# Patient Record
Sex: MALE | Race: WHITE | Employment: OTHER | URBAN - METROPOLITAN AREA
[De-identification: names, ages, dates, MRNs, and addresses within clinical notes are randomized per-mention and may not be internally consistent; named-entity substitution may affect disease eponyms.]

---

## 2022-01-25 ENCOUNTER — HOSPITAL ENCOUNTER (OUTPATIENT)
Dept: LAB | Age: 67
Discharge: HOME | End: 2022-01-25

## 2022-01-25 DIAGNOSIS — D50.9 IRON DEFICIENCY ANEMIA, UNSPECIFIED IRON DEFICIENCY ANEMIA TYPE: ICD-10-CM

## 2022-01-25 DIAGNOSIS — D51.9 VITAMIN B12 DEFICIENCY ANEMIA, UNSPECIFIED: ICD-10-CM

## 2022-01-25 DIAGNOSIS — R68.89 OTHER GENERAL SYMPTOMS AND SIGNS: ICD-10-CM

## 2022-01-25 LAB
ALBUMIN SERPL-MCNC: 2.4 G/DL (ref 3.2–4.6)
ALBUMIN/GLOB SERPL: 0.5 {RATIO} (ref 1.2–3.5)
ALP SERPL-CCNC: 148 U/L (ref 50–136)
ALT SERPL-CCNC: 10 U/L (ref 12–65)
ANION GAP SERPL CALC-SCNC: 7 MMOL/L (ref 7–16)
AST SERPL-CCNC: 8 U/L (ref 15–37)
BASOPHILS # BLD: 0.1 K/UL (ref 0–0.2)
BASOPHILS NFR BLD: 0 % (ref 0–2)
BILIRUB SERPL-MCNC: 0.4 MG/DL (ref 0.2–1.1)
BUN SERPL-MCNC: 15 MG/DL (ref 8–23)
CALCIUM SERPL-MCNC: 8.8 MG/DL (ref 8.3–10.4)
CHLORIDE SERPL-SCNC: 100 MMOL/L (ref 98–107)
CO2 SERPL-SCNC: 27 MMOL/L (ref 21–32)
CREAT SERPL-MCNC: 0.6 MG/DL (ref 0.8–1.5)
DIFFERENTIAL METHOD BLD: ABNORMAL
EOSINOPHIL # BLD: 0.1 K/UL (ref 0–0.8)
EOSINOPHIL NFR BLD: 1 % (ref 0.5–7.8)
ERYTHROCYTE [DISTWIDTH] IN BLOOD BY AUTOMATED COUNT: 18 % (ref 11.9–14.6)
ERYTHROCYTE [SEDIMENTATION RATE] IN BLOOD: 32 MM/HR (ref 0–15)
FERRITIN SERPL-MCNC: 5 NG/ML (ref 8–388)
FOLATE SERPL-MCNC: 9.1 NG/ML (ref 3.1–17.5)
GLOBULIN SER CALC-MCNC: 4.5 G/DL (ref 2.3–3.5)
GLUCOSE SERPL-MCNC: 122 MG/DL (ref 65–100)
HCT VFR BLD AUTO: 27.3 %
HGB BLD-MCNC: 7.3 G/DL (ref 13.6–17.2)
HGB RETIC QN AUTO: 19 PG (ref 29–35)
IMM GRANULOCYTES # BLD AUTO: 0.1 K/UL (ref 0–0.5)
IMM GRANULOCYTES NFR BLD AUTO: 0 % (ref 0–5)
IMM RETICS NFR: 22 % (ref 2.3–13.4)
IRON SATN MFR SERPL: 5 %
IRON SERPL-MCNC: 14 UG/DL (ref 35–150)
LYMPHOCYTES # BLD: 1.6 K/UL (ref 0.5–4.6)
LYMPHOCYTES NFR BLD: 13 % (ref 13–44)
MCH RBC QN AUTO: 17.3 PG (ref 26.1–32.9)
MCHC RBC AUTO-ENTMCNC: 26.7 G/DL (ref 31.4–35)
MCV RBC AUTO: 64.7 FL (ref 79.6–97.8)
MONOCYTES # BLD: 0.7 K/UL (ref 0.1–1.3)
MONOCYTES NFR BLD: 5 % (ref 4–12)
NEUTS SEG # BLD: 9.7 K/UL (ref 1.7–8.2)
NEUTS SEG NFR BLD: 80 % (ref 43–78)
NRBC # BLD: 0 K/UL (ref 0–0.2)
PLATELET # BLD AUTO: 566 K/UL (ref 150–450)
PMV BLD AUTO: 8.1 FL (ref 9.4–12.3)
POTASSIUM SERPL-SCNC: 3.6 MMOL/L (ref 3.5–5.1)
PROT SERPL-MCNC: 6.9 G/DL (ref 6.3–8.2)
RBC # BLD AUTO: 4.22 M/UL (ref 4.23–5.6)
RETICS # AUTO: 0.09 M/UL (ref 0.03–0.1)
RETICS/RBC NFR AUTO: 2.2 % (ref 0.3–2)
SODIUM SERPL-SCNC: 134 MMOL/L (ref 136–145)
TIBC SERPL-MCNC: 273 UG/DL (ref 250–450)
VIT B12 SERPL-MCNC: 225 PG/ML (ref 193–986)
WBC # BLD AUTO: 12.2 K/UL (ref 4.3–11.1)

## 2022-01-25 PROCEDURE — 82607 VITAMIN B-12: CPT

## 2022-01-25 PROCEDURE — 80053 COMPREHEN METABOLIC PANEL: CPT

## 2022-01-25 PROCEDURE — 83090 ASSAY OF HOMOCYSTEINE: CPT

## 2022-01-25 PROCEDURE — 82746 ASSAY OF FOLIC ACID SERUM: CPT

## 2022-01-25 PROCEDURE — 83540 ASSAY OF IRON: CPT

## 2022-01-25 PROCEDURE — 85025 COMPLETE CBC W/AUTO DIFF WBC: CPT

## 2022-01-25 PROCEDURE — 36415 COLL VENOUS BLD VENIPUNCTURE: CPT

## 2022-01-25 PROCEDURE — 82728 ASSAY OF FERRITIN: CPT

## 2022-01-25 PROCEDURE — 83921 ORGANIC ACID SINGLE QUANT: CPT

## 2022-01-25 PROCEDURE — 85652 RBC SED RATE AUTOMATED: CPT

## 2022-01-25 PROCEDURE — 85046 RETICYTE/HGB CONCENTRATE: CPT

## 2022-01-26 LAB — PATH REV BLD -IMP: NORMAL

## 2022-01-31 ENCOUNTER — HOSPITAL ENCOUNTER (OUTPATIENT)
Dept: INFUSION THERAPY | Age: 67
Discharge: HOME | End: 2022-01-31

## 2022-01-31 VITALS
DIASTOLIC BLOOD PRESSURE: 71 MMHG | TEMPERATURE: 97.2 F | SYSTOLIC BLOOD PRESSURE: 126 MMHG | RESPIRATION RATE: 16 BRPM | HEART RATE: 91 BPM | OXYGEN SATURATION: 97 %

## 2022-01-31 DIAGNOSIS — D50.9 IRON DEFICIENCY ANEMIA, UNSPECIFIED IRON DEFICIENCY ANEMIA TYPE: Primary | ICD-10-CM

## 2022-01-31 LAB — HCYS SERPL-SCNC: 15.3 UMOL/L (ref 0–17.2)

## 2022-01-31 PROCEDURE — 74011250636 HC RX REV CODE- 250/636: Performed by: INTERNAL MEDICINE

## 2022-01-31 PROCEDURE — 96374 THER/PROPH/DIAG INJ IV PUSH: CPT

## 2022-01-31 PROCEDURE — 74011000250 HC RX REV CODE- 250: Performed by: INTERNAL MEDICINE

## 2022-01-31 PROCEDURE — 74011000258 HC RX REV CODE- 258: Performed by: INTERNAL MEDICINE

## 2022-01-31 RX ORDER — DIPHENHYDRAMINE HYDROCHLORIDE 50 MG/ML
25 INJECTION, SOLUTION INTRAMUSCULAR; INTRAVENOUS AS NEEDED
Status: DISPENSED | OUTPATIENT
Start: 2022-01-31 | End: 2022-01-31

## 2022-01-31 RX ORDER — HYDROCORTISONE SODIUM SUCCINATE 100 MG/2ML
100 INJECTION, POWDER, FOR SOLUTION INTRAMUSCULAR; INTRAVENOUS AS NEEDED
Status: DISPENSED | OUTPATIENT
Start: 2022-01-31 | End: 2022-01-31

## 2022-01-31 RX ORDER — SODIUM CHLORIDE 0.9 % (FLUSH) 0.9 %
10 SYRINGE (ML) INJECTION AS NEEDED
Status: ACTIVE | OUTPATIENT
Start: 2022-01-31 | End: 2022-01-31

## 2022-01-31 RX ORDER — SODIUM CHLORIDE 9 MG/ML
10 INJECTION INTRAMUSCULAR; INTRAVENOUS; SUBCUTANEOUS AS NEEDED
Status: ACTIVE | OUTPATIENT
Start: 2022-01-31 | End: 2022-01-31

## 2022-01-31 RX ORDER — HEPARIN 100 UNIT/ML
300-500 SYRINGE INTRAVENOUS AS NEEDED
Status: ACTIVE | OUTPATIENT
Start: 2022-01-31 | End: 2022-01-31

## 2022-01-31 RX ADMIN — SODIUM CHLORIDE 500 ML: 9 INJECTION, SOLUTION INTRAVENOUS at 08:15

## 2022-01-31 RX ADMIN — SODIUM CHLORIDE, PRESERVATIVE FREE 10 ML: 5 INJECTION INTRAVENOUS at 08:05

## 2022-01-31 RX ADMIN — FERUMOXYTOL 510 MG: 510 INJECTION INTRAVENOUS at 08:29

## 2022-01-31 NOTE — PROGRESS NOTES
Pt. Discharged ambulatory. Tolerated infusion well. Discharge instructions given. To return to Infusions on 2/7/22.

## 2022-02-04 LAB
Lab: NORMAL
METHYLMALONATE SERPL-SCNC: 350 NMOL/L (ref 0–378)

## 2022-02-07 ENCOUNTER — HOSPITAL ENCOUNTER (OUTPATIENT)
Dept: INFUSION THERAPY | Age: 67
Discharge: HOME | End: 2022-02-07

## 2022-02-07 VITALS
DIASTOLIC BLOOD PRESSURE: 64 MMHG | SYSTOLIC BLOOD PRESSURE: 120 MMHG | TEMPERATURE: 98.4 F | RESPIRATION RATE: 16 BRPM | OXYGEN SATURATION: 97 % | HEART RATE: 86 BPM

## 2022-02-07 DIAGNOSIS — D50.9 IRON DEFICIENCY ANEMIA, UNSPECIFIED IRON DEFICIENCY ANEMIA TYPE: Primary | ICD-10-CM

## 2022-02-07 PROCEDURE — 74011250636 HC RX REV CODE- 250/636: Performed by: INTERNAL MEDICINE

## 2022-02-07 PROCEDURE — 74011000258 HC RX REV CODE- 258: Performed by: INTERNAL MEDICINE

## 2022-02-07 PROCEDURE — 74011000250 HC RX REV CODE- 250: Performed by: INTERNAL MEDICINE

## 2022-02-07 PROCEDURE — 96365 THER/PROPH/DIAG IV INF INIT: CPT

## 2022-02-07 RX ORDER — SODIUM CHLORIDE 0.9 % (FLUSH) 0.9 %
10 SYRINGE (ML) INJECTION AS NEEDED
Status: ACTIVE | OUTPATIENT
Start: 2022-02-07 | End: 2022-02-07

## 2022-02-07 RX ADMIN — SODIUM CHLORIDE, PRESERVATIVE FREE 10 ML: 5 INJECTION INTRAVENOUS at 08:55

## 2022-02-07 RX ADMIN — SODIUM CHLORIDE, PRESERVATIVE FREE 10 ML: 5 INJECTION INTRAVENOUS at 10:00

## 2022-02-07 RX ADMIN — FERUMOXYTOL 510 MG: 510 INJECTION INTRAVENOUS at 09:13

## 2022-02-07 RX ADMIN — SODIUM CHLORIDE 500 ML: 900 INJECTION, SOLUTION INTRAVENOUS at 08:55

## 2022-02-07 NOTE — PROGRESS NOTES
Patient arrived at [facility]. Assessment completed. No needs voiced at this time. Patient tolerated infusion well and is aware of next appointment on 3/7/2022 @8047. Patient discharged ambulatory.

## 2022-02-25 ENCOUNTER — HOSPITAL ENCOUNTER (OUTPATIENT)
Dept: CT IMAGING | Age: 67
Discharge: HOME | End: 2022-02-25
Attending: INTERNAL MEDICINE

## 2022-02-25 DIAGNOSIS — R63.4 WEIGHT LOSS: ICD-10-CM

## 2022-02-25 DIAGNOSIS — D64.9 ANEMIA, UNSPECIFIED TYPE: ICD-10-CM

## 2022-02-25 LAB — CREAT BLD-MCNC: 0.59 MG/DL (ref 0.8–1.5)

## 2022-02-25 PROCEDURE — 74177 CT ABD & PELVIS W/CONTRAST: CPT

## 2022-02-25 PROCEDURE — 74011000636 HC RX REV CODE- 636: Performed by: INTERNAL MEDICINE

## 2022-02-25 PROCEDURE — 74011000258 HC RX REV CODE- 258: Performed by: INTERNAL MEDICINE

## 2022-02-25 PROCEDURE — 82565 ASSAY OF CREATININE: CPT

## 2022-02-25 RX ORDER — SODIUM CHLORIDE 0.9 % (FLUSH) 0.9 %
10 SYRINGE (ML) INJECTION
Status: COMPLETED | OUTPATIENT
Start: 2022-02-25 | End: 2022-02-25

## 2022-02-25 RX ADMIN — DIATRIZOATE MEGLUMINE AND DIATRIZOATE SODIUM 15 ML: 660; 100 LIQUID ORAL; RECTAL at 10:16

## 2022-02-25 RX ADMIN — SODIUM CHLORIDE 100 ML: 900 INJECTION, SOLUTION INTRAVENOUS at 10:16

## 2022-02-25 RX ADMIN — Medication 10 ML: at 10:16

## 2022-02-25 RX ADMIN — IOPAMIDOL 100 ML: 755 INJECTION, SOLUTION INTRAVENOUS at 10:13

## 2022-03-07 ENCOUNTER — HOSPITAL ENCOUNTER (OUTPATIENT)
Dept: LAB | Age: 67
Discharge: HOME | End: 2022-03-07

## 2022-03-07 DIAGNOSIS — D50.9 IRON DEFICIENCY ANEMIA, UNSPECIFIED IRON DEFICIENCY ANEMIA TYPE: ICD-10-CM

## 2022-03-07 LAB
ALBUMIN SERPL-MCNC: 1.7 G/DL (ref 3.2–4.6)
ALBUMIN/GLOB SERPL: 0.4 {RATIO} (ref 1.2–3.5)
ALP SERPL-CCNC: 130 U/L (ref 50–136)
ALT SERPL-CCNC: 11 U/L (ref 12–65)
ANION GAP SERPL CALC-SCNC: 6 MMOL/L (ref 7–16)
AST SERPL-CCNC: 10 U/L (ref 15–37)
BASOPHILS # BLD: 0 K/UL (ref 0–0.2)
BASOPHILS NFR BLD: 0 % (ref 0–2)
BILIRUB SERPL-MCNC: 0.2 MG/DL (ref 0.2–1.1)
BUN SERPL-MCNC: 8 MG/DL (ref 8–23)
CALCIUM SERPL-MCNC: 8 MG/DL (ref 8.3–10.4)
CHLORIDE SERPL-SCNC: 99 MMOL/L (ref 98–107)
CO2 SERPL-SCNC: 27 MMOL/L (ref 21–32)
CREAT SERPL-MCNC: 0.4 MG/DL (ref 0.8–1.5)
DIFFERENTIAL METHOD BLD: ABNORMAL
EOSINOPHIL # BLD: 0.1 K/UL (ref 0–0.8)
EOSINOPHIL NFR BLD: 1 % (ref 0.5–7.8)
FERRITIN SERPL-MCNC: 39 NG/ML (ref 8–388)
GLOBULIN SER CALC-MCNC: 3.8 G/DL (ref 2.3–3.5)
GLUCOSE SERPL-MCNC: 90 MG/DL (ref 65–100)
HCT VFR BLD AUTO: 27.3 %
HGB BLD-MCNC: 7.8 G/DL (ref 13.6–17.2)
HGB RETIC QN AUTO: 25 PG (ref 29–35)
IMM GRANULOCYTES # BLD AUTO: 0 K/UL (ref 0–0.5)
IMM GRANULOCYTES NFR BLD AUTO: 0 % (ref 0–5)
IMM RETICS NFR: 26.9 % (ref 2.3–13.4)
IRON SATN MFR SERPL: 9 %
IRON SERPL-MCNC: 12 UG/DL (ref 35–150)
LYMPHOCYTES # BLD: 1.4 K/UL (ref 0.5–4.6)
LYMPHOCYTES NFR BLD: 12 % (ref 13–44)
MCH RBC QN AUTO: 21.4 PG (ref 26.1–32.9)
MCHC RBC AUTO-ENTMCNC: 28.6 G/DL (ref 31.4–35)
MCV RBC AUTO: 74.8 FL (ref 79.6–97.8)
MONOCYTES # BLD: 0.9 K/UL (ref 0.1–1.3)
MONOCYTES NFR BLD: 8 % (ref 4–12)
NEUTS SEG # BLD: 8.9 K/UL (ref 1.7–8.2)
NEUTS SEG NFR BLD: 79 % (ref 43–78)
NRBC # BLD: 0 K/UL (ref 0–0.2)
PLATELET # BLD AUTO: 485 K/UL (ref 150–450)
PLATELET COMMENTS,PCOM: ABNORMAL
PMV BLD AUTO: 8.1 FL (ref 9.4–12.3)
POTASSIUM SERPL-SCNC: 3.7 MMOL/L (ref 3.5–5.1)
PROT SERPL-MCNC: 5.5 G/DL (ref 6.3–8.2)
RBC # BLD AUTO: 3.65 M/UL (ref 4.23–5.6)
RBC MORPH BLD: ABNORMAL
RETICS # AUTO: 0.11 M/UL (ref 0.03–0.1)
RETICS/RBC NFR AUTO: 3 % (ref 0.3–2)
SODIUM SERPL-SCNC: 132 MMOL/L (ref 136–145)
TIBC SERPL-MCNC: 127 UG/DL (ref 250–450)
WBC # BLD AUTO: 11.3 K/UL (ref 4.3–11.1)
WBC MORPH BLD: ABNORMAL

## 2022-03-07 PROCEDURE — 80053 COMPREHEN METABOLIC PANEL: CPT

## 2022-03-07 PROCEDURE — 85046 RETICYTE/HGB CONCENTRATE: CPT

## 2022-03-07 PROCEDURE — 83540 ASSAY OF IRON: CPT

## 2022-03-07 PROCEDURE — 36415 COLL VENOUS BLD VENIPUNCTURE: CPT

## 2022-03-07 PROCEDURE — 85025 COMPLETE CBC W/AUTO DIFF WBC: CPT

## 2022-03-07 PROCEDURE — 82728 ASSAY OF FERRITIN: CPT

## 2022-03-09 ENCOUNTER — ANESTHESIA EVENT (OUTPATIENT)
Dept: ENDOSCOPY | Age: 67
End: 2022-03-09

## 2022-03-09 ENCOUNTER — ANESTHESIA (OUTPATIENT)
Dept: ENDOSCOPY | Age: 67
End: 2022-03-09

## 2022-03-09 ENCOUNTER — HOSPITAL ENCOUNTER (OUTPATIENT)
Age: 67
Setting detail: OUTPATIENT SURGERY
Discharge: HOME | End: 2022-03-09
Attending: INTERNAL MEDICINE | Admitting: INTERNAL MEDICINE

## 2022-03-09 VITALS
OXYGEN SATURATION: 97 % | TEMPERATURE: 97.7 F | RESPIRATION RATE: 14 BRPM | DIASTOLIC BLOOD PRESSURE: 65 MMHG | HEART RATE: 64 BPM | SYSTOLIC BLOOD PRESSURE: 106 MMHG

## 2022-03-09 PROCEDURE — 88305 TISSUE EXAM BY PATHOLOGIST: CPT

## 2022-03-09 PROCEDURE — 76060000033 HC ANESTHESIA 1 TO 1.5 HR: Performed by: INTERNAL MEDICINE

## 2022-03-09 PROCEDURE — 74011250636 HC RX REV CODE- 250/636: Performed by: ANESTHESIOLOGY

## 2022-03-09 PROCEDURE — 76040000026: Performed by: INTERNAL MEDICINE

## 2022-03-09 PROCEDURE — 2709999900 HC NON-CHARGEABLE SUPPLY: Performed by: INTERNAL MEDICINE

## 2022-03-09 PROCEDURE — 77030008969: Performed by: INTERNAL MEDICINE

## 2022-03-09 PROCEDURE — 77030021593 HC FCPS BIOP ENDOSC BSC -A: Performed by: INTERNAL MEDICINE

## 2022-03-09 PROCEDURE — 74011000250 HC RX REV CODE- 250: Performed by: STUDENT IN AN ORGANIZED HEALTH CARE EDUCATION/TRAINING PROGRAM

## 2022-03-09 PROCEDURE — 88312 SPECIAL STAINS GROUP 1: CPT

## 2022-03-09 PROCEDURE — 74011250636 HC RX REV CODE- 250/636: Performed by: STUDENT IN AN ORGANIZED HEALTH CARE EDUCATION/TRAINING PROGRAM

## 2022-03-09 RX ORDER — PROPOFOL 10 MG/ML
INJECTION, EMULSION INTRAVENOUS
Status: DISCONTINUED | OUTPATIENT
Start: 2022-03-09 | End: 2022-03-09 | Stop reason: HOSPADM

## 2022-03-09 RX ORDER — PROPOFOL 10 MG/ML
INJECTION, EMULSION INTRAVENOUS AS NEEDED
Status: DISCONTINUED | OUTPATIENT
Start: 2022-03-09 | End: 2022-03-09 | Stop reason: HOSPADM

## 2022-03-09 RX ORDER — LIDOCAINE HYDROCHLORIDE 20 MG/ML
INJECTION, SOLUTION EPIDURAL; INFILTRATION; INTRACAUDAL; PERINEURAL AS NEEDED
Status: DISCONTINUED | OUTPATIENT
Start: 2022-03-09 | End: 2022-03-09 | Stop reason: HOSPADM

## 2022-03-09 RX ORDER — SODIUM CHLORIDE, SODIUM LACTATE, POTASSIUM CHLORIDE, CALCIUM CHLORIDE 600; 310; 30; 20 MG/100ML; MG/100ML; MG/100ML; MG/100ML
1000 INJECTION, SOLUTION INTRAVENOUS CONTINUOUS
Status: DISCONTINUED | OUTPATIENT
Start: 2022-03-09 | End: 2022-03-09 | Stop reason: HOSPADM

## 2022-03-09 RX ADMIN — LIDOCAINE HYDROCHLORIDE 100 MG: 20 INJECTION, SOLUTION EPIDURAL; INFILTRATION; INTRACAUDAL; PERINEURAL at 13:48

## 2022-03-09 RX ADMIN — SODIUM CHLORIDE, SODIUM LACTATE, POTASSIUM CHLORIDE, AND CALCIUM CHLORIDE: 600; 310; 30; 20 INJECTION, SOLUTION INTRAVENOUS at 13:44

## 2022-03-09 RX ADMIN — PHENYLEPHRINE HYDROCHLORIDE 100 MCG: 10 INJECTION INTRAVENOUS at 14:31

## 2022-03-09 RX ADMIN — PROPOFOL 50 MG: 10 INJECTION, EMULSION INTRAVENOUS at 13:48

## 2022-03-09 RX ADMIN — PHENYLEPHRINE HYDROCHLORIDE 100 MCG: 10 INJECTION INTRAVENOUS at 14:50

## 2022-03-09 RX ADMIN — PHENYLEPHRINE HYDROCHLORIDE 100 MCG: 10 INJECTION INTRAVENOUS at 14:45

## 2022-03-09 RX ADMIN — PROPOFOL 160 MCG/KG/MIN: 10 INJECTION, EMULSION INTRAVENOUS at 13:49

## 2022-03-09 NOTE — H&P
History and Physical for Procedures             Date: 3/9/2022     History of Present Illness:  Patient presents to undergo EGD and colonoscopy. Patient was found to have iron deficiency anemia and radiographic evidence of a gastric mass. EUS will also be performed for locoregional staging of gastric mass. Past Medical History:   Diagnosis Date    HTN (hypertension)      Past Surgical History:   Procedure Laterality Date    HX OTHER SURGICAL      none      Family History   Problem Relation Age of Onset    Other Other         non-contributory     Social History     Tobacco Use    Smoking status: Former Smoker    Smokeless tobacco: Former User   Substance Use Topics    Alcohol use: Not Currently        Allergies   Allergen Reactions    Tetanus Vaccines And Toxoid Unknown (comments)     No current facility-administered medications for this encounter. Review of Systems:  A detailed 10 organ review of systems is obtained with pertinent positives as listed in the History of Present Illness. All others are negative. Objective:     Physical Exam:  There were no vitals taken for this visit. General:  Alert and oriented. Heart: Regular rate and rhythm  Lungs:  Clear to auscultation bilaterally  Abdomen: Soft, nontender, nondistended    Impression/Plan:     Proceed with EGD/EUS and colonoscopy as planned. I have discussed with the patient the technique, benefits, alternatives, and risks of these procedures, including medication reaction, immediate or delayed bleeding, or perforation of the gastrointestinal tract.      Signed By: Oscar Salgado MD     March 9, 2022

## 2022-03-09 NOTE — DISCHARGE INSTRUCTIONS
Gastrointestinal Colonoscopy/Flexible Sigmoidoscopy - Lower Exam Discharge Instructions  1. Call Dr. Mendel Peña at [phone] for any problems or questions. 2. Contact the doctors office for follow up appointment as directed  3. Medication may cause drowsiness for several hours, therefore:  · Do not drive or operate machinery for reminder of the day. · No alcohol today. · Do not make any important or legal decisions for 24 hours. · Do not sign any legal documents for 24 hours. 4. Ordinarily, you may resume regular diet and activity after exam unless otherwise specified by your physician. 5. Because of air put into your colon during exam, you may experience some abdominal distension, relieved by the passage of gas, for several hours. 6. Contact your physician if you have any of the following:  · Excessive amount of bleeding - large amount when having a bowel movement. Occasional specks of blood with bowel movement would not be unusual.  · Severe abdominal pain  · Fever or Chills. Gastrointestinal Esophagogastroduodenoscopy (EGD) - Upper Exam Discharge Instructions    6. For mild soreness in your throat you may use Cepacol throat lozenges or warm salt-water gargles as needed. Any additional instructions:  1. Office will call with biopsy report. 2. Eat a high fiber diet. 3. Return to office in 1-2 months. Instructions given to Rusty Sams and other family member.

## 2022-03-09 NOTE — OP NOTES
Procedure:  Esophagogastroduodenoscopy, Endoscopic ultrasound with Doppler     Date of Procedure:  3/9/2022    Patient:  Ghazala Oconnor     1955    Indication:  Gastric mass on CT, iron deficiency anemia     Sedation:  MAC    Pre-Procedure Physical Exam:    Mental status:  alert and oriented  Airway:  normal oropharyngeal airway and neck mobility  CV:  regular rate and rhythm  Respiratory:  clear to auscultation    Procedure:  A History and Physical has been performed, and patient medication allergies have been reviewed. Risks of perforation, hemorrhage, adverse drug reaction, and aspiration were discussed. Informed consent was obtained for the procedure, including sedation. The patient was placed in the left lateral decubitus position. The heart rate, oxygen saturations, blood pressure, and response to care were monitored throughout the procedure. The gastroscope was passed through the mouth and advanced under direct vision to the second portion of the duodenum. The radial echoendoscope was then passed through the mouth and advanced under direct vision to the distal duodenum. As the scope was slowly withdrawn, detailed endoscopic images were obtained from the surrounding organs. The patient tolerated the procedure well. ENDOSCOPIC FINDINGS:  OROPHARYNX:  Cords and pyriform recesses appear normal.    ESOPHAGUS:  The esophagus is normal. The proximal, mid, and distal portions are normal. The Z-Line is intact. STOMACH:  A large, circumferential, fungating mass is seen in the proximal stomach extending distally to the level of the incisura. Biopsies were obtained. DUODENUM:  Normal bulb, second and third portions. The ampulla was well visualized with the echoendoscope and appears normal.     EUS FINDINGS:    STOMACH:  Multiple sweeps were made throughout the stomach visualizing the entire wall from the pylorus to the gastroesophageal junction.  At a distance 38 to 58 cm showed the incisors, there are severe circumferential wall thickening to 25 mm maximally. There is a hypoechoic, heterogeneous mass involving all wall layers with definite extension through the muscularis propria into the visceral peritoneum. There is no convincing invasion of adjacent organs. ESOPHAGUS:  Multiple sweeps were made throughout the esophagus visualizing the entire wall from the gastroesophageal junction to the upper esophageal sphincter. Proximal to the previously described mass, the remainder of the esophageal wall is of normal thickness and normal wall architecture. OTHER ORGANS:  Views of the left lobe of the liver demonstrate no solid mass lesions. The left adrenal gland appears normal. Due to the focused nature of the examination, detailed images of the pancreas and biliary tree were not obtained. There are no pathologically enlarged upper abdominal lymph nodes. There are several sub-centimeter perigastric nodes. FNA was not performed as the needle would traverse the mass. Specimen:  No    Estimated Blood Loss:  None    Implant:  None            Impression:     Gastric neoplasm. If malignancy is confirmed, this is stage T4a N0 MX by EUS criteria. Plan:  1. Resume diet and current medications. 2. Return to office in 1-2 months for ongoing care.

## 2022-03-09 NOTE — ANESTHESIA POSTPROCEDURE EVALUATION
Procedure(s):  ESOPHAGOGASTRODUODENOSCOPY (EGD)  ENDOSCOPIC ULTRASOUND (EUS)  COLONOSCOPY/ 22  ESOPHAGOGASTRODUODENAL (EGD) BIOPSY  ENDOSCOPIC POLYPECTOMY. total IV anesthesia    Anesthesia Post Evaluation      Multimodal analgesia: multimodal analgesia not used between 6 hours prior to anesthesia start to PACU discharge  Patient location during evaluation: PACU  Patient participation: complete - patient participated  Level of consciousness: awake and alert  Pain management: adequate  Airway patency: patent  Anesthetic complications: no  Cardiovascular status: hemodynamically stable  Respiratory status: acceptable  Hydration status: acceptable        INITIAL Post-op Vital signs:   Vitals Value Taken Time   /69 03/09/22 1519   Temp 36.5 °C (97.7 °F) 03/09/22 1452   Pulse 68 03/09/22 1527   Resp 14 03/09/22 1516   SpO2 97 % 03/09/22 1525   Vitals shown include unvalidated device data.

## 2022-03-09 NOTE — ANESTHESIA PREPROCEDURE EVALUATION
Relevant Problems   HEMATOLOGY   (+) MANUEL (iron deficiency anemia)       Anesthetic History               Review of Systems / Medical History      Pulmonary                   Neuro/Psych              Cardiovascular    Hypertension: well controlled              Exercise tolerance: >4 METS     GI/Hepatic/Renal                Endo/Other             Other Findings   Comments: Weight loss  Gastric mass           Physical Exam    Airway  Mallampati: II  TM Distance: > 6 cm  Neck ROM: normal range of motion   Mouth opening: Normal     Cardiovascular  Regular rate and rhythm,  S1 and S2 normal,  no murmur, click, rub, or gallop  Rhythm: regular  Rate: normal         Dental  No notable dental hx       Pulmonary  Breath sounds clear to auscultation               Abdominal         Other Findings            Anesthetic Plan    ASA: 2  Anesthesia type: total IV anesthesia            Anesthetic plan and risks discussed with: Patient

## 2022-03-09 NOTE — OP NOTES
Procedure:  Colonoscopy    Date of Procedure:  3/9/2022    Patient:  Andrews Sanchez     1955    Indication:  Iron deficiency anemia     Sedation:  MAC    Pre-Procedure Exam:    Mental status:  alert and oriented  Airway:  normal oropharyngeal airway and neck mobility  CV:  regular rate and rhythm   Respiratory:  clear to auscultation    Procedure:  A History and Physical has been performed, and patient medication allergies have been reviewed. Risks of perforation, hemorrhage, adverse drug reaction, and aspiration were discussed. Informed consent was obtained for the procedure, including sedation. The patient was placed in the left lateral decubitus position. The heart rate, oxygen saturations, blood pressure, and response to care were monitored throughout the procedure. After performing a rectal exam, the colonoscope was passed through the anus and advanced under direct vision to the cecum, identified by appendiceal orifice and ileocecal valve. The quality of prep was adequate. A careful inspection was made as the colonoscope was withdrawn, including a retroflexed view of the rectum. The patient tolerated the procedure well. Findings:     ANUS:  Anal exam reveals no masses or hemorrhoids. RECTUM:  Internal hemorrhoids were seen in the rectum. SIGMOID COLON:  A few small-mouthed diverticula were seen. DESCENDING COLON:  A few small-mouthed diverticula were seen. SPLENIC FLEXURE:  The splenic flexure is normal.   TRANSVERSE COLON:  One 3 mm sessile polyp was removed using a cold forceps. HEPATIC FLEXURE:  The hepatic flexure is normal.   ASCENDING COLON:  One 4 mm sessile polyp was removed using a cold forceps. CECUM:  The appendiceal orifice appears normal. The ileocecal valve appears normal.   TERMINAL ILEUM:  The terminal ileum was not entered.      Specimen:  Yes    Estimated Blood Loss:  Minimal    Implant:  None           Impression:    Colon polyps. Diverticulosis. Internal hemorrhoids. Plan:  1. Follow up pathology results. 2. Repeat colonoscopy for surveillance based on pathology results. 3. High fiber diet indefinitely. 4. Return to office in 2 months.     Signed:  Mirna Yang MD  3/9/2022  2:24 PM

## 2022-03-14 ENCOUNTER — HOSPITAL ENCOUNTER (OUTPATIENT)
Dept: INFUSION THERAPY | Age: 67
Discharge: HOME | End: 2022-03-14

## 2022-03-14 VITALS
SYSTOLIC BLOOD PRESSURE: 122 MMHG | OXYGEN SATURATION: 99 % | DIASTOLIC BLOOD PRESSURE: 86 MMHG | HEART RATE: 98 BPM | RESPIRATION RATE: 16 BRPM | TEMPERATURE: 98 F

## 2022-03-14 DIAGNOSIS — D50.9 IRON DEFICIENCY ANEMIA, UNSPECIFIED IRON DEFICIENCY ANEMIA TYPE: Primary | ICD-10-CM

## 2022-03-14 PROCEDURE — 96365 THER/PROPH/DIAG IV INF INIT: CPT

## 2022-03-14 PROCEDURE — 74011000250 HC RX REV CODE- 250: Performed by: INTERNAL MEDICINE

## 2022-03-14 PROCEDURE — 74011250636 HC RX REV CODE- 250/636: Performed by: INTERNAL MEDICINE

## 2022-03-14 PROCEDURE — 74011000258 HC RX REV CODE- 258: Performed by: INTERNAL MEDICINE

## 2022-03-14 RX ORDER — DIPHENHYDRAMINE HYDROCHLORIDE 50 MG/ML
25 INJECTION, SOLUTION INTRAMUSCULAR; INTRAVENOUS AS NEEDED
Status: CANCELLED | OUTPATIENT
Start: 2022-03-14

## 2022-03-14 RX ORDER — HEPARIN 100 UNIT/ML
300-500 SYRINGE INTRAVENOUS AS NEEDED
Status: CANCELLED | OUTPATIENT
Start: 2022-03-14

## 2022-03-14 RX ORDER — DIPHENHYDRAMINE HYDROCHLORIDE 50 MG/ML
25 INJECTION, SOLUTION INTRAMUSCULAR; INTRAVENOUS AS NEEDED
Status: CANCELLED | OUTPATIENT
Start: 2022-03-21

## 2022-03-14 RX ORDER — SODIUM CHLORIDE 9 MG/ML
10 INJECTION INTRAMUSCULAR; INTRAVENOUS; SUBCUTANEOUS AS NEEDED
Status: CANCELLED | OUTPATIENT
Start: 2022-03-14

## 2022-03-14 RX ORDER — HYDROCORTISONE SODIUM SUCCINATE 100 MG/2ML
100 INJECTION, POWDER, FOR SOLUTION INTRAMUSCULAR; INTRAVENOUS AS NEEDED
Status: CANCELLED | OUTPATIENT
Start: 2022-03-21

## 2022-03-14 RX ORDER — ACETAMINOPHEN 325 MG/1
650 TABLET ORAL AS NEEDED
Status: CANCELLED
Start: 2022-03-14

## 2022-03-14 RX ORDER — ONDANSETRON 2 MG/ML
8 INJECTION INTRAMUSCULAR; INTRAVENOUS AS NEEDED
Status: CANCELLED | OUTPATIENT
Start: 2022-03-21

## 2022-03-14 RX ORDER — SODIUM CHLORIDE 0.9 % (FLUSH) 0.9 %
10 SYRINGE (ML) INJECTION AS NEEDED
Status: ACTIVE | OUTPATIENT
Start: 2022-03-14 | End: 2022-03-14

## 2022-03-14 RX ORDER — EPINEPHRINE 1 MG/ML
0.3 INJECTION, SOLUTION, CONCENTRATE INTRAVENOUS AS NEEDED
Status: CANCELLED | OUTPATIENT
Start: 2022-03-21

## 2022-03-14 RX ORDER — ALBUTEROL SULFATE 0.83 MG/ML
2.5 SOLUTION RESPIRATORY (INHALATION) AS NEEDED
Status: CANCELLED
Start: 2022-03-14

## 2022-03-14 RX ORDER — HYDROCORTISONE SODIUM SUCCINATE 100 MG/2ML
100 INJECTION, POWDER, FOR SOLUTION INTRAMUSCULAR; INTRAVENOUS AS NEEDED
Status: CANCELLED | OUTPATIENT
Start: 2022-03-14

## 2022-03-14 RX ORDER — HEPARIN 100 UNIT/ML
300-500 SYRINGE INTRAVENOUS AS NEEDED
Status: CANCELLED | OUTPATIENT
Start: 2022-03-21

## 2022-03-14 RX ORDER — SODIUM CHLORIDE 9 MG/ML
10 INJECTION INTRAMUSCULAR; INTRAVENOUS; SUBCUTANEOUS AS NEEDED
Status: CANCELLED | OUTPATIENT
Start: 2022-03-21

## 2022-03-14 RX ORDER — ONDANSETRON 2 MG/ML
8 INJECTION INTRAMUSCULAR; INTRAVENOUS AS NEEDED
Status: CANCELLED | OUTPATIENT
Start: 2022-03-14

## 2022-03-14 RX ORDER — DIPHENHYDRAMINE HYDROCHLORIDE 50 MG/ML
50 INJECTION, SOLUTION INTRAMUSCULAR; INTRAVENOUS AS NEEDED
Status: CANCELLED
Start: 2022-03-14

## 2022-03-14 RX ORDER — DIPHENHYDRAMINE HYDROCHLORIDE 50 MG/ML
50 INJECTION, SOLUTION INTRAMUSCULAR; INTRAVENOUS AS NEEDED
Status: CANCELLED
Start: 2022-03-21

## 2022-03-14 RX ORDER — ACETAMINOPHEN 325 MG/1
650 TABLET ORAL AS NEEDED
Status: CANCELLED
Start: 2022-03-21

## 2022-03-14 RX ORDER — ALBUTEROL SULFATE 0.83 MG/ML
2.5 SOLUTION RESPIRATORY (INHALATION) AS NEEDED
Status: CANCELLED
Start: 2022-03-21

## 2022-03-14 RX ORDER — EPINEPHRINE 1 MG/ML
0.3 INJECTION, SOLUTION, CONCENTRATE INTRAVENOUS AS NEEDED
Status: CANCELLED | OUTPATIENT
Start: 2022-03-14

## 2022-03-14 RX ORDER — SODIUM CHLORIDE 0.9 % (FLUSH) 0.9 %
10 SYRINGE (ML) INJECTION AS NEEDED
Status: CANCELLED | OUTPATIENT
Start: 2022-03-21

## 2022-03-14 RX ADMIN — FERUMOXYTOL 510 MG: 510 INJECTION INTRAVENOUS at 12:06

## 2022-03-14 RX ADMIN — SODIUM CHLORIDE, PRESERVATIVE FREE 10 ML: 5 INJECTION INTRAVENOUS at 11:40

## 2022-03-14 RX ADMIN — SODIUM CHLORIDE, PRESERVATIVE FREE 10 ML: 5 INJECTION INTRAVENOUS at 12:29

## 2022-03-14 RX ADMIN — SODIUM CHLORIDE 500 ML: 900 INJECTION, SOLUTION INTRAVENOUS at 11:40

## 2022-03-14 NOTE — PROGRESS NOTES
Patient arrived to [facility]. Assessment completed. No needs voiced at this time. Patient tolerated infusion well and is aware of next appointment on 3/21/2022 @1400. Patient discharged ambulatory.

## 2022-03-15 ENCOUNTER — HOSPITAL ENCOUNTER (OUTPATIENT)
Dept: PET IMAGING | Age: 67
Discharge: HOME | End: 2022-03-15

## 2022-03-15 DIAGNOSIS — C16.2 MALIGNANT NEOPLASM OF BODY OF STOMACH (HCC): ICD-10-CM

## 2022-03-15 DIAGNOSIS — C16.9 MALIGNANT NEOPLASM OF STOMACH, UNSPECIFIED LOCATION (HCC): ICD-10-CM

## 2022-03-15 LAB
GLUCOSE BLD STRIP.AUTO-MCNC: 94 MG/DL (ref 65–100)
SERVICE CMNT-IMP: NORMAL

## 2022-03-15 PROCEDURE — 82962 GLUCOSE BLOOD TEST: CPT

## 2022-03-15 PROCEDURE — 74011000636 HC RX REV CODE- 636: Performed by: INTERNAL MEDICINE

## 2022-03-15 PROCEDURE — A9552 F18 FDG: HCPCS

## 2022-03-15 RX ORDER — FLUDEOXYGLUCOSE F-18 200 MCI/ML
10 INJECTION INTRAVENOUS ONCE
Status: COMPLETED | OUTPATIENT
Start: 2022-03-15 | End: 2022-03-15

## 2022-03-15 RX ORDER — SODIUM CHLORIDE 0.9 % (FLUSH) 0.9 %
10 SYRINGE (ML) INJECTION
Status: COMPLETED | OUTPATIENT
Start: 2022-03-15 | End: 2022-03-15

## 2022-03-15 RX ADMIN — FLUDEOXYGLUCOSE F-18 13.07 MILLICURIE: 200 INJECTION INTRAVENOUS at 13:08

## 2022-03-15 RX ADMIN — Medication 10 ML: at 13:08

## 2022-03-15 RX ADMIN — DIATRIZOATE MEGLUMINE AND DIATRIZOATE SODIUM 10 ML: 660; 100 LIQUID ORAL; RECTAL at 13:08

## 2022-03-19 PROBLEM — D50.9 IDA (IRON DEFICIENCY ANEMIA): Status: ACTIVE | Noted: 2022-01-25

## 2022-03-21 ENCOUNTER — HOSPITAL ENCOUNTER (OUTPATIENT)
Dept: INFUSION THERAPY | Age: 67
Discharge: HOME | End: 2022-03-21

## 2022-03-21 VITALS
HEART RATE: 93 BPM | OXYGEN SATURATION: 98 % | SYSTOLIC BLOOD PRESSURE: 127 MMHG | DIASTOLIC BLOOD PRESSURE: 86 MMHG | RESPIRATION RATE: 16 BRPM | TEMPERATURE: 98 F

## 2022-03-21 DIAGNOSIS — D50.9 IRON DEFICIENCY ANEMIA, UNSPECIFIED IRON DEFICIENCY ANEMIA TYPE: Primary | ICD-10-CM

## 2022-03-21 PROCEDURE — 96365 THER/PROPH/DIAG IV INF INIT: CPT

## 2022-03-21 PROCEDURE — 74011000250 HC RX REV CODE- 250: Performed by: INTERNAL MEDICINE

## 2022-03-21 PROCEDURE — 74011250636 HC RX REV CODE- 250/636: Performed by: INTERNAL MEDICINE

## 2022-03-21 PROCEDURE — 74011000258 HC RX REV CODE- 258: Performed by: INTERNAL MEDICINE

## 2022-03-21 RX ORDER — SODIUM CHLORIDE 0.9 % (FLUSH) 0.9 %
10 SYRINGE (ML) INJECTION AS NEEDED
Status: DISCONTINUED | OUTPATIENT
Start: 2022-03-21 | End: 2022-03-22 | Stop reason: HOSPADM

## 2022-03-21 RX ADMIN — SODIUM CHLORIDE, PRESERVATIVE FREE 10 ML: 5 INJECTION INTRAVENOUS at 14:15

## 2022-03-21 RX ADMIN — SODIUM CHLORIDE 500 ML: 900 INJECTION, SOLUTION INTRAVENOUS at 14:41

## 2022-03-21 RX ADMIN — FERUMOXYTOL 510 MG: 510 INJECTION INTRAVENOUS at 14:25

## 2022-03-21 NOTE — PROGRESS NOTES
Arrived to the [facility]. Kana completed. Patient tolerated well. Any issues or concerns during appointment: patent declined 30 minute wait time, no other concerns this appt. Patient to follow up with MD regarding any future appts. Discharged ambulatory to home.

## 2022-03-23 ENCOUNTER — HOSPITAL ENCOUNTER (OUTPATIENT)
Dept: GENERAL RADIOLOGY | Age: 67
Discharge: HOME | End: 2022-03-23
Attending: SURGERY

## 2022-03-23 ENCOUNTER — HOSPITAL ENCOUNTER (OUTPATIENT)
Dept: SURGERY | Age: 67
Discharge: HOME | End: 2022-03-23

## 2022-03-23 VITALS
HEIGHT: 68 IN | WEIGHT: 146.3 LBS | TEMPERATURE: 97.8 F | BODY MASS INDEX: 22.17 KG/M2 | SYSTOLIC BLOOD PRESSURE: 127 MMHG | HEART RATE: 58 BPM | OXYGEN SATURATION: 98 % | RESPIRATION RATE: 18 BRPM | DIASTOLIC BLOOD PRESSURE: 81 MMHG

## 2022-03-23 PROBLEM — C16.8 MALIGNANT NEOPLASM OF OVERLAPPING SITES OF STOMACH (HCC): Status: ACTIVE | Noted: 2022-03-23

## 2022-03-23 LAB
ALBUMIN SERPL-MCNC: 1.4 G/DL (ref 3.2–4.6)
ALBUMIN/GLOB SERPL: 0.4 {RATIO} (ref 1.2–3.5)
ALP SERPL-CCNC: 160 U/L (ref 50–136)
ALT SERPL-CCNC: 10 U/L (ref 12–65)
ANION GAP SERPL CALC-SCNC: 4 MMOL/L (ref 7–16)
APPEARANCE UR: CLEAR
APTT PPP: 32.5 SEC (ref 24.1–35.1)
AST SERPL-CCNC: 9 U/L (ref 15–37)
ATRIAL RATE: 86 BPM
BASOPHILS # BLD: 0 K/UL (ref 0–0.2)
BASOPHILS NFR BLD: 0 % (ref 0–2)
BILIRUB SERPL-MCNC: 0.2 MG/DL (ref 0.2–1.1)
BILIRUB UR QL: NEGATIVE
BUN SERPL-MCNC: 10 MG/DL (ref 8–23)
CALCIUM SERPL-MCNC: 7.8 MG/DL (ref 8.3–10.4)
CALCULATED P AXIS, ECG09: 119 DEGREES
CALCULATED R AXIS, ECG10: 122 DEGREES
CALCULATED T AXIS, ECG11: 122 DEGREES
CHLORIDE SERPL-SCNC: 97 MMOL/L (ref 98–107)
CO2 SERPL-SCNC: 31 MMOL/L (ref 21–32)
COLOR UR: YELLOW
CREAT SERPL-MCNC: 0.33 MG/DL (ref 0.8–1.5)
DIAGNOSIS, 93000: NORMAL
DIFFERENTIAL METHOD BLD: ABNORMAL
EOSINOPHIL # BLD: 0.1 K/UL (ref 0–0.8)
EOSINOPHIL NFR BLD: 1 % (ref 0.5–7.8)
ERYTHROCYTE [DISTWIDTH] IN BLOOD BY AUTOMATED COUNT: 24.5 % (ref 11.9–14.6)
GLOBULIN SER CALC-MCNC: 3.9 G/DL (ref 2.3–3.5)
GLUCOSE SERPL-MCNC: 100 MG/DL (ref 65–100)
GLUCOSE UR STRIP.AUTO-MCNC: NEGATIVE MG/DL
HCT VFR BLD AUTO: 27.4 % (ref 41.1–50.3)
HGB BLD-MCNC: 8.1 G/DL (ref 13.6–17.2)
HGB UR QL STRIP: NEGATIVE
IMM GRANULOCYTES # BLD AUTO: 0.1 K/UL (ref 0–0.5)
IMM GRANULOCYTES NFR BLD AUTO: 1 % (ref 0–5)
INR PPP: 1
KETONES UR QL STRIP.AUTO: NEGATIVE MG/DL
LEUKOCYTE ESTERASE UR QL STRIP.AUTO: NEGATIVE
LYMPHOCYTES # BLD: 1.3 K/UL (ref 0.5–4.6)
LYMPHOCYTES NFR BLD: 10 % (ref 13–44)
MCH RBC QN AUTO: 23.6 PG (ref 26.1–32.9)
MCHC RBC AUTO-ENTMCNC: 29.6 G/DL (ref 31.4–35)
MCV RBC AUTO: 79.9 FL (ref 79.6–97.8)
MONOCYTES # BLD: 0.8 K/UL (ref 0.1–1.3)
MONOCYTES NFR BLD: 6 % (ref 4–12)
NEUTS SEG # BLD: 10.9 K/UL (ref 1.7–8.2)
NEUTS SEG NFR BLD: 83 % (ref 43–78)
NITRITE UR QL STRIP.AUTO: NEGATIVE
NRBC # BLD: 0 K/UL (ref 0–0.2)
P-R INTERVAL, ECG05: 170 MS
PH UR STRIP: 7 [PH] (ref 5–9)
PLATELET # BLD AUTO: 359 K/UL (ref 150–450)
PMV BLD AUTO: 8.1 FL (ref 9.4–12.3)
POTASSIUM SERPL-SCNC: 4.6 MMOL/L (ref 3.5–5.1)
PROT SERPL-MCNC: 5.3 G/DL (ref 6.3–8.2)
PROT UR STRIP-MCNC: NEGATIVE MG/DL
PROTHROMBIN TIME: 13.9 SEC (ref 12.6–14.5)
Q-T INTERVAL, ECG07: 366 MS
QRS DURATION, ECG06: 80 MS
QTC CALCULATION (BEZET), ECG08: 437 MS
RBC # BLD AUTO: 3.43 M/UL (ref 4.23–5.6)
SODIUM SERPL-SCNC: 132 MMOL/L (ref 136–145)
SP GR UR REFRACTOMETRY: 1.02 (ref 1–1.02)
UROBILINOGEN UR QL STRIP.AUTO: 1 EU/DL (ref 0.2–1)
VENTRICULAR RATE, ECG03: 86 BPM
WBC # BLD AUTO: 13.2 K/UL (ref 4.3–11.1)

## 2022-03-23 PROCEDURE — 85610 PROTHROMBIN TIME: CPT

## 2022-03-23 PROCEDURE — 81003 URINALYSIS AUTO W/O SCOPE: CPT

## 2022-03-23 PROCEDURE — 85025 COMPLETE CBC W/AUTO DIFF WBC: CPT

## 2022-03-23 PROCEDURE — 85730 THROMBOPLASTIN TIME PARTIAL: CPT

## 2022-03-23 PROCEDURE — 80053 COMPREHEN METABOLIC PANEL: CPT

## 2022-03-23 PROCEDURE — 93005 ELECTROCARDIOGRAM TRACING: CPT | Performed by: SURGERY

## 2022-03-23 NOTE — PERIOP NOTES
Patient verified name and     Order for consent not found in EHR and matches case posting; patient verified. Type 1Bsurgery, walk in assessment complete. Labs per surgeon: cbc,cmp,pt,pttcxr; results pending  Labs per anesthesia protocol: none;   EKG: 3/23/22, 10/19/17 in Care everywhere No changes noted. EKG 3/22/23 suspects arm lead reversal confirmed no reversal with 2 ekgs. Both interpretations read same as 10/19/17. Hospital approved surgical skin cleanser and instructions given per hospital policy. Patient provided with and instructed on educational handouts including Guide to Surgery, Pain Management, Hand Hygiene, Blood Transfusion Education, and [place] [facility] Brochure. Patient answered medical/surgical history questions at their best of ability. All prior to admission medications documented in [facility]. Original medication prescription bottle not visualized during patient appointment. Patient instructed to hold all vitamins 7 days prior to surgery and NSAIDS 5 days prior to surgery, patient verbalized understanding. Patient teach back successful and patient demonstrates knowledge of instructions.

## 2022-03-23 NOTE — PERIOP NOTES
PLEASE CONTINUE TAKING ALL PRESCRIPTION MEDICATIONS UP TO THE DAY OF SURGERY UNLESS OTHERWISE DIRECTED BELOW. DISCONTINUE all vitamins and supplements 7 days prior to surgery. DISCONTINUE Non-Steriodal Anti-Inflammatory (NSAIDS) such as Advil and Aleve 5 days prior to surgery. Home Medications to take  the day of surgery               Home Medications   to Hold           Comments      On the day before surgery please take Acetaminophen 1000mg in the morning and then again before bed. You may substitute for Tylenol 650 mg. Please do not bring home medications with you on the day of surgery unless otherwise directed by your nurse. If you are instructed to bring home medications, please give them to your nurse as they will be administered by the nursing staff. If you have any questions, please call [facility] [phone] or [facility] [phone]. A copy of this note was provided to the patient for reference.

## 2022-03-23 NOTE — PERIOP NOTES
Recent Results (from the past 12 hour(s))   CBC WITH AUTOMATED DIFF    Collection Time: 03/23/22  2:59 PM   Result Value Ref Range    WBC 13.2 (H) 4.3 - 11.1 K/uL    RBC 3.43 (L) 4.23 - 5.6 M/uL    HGB 8.1 (L) 13.6 - 17.2 g/dL    HCT 27.4 (L) 41.1 - 50.3 %    MCV 79.9 79.6 - 97.8 FL    MCH 23.6 (L) 26.1 - 32.9 PG    MCHC 29.6 (L) 31.4 - 35.0 g/dL    RDW 24.5 (H) 11.9 - 14.6 %    PLATELET 960 488 - 765 K/uL    MPV 8.1 (L) 9.4 - 12.3 FL    ABSOLUTE NRBC 0.00 0.0 - 0.2 K/uL    DF AUTOMATED      NEUTROPHILS 83 (H) 43 - 78 %    LYMPHOCYTES 10 (L) 13 - 44 %    MONOCYTES 6 4.0 - 12.0 %    EOSINOPHILS 1 0.5 - 7.8 %    BASOPHILS 0 0.0 - 2.0 %    IMMATURE GRANULOCYTES 1 0.0 - 5.0 %    ABS. NEUTROPHILS 10.9 (H) 1.7 - 8.2 K/UL    ABS. LYMPHOCYTES 1.3 0.5 - 4.6 K/UL    ABS. MONOCYTES 0.8 0.1 - 1.3 K/UL    ABS. EOSINOPHILS 0.1 0.0 - 0.8 K/UL    ABS. BASOPHILS 0.0 0.0 - 0.2 K/UL    ABS. IMM. GRANS. 0.1 0.0 - 0.5 K/UL   METABOLIC PANEL, COMPREHENSIVE    Collection Time: 03/23/22  2:59 PM   Result Value Ref Range    Sodium 132 (L) 136 - 145 mmol/L    Potassium 4.6 3.5 - 5.1 mmol/L    Chloride 97 (L) 98 - 107 mmol/L    CO2 31 21 - 32 mmol/L    Anion gap 4 (L) 7 - 16 mmol/L    Glucose 100 65 - 100 mg/dL    BUN 10 8 - 23 MG/DL    Creatinine 0.33 (L) 0.8 - 1.5 MG/DL    GFR est AA >60 >60 ml/min/1.73m2    GFR est non-AA >60 >60 ml/min/1.73m2    Calcium 7.8 (L) 8.3 - 10.4 MG/DL    Bilirubin, total 0.2 0.2 - 1.1 MG/DL    ALT (SGPT) 10 (L) 12 - 65 U/L    AST (SGOT) 9 (L) 15 - 37 U/L    Alk.  phosphatase 160 (H) 50 - 136 U/L    Protein, total 5.3 (L) 6.3 - 8.2 g/dL    Albumin 1.4 (L) 3.2 - 4.6 g/dL    Globulin 3.9 (H) 2.3 - 3.5 g/dL    A-G Ratio 0.4 (L) 1.2 - 3.5     PROTHROMBIN TIME + INR    Collection Time: 03/23/22  2:59 PM   Result Value Ref Range    Prothrombin time 13.9 12.6 - 14.5 sec    INR 1.0     PTT    Collection Time: 03/23/22  2:59 PM   Result Value Ref Range    aPTT 32.5 24.1 - 35.1 SEC   URINALYSIS W/ RFLX MICROSCOPIC Collection Time: 03/23/22  2:59 PM   Result Value Ref Range    Color YELLOW      Appearance CLEAR      Specific gravity 1.017 1.001 - 1.023      pH (UA) 7.0 5.0 - 9.0      Protein Negative NEG mg/dL    Glucose Negative mg/dL    Ketone Negative NEG mg/dL    Bilirubin Negative NEG      Blood Negative NEG      Urobilinogen 1.0 0.2 - 1.0 EU/dL    Nitrites Negative NEG      Leukocyte Esterase Negative NEG     EKG, 12 LEAD, INITIAL    Collection Time: 03/23/22  3:21 PM   Result Value Ref Range    Ventricular Rate 86 BPM    Atrial Rate 86 BPM    P-R Interval 170 ms    QRS Duration 80 ms    Q-T Interval 366 ms    QTC Calculation (Bezet) 437 ms    Calculated P Axis 119 degrees    Calculated R Axis 122 degrees    Calculated T Axis 122 degrees    Diagnosis       !!! Suspect arm lead reversal, interpretation assumes no reversal  Normal sinus rhythm  Right axis deviation  Anteroseptal infarct , age undetermined  Abnormal ECG  No previous ECGs available     Dr Mercedez Pelletier notified of Hgb  8.1., message left for Vu at Dr Abraham Escamilla office.

## 2022-03-24 PROBLEM — C16.8 MALIGNANT NEOPLASM OF OVERLAPPING SITES OF STOMACH (HCC): Status: ACTIVE | Noted: 2022-03-23

## 2022-03-25 ENCOUNTER — HOSPITAL ENCOUNTER (OUTPATIENT)
Age: 67
Setting detail: OUTPATIENT SURGERY
Discharge: HOME | End: 2022-03-25
Attending: SURGERY | Admitting: SURGERY
Payer: MEDICARE

## 2022-03-25 ENCOUNTER — APPOINTMENT (OUTPATIENT)
Dept: GENERAL RADIOLOGY | Age: 67
End: 2022-03-25
Attending: SURGERY

## 2022-03-25 ENCOUNTER — ANESTHESIA (OUTPATIENT)
Dept: SURGERY | Age: 67
End: 2022-03-25

## 2022-03-25 ENCOUNTER — ANESTHESIA EVENT (OUTPATIENT)
Dept: SURGERY | Age: 67
End: 2022-03-25

## 2022-03-25 VITALS
OXYGEN SATURATION: 99 % | BODY MASS INDEX: 22.13 KG/M2 | DIASTOLIC BLOOD PRESSURE: 77 MMHG | HEART RATE: 70 BPM | HEIGHT: 68 IN | TEMPERATURE: 97.7 F | WEIGHT: 146 LBS | SYSTOLIC BLOOD PRESSURE: 122 MMHG | RESPIRATION RATE: 16 BRPM

## 2022-03-25 DIAGNOSIS — C16.8 MALIGNANT NEOPLASM OF OVERLAPPING SITES OF STOMACH (HCC): Primary | ICD-10-CM

## 2022-03-25 LAB
ALBUMIN SERPL-MCNC: 1.6 G/DL (ref 3.2–4.6)
ALBUMIN/GLOB SERPL: 0.4 {RATIO} (ref 1.2–3.5)
ALP SERPL-CCNC: 175 U/L (ref 50–136)
ALT SERPL-CCNC: 14 U/L (ref 12–65)
ANION GAP SERPL CALC-SCNC: 5 MMOL/L (ref 7–16)
APPEARANCE UR: CLEAR
AST SERPL-CCNC: 14 U/L (ref 15–37)
BASOPHILS # BLD: 0 K/UL (ref 0–0.2)
BASOPHILS NFR BLD: 0 % (ref 0–2)
BILIRUB SERPL-MCNC: 0.2 MG/DL (ref 0.2–1.1)
BILIRUB UR QL: NEGATIVE
BUN SERPL-MCNC: 9 MG/DL (ref 8–23)
CALCIUM SERPL-MCNC: 8.2 MG/DL (ref 8.3–10.4)
CHLORIDE SERPL-SCNC: 96 MMOL/L (ref 98–107)
CO2 SERPL-SCNC: 30 MMOL/L (ref 21–32)
COLOR UR: YELLOW
CREAT SERPL-MCNC: 0.3 MG/DL (ref 0.8–1.5)
DIFFERENTIAL METHOD BLD: ABNORMAL
EOSINOPHIL # BLD: 0 K/UL (ref 0–0.8)
EOSINOPHIL NFR BLD: 0 % (ref 0.5–7.8)
ERYTHROCYTE [DISTWIDTH] IN BLOOD BY AUTOMATED COUNT: 24.9 % (ref 11.9–14.6)
GLOBULIN SER CALC-MCNC: 3.9 G/DL (ref 2.3–3.5)
GLUCOSE SERPL-MCNC: 92 MG/DL (ref 65–100)
GLUCOSE UR STRIP.AUTO-MCNC: NEGATIVE MG/DL
HCT VFR BLD AUTO: 29.1 % (ref 41.1–50.3)
HGB BLD-MCNC: 8.6 G/DL (ref 13.6–17.2)
HGB UR QL STRIP: NEGATIVE
IMM GRANULOCYTES # BLD AUTO: 0.1 K/UL (ref 0–0.5)
IMM GRANULOCYTES NFR BLD AUTO: 1 % (ref 0–5)
INR PPP: 1.1
KETONES UR QL STRIP.AUTO: NEGATIVE MG/DL
LEUKOCYTE ESTERASE UR QL STRIP.AUTO: NEGATIVE
LYMPHOCYTES # BLD: 1 K/UL (ref 0.5–4.6)
LYMPHOCYTES NFR BLD: 6 % (ref 13–44)
MCH RBC QN AUTO: 24 PG (ref 26.1–32.9)
MCHC RBC AUTO-ENTMCNC: 29.6 G/DL (ref 31.4–35)
MCV RBC AUTO: 81.1 FL (ref 79.6–97.8)
MONOCYTES # BLD: 1 K/UL (ref 0.1–1.3)
MONOCYTES NFR BLD: 6 % (ref 4–12)
NEUTS SEG # BLD: 13.6 K/UL (ref 1.7–8.2)
NEUTS SEG NFR BLD: 87 % (ref 43–78)
NITRITE UR QL STRIP.AUTO: NEGATIVE
NRBC # BLD: 0 K/UL (ref 0–0.2)
PH UR STRIP: 8 [PH] (ref 5–9)
PLATELET # BLD AUTO: 383 K/UL (ref 150–450)
PMV BLD AUTO: 8.1 FL (ref 9.4–12.3)
POTASSIUM SERPL-SCNC: 4.2 MMOL/L (ref 3.5–5.1)
PROT SERPL-MCNC: 5.5 G/DL (ref 6.3–8.2)
PROT UR STRIP-MCNC: ABNORMAL MG/DL
PROTHROMBIN TIME: 14.1 SEC (ref 12.6–14.5)
RBC # BLD AUTO: 3.59 M/UL (ref 4.23–5.6)
SODIUM SERPL-SCNC: 131 MMOL/L (ref 138–145)
SP GR UR REFRACTOMETRY: 1.01 (ref 1–1.02)
UROBILINOGEN UR QL STRIP.AUTO: 0.2 EU/DL (ref 0.2–1)
WBC # BLD AUTO: 15.7 K/UL (ref 4.3–11.1)

## 2022-03-25 PROCEDURE — 74011250636 HC RX REV CODE- 250/636: Performed by: REGISTERED NURSE

## 2022-03-25 PROCEDURE — 80053 COMPREHEN METABOLIC PANEL: CPT

## 2022-03-25 PROCEDURE — 71045 X-RAY EXAM CHEST 1 VIEW: CPT

## 2022-03-25 PROCEDURE — 77030014090 HC TRCR OPT AMR -B: Performed by: SURGERY

## 2022-03-25 PROCEDURE — 85025 COMPLETE CBC W/AUTO DIFF WBC: CPT

## 2022-03-25 PROCEDURE — 77030031139 HC SUT VCRL2 J&J -A: Performed by: SURGERY

## 2022-03-25 PROCEDURE — 77030008756 HC TU IRR SUC STRY -B: Performed by: SURGERY

## 2022-03-25 PROCEDURE — 77001 FLUOROGUIDE FOR VEIN DEVICE: CPT | Performed by: SURGERY

## 2022-03-25 PROCEDURE — 76010000161 HC OR TIME 1 TO 1.5 HR INTENSV-TIER 1: Performed by: SURGERY

## 2022-03-25 PROCEDURE — 36561 INSERT TUNNELED CV CATH: CPT | Performed by: SURGERY

## 2022-03-25 PROCEDURE — 77030008518 HC TBNG INSUF ENDO STRY -B: Performed by: SURGERY

## 2022-03-25 PROCEDURE — 49320 DIAG LAPARO SEPARATE PROC: CPT | Performed by: SURGERY

## 2022-03-25 PROCEDURE — 81003 URINALYSIS AUTO W/O SCOPE: CPT

## 2022-03-25 PROCEDURE — 76210000020 HC REC RM PH II FIRST 0.5 HR: Performed by: SURGERY

## 2022-03-25 PROCEDURE — 74011000250 HC RX REV CODE- 250: Performed by: SURGERY

## 2022-03-25 PROCEDURE — 77030008522 HC TBNG INSUF LAPRO STRY -B: Performed by: SURGERY

## 2022-03-25 PROCEDURE — 77030010031 HC SCIS ENDOSC MPLR J&J -C: Performed by: SURGERY

## 2022-03-25 PROCEDURE — 77030018673: Performed by: SURGERY

## 2022-03-25 PROCEDURE — 74011250636 HC RX REV CODE- 250/636: Performed by: SURGERY

## 2022-03-25 PROCEDURE — 77030003575 HC NDL INSUF ENDOPTH J&J -B: Performed by: SURGERY

## 2022-03-25 PROCEDURE — 77030008606 HC TRCR ENDOSC KII AMR -B: Performed by: SURGERY

## 2022-03-25 PROCEDURE — 76210000006 HC OR PH I REC 0.5 TO 1 HR: Performed by: SURGERY

## 2022-03-25 PROCEDURE — 74011250637 HC RX REV CODE- 250/637: Performed by: ANESTHESIOLOGY

## 2022-03-25 PROCEDURE — C1788 PORT, INDWELLING, IMP: HCPCS | Performed by: SURGERY

## 2022-03-25 PROCEDURE — 85610 PROTHROMBIN TIME: CPT

## 2022-03-25 PROCEDURE — 77030002986 HC SUT PROL J&J -A: Performed by: SURGERY

## 2022-03-25 PROCEDURE — 77030040830 HC CATH URETH FOL MDII -A: Performed by: SURGERY

## 2022-03-25 PROCEDURE — 2709999900 HC NON-CHARGEABLE SUPPLY: Performed by: SURGERY

## 2022-03-25 PROCEDURE — 74011000250 HC RX REV CODE- 250: Performed by: NURSE ANESTHETIST, CERTIFIED REGISTERED

## 2022-03-25 PROCEDURE — 76060000033 HC ANESTHESIA 1 TO 1.5 HR: Performed by: SURGERY

## 2022-03-25 PROCEDURE — 77030040922 HC BLNKT HYPOTHRM STRY -A: Performed by: REGISTERED NURSE

## 2022-03-25 PROCEDURE — 74011250636 HC RX REV CODE- 250/636: Performed by: ANESTHESIOLOGY

## 2022-03-25 PROCEDURE — 77030039425 HC BLD LARYNG TRULITE DISP TELE -A: Performed by: REGISTERED NURSE

## 2022-03-25 PROCEDURE — 74011000250 HC RX REV CODE- 250: Performed by: REGISTERED NURSE

## 2022-03-25 PROCEDURE — 77030000038 HC TIP SCIS LAPSCP SURI -B: Performed by: SURGERY

## 2022-03-25 PROCEDURE — 77030019908 HC STETH ESOPH SIMS -A: Performed by: REGISTERED NURSE

## 2022-03-25 PROCEDURE — 88305 TISSUE EXAM BY PATHOLOGIST: CPT

## 2022-03-25 PROCEDURE — 77030040361 HC SLV COMPR DVT MDII -B: Performed by: SURGERY

## 2022-03-25 PROCEDURE — 88112 CYTOPATH CELL ENHANCE TECH: CPT

## 2022-03-25 PROCEDURE — 77030037088 HC TUBE ENDOTRACH ORAL NSL COVD-A: Performed by: REGISTERED NURSE

## 2022-03-25 PROCEDURE — 77030020829: Performed by: SURGERY

## 2022-03-25 DEVICE — POWERPORT IMPLANTABLE PORT WITH ATTACHABLE 8F CHRONOFLEX OPEN-ENDED SINGLE-LUMEN VENOUS CATHETER INTERMEDIATE KIT (WITH SUTURE PLUGS)
Type: IMPLANTABLE DEVICE | Site: CHEST | Status: FUNCTIONAL
Brand: POWERPORT, CHRONOFLEX

## 2022-03-25 RX ORDER — FAMOTIDINE 20 MG/1
20 TABLET, FILM COATED ORAL ONCE
Status: COMPLETED | OUTPATIENT
Start: 2022-03-25 | End: 2022-03-25

## 2022-03-25 RX ORDER — MIDAZOLAM HYDROCHLORIDE 1 MG/ML
2 INJECTION, SOLUTION INTRAMUSCULAR; INTRAVENOUS ONCE
Status: DISCONTINUED | OUTPATIENT
Start: 2022-03-25 | End: 2022-03-25 | Stop reason: HOSPADM

## 2022-03-25 RX ORDER — ONDANSETRON 2 MG/ML
INJECTION INTRAMUSCULAR; INTRAVENOUS AS NEEDED
Status: DISCONTINUED | OUTPATIENT
Start: 2022-03-25 | End: 2022-03-25 | Stop reason: HOSPADM

## 2022-03-25 RX ORDER — GLYCOPYRROLATE 0.2 MG/ML
INJECTION INTRAMUSCULAR; INTRAVENOUS AS NEEDED
Status: DISCONTINUED | OUTPATIENT
Start: 2022-03-25 | End: 2022-03-25 | Stop reason: HOSPADM

## 2022-03-25 RX ORDER — SODIUM CHLORIDE, SODIUM LACTATE, POTASSIUM CHLORIDE, CALCIUM CHLORIDE 600; 310; 30; 20 MG/100ML; MG/100ML; MG/100ML; MG/100ML
75 INJECTION, SOLUTION INTRAVENOUS CONTINUOUS
Status: DISCONTINUED | OUTPATIENT
Start: 2022-03-25 | End: 2022-03-25 | Stop reason: HOSPADM

## 2022-03-25 RX ORDER — LIDOCAINE HYDROCHLORIDE 10 MG/ML
0.1 INJECTION INFILTRATION; PERINEURAL AS NEEDED
Status: DISCONTINUED | OUTPATIENT
Start: 2022-03-25 | End: 2022-03-25 | Stop reason: HOSPADM

## 2022-03-25 RX ORDER — OXYCODONE HYDROCHLORIDE 5 MG/1
5 TABLET ORAL
Status: DISCONTINUED | OUTPATIENT
Start: 2022-03-25 | End: 2022-03-25 | Stop reason: HOSPADM

## 2022-03-25 RX ORDER — HYDROMORPHONE HYDROCHLORIDE 2 MG/ML
0.5 INJECTION, SOLUTION INTRAMUSCULAR; INTRAVENOUS; SUBCUTANEOUS
Status: DISCONTINUED | OUTPATIENT
Start: 2022-03-25 | End: 2022-03-25 | Stop reason: HOSPADM

## 2022-03-25 RX ORDER — SODIUM CHLORIDE 0.9 % (FLUSH) 0.9 %
5-40 SYRINGE (ML) INJECTION AS NEEDED
Status: DISCONTINUED | OUTPATIENT
Start: 2022-03-25 | End: 2022-03-25 | Stop reason: HOSPADM

## 2022-03-25 RX ORDER — BUPIVACAINE HYDROCHLORIDE 5 MG/ML
INJECTION, SOLUTION EPIDURAL; INTRACAUDAL AS NEEDED
Status: DISCONTINUED | OUTPATIENT
Start: 2022-03-25 | End: 2022-03-25 | Stop reason: HOSPADM

## 2022-03-25 RX ORDER — HEPARIN 100 UNIT/ML
SYRINGE INTRAVENOUS AS NEEDED
Status: DISCONTINUED | OUTPATIENT
Start: 2022-03-25 | End: 2022-03-25 | Stop reason: HOSPADM

## 2022-03-25 RX ORDER — DEXAMETHASONE SODIUM PHOSPHATE 4 MG/ML
INJECTION, SOLUTION INTRA-ARTICULAR; INTRALESIONAL; INTRAMUSCULAR; INTRAVENOUS; SOFT TISSUE AS NEEDED
Status: DISCONTINUED | OUTPATIENT
Start: 2022-03-25 | End: 2022-03-25 | Stop reason: HOSPADM

## 2022-03-25 RX ORDER — EPHEDRINE SULFATE/0.9% NACL/PF 50 MG/5 ML
SYRINGE (ML) INTRAVENOUS AS NEEDED
Status: DISCONTINUED | OUTPATIENT
Start: 2022-03-25 | End: 2022-03-25 | Stop reason: HOSPADM

## 2022-03-25 RX ORDER — NEOSTIGMINE METHYLSULFATE 1 MG/ML
INJECTION, SOLUTION INTRAVENOUS AS NEEDED
Status: DISCONTINUED | OUTPATIENT
Start: 2022-03-25 | End: 2022-03-25 | Stop reason: HOSPADM

## 2022-03-25 RX ORDER — FENTANYL CITRATE 50 UG/ML
INJECTION, SOLUTION INTRAMUSCULAR; INTRAVENOUS AS NEEDED
Status: DISCONTINUED | OUTPATIENT
Start: 2022-03-25 | End: 2022-03-25 | Stop reason: HOSPADM

## 2022-03-25 RX ORDER — LIDOCAINE HYDROCHLORIDE 20 MG/ML
INJECTION, SOLUTION EPIDURAL; INFILTRATION; INTRACAUDAL; PERINEURAL AS NEEDED
Status: DISCONTINUED | OUTPATIENT
Start: 2022-03-25 | End: 2022-03-25 | Stop reason: HOSPADM

## 2022-03-25 RX ORDER — CEFAZOLIN SODIUM/WATER 2 G/20 ML
2 SYRINGE (ML) INTRAVENOUS ONCE
Status: COMPLETED | OUTPATIENT
Start: 2022-03-25 | End: 2022-03-25

## 2022-03-25 RX ORDER — PROPOFOL 10 MG/ML
INJECTION, EMULSION INTRAVENOUS AS NEEDED
Status: DISCONTINUED | OUTPATIENT
Start: 2022-03-25 | End: 2022-03-25 | Stop reason: HOSPADM

## 2022-03-25 RX ORDER — OXYCODONE HYDROCHLORIDE 5 MG/1
10 TABLET ORAL
Status: DISCONTINUED | OUTPATIENT
Start: 2022-03-25 | End: 2022-03-25 | Stop reason: HOSPADM

## 2022-03-25 RX ORDER — FENTANYL CITRATE 50 UG/ML
100 INJECTION, SOLUTION INTRAMUSCULAR; INTRAVENOUS ONCE
Status: DISCONTINUED | OUTPATIENT
Start: 2022-03-25 | End: 2022-03-25 | Stop reason: HOSPADM

## 2022-03-25 RX ORDER — SODIUM CHLORIDE 0.9 % (FLUSH) 0.9 %
5-40 SYRINGE (ML) INJECTION EVERY 8 HOURS
Status: DISCONTINUED | OUTPATIENT
Start: 2022-03-25 | End: 2022-03-25 | Stop reason: HOSPADM

## 2022-03-25 RX ORDER — HYDROCODONE BITARTRATE AND ACETAMINOPHEN 5; 325 MG/1; MG/1
1 TABLET ORAL
Qty: 8 TABLET | Refills: 0 | Status: SHIPPED | OUTPATIENT
Start: 2022-03-25 | End: 2022-03-28

## 2022-03-25 RX ORDER — ROCURONIUM BROMIDE 10 MG/ML
INJECTION, SOLUTION INTRAVENOUS AS NEEDED
Status: DISCONTINUED | OUTPATIENT
Start: 2022-03-25 | End: 2022-03-25 | Stop reason: HOSPADM

## 2022-03-25 RX ADMIN — ONDANSETRON 4 MG: 2 INJECTION INTRAMUSCULAR; INTRAVENOUS at 15:07

## 2022-03-25 RX ADMIN — Medication 10 MG: at 14:42

## 2022-03-25 RX ADMIN — PHENYLEPHRINE HYDROCHLORIDE 100 MCG: 10 INJECTION INTRAVENOUS at 14:19

## 2022-03-25 RX ADMIN — PHENYLEPHRINE HYDROCHLORIDE 200 MCG: 10 INJECTION INTRAVENOUS at 14:33

## 2022-03-25 RX ADMIN — GLYCOPYRROLATE 0.2 MG: 0.2 INJECTION, SOLUTION INTRAMUSCULAR; INTRAVENOUS at 15:10

## 2022-03-25 RX ADMIN — Medication 2 G: at 14:17

## 2022-03-25 RX ADMIN — Medication 1 MG: at 15:10

## 2022-03-25 RX ADMIN — SODIUM CHLORIDE, SODIUM LACTATE, POTASSIUM CHLORIDE, AND CALCIUM CHLORIDE: 600; 310; 30; 20 INJECTION, SOLUTION INTRAVENOUS at 15:20

## 2022-03-25 RX ADMIN — GLYCOPYRROLATE 0.2 MG: 0.2 INJECTION, SOLUTION INTRAMUSCULAR; INTRAVENOUS at 15:12

## 2022-03-25 RX ADMIN — Medication 1 MG: at 15:12

## 2022-03-25 RX ADMIN — PROPOFOL 160 MG: 10 INJECTION, EMULSION INTRAVENOUS at 14:11

## 2022-03-25 RX ADMIN — Medication 1 MG: at 15:13

## 2022-03-25 RX ADMIN — SODIUM CHLORIDE, SODIUM LACTATE, POTASSIUM CHLORIDE, AND CALCIUM CHLORIDE 75 ML/HR: 600; 310; 30; 20 INJECTION, SOLUTION INTRAVENOUS at 12:11

## 2022-03-25 RX ADMIN — FENTANYL CITRATE 100 MCG: 50 INJECTION INTRAMUSCULAR; INTRAVENOUS at 14:11

## 2022-03-25 RX ADMIN — Medication 5 MG: at 14:50

## 2022-03-25 RX ADMIN — PHENYLEPHRINE HYDROCHLORIDE 200 MCG: 10 INJECTION INTRAVENOUS at 14:15

## 2022-03-25 RX ADMIN — DEXAMETHASONE SODIUM PHOSPHATE 4 MG: 4 INJECTION, SOLUTION INTRAMUSCULAR; INTRAVENOUS at 14:17

## 2022-03-25 RX ADMIN — ROCURONIUM BROMIDE 40 MG: 10 INJECTION, SOLUTION INTRAVENOUS at 14:12

## 2022-03-25 RX ADMIN — ROCURONIUM BROMIDE 10 MG: 10 INJECTION, SOLUTION INTRAVENOUS at 14:24

## 2022-03-25 RX ADMIN — PHENYLEPHRINE HYDROCHLORIDE 100 MCG: 10 INJECTION INTRAVENOUS at 14:37

## 2022-03-25 RX ADMIN — ROCURONIUM BROMIDE 10 MG: 10 INJECTION, SOLUTION INTRAVENOUS at 14:49

## 2022-03-25 RX ADMIN — FAMOTIDINE 20 MG: 20 TABLET ORAL at 12:10

## 2022-03-25 RX ADMIN — LIDOCAINE HYDROCHLORIDE 100 MG: 20 INJECTION, SOLUTION EPIDURAL; INFILTRATION; INTRACAUDAL; PERINEURAL at 14:11

## 2022-03-25 RX ADMIN — PHENYLEPHRINE HYDROCHLORIDE 100 MCG: 10 INJECTION INTRAVENOUS at 14:26

## 2022-03-25 NOTE — DISCHARGE INSTRUCTIONS
Remove outer dressing and gauze after 48 hours, leave sterile strips on for 7-10 days, OK to shower    May start using port when needed    MEDICATION INTERACTION:    During your procedure you potentially received a medication or medications which may reduce the effectiveness of oral contraceptives. Please consider other forms of contraception for 1 month following your procedure if you are currently using oral contraceptives as your primary form of birth control. In addition to this, we recommend continuing your oral contraceptive as prescribed, unless otherwise instructed by your physician, during this time. ACTIVITY  · As tolerated and as directed by your doctor. · You may shower in 24 hours. Do not take a bath until cleared by MD.     DIET  · Clear liquids until no nausea or vomiting, then light diet for the first day. · Advance to regular diet on second day, unless your doctor orders otherwise. · If nausea and vomiting continues, call your doctor. PAIN  · Take pain medication as directed by your doctor. · Call your doctor if pain is NOT relieved by medication. CALL YOUR DOCTOR IF   · Excessive bleeding that does not stop after holding pressure over the area. · Temperature of 101 degrees F or above. · Excessive redness, swelling or bruising, and/or green or yellow, smelly discharge from incision. After general anesthesia or intravenous sedation, for 24 hours or while taking prescription Narcotics:  · Limit your activities  · A responsible adult needs to be with you for the next 24 hours  · Do not drive and operate hazardous machinery  · Do not make important personal or business decisions  · Do not drink alcoholic beverages  · If you have not urinated within 8 hours after discharge, and you are experiencing discomfort from urinary retention, please go to the nearest ED. · If you have sleep apnea and have a CPAP machine, please use it for all naps and sleeping.   · Please use caution when taking narcotics and any of your home medications that may cause drowsiness. *  Please give a list of your current medications to your Primary Care Provider. *  Please update this list whenever your medications are discontinued, doses are      changed, or new medications (including over-the-counter products) are added. *  Please carry medication information at all times in case of emergency situations. These are general instructions for a healthy lifestyle:  No smoking/ No tobacco products/ Avoid exposure to second hand smoke  Surgeon General's Warning:  Quitting smoking now greatly reduces serious risk to your health. Obesity, smoking, and sedentary lifestyle greatly increases your risk for illness  A healthy diet, regular physical exercise & weight monitoring are important for maintaining a healthy lifestyle    You may be retaining fluid if you have a history of heart failure or if you experience any of the following symptoms:  Weight gain of 3 pounds or more overnight or 5 pounds in a week, increased swelling in our hands or feet or shortness of breath while lying flat in bed. Please call your doctor as soon as you notice any of these symptoms; do not wait until your next office visit.

## 2022-03-25 NOTE — BRIEF OP NOTE
Brief Postoperative Note    Patient: Milagros Camilo  YOB: 1955  MRN: [id number]    Date of Procedure: 3/25/2022     Pre-Op Diagnosis: Malignant neoplasm of stomach, unspecified location ([facility] [address] [C16.9]    Post-Op Diagnosis: Same as preoperative diagnosis. Procedure(s): INFUSAPORT INSERTION with fluoro  LAPAROSCOPY GENERAL DIAGNOSTIC    Surgeon(s): Tyler Pablo MD    Surgical Assistant: None    Anesthesia: General     Estimated Blood Loss (mL): Minimal    Complications: None    Specimens:   ID Type Source Tests Collected by Time Destination   1 : ascites Fresh Abdominal Fluid  Tyler Pablo MD 3/25/2022 1500 Cytology        Implants:   Implant Name Type Inv.  Item Serial No.  Lot No. LRB No. Used Action   PORT ATTACH CATH POWERPORT 8FR --  - [id number]  PORT ATTACH CATH POWERPORT 8FR --   [employer]_WD [id number] Left 1 Implanted       Drains: * No LDAs found *    Findings: 1 fluoro confirmed positioning of port; 2 no definitive gross peritoneal disease but small ascites, which is collected for cytology    Electronically Signed by Edward Joyce MD on 3/25/2022 at 3:18 PM

## 2022-03-25 NOTE — ANESTHESIA PREPROCEDURE EVALUATION
Relevant Problems   HEMATOLOGY   (+) MANUEL (iron deficiency anemia)      PERSONAL HX & FAMILY HX OF CANCER   (+) Malignant neoplasm of overlapping sites of stomach [facility])       Anesthetic History               Review of Systems / Medical History      Pulmonary    COPD: mild               Neuro/Psych              Cardiovascular    Hypertension: well controlled              Exercise tolerance: >4 METS     GI/Hepatic/Renal                Endo/Other        Cancer and anemia     Other Findings   Comments: Weight loss  Gastric mass  Stomach cancer         Physical Exam    Airway  Mallampati: II  TM Distance: > 6 cm  Neck ROM: normal range of motion   Mouth opening: Normal     Cardiovascular  Regular rate and rhythm,  S1 and S2 normal,  no murmur, click, rub, or gallop  Rhythm: regular  Rate: normal         Dental  No notable dental hx       Pulmonary  Breath sounds clear to auscultation               Abdominal         Other Findings            Anesthetic Plan    ASA: 3  Anesthesia type: general            Anesthetic plan and risks discussed with: Patient

## 2022-03-26 NOTE — ANESTHESIA POSTPROCEDURE EVALUATION
Procedure(s): INFUSAPORT INSERTION  LAPAROSCOPY GENERAL DIAGNOSTIC.    general    Anesthesia Post Evaluation      Multimodal analgesia: multimodal analgesia used between 6 hours prior to anesthesia start to PACU discharge  Patient location during evaluation: PACU  Patient participation: complete - patient participated  Level of consciousness: awake  Pain management: adequate  Airway patency: patent  Anesthetic complications: no  Cardiovascular status: acceptable  Respiratory status: acceptable  Hydration status: acceptable  Post anesthesia nausea and vomiting:  none      INITIAL Post-op Vital signs:   Vitals Value Taken Time   /77 03/25/22 1556   Temp 36.5 °C (97.7 °F) 03/25/22 1555   Pulse 74 03/25/22 1557   Resp 16 03/25/22 1555   SpO2 99 % 03/25/22 1557   Vitals shown include unvalidated device data.

## 2022-03-26 NOTE — OP NOTES
[address] [facility]  OPERATIVE REPORT    Name:  Juan Diego Toribio  MR#:  [id number]  :  1955  ACCOUNT #:  [de-identified]  DATE OF SERVICE:  2022    PREOPERATIVE DIAGNOSIS:  Stomach cancer. POSTOPERATIVE DIAGNOSIS:  Stomach cancer. PROCEDURES PERFORMED:  1. Port-A-Cath placement under fluoroscopy. 2.  Diagnostic laparoscopy. SURGEON:  MD Ajith Altman PA    ANESTHESIA:  general    COMPLICATIONS:none    SPECIMENS REMOVED: ascites    IMPLANTS: port    ESTIMATED BLOOD LOSS:  Minimum. INDICATION:  This is a 79-year-old gentleman who presented with a large stomach cancer, which almost occupied his entire stomach and EUS suggested this may be a T4A disease, although the scan shows no evidence of metastatic disease. He needs a port for chemotherapy. I also offered him diagnostic laparoscopy to rule out peritoneal disease. He has refused a feeding tube and he claims he can drink Ensure without difficulty although he cannot take solid food and he has lost a lot of weight. The patient understood the risks and benefits, agreed to proceed with surgery. FINDINGS:  1. Intraoperative fluoroscopy confirmed position of port placement. 2.  Laparoscopy showed no gross peritoneal disease, but he does have a small ascites which is not clear and fluid was collected for cytology. PROCEDURE:  After informed consent obtained, the patient brought into the operating room, left in supine position. General anesthesia was administered. We prepped his neck, chest and abdomen altogether and this was draped. Then, we have the abdomen covered first and started port placement first.  The left subclavian vein was accessed. Venous blood return was obtained. Guidewire was then advanced under the guidance of fluoroscopy followed by a dilator introducer sheath and then the catheter was advanced along the introducer sheath under the guidance of fluoroscopy.   At the same time, pocket was made at the left upper chest.  The catheter was tunneled through the pocket, connected to a reservoir. Both reservoir and catheter was flushed with heparin solution. 100 units/mL with good aspiration and good flush. Then the port was sutured into place with 2-0 Prolene stitch. Incision closed with 3-0 Vicryl stitch on dermal layer, 4-0 Vicryl stitch in subcuticular running fashion. Then the port site was dressed and we moved attention to the abdomen. I used Veress needle technique to create pneumoperitoneum and then 5 mm trocar placed in the right upper quadrant with scope-over-trocar technique. Peritoneal cavity was entered under direct vision. A second 5-mm trocar placed at the epigastrium and the findings as described above. I see no gross positive peritoneal disease. I was able to lift the liver left lobe to look at the part of anterior stomach surface. The stomach does feel thickened, felt like leather. However, no gross disease on the surface of the stomach. He does have small to moderate amount of ascites in the upper abdomen. This was collected for cytology. Then pneumoperitoneum was evacuated. All the trocars were removed and the skin incision closed with 4-0 Vicryl stitch in subcuticular fashion. The patient tolerated the procedure well, transferred to recovery room in stable condition. All the instrument count and lap count were correct. Estimated blood loss was minimum.         Kal Cruz MD      [id number]  D:  03/25/2022 15:30  T:  03/25/2022 21:39  JOB #:  [id number]

## 2022-03-30 ENCOUNTER — DOCUMENTATION ONLY (OUTPATIENT)
Dept: HEMATOLOGY | Age: 67
End: 2022-03-30

## 2022-03-30 NOTE — PROGRESS NOTES
I spoke with Barney Smallwood regarding his Medicare Part A only insurance coverage, potential oral medication authorizations, enrollment in the [address] [place] [address] ([facility]) and the [address] [address] [place] [address] ([address]), and assistance organization resource sheet. Patient will review the cancer programs. Next, I spoke with Barney Smallwood regarding his Medicare Part A only insurance coverage. Next, I spoke with Barney Smallwood regarding the Oncology Care Model Notification Letter. I answered questions regarding the costs associated with Medicare Benefits. I explained to Barney Smallwood the estimated cost of treatment and services for six months under the [employer]. Barney Smallwood was advised to contact Medicare or their healthcare provider for questions or concerns related to service of care. The Oncology Care Model provides different options of contact for Barney Smallwood regarding concerns and complaints of treatments and services. The cost of 6 months of medical treatments and services can be estimated to be $6,917.00. Next, I spoke with Barney Smallwood regarding his Prescription Drug Benefits. Next, I spoke with Barney Selin about the financial assistance application. Next, I spoke with Barney Selin about billing questions and treatment services. We discussed copayments, deductibles, and out of pocket maximums. We discussed cost associated with FLOT medication. Next, I spoke with Barney Smallwood regarding the Limited Power of [employer] document. After reading the form, Barney Smallwood signed the Limited Power of  document. Next, I spoke with Barney Josefapaty regarding potential oral medication authorizations. I told him that if he ever had any problems getting his oral medications filled to give the dedicated [facility]  a call. Most of the time, it is simply an authorization that needs to be done with the insurance company.   Lastly, I gave Barney Smallwood a form with various resource organizations that could assist with specific needs (example:  transportation, lodging, preparing meals, home cleaning). After reviewing the programs Gurpreet Moss signed. Faxed Gurpreet Moss Referral form to the [address] [place], [place] [address]e at [phone]. Phone [phone]. Form scanned into chart. Faxed Physician's Statement to the [address] [place] [address] at [phone]. Phone [phone]. Form scanned into chart. Gurpreet Moss expressed understanding of the information above and all questions were answered to his satisfaction.

## 2022-04-05 ENCOUNTER — PATIENT OUTREACH (OUTPATIENT)
Dept: CASE MANAGEMENT | Age: 67
End: 2022-04-05

## 2022-04-05 ENCOUNTER — HOSPITAL ENCOUNTER (OUTPATIENT)
Dept: INFUSION THERAPY | Age: 67
Discharge: HOME | End: 2022-04-05

## 2022-04-05 DIAGNOSIS — D50.9 IRON DEFICIENCY ANEMIA, UNSPECIFIED IRON DEFICIENCY ANEMIA TYPE: ICD-10-CM

## 2022-04-05 DIAGNOSIS — C16.2 MALIGNANT NEOPLASM OF BODY OF STOMACH (HCC): ICD-10-CM

## 2022-04-05 LAB
ALBUMIN SERPL-MCNC: 1.7 G/DL (ref 3.2–4.6)
ALBUMIN/GLOB SERPL: 0.4 {RATIO} (ref 1.2–3.5)
ALP SERPL-CCNC: 236 U/L (ref 50–136)
ALT SERPL-CCNC: 50 U/L (ref 12–65)
ANION GAP SERPL CALC-SCNC: 7 MMOL/L (ref 7–16)
AST SERPL-CCNC: 31 U/L (ref 15–37)
BASOPHILS # BLD: 0.1 K/UL (ref 0–0.2)
BASOPHILS NFR BLD: 0 % (ref 0–2)
BILIRUB SERPL-MCNC: 0.2 MG/DL (ref 0.2–1.1)
BUN SERPL-MCNC: 17 MG/DL (ref 8–23)
CALCIUM SERPL-MCNC: 8.6 MG/DL (ref 8.3–10.4)
CANCER AG19-9 SERPL-ACNC: 5.2 U/ML (ref 2–37)
CEA SERPL-MCNC: 2.4 NG/ML (ref 0–3)
CHLORIDE SERPL-SCNC: 98 MMOL/L (ref 98–107)
CO2 SERPL-SCNC: 27 MMOL/L (ref 21–32)
CREAT SERPL-MCNC: 0.3 MG/DL (ref 0.8–1.5)
DIFFERENTIAL METHOD BLD: ABNORMAL
EOSINOPHIL # BLD: 0.1 K/UL (ref 0–0.8)
EOSINOPHIL NFR BLD: 1 % (ref 0.5–7.8)
ERYTHROCYTE [DISTWIDTH] IN BLOOD BY AUTOMATED COUNT: 21.6 % (ref 11.9–14.6)
FERRITIN SERPL-MCNC: 152 NG/ML (ref 8–388)
GLOBULIN SER CALC-MCNC: 4.4 G/DL (ref 2.3–3.5)
GLUCOSE SERPL-MCNC: 183 MG/DL (ref 65–100)
HCT VFR BLD AUTO: 26.4 %
HGB BLD-MCNC: 7.7 G/DL (ref 13.6–17.2)
IMM GRANULOCYTES # BLD AUTO: 0.1 K/UL (ref 0–0.5)
IMM GRANULOCYTES NFR BLD AUTO: 1 % (ref 0–5)
IRON SATN MFR SERPL: 15 %
IRON SERPL-MCNC: 21 UG/DL (ref 35–150)
LYMPHOCYTES # BLD: 1.4 K/UL (ref 0.5–4.6)
LYMPHOCYTES NFR BLD: 9 % (ref 13–44)
MAGNESIUM SERPL-MCNC: 1.9 MG/DL (ref 1.8–2.4)
MCH RBC QN AUTO: 24.4 PG (ref 26.1–32.9)
MCHC RBC AUTO-ENTMCNC: 29.2 G/DL (ref 31.4–35)
MCV RBC AUTO: 83.8 FL (ref 79.6–97.8)
MONOCYTES # BLD: 0.5 K/UL (ref 0.1–1.3)
MONOCYTES NFR BLD: 4 % (ref 4–12)
NEUTS SEG # BLD: 13.1 K/UL (ref 1.7–8.2)
NEUTS SEG NFR BLD: 86 % (ref 43–78)
NRBC # BLD: 0 K/UL (ref 0–0.2)
PLATELET # BLD AUTO: 448 K/UL (ref 150–450)
PMV BLD AUTO: 7.7 FL (ref 9.4–12.3)
POTASSIUM SERPL-SCNC: 4.7 MMOL/L (ref 3.5–5.1)
PROT SERPL-MCNC: 6.1 G/DL (ref 6.3–8.2)
RBC # BLD AUTO: 3.15 M/UL (ref 4.23–5.6)
SODIUM SERPL-SCNC: 132 MMOL/L (ref 136–145)
TIBC SERPL-MCNC: 139 UG/DL (ref 250–450)
WBC # BLD AUTO: 15.2 K/UL (ref 4.3–11.1)

## 2022-04-05 PROCEDURE — 85025 COMPLETE CBC W/AUTO DIFF WBC: CPT

## 2022-04-05 PROCEDURE — 82378 CARCINOEMBRYONIC ANTIGEN: CPT

## 2022-04-05 PROCEDURE — 80053 COMPREHEN METABOLIC PANEL: CPT

## 2022-04-05 PROCEDURE — 36591 DRAW BLOOD OFF VENOUS DEVICE: CPT

## 2022-04-05 PROCEDURE — 83540 ASSAY OF IRON: CPT

## 2022-04-05 PROCEDURE — 86301 IMMUNOASSAY TUMOR CA 19-9: CPT

## 2022-04-05 PROCEDURE — 83735 ASSAY OF MAGNESIUM: CPT

## 2022-04-05 PROCEDURE — 82728 ASSAY OF FERRITIN: CPT

## 2022-04-05 RX ORDER — SODIUM CHLORIDE 0.9 % (FLUSH) 0.9 %
10 SYRINGE (ML) INJECTION AS NEEDED
Status: DISCONTINUED | OUTPATIENT
Start: 2022-04-05 | End: 2022-04-07 | Stop reason: HOSPADM

## 2022-04-05 RX ADMIN — Medication 10 ML: at 08:37

## 2022-04-05 NOTE — PROGRESS NOTES
Patient arrived to port lab for port access and lab draw   Port accessed and labs drawn per protocol   *Port deaccessed and flushed. Patient discharged from port lab to [facility].

## 2022-04-05 NOTE — PROGRESS NOTES
4/5/22 saw pt today with ÁNGEL Mills for pre chemo cycle 1 FLOT for gastric cancer. See pt instructions for symptom management. Discussed nutrition at length. Provided opportunity to ask questions and all were discussed. Will add iron studies to labs today. Infusion tomorrow. Follow up in 1 week for tox check and 2 weeks for next cycle. Encouraged to call with any concerns. Navigation will continue to follow.

## 2022-04-06 ENCOUNTER — HOSPITAL ENCOUNTER (OUTPATIENT)
Dept: INFUSION THERAPY | Age: 67
Discharge: HOME | End: 2022-04-06

## 2022-04-06 VITALS
TEMPERATURE: 97.6 F | DIASTOLIC BLOOD PRESSURE: 80 MMHG | OXYGEN SATURATION: 95 % | RESPIRATION RATE: 16 BRPM | BODY MASS INDEX: 20.21 KG/M2 | WEIGHT: 131 LBS | SYSTOLIC BLOOD PRESSURE: 131 MMHG | HEART RATE: 90 BPM

## 2022-04-06 DIAGNOSIS — C16.8 MALIGNANT NEOPLASM OF OVERLAPPING SITES OF STOMACH (HCC): Primary | ICD-10-CM

## 2022-04-06 PROCEDURE — 96415 CHEMO IV INFUSION ADDL HR: CPT

## 2022-04-06 PROCEDURE — 96417 CHEMO IV INFUS EACH ADDL SEQ: CPT

## 2022-04-06 PROCEDURE — G0498 CHEMO EXTEND IV INFUS W/PUMP: HCPCS

## 2022-04-06 PROCEDURE — 96368 THER/DIAG CONCURRENT INF: CPT

## 2022-04-06 PROCEDURE — 96413 CHEMO IV INFUSION 1 HR: CPT

## 2022-04-06 PROCEDURE — 74011250636 HC RX REV CODE- 250/636: Performed by: INTERNAL MEDICINE

## 2022-04-06 PROCEDURE — 74011000258 HC RX REV CODE- 258: Performed by: INTERNAL MEDICINE

## 2022-04-06 PROCEDURE — 96375 TX/PRO/DX INJ NEW DRUG ADDON: CPT

## 2022-04-06 RX ORDER — ACETAMINOPHEN 325 MG/1
650 TABLET ORAL AS NEEDED
Status: CANCELLED
Start: 2022-04-06

## 2022-04-06 RX ORDER — EPINEPHRINE 1 MG/ML
0.3 INJECTION, SOLUTION, CONCENTRATE INTRAVENOUS AS NEEDED
Status: CANCELLED | OUTPATIENT
Start: 2022-04-06

## 2022-04-06 RX ORDER — ONDANSETRON 2 MG/ML
8 INJECTION INTRAMUSCULAR; INTRAVENOUS AS NEEDED
Status: CANCELLED | OUTPATIENT
Start: 2022-04-06

## 2022-04-06 RX ORDER — SODIUM CHLORIDE 0.9 % (FLUSH) 0.9 %
10 SYRINGE (ML) INJECTION AS NEEDED
Status: CANCELLED | OUTPATIENT
Start: 2022-04-07

## 2022-04-06 RX ORDER — DIPHENHYDRAMINE HYDROCHLORIDE 50 MG/ML
25 INJECTION, SOLUTION INTRAMUSCULAR; INTRAVENOUS AS NEEDED
Status: DISPENSED | OUTPATIENT
Start: 2022-04-06 | End: 2022-04-06

## 2022-04-06 RX ORDER — DIPHENHYDRAMINE HYDROCHLORIDE 50 MG/ML
50 INJECTION, SOLUTION INTRAMUSCULAR; INTRAVENOUS AS NEEDED
Status: CANCELLED
Start: 2022-04-06

## 2022-04-06 RX ORDER — ONDANSETRON 2 MG/ML
8 INJECTION INTRAMUSCULAR; INTRAVENOUS ONCE
Status: COMPLETED | OUTPATIENT
Start: 2022-04-06 | End: 2022-04-06

## 2022-04-06 RX ORDER — DEXTROSE MONOHYDRATE 50 MG/ML
25 INJECTION, SOLUTION INTRAVENOUS CONTINUOUS
Status: ACTIVE | OUTPATIENT
Start: 2022-04-06 | End: 2022-04-06

## 2022-04-06 RX ORDER — SODIUM CHLORIDE 9 MG/ML
10 INJECTION INTRAMUSCULAR; INTRAVENOUS; SUBCUTANEOUS AS NEEDED
Status: CANCELLED | OUTPATIENT
Start: 2022-04-07

## 2022-04-06 RX ORDER — SODIUM CHLORIDE 0.9 % (FLUSH) 0.9 %
10 SYRINGE (ML) INJECTION AS NEEDED
Status: CANCELLED | OUTPATIENT
Start: 2022-04-06

## 2022-04-06 RX ORDER — SODIUM CHLORIDE 9 MG/ML
10 INJECTION INTRAMUSCULAR; INTRAVENOUS; SUBCUTANEOUS AS NEEDED
Status: CANCELLED | OUTPATIENT
Start: 2022-04-06

## 2022-04-06 RX ORDER — HYDROCORTISONE SODIUM SUCCINATE 100 MG/2ML
100 INJECTION, POWDER, FOR SOLUTION INTRAMUSCULAR; INTRAVENOUS AS NEEDED
Status: DISPENSED | OUTPATIENT
Start: 2022-04-06 | End: 2022-04-06

## 2022-04-06 RX ORDER — HEPARIN 100 UNIT/ML
300-500 SYRINGE INTRAVENOUS AS NEEDED
Status: CANCELLED
Start: 2022-04-06

## 2022-04-06 RX ORDER — HEPARIN 100 UNIT/ML
300-500 SYRINGE INTRAVENOUS AS NEEDED
Status: CANCELLED
Start: 2022-04-07

## 2022-04-06 RX ORDER — ALBUTEROL SULFATE 0.83 MG/ML
2.5 SOLUTION RESPIRATORY (INHALATION) AS NEEDED
Status: CANCELLED
Start: 2022-04-06

## 2022-04-06 RX ADMIN — DOCETAXEL ANHYDROUS 84 MG: 10 INJECTION, SOLUTION INTRAVENOUS at 09:26

## 2022-04-06 RX ADMIN — ONDANSETRON 8 MG: 2 INJECTION INTRAMUSCULAR; INTRAVENOUS at 08:36

## 2022-04-06 RX ADMIN — FLUOROURACIL 4368 MG: 50 INJECTION, SOLUTION INTRAVENOUS at 12:54

## 2022-04-06 RX ADMIN — OXALIPLATIN 143 MG: 5 INJECTION, SOLUTION INTRAVENOUS at 10:36

## 2022-04-06 RX ADMIN — DEXAMETHASONE SODIUM PHOSPHATE 12 MG: 4 INJECTION, SOLUTION INTRAMUSCULAR; INTRAVENOUS at 08:47

## 2022-04-06 RX ADMIN — DEXTROSE MONOHYDRATE 25 ML/HR: 5 INJECTION, SOLUTION INTRAVENOUS at 08:20

## 2022-04-06 RX ADMIN — LEUCOVORIN CALCIUM 336 MG: 350 INJECTION, POWDER, LYOPHILIZED, FOR SOLUTION INTRAMUSCULAR; INTRAVENOUS at 10:36

## 2022-04-06 NOTE — PROGRESS NOTES
Arrived to the [facility] ambulatory with his wife. taxotere, oxaliplatin completed and CI 5fu pump started over 24hours at 1254. Patient tolerated well. Any issues or concerns during appointment: no.  Patient aware of next infusion appointment on 4/7 at 1700 for pump DC. Patient instructed to call provider with temperature of 100.4 or greater or nausea/vomiting/ diarrhea or pain not controlled by medications  Discharged to home ambulatory.

## 2022-04-07 ENCOUNTER — HOSPITAL ENCOUNTER (OUTPATIENT)
Dept: INFUSION THERAPY | Age: 67
Discharge: HOME | End: 2022-04-07

## 2022-04-07 VITALS
TEMPERATURE: 97.7 F | HEART RATE: 109 BPM | OXYGEN SATURATION: 95 % | RESPIRATION RATE: 18 BRPM | SYSTOLIC BLOOD PRESSURE: 113 MMHG | DIASTOLIC BLOOD PRESSURE: 64 MMHG

## 2022-04-07 DIAGNOSIS — C16.8 MALIGNANT NEOPLASM OF OVERLAPPING SITES OF STOMACH (HCC): Primary | ICD-10-CM

## 2022-04-07 PROCEDURE — 74011000250 HC RX REV CODE- 250: Performed by: INTERNAL MEDICINE

## 2022-04-07 PROCEDURE — 96523 IRRIG DRUG DELIVERY DEVICE: CPT

## 2022-04-07 RX ORDER — SODIUM CHLORIDE 9 MG/ML
10 INJECTION INTRAMUSCULAR; INTRAVENOUS; SUBCUTANEOUS AS NEEDED
Status: DISCONTINUED | OUTPATIENT
Start: 2022-04-07 | End: 2022-04-08 | Stop reason: HOSPADM

## 2022-04-07 RX ADMIN — SODIUM CHLORIDE, PRESERVATIVE FREE 10 ML: 5 INJECTION INTRAVENOUS at 16:50

## 2022-04-07 NOTE — PROGRESS NOTES
Arrived to the [facility]. D/C pump completed. Patient tolerated well. Any issues or concerns during appointment: none. Patient aware of next infusion appointment on 4/20 (date) at 8:00 AM (time). Patient instructed to call provider with temperature of 100.4 or greater or nausea/vomiting/ diarrhea or pain not controlled by medications  Discharged ambulatory.

## 2022-04-12 ENCOUNTER — PATIENT OUTREACH (OUTPATIENT)
Dept: CASE MANAGEMENT | Age: 67
End: 2022-04-12

## 2022-04-12 ENCOUNTER — HOSPITAL ENCOUNTER (OUTPATIENT)
Dept: LAB | Age: 67
Discharge: HOME | End: 2022-04-12

## 2022-04-12 DIAGNOSIS — C16.2 MALIGNANT NEOPLASM OF BODY OF STOMACH (HCC): ICD-10-CM

## 2022-04-12 LAB
ALBUMIN SERPL-MCNC: 2 G/DL (ref 3.2–4.6)
ALBUMIN/GLOB SERPL: 0.5 {RATIO} (ref 1.2–3.5)
ALP SERPL-CCNC: 239 U/L (ref 50–136)
ALT SERPL-CCNC: 39 U/L (ref 12–65)
ANION GAP SERPL CALC-SCNC: 4 MMOL/L (ref 7–16)
AST SERPL-CCNC: 24 U/L (ref 15–37)
BASOPHILS # BLD: 0 K/UL (ref 0–0.2)
BASOPHILS NFR BLD: 0 % (ref 0–2)
BILIRUB SERPL-MCNC: 0.2 MG/DL (ref 0.2–1.1)
BUN SERPL-MCNC: 19 MG/DL (ref 8–23)
CALCIUM SERPL-MCNC: 8.6 MG/DL (ref 8.3–10.4)
CHLORIDE SERPL-SCNC: 95 MMOL/L (ref 98–107)
CO2 SERPL-SCNC: 32 MMOL/L (ref 21–32)
CREAT SERPL-MCNC: 0.4 MG/DL (ref 0.8–1.5)
DIFFERENTIAL METHOD BLD: ABNORMAL
EOSINOPHIL # BLD: 0.1 K/UL (ref 0–0.8)
EOSINOPHIL NFR BLD: 1 % (ref 0.5–7.8)
ERYTHROCYTE [DISTWIDTH] IN BLOOD BY AUTOMATED COUNT: 20 % (ref 11.9–14.6)
GLOBULIN SER CALC-MCNC: 4.4 G/DL (ref 2.3–3.5)
GLUCOSE SERPL-MCNC: 109 MG/DL (ref 65–100)
HCT VFR BLD AUTO: 28 %
HGB BLD-MCNC: 8.4 G/DL (ref 13.6–17.2)
IMM GRANULOCYTES # BLD AUTO: 0.2 K/UL (ref 0–0.5)
IMM GRANULOCYTES NFR BLD AUTO: 1 % (ref 0–5)
LYMPHOCYTES # BLD: 1.5 K/UL (ref 0.5–4.6)
LYMPHOCYTES NFR BLD: 12 % (ref 13–44)
MAGNESIUM SERPL-MCNC: 2.1 MG/DL (ref 1.8–2.4)
MCH RBC QN AUTO: 24.9 PG (ref 26.1–32.9)
MCHC RBC AUTO-ENTMCNC: 30 G/DL (ref 31.4–35)
MCV RBC AUTO: 83.1 FL (ref 79.6–97.8)
MONOCYTES # BLD: 0.4 K/UL (ref 0.1–1.3)
MONOCYTES NFR BLD: 3 % (ref 4–12)
NEUTS SEG # BLD: 9.7 K/UL (ref 1.7–8.2)
NEUTS SEG NFR BLD: 83 % (ref 43–78)
NRBC # BLD: 0 K/UL (ref 0–0.2)
PLATELET # BLD AUTO: 467 K/UL (ref 150–450)
PMV BLD AUTO: 8.2 FL (ref 9.4–12.3)
POTASSIUM SERPL-SCNC: 5.5 MMOL/L (ref 3.5–5.1)
PROT SERPL-MCNC: 6.4 G/DL (ref 6.3–8.2)
RBC # BLD AUTO: 3.37 M/UL (ref 4.23–5.6)
SODIUM SERPL-SCNC: 131 MMOL/L (ref 136–145)
WBC # BLD AUTO: 11.9 K/UL (ref 4.3–11.1)

## 2022-04-12 PROCEDURE — 80053 COMPREHEN METABOLIC PANEL: CPT

## 2022-04-12 PROCEDURE — 85025 COMPLETE CBC W/AUTO DIFF WBC: CPT

## 2022-04-12 PROCEDURE — 36415 COLL VENOUS BLD VENIPUNCTURE: CPT

## 2022-04-12 PROCEDURE — 83735 ASSAY OF MAGNESIUM: CPT

## 2022-04-12 NOTE — PROGRESS NOTES
4/12/22 saw pt today with Bautista Shaffer NP for tox check post cycle 1 FLOT for gastric cancer. He tolerated cycle 1 well. Denies any issues. He is having some fatigue. He is primarily getting nutrition from supplement shakes (10-12 per day), very little food intake. Discussed that should improve as we do more chemo. Follow up 4/18 for cycle 2. Encouraged to call with any concerns. Navigation will continue to follow.

## 2022-04-18 ENCOUNTER — HOSPITAL ENCOUNTER (OUTPATIENT)
Dept: LAB | Age: 67
Discharge: HOME | End: 2022-04-18

## 2022-04-18 DIAGNOSIS — C16.8 MALIGNANT NEOPLASM OF OVERLAPPING SITES OF STOMACH (HCC): ICD-10-CM

## 2022-04-18 PROBLEM — C16.2 MALIGNANT NEOPLASM OF BODY OF STOMACH (HCC): Status: ACTIVE | Noted: 2022-04-18

## 2022-04-18 PROBLEM — C16.9 GASTRIC ADENOCARCINOMA (HCC): Status: ACTIVE | Noted: 2022-04-18

## 2022-04-18 LAB
ALBUMIN SERPL-MCNC: 1.9 G/DL (ref 3.2–4.6)
ALBUMIN/GLOB SERPL: 0.4 {RATIO} (ref 1.2–3.5)
ALP SERPL-CCNC: 248 U/L (ref 50–136)
ALT SERPL-CCNC: 27 U/L (ref 12–65)
ANION GAP SERPL CALC-SCNC: 6 MMOL/L (ref 7–16)
AST SERPL-CCNC: 19 U/L (ref 15–37)
BASOPHILS # BLD: 0 K/UL (ref 0–0.2)
BASOPHILS NFR BLD: 1 % (ref 0–2)
BILIRUB SERPL-MCNC: 0.2 MG/DL (ref 0.2–1.1)
BUN SERPL-MCNC: 16 MG/DL (ref 8–23)
CALCIUM SERPL-MCNC: 8.5 MG/DL (ref 8.3–10.4)
CHLORIDE SERPL-SCNC: 96 MMOL/L (ref 98–107)
CO2 SERPL-SCNC: 28 MMOL/L (ref 21–32)
CREAT SERPL-MCNC: 0.4 MG/DL (ref 0.8–1.5)
DIFFERENTIAL METHOD BLD: ABNORMAL
EOSINOPHIL # BLD: 0.1 K/UL (ref 0–0.8)
EOSINOPHIL NFR BLD: 1 % (ref 0.5–7.8)
ERYTHROCYTE [DISTWIDTH] IN BLOOD BY AUTOMATED COUNT: 19.6 % (ref 11.9–14.6)
GLOBULIN SER CALC-MCNC: 4.4 G/DL (ref 2.3–3.5)
GLUCOSE SERPL-MCNC: 120 MG/DL (ref 65–100)
HCT VFR BLD AUTO: 28.2 %
HGB BLD-MCNC: 8.2 G/DL (ref 13.6–17.2)
IMM GRANULOCYTES # BLD AUTO: 0 K/UL (ref 0–0.5)
IMM GRANULOCYTES NFR BLD AUTO: 1 % (ref 0–5)
LYMPHOCYTES # BLD: 1.3 K/UL (ref 0.5–4.6)
LYMPHOCYTES NFR BLD: 15 % (ref 13–44)
MAGNESIUM SERPL-MCNC: 1.9 MG/DL (ref 1.8–2.4)
MCH RBC QN AUTO: 24.8 PG (ref 26.1–32.9)
MCHC RBC AUTO-ENTMCNC: 29.1 G/DL (ref 31.4–35)
MCV RBC AUTO: 85.2 FL (ref 79.6–97.8)
MONOCYTES # BLD: 0.9 K/UL (ref 0.1–1.3)
MONOCYTES NFR BLD: 10 % (ref 4–12)
NEUTS SEG # BLD: 6.3 K/UL (ref 1.7–8.2)
NEUTS SEG NFR BLD: 73 % (ref 43–78)
NRBC # BLD: 0 K/UL (ref 0–0.2)
PLATELET # BLD AUTO: 394 K/UL (ref 150–450)
PMV BLD AUTO: 7.9 FL (ref 9.4–12.3)
POTASSIUM SERPL-SCNC: 4.4 MMOL/L (ref 3.5–5.1)
PROT SERPL-MCNC: 6.3 G/DL (ref 6.3–8.2)
RBC # BLD AUTO: 3.31 M/UL (ref 4.23–5.6)
SODIUM SERPL-SCNC: 130 MMOL/L (ref 136–145)
WBC # BLD AUTO: 8.7 K/UL (ref 4.3–11.1)

## 2022-04-18 PROCEDURE — 80053 COMPREHEN METABOLIC PANEL: CPT

## 2022-04-18 PROCEDURE — 83735 ASSAY OF MAGNESIUM: CPT

## 2022-04-18 PROCEDURE — 36415 COLL VENOUS BLD VENIPUNCTURE: CPT

## 2022-04-18 PROCEDURE — 85025 COMPLETE CBC W/AUTO DIFF WBC: CPT

## 2022-04-20 ENCOUNTER — HOSPITAL ENCOUNTER (OUTPATIENT)
Dept: INFUSION THERAPY | Age: 67
Discharge: HOME | End: 2022-04-20

## 2022-04-20 VITALS
BODY MASS INDEX: 19.69 KG/M2 | WEIGHT: 127.6 LBS | SYSTOLIC BLOOD PRESSURE: 128 MMHG | RESPIRATION RATE: 20 BRPM | HEART RATE: 92 BPM | OXYGEN SATURATION: 99 % | TEMPERATURE: 97.6 F | DIASTOLIC BLOOD PRESSURE: 78 MMHG

## 2022-04-20 DIAGNOSIS — C16.8 MALIGNANT NEOPLASM OF OVERLAPPING SITES OF STOMACH (HCC): Primary | ICD-10-CM

## 2022-04-20 PROCEDURE — 74011250636 HC RX REV CODE- 250/636: Performed by: INTERNAL MEDICINE

## 2022-04-20 PROCEDURE — 74011000250 HC RX REV CODE- 250: Performed by: INTERNAL MEDICINE

## 2022-04-20 PROCEDURE — 96368 THER/DIAG CONCURRENT INF: CPT

## 2022-04-20 PROCEDURE — 96375 TX/PRO/DX INJ NEW DRUG ADDON: CPT

## 2022-04-20 PROCEDURE — 96413 CHEMO IV INFUSION 1 HR: CPT

## 2022-04-20 PROCEDURE — 74011000258 HC RX REV CODE- 258: Performed by: INTERNAL MEDICINE

## 2022-04-20 PROCEDURE — 96417 CHEMO IV INFUS EACH ADDL SEQ: CPT

## 2022-04-20 PROCEDURE — G0498 CHEMO EXTEND IV INFUS W/PUMP: HCPCS

## 2022-04-20 RX ORDER — DEXTROSE MONOHYDRATE 50 MG/ML
25 INJECTION, SOLUTION INTRAVENOUS CONTINUOUS
Status: ACTIVE | OUTPATIENT
Start: 2022-04-20 | End: 2022-04-20

## 2022-04-20 RX ORDER — SODIUM CHLORIDE 0.9 % (FLUSH) 0.9 %
10 SYRINGE (ML) INJECTION AS NEEDED
Status: ACTIVE | OUTPATIENT
Start: 2022-04-20 | End: 2022-04-20

## 2022-04-20 RX ORDER — ONDANSETRON 2 MG/ML
8 INJECTION INTRAMUSCULAR; INTRAVENOUS ONCE
Status: COMPLETED | OUTPATIENT
Start: 2022-04-20 | End: 2022-04-20

## 2022-04-20 RX ADMIN — DOCETAXEL ANHYDROUS 84 MG: 10 INJECTION, SOLUTION INTRAVENOUS at 09:08

## 2022-04-20 RX ADMIN — OXALIPLATIN 143 MG: 5 INJECTION, SOLUTION INTRAVENOUS at 10:12

## 2022-04-20 RX ADMIN — LEUCOVORIN CALCIUM 336 MG: 350 INJECTION, POWDER, LYOPHILIZED, FOR SOLUTION INTRAMUSCULAR; INTRAVENOUS at 10:12

## 2022-04-20 RX ADMIN — DEXTROSE MONOHYDRATE 25 ML/HR: 5 INJECTION, SOLUTION INTRAVENOUS at 09:00

## 2022-04-20 RX ADMIN — ONDANSETRON 8 MG: 2 INJECTION INTRAMUSCULAR; INTRAVENOUS at 08:34

## 2022-04-20 RX ADMIN — DEXAMETHASONE SODIUM PHOSPHATE 12 MG: 4 INJECTION, SOLUTION INTRAMUSCULAR; INTRAVENOUS at 08:35

## 2022-04-20 RX ADMIN — FLUOROURACIL 4368 MG: 50 INJECTION, SOLUTION INTRAVENOUS at 12:20

## 2022-04-20 RX ADMIN — SODIUM CHLORIDE, PRESERVATIVE FREE 10 ML: 5 INJECTION INTRAVENOUS at 08:33

## 2022-04-20 RX ADMIN — SODIUM CHLORIDE, PRESERVATIVE FREE 10 ML: 5 INJECTION INTRAVENOUS at 12:21

## 2022-04-20 NOTE — PROGRESS NOTES
Pt arrived ambulatory, Chemo completed, pt tolerated well, discharged home ambulatory with chemo pump connected and unclamped, discharged home ambulatory aware of appointment tomorrow at [phone]

## 2022-04-21 ENCOUNTER — HOSPITAL ENCOUNTER (OUTPATIENT)
Dept: INFUSION THERAPY | Age: 67
Discharge: HOME | End: 2022-04-21

## 2022-04-21 VITALS
OXYGEN SATURATION: 99 % | TEMPERATURE: 97.8 F | RESPIRATION RATE: 18 BRPM | SYSTOLIC BLOOD PRESSURE: 113 MMHG | HEART RATE: 119 BPM | DIASTOLIC BLOOD PRESSURE: 70 MMHG

## 2022-04-21 DIAGNOSIS — C16.8 MALIGNANT NEOPLASM OF OVERLAPPING SITES OF STOMACH (HCC): Primary | ICD-10-CM

## 2022-04-21 PROCEDURE — 96523 IRRIG DRUG DELIVERY DEVICE: CPT

## 2022-04-21 PROCEDURE — 74011000250 HC RX REV CODE- 250: Performed by: INTERNAL MEDICINE

## 2022-04-21 RX ORDER — SODIUM CHLORIDE 0.9 % (FLUSH) 0.9 %
10 SYRINGE (ML) INJECTION AS NEEDED
Status: DISCONTINUED | OUTPATIENT
Start: 2022-04-21 | End: 2022-04-22 | Stop reason: HOSPADM

## 2022-04-21 RX ADMIN — SODIUM CHLORIDE, PRESERVATIVE FREE 10 ML: 5 INJECTION INTRAVENOUS at 16:26

## 2022-04-21 NOTE — PROGRESS NOTES
Arrived to the [facility]. Pump D/C completed. Patient instructed to report any side affects to ordering provider. Patient tolerated well. Any issues or concerns during appointment: none. Patient aware of next infusion appointment on 05/04/2022 (date) at 0800 (time). Discharged ambulatory.

## 2022-05-03 ENCOUNTER — PATIENT OUTREACH (OUTPATIENT)
Dept: CASE MANAGEMENT | Age: 67
End: 2022-05-03

## 2022-05-03 ENCOUNTER — HOSPITAL ENCOUNTER (OUTPATIENT)
Dept: LAB | Age: 67
Discharge: HOME | End: 2022-05-03

## 2022-05-03 DIAGNOSIS — C16.9 GASTRIC ADENOCARCINOMA (HCC): ICD-10-CM

## 2022-05-03 DIAGNOSIS — D50.9 IRON DEFICIENCY ANEMIA, UNSPECIFIED IRON DEFICIENCY ANEMIA TYPE: ICD-10-CM

## 2022-05-03 DIAGNOSIS — C16.2 MALIGNANT NEOPLASM OF BODY OF STOMACH (HCC): ICD-10-CM

## 2022-05-03 LAB
ALBUMIN SERPL-MCNC: 1.8 G/DL (ref 3.2–4.6)
ALBUMIN/GLOB SERPL: 0.4 {RATIO} (ref 1.2–3.5)
ALP SERPL-CCNC: 201 U/L (ref 50–136)
ALT SERPL-CCNC: 20 U/L (ref 12–65)
ANION GAP SERPL CALC-SCNC: 8 MMOL/L (ref 7–16)
AST SERPL-CCNC: 16 U/L (ref 15–37)
BASOPHILS # BLD: 0 K/UL (ref 0–0.2)
BASOPHILS NFR BLD: 0 % (ref 0–2)
BILIRUB SERPL-MCNC: 0.2 MG/DL (ref 0.2–1.1)
BUN SERPL-MCNC: 17 MG/DL (ref 8–23)
CALCIUM SERPL-MCNC: 8.7 MG/DL (ref 8.3–10.4)
CHLORIDE SERPL-SCNC: 93 MMOL/L (ref 98–107)
CO2 SERPL-SCNC: 28 MMOL/L (ref 21–32)
CREAT SERPL-MCNC: 0.4 MG/DL (ref 0.8–1.5)
DIFFERENTIAL METHOD BLD: ABNORMAL
EOSINOPHIL # BLD: 0.1 K/UL (ref 0–0.8)
EOSINOPHIL NFR BLD: 1 % (ref 0.5–7.8)
ERYTHROCYTE [DISTWIDTH] IN BLOOD BY AUTOMATED COUNT: 17.2 % (ref 11.9–14.6)
FERRITIN SERPL-MCNC: 124 NG/ML (ref 8–388)
GLOBULIN SER CALC-MCNC: 4.4 G/DL (ref 2.3–3.5)
GLUCOSE SERPL-MCNC: 117 MG/DL (ref 65–100)
HCT VFR BLD AUTO: 26.8 %
HGB BLD-MCNC: 8 G/DL (ref 13.6–17.2)
HGB RETIC QN AUTO: 22 PG (ref 29–35)
IMM GRANULOCYTES # BLD AUTO: 0.1 K/UL (ref 0–0.5)
IMM GRANULOCYTES NFR BLD AUTO: 1 % (ref 0–5)
IMM RETICS NFR: 23.6 % (ref 2.3–13.4)
IRON SATN MFR SERPL: 7 %
IRON SERPL-MCNC: 14 UG/DL (ref 35–150)
LYMPHOCYTES # BLD: 1.1 K/UL (ref 0.5–4.6)
LYMPHOCYTES NFR BLD: 12 % (ref 13–44)
MAGNESIUM SERPL-MCNC: 2 MG/DL (ref 1.8–2.4)
MCH RBC QN AUTO: 24.5 PG (ref 26.1–32.9)
MCHC RBC AUTO-ENTMCNC: 29.9 G/DL (ref 31.4–35)
MCV RBC AUTO: 82 FL (ref 79.6–97.8)
MONOCYTES # BLD: 1.3 K/UL (ref 0.1–1.3)
MONOCYTES NFR BLD: 13 % (ref 4–12)
NEUTS SEG # BLD: 6.9 K/UL (ref 1.7–8.2)
NEUTS SEG NFR BLD: 73 % (ref 43–78)
NRBC # BLD: 0 K/UL (ref 0–0.2)
PLATELET # BLD AUTO: 335 K/UL (ref 150–450)
PMV BLD AUTO: 7.9 FL (ref 9.4–12.3)
POTASSIUM SERPL-SCNC: 4.8 MMOL/L (ref 3.5–5.1)
PROT SERPL-MCNC: 6.2 G/DL (ref 6.3–8.2)
RBC # BLD AUTO: 3.27 M/UL (ref 4.23–5.6)
RETICS # AUTO: 0.14 M/UL (ref 0.03–0.1)
RETICS/RBC NFR AUTO: 4.4 % (ref 0.3–2)
SODIUM SERPL-SCNC: 129 MMOL/L (ref 136–145)
TIBC SERPL-MCNC: 208 UG/DL (ref 250–450)
VIT B12 SERPL-MCNC: 339 PG/ML (ref 193–986)
WBC # BLD AUTO: 9.4 K/UL (ref 4.3–11.1)

## 2022-05-03 PROCEDURE — 80053 COMPREHEN METABOLIC PANEL: CPT

## 2022-05-03 PROCEDURE — 83735 ASSAY OF MAGNESIUM: CPT

## 2022-05-03 PROCEDURE — 36415 COLL VENOUS BLD VENIPUNCTURE: CPT

## 2022-05-03 PROCEDURE — 82607 VITAMIN B-12: CPT

## 2022-05-03 PROCEDURE — 83540 ASSAY OF IRON: CPT

## 2022-05-03 PROCEDURE — 82728 ASSAY OF FERRITIN: CPT

## 2022-05-03 PROCEDURE — 85046 RETICYTE/HGB CONCENTRATE: CPT

## 2022-05-03 PROCEDURE — 85025 COMPLETE CBC W/AUTO DIFF WBC: CPT

## 2022-05-03 NOTE — PROGRESS NOTES
I spoke with patient prior to C3 FLOT. He states he has little appetite however has not started Decadron maintenance. We spent most of the visit speaking with patient concerning drinking Boost/Ensure and any foods palatable. Pt also admits that he is not moving and sleeping most of day. Again we explained the necessity of movement and exercise. Pt will start Remeron and agreed we will give pt one week for change, then start home PT. Pt is in agreement with plan. One Liter fluids added to pump day off and in one week.  Nurse navigation is following

## 2022-05-04 ENCOUNTER — HOSPITAL ENCOUNTER (OUTPATIENT)
Dept: INFUSION THERAPY | Age: 67
Discharge: HOME | End: 2022-05-04

## 2022-05-04 VITALS
DIASTOLIC BLOOD PRESSURE: 88 MMHG | HEART RATE: 112 BPM | TEMPERATURE: 97.9 F | SYSTOLIC BLOOD PRESSURE: 120 MMHG | RESPIRATION RATE: 18 BRPM | OXYGEN SATURATION: 99 %

## 2022-05-04 DIAGNOSIS — C16.9 GASTRIC ADENOCARCINOMA (HCC): ICD-10-CM

## 2022-05-04 DIAGNOSIS — D50.9 IRON DEFICIENCY ANEMIA, UNSPECIFIED IRON DEFICIENCY ANEMIA TYPE: ICD-10-CM

## 2022-05-04 DIAGNOSIS — C16.8 MALIGNANT NEOPLASM OF OVERLAPPING SITES OF STOMACH (HCC): Primary | ICD-10-CM

## 2022-05-04 PROCEDURE — 96417 CHEMO IV INFUS EACH ADDL SEQ: CPT

## 2022-05-04 PROCEDURE — 96375 TX/PRO/DX INJ NEW DRUG ADDON: CPT

## 2022-05-04 PROCEDURE — 74011250636 HC RX REV CODE- 250/636: Performed by: NURSE PRACTITIONER

## 2022-05-04 PROCEDURE — 96415 CHEMO IV INFUSION ADDL HR: CPT

## 2022-05-04 PROCEDURE — 96413 CHEMO IV INFUSION 1 HR: CPT

## 2022-05-04 PROCEDURE — 96368 THER/DIAG CONCURRENT INF: CPT

## 2022-05-04 PROCEDURE — 74011000258 HC RX REV CODE- 258: Performed by: NURSE PRACTITIONER

## 2022-05-04 PROCEDURE — G0498 CHEMO EXTEND IV INFUS W/PUMP: HCPCS

## 2022-05-04 RX ORDER — ONDANSETRON 2 MG/ML
8 INJECTION INTRAMUSCULAR; INTRAVENOUS ONCE
Status: COMPLETED | OUTPATIENT
Start: 2022-05-04 | End: 2022-05-04

## 2022-05-04 RX ORDER — DEXTROSE MONOHYDRATE 50 MG/ML
25 INJECTION, SOLUTION INTRAVENOUS CONTINUOUS
Status: ACTIVE | OUTPATIENT
Start: 2022-05-04 | End: 2022-05-04

## 2022-05-04 RX ORDER — SODIUM CHLORIDE 0.9 % (FLUSH) 0.9 %
10 SYRINGE (ML) INJECTION AS NEEDED
Status: ACTIVE | OUTPATIENT
Start: 2022-05-04 | End: 2022-05-04

## 2022-05-04 RX ADMIN — OXALIPLATIN 143 MG: 5 INJECTION, SOLUTION INTRAVENOUS at 09:57

## 2022-05-04 RX ADMIN — SODIUM CHLORIDE 500 ML: 900 INJECTION, SOLUTION INTRAVENOUS at 12:00

## 2022-05-04 RX ADMIN — FERUMOXYTOL 510 MG: 510 INJECTION INTRAVENOUS at 12:00

## 2022-05-04 RX ADMIN — LEUCOVORIN CALCIUM 336 MG: 200 INJECTION, POWDER, LYOPHILIZED, FOR SOLUTION INTRAMUSCULAR; INTRAVENOUS at 09:57

## 2022-05-04 RX ADMIN — DEXAMETHASONE SODIUM PHOSPHATE 12 MG: 4 INJECTION, SOLUTION INTRAMUSCULAR; INTRAVENOUS at 08:15

## 2022-05-04 RX ADMIN — DEXTROSE MONOHYDRATE 25 ML/HR: 5 INJECTION, SOLUTION INTRAVENOUS at 08:05

## 2022-05-04 RX ADMIN — FLUOROURACIL 4368 MG: 50 INJECTION, SOLUTION INTRAVENOUS at 12:16

## 2022-05-04 RX ADMIN — ONDANSETRON 8 MG: 2 INJECTION INTRAMUSCULAR; INTRAVENOUS at 08:11

## 2022-05-04 RX ADMIN — DOCETAXEL ANHYDROUS 84 MG: 10 INJECTION, SOLUTION INTRAVENOUS at 08:55

## 2022-05-04 NOTE — PROGRESS NOTES
Arrived to the [facility]. Taxotere, Oxaliplatin, and Feraheme completed. Continuous 5FU infusing via elastomeric pump over 24 hrs. Patient tolerated well. Patient declined 30 minutes post iron infusion wait time. Any issues or concerns during appointment: none. Patient aware of next infusion appointment on 5/5 at 1530. Patient instructed to call provider with temperature of 100.4 or greater or nausea/vomiting/ diarrhea or pain not controlled by medications. Discharged ambulatory with chemo infusing.

## 2022-05-05 ENCOUNTER — HOSPITAL ENCOUNTER (OUTPATIENT)
Dept: INFUSION THERAPY | Age: 67
Discharge: HOME | End: 2022-05-05

## 2022-05-05 VITALS
SYSTOLIC BLOOD PRESSURE: 109 MMHG | DIASTOLIC BLOOD PRESSURE: 64 MMHG | OXYGEN SATURATION: 97 % | RESPIRATION RATE: 16 BRPM | HEART RATE: 109 BPM | TEMPERATURE: 98.4 F

## 2022-05-05 DIAGNOSIS — C16.9 GASTRIC ADENOCARCINOMA (HCC): ICD-10-CM

## 2022-05-05 DIAGNOSIS — C16.8 MALIGNANT NEOPLASM OF OVERLAPPING SITES OF STOMACH (HCC): Primary | ICD-10-CM

## 2022-05-05 PROCEDURE — 74011250636 HC RX REV CODE- 250/636: Performed by: NURSE PRACTITIONER

## 2022-05-05 PROCEDURE — 96360 HYDRATION IV INFUSION INIT: CPT

## 2022-05-05 PROCEDURE — 74011000250 HC RX REV CODE- 250: Performed by: NURSE PRACTITIONER

## 2022-05-05 RX ORDER — SODIUM CHLORIDE 0.9 % (FLUSH) 0.9 %
10 SYRINGE (ML) INJECTION AS NEEDED
Status: DISCONTINUED | OUTPATIENT
Start: 2022-05-05 | End: 2022-05-06 | Stop reason: HOSPADM

## 2022-05-05 RX ORDER — SODIUM CHLORIDE 9 MG/ML
1000 INJECTION, SOLUTION INTRAVENOUS ONCE
Status: COMPLETED | OUTPATIENT
Start: 2022-05-05 | End: 2022-05-05

## 2022-05-05 RX ADMIN — SODIUM CHLORIDE, PRESERVATIVE FREE 10 ML: 5 INJECTION INTRAVENOUS at 15:33

## 2022-05-05 RX ADMIN — SODIUM CHLORIDE 1000 ML: 900 INJECTION, SOLUTION INTRAVENOUS at 15:35

## 2022-05-05 NOTE — PROGRESS NOTES
Arrived to the [facility]. DC pump completed. Patient given IVF and tolerated well. Any issues or concerns during appointment: no, feels better after hydration  Patient given education on hydration  Informed of next infusion appointment scheduled for 5/12/22 at 330pm  Instructed patient to notify ordering provider for any issues or worrisome symptoms. They verbalized understanding. Discharged via w/c with family member.

## 2022-05-12 ENCOUNTER — HOSPITAL ENCOUNTER (OUTPATIENT)
Dept: INFUSION THERAPY | Age: 67
Discharge: HOME | End: 2022-05-12

## 2022-05-12 VITALS
RESPIRATION RATE: 16 BRPM | TEMPERATURE: 97.3 F | HEART RATE: 113 BPM | SYSTOLIC BLOOD PRESSURE: 108 MMHG | OXYGEN SATURATION: 96 % | DIASTOLIC BLOOD PRESSURE: 79 MMHG

## 2022-05-12 DIAGNOSIS — D50.9 IRON DEFICIENCY ANEMIA, UNSPECIFIED IRON DEFICIENCY ANEMIA TYPE: Primary | ICD-10-CM

## 2022-05-12 PROCEDURE — 74011250636 HC RX REV CODE- 250/636: Performed by: NURSE PRACTITIONER

## 2022-05-12 PROCEDURE — 74011000258 HC RX REV CODE- 258: Performed by: NURSE PRACTITIONER

## 2022-05-12 PROCEDURE — 96374 THER/PROPH/DIAG INJ IV PUSH: CPT

## 2022-05-12 PROCEDURE — 74011000250 HC RX REV CODE- 250: Performed by: NURSE PRACTITIONER

## 2022-05-12 RX ORDER — SODIUM CHLORIDE 0.9 % (FLUSH) 0.9 %
10 SYRINGE (ML) INJECTION AS NEEDED
Status: DISCONTINUED | OUTPATIENT
Start: 2022-05-12 | End: 2022-05-13 | Stop reason: HOSPADM

## 2022-05-12 RX ADMIN — SODIUM CHLORIDE 500 ML: 900 INJECTION, SOLUTION INTRAVENOUS at 15:50

## 2022-05-12 RX ADMIN — FERUMOXYTOL 510 MG: 510 INJECTION INTRAVENOUS at 16:07

## 2022-05-12 RX ADMIN — SODIUM CHLORIDE, PRESERVATIVE FREE 10 ML: 5 INJECTION INTRAVENOUS at 17:01

## 2022-05-12 NOTE — PROGRESS NOTES
Arrived to the [facility]. Feraheme and NS bolus given and tolerated well. Any issues or concerns during appointment: fatigued   Informed of next [facility] appointment on 5/17 at [phone] for lab with Ov to follow. Instructed patient to notify Dr. Humble Mathur' office for any issues or worrisome symptoms. They verbalized understanding. Discharged via w/c with family.

## 2022-05-17 ENCOUNTER — HOME HEALTH ADMISSION (OUTPATIENT)
Dept: HOME HEALTH SERVICES | Facility: HOME HEALTH | Age: 67
End: 2022-05-17

## 2022-05-17 ENCOUNTER — HOSPITAL ENCOUNTER (OUTPATIENT)
Dept: LAB | Age: 67
Discharge: HOME | End: 2022-05-17

## 2022-05-17 ENCOUNTER — PATIENT OUTREACH (OUTPATIENT)
Dept: CASE MANAGEMENT | Age: 67
End: 2022-05-17

## 2022-05-17 DIAGNOSIS — C16.2 MALIGNANT NEOPLASM OF BODY OF STOMACH (HCC): ICD-10-CM

## 2022-05-17 DIAGNOSIS — C16.9 GASTRIC ADENOCARCINOMA (HCC): Primary | ICD-10-CM

## 2022-05-17 DIAGNOSIS — C16.2 MALIGNANT NEOPLASM OF BODY OF STOMACH (HCC): Primary | ICD-10-CM

## 2022-05-17 DIAGNOSIS — R53.1 WEAKNESS GENERALIZED: ICD-10-CM

## 2022-05-17 LAB
ALBUMIN SERPL-MCNC: 1.7 G/DL (ref 3.2–4.6)
ALBUMIN/GLOB SERPL: 0.4 {RATIO} (ref 1.2–3.5)
ALP SERPL-CCNC: 176 U/L (ref 50–136)
ALT SERPL-CCNC: 17 U/L (ref 12–65)
ANION GAP SERPL CALC-SCNC: 3 MMOL/L (ref 7–16)
AST SERPL-CCNC: 13 U/L (ref 15–37)
BASOPHILS # BLD: 0 K/UL (ref 0–0.2)
BASOPHILS NFR BLD: 0 % (ref 0–2)
BILIRUB SERPL-MCNC: 0.3 MG/DL (ref 0.2–1.1)
BUN SERPL-MCNC: 14 MG/DL (ref 8–23)
CALCIUM SERPL-MCNC: 8.7 MG/DL (ref 8.3–10.4)
CEA SERPL-MCNC: 2.6 NG/ML (ref 0–3)
CHLORIDE SERPL-SCNC: 91 MMOL/L (ref 98–107)
CO2 SERPL-SCNC: 32 MMOL/L (ref 21–32)
CREAT SERPL-MCNC: 0.3 MG/DL (ref 0.8–1.5)
DIFFERENTIAL METHOD BLD: ABNORMAL
EOSINOPHIL # BLD: 0.1 K/UL (ref 0–0.8)
EOSINOPHIL NFR BLD: 1 % (ref 0.5–7.8)
ERYTHROCYTE [DISTWIDTH] IN BLOOD BY AUTOMATED COUNT: 19.1 % (ref 11.9–14.6)
GLOBULIN SER CALC-MCNC: 4.3 G/DL (ref 2.3–3.5)
GLUCOSE SERPL-MCNC: 94 MG/DL (ref 65–100)
HCT VFR BLD AUTO: 28 %
HGB BLD-MCNC: 8.3 G/DL (ref 13.6–17.2)
IMM GRANULOCYTES # BLD AUTO: 0.3 K/UL (ref 0–0.5)
IMM GRANULOCYTES NFR BLD AUTO: 3 % (ref 0–5)
LYMPHOCYTES # BLD: 1.1 K/UL (ref 0.5–4.6)
LYMPHOCYTES NFR BLD: 13 % (ref 13–44)
MAGNESIUM SERPL-MCNC: 2.2 MG/DL (ref 1.8–2.4)
MCH RBC QN AUTO: 24.3 PG (ref 26.1–32.9)
MCHC RBC AUTO-ENTMCNC: 29.6 G/DL (ref 31.4–35)
MCV RBC AUTO: 81.9 FL (ref 79.6–97.8)
MONOCYTES # BLD: 1.7 K/UL (ref 0.1–1.3)
MONOCYTES NFR BLD: 21 % (ref 4–12)
NEUTS SEG # BLD: 5.1 K/UL (ref 1.7–8.2)
NEUTS SEG NFR BLD: 62 % (ref 43–78)
NRBC # BLD: 0 K/UL (ref 0–0.2)
PLATELET # BLD AUTO: 312 K/UL (ref 150–450)
PLATELET COMMENTS,PCOM: ADEQUATE
PMV BLD AUTO: 7.8 FL (ref 9.4–12.3)
POTASSIUM SERPL-SCNC: 5.1 MMOL/L (ref 3.5–5.1)
PROT SERPL-MCNC: 6 G/DL (ref 6.3–8.2)
RBC # BLD AUTO: 3.42 M/UL (ref 4.23–5.6)
RBC MORPH BLD: ABNORMAL
SODIUM SERPL-SCNC: 126 MMOL/L (ref 136–145)
WBC # BLD AUTO: 8.3 K/UL (ref 4.3–11.1)
WBC MORPH BLD: ABNORMAL

## 2022-05-17 PROCEDURE — 36415 COLL VENOUS BLD VENIPUNCTURE: CPT

## 2022-05-17 PROCEDURE — 82378 CARCINOEMBRYONIC ANTIGEN: CPT

## 2022-05-17 PROCEDURE — 80053 COMPREHEN METABOLIC PANEL: CPT

## 2022-05-17 PROCEDURE — 83735 ASSAY OF MAGNESIUM: CPT

## 2022-05-17 PROCEDURE — 85025 COMPLETE CBC W/AUTO DIFF WBC: CPT

## 2022-05-17 NOTE — PROGRESS NOTES
5/17/22 saw pt today with ÁNGEL Mills for pre chemo cycle 4 FLOT for gastric cancer. He is still having difficulty with food intake but he is drinking ensure with added protein. Will arrange follow up with Dr. Jacobo Mccord to plan surgery. Referral to home health PT for general weakness and deconditioning. Infusion tomorrow. IV iron infusion completed. Encouraged to call with any concerns. Navigation will arrange follow up post op.

## 2022-05-18 ENCOUNTER — HOME HEALTH ADMISSION (OUTPATIENT)
Dept: HOME HEALTH SERVICES | Facility: HOME HEALTH | Age: 67
End: 2022-05-18
Payer: MEDICARE

## 2022-05-18 ENCOUNTER — HOSPITAL ENCOUNTER (OUTPATIENT)
Dept: INFUSION THERAPY | Age: 67
Discharge: HOME | End: 2022-05-18

## 2022-05-18 VITALS
WEIGHT: 120.6 LBS | RESPIRATION RATE: 16 BRPM | BODY MASS INDEX: 18.61 KG/M2 | DIASTOLIC BLOOD PRESSURE: 81 MMHG | HEART RATE: 95 BPM | TEMPERATURE: 97.4 F | OXYGEN SATURATION: 100 % | SYSTOLIC BLOOD PRESSURE: 124 MMHG

## 2022-05-18 DIAGNOSIS — C16.9 GASTRIC ADENOCARCINOMA (HCC): Primary | ICD-10-CM

## 2022-05-18 PROCEDURE — G0498 CHEMO EXTEND IV INFUS W/PUMP: HCPCS

## 2022-05-18 PROCEDURE — 74011250636 HC RX REV CODE- 250/636: Performed by: NURSE PRACTITIONER

## 2022-05-18 PROCEDURE — 96375 TX/PRO/DX INJ NEW DRUG ADDON: CPT

## 2022-05-18 PROCEDURE — 96417 CHEMO IV INFUS EACH ADDL SEQ: CPT

## 2022-05-18 PROCEDURE — 96368 THER/DIAG CONCURRENT INF: CPT

## 2022-05-18 PROCEDURE — 96413 CHEMO IV INFUSION 1 HR: CPT

## 2022-05-18 PROCEDURE — 96415 CHEMO IV INFUSION ADDL HR: CPT

## 2022-05-18 PROCEDURE — 74011000258 HC RX REV CODE- 258: Performed by: NURSE PRACTITIONER

## 2022-05-18 RX ORDER — DEXTROSE MONOHYDRATE 50 MG/ML
25 INJECTION, SOLUTION INTRAVENOUS CONTINUOUS
Status: ACTIVE | OUTPATIENT
Start: 2022-05-18 | End: 2022-05-18

## 2022-05-18 RX ORDER — ONDANSETRON 2 MG/ML
8 INJECTION INTRAMUSCULAR; INTRAVENOUS ONCE
Status: COMPLETED | OUTPATIENT
Start: 2022-05-18 | End: 2022-05-18

## 2022-05-18 RX ORDER — DIPHENHYDRAMINE HYDROCHLORIDE 50 MG/ML
25 INJECTION, SOLUTION INTRAMUSCULAR; INTRAVENOUS AS NEEDED
Status: DISPENSED | OUTPATIENT
Start: 2022-05-18 | End: 2022-05-18

## 2022-05-18 RX ORDER — SODIUM CHLORIDE 0.9 % (FLUSH) 0.9 %
10 SYRINGE (ML) INJECTION AS NEEDED
Status: ACTIVE | OUTPATIENT
Start: 2022-05-18 | End: 2022-05-18

## 2022-05-18 RX ORDER — HYDROCORTISONE SODIUM SUCCINATE 100 MG/2ML
100 INJECTION, POWDER, FOR SOLUTION INTRAMUSCULAR; INTRAVENOUS AS NEEDED
Status: DISPENSED | OUTPATIENT
Start: 2022-05-18 | End: 2022-05-18

## 2022-05-18 RX ADMIN — DEXAMETHASONE SODIUM PHOSPHATE 12 MG: 4 INJECTION, SOLUTION INTRAMUSCULAR; INTRAVENOUS at 08:37

## 2022-05-18 RX ADMIN — ONDANSETRON 8 MG: 2 INJECTION INTRAMUSCULAR; INTRAVENOUS at 08:34

## 2022-05-18 RX ADMIN — OXALIPLATIN 143 MG: 5 INJECTION, SOLUTION INTRAVENOUS at 10:17

## 2022-05-18 RX ADMIN — FLUOROURACIL 4368 MG: 50 INJECTION, SOLUTION INTRAVENOUS at 12:23

## 2022-05-18 RX ADMIN — DOCETAXEL ANHYDROUS 84 MG: 10 INJECTION, SOLUTION INTRAVENOUS at 09:13

## 2022-05-18 RX ADMIN — DEXTROSE MONOHYDRATE 25 ML/HR: 5 INJECTION, SOLUTION INTRAVENOUS at 08:36

## 2022-05-18 RX ADMIN — LEUCOVORIN CALCIUM 336 MG: 350 INJECTION, POWDER, LYOPHILIZED, FOR SOLUTION INTRAMUSCULAR; INTRAVENOUS at 10:17

## 2022-05-18 NOTE — PROGRESS NOTES
Arrived to the [facility]. FLOT given and tolerated well. 5FU pump unclamped and connected to patient - verified with 2nd RN   Any issues or concerns during appointment:denies  Informed of next infusion appointment scheduled for tomorrow at [facility] 43 patient to notify ordering provider for any issues or worrisome symptoms. They verbalized understanding. Discharged ambulatory with wife.

## 2022-05-19 ENCOUNTER — HOSPITAL ENCOUNTER (OUTPATIENT)
Dept: INFUSION THERAPY | Age: 67
Discharge: HOME | End: 2022-05-19

## 2022-05-19 VITALS
SYSTOLIC BLOOD PRESSURE: 111 MMHG | HEART RATE: 130 BPM | OXYGEN SATURATION: 92 % | DIASTOLIC BLOOD PRESSURE: 75 MMHG | RESPIRATION RATE: 16 BRPM | TEMPERATURE: 98 F

## 2022-05-19 DIAGNOSIS — C16.9 GASTRIC ADENOCARCINOMA (HCC): Primary | ICD-10-CM

## 2022-05-19 PROCEDURE — 74011000250 HC RX REV CODE- 250: Performed by: NURSE PRACTITIONER

## 2022-05-19 PROCEDURE — 74011250636 HC RX REV CODE- 250/636: Performed by: NURSE PRACTITIONER

## 2022-05-19 PROCEDURE — 96360 HYDRATION IV INFUSION INIT: CPT

## 2022-05-19 RX ORDER — SODIUM CHLORIDE 9 MG/ML
1000 INJECTION, SOLUTION INTRAVENOUS CONTINUOUS
Status: DISCONTINUED | OUTPATIENT
Start: 2022-05-19 | End: 2022-05-21 | Stop reason: HOSPADM

## 2022-05-19 RX ORDER — SODIUM CHLORIDE 0.9 % (FLUSH) 0.9 %
10 SYRINGE (ML) INJECTION AS NEEDED
Status: DISCONTINUED | OUTPATIENT
Start: 2022-05-19 | End: 2022-05-20 | Stop reason: HOSPADM

## 2022-05-19 RX ADMIN — SODIUM CHLORIDE 1000 ML: 900 INJECTION, SOLUTION INTRAVENOUS at 16:07

## 2022-05-19 RX ADMIN — SODIUM CHLORIDE, PRESERVATIVE FREE 10 ML: 5 INJECTION INTRAVENOUS at 17:08

## 2022-05-19 RX ADMIN — SODIUM CHLORIDE, PRESERVATIVE FREE 10 ML: 5 INJECTION INTRAVENOUS at 16:06

## 2022-05-19 NOTE — PROGRESS NOTES
Arrived to the [facility]. Adrucil pump and hydration completed. Patient tolerated without problems. Any issues or concerns during appointment: no.  Patient aware of next infusion appointment on 5/25/22 (date) at 0800 (time). Patient instructed to call provider with temperature of 100.4 or greater or nausea/vomiting/ diarrhea or pain not controlled by medications  Discharged via [facility] with family.

## 2022-05-21 ENCOUNTER — HOME CARE VISIT (OUTPATIENT)
Dept: SCHEDULING | Facility: HOME HEALTH | Age: 67
End: 2022-05-21
Payer: MEDICARE

## 2022-05-21 VITALS
HEART RATE: 94 BPM | OXYGEN SATURATION: 97 % | SYSTOLIC BLOOD PRESSURE: 130 MMHG | TEMPERATURE: 98 F | RESPIRATION RATE: 16 BRPM | DIASTOLIC BLOOD PRESSURE: 85 MMHG

## 2022-05-21 PROCEDURE — 1090000002 HH PPS REVENUE DEBIT

## 2022-05-21 PROCEDURE — 0221000100 HH NO PAY CLAIM PROCEDURE

## 2022-05-21 PROCEDURE — 1090000001 HH PPS REVENUE CREDIT

## 2022-05-21 PROCEDURE — 400013 HH SOC

## 2022-05-21 PROCEDURE — G0151 HHCP-SERV OF PT,EA 15 MIN: HCPCS

## 2022-05-21 ASSESSMENT — ENCOUNTER SYMPTOMS
DIARRHEA: 1
HEMOPTYSIS: 0

## 2022-05-22 PROCEDURE — 1090000001 HH PPS REVENUE CREDIT

## 2022-05-22 PROCEDURE — 1090000002 HH PPS REVENUE DEBIT

## 2022-05-23 ENCOUNTER — HOME CARE VISIT (OUTPATIENT)
Dept: SCHEDULING | Facility: HOME HEALTH | Age: 67
End: 2022-05-23
Payer: MEDICARE

## 2022-05-23 VITALS
RESPIRATION RATE: 16 BRPM | HEART RATE: 88 BPM | TEMPERATURE: 98.3 F | DIASTOLIC BLOOD PRESSURE: 78 MMHG | SYSTOLIC BLOOD PRESSURE: 108 MMHG

## 2022-05-23 PROCEDURE — 1090000001 HH PPS REVENUE CREDIT

## 2022-05-23 PROCEDURE — G0151 HHCP-SERV OF PT,EA 15 MIN: HCPCS

## 2022-05-23 PROCEDURE — 1090000002 HH PPS REVENUE DEBIT

## 2022-05-24 PROCEDURE — 1090000002 HH PPS REVENUE DEBIT

## 2022-05-24 PROCEDURE — 1090000001 HH PPS REVENUE CREDIT

## 2022-05-25 ENCOUNTER — HOSPITAL ENCOUNTER (OUTPATIENT)
Dept: INFUSION THERAPY | Age: 67
Discharge: HOME | End: 2022-05-25

## 2022-05-25 VITALS
SYSTOLIC BLOOD PRESSURE: 118 MMHG | HEART RATE: 98 BPM | DIASTOLIC BLOOD PRESSURE: 79 MMHG | RESPIRATION RATE: 16 BRPM | TEMPERATURE: 97.5 F

## 2022-05-25 DIAGNOSIS — C16.2 MALIGNANT NEOPLASM OF BODY OF STOMACH (HCC): Primary | ICD-10-CM

## 2022-05-25 PROCEDURE — 96360 HYDRATION IV INFUSION INIT: CPT

## 2022-05-25 PROCEDURE — 1090000001 HH PPS REVENUE CREDIT

## 2022-05-25 PROCEDURE — 2580000003 HC RX 258: Performed by: INTERNAL MEDICINE

## 2022-05-25 PROCEDURE — 1090000002 HH PPS REVENUE DEBIT

## 2022-05-25 RX ORDER — 0.9 % SODIUM CHLORIDE 0.9 %
1000 INTRAVENOUS SOLUTION INTRAVENOUS ONCE
Status: COMPLETED | OUTPATIENT
Start: 2022-05-25 | End: 2022-05-25

## 2022-05-25 RX ORDER — SODIUM CHLORIDE 0.9 % (FLUSH) 0.9 %
5-40 SYRINGE (ML) INJECTION 2 TIMES DAILY
Status: DISCONTINUED | OUTPATIENT
Start: 2022-05-25 | End: 2022-05-26 | Stop reason: HOSPADM

## 2022-05-25 RX ADMIN — SODIUM CHLORIDE, PRESERVATIVE FREE 10 ML: 5 INJECTION INTRAVENOUS at 09:25

## 2022-05-25 RX ADMIN — SODIUM CHLORIDE, PRESERVATIVE FREE 10 ML: 5 INJECTION INTRAVENOUS at 08:05

## 2022-05-25 RX ADMIN — SODIUM CHLORIDE 1000 ML: 900 INJECTION, SOLUTION INTRAVENOUS at 08:05

## 2022-05-25 NOTE — PROGRESS NOTES
[facility] 1L of NS completed. Provided education on infusion. Patient instructed to report any side affects to ordering provider. Patient tolerated well. Any issues or concerns during appointment: none. Patient aware of next infusion appointment on 06/01/2022 at 0800. Discharged via wheelchair.

## 2022-05-26 ENCOUNTER — HOME CARE VISIT (OUTPATIENT)
Dept: SCHEDULING | Facility: HOME HEALTH | Age: 67
End: 2022-05-26
Payer: MEDICARE

## 2022-05-26 PROCEDURE — 1090000002 HH PPS REVENUE DEBIT

## 2022-05-26 PROCEDURE — 1090000001 HH PPS REVENUE CREDIT

## 2022-05-26 PROCEDURE — G0151 HHCP-SERV OF PT,EA 15 MIN: HCPCS

## 2022-05-27 PROCEDURE — 1090000002 HH PPS REVENUE DEBIT

## 2022-05-27 PROCEDURE — 1090000001 HH PPS REVENUE CREDIT

## 2022-05-28 PROCEDURE — 1090000002 HH PPS REVENUE DEBIT

## 2022-05-28 PROCEDURE — 1090000001 HH PPS REVENUE CREDIT

## 2022-05-29 PROCEDURE — 1090000001 HH PPS REVENUE CREDIT

## 2022-05-29 PROCEDURE — 1090000002 HH PPS REVENUE DEBIT

## 2022-05-30 ENCOUNTER — HOME CARE VISIT (OUTPATIENT)
Dept: SCHEDULING | Facility: HOME HEALTH | Age: 67
End: 2022-05-30
Payer: MEDICARE

## 2022-05-30 VITALS
TEMPERATURE: 98.6 F | HEART RATE: 108 BPM | SYSTOLIC BLOOD PRESSURE: 118 MMHG | DIASTOLIC BLOOD PRESSURE: 70 MMHG | OXYGEN SATURATION: 97 % | RESPIRATION RATE: 20 BRPM

## 2022-05-30 VITALS
TEMPERATURE: 98.7 F | DIASTOLIC BLOOD PRESSURE: 60 MMHG | RESPIRATION RATE: 16 BRPM | SYSTOLIC BLOOD PRESSURE: 100 MMHG | HEART RATE: 95 BPM | OXYGEN SATURATION: 99 %

## 2022-05-30 PROCEDURE — 1090000001 HH PPS REVENUE CREDIT

## 2022-05-30 PROCEDURE — 1090000002 HH PPS REVENUE DEBIT

## 2022-05-30 PROCEDURE — G0151 HHCP-SERV OF PT,EA 15 MIN: HCPCS

## 2022-05-30 ASSESSMENT — ENCOUNTER SYMPTOMS: PAIN LOCATION - PAIN QUALITY: ACHE

## 2022-05-31 ENCOUNTER — OFFICE VISIT (OUTPATIENT)
Dept: SURGERY | Age: 67
End: 2022-05-31

## 2022-05-31 ENCOUNTER — PREP FOR PROCEDURE (OUTPATIENT)
Dept: SURGERY | Age: 67
End: 2022-05-31

## 2022-05-31 VITALS
HEART RATE: 86 BPM | BODY MASS INDEX: 17.88 KG/M2 | HEIGHT: 68 IN | SYSTOLIC BLOOD PRESSURE: 110 MMHG | DIASTOLIC BLOOD PRESSURE: 72 MMHG | WEIGHT: 118 LBS | OXYGEN SATURATION: 98 %

## 2022-05-31 DIAGNOSIS — C16.8 MALIGNANT NEOPLASM OF OVERLAPPING SITES OF STOMACH (HCC): Primary | ICD-10-CM

## 2022-05-31 PROCEDURE — 1090000002 HH PPS REVENUE DEBIT

## 2022-05-31 PROCEDURE — 99215 OFFICE O/P EST HI 40 MIN: CPT | Performed by: SURGERY

## 2022-05-31 PROCEDURE — 1090000001 HH PPS REVENUE CREDIT

## 2022-05-31 PROCEDURE — 1123F ACP DISCUSS/DSCN MKR DOCD: CPT | Performed by: SURGERY

## 2022-05-31 NOTE — PROGRESS NOTES
[facility]  [facility]. [address] [place] [address]  [phone]     2022  Patient:  Ita Birch  :     HPI  Ita Birch is a 78 y.o. male who is known to our service and was last seen in March with a known gastric adenocarcinoma. He has completed 4 cycles of chemotherapy. His last dose was on 2022. He states that the chemotherapy has been very difficult. The patient states that he feels very week. He states that he is only having liquid intake. He is able to drink about 7 ensure/day. He denies any emesis. He states he is not having any abdominal pain. He has had an overall weight loss of about 50 lbs. He states he loses weight every week. He is having regular BMs. On 3/22/2022, he presented with a gastric adenocarcinoma. The patient states that over the last few months he has had a significant decrease in appetite as well as a 40 lb unintentional weight loss. This patient was worked up and found to be anemic earlier in the year. He underwent a CT scan on 2022 and was found to have a gastric mass on the CT scan. He then underwent an EGD/EUS on 3/9/2022 which found a large, circumferential, fungating mass in the proximal stomach at a distance 38-58 cm. It was staged T4aN0 on EUS criteria. Biopsy of this mass found it to be a poorly differentiated adenocarcinoma. He underwent a subsequent PET scan on 3/15/2022 which again revealed a hypermetabolic gastric mass. There was no evidence of metastatic disease. He had laparoscopy without no gross peritoneal disease, peritoneal washing was also negative. He has no significant past surgical history. He has no significant past medical history. He has no family history of cancer. He has a 50 pack year history of smoking.        Past Medical History:   Diagnosis Date    HTN (hypertension)      Current Outpatient Medications   Medication Sig Dispense Refill    dexamethasone (DECADRON) 1 MG tablet Take 1 mg by mouth 2 times a day      dexamethasone (DECADRON) 4 MG tablet Take 2 tabs twice daily the day before chemo and the day after chemo.  lidocaine-prilocaine (EMLA) 2.5-2.5 % cream Apply topically as needed      ondansetron (ZOFRAN) 8 MG tablet Take 8 mg by mouth every 8 hours as needed for Nausea      prochlorperazine (COMPAZINE) 10 MG tablet Take 5 mg by mouth every 6 hours as needed (nausea)       No current facility-administered medications for this visit. No Known Allergies  Past Surgical History:   Procedure Laterality Date    COLONOSCOPY N/A 3/9/2022    COLONOSCOPY/ 22 performed by Tahir Garza MD at [facility] ENDOSCOPY    KNEE ARTHROSCOPY      OTHER SURGICAL HISTORY      none     Family History   Problem Relation Age of Onset    Other Other         non-contributory     Social History     Tobacco Use    Smoking status: Former Smoker    Smokeless tobacco: Former User   Substance Use Topics    Alcohol use: Not Currently        Review of Systems   A comprehensive review of systems was negative except for that written in the HPI. Physical Exam  /72   Pulse 86   Ht 5' 7.5\" (1.715 m)   Wt 118 lb (53.5 kg)   SpO2 98%   BMI 18.21 kg/m²      General: Alert, oriented, cooperative, awake patient in no acute distress, he is cachetic.    Skin:  Warm, moist with good texture   Eyes:   Sclera are clear, extraocular muscles intact  HENT:  Normocephalic; oral mucosa moist, nares patent; neck is supple; trachea midline  Respiratory: Lungs clear to auscultation bilaterally, breathing is non-labored   Chest:  Symmetric throughout one respiratory excursion; no supraclavicular lymphadenopathy  CV:  Regular rate and rhythm, no appreciable murmurs, rubs, gallops  Abdomen: Soft, protuberant but non-distended; bowel sounds are normoactive   Extremities: No cyanosis, clubbing or edema  Neurological: No focal signs      Labs:   Lab Results   Component Value Date    APTT 32.5 03/23/2022    INR 1.1 03/25/2022      Lab Results   Component Value Date    WBC 8.3 05/17/2022    HGB 8.3 (L) 05/17/2022    HCT 28.0 05/17/2022    MCV 81.9 05/17/2022     05/17/2022     Lab Results   Component Value Date     (L) 05/17/2022    K 5.1 05/17/2022    CL 91 (L) 05/17/2022    CO2 32 05/17/2022    BUN 14 05/17/2022    CREATININE 0.30 (L) 05/17/2022    GLUCOSE 94 05/17/2022    CALCIUM 8.7 05/17/2022    PROT 6.0 (L) 05/17/2022    LABALBU 1.7 (L) 05/17/2022    BILITOT 0.3 05/17/2022    ALKPHOS 176 (H) 05/17/2022    AST 13 (L) 05/17/2022    ALT 17 05/17/2022    LABGLOM >60 02/25/2022    GFRAA >60 05/17/2022    AGRATIO 0.4 (L) 05/17/2022    GLOB 4.3 (H) 05/17/2022       CT:2/25/2022  CT OF THE ABDOMEN AND PELVIS       INDICATION: One month of weight loss with intermittent abdominal pain.       Multiple axial images were obtained through the abdomen and pelvis.  Oral   contrast was used for bowel opacification.  100mL of Isovue 370 intravenous   contrast was used for better evaluation of solid organs and vascular structures.    Radiation dose reduction techniques were used for this study.  All CT scans   performed at this facility use one or all of the following: Automated exposure   control, adjustment of the mA and/or kVp according to patient's size, iterative   reconstruction.       COMPARISON: None       FINDINGS:   LOWER CHEST: Normal.       HEPATOBILIARY: Calcified granulomas. Liver and gallbladder are otherwise   unremarkable. Trace perihepatic ascites.       PANCREAS: Normal.       SPLEEN: Spleen is enlarged measuring 14.4 cm in AP dimension. No focal splenic   lesions.       ADRENAL GLANDS: Normal.       KIDNEYS/BLADDER: Kidneys and bladder are unremarkable. No hydronephrosis or   renal calculi.       BOWEL: Soft tissue mass noted involving the proximal stomach with an area   posteriorly abutting the spleen measuring 6.4 x 4.2 cm. An additional area noted   on image 23 measuring 5.1 x 3.6 cm.  This may also involve the region of the   gastric cardia near the GE junction on image 10. No associated gastric   obstruction. No bowel obstruction or diverticulitis. Appendix is normal.       LYMPH NODES: No enlarged abdominal or pelvic lymph nodes.       VASCULATURE: Calcified plaque abdominal aorta without aneurysm or dissection.       PELVIC ORGANS: Prostate gland and rectum are unremarkable. Small amount of free   pelvic fluid.       MUSCULOSKELETAL: Degenerative spine changes. No destructive bone lesions.           Impression   1. Infiltrative appearing gastric mass for which follow-up endoscopy recommended   for further evaluation. No gastric obstruction. Remainder of the bowel is within   normal limits. Small upper abdominal lymph nodes are present but none are   significantly enlarged by CT criteria. Gastric adenocarcinoma thought to be most   likely, but gastrointestinal stromal tumor remains in the differential. No   significant metastatic disease is appreciated but there is a trace amount of   perihepatic ascites. Follow-up CT imaging recommended to assess for developing   peritoneal metastatic disease. PET: 3/15/2022  EXAMINATION: PET/CT TUMOR IMAGE SKULL THIGH W (INI) 3/15/2022 2:46 PM       ACCESSION NUMBER: [id number]       COMPARISON: CT abdomen pelvis dated 2/25/2022       INDICATION: Gastric adenocarcinoma, staging       TECHNIQUE:   Radiopharmaceutical: 13.07 mCi F18-FDG IV.  This is PET- FDG study   performed on a dedicated full-ring scanner. Low dose, nondiagnostic CT axial   source images are also acquired for PET attenuation correction and are utilized   for PET-CT fusion.  These images do not constitute a diagnostic quality CT exam.   The patient underwent adequate fasting of at least 4 hours.        Blood glucose at the time of tracer administration is 94 mg/dl, which is   adequate for imaging.  Approximately 60 minutes after administration of the   radiopharmaceutical imaging was initiated covering the skull to the thighs. SUV   max. calculated from patient body wt. Noncaloric dilute oral contrast is given.     For this CT scanner at least one of the following techniques is utilized to   decrease patient radiation exposure: Automatic exposure control, modulation of   MA and KVP based on patient weight, and iterative reconstruction.        FINDINGS:     Head/Neck:   No hypermetabolic cervical lymphadenopathy.  No abnormal radiotracer uptake   within the brain, however the normal intense uptake limits evaluation.       Chest:   No hypermetabolic pulmonary nodule or mediastinal lymph node. Small left pleural   effusion without significant radiotracer uptake. Moderate upper lobe predominant   emphysema. Platelike atelectasis/scarring within the lingula and left lower   lobe. Small pericardial effusion.       Abdomen/Pelvis:   Hypermetabolic infiltrative mass involving the gastric cardia, gastric fundus,   and gastric body (max SUV 18.3). Small to moderate-sized hiatal hernia. There is   a focal outpouching of hypermetabolic tumor along the posterior aspect of the   gastric body (series 3 image 165). Small amount of ascites within the abdomen   and pelvis, without significant FDG uptake. Several small bowel loops within the   pelvis and right abdomen demonstrate metabolic activity, favored to BE   physiologic. No discrete omental or peritoneal nodules.        Bones:   No hypermetabolic osseous focus to suggest osseous metastatic disease.           Impression   1.  Hypermetabolic infiltrative gastric mass involving the upper portions of the   stomach, compatible with biopsy-proven malignancy. There is a focal outpouching   of tumor posteriorly along the gastric body wall. 2.  No evidence of metastatic disease. 3.  Redemonstrated small amount of ascites within the abdomen and pelvis. No   discrete omental nodularity is evident.     4.  Small pericardial effusion, small left pleural effusion, and mild anasarca,   suspicious for fluid overload or hypoproteinemia.                 Assessment/Plan:  Paty Felipe is a 78 y.o. male who has signs and symptoms consistent with a gastric adenocarcinoma involving almost entire stomach. The patient has finished his neoadjuvant chemotherapy with last dose two weeks ago. We will be scheduling the patient to undergo an open total gastrectomy in 2-3 weeks . It was discussed that he will have a temporary J-tube placed as well at the time of his operation. He was encouraged to continue his 6-8 ensure each day to keep up with nutrition as much as possible. Discussed the patient's condition and treatment options with the patient. Discussed risks of surgery in language the patient could understand. The patient voiced understanding of all this and all questions were answered. Alternatives to surgery were discussed also and risks of the alternatives. The patient requested that we proceed with surgery.       Total time spent with patient including chart review is 40 minutes      Vasquez Mantilla MD

## 2022-06-01 ENCOUNTER — HOSPITAL ENCOUNTER (OUTPATIENT)
Dept: INFUSION THERAPY | Age: 67
End: 2022-06-01

## 2022-06-01 PROCEDURE — 1090000002 HH PPS REVENUE DEBIT

## 2022-06-01 PROCEDURE — 1090000001 HH PPS REVENUE CREDIT

## 2022-06-01 RX ORDER — SODIUM CHLORIDE 0.9 % (FLUSH) 0.9 %
5-40 SYRINGE (ML) INJECTION EVERY 12 HOURS SCHEDULED
Status: CANCELLED | OUTPATIENT
Start: 2022-06-01

## 2022-06-01 RX ORDER — SODIUM CHLORIDE 9 MG/ML
INJECTION, SOLUTION INTRAVENOUS PRN
Status: CANCELLED | OUTPATIENT
Start: 2022-06-01

## 2022-06-01 RX ORDER — SODIUM CHLORIDE 0.9 % (FLUSH) 0.9 %
5-40 SYRINGE (ML) INJECTION PRN
Status: CANCELLED | OUTPATIENT
Start: 2022-06-01

## 2022-06-02 ENCOUNTER — HOSPITAL ENCOUNTER (INPATIENT)
Age: 67
LOS: 19 days | Discharge: NURSING, REHAB OR LONG-TERM CARE | DRG: 853 | End: 2022-06-21
Attending: EMERGENCY MEDICINE
Payer: MEDICARE

## 2022-06-02 ENCOUNTER — APPOINTMENT (OUTPATIENT)
Dept: CT IMAGING | Age: 67
DRG: 853 | End: 2022-06-02
Payer: MEDICARE

## 2022-06-02 ENCOUNTER — APPOINTMENT (OUTPATIENT)
Dept: GENERAL RADIOLOGY | Age: 67
DRG: 853 | End: 2022-06-02
Payer: MEDICARE

## 2022-06-02 DIAGNOSIS — I95.1 SYNCOPE DUE TO ORTHOSTATIC HYPOTENSION: ICD-10-CM

## 2022-06-02 DIAGNOSIS — Z85.028 HISTORY OF GASTRIC CANCER: ICD-10-CM

## 2022-06-02 DIAGNOSIS — Z90.49 H/O RESECTION OF LIVER: ICD-10-CM

## 2022-06-02 DIAGNOSIS — R55 SYNCOPE AND COLLAPSE: ICD-10-CM

## 2022-06-02 DIAGNOSIS — A41.9 SEPTIC SHOCK (HCC): Primary | ICD-10-CM

## 2022-06-02 DIAGNOSIS — R65.21 SEPTIC SHOCK (HCC): Primary | ICD-10-CM

## 2022-06-02 DIAGNOSIS — I26.99 ACUTE PULMONARY EMBOLISM WITHOUT ACUTE COR PULMONALE, UNSPECIFIED PULMONARY EMBOLISM TYPE (HCC): ICD-10-CM

## 2022-06-02 DIAGNOSIS — K92.2 GASTROINTESTINAL HEMORRHAGE, UNSPECIFIED GASTROINTESTINAL HEMORRHAGE TYPE: ICD-10-CM

## 2022-06-02 DIAGNOSIS — D68.69 HYPERCOAGULABLE STATE, SECONDARY (HCC): Chronic | ICD-10-CM

## 2022-06-02 DIAGNOSIS — R57.8 HEMORRHAGIC SHOCK (HCC): ICD-10-CM

## 2022-06-02 DIAGNOSIS — R53.1 WEAKNESS: ICD-10-CM

## 2022-06-02 PROBLEM — D50.9 IDA (IRON DEFICIENCY ANEMIA): Status: ACTIVE | Noted: 2022-01-25

## 2022-06-02 PROBLEM — E46 HYPOALBUMINEMIA DUE TO PROTEIN-CALORIE MALNUTRITION (HCC): Status: ACTIVE | Noted: 2022-06-02

## 2022-06-02 PROBLEM — C16.9 GASTRIC ADENOCARCINOMA (HCC): Status: ACTIVE | Noted: 2022-04-18

## 2022-06-02 PROBLEM — D62 ABLA (ACUTE BLOOD LOSS ANEMIA): Status: ACTIVE | Noted: 2022-06-02

## 2022-06-02 PROBLEM — R57.9 SHOCK (HCC): Status: ACTIVE | Noted: 2022-06-02

## 2022-06-02 PROBLEM — F17.200 TOBACCO USE DISORDER: Status: ACTIVE | Noted: 2021-12-01

## 2022-06-02 PROBLEM — F19.20 CORTICOSTEROID DEPENDENCE (HCC): Status: ACTIVE | Noted: 2022-06-02

## 2022-06-02 PROBLEM — R63.4 LOSS OF WEIGHT: Status: ACTIVE | Noted: 2021-12-01

## 2022-06-02 PROBLEM — S62.512B: Status: ACTIVE | Noted: 2017-10-25

## 2022-06-02 PROBLEM — K21.9 GASTROESOPHAGEAL REFLUX DISEASE: Status: ACTIVE | Noted: 2021-12-01

## 2022-06-02 PROBLEM — E87.1 HYPONATREMIA: Status: ACTIVE | Noted: 2022-06-02

## 2022-06-02 PROBLEM — Z87.891 FORMER HEAVY TOBACCO SMOKER: Status: ACTIVE | Noted: 2022-06-02

## 2022-06-02 PROBLEM — R64 CANCER CACHEXIA (HCC): Status: ACTIVE | Noted: 2022-06-02

## 2022-06-02 PROBLEM — R65.20 SEVERE SEPSIS (HCC): Status: ACTIVE | Noted: 2022-06-02

## 2022-06-02 PROBLEM — E87.20 SEVERE SEPSIS WITH LACTIC ACIDOSIS (HCC): Status: ACTIVE | Noted: 2022-06-02

## 2022-06-02 PROBLEM — C16.8 MALIGNANT NEOPLASM OF OVERLAPPING SITES OF STOMACH (HCC): Status: ACTIVE | Noted: 2022-03-23

## 2022-06-02 PROBLEM — E83.42 HYPOMAGNESEMIA: Status: ACTIVE | Noted: 2022-06-02

## 2022-06-02 PROBLEM — E88.09 HYPOALBUMINEMIA DUE TO PROTEIN-CALORIE MALNUTRITION (HCC): Status: ACTIVE | Noted: 2022-06-02

## 2022-06-02 LAB
ALBUMIN SERPL-MCNC: 1 G/DL (ref 3.2–4.6)
ALBUMIN/GLOB SERPL: 0.3 {RATIO} (ref 1.2–3.5)
ALP SERPL-CCNC: 181 U/L (ref 50–136)
ALT SERPL-CCNC: 15 U/L (ref 12–65)
ANION GAP SERPL CALC-SCNC: 10 MMOL/L (ref 7–16)
APPEARANCE UR: CLEAR
APTT PPP: 33.8 SEC (ref 24.1–35.1)
AST SERPL-CCNC: 17 U/L (ref 15–37)
BACTERIA URNS QL MICRO: 0 /HPF
BASOPHILS # BLD: 0 K/UL (ref 0–0.2)
BASOPHILS NFR BLD: 0 % (ref 0–2)
BILIRUB SERPL-MCNC: 0.2 MG/DL (ref 0.2–1.1)
BILIRUB UR QL: NEGATIVE
BUN SERPL-MCNC: 23 MG/DL (ref 8–23)
CALCIUM SERPL-MCNC: 7.9 MG/DL (ref 8.3–10.4)
CASTS URNS QL MICRO: 0 /LPF
CHLORIDE SERPL-SCNC: 96 MMOL/L (ref 98–107)
CO2 SERPL-SCNC: 25 MMOL/L (ref 21–32)
COLOR UR: YELLOW
CORTIS BS SERPL-MCNC: 21.8 UG/DL
CREAT SERPL-MCNC: 0.4 MG/DL (ref 0.8–1.5)
CRYSTALS URNS QL MICRO: 0 /LPF
DIFFERENTIAL METHOD BLD: ABNORMAL
EKG ATRIAL RATE: 109 BPM
EKG DIAGNOSIS: NORMAL
EKG P AXIS: 51 DEGREES
EKG P-R INTERVAL: 167 MS
EKG Q-T INTERVAL: 326 MS
EKG QRS DURATION: 73 MS
EKG QTC CALCULATION (BAZETT): 437 MS
EKG R AXIS: 41 DEGREES
EKG T AXIS: 66 DEGREES
EKG VENTRICULAR RATE: 108 BPM
EOSINOPHIL # BLD: 0 K/UL (ref 0–0.8)
EOSINOPHIL NFR BLD: 0 % (ref 0.5–7.8)
EPI CELLS #/AREA URNS HPF: 0 /HPF
ERYTHROCYTE [DISTWIDTH] IN BLOOD BY AUTOMATED COUNT: 19 % (ref 11.9–14.6)
GLOBULIN SER CALC-MCNC: 3.1 G/DL (ref 2.3–3.5)
GLUCOSE SERPL-MCNC: 195 MG/DL (ref 65–100)
GLUCOSE UR STRIP.AUTO-MCNC: NEGATIVE MG/DL
HCT VFR BLD AUTO: 19.5 % (ref 41.1–50.3)
HCT VFR BLD AUTO: 22.2 % (ref 41.1–50.3)
HGB BLD-MCNC: 5.6 G/DL (ref 13.6–17.2)
HGB BLD-MCNC: 6.8 G/DL (ref 13.6–17.2)
HGB UR QL STRIP: NEGATIVE
HISTORY CHECK: NORMAL
HISTORY CHECK: NORMAL
IMM GRANULOCYTES # BLD AUTO: 1.1 K/UL (ref 0–0.5)
IMM GRANULOCYTES NFR BLD AUTO: 5 % (ref 0–5)
INR PPP: 1.1
KETONES UR QL STRIP.AUTO: NEGATIVE MG/DL
LACTATE SERPL-SCNC: 1.6 MMOL/L (ref 0.4–2)
LACTATE SERPL-SCNC: 1.6 MMOL/L (ref 0.4–2)
LACTATE SERPL-SCNC: 5.1 MMOL/L (ref 0.4–2)
LEUKOCYTE ESTERASE UR QL STRIP.AUTO: NEGATIVE
LYMPHOCYTES # BLD: 1.1 K/UL (ref 0.5–4.6)
LYMPHOCYTES NFR BLD: 5 % (ref 13–44)
MAGNESIUM SERPL-MCNC: 1.8 MG/DL (ref 1.8–2.4)
MCH RBC QN AUTO: 24 PG (ref 26.1–32.9)
MCHC RBC AUTO-ENTMCNC: 28.7 G/DL (ref 31.4–35)
MCV RBC AUTO: 83.7 FL (ref 79.6–97.8)
MONOCYTES # BLD: 2.1 K/UL (ref 0.1–1.3)
MONOCYTES NFR BLD: 10 % (ref 4–12)
MUCOUS THREADS URNS QL MICRO: 0 /LPF
NEUTS SEG # BLD: 16 K/UL (ref 1.7–8.2)
NEUTS SEG NFR BLD: 79 % (ref 43–78)
NITRITE UR QL STRIP.AUTO: NEGATIVE
NRBC # BLD: 0 K/UL (ref 0–0.2)
PH UR STRIP: 7 [PH] (ref 5–9)
PLATELET # BLD AUTO: 401 K/UL (ref 150–450)
PMV BLD AUTO: 8.8 FL (ref 9.4–12.3)
POTASSIUM SERPL-SCNC: 4.6 MMOL/L (ref 3.5–5.1)
PROCALCITONIN SERPL-MCNC: 1.02 NG/ML (ref 0–0.49)
PROT SERPL-MCNC: 4.1 G/DL (ref 6.3–8.2)
PROT UR STRIP-MCNC: NEGATIVE MG/DL
PROTHROMBIN TIME: 14.6 SEC (ref 12.6–14.5)
RBC # BLD AUTO: 2.33 M/UL (ref 4.23–5.6)
RBC #/AREA URNS HPF: 0 /HPF
SODIUM SERPL-SCNC: 131 MMOL/L (ref 136–145)
SP GR UR REFRACTOMETRY: 1.01 (ref 1–1.02)
TROPONIN I SERPL HS-MCNC: 8.8 PG/ML (ref 0–14)
TSH W FREE THYROID IF ABNORMAL: 1.41 UIU/ML (ref 0.36–3.74)
URINE CULTURE IF INDICATED: NORMAL
UROBILINOGEN UR QL STRIP.AUTO: 0.2 EU/DL (ref 0.2–1)
WBC # BLD AUTO: 20.4 K/UL (ref 4.3–11.1)
WBC URNS QL MICRO: 0 /HPF

## 2022-06-02 PROCEDURE — 2580000003 HC RX 258: Performed by: FAMILY MEDICINE

## 2022-06-02 PROCEDURE — 83735 ASSAY OF MAGNESIUM: CPT

## 2022-06-02 PROCEDURE — 51798 US URINE CAPACITY MEASURE: CPT

## 2022-06-02 PROCEDURE — 83930 ASSAY OF BLOOD OSMOLALITY: CPT

## 2022-06-02 PROCEDURE — 87641 MR-STAPH DNA AMP PROBE: CPT

## 2022-06-02 PROCEDURE — 84484 ASSAY OF TROPONIN QUANT: CPT

## 2022-06-02 PROCEDURE — 1090000001 HH PPS REVENUE CREDIT

## 2022-06-02 PROCEDURE — 71275 CT ANGIOGRAPHY CHEST: CPT

## 2022-06-02 PROCEDURE — 83605 ASSAY OF LACTIC ACID: CPT

## 2022-06-02 PROCEDURE — 36415 COLL VENOUS BLD VENIPUNCTURE: CPT

## 2022-06-02 PROCEDURE — 87205 SMEAR GRAM STAIN: CPT

## 2022-06-02 PROCEDURE — 30233N1 TRANSFUSION OF NONAUTOLOGOUS RED BLOOD CELLS INTO PERIPHERAL VEIN, PERCUTANEOUS APPROACH: ICD-10-PCS | Performed by: EMERGENCY MEDICINE

## 2022-06-02 PROCEDURE — 84300 ASSAY OF URINE SODIUM: CPT

## 2022-06-02 PROCEDURE — 6360000004 HC RX CONTRAST MEDICATION: Performed by: FAMILY MEDICINE

## 2022-06-02 PROCEDURE — P9047 ALBUMIN (HUMAN), 25%, 50ML: HCPCS | Performed by: FAMILY MEDICINE

## 2022-06-02 PROCEDURE — 71045 X-RAY EXAM CHEST 1 VIEW: CPT

## 2022-06-02 PROCEDURE — 84145 PROCALCITONIN (PCT): CPT

## 2022-06-02 PROCEDURE — 86923 COMPATIBILITY TEST ELECTRIC: CPT

## 2022-06-02 PROCEDURE — 86901 BLOOD TYPING SEROLOGIC RH(D): CPT

## 2022-06-02 PROCEDURE — 1090000002 HH PPS REVENUE DEBIT

## 2022-06-02 PROCEDURE — 96375 TX/PRO/DX INJ NEW DRUG ADDON: CPT

## 2022-06-02 PROCEDURE — 80053 COMPREHEN METABOLIC PANEL: CPT

## 2022-06-02 PROCEDURE — P9016 RBC LEUKOCYTES REDUCED: HCPCS

## 2022-06-02 PROCEDURE — 1100000000 HC RM PRIVATE

## 2022-06-02 PROCEDURE — 81001 URINALYSIS AUTO W/SCOPE: CPT

## 2022-06-02 PROCEDURE — 85730 THROMBOPLASTIN TIME PARTIAL: CPT

## 2022-06-02 PROCEDURE — 87185 SC STD ENZYME DETCJ PER NZM: CPT

## 2022-06-02 PROCEDURE — 83935 ASSAY OF URINE OSMOLALITY: CPT

## 2022-06-02 PROCEDURE — 87186 SC STD MICRODIL/AGAR DIL: CPT

## 2022-06-02 PROCEDURE — 96365 THER/PROPH/DIAG IV INF INIT: CPT

## 2022-06-02 PROCEDURE — 82533 TOTAL CORTISOL: CPT

## 2022-06-02 PROCEDURE — 6370000000 HC RX 637 (ALT 250 FOR IP): Performed by: FAMILY MEDICINE

## 2022-06-02 PROCEDURE — 85610 PROTHROMBIN TIME: CPT

## 2022-06-02 PROCEDURE — 99285 EMERGENCY DEPT VISIT HI MDM: CPT

## 2022-06-02 PROCEDURE — 2580000003 HC RX 258: Performed by: EMERGENCY MEDICINE

## 2022-06-02 PROCEDURE — 85025 COMPLETE CBC W/AUTO DIFF WBC: CPT

## 2022-06-02 PROCEDURE — 6360000002 HC RX W HCPCS: Performed by: EMERGENCY MEDICINE

## 2022-06-02 PROCEDURE — 85018 HEMOGLOBIN: CPT

## 2022-06-02 PROCEDURE — 6360000002 HC RX W HCPCS: Performed by: FAMILY MEDICINE

## 2022-06-02 PROCEDURE — 36430 TRANSFUSION BLD/BLD COMPNT: CPT

## 2022-06-02 PROCEDURE — 2500000003 HC RX 250 WO HCPCS: Performed by: FAMILY MEDICINE

## 2022-06-02 PROCEDURE — 87040 BLOOD CULTURE FOR BACTERIA: CPT

## 2022-06-02 PROCEDURE — 87076 CULTURE ANAEROBE IDENT EACH: CPT

## 2022-06-02 PROCEDURE — 84443 ASSAY THYROID STIM HORMONE: CPT

## 2022-06-02 RX ORDER — MAGNESIUM SULFATE IN WATER 40 MG/ML
2000 INJECTION, SOLUTION INTRAVENOUS ONCE
Status: COMPLETED | OUTPATIENT
Start: 2022-06-02 | End: 2022-06-02

## 2022-06-02 RX ORDER — SODIUM CHLORIDE 9 MG/ML
INJECTION, SOLUTION INTRAVENOUS PRN
Status: DISCONTINUED | OUTPATIENT
Start: 2022-06-02 | End: 2022-06-02

## 2022-06-02 RX ORDER — ACETAMINOPHEN 325 MG/1
650 TABLET ORAL EVERY 6 HOURS PRN
Status: DISCONTINUED | OUTPATIENT
Start: 2022-06-02 | End: 2022-06-03

## 2022-06-02 RX ORDER — SODIUM CHLORIDE 0.9 % (FLUSH) 0.9 %
5-40 SYRINGE (ML) INJECTION PRN
Status: DISCONTINUED | OUTPATIENT
Start: 2022-06-02 | End: 2022-06-09

## 2022-06-02 RX ORDER — CYANOCOBALAMIN 1000 UG/ML
1000 INJECTION INTRAMUSCULAR; SUBCUTANEOUS ONCE
Status: COMPLETED | OUTPATIENT
Start: 2022-06-02 | End: 2022-06-02

## 2022-06-02 RX ORDER — MIDODRINE HYDROCHLORIDE 5 MG/1
10 TABLET ORAL 3 TIMES DAILY PRN
Status: DISCONTINUED | OUTPATIENT
Start: 2022-06-02 | End: 2022-06-03 | Stop reason: DRUGHIGH

## 2022-06-02 RX ORDER — SODIUM CHLORIDE 0.9 % (FLUSH) 0.9 %
10 SYRINGE (ML) INJECTION
Status: DISCONTINUED | OUTPATIENT
Start: 2022-06-02 | End: 2022-06-09

## 2022-06-02 RX ORDER — DEXAMETHASONE 0.5 MG/1
3 TABLET ORAL EVERY 12 HOURS SCHEDULED
Status: DISPENSED | OUTPATIENT
Start: 2022-06-02 | End: 2022-06-05

## 2022-06-02 RX ORDER — DEXAMETHASONE SODIUM PHOSPHATE 4 MG/ML
4 INJECTION, SOLUTION INTRA-ARTICULAR; INTRALESIONAL; INTRAMUSCULAR; INTRAVENOUS; SOFT TISSUE ONCE
Status: COMPLETED | OUTPATIENT
Start: 2022-06-02 | End: 2022-06-02

## 2022-06-02 RX ORDER — SODIUM CHLORIDE, SODIUM LACTATE, POTASSIUM CHLORIDE, AND CALCIUM CHLORIDE .6; .31; .03; .02 G/100ML; G/100ML; G/100ML; G/100ML
30 INJECTION, SOLUTION INTRAVENOUS ONCE
Status: COMPLETED | OUTPATIENT
Start: 2022-06-02 | End: 2022-06-02

## 2022-06-02 RX ORDER — SALIVA STIMULANT COMB. NO.3
SPRAY, NON-AEROSOL (ML) MUCOUS MEMBRANE PRN
Status: DISCONTINUED | OUTPATIENT
Start: 2022-06-02 | End: 2022-06-09

## 2022-06-02 RX ORDER — ONDANSETRON 2 MG/ML
4 INJECTION INTRAMUSCULAR; INTRAVENOUS EVERY 6 HOURS PRN
Status: DISCONTINUED | OUTPATIENT
Start: 2022-06-02 | End: 2022-06-21 | Stop reason: HOSPADM

## 2022-06-02 RX ORDER — FOLIC ACID 1 MG/1
1 TABLET ORAL DAILY
Status: DISCONTINUED | OUTPATIENT
Start: 2022-06-02 | End: 2022-06-06

## 2022-06-02 RX ORDER — MIRTAZAPINE 15 MG/1
7.5 TABLET, FILM COATED ORAL NIGHTLY
Status: DISCONTINUED | OUTPATIENT
Start: 2022-06-02 | End: 2022-06-09

## 2022-06-02 RX ORDER — SUCRALFATE 1 G/1
1 TABLET ORAL EVERY 6 HOURS SCHEDULED
Status: DISCONTINUED | OUTPATIENT
Start: 2022-06-02 | End: 2022-06-09

## 2022-06-02 RX ORDER — ACETAMINOPHEN 650 MG/1
650 SUPPOSITORY RECTAL EVERY 6 HOURS PRN
Status: DISCONTINUED | OUTPATIENT
Start: 2022-06-02 | End: 2022-06-03

## 2022-06-02 RX ORDER — SODIUM CHLORIDE 9 MG/ML
INJECTION, SOLUTION INTRAVENOUS PRN
Status: DISCONTINUED | OUTPATIENT
Start: 2022-06-02 | End: 2022-06-04 | Stop reason: SDUPTHER

## 2022-06-02 RX ORDER — DEXAMETHASONE 0.5 MG/1
1 TABLET ORAL EVERY 12 HOURS SCHEDULED
Status: DISCONTINUED | OUTPATIENT
Start: 2022-06-05 | End: 2022-06-09

## 2022-06-02 RX ORDER — ALBUMIN (HUMAN) 12.5 G/50ML
25 SOLUTION INTRAVENOUS EVERY 6 HOURS
Status: DISPENSED | OUTPATIENT
Start: 2022-06-02 | End: 2022-06-03

## 2022-06-02 RX ORDER — LANOLIN
CREAM (ML) TOPICAL PRN
Status: DISCONTINUED | OUTPATIENT
Start: 2022-06-02 | End: 2022-06-21 | Stop reason: HOSPADM

## 2022-06-02 RX ORDER — DEXTROSE AND SODIUM CHLORIDE 5; .9 G/100ML; G/100ML
INJECTION, SOLUTION INTRAVENOUS CONTINUOUS
Status: DISCONTINUED | OUTPATIENT
Start: 2022-06-02 | End: 2022-06-03

## 2022-06-02 RX ORDER — 0.9 % SODIUM CHLORIDE 0.9 %
100 INTRAVENOUS SOLUTION INTRAVENOUS
Status: COMPLETED | OUTPATIENT
Start: 2022-06-02 | End: 2022-06-02

## 2022-06-02 RX ORDER — SODIUM CHLORIDE 0.9 % (FLUSH) 0.9 %
5-40 SYRINGE (ML) INJECTION EVERY 12 HOURS SCHEDULED
Status: DISCONTINUED | OUTPATIENT
Start: 2022-06-02 | End: 2022-06-09

## 2022-06-02 RX ADMIN — DEXAMETHASONE 3 MG: 0.5 TABLET ORAL at 21:59

## 2022-06-02 RX ADMIN — SODIUM CHLORIDE, POTASSIUM CHLORIDE, SODIUM LACTATE AND CALCIUM CHLORIDE 2019 ML: 600; 310; 30; 20 INJECTION, SOLUTION INTRAVENOUS at 13:24

## 2022-06-02 RX ADMIN — DEXAMETHASONE SODIUM PHOSPHATE 4 MG: 4 INJECTION, SOLUTION INTRAMUSCULAR; INTRAVENOUS at 20:20

## 2022-06-02 RX ADMIN — SUCRALFATE 1 G: 1 TABLET ORAL at 20:20

## 2022-06-02 RX ADMIN — FOLIC ACID 1 MG: 1 TABLET ORAL at 20:20

## 2022-06-02 RX ADMIN — IOPAMIDOL 100 ML: 755 INJECTION, SOLUTION INTRAVENOUS at 17:21

## 2022-06-02 RX ADMIN — MIRTAZAPINE 7.5 MG: 15 TABLET, FILM COATED ORAL at 20:46

## 2022-06-02 RX ADMIN — SODIUM CHLORIDE, PRESERVATIVE FREE 10 ML: 5 INJECTION INTRAVENOUS at 20:37

## 2022-06-02 RX ADMIN — MIDODRINE HYDROCHLORIDE 10 MG: 5 TABLET ORAL at 20:19

## 2022-06-02 RX ADMIN — CEFEPIME HYDROCHLORIDE 2000 MG: 2 INJECTION, POWDER, FOR SOLUTION INTRAVENOUS at 21:23

## 2022-06-02 RX ADMIN — SODIUM CHLORIDE 100 ML: 9 INJECTION, SOLUTION INTRAVENOUS at 17:21

## 2022-06-02 RX ADMIN — MAGNESIUM SULFATE HEPTAHYDRATE 2000 MG: 40 INJECTION, SOLUTION INTRAVENOUS at 20:21

## 2022-06-02 RX ADMIN — VANCOMYCIN HYDROCHLORIDE 750 MG: 750 INJECTION, POWDER, LYOPHILIZED, FOR SOLUTION INTRAVENOUS at 22:43

## 2022-06-02 RX ADMIN — CEFEPIME HYDROCHLORIDE 2000 MG: 2 INJECTION, POWDER, FOR SOLUTION INTRAVENOUS at 13:40

## 2022-06-02 RX ADMIN — Medication 5 UNITS: at 20:46

## 2022-06-02 RX ADMIN — VANCOMYCIN HYDROCHLORIDE 1250 MG: 10 INJECTION, POWDER, LYOPHILIZED, FOR SOLUTION INTRAVENOUS at 14:53

## 2022-06-02 RX ADMIN — CYANOCOBALAMIN 1000 MCG: 1000 INJECTION, SOLUTION INTRAMUSCULAR; SUBCUTANEOUS at 20:20

## 2022-06-02 RX ADMIN — ALBUMIN (HUMAN) 25 G: 0.25 INJECTION, SOLUTION INTRAVENOUS at 22:00

## 2022-06-02 RX ADMIN — DEXTROSE AND SODIUM CHLORIDE: 5; 900 INJECTION, SOLUTION INTRAVENOUS at 20:07

## 2022-06-02 ASSESSMENT — ENCOUNTER SYMPTOMS
SINUS PAIN: 0
ABDOMINAL DISTENTION: 0
BACK PAIN: 0
NAUSEA: 1
VOMITING: 0
BLOOD IN STOOL: 0
ABDOMINAL PAIN: 0
SHORTNESS OF BREATH: 1
CONSTIPATION: 1
DIARRHEA: 0
SHORTNESS OF BREATH: 0
EYE PAIN: 0
COUGH: 0
ABDOMINAL PAIN: 1
TROUBLE SWALLOWING: 1
NAUSEA: 0
ANAL BLEEDING: 0
RHINORRHEA: 0
COLOR CHANGE: 0

## 2022-06-02 NOTE — H&P
Hospitalist History and Physical   Admit Date:  2022  1:05 PM   Name:  Burnie Lefort   Age:  78 y.o. Sex:  male  :  1955   MRN:  [id number]   Room:  ER08/    Presenting Complaint: Loss of Consciousness     Reason(s) for Admission: Shock [facility]) [R57.9]     History of Present Illness:   Burnie Lefort is a 78 y.o. male with medical history of gastric adenocarcinoma s/p 4 cycles chemo, last dose 22, 50 pack year smoking history,  HTN, who presented after having a syncopal episode today with 2 day history of near syncope. He started feeling wewak and tired then stood up and passed out. Had constipation yesterday and abdominal pain but no abdominal pain today. No melena, hematochezia. Denies fever, chills, n/v, diarrhea. Unable to tolerate solid food for months, drinks several ensures per day with significant weight loss. Wife notes yesterday he was breathing abnormally and very shallow, having some chest discomfort. 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 Has missed 1 or more doses but says his wife is good about giving him his medicine. Denies current NSAID use. Only takes what is prescribed. Has hd IV iron infusions in the past but no blood. Prior to arrival, HR was erratic jumping from normal to as high as 160-170, described as SVT vs Sinus tach per EMS. He rec'd 1500 cc LR bolus and 4 mg zofran while en route. In ED, he was hypotensive, BP 74/54, , RR 22. He rec'd S/p  30cc/kg sepsis, vancomcyin, cefepime 1 UpRBC ordered     Labs: SNa 131 Procal 1.02 LA 5.1  Salb 1.0 ALK phos 181  WBC 20.4 hgb 5.6 (8.3 on 22) HCT 19.5   EKG sinus tach   CXR no acute findings     S/p sepsis bolus, vancomcyin, cefepime 1 UpRBC ordered     Review of Systems:  Review of Systems   Constitutional: Positive for activity change, appetite change, fatigue and unexpected weight change. Negative for chills, diaphoresis and fever. HENT: Positive for trouble swallowing. Negative for congestion and sinus pain. Eyes: Negative for pain and visual disturbance. Respiratory: Positive for shortness of breath. Negative for cough. Cardiovascular: Negative for chest pain and leg swelling. Gastrointestinal: Positive for abdominal pain, constipation and nausea. Negative for abdominal distention, anal bleeding, blood in stool, diarrhea and vomiting. Endocrine: Positive for polydipsia. Genitourinary: Negative for difficulty urinating, dysuria and frequency. Musculoskeletal: Positive for gait problem. Skin: Positive for pallor. Negative for rash. Neurological: Positive for dizziness, syncope, weakness and light-headedness. Hematological: Bruises/bleeds easily. Psychiatric/Behavioral: Positive for dysphoric mood. Negative for confusion. Assessment & Plan:     Shock - hemorrhagic and septic etiology suspected. Source of sepsis unknown. S/p 30 cc/kg sepsis Bolus. Schedule albumin q6hx4 doses. Start cIVF. Admin 2 UpRBC. Vanc/cefepime. Chronically on dexamethasone 1 mg BID. Admin IV 4 mg dexadron now f/b 3 mg BID x 3 days then reduce back to home dose. Secondary adrenal crisis is possible. Blood cultures. Check random cortisol. Check CTPE and CT a/p. Trend lactic acid. Midodrine 10 mg TID prn for MAP persistently <65     Anemia - suspected abla from GI source vs BM suppression from chemo/malignancy plus nutritional deficiencies. H/o b12 deficiency. Admin b12 1000 mcg IM and start PO folic acid. Admin 2 Uprbc. Trend h/h q6h. Check coags. Start PPI IV BID and carafate      Immunocompromised state // corticosteroid dependence - admin decadron 4 mg IV now f/b increase home regimen 3x3. Chronic hyponatremia - etiology unclear. Possibly SIADH related to known malignancy. Check TSH, cortisol, urine studies. Syncope - 2/2 hypovolemia. Cancer cachexia // severe protein caloric malnutrition - add low dose remeron at bedtime. Steroids as above. Consult nutrition. Gastric adenocarcinoma - s/p 4 cycle chemo. Consult oncology and Dr Segundo Caraballo. Former smoker - noted     Hypoalbuminemia - 2/2 malnutirtion. Slab 1.0 on 6/2/22     hypomagnsemia - replete     SVT/sinus tachycardia - responded to IVF. Patient is critically ill. Without intervention, there is a high probability of acute organ impairment or life-threatening deterioration in the patient's condition from: shock, hemorrhagic shock, abla, septic shock, arrhythmias   Total critical care time spent: 61 minutes. Time is indicative of direct patient attendance at bedside and on the patient's floor nearby. Includes time spent at bedside performing history and exam, performing chart review, discussing findings and treatment plan with patient and/or family, discussing patient with nursing staff, consultants and colleagues, and ordering/reviewing pertinent laboratory and radiographic evaluations. Time excludes procedures. CPT:  95432: First 30-74 minutes  13015: Each block of 30 min. beyond 76      Dispo/Discharge Planning:     Admit remote tele, low threshold ICU transfer     Diet: ADULT DIET; Clear Liquid; No red dye  VTE ppx: SCDs  Code status: DNR    Hospital Problems:  Principal Problem:    Shock ([facility] [address] [address]  Active Problems:    Severe sepsis with lactic acidosis (HCC)    ABLA (acute blood loss anemia)    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HCC)    Former heavy tobacco smoker    Gastroesophageal reflux disease    GIB (gastrointestinal bleeding)    Hyponatremia    Hypoalbuminemia due to protein-calorie malnutrition (HCC)    Syncope due to orthostatic hypotension    Hypomagnesemia    Corticosteroid dependence (HCC)    ABILIO (iron deficiency anemia)    Malignant neoplasm of overlapping sites of stomach ([address]    Gastric adenocarcinoma ([address]  Resolved Problems:    * No resolved hospital problems.  *       Past History:     Past Medical History:   Diagnosis Date    HTN (hypertension)      Past Surgical History:   Procedure Laterality Date    COLONOSCOPY N/A 3/9/2022    COLONOSCOPY/ 22 performed by Minal Rutledge [facility] ENDOSCOPY    KNEE ARTHROSCOPY      OTHER SURGICAL HISTORY      none      No Known Allergies   Social History     Tobacco Use    Smoking status: Former Smoker    Smokeless tobacco: Former User   Substance Use Topics    Alcohol use: Not Currently      Family History   Problem Relation Age of Onset    Other Other         non-contributory      Family history reviewed and negative except as noted above. There is no immunization history for the selected administration types on file for this patient. Prior to Admit Medications:  Current Outpatient Medications   Medication Instructions    dexamethasone (DECADRON) 4 MG tablet Take 2 tabs twice daily the day before chemo and the day after chemo.  dexamethasone (DECADRON) 1 mg, Oral, 2 times a day    lidocaine-prilocaine (EMLA) 2.5-2.5 % cream Topical, PRN    ondansetron (ZOFRAN) 8 mg, Oral, EVERY 8 HOURS PRN    prochlorperazine (COMPAZINE) 5 mg, Oral, EVERY 6 HOURS PRN         Objective:     Patient Vitals for the past 24 hrs:   Temp Pulse Resp BP SpO2   06/02/22 1551 97.8 °F (36.6 °C) 91 16 (!) 83/55 98 %   06/02/22 1536 97.5 °F (36.4 °C) 86 16 83/63 100 %   06/02/22 1415 -- 99 18 94/62 94 %   06/02/22 1330 -- (!) 104 19 (!) 100/38 96 %   06/02/22 1323 -- (!) 110 17 80/62 96 %   06/02/22 1310 -- (!) 110 22 (!) 74/54 96 %   06/02/22 1309 -- (!) 108 20 (!) 74/54 95 %       Oxygen Therapy  SpO2: 98 %    Estimated body mass index is 17.43 kg/m² as calculated from the following:    Height as of this encounter: 5' 9\" (1.753 m). Weight as of this encounter: 118 lb (53.5 kg). No intake or output data in the 24 hours ending 06/02/22 1631      Physical Exam:    Blood pressure (!) 83/55, pulse 91, temperature 97.8 °F (36.6 °C), resp. rate 16, height 5' 9\" (1.753 m), weight 118 lb (53.5 kg), SpO2 98 %. Physical Exam  Vitals and nursing note reviewed.    Constitutional: Appearance: He is ill-appearing. Comments: Cachetic, diffuse pallor    HENT:      Head: Normocephalic and atraumatic. Mouth/Throat:      Mouth: Mucous membranes are dry. Eyes:      General:         Right eye: Discharge present. Left eye: Discharge present. Extraocular Movements: Extraocular movements intact. Pupils: Pupils are equal, round, and reactive to light. Cardiovascular:      Rate and Rhythm: Normal rate and regular rhythm. Heart sounds: No murmur heard. Pulmonary:      Effort: Pulmonary effort is normal.      Breath sounds: No wheezing or rhonchi. Comments: Transmitted upper airway sounds   Abdominal:      General: Abdomen is flat. Bowel sounds are normal. There is no distension. Palpations: Abdomen is soft. Tenderness: There is no abdominal tenderness. There is no guarding or rebound. Musculoskeletal:      Cervical back: Neck supple. Right lower leg: No edema. Left lower leg: No edema. Skin:     General: Skin is dry. Capillary Refill: Capillary refill takes 2 to 3 seconds. Coloration: Skin is pale. Findings: Bruising present. No rash. Comments: Left upper chest port site clean, dry    Neurological:      Mental Status: He is alert and oriented to person, place, and time. Motor: Weakness present. Psychiatric:      Comments: Normal mood. Tearful affect.             I have reviewed ordered lab tests and independently visualized imaging below:    Last 24hr Labs:  Recent Results (from the past 24 hour(s))   EKG 12 Lead    Collection Time: 06/02/22  1:10 PM   Result Value Ref Range    Ventricular Rate 108 BPM    Atrial Rate 109 BPM    P-R Interval 167 ms    QRS Duration 73 ms    Q-T Interval 326 ms    QTc Calculation (Bazett) 437 ms    P Axis 51 degrees    R Axis 41 degrees    T Axis 66 degrees    Diagnosis Sinus tachycardia    Culture, Blood 1    Collection Time: 06/02/22  1:12 PM    Specimen: Blood   Result Value Ref Range    Special Requests PORT      Culture PENDING    Lactic Acid    Collection Time: 06/02/22  1:12 PM   Result Value Ref Range    Lactic Acid, Plasma 5.1 (HH) 0.4 - 2.0 MMOL/L   CBC with Auto Differential    Collection Time: 06/02/22  1:12 PM   Result Value Ref Range    WBC 20.4 (H) 4.3 - 11.1 K/uL    RBC 2.33 (L) 4.23 - 5.6 M/uL    Hemoglobin 5.6 (LL) 13.6 - 17.2 g/dL    Hematocrit 19.5 (L) 41.1 - 50.3 %    MCV 83.7 79.6 - 97.8 FL    MCH 24.0 (L) 26.1 - 32.9 PG    MCHC 28.7 (L) 31.4 - 35.0 g/dL    RDW 19.0 (H) 11.9 - 14.6 %    Platelets 836 641 - 238 K/uL    MPV 8.8 (L) 9.4 - 12.3 FL    nRBC 0.00 0.0 - 0.2 K/uL    Differential Type AUTOMATED      Seg Neutrophils 79 (H) 43 - 78 %    Lymphocytes 5 (L) 13 - 44 %    Monocytes 10 4.0 - 12.0 %    Eosinophils % 0 (L) 0.5 - 7.8 %    Basophils 0 0.0 - 2.0 %    Immature Granulocytes 5 0.0 - 5.0 %    Segs Absolute 16.0 (H) 1.7 - 8.2 K/UL    Absolute Lymph # 1.1 0.5 - 4.6 K/UL    Absolute Mono # 2.1 (H) 0.1 - 1.3 K/UL    Absolute Eos # 0.0 0.0 - 0.8 K/UL    Basophils Absolute 0.0 0.0 - 0.2 K/UL    Absolute Immature Granulocyte 1.1 (H) 0.0 - 0.5 K/UL   CMP    Collection Time: 06/02/22  1:12 PM   Result Value Ref Range    Sodium 131 (L) 136 - 145 mmol/L    Potassium 4.6 3.5 - 5.1 mmol/L    Chloride 96 (L) 98 - 107 mmol/L    CO2 25 21 - 32 mmol/L    Anion Gap 10 7 - 16 mmol/L    Glucose 195 (H) 65 - 100 mg/dL    BUN 23 8 - 23 MG/DL    CREATININE 0.40 (L) 0.8 - 1.5 MG/DL    GFR African American >60 >60 ml/min/1.73m2    GFR Non- >60 >60 ml/min/1.73m2    Calcium 7.9 (L) 8.3 - 10.4 MG/DL    Total Bilirubin 0.2 0.2 - 1.1 MG/DL    ALT 15 12 - 65 U/L    AST 17 15 - 37 U/L    Alk Phosphatase 181 (H) 50 - 136 U/L    Total Protein 4.1 (L) 6.3 - 8.2 g/dL    Albumin 1.0 (L) 3.2 - 4.6 g/dL    Globulin 3.1 2.3 - 3.5 g/dL    Albumin/Globulin Ratio 0.3 (L) 1.2 - 3.5     Procalcitonin    Collection Time: 06/02/22  1:12 PM   Result Value Ref Range    Procalcitonin 1.02 (H) 0.00 - 0.49 ng/mL   Troponin    Collection Time: 06/02/22  1:12 PM   Result Value Ref Range    Troponin, High Sensitivity 8.8 0 - 14 pg/mL   Magnesium    Collection Time: 06/02/22  1:12 PM   Result Value Ref Range    Magnesium 1.8 1.8 - 2.4 mg/dL   TYPE AND SCREEN    Collection Time: 06/02/22  1:36 PM   Result Value Ref Range    Crossmatch expiration date 06/05/2022,2359     ABO/Rh O POSITIVE     Antibody Screen NEG     Unit Number [id number]     Product Code Blood Bank RC LR     Unit Divison 00     Dispense Status Blood Bank ALLOCATED     Crossmatch Result Compatible     Unit Number [id number]     Product Code Blood Bank RC LR     Unit Divison 00     Dispense Status Blood Bank ISSUED     Crossmatch Result Compatible    PREPARE RBC (CROSSMATCH), 1 Units    Collection Time: 06/02/22  1:45 PM   Result Value Ref Range    History Check Historical check performed    PREPARE RBC (CROSSMATCH), 1 Units    Collection Time: 06/02/22  3:15 PM   Result Value Ref Range    History Check Historical check performed        Other Studies:  XR CHEST PORTABLE    Result Date: 6/2/2022  Portable chest: History: Sepsis, near syncope Comparison: 03/25/2022 Findings: Portable AP views were obtained at 1316 at 1319 hours. A left subclavian Mediport catheter is present. The cardiac silhouette is normal in size and configuration. The lungs and pleural spaces are clear. The pulmonary vascularity is within normal limits. Unremarkable portable chest radiograph. Echocardiogram:  No results found for this or any previous visit.       Meds previously ordered:  Orders Placed This Encounter   Medications    lactated ringers bolus    cefepime (MAXIPIME) 2000 mg IVPB minibag in NS     Order Specific Question:   Antimicrobial Indications     Answer:   Sepsis of Unknown Etiology    DISCONTD: vancomycin (VANCOCIN) 1,000 mg in sodium chloride 0.9 % 250 mL IVPB ([id number])     Order Specific Question:   Antimicrobial Indications     Answer: Sepsis of Unknown Etiology    0.9 % sodium chloride infusion    vancomycin (VANCOCIN) 1250 mg in sodium chloride 0.9% 250 mL IVPB     Order Specific Question:   Antimicrobial Indications     Answer:   Sepsis of Unknown Etiology    0.9 % sodium chloride infusion    albumin human 25 % IV solution 25 g    pantoprazole (PROTONIX) 40 mg in sodium chloride (PF) 10 mL injection    sodium chloride flush 0.9 % injection 5-40 mL    sodium chloride flush 0.9 % injection 5-40 mL    0.9 % sodium chloride infusion    OR Linked Order Group     acetaminophen (TYLENOL) tablet 650 mg     acetaminophen (TYLENOL) suppository 650 mg    dextrose 5 % and 0.9 % sodium chloride infusion    cefepime (MAXIPIME) 2000 mg IVPB minibag in NS     Order Specific Question:   Antimicrobial Indications     Answer:   Sepsis of Unknown Etiology     Order Specific Question:   Sepsis duration of therapy     Answer:   7 days    0.9 % sodium chloride infusion    magnesium sulfate 2000 mg in 50 mL IVPB premix    dexamethasone (DECADRON) injection 4 mg    FOLLOWED BY Linked Order Group     dexamethasone (DECADRON) tablet 3 mg     dexamethasone (DECADRON) tablet 1 mg    sucralfate (CARAFATE) tablet 1 g    iopamidol (ISOVUE-370) 76 % injection 100 mL    0.9 % sodium chloride bolus    sodium chloride flush 0.9 % injection 10 mL    folic acid (FOLVITE) tablet 1 mg    cyanocobalamin injection 1,000 mcg    mirtazapine (REMERON) tablet 7.5 mg    ondansetron (ZOFRAN) injection 4 mg    saliva substitute (BIOTENE/MOUTH KOTE) liquid    Medela Tender Care Lanolin cream    tuberculin injection 5 Units    vancomycin (VANCOCIN) 750 mg in sodium chloride 0.9 % 250 mL IVPB ([id number])     Order Specific Question:   Antimicrobial Indications     Answer:   Pneumonia (CAP)     Order Specific Question:   Antimicrobial Indications     Answer:   Sepsis of Unknown Etiology     Order Specific Question:   CAP duration of therapy     Answer:   7 days Order Specific Question:   Sepsis duration of therapy     Answer:   7 days    midodrine (PROAMATINE) tablet 10 mg       Signed:  Agnes Addison, DO, DO    Part of this note may have been written by using a voice dictation software. The note has been proof read but may still contain some grammatical/other typographical errors.

## 2022-06-02 NOTE — ED PROVIDER NOTES
[facility] Emergency Department Provider Note                   PCP:                Luis A Peters MD               Age: 78 y.o. Sex: male       ICD-10-CM    1. Septic shock (HCC)  A41.9     R65.21    2. Hemorrhagic shock (HCC)  R57.8    3. Gastrointestinal hemorrhage, unspecified gastrointestinal hemorrhage type  K92.2    4. Syncope and collapse  R55    5. History of gastric cancer  Z85.028        DISPOSITION Decision To Admit 06/02/2022 03:12:11 PM       New Prescriptions    No medications on file       Orders Placed This Encounter   Procedures    Culture, Blood 1    Culture, Blood 1    XR CHEST PORTABLE    Lactic Acid    Lactic Acid    CBC with Auto Differential    CMP    Procalcitonin    Troponin    Magnesium    Hemoglobin and Hematocrit    Hemoglobin and Hematocrit    Straight Cath (Select if patient is unable to provide a sample)    Verify hospital blood product consent form has been signed and witnessed    Vital Signs For Blood Product Transfusion    Transfusion Reaction Management    Verify hospital blood product consent form has been signed and witnessed    Vital Signs For Blood Product Transfusion    Transfusion Reaction Management    Pharmacy to Dose: Vancomycin; Sepsis of Unknown Etiology; 7 days    EKG 12 Lead    TYPE AND SCREEN    PREPARE RBC (CROSSMATCH), 1 Units    TYPE AND SCREEN    PREPARE RBC (CROSSMATCH), 1 Units    Saline lock IV        MDM  Number of Diagnoses or Management Options  Gastrointestinal hemorrhage, unspecified gastrointestinal hemorrhage type  Hemorrhagic shock (HCC)  History of gastric cancer  Septic shock (HCC)  Syncope and collapse  Diagnosis management comments: Patient appears pale and we will Hemoccult stool and get a unit of blood ready if positive. 1:33 PM  Hemoccult positive. A unit of blood will be ordered and transfused. 2:11 PM  Antibiotics continued due to leukocytosis and white count of 20.   Patient remains hypotensive with a blood pressure of 89/62 but heart rate is improved down to 100-1 01 and blood pressure is improved from a systolic in the 89C.    9:72 PM  Blood bank still working on first unit of blood and I have asked them to pair a second unit of blood. GI consulted and hospitalist consulted for admission. Patient remains hypotensive but tachycardia has improved. BP currently 82/58. Patient awake and alert. Amount and/or Complexity of Data Reviewed  Clinical lab tests: ordered and reviewed  Tests in the radiology section of CPT®: ordered and reviewed  Tests in the medicine section of CPT®: ordered and reviewed  Independent visualization of images, tracings, or specimens: yes (EKG interpretation in absence of cardiology  EKG shows a sinus tachycardia rate of 100 with atrial premature complexes, anterior infarct old anterior Q waves, prolonged QT interval, ST elevation inferior leads less than 1 mm, no STEMI, baseline wander lateral leads,  Abnormal EKG  Interpreted by ED physician Dr. Desmond Dykes)    Risk of Complications, Morbidity, and/or Mortality  Presenting problems: high  Diagnostic procedures: low  Management options: moderate    Critical Care  Total time providing critical care: (Critical care time: 35 minutes of critical care time was performed in the emergency department. This was separate from any other procedures listed during the patients emergency department course. The failure to initiate these interventions on an urgent basis would likely have resulted in sudden, clinically significant or life-threatening deterioration in the patients condition.  )       José Raza is a 78 y.o. male who presents to the Emergency Department with chief complaint of    Chief Complaint   Patient presents with    Loss of Consciousness      79-year-old male with history of stomach cancer status post recent completion of chemotherapy presents after syncopal event today.   He has been feeling weak and tired and stood up and passed out today. He had trouble with constipation yesterday as well as abdominal pain but denies any abdominal pain today. He denies melena or hematochezia. He denies fevers chills cough or congestion. No nausea vomiting or diarrhea. Prior to arrival patient's heart rate was erratic and jumping from normal up to as high as 160 and 170 and described as SVT versus sinus tachycardia. Patient hypotensive prior to arrival and upon arrival.          All other systems reviewed and are negative. Review of Systems   Constitutional: Negative for chills and fever. HENT: Negative for congestion and rhinorrhea. Respiratory: Negative for cough and shortness of breath. Cardiovascular: Negative for chest pain and leg swelling. Gastrointestinal: Positive for constipation. Negative for abdominal pain, diarrhea, nausea and vomiting. Endocrine: Negative for polydipsia and polyuria. Genitourinary: Negative for dysuria, frequency and hematuria. Musculoskeletal: Negative for back pain and myalgias. Skin: Negative for color change and rash. Neurological: Negative for weakness and numbness. All other systems reviewed and are negative.       Past Medical History:   Diagnosis Date    HTN (hypertension)         Past Surgical History:   Procedure Laterality Date    COLONOSCOPY N/A 3/9/2022    COLONOSCOPY/ 22 performed by Nancie Woods MD at [facility] ENDOSCOPY    KNEE ARTHROSCOPY      OTHER SURGICAL HISTORY      none        Family History   Problem Relation Age of Onset    Other Other         non-contributory           Social Connections:     Frequency of Communication with Friends and Family: Not on file    Frequency of Social Gatherings with Friends and Family: Not on file    Attends Scientologist Services: Not on file    Active Member of Clubs or Organizations: Not on file    Attends Club or Organization Meetings: Not on file    Marital Status: Not on file        No Known Allergies     Vitals signs and nursing note reviewed. Patient Vitals for the past 4 hrs:   Pulse Resp BP SpO2   06/02/22 1415 99 18 94/62 94 %   06/02/22 1330 (!) 104 19 (!) 100/38 96 %   06/02/22 1323 (!) 110 17 80/62 96 %   06/02/22 1310 (!) 110 22 (!) 74/54 96 %   06/02/22 1309 (!) 108 20 (!) 74/54 95 %          Physical Exam  Vitals and nursing note reviewed. Constitutional:       Appearance: Normal appearance. He is ill-appearing ( Chronically ill-appearing, pale). HENT:      Head: Normocephalic and atraumatic. Nose: Nose normal.      Mouth/Throat:      Mouth: Mucous membranes are moist.   Eyes:      Pupils: Pupils are equal, round, and reactive to light. Comments: Conjunctive a are pale   Cardiovascular:      Rate and Rhythm: Normal rate and regular rhythm. Pulses: Normal pulses. Heart sounds: Normal heart sounds. Pulmonary:      Effort: Pulmonary effort is normal.      Breath sounds: Normal breath sounds. Abdominal:      General: There is no distension. Palpations: Abdomen is soft. Tenderness: There is no abdominal tenderness. There is no guarding or rebound. Genitourinary:     Rectum: Guaiac result positive. Musculoskeletal:         General: Normal range of motion. Skin:     General: Skin is warm and dry. Coloration: Skin is pale. Neurological:      Mental Status: He is alert and oriented to person, place, and time.    Psychiatric:         Behavior: Behavior normal.          Procedures    Labs Reviewed   LACTIC ACID - Abnormal; Notable for the following components:       Result Value    Lactic Acid, Plasma 5.1 (*)     All other components within normal limits   CBC WITH AUTO DIFFERENTIAL - Abnormal; Notable for the following components:    WBC 20.4 (*)     RBC 2.33 (*)     Hemoglobin 5.6 (*)     Hematocrit 19.5 (*)     MCH 24.0 (*)     MCHC 28.7 (*)     RDW 19.0 (*)     MPV 8.8 (*)     Seg Neutrophils 79 (*)     Lymphocytes 5 (*)     Eosinophils % 0 (*)     Segs Absolute 16.0 (*)     Absolute Mono # 2.1 (*)     Absolute Immature Granulocyte 1.1 (*)     All other components within normal limits   COMPREHENSIVE METABOLIC PANEL - Abnormal; Notable for the following components:    Sodium 131 (*)     Chloride 96 (*)     Glucose 195 (*)     CREATININE 0.40 (*)     Calcium 7.9 (*)     Alk Phosphatase 181 (*)     Total Protein 4.1 (*)     Albumin 1.0 (*)     Albumin/Globulin Ratio 0.3 (*)     All other components within normal limits   PROCALCITONIN - Abnormal; Notable for the following components:    Procalcitonin 1.02 (*)     All other components within normal limits   CULTURE, BLOOD 1   CULTURE, BLOOD 1   TROPONIN   MAGNESIUM   TYPE AND SCREEN   PREPARE RBC (CROSSMATCH)   TYPE AND SCREEN   PREPARE RBC (CROSSMATCH)        XR CHEST PORTABLE   Final Result   Unremarkable portable chest radiograph. Adryan Coma Scale  Eye Opening: Spontaneous  Best Verbal Response: Oriented  Best Motor Response: Obeys commands  Adryan Coma Scale Score: 15                     Voice dictation software was used during the making of this note. This software is not perfect and grammatical and other typographical errors may be present. This note has not been completely proofread for errors.         Sisi Agee MD  06/02/22 7750

## 2022-06-02 NOTE — ED TRIAGE NOTES
Pt arrives for complaint of syncope that occurred at 1121 today. Pt reports near syncope for the past couple days. Pt currently receiving chemo. Pt received 4mg zofran and 1500mL LR en route.

## 2022-06-02 NOTE — ACP (ADVANCE CARE PLANNING)
[facility] Hospitalist Service  At the heart of better care     Advance Care Planning   Admit Date:  2022  1:05 PM   Name:  Paty Felipe   Age:  78 y.o. Sex:  male  :  1955   MRN:  [id number]   Room:  [place] [address]    Paty Felipe is able to make his own decisions:   yes    If pt unable to make decisions, POA/surrogate decision maker:  wife    Patient / surrogate decision-maker directed:  DNR/DNI     Patient or surrogate consented to discussion of the current conditions, workup, management plans, prognosis, and understand the risk for further deterioration. Time spent: 16 minutes in direct discussion (face to face and/or over phone).       Signed:  Claudia Lennon DO,

## 2022-06-03 ENCOUNTER — HOME CARE VISIT (OUTPATIENT)
Dept: SCHEDULING | Facility: HOME HEALTH | Age: 67
End: 2022-06-03
Payer: MEDICARE

## 2022-06-03 ENCOUNTER — APPOINTMENT (OUTPATIENT)
Dept: ULTRASOUND IMAGING | Age: 67
DRG: 853 | End: 2022-06-03
Payer: MEDICARE

## 2022-06-03 PROBLEM — I26.99 ACUTE PULMONARY EMBOLISM WITHOUT ACUTE COR PULMONALE (HCC): Status: ACTIVE | Noted: 2022-06-03

## 2022-06-03 PROBLEM — D62 ABLA (ACUTE BLOOD LOSS ANEMIA): Status: RESOLVED | Noted: 2022-06-02 | Resolved: 2022-06-03

## 2022-06-03 PROBLEM — A41.9 SEPTIC SHOCK (HCC): Status: ACTIVE | Noted: 2022-06-02

## 2022-06-03 PROBLEM — E87.20 SEVERE SEPSIS WITH LACTIC ACIDOSIS (HCC): Status: RESOLVED | Noted: 2022-06-02 | Resolved: 2022-06-03

## 2022-06-03 PROBLEM — A41.9 SEVERE SEPSIS WITH LACTIC ACIDOSIS (HCC): Status: RESOLVED | Noted: 2022-06-02 | Resolved: 2022-06-03

## 2022-06-03 PROBLEM — R65.21 SEPTIC SHOCK (HCC): Status: ACTIVE | Noted: 2022-06-02

## 2022-06-03 PROBLEM — D68.69 HYPERCOAGULABLE STATE, SECONDARY (HCC): Chronic | Status: ACTIVE | Noted: 2022-06-03

## 2022-06-03 PROBLEM — R65.20 SEVERE SEPSIS WITH LACTIC ACIDOSIS (HCC): Status: RESOLVED | Noted: 2022-06-02 | Resolved: 2022-06-03

## 2022-06-03 PROBLEM — Z66 DO NOT RESUSCITATE STATUS: Status: ACTIVE | Noted: 2022-06-03

## 2022-06-03 PROBLEM — K92.2 GIB (GASTROINTESTINAL BLEEDING): Status: RESOLVED | Noted: 2022-06-02 | Resolved: 2022-06-03

## 2022-06-03 PROBLEM — E77.8 HYPOPROTEINEMIA (HCC): Status: ACTIVE | Noted: 2022-06-03

## 2022-06-03 LAB
ALBUMIN SERPL-MCNC: 1.5 G/DL (ref 3.2–4.6)
ALBUMIN SERPL-MCNC: 2.1 G/DL (ref 3.2–4.6)
ALBUMIN/GLOB SERPL: 0.6 {RATIO} (ref 1.2–3.5)
ALBUMIN/GLOB SERPL: 0.7 {RATIO} (ref 1.2–3.5)
ALP SERPL-CCNC: 115 U/L (ref 50–136)
ALP SERPL-CCNC: 146 U/L (ref 50–136)
ALT SERPL-CCNC: 11 U/L (ref 12–65)
ALT SERPL-CCNC: 12 U/L (ref 12–65)
ANION GAP SERPL CALC-SCNC: 13 MMOL/L (ref 7–16)
ANION GAP SERPL CALC-SCNC: 5 MMOL/L (ref 7–16)
AST SERPL-CCNC: 10 U/L (ref 15–37)
AST SERPL-CCNC: 17 U/L (ref 15–37)
BACTERIA SPEC CULT: ABNORMAL
BASOPHILS # BLD: 0 K/UL (ref 0–0.2)
BASOPHILS # BLD: 0 K/UL (ref 0–0.2)
BASOPHILS NFR BLD: 0 % (ref 0–2)
BASOPHILS NFR BLD: 0 % (ref 0–2)
BILIRUB SERPL-MCNC: 0.3 MG/DL (ref 0.2–1.1)
BILIRUB SERPL-MCNC: 0.4 MG/DL (ref 0.2–1.1)
BUN SERPL-MCNC: 27 MG/DL (ref 8–23)
BUN SERPL-MCNC: 28 MG/DL (ref 8–23)
CALCIUM SERPL-MCNC: 8 MG/DL (ref 8.3–10.4)
CALCIUM SERPL-MCNC: 8.4 MG/DL (ref 8.3–10.4)
CHLORIDE SERPL-SCNC: 101 MMOL/L (ref 98–107)
CHLORIDE SERPL-SCNC: 101 MMOL/L (ref 98–107)
CO2 SERPL-SCNC: 17 MMOL/L (ref 21–32)
CO2 SERPL-SCNC: 24 MMOL/L (ref 21–32)
CREAT SERPL-MCNC: 0.2 MG/DL (ref 0.8–1.5)
CREAT SERPL-MCNC: 0.5 MG/DL (ref 0.8–1.5)
DIFFERENTIAL METHOD BLD: ABNORMAL
DIFFERENTIAL METHOD BLD: ABNORMAL
EOSINOPHIL # BLD: 0 K/UL (ref 0–0.8)
EOSINOPHIL # BLD: 0 K/UL (ref 0–0.8)
EOSINOPHIL NFR BLD: 0 % (ref 0.5–7.8)
EOSINOPHIL NFR BLD: 0 % (ref 0.5–7.8)
ERYTHROCYTE [DISTWIDTH] IN BLOOD BY AUTOMATED COUNT: 17.4 % (ref 11.9–14.6)
ERYTHROCYTE [DISTWIDTH] IN BLOOD BY AUTOMATED COUNT: 17.7 % (ref 11.9–14.6)
GLOBULIN SER CALC-MCNC: 2.7 G/DL (ref 2.3–3.5)
GLOBULIN SER CALC-MCNC: 3 G/DL (ref 2.3–3.5)
GLUCOSE BLD STRIP.AUTO-MCNC: 337 MG/DL (ref 65–100)
GLUCOSE SERPL-MCNC: 161 MG/DL (ref 65–100)
GLUCOSE SERPL-MCNC: 371 MG/DL (ref 65–100)
HCT VFR BLD AUTO: 21.9 % (ref 41.1–50.3)
HCT VFR BLD AUTO: 24.8 % (ref 41.1–50.3)
HCT VFR BLD AUTO: 25.4 % (ref 41.1–50.3)
HGB BLD-MCNC: 6.4 G/DL (ref 13.6–17.2)
HGB BLD-MCNC: 7.8 G/DL (ref 13.6–17.2)
HGB BLD-MCNC: 7.8 G/DL (ref 13.6–17.2)
HISTORY CHECK: NORMAL
IMM GRANULOCYTES # BLD AUTO: 0.4 K/UL (ref 0–0.5)
IMM GRANULOCYTES # BLD AUTO: 1.3 K/UL (ref 0–0.5)
IMM GRANULOCYTES NFR BLD AUTO: 5 % (ref 0–5)
IMM GRANULOCYTES NFR BLD AUTO: 7 % (ref 0–5)
INR PPP: 1.2
LACTATE SERPL-SCNC: 0.9 MMOL/L (ref 0.4–2)
LACTATE SERPL-SCNC: 7.9 MMOL/L (ref 0.4–2)
LYMPHOCYTES # BLD: 0.6 K/UL (ref 0.5–4.6)
LYMPHOCYTES # BLD: 2.9 K/UL (ref 0.5–4.6)
LYMPHOCYTES NFR BLD: 15 % (ref 13–44)
LYMPHOCYTES NFR BLD: 7 % (ref 13–44)
MCH RBC QN AUTO: 24.7 PG (ref 26.1–32.9)
MCH RBC QN AUTO: 25.5 PG (ref 26.1–32.9)
MCHC RBC AUTO-ENTMCNC: 29.2 G/DL (ref 31.4–35)
MCHC RBC AUTO-ENTMCNC: 31.5 G/DL (ref 31.4–35)
MCV RBC AUTO: 81 FL (ref 79.6–97.8)
MCV RBC AUTO: 84.6 FL (ref 79.6–97.8)
MONOCYTES # BLD: 0.2 K/UL (ref 0.1–1.3)
MONOCYTES # BLD: 1 K/UL (ref 0.1–1.3)
MONOCYTES NFR BLD: 2 % (ref 4–12)
MONOCYTES NFR BLD: 5 % (ref 4–12)
NEUTS SEG # BLD: 13.9 K/UL (ref 1.7–8.2)
NEUTS SEG # BLD: 7.2 K/UL (ref 1.7–8.2)
NEUTS SEG NFR BLD: 73 % (ref 43–78)
NEUTS SEG NFR BLD: 86 % (ref 43–78)
NRBC # BLD: 0 K/UL (ref 0–0.2)
NRBC # BLD: 0 K/UL (ref 0–0.2)
OSMOLALITY SERPL: 273 MOSM/KG H2O (ref 280–301)
OSMOLALITY UR: 536 MOSM/KG H2O (ref 50–1400)
PLATELET # BLD AUTO: 287 K/UL (ref 150–450)
PLATELET # BLD AUTO: 496 K/UL (ref 150–450)
PMV BLD AUTO: 8.8 FL (ref 9.4–12.3)
PMV BLD AUTO: 9 FL (ref 9.4–12.3)
POTASSIUM SERPL-SCNC: 4.3 MMOL/L (ref 3.5–5.1)
POTASSIUM SERPL-SCNC: 4.9 MMOL/L (ref 3.5–5.1)
PROT SERPL-MCNC: 4.2 G/DL (ref 6.3–8.2)
PROT SERPL-MCNC: 5.1 G/DL (ref 6.3–8.2)
PROTHROMBIN TIME: 15.2 SEC (ref 12.6–14.5)
RBC # BLD AUTO: 2.59 M/UL (ref 4.23–5.6)
RBC # BLD AUTO: 3.06 M/UL (ref 4.23–5.6)
SERVICE CMNT-IMP: ABNORMAL
SERVICE CMNT-IMP: ABNORMAL
SODIUM SERPL-SCNC: 130 MMOL/L (ref 136–145)
SODIUM SERPL-SCNC: 131 MMOL/L (ref 136–145)
SODIUM UR-SCNC: 16 MMOL/L
VANCOMYCIN SERPL-MCNC: 26.1 UG/ML
WBC # BLD AUTO: 19.1 K/UL (ref 4.3–11.1)
WBC # BLD AUTO: 8.4 K/UL (ref 4.3–11.1)

## 2022-06-03 PROCEDURE — 85610 PROTHROMBIN TIME: CPT

## 2022-06-03 PROCEDURE — 6360000002 HC RX W HCPCS: Performed by: FAMILY MEDICINE

## 2022-06-03 PROCEDURE — P9016 RBC LEUKOCYTES REDUCED: HCPCS

## 2022-06-03 PROCEDURE — 2500000003 HC RX 250 WO HCPCS: Performed by: FAMILY MEDICINE

## 2022-06-03 PROCEDURE — 1100000000 HC RM PRIVATE

## 2022-06-03 PROCEDURE — 97530 THERAPEUTIC ACTIVITIES: CPT

## 2022-06-03 PROCEDURE — 80053 COMPREHEN METABOLIC PANEL: CPT

## 2022-06-03 PROCEDURE — 1090000002 HH PPS REVENUE DEBIT

## 2022-06-03 PROCEDURE — 85025 COMPLETE CBC W/AUTO DIFF WBC: CPT

## 2022-06-03 PROCEDURE — 36430 TRANSFUSION BLD/BLD COMPNT: CPT

## 2022-06-03 PROCEDURE — 51702 INSERT TEMP BLADDER CATH: CPT

## 2022-06-03 PROCEDURE — APPSS180 APP SPLIT SHARED TIME > 60 MINUTES: Performed by: NURSE PRACTITIONER

## 2022-06-03 PROCEDURE — 99223 1ST HOSP IP/OBS HIGH 75: CPT | Performed by: INTERNAL MEDICINE

## 2022-06-03 PROCEDURE — 99223 1ST HOSP IP/OBS HIGH 75: CPT | Performed by: NURSE PRACTITIONER

## 2022-06-03 PROCEDURE — 36415 COLL VENOUS BLD VENIPUNCTURE: CPT

## 2022-06-03 PROCEDURE — 97535 SELF CARE MNGMENT TRAINING: CPT

## 2022-06-03 PROCEDURE — 6370000000 HC RX 637 (ALT 250 FOR IP): Performed by: FAMILY MEDICINE

## 2022-06-03 PROCEDURE — 85018 HEMOGLOBIN: CPT

## 2022-06-03 PROCEDURE — 2580000003 HC RX 258: Performed by: FAMILY MEDICINE

## 2022-06-03 PROCEDURE — 6360000002 HC RX W HCPCS: Performed by: INTERNAL MEDICINE

## 2022-06-03 PROCEDURE — A4216 STERILE WATER/SALINE, 10 ML: HCPCS | Performed by: FAMILY MEDICINE

## 2022-06-03 PROCEDURE — 83605 ASSAY OF LACTIC ACID: CPT

## 2022-06-03 PROCEDURE — P9047 ALBUMIN (HUMAN), 25%, 50ML: HCPCS | Performed by: FAMILY MEDICINE

## 2022-06-03 PROCEDURE — 99221 1ST HOSP IP/OBS SF/LOW 40: CPT | Performed by: NURSE PRACTITIONER

## 2022-06-03 PROCEDURE — 82962 GLUCOSE BLOOD TEST: CPT

## 2022-06-03 PROCEDURE — 1090000001 HH PPS REVENUE CREDIT

## 2022-06-03 PROCEDURE — 97166 OT EVAL MOD COMPLEX 45 MIN: CPT

## 2022-06-03 PROCEDURE — 97162 PT EVAL MOD COMPLEX 30 MIN: CPT

## 2022-06-03 PROCEDURE — C9113 INJ PANTOPRAZOLE SODIUM, VIA: HCPCS | Performed by: FAMILY MEDICINE

## 2022-06-03 PROCEDURE — 2580000003 HC RX 258: Performed by: INTERNAL MEDICINE

## 2022-06-03 PROCEDURE — 80202 ASSAY OF VANCOMYCIN: CPT

## 2022-06-03 PROCEDURE — 93970 EXTREMITY STUDY: CPT

## 2022-06-03 RX ORDER — ALBUMIN (HUMAN) 12.5 G/50ML
25 SOLUTION INTRAVENOUS EVERY 6 HOURS
Status: COMPLETED | OUTPATIENT
Start: 2022-06-03 | End: 2022-06-04

## 2022-06-03 RX ORDER — MIDODRINE HYDROCHLORIDE 5 MG/1
10 TABLET ORAL 3 TIMES DAILY PRN
Status: DISCONTINUED | OUTPATIENT
Start: 2022-06-03 | End: 2022-06-09

## 2022-06-03 RX ORDER — 0.9 % SODIUM CHLORIDE 0.9 %
1000 INTRAVENOUS SOLUTION INTRAVENOUS ONCE
Status: COMPLETED | OUTPATIENT
Start: 2022-06-03 | End: 2022-06-03

## 2022-06-03 RX ORDER — METRONIDAZOLE 500 MG/100ML
500 INJECTION, SOLUTION INTRAVENOUS EVERY 12 HOURS
Status: DISCONTINUED | OUTPATIENT
Start: 2022-06-03 | End: 2022-06-09

## 2022-06-03 RX ORDER — SODIUM CHLORIDE 9 MG/ML
INJECTION, SOLUTION INTRAVENOUS PRN
Status: DISCONTINUED | OUTPATIENT
Start: 2022-06-03 | End: 2022-06-04 | Stop reason: SDUPTHER

## 2022-06-03 RX ORDER — ACETAMINOPHEN 325 MG/1
650 TABLET ORAL EVERY 6 HOURS PRN
Status: DISCONTINUED | OUTPATIENT
Start: 2022-06-03 | End: 2022-06-09

## 2022-06-03 RX ORDER — SODIUM CHLORIDE, SODIUM LACTATE, POTASSIUM CHLORIDE, CALCIUM CHLORIDE 600; 310; 30; 20 MG/100ML; MG/100ML; MG/100ML; MG/100ML
INJECTION, SOLUTION INTRAVENOUS CONTINUOUS
Status: ACTIVE | OUTPATIENT
Start: 2022-06-03 | End: 2022-06-06

## 2022-06-03 RX ORDER — ACETAMINOPHEN 650 MG/1
650 SUPPOSITORY RECTAL EVERY 6 HOURS PRN
Status: DISCONTINUED | OUTPATIENT
Start: 2022-06-03 | End: 2022-06-09

## 2022-06-03 RX ADMIN — SUCRALFATE 1 G: 1 TABLET ORAL at 23:27

## 2022-06-03 RX ADMIN — FOLIC ACID 1 MG: 1 TABLET ORAL at 08:16

## 2022-06-03 RX ADMIN — ONDANSETRON 4 MG: 2 INJECTION INTRAMUSCULAR; INTRAVENOUS at 17:44

## 2022-06-03 RX ADMIN — ALBUMIN (HUMAN) 25 G: 0.25 INJECTION, SOLUTION INTRAVENOUS at 19:52

## 2022-06-03 RX ADMIN — VANCOMYCIN HYDROCHLORIDE 750 MG: 750 INJECTION, POWDER, LYOPHILIZED, FOR SOLUTION INTRAVENOUS at 06:52

## 2022-06-03 RX ADMIN — DEXAMETHASONE 3 MG: 0.5 TABLET ORAL at 08:16

## 2022-06-03 RX ADMIN — SODIUM CHLORIDE, PRESERVATIVE FREE 40 MG: 5 INJECTION INTRAVENOUS at 15:30

## 2022-06-03 RX ADMIN — SUCRALFATE 1 G: 1 TABLET ORAL at 06:03

## 2022-06-03 RX ADMIN — CEFEPIME HYDROCHLORIDE 2000 MG: 2 INJECTION, POWDER, FOR SOLUTION INTRAVENOUS at 23:27

## 2022-06-03 RX ADMIN — DEXAMETHASONE 3 MG: 0.5 TABLET ORAL at 22:07

## 2022-06-03 RX ADMIN — DEXTROSE AND SODIUM CHLORIDE: 5; 900 INJECTION, SOLUTION INTRAVENOUS at 08:24

## 2022-06-03 RX ADMIN — SODIUM CHLORIDE, SODIUM LACTATE, POTASSIUM CHLORIDE, AND CALCIUM CHLORIDE: 600; 310; 30; 20 INJECTION, SOLUTION INTRAVENOUS at 19:46

## 2022-06-03 RX ADMIN — SODIUM CHLORIDE, PRESERVATIVE FREE 10 ML: 5 INJECTION INTRAVENOUS at 22:38

## 2022-06-03 RX ADMIN — SUCRALFATE 1 G: 1 TABLET ORAL at 13:03

## 2022-06-03 RX ADMIN — SODIUM CHLORIDE 1000 ML: 9 INJECTION, SOLUTION INTRAVENOUS at 18:32

## 2022-06-03 RX ADMIN — MIRTAZAPINE 7.5 MG: 15 TABLET, FILM COATED ORAL at 22:07

## 2022-06-03 RX ADMIN — SODIUM CHLORIDE, PRESERVATIVE FREE 10 ML: 5 INJECTION INTRAVENOUS at 08:17

## 2022-06-03 RX ADMIN — ALBUMIN (HUMAN) 25 G: 0.25 INJECTION, SOLUTION INTRAVENOUS at 04:11

## 2022-06-03 RX ADMIN — CEFEPIME HYDROCHLORIDE 2000 MG: 2 INJECTION, POWDER, FOR SOLUTION INTRAVENOUS at 06:03

## 2022-06-03 RX ADMIN — SUCRALFATE 1 G: 1 TABLET ORAL at 17:40

## 2022-06-03 RX ADMIN — ALBUMIN (HUMAN) 25 G: 0.25 INJECTION, SOLUTION INTRAVENOUS at 10:20

## 2022-06-03 RX ADMIN — VANCOMYCIN HYDROCHLORIDE 1000 MG: 1 INJECTION, POWDER, LYOPHILIZED, FOR SOLUTION INTRAVENOUS at 17:40

## 2022-06-03 RX ADMIN — SODIUM CHLORIDE, PRESERVATIVE FREE 40 MG: 5 INJECTION INTRAVENOUS at 04:11

## 2022-06-03 RX ADMIN — CEFEPIME HYDROCHLORIDE 2000 MG: 2 INJECTION, POWDER, FOR SOLUTION INTRAVENOUS at 14:00

## 2022-06-03 NOTE — PROGRESS NOTES
PHYSICAL THERAPY Initial Assessment and Daily Note  (Link to Caseload Tracking: PT Visit Days : 1  Acknowledge Orders  Time In/Out  PT Charge Capture  Rehab Caseload Tracker    Marina Middleton is a 78 y.o. male   PRIMARY DIAGNOSIS: Septic shock ([address]  Syncope and collapse [R55]  Hemorrhagic shock (HCC) [R57.8]  Shock ([address] [R57.9]  History of gastric cancer [Z85.028]  Septic shock ([facility] [A41.9, R65.21]  Gastrointestinal hemorrhage, unspecified gastrointestinal hemorrhage type [K92.2]       Reason for Referral: Generalized Muscle Weakness (M62.81)  Other abnormalities of gait and mobility (R26.89)  History of falling (Z91.81)  Inpatient: Payor: Willy December / Plan: MEDICARE PART A / Product Type: *No Product type* /     ASSESSMENT:     REHAB RECOMMENDATIONS:   Recommendation to date pending progress:  Setting:  [facility] 24/7 care    Equipment:     3 in 1 Bedside Commode   Rolling Walker     ASSESSMENT:  Mr. Adrianne Clemens presents s/p syncopal episode and increased weakness. He has a PMH of gastric adenocarcinoma with a recent completion of 4 rounds of chemo. Today he was able to perform bed mobility with CGA at the start of the session and came to stand with CGA-min A x2. Pt ambulated around the bed and proceeded to perform several other sit to stand transfers and static and dynamic standing for cleaning. After ambulating to the bathroom with RW for further self care he had another syncopal episode at the sink just after finishing brushing his teeth and required max A x2 to come to stand and recover. Pt was SBA for self care until sudden episode. He then required max A to transfer back to bed and roll for changing of now soiled brief. RN was immediately notified and present for transfers and brief/bedding change with assist of PCT. Pt is functionally safe for transfers and mobility at home with HHPT and 24/7 care as long as the syncope is well controlled.  He would continue to benefit from skilled acute care therapy to promote improved strength, ROM, activity tolerance and overall mobility.       [address] [facility] [address] [address] [facility] 6 Clicks Basic Mobility Inpatient Short Form  AM-PAC Mobility Inpatient   How much difficulty turning over in bed?: A Little  How much difficulty sitting down on / standing up from a chair with arms?: A Little  How much difficulty moving from lying on back to sitting on side of bed?: A Little  How much help from another person moving to and from a bed to a chair?: A Little  How much help from another person needed to walk in hospital room?: A Little  How much help from another person for climbing 3-5 steps with a railing?: A Lot  AM-PAC Inpatient Mobility Raw Score : 17  AM-PAC Inpatient T-Scale Score : 42.13  Mobility Inpatient CMS 0-100% Score: 50.57  Mobility Inpatient CMS G-Code Modifier : CK    SUBJECTIVE:   Mr. Austen Dow states, \"This looks really dignified doesn't it\"     Social/Functional Lives With: Spouse  Type of Home: House  Home Layout: One level  Bathroom Accessibility: Accessible  Receives Help From: Family  ADL Assistance: Independent  Homemaking Assistance: Independent  Ambulation Assistance: Independent  Transfer Assistance: Independent  Active : Yes  Mode of Transportation: Car  Occupation: Retired    OBJECTIVE:     PAIN: [place] / O2: [place]uckle / Cyndy Hatter / [place]:   Pre Treatment:   Pain Assessment: None - Denies Pain      Post Treatment: 0/10 Vitals        Oxygen      Mata Catheter and IV    RESTRICTIONS/PRECAUTIONS:  Restrictions/Precautions: Fall Risk                 GROSS EVALUATION: Intact Impaired (Comments):   AROM []  generally decreased due to decreased strength   PROM []    Strength []  generally decreased but functionally able to perform transfers with CGA-min A; grossly 3/5 bilaterally   Balance [] Posture: Fair  Sitting - Static: Good  Sitting - Dynamic: Fair,+  Standing - Static: Fair  Standing - Dynamic: Fair   Posture [] Forward Head  Rounded Shoulders Sensation [x]     Coordination []      Tone []     Edema []    Activity Tolerance [] Patient limited by fatigue    []      COGNITION/  PERCEPTION: Intact Impaired (Comments):   Orientation [x]     Vision [x]     Hearing [x]     Cognition  [x]       MOBILITY: I Mod I S SBA CGA Min Mod Max Total  NT x2 Comments:   Bed Mobility    Rolling [] [] [] [] [x] [x] [] [] [] [] [] Increased assist needed post syncopal episode   Supine to Sit [] [] [] [] [x] [] [] [] [] [] []    Scooting [] [] [] [] [] [] [] [] [x] [] [x]    Sit to Supine [] [] [] [] [] [] [x] [] [] [] [x] Post syncopal episode   Transfers    Sit to Stand [] [] [] [] [x] [x] [] [] [] [] [x]    Bed to Chair [] [] [] [] [x] [] [] [] [] [] []    Stand to Sit [] [] [] [] [x] [x] [] [] [] [] [x]     [] [] [] [] [] [] [] [] [] [] []    I=Independent, Mod I=Modified Independent, S=Supervision, SBA=Standby Assistance, CGA=Contact Guard Assistance,   Min=Minimal Assistance, Mod=Moderate Assistance, Max=Maximal Assistance, Total=Total Assistance, NT=Not Tested    GAIT: I Mod I S SBA CGA Min Mod Max Total  NT x2 Comments:   Level of Assistance [] [] [] [] [x] [] [] [] [] [] []    Distance 15,10 feet    DME Rolling Walker    Gait Quality Decreased martínez , Decreased step clearance and forward flexed posture    Weightbearing Status      Stairs      I=Independent, Mod I=Modified Independent, S=Supervision, SBA=Standby Assistance, CGA=Contact Guard Assistance,   Min=Minimal Assistance, Mod=Moderate Assistance, Max=Maximal Assistance, Total=Total Assistance, NT=Not Tested    PLAN:   ACUTE PHYSICAL THERAPY GOALS:   (Developed with and agreed upon by patient and/or caregiver. )  LTG:  (1.)Mr. Austen Dow will move from supine to sit and sit to supine , scoot up and down and roll side to side in bed with INDEPENDENCE within 7 treatment day(s). (2.)Mr. Austen Dow will transfer from bed to chair and chair to bed with MODIFIED INDEPENDENCE using the least restrictive device within 7 treatment day(s). (3.)Mr. Nitesh Sharma will ambulate with STAND BY ASSIST for a minimum of 75 feet with the least restrictive device within 7 treatment day(s). ________________________________________________________________________________________________      FREQUENCY AND DURATION: 3 times/week for duration of hospital stay or until stated goals are met, whichever comes first.    THERAPY PROGNOSIS: Fair    PROBLEM LIST:   (Skilled intervention is medically necessary to address:)  Decreased ADL/Functional Activities  Decreased Activity Tolerance  Decreased AROM/PROM  Decreased Balance  Decreased Gait Ability  Decreased Safety Awareness  Decreased Strength  Decreased Transfer Abilities INTERVENTIONS PLANNED:   (Benefits and precautions of physical therapy have been discussed with the patient.)  Self Care Training  Therapeutic Activity  Therapeutic Exercise/HEP  Neuromuscular Re-education  Gait Training  Education       TREATMENT:   EVALUATION: MODERATE COMPLEXITY: (Untimed Charge)    TREATMENT:   Co-Treatment PT/OT necessary due to patient's decreased overall endurance/tolerance levels, as well as need for high level skilled assistance to complete functional transfers/mobility and functional tasks  Therapeutic Activity (53 Minutes): Therapeutic activity included Rolling, Supine to Sit, Sit to Supine, Scooting, Transfer Training, Ambulation on level ground, Sitting balance  and Standing balance to improve functional Activity tolerance, Balance, Coordination, Mobility, Strength and ROM. TREATMENT GRID:  N/A    AFTER TREATMENT PRECAUTIONS: Bed, Call light within reach and RN at bedside    INTERDISCIPLINARY COLLABORATION:  RN/ PCT, PT/ PTA and OT/ BHATT    EDUCATION: Education Given To: Patient  Education Provided: Role of Therapy;Plan of Care; Fall Prevention Strategies  Education Method: Verbal  Barriers to Learning: None  Education Outcome: Verbalized understanding;Continued education needed    TIME IN/OUT:  Time In: 1103  Time Out: 21   Minutes: [address] [place], [place]

## 2022-06-03 NOTE — PROGRESS NOTES
attended Rapid Response. Patient did not respond at time and no family was present.  provided prayer from outside room while medical staff worked with patient.   Signed by  Mac Gomez M.Div.

## 2022-06-03 NOTE — CONSULTS
Comprehensive Nutrition Assessment    Type and Reason for Visit: Initial,Positive Nutrition Screen,Consult  Malnutrition Screening Tool: Malnutrition Screen  Have you recently lost weight without trying?: 34 or more pounds (4 points)  Have you been eating poorly because of a decreased appetite?: Yes (1 point)  Malnutrition Screening Tool Score: 5 and Unintentional Weight Loss (Hospitalists)    Nutrition Recommendations/Plan:   Meals and Snacks:  Diet: Continue current order Diet progression per GI as medical status allows  Do not anticipate patient to tolerate any diet progression past FLD. Once diet able to be advanced to at least FLD, oral nutrition supplement should be changed from clear liquid supplement to high kcal, high protein supplement x 6 per day (2 with each meal)  Nutrition Supplement Therapy:   Medical food supplement therapy:  Initiate Ensure Clear 6 per day (this provides 240 kcal and 8 grams protein per bottle)     Malnutrition Assessment:  Malnutrition Status: Severe malnutrition  Context: Chronic Illness  Findings of clinical characteristics of malnutrition:   Energy Intake:  Mild decrease in energy intake (Comment) (only tolerating oral nutrition supplements as primary)  Weight Loss:  Greater than 20% over 1 year (38# (24.4%) in ~10 months)     Body Fat Loss:  Severe body fat loss Triceps,Fat Overlying Ribs,Buccal region   Muscle Mass Loss:  Severe muscle mass loss Temples (temporalis),Clavicles (pectoralis & deltoids),Thigh (quadraceps),Calf (gastrocnemius)  Fluid Accumulation:  No significant fluid accumulation     Strength:  Not Performed  Nutrition Assessment:  Nutrition History: History provided by patient and wife. Patient states that he has been trying to eat some solids but it feels like it just sits on his stomach. He states he will wake the next day and still feel full. Wife states that patient has not tried any solids in weeks.  Patient states that he mostly has been consuming Ensure (regular) 6-7 per day. Wife states that she has been mixing peanut butter powder into Ensure to increase kcal and protein (potentially providing 60-70 additional kcal and 6-9 additional grams of protein per serving depending on brand). Patient endorses weight loss of 50-60# over the last year. Do You Have Any Cultural, Scientologist, or Ethnic Food Preferences?: No   Nutrition Background:   Patient with PMH significant for HTN, gastric adenocarcinoma s/p neoadjuvant chemo with plans for surgery in 2-3 week. He presented with near syncopal episodes for last 2 days. He was admitted with shock. Nutrition Interval:  Patient seen with wife at bedside. He states that he tried to drink his broth this am but it did not taste very good. He states that he does get hungry. He asks what the point of a CLD as he does not think he is getting much nutrition from it. Explained some calorie provisions with CLD but minimal protein. Explained addition of Ensure Clear until diet can be advanced will provide additional kcal and some protein. He stated he is willing to try anything that will help him. Nutrition Related Findings:          Current Nutrition Therapies:  ADULT DIET; Clear Liquid; No red dye    Current Intake:   Average Meal Intake:  (sips of CLD)        Anthropometric Measures:  Height: 5' 9\" (175.3 cm)  Current Body Wt: 117 lb 15.1 oz (53.5 kg) (6/2), Weight source: Not Specified (last office wt 5/31)  BMI: 17.4  Admission Body Weight: 117 lb 15.1 oz (53.5 kg)  Ideal Body Weight (Kg) (Calculated): 73 kg (160 lbs), 73.7 %  Usual Body Wt: 156 lb (70.8 kg) (8/11/21 office wt), Percent weight change: -24.4       Edema: No data recorded  BMI Category Overweight (BMI 25.0-29. 9)  Estimated Daily Nutrient Needs:  Energy (kcal/day): 5553-1513 (Kcal/kg (28-33) Weight used: 53.5 kg Current (6/2)  Protein (g/day): 70-80 (1.3-1.5 g/kg) Weight Used: (Current) 53.5 kg  Fluid (ml/day):   (1 ml/kcal)    Nutrition Diagnosis: · Unintended weight loss related to inadequate protein-energy intake as evidenced by  (diet recall, 24.4% wt loss)    · Severe malnutrition related to inadequate protein-energy intake as evidenced by Criteria as identified in malnutrition assessment    Nutrition Interventions:   Food and/or Nutrient Delivery: Continue Current Diet,Start Oral Nutrition Supplement     Coordination of Nutrition Care: Continue to monitor while inpatient       Goals: Active Goal:  (Diet advance to at least FLD by RD follow-up)       Nutrition Monitoring and Evaluation:      Food/Nutrient Intake Outcomes: Diet Advancement/Tolerance,Food and Nutrient Intake,Supplement Intake  Physical Signs/Symptoms Outcomes: GI Status,Meal Time Behavior,Weight    Discharge Planning:     Too soon to determine    Maddy Oscar, [address], [address], [address]

## 2022-06-03 NOTE — PROGRESS NOTES
[facility] CRITICAL CARE OUTREACH NURSE PROGRESS REPORT      SUBJECTIVE: Called to assess patient secondary to previous rapid response. Vitals:    06/03/22 1334 06/03/22 1606 06/03/22 1730 06/03/22 1930   BP: 80/70 129/84 88/62 108/82   Pulse:  85 (!) 110 (!) 112   Resp:  17     Temp:  97.6 °F (36.4 °C) 97.3 °F (36.3 °C)    TempSrc:  Oral Oral    SpO2:    100%   Weight:       Height:            OBJECTIVE: On arrival to room, I found patient to be resting in bed watching TV. ASSESSMENT:  Pt is A&O x2 and appears to be in NAD at this time. O2 sat 100% on RA. Pt denies any pain and voices no complaints. Family at bedside. Pt to receive 2 units PRBC. BP stable at this time. ST on tele monitor. PLAN:  Will continue to follow per outreach program protocol. Symone Pineda RN.

## 2022-06-03 NOTE — PROGRESS NOTES
Pt passed out while worked with PT/OT this morning.  per tele, BP 87/70, Albumin given per Mar.  provider notified. Recheck in 10 min, /72, hr 99. Pt resting quietly with his daughter at bedside. will closely monitor.

## 2022-06-03 NOTE — CONSULTS
H&P/Consult Note/Progress Note/Office Note:   Declan Mendosa  MRN: [id number]  :1955  Age:66 y.o.    HPI: Declan Mendosa is a 78 y.o. male who is well known to me for stomach cancer, which occupied almost entire stomach. He has just finished neoadjuvant chemotherapy and plans for resection with open total gastrectomy with J tube placement in about 3 weeks. He presented to the ED on 22 with c/o syncopal episode and a 2 day history of duarte syncope. On admission, WBC 20k, Hgb 5. 10.  GI was consulted, no plans for endoscopy at this time.         Past Medical History:   Diagnosis Date    ABLA (acute blood loss anemia) 2022    GIB (gastrointestinal bleeding) 2022    HTN (hypertension)     Severe sepsis with lactic acidosis ([address] 2022     Past Surgical History:   Procedure Laterality Date    COLONOSCOPY N/A 3/9/2022    COLONOSCOPY performed by Nando N[facility]ola MD at [facility] ENDOSCOPY    KNEE ARTHROSCOPY      OTHER SURGICAL HISTORY      none     Current Facility-Administered Medications   Medication Dose Route Frequency    acetaminophen (TYLENOL) tablet 650 mg  650 mg Oral Q6H PRN    Or    acetaminophen (TYLENOL) suppository 650 mg  650 mg Rectal Q6H PRN    0.9 % sodium chloride infusion   IntraVENous PRN    albumin human 25 % IV solution 25 g  25 g IntraVENous Q6H    pantoprazole (PROTONIX) 40 mg in sodium chloride (PF) 10 mL injection  40 mg IntraVENous Q12H    sodium chloride flush 0.9 % injection 5-40 mL  5-40 mL IntraVENous 2 times per day    sodium chloride flush 0.9 % injection 5-40 mL  5-40 mL IntraVENous PRN    dextrose 5 % and 0.9 % sodium chloride infusion   IntraVENous Continuous    cefepime (MAXIPIME) 2000 mg IVPB minibag in NS  2,000 mg IntraVENous Q8H    dexamethasone (DECADRON) tablet 3 mg  3 mg Oral 2 times per day    Followed by   Oscar Leaks ON 2022] dexamethasone (DECADRON) tablet 1 mg  1 mg Oral 2 times per day    sucralfate (CARAFATE) tablet 1 g  1 g Oral 4 times per day    sodium chloride flush 0.9 % injection 10 mL  10 mL IntraVENous ONCE PRN    folic acid (FOLVITE) tablet 1 mg  1 mg Oral Daily    mirtazapine (REMERON) tablet 7.5 mg  7.5 mg Oral Nightly    ondansetron (ZOFRAN) injection 4 mg  4 mg IntraVENous Q6H PRN    saliva substitute (BIOTENE/MOUTH KOTE) liquid   Oral PRN    Medela Tender Care Lanolin cream   Topical PRN    tuberculin injection 5 Units  5 Units IntraDERmal Once    vancomycin (VANCOCIN) 750 mg in sodium chloride 0.9 % 250 mL IVPB ([id number])  750 mg IntraVENous Q8H    midodrine (PROAMATINE) tablet 10 mg  10 mg Oral TID PRN     Iron and Tetanus antitoxin  Social History     Socioeconomic History    Marital status:      Spouse name: Not on file    Number of children: Not on file    Years of education: Not on file    Highest education level: Not on file   Occupational History    Not on file   Tobacco Use    Smoking status: Former Smoker    Smokeless tobacco: Former User   Substance and Sexual Activity    Alcohol use: Not Currently    Drug use: Not Currently    Sexual activity: Not on file   Other Topics Concern    Not on file   Social History Narrative    Not on file     Social Determinants of Health     Financial Resource Strain:     Difficulty of Paying Living Expenses: Not on file   Food Insecurity:     Worried About Running Out of Food in the Last Year: Not on file    Stacie of Food in the Last Year: Not on file   Transportation Needs:     Lack of Transportation (Medical): Not on file    Lack of Transportation (Non-Medical):  Not on file   Physical Activity:     Days of Exercise per Week: Not on file    Minutes of Exercise per Session: Not on file   Stress:     Feeling of Stress : Not on file   Social Connections:     Frequency of Communication with Friends and Family: Not on file    Frequency of Social Gatherings with Friends and Family: Not on file    Attends Gnosticist Services: Not on file   Anibal Dillon Member of Clubs or Organizations: Not on file    Attends Club or Organization Meetings: Not on file    Marital Status: Not on file   Intimate Partner Violence:     Fear of Current or Ex-Partner: Not on file    Emotionally Abused: Not on file    Physically Abused: Not on file    Sexually Abused: Not on file   Housing Stability:     Unable to Pay for Housing in the Last Year: Not on file    Number of [place] in the Last Year: Not on file    Unstable Housing in the Last Year: Not on file     Social History     Tobacco Use   Smoking Status Former Smoker   Smokeless Tobacco Former User     Family History   Problem Relation Age of Onset    Other Other         non-contributory     ROS: The patient has no difficulty with chest pain or shortness of breath. No fever or chills. Comprehensive review of systems was otherwise unremarkable except as noted above. Physical Exam:   /86   Pulse 89   Temp 97.7 °F (36.5 °C) (Oral)   Resp 17   Ht 5' 9\" (1.753 m)   Wt 118 lb (53.5 kg)   SpO2 95%   BMI 17.43 kg/m²   Vitals:    06/03/22 1000 06/03/22 1010 06/03/22 1152 06/03/22 1159   BP: 87/70 110/72  114/86   Pulse: (!) 188 99  89   Resp:    17   Temp:    97.7 °F (36.5 °C)   TempSrc:    Oral   SpO2:    95%   Weight:       Height:   5' 9\" (1.753 m)      06/03 0701 - 06/03 1900  In: 8061 [P.O.:240; I.V.:1278]  Out: 600 [Urine:600]  06/01 1901 - 06/03 0700  In: 786.1   Out: 700 [Urine:700]    Constitutional: Alert, oriented, cooperative patient in no acute distress; appears stated age    Eyes:Sclera are clear. EOMs intact  ENMT: no external lesions gross hearing normal; no obvious neck masses, no ear or lip lesions, nares normal  CV: RRR. Normal perfusion  Resp: No JVD. Breathing is  non-labored; no audible wheezing. GI: soft and non-distended     Musculoskeletal: unremarkable with normal function. No embolic signs or cyanosis.    Neuro:  Oriented; moves all 4; no focal deficits  Psychiatric: normal affect and mood, no memory impairment    Recent vitals (if inpt):  Patient Vitals for the past 24 hrs:   BP Temp Temp src Pulse Resp SpO2 Height Weight   06/03/22 1159 114/86 97.7 °F (36.5 °C) Oral 89 17 95 % -- --   06/03/22 1152 -- -- -- -- -- -- 5' 9\" (1.753 m) --   06/03/22 1010 110/72 -- -- 99 -- -- -- --   06/03/22 1000 87/70 -- -- (!) 188 -- -- -- --   06/03/22 0758 121/80 98.3 °F (36.8 °C) Oral 82 17 96 % -- --   06/03/22 0548 105/76 97.4 °F (36.3 °C) Oral 86 18 95 % -- --   06/03/22 0453 101/77 97.3 °F (36.3 °C) Oral 83 18 95 % -- --   06/03/22 0404 113/73 97.3 °F (36.3 °C) Oral 84 18 95 % -- --   06/03/22 0403 -- -- -- 85 -- -- -- --   06/03/22 0356 99/74 97.3 °F (36.3 °C) Oral 84 18 94 % -- --   06/03/22 0341 -- -- -- -- -- 95 % -- --   06/03/22 0328 99/69 97.5 °F (36.4 °C) Oral 86 18 -- -- --   06/03/22 0058 -- -- -- 87 -- -- -- --   06/02/22 2250 93/68 97.5 °F (36.4 °C) Oral 90 17 97 % -- --   06/02/22 1920 90/73 97.6 °F (36.4 °C) Oral 91 17 95 % -- --   06/02/22 1900 87/69 -- -- 91 17 -- -- --   06/02/22 1830 90/66 -- -- 90 24 97 % -- --   06/02/22 1815 94/68 -- -- 89 19 95 % -- --   06/02/22 1800 90/68 -- -- 90 15 97 % -- --   06/02/22 1745 85/64 -- -- 87 18 97 % -- --   06/02/22 1715 87/70 -- -- 91 19 97 % -- --   06/02/22 1700 85/66 -- -- 92 21 99 % -- --   06/02/22 1645 90/66 -- -- 92 19 97 % -- --   06/02/22 1630 90/67 -- -- 91 22 -- -- --   06/02/22 1615 85/64 -- -- 89 17 97 % -- --   06/02/22 1600 86/68 -- -- 92 22 98 % -- --   06/02/22 1551 (!) 83/55 97.8 °F (36.6 °C) -- 91 16 98 % -- --   06/02/22 1545 86/62 -- -- 87 -- 98 % -- --   06/02/22 1536 83/63 97.5 °F (36.4 °C) -- 86 16 100 % -- --   06/02/22 1415 94/62 -- -- 99 18 94 % -- --   06/02/22 1330 (!) 100/38 -- -- (!) 104 19 96 % -- --   06/02/22 1323 80/62 -- -- (!) 110 17 96 % -- --   06/02/22 1310 (!) 74/54 -- -- (!) 110 22 96 % -- --   06/02/22 1309 (!) 74/54 -- -- (!) 108 20 95 % 5' 9\" (1.753 m) 118 lb (53.5 kg)       Amount and/or Complexity 06/02/2022     Narrative       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 2.6 cm distal abdominal aortic aneurysm. Treated gastric cancer with moderate hiatal hernia.     Preliminary report was provided by Dr. Brooke Swain, the on-call radiologist, at 21:42  hours     Final report to follow from IR.              CTA CHEST ABDOMEN PELVIS W WO CONTRAST    Result Date: 6/3/2022  1. No extravasation to suggest active GI bleed. 2. Small areas of pulmonary embolism are present in the right lower lobe. Clot burden is quite small. 3. DVT is also likely present in the left femoral veins. 4. Previously described gastric mass again noted. It is difficult to measure to evaluate for progression. 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[address]    Former heavy tobacco smoker    Gastroesophageal reflux disease    Hyponatremia    Hypoalbuminemia due to protein-calorie malnutrition (HCC)    Syncope due to orthostatic hypotension    Hypomagnesemia    Corticosteroid dependence ([address]    Hypoproteinemia (HCC)    Do not resuscitate status    Fatigue    History of gastric cancer    Encounter for palliative care    Debility    Weakness    Hypercoagulable state, secondary ([address]    Adult body mass index less than 19    Acute pulmonary embolism without acute cor pulmonale (HCC)    ABILIO (iron deficiency anemia)    Malignant neoplasm of overlapping sites of stomach (HCC)    Gastric adenocarcinoma (HCC)  Resolved Problems:    Severe sepsis with lactic acidosis (HCC)    ABLA (acute blood loss anemia)    GIB (gastrointestinal bleeding)     Stomach cancer, s/p chemo just a few days ago. Try to manage GI bleeding medically, to avoid emergency surgery, which would be much more riskier.          Number and Complexity of Problems addressed and   Risks of complications and/or morbidity of management        Level of MDM (2/3 elements below)  Number and Complexity of Problems Addressed Amount and/or Complexity of Data to be Reviewed and Analyzed  *Each unique test, order, or document contributes to the combination of 2 or combination of 3 in Category 1 below.  Risk of Complications and/or Morbidity or Mortality of pt Management     37123  03367 SF Minimal  1self-limited or minor problem Minimal or none Minimal risk of morbidity from additional diagnostic testing or Rx   45868  71782 Low Low  2or more self-limited or minor problems;    or  1stable chronic illness;    or  [id number], uncomplicated illness or injury   Limited  (Must meet the requirements of at least 1 of the 2 categories)  Category 1: Tests and documents   Any combination of 2 from the following:  Review of prior external note(s) from each unique source*;  review of the result(s) of each unique test*;   ordering of each unique test*    or   Category 2: Assessment requiring an independent historian(s)  (For the categories of independent interpretation of tests and discussion of management or test interpretation, see moderate or high) Low risk of morbidity from additional diagnostic testing or treatment     14500  18113 Mod Moderate  1or more chronic illnesses with exacerbation, progression, or side effects of treatment;    or  2or more stable chronic illnesses;    or  1undiagnosed new problem with uncertain prognosis;    or  1acute illness with systemic symptoms;    or  [id number] complicated injury   Moderate  (Must meet the requirements of at least 1 out of 3 categories)  Category 1: Tests, documents, or independent historian(s)  Any combination of 3 from the following:   Review of prior external note(s) from each unique source*;  Review of the result(s) of each unique test*;  Ordering of each unique test*;  Assessment requiring an independent historian(s)    or  Category 2: Independent interpretation of tests   Independent interpretation of a test performed by another physician/other qualified health care professional (not separately reported);     or  Category 3: Discussion of management or test interpretation  Discussion of management or test interpretation with external physician/other qualified health care professional/appropriate source (not separately reported)   Moderate risk of morbidity from additional diagnostic testing or treatment  Examples only:  Prescription drug management   Decision regarding minor surgery with identified patient or procedure risk factors  Decision regarding elective major surgery without identified patient or procedure risk factors   Diagnosis or treatment significantly limited by social determinants of health       46629  63419 High High  1or more chronic illnesses with severe exacerbation, progression, or side effects of treatment;    or  1 acute or chronic illness or injury that poses a threat to life or bodily function   Extensive  (Must meet the requirements of at least 2 out of 3 categories)  Category 1: Tests, documents, or independent historian(s)  Any combination of 3 from the following:   Review of prior external note(s) from each unique source*;  Review of the result(s) of each unique test*;   Ordering of each unique test*;   Assessment requiring an independent historian(s)    or   Category 2: Independent interpretation of tests   Independent interpretation of a test performed by another physician/other qualified health care professional (not separately reported);     or  Category 3: Discussion of management or test interpretation  Discussion of management or test interpretation with external physician/other qualified health care professional/appropriate source (not separately reported)   High risk of morbidity from additional diagnostic testing or treatment  Examples only:  Drug therapy requiring intensive monitoring for toxicity  Decision regarding elective major surgery with identified patient or procedure risk factors  Decision regarding emergency major surgery  Decision regarding hospitalization  Decision not to resuscitate or to de-escalate care because of poor prognosis             I have personally performed a face-to-face diagnostic evaluation and management  service on this patient. I have independently seen the patient. I have independently obtained the above history from the patient/family. I have independently examined the patient with above findings. I have independently reviewed data/labs for this patient and developed the above plan of care (MDM).   Signed: Lena Farfan MD, FACS

## 2022-06-03 NOTE — PROGRESS NOTES
110 RR 22 BP 74/54 without clear source. S/p 30 cc/kg sepsis Bolus. S/p pRBC 2U 6/2.   - albumin q6hx4 doses  - Vancomycin (6/2-. .), Cefepime (6/2-. Kaushal Lakhani )  - decadron 4 mg f/b 3 mg BID x 3 days then reduce back to home dose of dexamethasone 1 mg BID. - follow blood cultures. Check random cortisol.   - Midodrine 10 mg TID prn for MAP persistently <65      Chronic Anemia   Admission Hgb 5.6; Suspected abla but GI consult signed off. Most likely BM suppression from chemo/malignancy plus nutritional deficiencies. S/p pRBC 2U 6/2; s/p b12 2976 mcg IM   - folic acid  - PPI IV BID and carafate   - consult hematology      Hypercoagulable state  CT CAP with small PE, femoral DVT. Discussed with surgery, no current plan for urgent intervention  - apixaban  - duplex BLE    Cancer cachexia, severe protein caloric malnutrition, Hypoproteinemia  - remeron qhs  - Steroids as above. - Consult nutrition  - consider TPN once infection is ruled out     Gastric adenocarcinoma   s/p 4 cycle chemotherapy  - Consult oncology   - Consult surgery (Dr Clarke Sanders)     Former smoker   noted          Discharge Planning:      Pending clinical course    Diet:  ADULT DIET; Clear Liquid;  No red dye  ADULT ORAL NUTRITION SUPPLEMENT; Breakfast, Lunch, Dinner; Clear Liquid Oral Supplement  ADULT ORAL NUTRITION SUPPLEMENT; Breakfast, Lunch, Dinner; Clear Liquid Oral Supplement  DVT PPx: SCDs  Code status: DNR    Hospital Problems:  Principal Problem:    Septic shock ([address]  Active Problems:    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[address]    Former heavy tobacco smoker    Gastroesophageal reflux disease    Hyponatremia    Hypoalbuminemia due to protein-calorie malnutrition (HCC)    Syncope due to orthostatic hypotension    Hypomagnesemia    Corticosteroid dependence ([address]    Hypoproteinemia (HCC)    Do not resuscitate status    Fatigue    History of gastric cancer    Encounter for palliative care    Debility    Weakness    Hypercoagulable state, secondary ([address]    Adult body mass index less than 19    ABILIO (iron deficiency anemia)    Malignant neoplasm of overlapping sites of stomach (HCC)    Gastric adenocarcinoma ([facility]  Resolved Problems:    Severe sepsis with lactic acidosis (HCC)    ABLA (acute blood loss anemia)    GIB (gastrointestinal bleeding)      Objective:     Patient Vitals for the past 24 hrs:   Temp Pulse Resp BP SpO2   06/03/22 1159 97.7 °F (36.5 °C) 89 17 114/86 95 %   06/03/22 1010 -- 99 -- 110/72 --   06/03/22 1000 -- (!) 188 -- 87/70 --   06/03/22 0758 98.3 °F (36.8 °C) 82 17 121/80 96 %   06/03/22 0548 97.4 °F (36.3 °C) 86 18 105/76 95 %   06/03/22 0453 97.3 °F (36.3 °C) 83 18 101/77 95 %   06/03/22 0404 97.3 °F (36.3 °C) 84 18 113/73 95 %   06/03/22 0403 -- 85 -- -- --   06/03/22 0356 97.3 °F (36.3 °C) 84 18 99/74 94 %   06/03/22 0341 -- -- -- -- 95 %   06/03/22 0328 97.5 °F (36.4 °C) 86 18 99/69 --   06/03/22 0058 -- 87 -- -- --   06/02/22 2250 97.5 °F (36.4 °C) 90 17 93/68 97 %   06/02/22 1920 97.6 °F (36.4 °C) 91 17 90/73 95 %   06/02/22 1900 -- 91 17 87/69 --   06/02/22 1830 -- 90 24 90/66 97 %   06/02/22 1815 -- 89 19 94/68 95 %   06/02/22 1800 -- 90 15 90/68 97 %   06/02/22 1745 -- 87 18 85/64 97 %   06/02/22 1715 -- 91 19 87/70 97 %   06/02/22 1700 -- 92 21 85/66 99 %   06/02/22 1645 -- 92 19 90/66 97 %   06/02/22 1630 -- 91 22 90/67 --   06/02/22 1615 -- 89 17 85/64 97 %   06/02/22 1600 -- 92 22 86/68 98 %   06/02/22 1551 97.8 °F (36.6 °C) 91 16 (!) 83/55 98 %   06/02/22 1545 -- 87 -- 86/62 98 %   06/02/22 1536 97.5 °F (36.4 °C) 86 16 83/63 100 %   06/02/22 1415 -- 99 18 94/62 94 %   06/02/22 1330 -- (!) 104 19 (!) 100/38 96 %   06/02/22 1323 -- (!) 110 17 80/62 96 %   06/02/22 1310 -- (!) 110 22 (!) 74/54 96 %   06/02/22 1309 -- (!) 108 20 (!) 74/54 95 %       Oxygen Therapy  SpO2: 95 %  O2 Device: None (Room air)    Estimated body mass index is 17.43 kg/m² as calculated from the following:    Height as of this encounter: 5' 9\" (1.753 m).     Weight as of this encounter: 118 lb (53.5 kg). Intake/Output Summary (Last 24 hours) at 6/3/2022 1251  Last data filed at 6/3/2022 0945  Gross per 24 hour   Intake 2304.08 ml   Output 1300 ml   Net 1004.08 ml       Blood pressure 114/86, pulse 89, temperature 97.7 °F (36.5 °C), temperature source Oral, resp. rate 17, height 5' 9\" (1.753 m), weight 118 lb (53.5 kg), SpO2 95 %. Physical Exam  Vitals and nursing note reviewed. Constitutional:       General: He is not in acute distress. Appearance: He is cachectic. He is ill-appearing. He is not diaphoretic. HENT:      Head: Normocephalic and atraumatic. Comments: Temporal wasting  Eyes:      Extraocular Movements: Extraocular movements intact. Cardiovascular:      Rate and Rhythm: Normal rate. Pulmonary:      Effort: Pulmonary effort is normal. No respiratory distress. Abdominal:      General: There is no distension. Musculoskeletal:         General: No deformity. Right lower leg: No edema. Left lower leg: No edema. Comments: Thoracic bony prominence  Thenar atrophy   Skin:     Coloration: Skin is not jaundiced or pale. Neurological:      General: No focal deficit present. Mental Status: He is alert and oriented to person, place, and time. Psychiatric:         Mood and Affect: Mood normal.         Behavior: Behavior normal. Behavior is cooperative.       Comments: pleasant         I have reviewed ordered lab tests and independently visualized imaging below:    Recent Labs:  Recent Results (from the past 48 hour(s))   EKG 12 Lead    Collection Time: 06/02/22  1:10 PM   Result Value Ref Range    Ventricular Rate 108 BPM    Atrial Rate 109 BPM    P-R Interval 167 ms    QRS Duration 73 ms    Q-T Interval 326 ms    QTc Calculation (Bazett) 437 ms    P Axis 51 degrees    R Axis 41 degrees    T Axis 66 degrees    Diagnosis Sinus tachycardia    Culture, Blood 1    Collection Time: 06/02/22  1:12 PM    Specimen: Blood   Result Value Ref Range Special Requests PORT      Gram stain GRAM POSITIVE RODS      Gram stain ANAEROBIC BOTTLE POSITIVE      Gram stain        RESULTS VERIFIED, PHONED TO AND READ BACK BY DR GELLER AT 0739 ON 6.3.22, ROLF    Culture CULTURE IN [facility] UPDATES TO FOLLOW     Lactic Acid    Collection Time: 06/02/22  1:12 PM   Result Value Ref Range    Lactic Acid, Plasma 5.1 (HH) 0.4 - 2.0 MMOL/L   CBC with Auto Differential    Collection Time: 06/02/22  1:12 PM   Result Value Ref Range    WBC 20.4 (H) 4.3 - 11.1 K/uL    RBC 2.33 (L) 4.23 - 5.6 M/uL    Hemoglobin 5.6 (LL) 13.6 - 17.2 g/dL    Hematocrit 19.5 (L) 41.1 - 50.3 %    MCV 83.7 79.6 - 97.8 FL    MCH 24.0 (L) 26.1 - 32.9 PG    MCHC 28.7 (L) 31.4 - 35.0 g/dL    RDW 19.0 (H) 11.9 - 14.6 %    Platelets 338 137 - 336 K/uL    MPV 8.8 (L) 9.4 - 12.3 FL    nRBC 0.00 0.0 - 0.2 K/uL    Differential Type AUTOMATED      Seg Neutrophils 79 (H) 43 - 78 %    Lymphocytes 5 (L) 13 - 44 %    Monocytes 10 4.0 - 12.0 %    Eosinophils % 0 (L) 0.5 - 7.8 %    Basophils 0 0.0 - 2.0 %    Immature Granulocytes 5 0.0 - 5.0 %    Segs Absolute 16.0 (H) 1.7 - 8.2 K/UL    Absolute Lymph # 1.1 0.5 - 4.6 K/UL    Absolute Mono # 2.1 (H) 0.1 - 1.3 K/UL    Absolute Eos # 0.0 0.0 - 0.8 K/UL    Basophils Absolute 0.0 0.0 - 0.2 K/UL    Absolute Immature Granulocyte 1.1 (H) 0.0 - 0.5 K/UL   CMP    Collection Time: 06/02/22  1:12 PM   Result Value Ref Range    Sodium 131 (L) 136 - 145 mmol/L    Potassium 4.6 3.5 - 5.1 mmol/L    Chloride 96 (L) 98 - 107 mmol/L    CO2 25 21 - 32 mmol/L    Anion Gap 10 7 - 16 mmol/L    Glucose 195 (H) 65 - 100 mg/dL    BUN 23 8 - 23 MG/DL    CREATININE 0.40 (L) 0.8 - 1.5 MG/DL    GFR African American >60 >60 ml/min/1.73m2    GFR Non- >60 >60 ml/min/1.73m2    Calcium 7.9 (L) 8.3 - 10.4 MG/DL    Total Bilirubin 0.2 0.2 - 1.1 MG/DL    ALT 15 12 - 65 U/L    AST 17 15 - 37 U/L    Alk Phosphatase 181 (H) 50 - 136 U/L    Total Protein 4.1 (L) 6.3 - 8.2 g/dL    Albumin 1.0 (L) 3.2 - 4.6 g/dL    Globulin 3.1 2.3 - 3.5 g/dL    Albumin/Globulin Ratio 0.3 (L) 1.2 - 3.5     Procalcitonin    Collection Time: 06/02/22  1:12 PM   Result Value Ref Range    Procalcitonin 1.02 (H) 0.00 - 0.49 ng/mL   Troponin    Collection Time: 06/02/22  1:12 PM   Result Value Ref Range    Troponin, High Sensitivity 8.8 0 - 14 pg/mL   Magnesium    Collection Time: 06/02/22  1:12 PM   Result Value Ref Range    Magnesium 1.8 1.8 - 2.4 mg/dL   TYPE AND SCREEN    Collection Time: 06/02/22  1:36 PM   Result Value Ref Range    Crossmatch expiration date 06/05/2022,2443     ABO/Rh O POSITIVE     Antibody Screen NEG     Unit Number [id number]     Product Code Blood Bank  LR     Unit Divison 00     Dispense Status Blood Bank ISSUED     Crossmatch Result Compatible     Unit Number [id number]     Product Code Blood Bank  LR     Unit Divison 00     Dispense Status Blood Bank TRANSFUSED     Crossmatch Result Compatible    PREPARE RBC (CROSSMATCH), 1 Units    Collection Time: 06/02/22  1:45 PM   Result Value Ref Range    History Check Historical check performed    PREPARE RBC (CROSSMATCH), 1 Units    Collection Time: 06/02/22  3:15 PM   Result Value Ref Range    History Check Historical check performed    Culture, Blood 1    Collection Time: 06/02/22  7:53 PM    Specimen: Blood   Result Value Ref Range    Special Requests LEFT ANTECUBITAL      Culture NO GROWTH AFTER 10 HOURS     Lactate, Sepsis    Collection Time: 06/02/22  7:53 PM   Result Value Ref Range    Lactic Acid, Sepsis 1.6 0.4 - 2.0 MMOL/L   Hemoglobin and Hematocrit    Collection Time: 06/02/22  7:53 PM   Result Value Ref Range    Hemoglobin 6.8 (LL) 13.6 - 17.2 g/dL    Hematocrit 22.2 (L) 41.1 - 50.3 %   Lactic Acid    Collection Time: 06/02/22  7:53 PM   Result Value Ref Range    Lactic Acid, Plasma 1.6 0.4 - 2.0 MMOL/L   Protime-INR    Collection Time: 06/02/22  7:53 PM   Result Value Ref Range    Protime 14.6 (H) 12.6 - 14.5 sec    INR 1.1     APTT Collection Time: 06/02/22  7:53 PM   Result Value Ref Range    PTT 33.8 24.1 - 35.1 SEC   TSH with Reflex    Collection Time: 06/02/22  7:53 PM   Result Value Ref Range    TSH w Free Thyroid if Abnormal 1.41 0.358 - 3.740 UIU/ML   Cortisol, Baseline    Collection Time: 06/02/22  7:53 PM   Result Value Ref Range    Cortisol 21.8 ug/dL   Urinalysis with Reflex to Culture    Collection Time: 06/02/22  9:30 PM    Specimen: Urine   Result Value Ref Range    Color, UA YELLOW      Appearance CLEAR      Specific Gravity, UA 1.015 1.001 - 1.023      pH, Urine 7.0 5.0 - 9.0      Protein, UA Negative NEG mg/dL    Glucose, UA Negative mg/dL    Ketones, Urine Negative NEG mg/dL    Bilirubin Urine Negative NEG      Blood, Urine Negative NEG      Urobilinogen, Urine 0.2 0.2 - 1.0 EU/dL    Nitrite, Urine Negative NEG      Leukocyte Esterase, Urine Negative NEG      WBC, UA 0 0 /hpf    RBC, UA 0 0 /hpf    BACTERIA, URINE 0 0 /hpf    Urine Culture if Indicated CULTURE NOT INDICATED BY UA RESULT      Epithelial Cells UA 0 0 /hpf    Casts 0 0 /lpf    Crystals 0 0 /LPF    Mucus, UA 0 0 /lpf   Sodium, urine, random    Collection Time: 06/02/22  9:30 PM   Result Value Ref Range    SODIUM, RANDOM URINE 16 MMOL/L   Lactic Acid    Collection Time: 06/02/22 10:35 PM   Result Value Ref Range    Lactic Acid, Plasma 0.9 0.4 - 2.0 MMOL/L   MSSA/MRSA Screen BY PCR    Collection Time: 06/02/22 10:48 PM    Specimen: Nasal; Blood   Result Value Ref Range    Special Requests NO SPECIAL REQUESTS      Culture (A)       MRSA target DNA not detected, SA target DNA detected. A MRSA negative, SA positive test result does not preclude MRSA nasal colonization.    Vancomycin Level, Random    Collection Time: 06/03/22  7:09 AM   Result Value Ref Range    Vancomycin Rm 26.1 UG/ML   CBC with Auto Differential    Collection Time: 06/03/22  7:09 AM   Result Value Ref Range    WBC 8.4 4.3 - 11.1 K/uL    RBC 3.06 (L) 4.23 - 5.6 M/uL    Hemoglobin 7.8 (L) 13.6 -  albumin human 25 % IV solution 25 g  25 g IntraVENous Q6H    pantoprazole (PROTONIX) 40 mg in sodium chloride (PF) 10 mL injection  40 mg IntraVENous Q12H    sodium chloride flush 0.9 % injection 5-40 mL  5-40 mL IntraVENous 2 times per day    sodium chloride flush 0.9 % injection 5-40 mL  5-40 mL IntraVENous PRN    dextrose 5 % and 0.9 % sodium chloride infusion   IntraVENous Continuous    cefepime (MAXIPIME) 2000 mg IVPB minibag in NS  2,000 mg IntraVENous Q8H    dexamethasone (DECADRON) tablet 3 mg  3 mg Oral 2 times per day    Followed by   Familia Denton ON 6/5/2022] dexamethasone (DECADRON) tablet 1 mg  1 mg Oral 2 times per day    sucralfate (CARAFATE) tablet 1 g  1 g Oral 4 times per day    sodium chloride flush 0.9 % injection 10 mL  10 mL IntraVENous ONCE PRN    folic acid (FOLVITE) tablet 1 mg  1 mg Oral Daily    mirtazapine (REMERON) tablet 7.5 mg  7.5 mg Oral Nightly    ondansetron (ZOFRAN) injection 4 mg  4 mg IntraVENous Q6H PRN    saliva substitute (BIOTENE/MOUTH KOTE) liquid   Oral PRN    Medela Tender Care Lanolin cream   Topical PRN    tuberculin injection 5 Units  5 Units IntraDERmal Once    vancomycin (VANCOCIN) 750 mg in sodium chloride 0.9 % 250 mL IVPB ([id number])  750 mg IntraVENous Q8H    midodrine (PROAMATINE) tablet 10 mg  10 mg Oral TID PRN       Signed:  Brandi Aguillon MD

## 2022-06-03 NOTE — ACP (ADVANCE CARE PLANNING)
Advance Care Planning     General Advance Care Planning (ACP) Conversation    Date of Conversation: 6/2/2022  Conducted with: Patient with Decision Making Capacity    Healthcare Decision Maker:    Primary Decision Maker: Cb Sandra - [phone]    Secondary Decision Maker: Matti Perez - Ko - [phone]  Click here to complete Healthcare Decision Makers including selection of the Healthcare Decision Maker Relationship (ie \"Primary\"). Today we documented Decision Maker(s) consistent with Legal Next of Kin hierarchy.     Content/Action Overview:  Has NO ACP documents/care preferences - refer to ACP Clinical Specialist  Reviewed DNR/DNI and patient confirms current DNR status - completed forms on file (place new order if needed)  artificial nutrition, ventilation preferences, hospitalization preferences and resuscitation preferences  N/A    Length of Voluntary ACP Conversation in minutes:  <16 minutes (Non-Billable)    JUANA Kingsley

## 2022-06-03 NOTE — CARE COORDINATION
Chart screened by CM for d/c planning. Pt resides with his spouse Roman Baez [phone] in a one-story house. He has insurance and a PCP. He is seen at the [facility]. Pt is current with [facility] [place]: PT.  Pt admitted with Dx of Syncope and collapse, Hemorrhagic shock, HTN, Hx of gastric CA, Septic shock, Gastrointestinal hemorrhage, and Stage IIB gastric adenocarcinoma. PT, OT, and SLP consults have been ordered. CM will await recommendations. Anticipated d/c plan is for pt to return home with spouse and [facility] services when medically stable. CM will continue to follow and remain available if any needs arise. 06/03/22 3790   Service Assessment   Patient Orientation Alert and Oriented;Person;Place; Self   Cognition Alert   History Provided By Medical Record   Primary Caregiver Self   Support Systems Spouse/Significant Other;Family Members   Patient's Healthcare Decision Maker is: Legal Next of Kin   PCP Verified by CM Yes  Jermain Tinsley. Stan An MD)   Last Visit to PCP [facility] last year   Prior Functional Level Independent in ADLs/IADLs   Current Functional Level Independent in ADLs/IADLs   Can patient return to prior living arrangement Yes   Ability to make needs known: Good   Family able to assist with home care needs: Yes   Would you like for me to discuss the discharge plan with any other family members/significant others, and if so, who?  No   Social/Functional History   Lives With Spouse   Type of [address] One level   Bathroom Accessibility Accessible   Receives Help From Sherry Butler.   Ambulation Assistance Independent   Transfer Assistance Independent   Active  Yes   Mode of Transportation Car   Occupation Retired   Discharge Planning   Type of [address] Prior To Admission Other ([place])  [facility]: PT)   [facility] Medications No   Patient expects to be discharged to: House   One/Two Story Residence One story   Services At/After Discharge   Transition of Care Consult (CM Consult) Home Health;Discharge Planning  (Current with [facility]: PT)   Internal [address] Provided? No   Mode of Transport at Discharge Other (see comment)  (Family)   Confirm Follow Up Transport Family   Condition of Participation: Discharge Planning   The Plan for Transition of Care is related to the following treatment goals: Pt requires home health to return to functional baseline in the community. The Patient and/or Patient Representative was provided with a Choice of Provider? Patient   The Patient and/Or Patient Representative agree with the Discharge Plan? Yes   Freedom of Choice list was provided with basic dialogue that supports the patient's individualized plan of care/goals, treatment preferences, and shares the quality data associated with the providers?   Yes

## 2022-06-03 NOTE — CONSULTS
Inpatient Hematology / Oncology Consult Note    Reason for Consult:  Syncope and collapse [R55]  Hemorrhagic shock ([address] [R57.8]  Shock ([address] [R57.9]  History of gastric cancer [Z85.028]  Septic shock ([address] [A41.9, R65.21]  Gastrointestinal hemorrhage, unspecified gastrointestinal hemorrhage type [K92.2]  Referring Physician:  Maude Winn MD    History of Present Illness:  Mr. Mary Grace Foley is a 78 y.o. male admitted on 6/2/2022 with a primary diagnosis of The primary encounter diagnosis was Septic shock ([address] Diagnoses of Hemorrhagic shock ([address] Gastrointestinal hemorrhage, unspecified gastrointestinal hemorrhage type, Syncope and collapse, History of gastric cancer, and Acute pulmonary embolism without acute cor pulmonale, unspecified pulmonary embolism type [facility]) were also pertinent to this visit. .      70-year-old  male patient, well-known to me from cancer center for his diagnosis of gastric adenocarcinoma stage IIb, ABILIO as well as debility, more recently undergoing neoadjuvant treatment with FLOT, dose #4 given on 5/18/2022. Patient now admitted through ED with syncope episode, in setting of progressive weakness, hypotension, leukocytosis, significant anemia with hemoglobin 5.6. Status post PRBC transfusion. Blood cultures since are showing 1 out of 2 gram-positive rods, patient currently on broad-spectrum antibiotics. Oncology consulted. Remains frail appearing however appears to have clinically improved from admission. Has seen GI, no plans for endoscopies currently. CT CAP today with no extravasation to suggest active GI bleeding, small RLL PE noted, likely DVT in left femoral vein as well as previously described gastric mass again noted, possibly improved. Review of Systems:  As mentioned above. All other systems reviewed in full and are unremarkable.        Allergies   Allergen Reactions    Iron     Tetanus Antitoxin      Past Medical History:   Diagnosis Date    ABLA (acute blood loss anemia) 6/2/2022    GIB (gastrointestinal bleeding) 6/2/2022    HTN (hypertension)     Severe sepsis with lactic acidosis ([facility] 6/2/2022     Past Surgical History:   Procedure Laterality Date    COLONOSCOPY N/A 3/9/2022    COLONOSCOPY/ 22 performed by Nancie Woods MD at [address] [place] [address] ARTHROSCOPY      OTHER SURGICAL HISTORY      none     Family History   Problem Relation Age of Onset    Other Other         non-contributory     Social History     Socioeconomic History    Marital status:      Spouse name: Not on file    Number of children: Not on file    Years of education: Not on file    Highest education level: Not on file   Occupational History    Not on file   Tobacco Use    Smoking status: Former Smoker    Smokeless tobacco: Former User   Substance and Sexual Activity    Alcohol use: Not Currently    Drug use: Not Currently    Sexual activity: Not on file   Other Topics Concern    Not on file   Social History Narrative    Not on file     Social Determinants of Health     Financial Resource Strain:     Difficulty of Paying Living Expenses: Not on file   Food Insecurity:     Worried About [facility] in the Last Year: Not on file    Stacie of Food in the Last Year: Not on file   Transportation Needs:     Lack of Transportation (Medical): Not on file    Lack of Transportation (Non-Medical):  Not on file   Physical Activity:     Days of Exercise per Week: Not on file    Minutes of Exercise per Session: Not on file   Stress:     Feeling of Stress : Not on file   Social Connections:     Frequency of Communication with Friends and Family: Not on file    Frequency of Social Gatherings with Friends and Family: Not on file    Attends [employer] Services: Not on file    Active Member of Clubs or Organizations: Not on file    Attends Club or Organization Meetings: Not on file    Marital Status: Not on file   Intimate Partner Violence:     Fear of Current or Ex-Partner: Not on file    Emotionally Abused: Not on file    Physically Abused: Not on file    Sexually Abused: Not on file   Housing Stability:     Unable to Pay for Housing in the Last Year: Not on file    Number of Jono in the Last Year: Not on file    Unstable Housing in the Last Year: Not on file     Current Facility-Administered Medications   Medication Dose Route Frequency Provider Last Rate Last Admin    acetaminophen (TYLENOL) tablet 650 mg  650 mg Oral Q6H PRN Jaylon Oconnell MD        Or    acetaminophen (TYLENOL) suppository 650 mg  650 mg Rectal Q6H PRN Jaylon Oconnell MD        vancomycin (VANCOCIN) 1,000 mg in sodium chloride 0.9 % 250 mL IVPB ([id number])  1,000 mg IntraVENous Q12H Clay Mohan MD        0.9 % sodium chloride infusion   IntraVENous PRN Maegan Donovan MD        pantoprazole (PROTONIX) 40 mg in sodium chloride (PF) 10 mL injection  40 mg IntraVENous Q12H Loreatha Stain, DO   40 mg at 06/03/22 1530    sodium chloride flush 0.9 % injection 5-40 mL  5-40 mL IntraVENous 2 times per day Loreatha Stain, DO   10 mL at 06/03/22 0817    sodium chloride flush 0.9 % injection 5-40 mL  5-40 mL IntraVENous PRN Loreatha Stain, DO        dextrose 5 % and 0.9 % sodium chloride infusion   IntraVENous Continuous Loreatha Stain,  mL/hr at 06/03/22 0824 New Bag at 06/03/22 0824    cefepime (MAXIPIME) 2000 mg IVPB minibag in NS  2,000 mg IntraVENous Q8H Loreatha Stain, DO 12.5 mL/hr at 06/03/22 1400 2,000 mg at 06/03/22 1400    dexamethasone (DECADRON) tablet 3 mg  3 mg Oral 2 times per day Loreatha Stain, DO   3 mg at 06/03/22 0816    Followed by   Elli Andrew ON 6/5/2022] dexamethasone (DECADRON) tablet 1 mg  1 mg Oral 2 times per day Loreatha Stain, DO        sucralfate (CARAFATE) tablet 1 g  1 g Oral 4 times per day Loreatha Stain, DO   1 g at 06/03/22 1303    sodium chloride flush 0.9 % injection 10 mL  10 mL IntraVENous ONCE PRN Loreatha Stain, DO        folic acid (Siva Jay) tablet 1 mg  1 mg Oral Daily Karen Snipe, DO   1 mg at 22 0816    mirtazapine (REMERON) tablet 7.5 mg  7.5 mg Oral Nightly Karen Snipe, DO   7.5 mg at 22    ondansetron (ZOFRAN) injection 4 mg  4 mg IntraVENous Q6H PRN Karen Snipe, DO        saliva substitute (BIOTENE/MOUTH KOTE) liquid   Oral PRN Karen Snipe, DO        Medela Tender Care Lanolin cream   Topical PRN Karen Snipe, DO        tuberculin injection 5 Units  5 Units IntraDERmal Once Karen Snipe, DO   5 Units at 22    midodrine (PROAMATINE) tablet 10 mg  10 mg Oral TID PRN Karen Snipe, DO   10 mg at 22       OBJECTIVE:  Patient Vitals for the past 8 hrs:   BP Temp Temp src Pulse Resp SpO2 Height   22 1606 129/84 97.6 °F (36.4 °C) Oral 85 17 -- --   22 1334 80/70 -- -- -- -- -- --   22 1159 114/86 97.7 °F (36.5 °C) Oral 89 17 95 % --   22 1152 -- -- -- -- -- -- 5' 9\" (1.753 m)   22 1010 110/72 -- -- 99 -- -- --   22 1000 87/70 -- -- (!) 188 -- -- --     Temp (24hrs), Av.6 °F (36.4 °C), Min:97.3 °F (36.3 °C), Max:98.3 °F (36.8 °C)     0701 -  1900  In: 8980 [P.O.:240; I.V.:1278]  Out: 600 [Urine:600]    Physical Exam:  Constitutional: Weak appearing male in no acute distress, sitting comfortably in the hospital bed. HEENT: Normocephalic and atraumatic. Oropharynx is clear, mucous membranes are moist.  Pupils are equal, round, and reactive to light. Extraocular muscles are intact. Sclerae anicteric. Neck supple without JVD. No thyromegaly present. Lymph node   No palpable submandibular, cervical, supraclavicular, axillary or inguinal lymph nodes. Skin Warm and dry. No bruising and no rash noted. No erythema. No pallor. Respiratory Lungs are clear to auscultation bilaterally without wheezes, rales or rhonchi, normal air exchange without accessory muscle use. CVS Normal rate, regular rhythm and normal S1 and S2.   No murmurs, gallops, or rubs.   Abdomen Soft, nontender and nondistended, normoactive bowel sounds. No palpable mass. No hepatosplenomegaly. Neuro Grossly nonfocal with no obvious sensory or motor deficits. MSK Normal range of motion in general.  No edema and no tenderness. Psych Appropriate mood and affect.         Labs:    Recent Results (from the past 24 hour(s))   Culture, Blood 1    Collection Time: 06/02/22  7:53 PM    Specimen: Blood   Result Value Ref Range    Special Requests LEFT ANTECUBITAL      Culture NO GROWTH AFTER 10 HOURS     Lactate, Sepsis    Collection Time: 06/02/22  7:53 PM   Result Value Ref Range    Lactic Acid, Sepsis 1.6 0.4 - 2.0 MMOL/L   Hemoglobin and Hematocrit    Collection Time: 06/02/22  7:53 PM   Result Value Ref Range    Hemoglobin 6.8 (LL) 13.6 - 17.2 g/dL    Hematocrit 22.2 (L) 41.1 - 50.3 %   Lactic Acid    Collection Time: 06/02/22  7:53 PM   Result Value Ref Range    Lactic Acid, Plasma 1.6 0.4 - 2.0 MMOL/L   Protime-INR    Collection Time: 06/02/22  7:53 PM   Result Value Ref Range    Protime 14.6 (H) 12.6 - 14.5 sec    INR 1.1     APTT    Collection Time: 06/02/22  7:53 PM   Result Value Ref Range    PTT 33.8 24.1 - 35.1 SEC   TSH with Reflex    Collection Time: 06/02/22  7:53 PM   Result Value Ref Range    TSH w Free Thyroid if Abnormal 1.41 0.358 - 3.740 UIU/ML   Osmolality    Collection Time: 06/02/22  7:53 PM   Result Value Ref Range    Serum Osmolality 273 (L) 280 - 301 MOSM/kg H2O   Cortisol, Baseline    Collection Time: 06/02/22  7:53 PM   Result Value Ref Range    Cortisol 21.8 ug/dL   Urinalysis with Reflex to Culture    Collection Time: 06/02/22  9:30 PM    Specimen: Urine   Result Value Ref Range    Color, UA YELLOW      Appearance CLEAR      Specific Gravity, UA 1.015 1.001 - 1.023      pH, Urine 7.0 5.0 - 9.0      Protein, UA Negative NEG mg/dL    Glucose, UA Negative mg/dL    Ketones, Urine Negative NEG mg/dL    Bilirubin Urine Negative NEG      Blood, Urine Negative NEG Urobilinogen, Urine 0.2 0.2 - 1.0 EU/dL    Nitrite, Urine Negative NEG      Leukocyte Esterase, Urine Negative NEG      WBC, UA 0 0 /hpf    RBC, UA 0 0 /hpf    BACTERIA, URINE 0 0 /hpf    Urine Culture if Indicated CULTURE NOT INDICATED BY UA RESULT      Epithelial Cells UA 0 0 /hpf    Casts 0 0 /lpf    Crystals 0 0 /LPF    Mucus, UA 0 0 /lpf   Sodium, urine, random    Collection Time: 06/02/22  9:30 PM   Result Value Ref Range    SODIUM, RANDOM URINE 16 MMOL/L   Osmolality, Urine    Collection Time: 06/02/22  9:30 PM   Result Value Ref Range    Osmolality, Ur 536 50 - 1400 MOSM/kg H2O   Lactic Acid    Collection Time: 06/02/22 10:35 PM   Result Value Ref Range    Lactic Acid, Plasma 0.9 0.4 - 2.0 MMOL/L   MSSA/MRSA Screen BY PCR    Collection Time: 06/02/22 10:48 PM    Specimen: Nasal; Blood   Result Value Ref Range    Special Requests NO SPECIAL REQUESTS      Culture (A)       MRSA target DNA not detected, SA target DNA detected. A MRSA negative, SA positive test result does not preclude MRSA nasal colonization.    Vancomycin Level, Random    Collection Time: 06/03/22  7:09 AM   Result Value Ref Range    Vancomycin Rm 26.1 UG/ML   CBC with Auto Differential    Collection Time: 06/03/22  7:09 AM   Result Value Ref Range    WBC 8.4 4.3 - 11.1 K/uL    RBC 3.06 (L) 4.23 - 5.6 M/uL    Hemoglobin 7.8 (L) 13.6 - 17.2 g/dL    Hematocrit 24.8 (L) 41.1 - 50.3 %    MCV 81.0 79.6 - 97.8 FL    MCH 25.5 (L) 26.1 - 32.9 PG    MCHC 31.5 31.4 - 35.0 g/dL    RDW 17.4 (H) 11.9 - 14.6 %    Platelets 828 626 - 856 K/uL    MPV 8.8 (L) 9.4 - 12.3 FL    nRBC 0.00 0.0 - 0.2 K/uL    Differential Type AUTOMATED      Seg Neutrophils 86 (H) 43 - 78 %    Lymphocytes 7 (L) 13 - 44 %    Monocytes 2 (L) 4.0 - 12.0 %    Eosinophils % 0 (L) 0.5 - 7.8 %    Basophils 0 0.0 - 2.0 %    Immature Granulocytes 5 0.0 - 5.0 %    Segs Absolute 7.2 1.7 - 8.2 K/UL    Absolute Lymph # 0.6 0.5 - 4.6 K/UL    Absolute Mono # 0.2 0.1 - 1.3 K/UL    Absolute Eos # 0.0 0.0 - 0.8 K/UL    Basophils Absolute 0.0 0.0 - 0.2 K/UL    Absolute Immature Granulocyte 0.4 0.0 - 0.5 K/UL   Hemoglobin and Hematocrit    Collection Time: 06/03/22  2:10 PM   Result Value Ref Range    Hemoglobin 7.8 (L) 13.6 - 17.2 g/dL    Hematocrit 25.4 (L) 41.1 - 50.3 %   Comprehensive Metabolic Panel w/ Reflex to MG    Collection Time: 06/03/22  2:10 PM   Result Value Ref Range    Sodium 130 (L) 136 - 145 mmol/L    Potassium 4.3 3.5 - 5.1 mmol/L    Chloride 101 98 - 107 mmol/L    CO2 24 21 - 32 mmol/L    Anion Gap 5 (L) 7 - 16 mmol/L    Glucose 161 (H) 65 - 100 mg/dL    BUN 27 (H) 8 - 23 MG/DL    CREATININE 0.20 (L) 0.8 - 1.5 MG/DL    GFR African American >60 >60 ml/min/1.73m2    GFR Non- >60 >60 ml/min/1.73m2    Calcium 8.4 8.3 - 10.4 MG/DL    Total Bilirubin 0.4 0.2 - 1.1 MG/DL    ALT 11 (L) 12 - 65 U/L    AST 10 (L) 15 - 37 U/L    Alk Phosphatase 115 50 - 136 U/L    Total Protein 5.1 (L) 6.3 - 8.2 g/dL    Albumin 2.1 (L) 3.2 - 4.6 g/dL    Globulin 3.0 2.3 - 3.5 g/dL    Albumin/Globulin Ratio 0.7 (L) 1.2 - 3.5         Imaging:  reviewed    ASSESSMENT & PLAN:  27-year-old  male patient, well-known to me from cancer center for his diagnosis of gastric adenocarcinoma stage IIb, ABILIO as well as debility, more recently undergoing neoadjuvant treatment with FLOT, dose #4 given on 5/18/2022. Patient now admitted through ED with syncope episode, in setting of progressive weakness, hypotension, leukocytosis, significant anemia with hemoglobin 5.6. Status post PRBC transfusion. Blood cultures since are showing 1 out of 2 gram-positive rods, patient currently on broad-spectrum antibiotics. Oncology consulted. Remains frail appearing however appears to have clinically improved from admission. Has seen GI, no plans for endoscopies currently.   CT CAP today with no extravasation to suggest active GI bleeding, small RLL PE noted, likely DVT in left femoral vein as well as previously described gastric mass again noted, possibly improved. Continue ongoing management through hospitalist service for his bacteremia. Transfuse PRBC as needed to keep hemoglobin above 7. Patient is to be started on anticoagulation with close monitoring of hemoglobin given VTE. He has completed his planned neoadjuvant systemic therapy. Once clinically improved plan is for him to undergo surgery with open total gastrectomy. F/u with outpt oncology within 2 weeks of discharge. Lab studies and imaging studies were personally reviewed. Pertinent old records were reviewed. Thank you for allowing us to participate in the care of Mr. Quinones Allans.          Moris Shane MD  [address] [place] [address] Hematology Oncology  [address] [place] [address]  Office : [phone]  Fax : [phone]

## 2022-06-03 NOTE — PROGRESS NOTES
SPEECH PATHOLOGY NOTE:    Speech therapy consult received and appreciated. Attempted to see this AM, but currently working with PT/OT. Will follow up at later time today. ADDENDUM: Second attempt to see patient this morning. Syncopal event reported by PT/OT during their session. + bloody bowel movement also noted. Will defer dysphagia evaluation today.      ASHER Martinez, CCC-SLP  Speech Language Pathologist  Acute Rehabilitation Services  Contact: [place]

## 2022-06-03 NOTE — PROGRESS NOTES
VANCO DAILY FOLLOW UP NOTE  [facility] Pharmacokinetic Monitoring Service - Vancomycin    Consulting Provider: Dr. Barbara Woods   Indication: sepsis  Target Concentration: Goal AUC/WILFREDO 400-600 mg*hr/L  Day of Therapy: 2  Additional Antimicrobials: cefepime    Pertinent Laboratory Values: Wt Readings from Last 1 Encounters:   06/02/22 118 lb (53.5 kg)     Temp Readings from Last 1 Encounters:   06/03/22 97.7 °F (36.5 °C) (Oral)     Recent Labs     06/02/22  1312 06/03/22  0709   BUN 23  --    CREATININE 0.40*  --    WBC 20.4* 8.4   PROCAL 1.02*  --      Estimated Creatinine Clearance: 136 mL/min (A) (based on SCr of 0.4 mg/dL (L)). Lab Results   Component Value Date    VANCORANDOM 26.1 06/03/2022       MRSA Nasal Swab: N/A. Non-respiratory infection. .      Assessment:  Date/Time Dose Concentration AUC   6/3 0709 750 mg q8h 26.1 601   Note: Serum concentrations collected for AUC dosing may appear elevated if collected in close proximity to the dose administered, this is not necessarily an indication of toxicity    Plan:  Current dosing regimen is supra-therapeutic  Change dose to 1000 mg q12h for predicted AUC/Tr of 535/13.1  Repeat vancomycin concentrations will be ordered as clinically appropriate   Pharmacy will continue to monitor patient and adjust therapy as indicated    Thank you for the consult,  Darcie Ruiz, PharmD, BCOP  Clinical Pharmacist  Contact via Perfect Serve

## 2022-06-03 NOTE — CONSULTS
H&P/Consult Note/Progress Note/Office Note:   Nena Hoang  MRN: [id number]  :1955  Age:66 y.o. General Surgery Consult ordered by: Dr. Jeremiah Levine  Reason for General Surgery Consult: Gastric Adenocarcinoma    HPI: Nena Hoang is a 78 y.o. male with a past medical history of HTN, ABILIO and tobacco use - 50pk year who presented to the ED 22 with c/o syncopal episode and a 2 day history of duarte syncope. He is a patient of Dr. Marcell Mills with Stage IIB gastric adenocarcinoma s/p neoadjuvant C4 FLOT from  - . Plans for resection with open total gastrectomy with J tube placement in 2-3 weeks. On admission, WBC 20k, Hgb 5.6. GI was consulted, no plans for endoscopy at this time.     Past Medical History:   Diagnosis Date    HTN (hypertension)      Past Surgical History:   Procedure Laterality Date    COLONOSCOPY N/A 3/9/2022    COLONOSCOPY performed by Sophia Paulino MD at [facility]    KNEE ARTHROSCOPY      OTHER SURGICAL HISTORY      none     Current Facility-Administered Medications   Medication Dose Route Frequency    0.9 % sodium chloride infusion   IntraVENous PRN    albumin human 25 % IV solution 25 g  25 g IntraVENous Q6H    pantoprazole (PROTONIX) 40 mg in sodium chloride (PF) 10 mL injection  40 mg IntraVENous Q12H    sodium chloride flush 0.9 % injection 5-40 mL  5-40 mL IntraVENous 2 times per day    sodium chloride flush 0.9 % injection 5-40 mL  5-40 mL IntraVENous PRN    acetaminophen (TYLENOL) tablet 650 mg  650 mg Oral Q6H PRN    Or    acetaminophen (TYLENOL) suppository 650 mg  650 mg Rectal Q6H PRN    dextrose 5 % and 0.9 % sodium chloride infusion   IntraVENous Continuous    cefepime (MAXIPIME) 2000 mg IVPB minibag in NS  2,000 mg IntraVENous Q8H    dexamethasone (DECADRON) tablet 3 mg  3 mg Oral 2 times per day    Followed by   Senthil Shields ON 2022] dexamethasone (DECADRON) tablet 1 mg  1 mg Oral 2 times per day    sucralfate (CARAFATE) tablet 1 g  1 g Oral 4 times per day    sodium chloride flush 0.9 % injection 10 mL  10 mL IntraVENous ONCE PRN    folic acid (FOLVITE) tablet 1 mg  1 mg Oral Daily    mirtazapine (REMERON) tablet 7.5 mg  7.5 mg Oral Nightly    ondansetron (ZOFRAN) injection 4 mg  4 mg IntraVENous Q6H PRN    saliva substitute (BIOTENE/MOUTH KOTE) liquid   Oral PRN    Medela Tender Care Lanolin cream   Topical PRN    tuberculin injection 5 Units  5 Units IntraDERmal Once    vancomycin (VANCOCIN) 750 mg in sodium chloride 0.9 % 250 mL IVPB ([id number])  750 mg IntraVENous Q8H    midodrine (PROAMATINE) tablet 10 mg  10 mg Oral TID PRN     Iron and Tetanus antitoxin  Social History     Socioeconomic History    Marital status:      Spouse name: Not on file    Number of children: Not on file    Years of education: Not on file    Highest education level: Not on file   Occupational History    Not on file   Tobacco Use    Smoking status: Former Smoker    Smokeless tobacco: Former User   Substance and Sexual Activity    Alcohol use: Not Currently    Drug use: Not Currently    Sexual activity: Not on file   Other Topics Concern    Not on file   Social History Narrative    Not on file     Social Determinants of Health     Financial Resource Strain:     Difficulty of Paying Living Expenses: Not on file   Food Insecurity:     Worried About Running Out of Food in the Last Year: Not on file    Stacie of Food in the Last Year: Not on file   Transportation Needs:     Lack of Transportation (Medical): Not on file    Lack of Transportation (Non-Medical):  Not on file   Physical Activity:     Days of Exercise per Week: Not on file    Minutes of Exercise per Session: Not on file   Stress:     Feeling of Stress : Not on file   Social Connections:     Frequency of Communication with Friends and Family: Not on file    Frequency of Social Gatherings with Friends and Family: Not on file    Attends Sabianism Services: Not on file   Keisha Active Member of Clubs or Organizations: Not on file    Attends Club or Organization Meetings: Not on file    Marital Status: Not on file   Intimate Partner Violence:     Fear of Current or Ex-Partner: Not on file    Emotionally Abused: Not on file    Physically Abused: Not on file    Sexually Abused: Not on file   Housing Stability:     Unable to Pay for Housing in the Last Year: Not on file    Number of [place] in the Last Year: Not on file    Unstable Housing in the Last Year: Not on file     Social History     Tobacco Use   Smoking Status Former Smoker   Smokeless Tobacco Former User     Family History   Problem Relation Age of Onset    Other Other         non-contributory     ROS: The patient has no difficulty with chest pain or shortness of breath. No fever or chills. Comprehensive review of systems was otherwise unremarkable except as noted above. Physical Exam:   /72   Pulse 99   Temp 98.3 °F (36.8 °C) (Oral)   Resp 17   Ht 5' 9\" (1.753 m)   Wt 118 lb (53.5 kg)   SpO2 96%   BMI 17.43 kg/m²   Vitals:    06/03/22 0548 06/03/22 0758 06/03/22 1000 06/03/22 1010   BP: 105/76 121/80 87/70 110/72   Pulse: 86 82 (!) 188 99   Resp: 18 17     Temp: 97.4 °F (36.3 °C) 98.3 °F (36.8 °C)     TempSrc: Oral Oral     SpO2: 95% 96%     Weight:       Height:         06/03 0701 - 06/03 1900  In: 7469 [I.V.:1278]  Out: -   06/01 1901 - 06/03 0700  In: 786.1   Out: 700 [Urine:700]    Constitutional: Alert, cooperative, NAD  Eyes: Sclera are clear. EOMs intact  ENMT: no external lesions gross hearing normal; no obvious neck masses, no ear or lip lesions, nares normal  CV: RRR. Normal perfusion  Resp: No JVD. Breathing is  non-labored; no audible wheezing. GI: soft and non-distended, non-tender     Musculoskeletal: No embolic signs or cyanosis.    Neuro: moves all 4; no focal deficits  Psychiatric: normal affect and mood    Recent vitals (if inpt):  Patient Vitals for the past 24 hrs:   BP Temp Temp src Pulse Resp SpO2 Height Weight   06/03/22 1010 110/72 -- -- 99 -- -- -- --   06/03/22 1000 87/70 -- -- (!) 188 -- -- -- --   06/03/22 0758 121/80 98.3 °F (36.8 °C) Oral 82 17 96 % -- --   06/03/22 0548 105/76 97.4 °F (36.3 °C) Oral 86 18 95 % -- --   06/03/22 0453 101/77 97.3 °F (36.3 °C) Oral 83 18 95 % -- --   06/03/22 0404 113/73 97.3 °F (36.3 °C) Oral 84 18 95 % -- --   06/03/22 0403 -- -- -- 85 -- -- -- --   06/03/22 0356 99/74 97.3 °F (36.3 °C) Oral 84 18 94 % -- --   06/03/22 0341 -- -- -- -- -- 95 % -- --   06/03/22 0328 99/69 97.5 °F (36.4 °C) Oral 86 18 -- -- --   06/03/22 0058 -- -- -- 87 -- -- -- --   06/02/22 2250 93/68 97.5 °F (36.4 °C) Oral 90 17 97 % -- --   06/02/22 1920 90/73 97.6 °F (36.4 °C) Oral 91 17 95 % -- --   06/02/22 1900 87/69 -- -- 91 17 -- -- --   06/02/22 1830 90/66 -- -- 90 24 97 % -- --   06/02/22 1815 94/68 -- -- 89 19 95 % -- --   06/02/22 1800 90/68 -- -- 90 15 97 % -- --   06/02/22 1745 85/64 -- -- 87 18 97 % -- --   06/02/22 1715 87/70 -- -- 91 19 97 % -- --   06/02/22 1700 85/66 -- -- 92 21 99 % -- --   06/02/22 1645 90/66 -- -- 92 19 97 % -- --   06/02/22 1630 90/67 -- -- 91 22 -- -- --   06/02/22 1615 85/64 -- -- 89 17 97 % -- --   06/02/22 1600 86/68 -- -- 92 22 98 % -- --   06/02/22 1551 (!) 83/55 97.8 °F (36.6 °C) -- 91 16 98 % -- --   06/02/22 1545 86/62 -- -- 87 -- 98 % -- --   06/02/22 1536 83/63 97.5 °F (36.4 °C) -- 86 16 100 % -- --   06/02/22 1415 94/62 -- -- 99 18 94 % -- --   06/02/22 1330 (!) 100/38 -- -- (!) 104 19 96 % -- --   06/02/22 1323 80/62 -- -- (!) 110 17 96 % -- --   06/02/22 1310 (!) 74/54 -- -- (!) 110 22 96 % -- --   06/02/22 1309 (!) 74/54 -- -- (!) 108 20 95 % 5' 9\" (1.753 m) 118 lb (53.5 kg)       Amount and/or Complexity of Data Reviewed and Analyzed:  I reviewed and analyzed all of the unique labs and radiologic studies that are shown below as well as any that are in the HPI, and any that are in the expanded problem list below  *Each unique test, order, or document contributes to the combination of 2 or combination of 3 in Category 1 below. For this visit I also reviewed old records and prior notes. Recent Labs     06/02/22  1312 06/02/22  1312 06/02/22 1953 06/03/22  0709   WBC 20.4*   < >  --  8.4   HGB 5.6*   < > 6.8* 7.8*      < >  --  287   *  --   --   --    K 4.6  --   --   --    CL 96*  --   --   --    CO2 25  --   --   --    BUN 23  --   --   --    INR  --   --  1.1  --    APTT  --   --  33.8  --    ALT 15  --   --   --     < > = values in this interval not displayed. Review of most recent CBC  Lab Results   Component Value Date    WBC 8.4 06/03/2022    HGB 7.8 (L) 06/03/2022    HCT 24.8 (L) 06/03/2022    MCV 81.0 06/03/2022     06/03/2022       Review of most recent BMP  Lab Results   Component Value Date     06/02/2022    K 4.6 06/02/2022    CL 96 06/02/2022    CO2 25 06/02/2022    BUN 23 06/02/2022    CREATININE 0.40 06/02/2022    GLUCOSE 195 06/02/2022    CALCIUM 7.9 06/02/2022        Review of most recent LFTs (and lipase if done)  Lab Results   Component Value Date    ALT 15 06/02/2022    AST 17 06/02/2022    ALKPHOS 181 (H) 06/02/2022    BILITOT 0.2 06/02/2022     No results found for: LIPASE    Lab Results   Component Value Date    INR 1.1 06/02/2022    APTT 33.8 06/02/2022    APTT 32.5 03/23/2022       Review of most recent HgbA1c  No results found for: LABA1C  No results found for: EAG    Nutritional assessment screen for wound healing issues:  Lab Results   Component Value Date    LABALBU 1.0 (L) 06/02/2022       @lastcovr@    Xray Result (most recent):  XR CHEST PORTABLE 06/02/2022    Narrative  Portable chest:    History: Sepsis, near syncope    Comparison: 03/25/2022    Findings: Portable AP views were obtained at 1316 at 1319 hours. A left  subclavian Mediport catheter is present. The cardiac silhouette is normal in size and configuration. The lungs and  pleural spaces are clear. The pulmonary vascularity is within normal limits. Impression  Unremarkable portable chest radiograph. 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 2.6 cm distal abdominal aortic aneurysm. Treated gastric cancer with moderate hiatal hernia. Preliminary report was provided by Dr. Mary Lynch, the on-call radiologist, at 21:42  hours    Final report to follow from IR. *END PRELIMINARY REPORT*                Title:  CTA  of the chest, abdomen and pelvis. Indication:  Sepsis. GI bleed. Syncope. Concern for pulmonary embolism. History  of gastric cancer. Technique: Axial images of the chest, abdomen and pelvis were obtained before  and after the administration of intravenous iodinated contrast media. Contrast  was used to best identify solid structures. Images also viewed on an  independent workstation including 3D rendering. All CT scans at this facility are performed using dose reduction/dose modulation  techniques, as appropriate the performed exam, including the following:  Automated Exposure Control; Adjustment of the mA and/or kV according to patient  size (this includes techniques or standardized protocols for targeted exams  where dose is matched to indication/reason for exam); and Use of Iterative  Reconstruction Technique. Comparison: February 25, 2022    Findings:    Neck base: Normal  Lungs: Emphysema. Primarily dependent opacities suggest atelectasis. Trace  effusions  Mediastinum: Normal.  Cardiac: Coronary calcifications. Pulmonary Arteries: Small filling defects of the right lower lobe are present,  compatible with pulmonary embolism. The clot burden is quite small. Esophagus:  Distention of the distal esophagus again noted. Previously described soft tissue  mass at the GE junction is grossly similar.   CHEST WALL: Normal  Liver:Normal  Biliary:Gallbladder is contracted  Pancreas:Normal  Spleen:Spleen remains mildly enlarged. Compared to the previous study, there are  some small peripheral areas of decreased enhancement which may represent  interval areas of infarct. The clinical significance is uncertain. Adrenals:Normal  Kidneys:Normal  Lymph nodes:No pathologically enlarged lymph nodes  Abdominal wall:Duyen  Bowel:Stomach remains distended. There is some thickening of the walls of the  proximal and mid stomach. This is a new finding compared to the previous study. However the previously described soft tissue mass of the posterior wall may be  slightly improved. No evidence of extravasation to suggest active GI bleed. This  is difficult to completely measure. Pelvic organs:Mild prostate enlargement  Bones:Mild degenerative changes in the spine    Aorta:  Mild atherosclerotic changes. No evidence of aneurysm or dissection. The  great vessels are patent. The is a mild stenosis in the proximal celiac artery. SMA and VAN are patent. Renal arteries are patent  Vascular/Other:  Filling defects are present in the left common femoral and deep  femoral veins, suggesting DVT. This could be confirmed with vascular ultrasound. Findings were discussed with patient's nurse on 6/20/2022 at 9:00 AM    Impression  1. No extravasation to suggest active GI bleed. 2. Small areas of pulmonary embolism are present in the right lower lobe. Clot  burden is quite small. 3. DVT is also likely present in the left femoral veins. 4. Previously described gastric mass again noted. It is difficult to measure to  evaluate for progression. US Result (most recent):  No results found for this or any previous visit from the past 3650 days.       Admission date (for inpatients): 6/2/2022   * No surgery found *  * No surgery found *        ASSESSMENT/PLAN:  [unfilled]  Principal Problem:    Septic shock ([facility] [address]  Active Problems:    Severe sepsis with lactic acidosis (HCC)    ABLA (acute blood loss anemia)    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HCC)    Former heavy tobacco smoker    Gastroesophageal reflux disease    GIB (gastrointestinal bleeding)    Hyponatremia    Hypoalbuminemia due to protein-calorie malnutrition (HCC)    Syncope due to orthostatic hypotension    Hypomagnesemia    Corticosteroid dependence ([address]    Hypoproteinemia (HCC)    Do not resuscitate status    Fatigue    History of gastric cancer    Encounter for palliative care    Debility    Weakness    ABILIO (iron deficiency anemia)    Malignant neoplasm of overlapping sites of stomach ([facility] [address]    Gastric adenocarcinoma ([facility] [address]  Resolved Problems:    * No resolved hospital problems.  *     Patient Active Problem List    Diagnosis Date Noted    Hypoproteinemia ([facility] [address] 06/03/2022     Priority: Medium    Do not resuscitate status 06/03/2022     Priority: Medium    Fatigue      Priority: Medium    History of gastric cancer      Priority: Medium    Encounter for palliative care      Priority: Medium    Debility      Priority: Medium    Weakness      Priority: Medium    Septic shock ([address] 06/02/2022     Priority: Medium    Severe sepsis with lactic acidosis ([facility] [address] 06/02/2022     Priority: Medium    ABLA (acute blood loss anemia) 06/02/2022     Priority: Medium    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[address] 06/02/2022     Priority: Medium    Former heavy tobacco smoker 06/02/2022     Priority: Medium    GIB (gastrointestinal bleeding) 06/02/2022     Priority: Medium    Hyponatremia 06/02/2022     Priority: Medium    Hypoalbuminemia due to protein-calorie malnutrition ([address] 06/02/2022     Priority: Medium    Syncope due to orthostatic hypotension 06/02/2022     Priority: Medium    Hypomagnesemia 06/02/2022     Priority: Medium    Corticosteroid dependence ([address] 06/02/2022     Priority: Medium    Gastroesophageal reflux disease 12/01/2021     Priority: Medium    Loss of weight 12/01/2021     Priority: Medium     Last Assessment & Plan:   Formatting of this note might be different from the original.  Patient has lost 12 pounds since visit on 12/1/2021. It is concerning although patient's BMI is normal I am concerned a gastric ulcer could be causing him to have pain postprandial.      Tobacco use disorder 12/01/2021     Priority: Medium     Last Assessment & Plan:   Formatting of this note might be different from the original.  Praised pt for cutting down on smoking. Unclear if weight loss could be something sinister like lung nodule. Pt chose to forego labs & imaging today till after he enrolled in additional insurance coverage.  Open fracture of proximal phalanx of left thumb 10/25/2017     Priority: Medium    Thumb amputation status, left 10/18/2017     Priority: Medium    Malignant neoplasm of body of stomach ([facility] [address] 04/18/2022    Gastric adenocarcinoma ([facility] [address] 04/18/2022    Malignant neoplasm of overlapping sites of stomach ([facility] [address] 03/23/2022    ABILIO (iron deficiency anemia) 01/25/2022          Number and Complexity of Problems addressed and   Risks of complications and/or morbidity of management    Care Management per Hospitalist  GI has signed off  Speech following for dysphagia evaluation  Palliative Care following for pain and symptom management  Oncology following  General Surgery will follow.  Further orders per Dr. Toma Mendez, NP

## 2022-06-03 NOTE — PROGRESS NOTES
[facility] Progress Note         Admit Date:  6/2/2022    Today's Date:  6/3/2022    CC:  GIB    Subjective:     Patient: patient without hematochezia, melena or hematemesis. Denies any BM since admission. Denies any N&V, GERD. Mild periumbilical abdominal pain intermittent. \"I feel great. 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 2.6 cm distal abdominal aortic aneurysm. Treated gastric cancer with moderate hiatal hernia.       Preliminary report was provided by Dr. Paul Montenegro, the on-call radiologist, at 21:42   hours       Final report to follow from IR.       End of preliminary report                               Title:  CTA  of the chest, abdomen and pelvis.       Indication:  Sepsis. GI bleed. Syncope. Concern for pulmonary embolism. History   of gastric cancer.       Technique: Axial images of the chest, abdomen and pelvis were obtained before   and after the administration of intravenous iodinated contrast media. Contrast   was used to best identify solid structures.  Images also viewed on an   independent workstation including 3D rendering.       All CT scans at this facility are performed using dose reduction/dose modulation   techniques, as appropriate the performed exam, including the following:    Automated Exposure Control; Adjustment of the mA and/or kV according to patient   size (this includes techniques or standardized protocols for targeted exams   where dose is matched to indication/reason for exam); and Use of Iterative   Reconstruction Technique.       Comparison: February 25, 2022       Findings:        Neck base: Normal    Lungs: Emphysema. Primarily dependent opacities suggest atelectasis. Trace   effusions   Mediastinum: Normal.   Cardiac: Coronary calcifications. Pulmonary Arteries: Small filling defects of the right lower lobe are present,   compatible with pulmonary embolism. The clot burden is quite small.  Esophagus: Distention of the distal esophagus again noted. Previously described soft tissue   mass at the GE junction is grossly similar. CHEST WALL: Normal   Liver:Normal   Biliary:Gallbladder is contracted   Pancreas:Normal   Spleen:Spleen remains mildly enlarged. Compared to the previous study, there are   some small peripheral areas of decreased enhancement which may represent   interval areas of infarct. The clinical significance is uncertain. Adrenals:Normal   Kidneys:Normal   Lymph nodes:No pathologically enlarged lymph nodes   Abdominal wall:Duyen   Bowel:Stomach remains distended. There is some thickening of the walls of the   proximal and mid stomach. This is a new finding compared to the previous study. However the previously described soft tissue mass of the posterior wall may be   slightly improved. No evidence of extravasation to suggest active GI bleed. This   is difficult to completely measure. Pelvic organs:Mild prostate enlargement   Bones:Mild degenerative changes in the spine       Aorta:  Mild atherosclerotic changes. No evidence of aneurysm or dissection. The   great vessels are patent. The is a mild stenosis in the proximal celiac artery. SMA and VAN are patent. Renal arteries are patent   Vascular/Other:  Filling defects are present in the left common femoral and deep   femoral veins, suggesting DVT. This could be confirmed with vascular ultrasound. Findings were discussed with patient's nurse on 6/20/2022 at 9:00 AM               Impression   1. No extravasation to suggest active GI bleed. 2. Small areas of pulmonary embolism are present in the right lower lobe. Clot   burden is quite small. 3. DVT is also likely present in the left femoral veins. 4. Previously described gastric mass again noted. It is difficult to measure to   evaluate for progression.          Medications:   Current Facility-Administered Medications   Medication Dose Route Frequency    0.9 % sodium chloride infusion   IntraVENous PRN    albumin human 25 % IV solution 25 g  25 g IntraVENous Q6H    pantoprazole (PROTONIX) 40 mg in sodium chloride (PF) 10 mL injection  40 mg IntraVENous Q12H    sodium chloride flush 0.9 % injection 5-40 mL  5-40 mL IntraVENous 2 times per day    sodium chloride flush 0.9 % injection 5-40 mL  5-40 mL IntraVENous PRN    acetaminophen (TYLENOL) tablet 650 mg  650 mg Oral Q6H PRN    Or    acetaminophen (TYLENOL) suppository 650 mg  650 mg Rectal Q6H PRN    dextrose 5 % and 0.9 % sodium chloride infusion   IntraVENous Continuous    cefepime (MAXIPIME) 2000 mg IVPB minibag in NS  2,000 mg IntraVENous Q8H    dexamethasone (DECADRON) tablet 3 mg  3 mg Oral 2 times per day    Followed by   Kaylan Ferrer ON 6/5/2022] dexamethasone (DECADRON) tablet 1 mg  1 mg Oral 2 times per day    sucralfate (CARAFATE) tablet 1 g  1 g Oral 4 times per day    sodium chloride flush 0.9 % injection 10 mL  10 mL IntraVENous ONCE PRN    folic acid (FOLVITE) tablet 1 mg  1 mg Oral Daily    mirtazapine (REMERON) tablet 7.5 mg  7.5 mg Oral Nightly    ondansetron (ZOFRAN) injection 4 mg  4 mg IntraVENous Q6H PRN    saliva substitute (BIOTENE/MOUTH KOTE) liquid   Oral PRN    Medela Tender Care Lanolin cream   Topical PRN    tuberculin injection 5 Units  5 Units IntraDERmal Once    vancomycin (VANCOCIN) 750 mg in sodium chloride 0.9 % 250 mL IVPB ([id number])  750 mg IntraVENous Q8H    midodrine (PROAMATINE) tablet 10 mg  10 mg Oral TID PRN       Review of Systems:  ROS was obtained, with pertinent positives as listed above. No chest pain or SOB.     Diet:  Clear liquid nothing red    Objective:   Vitals:  /80   Pulse 82   Temp 98.3 °F (36.8 °C) (Oral)   Resp 17   Ht 5' 9\" (1.753 m)   Wt 118 lb (53.5 kg)   SpO2 96%   BMI 17.43 kg/m²   Intake/Output:  06/03 0701 - 06/03 1900  In: 2843 [I.V.:1278]  Out: -   06/01 1901 - 06/03 0700  In: 786.1   Out: 700 [Urine:700]  Exam:  General appearance: alert, cooperative, no distress THIN; HARD OF HEARING  Lungs: clear to auscultation bilaterally anteriorly  Heart: regular rate and rhythm  Abdomen: soft, non-tender. Bowel sounds normal. No masses, no organomegaly  Extremities: extremities normal, atraumatic, no cyanosis or edema  Neuro:  alert and oriented    Data Review (Labs):    Recent Labs     06/02/22  1312 06/02/22  1953 06/03/22  0709   WBC 20.4*  --  8.4   HGB 5.6* 6.8* 7.8*   HCT 19.5* 22.2* 24.8*     --  287   MCV 83.7  --  81.0   *  --   --    K 4.6  --   --    CL 96*  --   --    CO2 25  --   --    BUN 23  --   --    CREATININE 0.40*  --   --    CALCIUM 7.9*  --   --    MG 1.8  --   --    GLUCOSE 195*  --   --    ALKPHOS 181*  --   --    AST 17  --   --    ALT 15  --   --    BILITOT 0.2  --   --    ALBUMIN 0.3*  --   --    PROT 4.1*  --   --    INR  --  1.1  --    APTT  --  33.8  --        Assessment:     Principal Problem:    Shock ([address]  Active Problems:    Severe sepsis with lactic acidosis (HCC)    ABLA (acute blood loss anemia)    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HCC)    Former heavy tobacco smoker    Gastroesophageal reflux disease    GIB (gastrointestinal bleeding)    Hyponatremia    Hypoalbuminemia due to protein-calorie malnutrition (HCC)    Syncope due to orthostatic hypotension    Hypomagnesemia    Corticosteroid dependence (HCC)    Hypoproteinemia (HCC)    Do not resuscitate status    ABILIO (iron deficiency anemia)    Malignant neoplasm of overlapping sites of stomach (HCC)    Gastric adenocarcinoma (HCC)  Resolved Problems:    * No resolved hospital problems. *  78 y.o. male with PMH including but not limited to, HTN, ABILIO, gastric cancer on current chemotherapy with plans for surgery later this month who we are following for possible GIB. Patient presented to the ED after a syncopal episode at home. On evaluation in the ED, he was found to have a Hgb of 5.6 (down from 8.3 on 5/17),BUN is normal at 23 with creat 0.40.   He has no overt bleeding, but heme positive stool. Mr. Melanie Mariscal has a known gastric adenocarcinoma involving almost his entire stomach on active neoadjuvant chemotherapy with FLOT therapy last treated 5/25. He is being followed by Dr. Kelly Son with plans for open total gastrectomy with J tube placement    6/3/2022 Hgb  7.8 s/p transfusion. No overt bleeding. Leukocytosis resolved. CTA of the chest/abdomen/pelvis without contrast did not reveal findings to suggest GIB. Anemia likely mutlifactorial with known history of chronic anemia, current gastric adenocarcinoma and active neoadjuvant chemotherapy. Plan:     -Agree with serial H&H and transfuse as needed  -No plans for repeat endoscopy at this time  -Will defer findings of Pulmonary Embolism and left femoral DVT to primary team  -Gastric adenocarcinoma plan of care per oncology, Dr. Jordan Emmanuel and Surgery, Dr. Therese Silva with supportive care  -Continue bid PPI  Tonya Osborn in collaboration with Dr. Bharath Mcmahan  [facility] [address] [place] [address]

## 2022-06-03 NOTE — PROGRESS NOTES
Hospitalist Rapid Response Note   Admit Date:  2022  1:05 PM   Name:  Burnie Lefort   Age:  78 y.o. Sex:  male  :  1955   MRN:  [id number]   Room:  [facility]/    Presenting Complaint: Loss of Consciousness    Reason(s) for Admission: Syncope and collapse [R55]  Hemorrhagic shock ([address] [R57.8]  Shock ([address] [R57.9]  History of gastric cancer [Z85.028]  Septic shock ([address] [A41.9, R65.21]  Gastrointestinal hemorrhage, unspecified gastrointestinal hemorrhage type [K92.2]     Rapid Response or Code Event:     79 y/o male with gastric cancer admitted yesterday with syncopal episode associated with sepsis, hypovolemia and acute blood loss anemia requiring blood transfusion. Rapid response was called due to severe hypotension with large melancotic BM, 2nd blow out today. Was having presyncopal episodes with PT/OT today per staff. HR was significantly elevated with hypotension but improved with IVF bolus. Plan:   Stat transfuse 2 UpRBC. Restart albumin. Hold any AC for newly detected PE and femoral dvt. Consult IR for evaluation for IVC Filter   Change IVF to  cc/hr. Midodrine 10 mg TID prn for vasopressor sparing   Stat labs CBC, CMP, PT/PTT/INR, fibrinogen, LA   BC positive in anorebic bottle GPR. Add anorebic coverage to cefepime/vanc w/  IV flagyl pending speciation    GI signed off today due to no evidence of GIB at that time, will reconsult given change in clinical status and medical management may still be recommended given co-morbid state. There is a high probability of acute organ impairment or life-threatening deterioration in the patient's condition from:   Hemorrhagic shock, septic shock, SVT    Total critical care time spent: 42 minutes. Time is indicative of direct patient attendance at bedside and on the patient's floor nearby.   Includes time spent at bedside performing history and exam, performing chart review, discussing findings and treatment plan with patient and/or family, discussing patient with nursing staff, consultants and colleagues, and ordering/reviewing pertinent laboratory and radiographic evaluations. Time excludes procedures. CPT:  01332: First 30-74 minutes  48192: Each block of 30 min. beyond 76    [address] done: no  (If yes, please chart separate ACP note).     Current Problem List:     Patient Active Problem List   Diagnosis    ABILIO (iron deficiency anemia)    Malignant neoplasm of overlapping sites of stomach [facility]    Malignant neoplasm of body of stomach ([facility]    Gastric adenocarcinoma ([facility]    Septic shock ([facility]    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[facility]    Former heavy tobacco smoker    Gastroesophageal reflux disease    Loss of weight    Open fracture of proximal phalanx of left thumb    Thumb amputation status, left    Tobacco use disorder    Hyponatremia    Hypoalbuminemia due to protein-calorie malnutrition (HCC)    Syncope due to orthostatic hypotension    Hypomagnesemia    Corticosteroid dependence (HCC)    Hypoproteinemia (HCC)    Do not resuscitate status    Fatigue    History of gastric cancer    Encounter for palliative care    Debility    Weakness    Hypercoagulable state, secondary ([facility]    Adult body mass index less than 19    Acute pulmonary embolism without acute cor pulmonale (HCC)       Objective:   Patient Vitals for the past 24 hrs:   Temp Pulse Resp BP SpO2   06/03/22 1730 97.3 °F (36.3 °C) (!) 110 -- 88/62 --   06/03/22 1606 97.6 °F (36.4 °C) 85 17 129/84 --   06/03/22 1334 -- -- -- 80/70 --   06/03/22 1159 97.7 °F (36.5 °C) 89 17 114/86 95 %   06/03/22 1010 -- 99 -- 110/72 --   06/03/22 1000 -- (!) 188 -- 87/70 --   06/03/22 0758 98.3 °F (36.8 °C) 82 17 121/80 96 %   06/03/22 0548 97.4 °F (36.3 °C) 86 18 105/76 95 %   06/03/22 0453 97.3 °F (36.3 °C) 83 18 101/77 95 %   06/03/22 0404 97.3 °F (36.3 °C) 84 18 113/73 95 %   06/03/22 0403 -- 85 -- -- --   06/03/22 0356 97.3 °F (36.3 °C) 84 18 99/74 94 %   06/03/22 0341 -- -- -- -- 95 %   06/03/22 0328 97.5 °F (36.4 °C) 86 18 99/69 --   06/03/22 0058 -- 87 -- -- --   06/02/22 2250 97.5 °F (36.4 °C) 90 17 93/68 97 %   06/02/22 1920 97.6 °F (36.4 °C) 91 17 90/73 95 %   06/02/22 1900 -- 91 17 87/69 --   06/02/22 1830 -- 90 24 90/66 97 %       Oxygen Therapy  SpO2: 95 %  O2 Device: None (Room air)    Estimated body mass index is 17.43 kg/m² as calculated from the following:    Height as of this encounter: 5' 9\" (1.753 m). Weight as of this encounter: 118 lb (53.5 kg).     Intake/Output Summary (Last 24 hours) at 6/3/2022 1824  Last data filed at 6/3/2022 0945  Gross per 24 hour   Intake 2304.08 ml   Output 1300 ml   Net 1004.08 ml         Recent Labs:  Recent Results (from the past 48 hour(s))   EKG 12 Lead    Collection Time: 06/02/22  1:10 PM   Result Value Ref Range    Ventricular Rate 108 BPM    Atrial Rate 109 BPM    P-R Interval 167 ms    QRS Duration 73 ms    Q-T Interval 326 ms    QTc Calculation (Bazett) 437 ms    P Axis 51 degrees    R Axis 41 degrees    T Axis 66 degrees    Diagnosis Sinus tachycardia    Culture, Blood 1    Collection Time: 06/02/22  1:12 PM    Specimen: Blood   Result Value Ref Range    Special Requests PORT      Gram stain GRAM POSITIVE RODS      Gram stain ANAEROBIC BOTTLE POSITIVE      Gram stain        RESULTS VERIFIED, PHONED TO AND READ BACK BY DR GELLER AT 0739 ON 6.3.22, ROLF    Culture CULTURE IN [facility] UPDATES TO FOLLOW     Lactic Acid    Collection Time: 06/02/22  1:12 PM   Result Value Ref Range    Lactic Acid, Plasma 5.1 (HH) 0.4 - 2.0 MMOL/L   CBC with Auto Differential    Collection Time: 06/02/22  1:12 PM   Result Value Ref Range    WBC 20.4 (H) 4.3 - 11.1 K/uL    RBC 2.33 (L) 4.23 - 5.6 M/uL    Hemoglobin 5.6 (LL) 13.6 - 17.2 g/dL    Hematocrit 19.5 (L) 41.1 - 50.3 %    MCV 83.7 79.6 - 97.8 FL    MCH 24.0 (L) 26.1 - 32.9 PG    MCHC 28.7 (L) 31.4 - 35.0 g/dL    RDW 19.0 (H) 11.9 - 14.6 %    Platelets 256 411 - 808 K/uL    MPV 8.8 (L) 9.4 - 12.3 FL    nRBC 0.00 0.0 - 0.2 K/uL    Differential Type AUTOMATED      Seg Neutrophils 79 (H) 43 - 78 %    Lymphocytes 5 (L) 13 - 44 %    Monocytes 10 4.0 - 12.0 %    Eosinophils % 0 (L) 0.5 - 7.8 %    Basophils 0 0.0 - 2.0 %    Immature Granulocytes 5 0.0 - 5.0 %    Segs Absolute 16.0 (H) 1.7 - 8.2 K/UL    Absolute Lymph # 1.1 0.5 - 4.6 K/UL    Absolute Mono # 2.1 (H) 0.1 - 1.3 K/UL    Absolute Eos # 0.0 0.0 - 0.8 K/UL    Basophils Absolute 0.0 0.0 - 0.2 K/UL    Absolute Immature Granulocyte 1.1 (H) 0.0 - 0.5 K/UL   CMP    Collection Time: 06/02/22  1:12 PM   Result Value Ref Range    Sodium 131 (L) 136 - 145 mmol/L    Potassium 4.6 3.5 - 5.1 mmol/L    Chloride 96 (L) 98 - 107 mmol/L    CO2 25 21 - 32 mmol/L    Anion Gap 10 7 - 16 mmol/L    Glucose 195 (H) 65 - 100 mg/dL    BUN 23 8 - 23 MG/DL    CREATININE 0.40 (L) 0.8 - 1.5 MG/DL    GFR African American >60 >60 ml/min/1.73m2    GFR Non- >60 >60 ml/min/1.73m2    Calcium 7.9 (L) 8.3 - 10.4 MG/DL    Total Bilirubin 0.2 0.2 - 1.1 MG/DL    ALT 15 12 - 65 U/L    AST 17 15 - 37 U/L    Alk Phosphatase 181 (H) 50 - 136 U/L    Total Protein 4.1 (L) 6.3 - 8.2 g/dL    Albumin 1.0 (L) 3.2 - 4.6 g/dL    Globulin 3.1 2.3 - 3.5 g/dL    Albumin/Globulin Ratio 0.3 (L) 1.2 - 3.5     Procalcitonin    Collection Time: 06/02/22  1:12 PM   Result Value Ref Range    Procalcitonin 1.02 (H) 0.00 - 0.49 ng/mL   Troponin    Collection Time: 06/02/22  1:12 PM   Result Value Ref Range    Troponin, High Sensitivity 8.8 0 - 14 pg/mL   Magnesium    Collection Time: 06/02/22  1:12 PM   Result Value Ref Range    Magnesium 1.8 1.8 - 2.4 mg/dL   TYPE AND SCREEN    Collection Time: 06/02/22  1:36 PM   Result Value Ref Range    Crossmatch expiration date 06/05/2022,2359     ABO/Rh O POSITIVE     Antibody Screen NEG     Unit Number [id number]     Product Code Blood Bank RC LR     Unit Divison 00     Dispense Status Blood Bank ISSUED     Crossmatch Result Compatible     Unit Number [id number]     Product Code Blood Bank RC LR     Unit Divison 00     Dispense Status Blood Bank TRANSFUSED     Crossmatch Result Compatible     Unit Number [id number]     Product Code Blood Bank RC LR     Unit Divison 00     Dispense Status Blood Bank ALLOCATED     Crossmatch Result Compatible     Unit Number [id number]     Product Code Blood Bank RC LR     Unit Divison 00     Dispense Status Blood Bank ALLOCATED     Crossmatch Result Compatible    PREPARE RBC (CROSSMATCH), 1 Units    Collection Time: 06/02/22  1:45 PM   Result Value Ref Range    History Check Historical check performed    PREPARE RBC (CROSSMATCH), 1 Units    Collection Time: 06/02/22  3:15 PM   Result Value Ref Range    History Check Historical check performed    Culture, Blood 1    Collection Time: 06/02/22  7:53 PM    Specimen: Blood   Result Value Ref Range    Special Requests LEFT ANTECUBITAL      Culture NO GROWTH AFTER 10 HOURS     Lactate, Sepsis    Collection Time: 06/02/22  7:53 PM   Result Value Ref Range    Lactic Acid, Sepsis 1.6 0.4 - 2.0 MMOL/L   Hemoglobin and Hematocrit    Collection Time: 06/02/22  7:53 PM   Result Value Ref Range    Hemoglobin 6.8 (LL) 13.6 - 17.2 g/dL    Hematocrit 22.2 (L) 41.1 - 50.3 %   Lactic Acid    Collection Time: 06/02/22  7:53 PM   Result Value Ref Range    Lactic Acid, Plasma 1.6 0.4 - 2.0 MMOL/L   Protime-INR    Collection Time: 06/02/22  7:53 PM   Result Value Ref Range    Protime 14.6 (H) 12.6 - 14.5 sec    INR 1.1     APTT    Collection Time: 06/02/22  7:53 PM   Result Value Ref Range    PTT 33.8 24.1 - 35.1 SEC   TSH with Reflex    Collection Time: 06/02/22  7:53 PM   Result Value Ref Range    TSH w Free Thyroid if Abnormal 1.41 0.358 - 3.740 UIU/ML   Osmolality    Collection Time: 06/02/22  7:53 PM   Result Value Ref Range    Serum Osmolality 273 (L) 280 - 301 MOSM/kg H2O   Cortisol, Baseline    Collection Time: 06/02/22  7:53 PM   Result Value Ref Range    Cortisol 21.8 ug/dL Urinalysis with Reflex to Culture    Collection Time: 06/02/22  9:30 PM    Specimen: Urine   Result Value Ref Range    Color, UA YELLOW      Appearance CLEAR      Specific Gravity, UA 1.015 1.001 - 1.023      pH, Urine 7.0 5.0 - 9.0      Protein, UA Negative NEG mg/dL    Glucose, UA Negative mg/dL    Ketones, Urine Negative NEG mg/dL    Bilirubin Urine Negative NEG      Blood, Urine Negative NEG      Urobilinogen, Urine 0.2 0.2 - 1.0 EU/dL    Nitrite, Urine Negative NEG      Leukocyte Esterase, Urine Negative NEG      WBC, UA 0 0 /hpf    RBC, UA 0 0 /hpf    BACTERIA, URINE 0 0 /hpf    Urine Culture if Indicated CULTURE NOT INDICATED BY UA RESULT      Epithelial Cells UA 0 0 /hpf    Casts 0 0 /lpf    Crystals 0 0 /LPF    Mucus, UA 0 0 /lpf   Sodium, urine, random    Collection Time: 06/02/22  9:30 PM   Result Value Ref Range    SODIUM, RANDOM URINE 16 MMOL/L   Osmolality, Urine    Collection Time: 06/02/22  9:30 PM   Result Value Ref Range    Osmolality, Ur 536 50 - 1400 MOSM/kg H2O   Lactic Acid    Collection Time: 06/02/22 10:35 PM   Result Value Ref Range    Lactic Acid, Plasma 0.9 0.4 - 2.0 MMOL/L   MSSA/MRSA Screen BY PCR    Collection Time: 06/02/22 10:48 PM    Specimen: Nasal; Blood   Result Value Ref Range    Special Requests NO SPECIAL REQUESTS      Culture (A)       MRSA target DNA not detected, SA target DNA detected. A MRSA negative, SA positive test result does not preclude MRSA nasal colonization.    Vancomycin Level, Random    Collection Time: 06/03/22  7:09 AM   Result Value Ref Range    Vancomycin Rm 26.1 UG/ML   CBC with Auto Differential    Collection Time: 06/03/22  7:09 AM   Result Value Ref Range    WBC 8.4 4.3 - 11.1 K/uL    RBC 3.06 (L) 4.23 - 5.6 M/uL    Hemoglobin 7.8 (L) 13.6 - 17.2 g/dL    Hematocrit 24.8 (L) 41.1 - 50.3 %    MCV 81.0 79.6 - 97.8 FL    MCH 25.5 (L) 26.1 - 32.9 PG    MCHC 31.5 31.4 - 35.0 g/dL    RDW 17.4 (H) 11.9 - 14.6 %    Platelets 622 355 - 900 K/uL    MPV 8.8 (L) 9.4 - 12.3 FL    nRBC 0.00 0.0 - 0.2 K/uL    Differential Type AUTOMATED      Seg Neutrophils 86 (H) 43 - 78 %    Lymphocytes 7 (L) 13 - 44 %    Monocytes 2 (L) 4.0 - 12.0 %    Eosinophils % 0 (L) 0.5 - 7.8 %    Basophils 0 0.0 - 2.0 %    Immature Granulocytes 5 0.0 - 5.0 %    Segs Absolute 7.2 1.7 - 8.2 K/UL    Absolute Lymph # 0.6 0.5 - 4.6 K/UL    Absolute Mono # 0.2 0.1 - 1.3 K/UL    Absolute Eos # 0.0 0.0 - 0.8 K/UL    Basophils Absolute 0.0 0.0 - 0.2 K/UL    Absolute Immature Granulocyte 0.4 0.0 - 0.5 K/UL   Hemoglobin and Hematocrit    Collection Time: 06/03/22  2:10 PM   Result Value Ref Range    Hemoglobin 7.8 (L) 13.6 - 17.2 g/dL    Hematocrit 25.4 (L) 41.1 - 50.3 %   Comprehensive Metabolic Panel w/ Reflex to MG    Collection Time: 06/03/22  2:10 PM   Result Value Ref Range    Sodium 130 (L) 136 - 145 mmol/L    Potassium 4.3 3.5 - 5.1 mmol/L    Chloride 101 98 - 107 mmol/L    CO2 24 21 - 32 mmol/L    Anion Gap 5 (L) 7 - 16 mmol/L    Glucose 161 (H) 65 - 100 mg/dL    BUN 27 (H) 8 - 23 MG/DL    CREATININE 0.20 (L) 0.8 - 1.5 MG/DL    GFR African American >60 >60 ml/min/1.73m2    GFR Non- >60 >60 ml/min/1.73m2    Calcium 8.4 8.3 - 10.4 MG/DL    Total Bilirubin 0.4 0.2 - 1.1 MG/DL    ALT 11 (L) 12 - 65 U/L    AST 10 (L) 15 - 37 U/L    Alk Phosphatase 115 50 - 136 U/L    Total Protein 5.1 (L) 6.3 - 8.2 g/dL    Albumin 2.1 (L) 3.2 - 4.6 g/dL    Globulin 3.0 2.3 - 3.5 g/dL    Albumin/Globulin Ratio 0.7 (L) 1.2 - 3.5     POCT Glucose    Collection Time: 06/03/22  6:02 PM   Result Value Ref Range    POC Glucose 337 (H) 65 - 100 mg/dL    Performed by: Tiffanie    PREPARE RBC (CROSSMATCH), 2 Units    Collection Time: 06/03/22  6:15 PM   Result Value Ref Range    History Check Historical check performed        Other Studies:  XR CHEST PORTABLE    Result Date: 6/2/2022  Portable chest: History: Sepsis, near syncope Comparison: 03/25/2022 Findings: Portable AP views were obtained at 1316 at 1319 hours. A left subclavian Mediport catheter is present. The cardiac silhouette is normal in size and configuration. The lungs and pleural spaces are clear. The pulmonary vascularity is within normal limits. Unremarkable portable chest radiograph. 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 2.6 cm distal abdominal aortic aneurysm. Treated gastric cancer with moderate hiatal hernia. Preliminary report was provided by Dr. Racquel Hong, the on-call radiologist, at 21:42 hours Final report to follow from IR. *END PRELIMINARY REPORTTitle:  CTA  of the chest, abdomen and pelvis. Indication:  Sepsis. GI bleed. Syncope. Concern for pulmonary embolism. History of gastric cancer. Technique: Axial images of the chest, abdomen and pelvis were obtained before and after the administration of intravenous iodinated contrast media. Contrast was used to best identify solid structures. Images also viewed on an independent workstation including 3D rendering. All CT scans at this facility are performed using dose reduction/dose modulation techniques, as appropriate the performed exam, including the following: Automated Exposure Control; Adjustment of the mA and/or kV according to patient size (this includes techniques or standardized protocols for targeted exams where dose is matched to indication/reason for exam); and Use of Iterative Reconstruction Technique. Comparison: February 25, 2022 Findings: Neck base: Normal Lungs: Emphysema. Primarily dependent opacities suggest atelectasis. Trace effusions Mediastinum: Normal. Cardiac: Coronary calcifications. Pulmonary Arteries: Small filling defects of the right lower lobe are present, compatible with pulmonary embolism. The clot burden is quite small. Esophagus: Distention of the distal esophagus again noted. Previously described soft tissue mass at the GE junction is grossly similar.  CHEST WALL: Normal Liver:Normal Biliary:Gallbladder is contracted Pancreas:Normal Spleen:Spleen remains mildly enlarged. Compared to the previous study, there are some small peripheral areas of decreased enhancement which may represent interval areas of infarct. The clinical significance is uncertain. Adrenals:Normal Kidneys:Normal Lymph nodes:No pathologically enlarged lymph nodes Abdominal wall:Duyen Bowel:Stomach remains distended. There is some thickening of the walls of the proximal and mid stomach. This is a new finding compared to the previous study. However the previously described soft tissue mass of the posterior wall may be slightly improved. No evidence of extravasation to suggest active GI bleed. This is difficult to completely measure. Pelvic organs:Mild prostate enlargement Bones:Mild degenerative changes in the spine Aorta:  Mild atherosclerotic changes. No evidence of aneurysm or dissection. The great vessels are patent. The is a mild stenosis in the proximal celiac artery. SMA and VAN are patent. Renal arteries are patent Vascular/Other:  Filling defects are present in the left common femoral and deep femoral veins, suggesting DVT. This could be confirmed with vascular ultrasound. Findings were discussed with patient's nurse on 6/20/2022 at 9:00 AM     1. No extravasation to suggest active GI bleed. 2. Small areas of pulmonary embolism are present in the right lower lobe. Clot burden is quite small. 3. DVT is also likely present in the left femoral veins. 4. Previously described gastric mass again noted. It is difficult to measure to evaluate for progression. Vascular duplex lower extremity venous bilateral    Result Date: 6/3/2022  RIGHT LOWER EXTREMITY VENOUS DUPLEX ULTRASOUND. INDICATION: Right lower extremity pain. TECHNIQUE: Direct skin-contact high resolution grayscale images, color-flow Doppler and duplex waveform analysis.  FINDINGS: There is compressibility, color-flow assignment and augmentation of the venous waveform with calf compression in the common femoral, superficial femoral and popliteal veins. Upper calf veins are unremarkable. Negative for sonographic evidence of deep venous thrombosis right lower extremity. LEFT LOWER EXTREMITY VENOUS DUPLEX ULTRASOUND. INDICATION: Left lower extremity pain. TECHNIQUE: Direct skin-contact high resolution grayscale images, color-flow Doppler and duplex waveform analysis. FINDINGS: Abnormal intraluminal echoes within the common femoral and profunda femoral veins. There is some flow. The superficial superficial femoral and popliteal veins and upper calf veins are unremarkable. IMPRESSION: Positive for deep venous thrombosis left common femoral profunda femoral veins, nonocclusive.       Current Meds:  Current Facility-Administered Medications   Medication Dose Route Frequency    acetaminophen (TYLENOL) tablet 650 mg  650 mg Oral Q6H PRN    Or    acetaminophen (TYLENOL) suppository 650 mg  650 mg Rectal Q6H PRN    vancomycin (VANCOCIN) 1,000 mg in sodium chloride 0.9 % 250 mL IVPB ([id number])  1,000 mg IntraVENous Q12H    albumin human 25 % IV solution 25 g  25 g IntraVENous Q6H    0.9 % sodium chloride infusion   IntraVENous PRN    midodrine (PROAMATINE) tablet 10 mg  10 mg Oral TID PRN    0.9 % sodium chloride bolus  1,000 mL IntraVENous Once    0.9 % sodium chloride infusion   IntraVENous PRN    pantoprazole (PROTONIX) 40 mg in sodium chloride (PF) 10 mL injection  40 mg IntraVENous Q12H    sodium chloride flush 0.9 % injection 5-40 mL  5-40 mL IntraVENous 2 times per day    sodium chloride flush 0.9 % injection 5-40 mL  5-40 mL IntraVENous PRN    dextrose 5 % and 0.9 % sodium chloride infusion   IntraVENous Continuous    cefepime (MAXIPIME) 2000 mg IVPB minibag in NS  2,000 mg IntraVENous Q8H    dexamethasone (DECADRON) tablet 3 mg  3 mg Oral 2 times per day    Followed by   Isaias Salazar ON 6/5/2022] dexamethasone (DECADRON) tablet 1 mg  1 mg Oral 2 times

## 2022-06-03 NOTE — CONSULTS
Palliative Care    Patient: Ankit Rodriguez MRN: [id number]  SSN: [id number]    YOB: 1955  Age: 78 y.o. Sex: male       Date of Request: 6/2/2022  Date of Consult:  6/3/2022  Reason for Consult:  pain and symptom management  Requesting Physician: Dr Rachel Noriega     Assessment/Plan:     Principal Diagnosis:     Fatigue, Lethargy  R53.83    Additional Diagnoses:   · Anorexia  R63.0  · Debility, Unspecified  R53.81  · Frailty  R54  · Counseling, Encounter for Medical Advice  Z71.9  · Encounter for Palliative Care  Z51.5    Palliative Performance Scale (PPS):       Medical Decision Making:   Reviewed and summarized notes from admission to present   Discussed case with appropriate providers  Reviewed laboratory and x-ray data from admission to present     Pt resting in bed, no distress noted. No visitors at bedside. Introduced role of PC and reviewed events of hospitalization. Pt reports he had a brief syncopal episode this morning while brushing is teeth at the sink with PT. Per PT, he never fully lost consciousness. Right after, pt had a large bloody BM. Pt currently states he feels great, and denies any symptoms other that fatigue. General surgery consult is pending. Per notes, pt is to have a total gastrectomy after neoadjuvant FLOT. Pt denies questions or needs at this time. We will follow loosely. Will discuss findings with members of the interdisciplinary team.      Thank you for this referral.          .    Subjective:     History obtained from:  Patient, Care Provider and Chart    Chief Complaint: Nausea/vomiting, gastric cancer   History of Present Illness:  Mr Michele Garduno is a 77 yo male with PMH of recently diagnosed gastric cancer s/p 4 cycles of FLOT, HTN, and tobacco abuse, who presented to the ED from home on 6/2/2022 with c/o syncopal event 1 hour earlier, and near syncopal events for several days prior. He c/o inability to tolerate solid foods, and has had significant weight loss.   Pt denied fevers, chills, n/v/d, melena. Work up revealed hypotension, tachycardia, elevated LA, elevated WBC count, and Hgb of 5.6. Pt was admitted for further management. GI was consulted for hemoccult stools, however, has deferred management to surgery. Advance Directive: No       Code Status:  DNR            Health Care Power of : No - Patient does not have a [address]. Past Medical History:   Diagnosis Date    HTN (hypertension)       Past Surgical History:   Procedure Laterality Date    COLONOSCOPY N/A 3/9/2022    COLONOSCOPY/ 22 performed by [facility]ful [facility]iva [facility] at [facility] ENDOSCOPY    KNEE ARTHROSCOPY      OTHER SURGICAL HISTORY      none     Family History   Problem Relation Age of Onset    Other Other         non-contributory      Social History     Tobacco Use    Smoking status: Former Smoker    Smokeless tobacco: Former User   Substance Use Topics    Alcohol use: Not Currently     Prior to Admission medications    Medication Sig Start Date End Date Taking? Authorizing Provider   dexamethasone (DECADRON) 1 MG tablet Take 1 mg by mouth 2 times a day 4/18/22   Ar Automatic Reconciliation   dexamethasone (DECADRON) 4 MG tablet Take 2 tabs twice daily the day before chemo and the day after chemo. 3/30/22   Ar Automatic Reconciliation   lidocaine-prilocaine (EMLA) 2.5-2.5 % cream Apply topically as needed 3/30/22   Ar Automatic Reconciliation   ondansetron (ZOFRAN) 8 MG tablet Take 8 mg by mouth every 8 hours as needed for Nausea 3/30/22   Ar Automatic Reconciliation   prochlorperazine (COMPAZINE) 10 MG tablet Take 5 mg by mouth every 6 hours as needed (nausea) 3/30/22   Ar Automatic Reconciliation       Allergies   Allergen Reactions    Iron     Tetanus Antitoxin         Review of Systems:  A comprehensive review of systems was negative except for:   Constitutional: Positive for fatigue.      Objective:     Visit Vitals  /80   Pulse 82   Temp 98.3 °F (36.8 °C) (Oral)   Resp 17   Ht 5' 9\" (1.753 m)   Wt 118 lb (53.5 kg)   SpO2 96%   BMI 17.43 kg/m²        Physical Exam:    General:  Cooperative. No acute distress. Eyes:  Conjunctivae/corneas clear    Nose: Nares normal. Septum midline.    Neck: Supple, symmetrical, trachea midline   Lungs:   Clear to auscultation bilaterally, unlabored   Heart:  Regular rate and rhythm   Abdomen:   Soft, non-tender, non-distended   Extremities: Normal, atraumatic, no cyanosis or edema   Skin: Skin color-pale, texture, turgor normal.    Neurologic: Nonfocal   Psych: Alert and oriented      Assessment:     [unfilled]    Signed By: Aroldo Rodriguez, WALLY - CNP     Janey 3, 2022

## 2022-06-03 NOTE — PROGRESS NOTES
When this RN came into pt room , pt complains having some nausea. Black loose stool noticed in the brief. Check BP 65/47,  O2 sat 91%, rapid response called. Blood collected, I unit blood ordered,  1 L NS bolus received. bp 109/76 hr 110 O2 sat 99%   Pt is resting in the bed with daughther at bedside. Shift report given to ongoing nurse at bedside.

## 2022-06-03 NOTE — PROGRESS NOTES
Rapid Response Nurse Note    Responded to rapid response due to hypotension (SBP 70s). Patient with recent bloody bowel movement. Dr. Kina Casarez at bedside. Patient given fluid bolus with good BP response. Lab work sent and PRBCs ordered. Will follow up per outreach protocol.

## 2022-06-03 NOTE — MANAGEMENT PLAN
[facility] Hematology & Oncology        Inpatient Hematology / Oncology Plan of Care    Reason for Consult:  Syncope and collapse [R55]  Hemorrhagic shock ([address] [R57.8]  Shock ([address] [R57.9]  History of gastric cancer [Z85.028]  Septic shock ([address] [A41.9, R65.21]  Gastrointestinal hemorrhage, unspecified gastrointestinal hemorrhage type [K92.2]  Referring Physician:  Melisa Dorman MD    History of Present Illness:  Mr. Nick Gutierrez is a 78 y.o. male admitted on 6/2/2022. The primary encounter diagnosis was Septic shock ([address] Diagnoses of Hemorrhagic shock ([address] Gastrointestinal hemorrhage, unspecified gastrointestinal hemorrhage type, Syncope and collapse, and History of gastric cancer were also pertinent to this visit. Lissett Carrington His PMH includes 50 pk yr smoking hx and HTN,  He is a patient of Dr. Marcell Mills with Stage IIB gastric adenocarcinoma s/p neoadjuvant C4 FLOT from 5/18 - 5/19. Plans for resection with open total gastrectomy in 2-3 weeks. He presented to ED with c/o syncopal episode with 2 day history of near syncope. He reports standing up and feeling weak and tired and then he passed out. He endorses constipation with abdominal pain the day prior to admission, but abd pain has now resolved. Wife reports pt with shallow breathing and chest discomfort at home. He was noted to be tachycardic with HR up to 170s, BP 74/54. EKG with sinus tach. PCT 1.02, LA 5.1. CXR neg. 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 On admission, WBC 20k, Hgb 5.6. GI was consulted, no plans for endoscopy at this time. Surgery consult pending. UA neg. BCx 1/2 +GPR, I/S pending. On Cef/Vanc. We were consulted for recommendations for our patient.     Allergies   Allergen Reactions    Iron     Tetanus Antitoxin      Past Medical History:   Diagnosis Date    HTN (hypertension)      Past Surgical History:   Procedure Laterality Date    COLONOSCOPY N/A 3/9/2022    COLONOSCOPY/ 22 performed by Ozzy Cortez MD at [facility] ENDOSCOPY    KNEE ARTHROSCOPY      OTHER SURGICAL HISTORY      none     Family History   Problem Relation Age of Onset    Other Other         non-contributory     Social History     Socioeconomic History    Marital status:      Spouse name: Not on file    Number of children: Not on file    Years of education: Not on file    Highest education level: Not on file   Occupational History    Not on file   Tobacco Use    Smoking status: Former Smoker    Smokeless tobacco: Former User   Substance and Sexual Activity    Alcohol use: Not Currently    Drug use: Not Currently    Sexual activity: Not on file   Other Topics Concern    Not on file   Social History Narrative    Not on file     Social Determinants of Health     Financial Resource Strain:     Difficulty of Paying Living Expenses: Not on file   Food Insecurity:     Worried About [address] in the Last Year: Not on file    [address] [employer] in the Last Year: Not on file   Transportation Needs:     Lack of Transportation (Medical): Not on file    Lack of Transportation (Non-Medical):  Not on file   Physical Activity:     Days of Exercise per Week: Not on file    Minutes of Exercise per Session: Not on file   Stress:     Feeling of Stress : Not on file   Social Connections:     Frequency of Communication with Friends and Family: Not on file    Frequency of Social Gatherings with Friends and Family: Not on file    Attends Scientologist Services: Not on file    Active Member of Clubs or Organizations: Not on file    Attends Club or Organization Meetings: Not on file    Marital Status: Not on file   Intimate Partner Violence:     Fear of Current or Ex-Partner: Not on file    Emotionally Abused: Not on file    Physically Abused: Not on file    Sexually Abused: Not on file   Housing Stability:     Unable to Pay for Housing in the Last Year: Not on file    Number of [place] in the Last Year: Not on file    Unstable Housing in the Last Year: Not on file     Current Facility-Administered Medications   Medication Dose Route Frequency Provider Last Rate Last Admin    0.9 % sodium chloride infusion   IntraVENous PRN Kimberly Garza MD        albumin human 25 % IV solution 25 g  25 g IntraVENous Q6H Birder [place], DO   Stopped at 06/03/22 0515    pantoprazole (PROTONIX) 40 mg in sodium chloride (PF) 10 mL injection  40 mg IntraVENous Q12H Birder [place], DO   40 mg at 06/03/22 0411    sodium chloride flush 0.9 % injection 5-40 mL  5-40 mL IntraVENous 2 times per day Birder [place], DO   10 mL at 06/03/22 0817    sodium chloride flush 0.9 % injection 5-40 mL  5-40 mL IntraVENous PRN Sandovaler [place], DO        acetaminophen (TYLENOL) tablet 650 mg  650 mg Oral Q6H PRN Sandovaler [place], DO        Or    acetaminophen (TYLENOL) suppository 650 mg  650 mg Rectal Q6H PRN Sandovaler [place], DO        dextrose 5 % and 0.9 % sodium chloride infusion   IntraVENous Continuous Harika Beaulieu,  mL/hr at 06/02/22 2007 New Bag at 06/02/22 2007    cefepime (MAXIPIME) 2000 mg IVPB minibag in NS  2,000 mg IntraVENous Q8H Sandovaler [place], DO 12.5 mL/hr at 06/03/22 0603 2,000 mg at 06/03/22 0603    dexamethasone (DECADRON) tablet 3 mg  3 mg Oral 2 times per day Sandovaler [place], DO   3 mg at 06/03/22 0816    Followed by   Senthil Shields ON 6/5/2022] dexamethasone (DECADRON) tablet 1 mg  1 mg Oral 2 times per day Birder [place], DO        sucralfate (CARAFATE) tablet 1 g  1 g Oral 4 times per day Birder [place], DO   1 g at 06/03/22 0603    sodium chloride flush 0.9 % injection 10 mL  10 mL IntraVENous ONCE PRN Sandovaler [place], DO        folic acid (FOLVITE) tablet 1 mg  1 mg Oral Daily Birder [place], DO   1 mg at 06/03/22 0816    mirtazapine (REMERON) tablet 7.5 mg  7.5 mg Oral Nightly Sandovaler [place], DO   7.5 mg at 06/02/22 2046    ondansetron (ZOFRAN) injection 4 mg  4 mg IntraVENous Q6H PRN Harika Beaulieu, DO        saliva substitute (BIOTENE/MOUTH KOTE) liquid   Oral PRN Javier Shaker, DO        Medela Tender Care Lanolin cream   Topical PRN Javier Shaker, DO        tuberculin injection 5 Units  5 Units IntraDERmal Once Javier Shaker, DO   5 Units at 22    vancomycin (VANCOCIN) 750 mg in sodium chloride 0.9 % 250 mL IVPB ([id number])  750 mg IntraVENous Q8H Javier Shaker, DO   Stopped at 22 08    midodrine (PROAMATINE) tablet 10 mg  10 mg Oral TID PRN Javier Shaker, DO   10 mg at 22       OBJECTIVE:  Patient Vitals for the past 8 hrs:   BP Temp Temp src Pulse Resp SpO2   22 0758 121/80 98.3 °F (36.8 °C) Oral 82 17 96 %   22 0548 105/76 97.4 °F (36.3 °C) Oral 86 18 95 %   22 0453 101/77 97.3 °F (36.3 °C) Oral 83 18 95 %   22 0404 113/73 97.3 °F (36.3 °C) Oral 84 18 95 %   22 0403 -- -- -- 85 -- --   22 0356 99/74 97.3 °F (36.3 °C) Oral 84 18 94 %   22 0341 -- -- -- -- -- 95 %   22 0328 99/69 97.5 °F (36.4 °C) Oral 86 18 --   22 0058 -- -- -- 87 -- --     Temp (24hrs), Av.6 °F (36.4 °C), Min:97.3 °F (36.3 °C), Max:98.3 °F (36.8 °C)    No intake/output data recorded. Physical Exam:  Constitutional: Well developed, well nourished male in no acute distress, sitting comfortably in the bedside chair. HEENT: Normocephalic and atraumatic. Oropharynx is clear, mucous membranes are moist.  Extraocular muscles are intact. Sclerae anicteric. Neck supple without JVD. No thyromegaly present. Skin Warm and dry. No bruising and no rash noted. No erythema. No pallor. Respiratory Lungs are clear to auscultation bilaterally without wheezes, rales or rhonchi, normal air exchange without accessory muscle use. CVS Normal rate, regular rhythm and normal S1 and S2. No murmurs, gallops, or rubs. Abdomen Soft, nontender and nondistended, normoactive bowel sounds. No palpable mass. No hepatosplenomegaly.    Neuro Grossly nonfocal with no obvious sensory or motor deficits. MSK Normal range of motion in general.  No edema and no tenderness. Psych Appropriate mood and affect.         Labs:    Recent Results (from the past 24 hour(s))   EKG 12 Lead    Collection Time: 06/02/22  1:10 PM   Result Value Ref Range    Ventricular Rate 108 BPM    Atrial Rate 109 BPM    P-R Interval 167 ms    QRS Duration 73 ms    Q-T Interval 326 ms    QTc Calculation (Bazett) 437 ms    P Axis 51 degrees    R Axis 41 degrees    T Axis 66 degrees    Diagnosis Sinus tachycardia    Culture, Blood 1    Collection Time: 06/02/22  1:12 PM    Specimen: Blood   Result Value Ref Range    Special Requests PORT      Gram stain GRAM POSITIVE RODS      Gram stain ANAEROBIC BOTTLE POSITIVE      Gram stain        RESULTS VERIFIED, PHONED TO AND READ BACK BY DR GELLER AT 0739 ON 6.3.22, ROLF    Culture CULTURE IN [facility] UPDATES TO FOLLOW     Lactic Acid    Collection Time: 06/02/22  1:12 PM   Result Value Ref Range    Lactic Acid, Plasma 5.1 (HH) 0.4 - 2.0 MMOL/L   CBC with Auto Differential    Collection Time: 06/02/22  1:12 PM   Result Value Ref Range    WBC 20.4 (H) 4.3 - 11.1 K/uL    RBC 2.33 (L) 4.23 - 5.6 M/uL    Hemoglobin 5.6 (LL) 13.6 - 17.2 g/dL    Hematocrit 19.5 (L) 41.1 - 50.3 %    MCV 83.7 79.6 - 97.8 FL    MCH 24.0 (L) 26.1 - 32.9 PG    MCHC 28.7 (L) 31.4 - 35.0 g/dL    RDW 19.0 (H) 11.9 - 14.6 %    Platelets 394 381 - 487 K/uL    MPV 8.8 (L) 9.4 - 12.3 FL    nRBC 0.00 0.0 - 0.2 K/uL    Differential Type AUTOMATED      Seg Neutrophils 79 (H) 43 - 78 %    Lymphocytes 5 (L) 13 - 44 %    Monocytes 10 4.0 - 12.0 %    Eosinophils % 0 (L) 0.5 - 7.8 %    Basophils 0 0.0 - 2.0 %    Immature Granulocytes 5 0.0 - 5.0 %    Segs Absolute 16.0 (H) 1.7 - 8.2 K/UL    Absolute Lymph # 1.1 0.5 - 4.6 K/UL    Absolute Mono # 2.1 (H) 0.1 - 1.3 K/UL    Absolute Eos # 0.0 0.0 - 0.8 K/UL    Basophils Absolute 0.0 0.0 - 0.2 K/UL    Absolute Immature Granulocyte 1.1 (H) 0.0 - 0.5 K/UL   CMP Special Requests LEFT ANTECUBITAL      Culture NO GROWTH AFTER 10 HOURS     Lactate, Sepsis    Collection Time: 06/02/22  7:53 PM   Result Value Ref Range    Lactic Acid, Sepsis 1.6 0.4 - 2.0 MMOL/L   Hemoglobin and Hematocrit    Collection Time: 06/02/22  7:53 PM   Result Value Ref Range    Hemoglobin 6.8 (LL) 13.6 - 17.2 g/dL    Hematocrit 22.2 (L) 41.1 - 50.3 %   Lactic Acid    Collection Time: 06/02/22  7:53 PM   Result Value Ref Range    Lactic Acid, Plasma 1.6 0.4 - 2.0 MMOL/L   Protime-INR    Collection Time: 06/02/22  7:53 PM   Result Value Ref Range    Protime 14.6 (H) 12.6 - 14.5 sec    INR 1.1     APTT    Collection Time: 06/02/22  7:53 PM   Result Value Ref Range    PTT 33.8 24.1 - 35.1 SEC   TSH with Reflex    Collection Time: 06/02/22  7:53 PM   Result Value Ref Range    TSH w Free Thyroid if Abnormal 1.41 0.358 - 3.740 UIU/ML   Cortisol, Baseline    Collection Time: 06/02/22  7:53 PM   Result Value Ref Range    Cortisol 21.8 ug/dL   Urinalysis with Reflex to Culture    Collection Time: 06/02/22  9:30 PM    Specimen: Urine   Result Value Ref Range    Color, UA YELLOW      Appearance CLEAR      Specific Gravity, UA 1.015 1.001 - 1.023      pH, Urine 7.0 5.0 - 9.0      Protein, UA Negative NEG mg/dL    Glucose, UA Negative mg/dL    Ketones, Urine Negative NEG mg/dL    Bilirubin Urine Negative NEG      Blood, Urine Negative NEG      Urobilinogen, Urine 0.2 0.2 - 1.0 EU/dL    Nitrite, Urine Negative NEG      Leukocyte Esterase, Urine Negative NEG      WBC, UA 0 0 /hpf    RBC, UA 0 0 /hpf    BACTERIA, URINE 0 0 /hpf    Urine Culture if Indicated CULTURE NOT INDICATED BY UA RESULT      Epithelial Cells UA 0 0 /hpf    Casts 0 0 /lpf    Crystals 0 0 /LPF    Mucus, UA 0 0 /lpf   Lactic Acid    Collection Time: 06/02/22 10:35 PM   Result Value Ref Range    Lactic Acid, Plasma 0.9 0.4 - 2.0 MMOL/L   MSSA/MRSA Screen BY PCR    Collection Time: 06/02/22 10:48 PM    Specimen: Nasal; Blood   Result Value Ref Range Special Requests NO SPECIAL REQUESTS      Culture (A)       MRSA target DNA not detected, SA target DNA detected. A MRSA negative, SA positive test result does not preclude MRSA nasal colonization. CBC with Auto Differential    Collection Time: 06/03/22  7:09 AM   Result Value Ref Range    WBC 8.4 4.3 - 11.1 K/uL    RBC 3.06 (L) 4.23 - 5.6 M/uL    Hemoglobin 7.8 (L) 13.6 - 17.2 g/dL    Hematocrit 24.8 (L) 41.1 - 50.3 %    MCV 81.0 79.6 - 97.8 FL    MCH 25.5 (L) 26.1 - 32.9 PG    MCHC 31.5 31.4 - 35.0 g/dL    RDW 17.4 (H) 11.9 - 14.6 %    Platelets 472 090 - 666 K/uL    MPV 8.8 (L) 9.4 - 12.3 FL    nRBC 0.00 0.0 - 0.2 K/uL    Differential Type AUTOMATED      Seg Neutrophils 86 (H) 43 - 78 %    Lymphocytes 7 (L) 13 - 44 %    Monocytes 2 (L) 4.0 - 12.0 %    Eosinophils % 0 (L) 0.5 - 7.8 %    Basophils 0 0.0 - 2.0 %    Immature Granulocytes 5 0.0 - 5.0 %    Segs Absolute 7.2 1.7 - 8.2 K/UL    Absolute Lymph # 0.6 0.5 - 4.6 K/UL    Absolute Mono # 0.2 0.1 - 1.3 K/UL    Absolute Eos # 0.0 0.0 - 0.8 K/UL    Basophils Absolute 0.0 0.0 - 0.2 K/UL    Absolute Immature Granulocyte 0.4 0.0 - 0.5 K/UL       Imaging:  Portable chest:        History: Sepsis, near syncope       Comparison: 03/25/2022       Findings: Portable AP views were obtained at 1316 at 1319 hours. A left   subclavian Mediport catheter is present.       The cardiac silhouette is normal in size and configuration. The lungs and   pleural spaces are clear. The pulmonary vascularity is within normal limits.           Impression   Unremarkable portable chest radiograph.      ASSESSMENT:  Patient Active Problem List   Diagnosis    ABILIO (iron deficiency anemia)    Malignant neoplasm of overlapping sites of stomach ([address]    Malignant neoplasm of body of stomach (HCC)    Gastric adenocarcinoma ([address]    [facility]    Severe sepsis with lactic acidosis (HCC)    ABLA (acute blood loss anemia)    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    Former heavy tobacco smoker    Gastroesophageal reflux disease    Loss of weight    Open fracture of proximal phalanx of left thumb    Thumb amputation status, left    Tobacco use disorder    GIB (gastrointestinal bleeding)    Hyponatremia    Hypoalbuminemia due to protein-calorie malnutrition (HCC)    Syncope due to orthostatic hypotension    Hypomagnesemia    [facility] dependence [facility]    Hypoproteinemia ([facility]    Do not resuscitate status       RECOMMENDATIONS:  Gastric adenocarcinoma  - s/p C4 FLOT 5/18 - 5/19. Plans for resection with open total gastrectomy in 2-3 weeks. - Hospitalist has consulted surgery    Sepsis  - CXR neg  - CTA CAP pending  - BCx 1/2 +GPR, I/S pending  - UA neg  - On Cef/Vanc    Anemia secondary to chemotherapy  - GI following, no plans for endoscopy at this time  - Hospitalist started J17 and folic acid supplements  - Transfuse prn to keep Hgb >7    Lab studies and imaging studies were personally reviewed. Thank you for allowing us to participate in the care of Mr. Pili Caceres. Formal consult note by Dr. Zaki Thapa to follow. He will need to f/u with Dr. Zaki Thapa as previously scheduled upon discharge.          Gonzaloeen January, APRN - [address] Hematology & Oncology  [address] [address]  Office : [phone]  Fax : [phone]

## 2022-06-03 NOTE — PROGRESS NOTES
OCCUPATIONAL THERAPY Initial Assessment and Daily Note       OT Visit Days: 1  Acknowledge Orders  Time  OT Charge Capture  Rehab Caseload Tracker      Roger Chambers is a 78 y.o. male   PRIMARY DIAGNOSIS: Septic shock ([address]  Syncope and collapse [R55]  Hemorrhagic shock ([address] [R57.8]  Shock ([address] [R57.9]  History of gastric cancer [Z85.028]  Septic shock ([address] [A41.9, R65.21]  Gastrointestinal hemorrhage, unspecified gastrointestinal hemorrhage type [K92.2]       Reason for Referral: Generalized Muscle Weakness (M62.81)  Difficulty in walking, Not elsewhere classified (R26.2)  Other abnormalities of gait and mobility (R26.89)  Inpatient: Payor: MEDICARE / Plan: MEDICARE PART A / Product Type: *No Product type* /     ASSESSMENT:     REHAB RECOMMENDATIONS:   Recommendation to date pending progress:  Settin [facility] with  supervision as pt is already getting    Equipment:     3 in 1 Bedside Commode   Rolling Walker     ASSESSMENT:  Mr. Jovanny Martin is a 77 y/o male presents with syncope and collapse. Pt with hx gastric adenocarcinoma and is current with chemotherapy. Today pt presents with decreased activity tolerance, balance, strength and mobility impacting ADLs. Pt overall CGA RW for functional transfers and mobility of household distances. Pt able to complete BM hygiene in standing with CGA and complete grooming ADLs standing at sink with CGA RW. While having a conversation with therapists during grooming ADLs, pt had a syncopal episode without warning or symptoms. Pt stated he lost consciousness, however was able to respond that he was ok literally 5 seconds after the episode occurred. Pt was able to transfer to the chair then to the bed with mod-max A x2 and BP was checked with RN in grid below. At this time pt lost control of bowel/bladder and required max A x2 for BM hygiene and brief change rolling in bed. Pt felt better after lying down and BP improved.  Pt is currently functioning below baseline and would benefit from skilled OT services to address OT goals and plan of care. Rec HHOT at d/c.       [facility] [address] [address] AM-PAC 6 Clicks Daily Activity Inpatient Short Form:    AM-PAC Daily Activity Inpatient   How much help for putting on and taking off regular lower body clothing?: A Little  How much help for Bathing?: A Little  How much help for Toileting?: A Little  How much help for putting on and taking off regular upper body clothing?: A Little  How much help for taking care of personal grooming?: A Little  How much help for eating meals?: None  AM-PAC Inpatient Daily Activity Raw Score: 19  AM-PAC Inpatient ADL T-Scale Score : 40.22  ADL Inpatient CMS 0-100% Score: 42.8  ADL Inpatient CMS G-Code Modifier : CK           SUBJECTIVE:     Mr. Austen Dow states, \"It's been a morning hasn't it\"     Social/Functional Lives With: Spouse  Type of Home: House  Home Layout: One level  Bathroom Accessibility: Accessible  Receives Help From: Family  ADL Assistance: Independent  Homemaking Assistance: Independent  Ambulation Assistance: Independent  Transfer Assistance: Independent  Active : Yes  Mode of Transportation: Car  Occupation: Retired    OBJECTIVE:     Cyndy Coffey / Se Baig / Osbaldo Forget: IV    RESTRICTIONS/PRECAUTIONS:  Restrictions/Precautions: Fall Risk    PAIN: [place] / O2:   Pre Treatment:   Pain Assessment: None - Denies Pain      Post Treatment: 0/10       Vitals   Vitals  BP: 80/70  BP Location: Right upper arm  BP Method: Automatic  Patient Position: Supine (after syncopal episode)  Orthostatic B/P and Pulse?: Yes  Blood Pressure Lyin/76 (after recovery from syncopal episode)  Pulse Lyin PER MINUTE      Oxygen            GROSS EVALUATION: INTACT IMPAIRED   (See Comments)   UE AROM [x] []   UE PROM [x] []   Strength []   generally decreased   Posture / Balance [] Posture: Fair  Sitting - Static: Good  Sitting - Dynamic: Fair,+  Standing - Static: Fair  Standing - Dynamic: Fair   Sensation [x]     Coordination [x]       Tone [x]       Edema [x] NA    Activity Tolerance [] Patient limited by fatigue     Hand Dominance R [] L []      COGNITION/  PERCEPTION: INTACT IMPAIRED   (See Comments)   Orientation [x]     Vision [x]     Hearing [] Hard of hearing/hearing concerns   Cognition  [x]     Perception [] Decreased safety awareness     MOBILITY: I Mod I S SBA CGA Min Mod Max Total  NT x2 Comments:   Bed Mobility    Rolling [] [] [] [] [] [x] [] [] [] [] []    Supine to Sit [] [] [] [] [] [x] [] [] [] [] [x]    Scooting [] [] [] [] [x] [] [] [] [] [] []    Sit to Supine [] [] [] [] [] [x] [] [] [] [] [x]    Transfers    Sit to Stand [] [] [] [] [x] [] [] [] [] [] [x]    Bed to Chair [] [] [] [] [x] [] [] [] [] [] [x]    Stand to Sit [] [] [] [] [x] [] [] [] [] [] [x]    Tub/Shower [] [] [] [] [] [] [] [] [] [x] []     Toilet [] [] [] [] [] [] [] [] [] [x] []      [] [] [] [] [] [] [] [] [] [x] []    I=Independent, Mod I=Modified Independent, S=Supervision/Setup, SBA=Standby Assistance, CGA=Contact Guard Assistance, Min=Minimal Assistance, Mod=Moderate Assistance, Max=Maximal Assistance, Total=Total Assistance, NT=Not Tested    ACTIVITIES OF DAILY LIVING: I Mod I S SBA CGA Min Mod Max Total NT Comments   BASIC ADLs:              Bathing/ Showering [] [] [] [] [] [] [] [] [] [x]    Toileting [] [] [] [] [x] [] [] [] [] [] For BM hygiene in standing RW initially then max A x2 after syncopal episode   Upper Body Dressing [] [] [] [] [] [x] [] [] [] [] Donning gown EOB   Lower Body Dressing [] [] [] [] [x] [] [] [] [] [] Donning socks EOB   Feeding [] [] [] [] [] [] [] [] [] [x]    Grooming [] [] [] [] [x] [] [] [] [] [] Brushing teeth and washing face standing at sink RW    Personal Device Care [] [] [] [] [] [] [] [] [] [x]    Functional Mobility [] [] [] [] [x] [] [] [] [] [] RW   I=Independent, Mod I=Modified Independent, S=Supervision/Setup, SBA=Standby Assistance, CGA=Contact Guard Assistance, Min=Minimal Assistance, Mod=Moderate Assistance, Max=Maximal Assistance, Total=Total Assistance, NT=Not Tested    PLAN:     FREQUENCY/DURATION   OT Plan of Care: 3 times/week for duration of hospital stay or until stated goals are met, whichever comes first.    ACUTE OCCUPATIONAL THERAPY GOALS:   (Developed with and agreed upon by patient and/or caregiver.)  1. Patient will complete lower body bathing and dressing with SUPERVISION and adaptive equipment as needed. 2. Patient will complete toileting with SUPERVISION. 3. Patient will complete grooming ADL with SUPERVISION. 4. Patient will tolerate 25 minutes of OT treatment with 1-2 rest breaks to increase activity tolerance for ADLs. 5. Patient will complete functional transfers with SUPERVISION and adaptive equipment as needed. 6. Patient will tolerate 10 minutes BUE exercises to increase strength for safe, functional transfers.      Timeframe: 7 visits     PROBLEM LIST:   (Skilled intervention is medically necessary to address:)  Decreased ADL/Functional Activities  Decreased Activity Tolerance  Decreased AROM/PROM  Decreased Balance  Decreased Gait Ability  Decreased Safety Awareness  Decreased Strength  Decreased Transfer Abilities   INTERVENTIONS PLANNED:  (Benefits and precautions of occupational therapy have been discussed with the patient.)  Self Care Training  Therapeutic Activity  Therapeutic Exercise/HEP  Neuromuscular Re-education  Manual Therapy  Education         TREATMENT:     EVALUATION: MODERATE COMPLEXITY: (Untimed Charge)    TREATMENT:   Co-Treatment PT/OT necessary due to patient's decreased overall endurance/tolerance levels, as well as need for high level skilled assistance to complete functional transfers/mobility and functional tasks  Self Care: (55): Procedure(s) (per grid) utilized to improve and/or restore self-care/home management as related to dressing, toileting, grooming and functional transfers in prep for ADLs and ambulating household distances. Required moderate verbal, manual and tactile cueing to facilitate activities of daily living skills and compensatory activities.     TREATMENT GRID:  N/A    AFTER TREATMENT PRECAUTIONS: Bed, Call light within reach, Needs within reach, RN at bedside and RN notified    INTERDISCIPLINARY COLLABORATION:  RN/ PCT, PT/ PTA and OT/ BHATT    EDUCATION:       TOTAL TREATMENT DURATION AND TIME:  Time In: 7675  Time Out: [address]  Minutes: [facility]

## 2022-06-04 ENCOUNTER — ANESTHESIA (OUTPATIENT)
Dept: ENDOSCOPY | Age: 67
DRG: 853 | End: 2022-06-04
Payer: MEDICARE

## 2022-06-04 ENCOUNTER — ANESTHESIA EVENT (OUTPATIENT)
Dept: ENDOSCOPY | Age: 67
DRG: 853 | End: 2022-06-04
Payer: MEDICARE

## 2022-06-04 LAB
ALBUMIN SERPL-MCNC: 2.4 G/DL (ref 3.2–4.6)
ALBUMIN/GLOB SERPL: 1 {RATIO} (ref 1.2–3.5)
ALP SERPL-CCNC: 115 U/L (ref 50–136)
ALT SERPL-CCNC: 13 U/L (ref 12–65)
ANION GAP SERPL CALC-SCNC: 7 MMOL/L (ref 7–16)
AST SERPL-CCNC: 19 U/L (ref 15–37)
BASOPHILS # BLD: 0 K/UL (ref 0–0.2)
BASOPHILS NFR BLD: 0 % (ref 0–2)
BILIRUB SERPL-MCNC: 0.3 MG/DL (ref 0.2–1.1)
BUN SERPL-MCNC: 31 MG/DL (ref 8–23)
CALCIUM SERPL-MCNC: 8 MG/DL (ref 8.3–10.4)
CHLORIDE SERPL-SCNC: 104 MMOL/L (ref 98–107)
CO2 SERPL-SCNC: 23 MMOL/L (ref 21–32)
CREAT SERPL-MCNC: 0.2 MG/DL (ref 0.8–1.5)
DIFFERENTIAL METHOD BLD: ABNORMAL
EOSINOPHIL # BLD: 0 K/UL (ref 0–0.8)
EOSINOPHIL NFR BLD: 0 % (ref 0.5–7.8)
ERYTHROCYTE [DISTWIDTH] IN BLOOD BY AUTOMATED COUNT: 16.9 % (ref 11.9–14.6)
GLOBULIN SER CALC-MCNC: 2.3 G/DL (ref 2.3–3.5)
GLUCOSE SERPL-MCNC: 101 MG/DL (ref 65–100)
HCT VFR BLD AUTO: 15.4 % (ref 41.1–50.3)
HCT VFR BLD AUTO: 16.7 % (ref 41.1–50.3)
HCT VFR BLD AUTO: 19.1 % (ref 41.1–50.3)
HCT VFR BLD AUTO: 22.5 % (ref 41.1–50.3)
HGB BLD-MCNC: 4.9 G/DL (ref 13.6–17.2)
HGB BLD-MCNC: 5.3 G/DL (ref 13.6–17.2)
HGB BLD-MCNC: 6.2 G/DL (ref 13.6–17.2)
HGB BLD-MCNC: 7.4 G/DL (ref 13.6–17.2)
HISTORY CHECK: NORMAL
IMM GRANULOCYTES # BLD AUTO: 0.8 K/UL (ref 0–0.5)
IMM GRANULOCYTES NFR BLD AUTO: 6 % (ref 0–5)
LACTATE SERPL-SCNC: 0.8 MMOL/L (ref 0.4–2)
LYMPHOCYTES # BLD: 1 K/UL (ref 0.5–4.6)
LYMPHOCYTES NFR BLD: 7 % (ref 13–44)
MCH RBC QN AUTO: 26.6 PG (ref 26.1–32.9)
MCHC RBC AUTO-ENTMCNC: 31.7 G/DL (ref 31.4–35)
MCV RBC AUTO: 83.9 FL (ref 79.6–97.8)
MM INDURATION, POC: 0 MM (ref 0–5)
MONOCYTES # BLD: 0.6 K/UL (ref 0.1–1.3)
MONOCYTES NFR BLD: 4 % (ref 4–12)
NEUTS SEG # BLD: 11.5 K/UL (ref 1.7–8.2)
NEUTS SEG NFR BLD: 83 % (ref 43–78)
NRBC # BLD: 0 K/UL (ref 0–0.2)
PHOSPHATE SERPL-MCNC: 3.5 MG/DL (ref 2.3–3.7)
PLATELET # BLD AUTO: 198 K/UL (ref 150–450)
PLATELET COMMENT: ADEQUATE
PMV BLD AUTO: 9.1 FL (ref 9.4–12.3)
POTASSIUM SERPL-SCNC: 4.3 MMOL/L (ref 3.5–5.1)
PPD, POC: NEGATIVE
PROT SERPL-MCNC: 4.7 G/DL (ref 6.3–8.2)
RBC # BLD AUTO: 1.99 M/UL (ref 4.23–5.6)
RBC MORPH BLD: ABNORMAL
SODIUM SERPL-SCNC: 134 MMOL/L (ref 136–145)
WBC # BLD AUTO: 13.9 K/UL (ref 4.3–11.1)
WBC MORPH BLD: ABNORMAL

## 2022-06-04 PROCEDURE — 2500000003 HC RX 250 WO HCPCS: Performed by: FAMILY MEDICINE

## 2022-06-04 PROCEDURE — 3700000000 HC ANESTHESIA ATTENDED CARE: Performed by: INTERNAL MEDICINE

## 2022-06-04 PROCEDURE — 87040 BLOOD CULTURE FOR BACTERIA: CPT

## 2022-06-04 PROCEDURE — 36430 TRANSFUSION BLD/BLD COMPNT: CPT

## 2022-06-04 PROCEDURE — 1090000001 HH PPS REVENUE CREDIT

## 2022-06-04 PROCEDURE — A4216 STERILE WATER/SALINE, 10 ML: HCPCS | Performed by: FAMILY MEDICINE

## 2022-06-04 PROCEDURE — 1100000000 HC RM PRIVATE

## 2022-06-04 PROCEDURE — 6360000002 HC RX W HCPCS: Performed by: INTERNAL MEDICINE

## 2022-06-04 PROCEDURE — P9016 RBC LEUKOCYTES REDUCED: HCPCS

## 2022-06-04 PROCEDURE — 85025 COMPLETE CBC W/AUTO DIFF WBC: CPT

## 2022-06-04 PROCEDURE — 3700000001 HC ADD 15 MINUTES (ANESTHESIA): Performed by: INTERNAL MEDICINE

## 2022-06-04 PROCEDURE — 6370000000 HC RX 637 (ALT 250 FOR IP): Performed by: FAMILY MEDICINE

## 2022-06-04 PROCEDURE — 84100 ASSAY OF PHOSPHORUS: CPT

## 2022-06-04 PROCEDURE — 2500000003 HC RX 250 WO HCPCS: Performed by: ANESTHESIOLOGY

## 2022-06-04 PROCEDURE — 7100000011 HC PHASE II RECOVERY - ADDTL 15 MIN: Performed by: INTERNAL MEDICINE

## 2022-06-04 PROCEDURE — 92610 EVALUATE SWALLOWING FUNCTION: CPT

## 2022-06-04 PROCEDURE — 1090000002 HH PPS REVENUE DEBIT

## 2022-06-04 PROCEDURE — 7100000010 HC PHASE II RECOVERY - FIRST 15 MIN: Performed by: INTERNAL MEDICINE

## 2022-06-04 PROCEDURE — 2580000003 HC RX 258: Performed by: FAMILY MEDICINE

## 2022-06-04 PROCEDURE — P9047 ALBUMIN (HUMAN), 25%, 50ML: HCPCS | Performed by: FAMILY MEDICINE

## 2022-06-04 PROCEDURE — 6360000002 HC RX W HCPCS: Performed by: FAMILY MEDICINE

## 2022-06-04 PROCEDURE — 2580000003 HC RX 258: Performed by: INTERNAL MEDICINE

## 2022-06-04 PROCEDURE — 2709999900 HC NON-CHARGEABLE SUPPLY: Performed by: INTERNAL MEDICINE

## 2022-06-04 PROCEDURE — APPNB30 APP NON BILLABLE TIME 0-30 MINS: Performed by: NURSE PRACTITIONER

## 2022-06-04 PROCEDURE — C9113 INJ PANTOPRAZOLE SODIUM, VIA: HCPCS | Performed by: FAMILY MEDICINE

## 2022-06-04 PROCEDURE — 85014 HEMATOCRIT: CPT

## 2022-06-04 PROCEDURE — 80053 COMPREHEN METABOLIC PANEL: CPT

## 2022-06-04 PROCEDURE — 2580000003 HC RX 258: Performed by: ANESTHESIOLOGY

## 2022-06-04 PROCEDURE — 3609017100 HC EGD: Performed by: INTERNAL MEDICINE

## 2022-06-04 PROCEDURE — 6360000002 HC RX W HCPCS: Performed by: ANESTHESIOLOGY

## 2022-06-04 PROCEDURE — 83605 ASSAY OF LACTIC ACID: CPT

## 2022-06-04 PROCEDURE — 36415 COLL VENOUS BLD VENIPUNCTURE: CPT

## 2022-06-04 RX ORDER — METOCLOPRAMIDE HYDROCHLORIDE 5 MG/ML
10 INJECTION INTRAMUSCULAR; INTRAVENOUS EVERY 6 HOURS
Status: DISCONTINUED | OUTPATIENT
Start: 2022-06-04 | End: 2022-06-09

## 2022-06-04 RX ORDER — PROPOFOL 10 MG/ML
INJECTION, EMULSION INTRAVENOUS PRN
Status: DISCONTINUED | OUTPATIENT
Start: 2022-06-04 | End: 2022-06-04 | Stop reason: SDUPTHER

## 2022-06-04 RX ORDER — FUROSEMIDE 10 MG/ML
20 INJECTION INTRAMUSCULAR; INTRAVENOUS PRN
Status: DISCONTINUED | OUTPATIENT
Start: 2022-06-04 | End: 2022-06-09

## 2022-06-04 RX ORDER — KETAMINE HYDROCHLORIDE 50 MG/ML
INJECTION, SOLUTION, CONCENTRATE INTRAMUSCULAR; INTRAVENOUS PRN
Status: DISCONTINUED | OUTPATIENT
Start: 2022-06-04 | End: 2022-06-04 | Stop reason: SDUPTHER

## 2022-06-04 RX ORDER — SODIUM CHLORIDE 9 MG/ML
INJECTION, SOLUTION INTRAVENOUS CONTINUOUS PRN
Status: DISCONTINUED | OUTPATIENT
Start: 2022-06-04 | End: 2022-06-04 | Stop reason: SDUPTHER

## 2022-06-04 RX ORDER — SODIUM CHLORIDE 9 MG/ML
INJECTION, SOLUTION INTRAVENOUS PRN
Status: DISCONTINUED | OUTPATIENT
Start: 2022-06-04 | End: 2022-06-09

## 2022-06-04 RX ORDER — LIDOCAINE HYDROCHLORIDE 40 MG/ML
SOLUTION TOPICAL
Status: DISPENSED
Start: 2022-06-04 | End: 2022-06-05

## 2022-06-04 RX ORDER — SODIUM CHLORIDE 9 MG/ML
INJECTION, SOLUTION INTRAVENOUS PRN
Status: DISCONTINUED | OUTPATIENT
Start: 2022-06-04 | End: 2022-06-04 | Stop reason: SDUPTHER

## 2022-06-04 RX ORDER — MIDAZOLAM HYDROCHLORIDE 1 MG/ML
INJECTION INTRAMUSCULAR; INTRAVENOUS PRN
Status: DISCONTINUED | OUTPATIENT
Start: 2022-06-04 | End: 2022-06-04 | Stop reason: SDUPTHER

## 2022-06-04 RX ADMIN — CEFEPIME HYDROCHLORIDE 2000 MG: 2 INJECTION, POWDER, FOR SOLUTION INTRAVENOUS at 13:34

## 2022-06-04 RX ADMIN — MIRTAZAPINE 7.5 MG: 15 TABLET, FILM COATED ORAL at 21:51

## 2022-06-04 RX ADMIN — SODIUM CHLORIDE: 9 INJECTION, SOLUTION INTRAVENOUS at 17:12

## 2022-06-04 RX ADMIN — VANCOMYCIN HYDROCHLORIDE 1000 MG: 1 INJECTION, POWDER, LYOPHILIZED, FOR SOLUTION INTRAVENOUS at 18:30

## 2022-06-04 RX ADMIN — SODIUM CHLORIDE, SODIUM LACTATE, POTASSIUM CHLORIDE, AND CALCIUM CHLORIDE: 600; 310; 30; 20 INJECTION, SOLUTION INTRAVENOUS at 06:15

## 2022-06-04 RX ADMIN — METRONIDAZOLE 500 MG: 500 INJECTION, SOLUTION INTRAVENOUS at 07:50

## 2022-06-04 RX ADMIN — SODIUM CHLORIDE, PRESERVATIVE FREE 10 ML: 5 INJECTION INTRAVENOUS at 09:35

## 2022-06-04 RX ADMIN — METRONIDAZOLE 500 MG: 500 INJECTION, SOLUTION INTRAVENOUS at 00:12

## 2022-06-04 RX ADMIN — WATER 2.5 MG: 1 INJECTION INTRAMUSCULAR; INTRAVENOUS; SUBCUTANEOUS at 03:17

## 2022-06-04 RX ADMIN — MIDAZOLAM 2 MG: 1 INJECTION INTRAMUSCULAR; INTRAVENOUS at 17:21

## 2022-06-04 RX ADMIN — SODIUM CHLORIDE, PRESERVATIVE FREE 40 MG: 5 INJECTION INTRAVENOUS at 16:17

## 2022-06-04 RX ADMIN — METRONIDAZOLE 500 MG: 500 INJECTION, SOLUTION INTRAVENOUS at 18:36

## 2022-06-04 RX ADMIN — SUCRALFATE 1 G: 1 TABLET ORAL at 06:34

## 2022-06-04 RX ADMIN — SODIUM CHLORIDE, PRESERVATIVE FREE 40 MG: 5 INJECTION INTRAVENOUS at 05:08

## 2022-06-04 RX ADMIN — METOCLOPRAMIDE HYDROCHLORIDE 10 MG: 5 INJECTION INTRAMUSCULAR; INTRAVENOUS at 18:33

## 2022-06-04 RX ADMIN — VANCOMYCIN HYDROCHLORIDE 1000 MG: 1 INJECTION, POWDER, LYOPHILIZED, FOR SOLUTION INTRAVENOUS at 06:34

## 2022-06-04 RX ADMIN — DEXAMETHASONE 3 MG: 0.5 TABLET ORAL at 09:25

## 2022-06-04 RX ADMIN — ALBUMIN (HUMAN) 25 G: 0.25 INJECTION, SOLUTION INTRAVENOUS at 02:10

## 2022-06-04 RX ADMIN — SUCRALFATE 1 G: 1 TABLET ORAL at 18:33

## 2022-06-04 RX ADMIN — DEXAMETHASONE 3 MG: 0.5 TABLET ORAL at 21:48

## 2022-06-04 RX ADMIN — ALBUMIN (HUMAN) 25 G: 0.25 INJECTION, SOLUTION INTRAVENOUS at 07:52

## 2022-06-04 RX ADMIN — KETAMINE HYDROCHLORIDE 10 MG: 50 INJECTION, SOLUTION INTRAMUSCULAR; INTRAVENOUS at 17:24

## 2022-06-04 RX ADMIN — FOLIC ACID 1 MG: 1 TABLET ORAL at 09:25

## 2022-06-04 RX ADMIN — ALBUMIN (HUMAN) 25 G: 0.25 INJECTION, SOLUTION INTRAVENOUS at 11:40

## 2022-06-04 RX ADMIN — CEFEPIME HYDROCHLORIDE 2000 MG: 2 INJECTION, POWDER, FOR SOLUTION INTRAVENOUS at 06:16

## 2022-06-04 RX ADMIN — PROPOFOL 10 MG: 10 INJECTION, EMULSION INTRAVENOUS at 17:25

## 2022-06-04 RX ADMIN — SUCRALFATE 1 G: 1 TABLET ORAL at 11:40

## 2022-06-04 RX ADMIN — CEFEPIME HYDROCHLORIDE 2000 MG: 2 INJECTION, POWDER, FOR SOLUTION INTRAVENOUS at 21:52

## 2022-06-04 RX ADMIN — PROPOFOL 10 MG: 10 INJECTION, EMULSION INTRAVENOUS at 17:22

## 2022-06-04 RX ADMIN — PROPOFOL 10 MG: 10 INJECTION, EMULSION INTRAVENOUS at 17:23

## 2022-06-04 RX ADMIN — SODIUM CHLORIDE, PRESERVATIVE FREE 10 ML: 5 INJECTION INTRAVENOUS at 21:00

## 2022-06-04 NOTE — PROGRESS NOTES
H&P/Consult Note/Progress Note/Office Note:   Bebeto Aden  MRN: [id number]  :1955  Age:66 y.o.    HPI: Bebeto Aden is a 78 y.o. male who is well known to me for stomach cancer, which occupied almost entire stomach. He has just finished neoadjuvant chemotherapy and plans for resection with open total gastrectomy with J tube placement in about 3 weeks. He presented to the ED on 22 with c/o syncopal episode and a 2 day history of duarte syncope. On admission, WBC 20k, Hgb 5. 10.  GI was consulted, no plans for endoscopy at this time. 22: Pt awake in bed. Denies pain. Reports Bloody BM x 2 overnight. Hgb 4.9 this am. AF, NAD.      Past Medical History:   Diagnosis Date    ABLA (acute blood loss anemia) 2022    GIB (gastrointestinal bleeding) 2022    HTN (hypertension)     Severe sepsis with lactic acidosis ([address] 2022     Past Surgical History:   Procedure Laterality Date    COLONOSCOPY N/A 3/9/2022    COLON[facility]COPY performed by Deuce [facility]ana [facility] ENDOSCOPY    KNEE ARTHROSCOPY      OTHER SURGICAL HISTORY      none     Current Facility-Administered Medications   Medication Dose Route Frequency    0.9 % sodium chloride infusion   IntraVENous PRN    [START ON 2022] vancomycin random level reminder   Other Once    acetaminophen (TYLENOL) tablet 650 mg  650 mg Oral Q6H PRN    Or    acetaminophen (TYLENOL) suppository 650 mg  650 mg Rectal Q6H PRN    vancomycin (VANCOCIN) 1,000 mg in sodium chloride 0.9 % 250 mL IVPB ([id number])  1,000 mg IntraVENous Q12H    albumin human 25 % IV solution 25 g  25 g IntraVENous Q6H    0.9 % sodium chloride infusion   IntraVENous PRN    midodrine (PROAMATINE) tablet 10 mg  10 mg Oral TID PRN    metronidazole (FLAGYL) 500 mg in 0.9% NaCl 100 mL IVPB premix  500 mg IntraVENous Q12H    lactated ringers infusion   IntraVENous Continuous    0.9 % sodium chloride infusion   IntraVENous PRN    0.9 % sodium chloride infusion   IntraVENous PRN    pantoprazole (PROTONIX) 40 mg in sodium chloride (PF) 10 mL injection  40 mg IntraVENous Q12H    sodium chloride flush 0.9 % injection 5-40 mL  5-40 mL IntraVENous 2 times per day    sodium chloride flush 0.9 % injection 5-40 mL  5-40 mL IntraVENous PRN    cefepime (MAXIPIME) 2000 mg IVPB minibag in NS  2,000 mg IntraVENous Q8H    dexamethasone (DECADRON) tablet 3 mg  3 mg Oral 2 times per day    Followed by   Desirae Naylor ON 6/5/2022] dexamethasone (DECADRON) tablet 1 mg  1 mg Oral 2 times per day    sucralfate (CARAFATE) tablet 1 g  1 g Oral 4 times per day    sodium chloride flush 0.9 % injection 10 mL  10 mL IntraVENous ONCE PRN    folic acid (FOLVITE) tablet 1 mg  1 mg Oral Daily    mirtazapine (REMERON) tablet 7.5 mg  7.5 mg Oral Nightly    ondansetron (ZOFRAN) injection 4 mg  4 mg IntraVENous Q6H PRN    saliva substitute (BIOTENE/MOUTH KOTE) liquid   Oral PRN    Medela Tender [facility] Lanolin cream   Topical PRN     Iron and Tetanus antitoxin  Social History     Socioeconomic History    Marital status:      Spouse name: Not on file    Number of children: Not on file    Years of education: Not on file    Highest education level: Not on file   Occupational History    Not on file   Tobacco Use    Smoking status: Former Smoker    Smokeless tobacco: Former User   Substance and Sexual Activity    Alcohol use: Not Currently    Drug use: Not Currently    Sexual activity: Not on file   Other Topics Concern    Not on file   Social History Narrative    Not on file     Social Determinants of Health     Financial Resource Strain:     Difficulty of Paying Living Expenses: Not on file   Food Insecurity:     Worried About Running Out of Food in the Last Year: Not on file    Stacie of Food in the Last Year: Not on file   Transportation Needs:     Lack of Transportation (Medical): Not on file    Lack of Transportation (Non-Medical):  Not on file   Physical Activity:     Days of Exercise per Week: Not on file    Minutes of Exercise per Session: Not on file   Stress:     Feeling of Stress : Not on file   Social Connections:     Frequency of Communication with Friends and Family: Not on file    Frequency of Social Gatherings with Friends and Family: Not on file    Attends [employer] Services: Not on file    Active Member of [address] [place] [address] or Organizations: Not on file    Attends Club or Organization Meetings: Not on file    Marital Status: Not on file   Intimate Partner Violence:     Fear of Current or Ex-Partner: Not on file    Emotionally Abused: Not on file    Physically Abused: Not on file    Sexually Abused: Not on file   Housing Stability:     Unable to Pay for Housing in the Last Year: Not on file    Number of [place] in the Last Year: Not on file    Unstable Housing in the Last Year: Not on file     Social History     Tobacco Use   Smoking Status Former Smoker   Smokeless Tobacco Former User     Family History   Problem Relation Age of Onset    Other Other         non-contributory     ROS: The patient has no difficulty with chest pain or shortness of breath. No fever or chills. Comprehensive review of systems was otherwise unremarkable except as noted above. Physical Exam:   /80   Pulse 81   Temp 97.5 °F (36.4 °C) (Oral)   Resp 12   Ht 5' 9\" (1.753 m)   Wt 118 lb (53.5 kg)   SpO2 98%   BMI 17.43 kg/m²   Vitals:    06/03/22 2058 06/03/22 2337 06/04/22 0320 06/04/22 0819   BP: 110/84 117/83 126/85 112/80   Pulse: 86 89 84 81   Resp: 19 20 18 12   Temp: 97.5 °F (36.4 °C) 97.3 °F (36.3 °C) 97.5 °F (36.4 °C) 97.5 °F (36.4 °C)   TempSrc: Oral Oral Oral Oral   SpO2: 100% 100% 100% 98%   Weight:       Height:         No intake/output data recorded. 06/02 1901 - 06/04 0700  In: 6896.4 [P.O.:240; I.V.:6279]  Out: 2200 [Urine:2200]    Constitutional: Alert, oriented, cooperative patient in no acute distress; appears stated age    Eyes: Sclera are clear.  EOMs intact  ENMT: no external lesions gross hearing normal; no obvious neck masses, no ear or lip lesions, nares normal  CV: RRR. Normal perfusion  Resp: No JVD. Breathing is  non-labored; no audible wheezing. GI: soft and non-distended, non-tender   Musculoskeletal: unremarkable with normal function. No embolic signs or cyanosis. Neuro:  Oriented; moves all 4; no focal deficits  Psychiatric: normal affect and mood, no memory impairment    Recent vitals (if inpt):  Patient Vitals for the past 24 hrs:   BP Temp Temp src Pulse Resp SpO2 Height   06/04/22 0819 112/80 97.5 °F (36.4 °C) Oral 81 12 98 % --   06/04/22 0320 126/85 97.5 °F (36.4 °C) Oral 84 18 100 % --   06/03/22 2337 117/83 97.3 °F (36.3 °C) Oral 89 20 100 % --   06/03/22 2058 110/84 97.5 °F (36.4 °C) Oral 86 19 100 % --   06/03/22 2043 111/81 97.5 °F (36.4 °C) -- 99 20 100 % --   06/03/22 1946 110/78 -- -- (!) 112 24 97 % --   06/03/22 1930 108/82 -- -- (!) 112 -- 100 % --   06/03/22 1730 88/62 97.3 °F (36.3 °C) Oral (!) 110 -- -- --   06/03/22 1606 129/84 97.6 °F (36.4 °C) Oral 85 17 -- --   06/03/22 1334 80/70 -- -- -- -- -- --   06/03/22 1159 114/86 97.7 °F (36.5 °C) Oral 89 17 95 % --   06/03/22 1152 -- -- -- -- -- -- 5' 9\" (1.753 m)       Amount and/or Complexity of Data Reviewed and Analyzed:  I reviewed and analyzed all of the unique labs and radiologic studies that are shown below as well as any that are in the HPI, and any that are in the expanded problem list below  *Each unique test, order, or document contributes to the combination of 2 or combination of 3 in Category 1 below. For this visit I also reviewed old records and prior notes.       Recent Labs     06/02/22 1953 06/03/22  0709 06/03/22 1807 06/03/22 1807 06/04/22  0348   WBC  --    < > 19.1*   < > 13.9*   HGB 6.8*   < > 6.4*   < > 5.3*   PLT  --    < > 496*   < > 198   NA  --    < > 131*   < > 134*   K  --    < > 4.9   < > 4.3   CL  --    < > 101   < > 104   CO2  --    < > 17*   < > 23   BUN  -- < > 28*   < > 31*   INR 1.1  --  1.2  --   --    APTT 33.8  --   --   --   --    ALT  --    < > 12   < > 13    < > = values in this interval not displayed. Review of most recent CBC  Lab Results   Component Value Date    WBC 13.9 (H) 06/04/2022    HGB 5.3 (LL) 06/04/2022    HCT 16.7 (L) 06/04/2022    MCV 83.9 06/04/2022     06/04/2022       Review of most recent BMP  Lab Results   Component Value Date     06/04/2022    K 4.3 06/04/2022     06/04/2022    CO2 23 06/04/2022    BUN 31 06/04/2022    CREATININE 0.20 06/04/2022    GLUCOSE 101 06/04/2022    CALCIUM 8.0 06/04/2022        Review of most recent LFTs (and lipase if done)  Lab Results   Component Value Date    ALT 13 06/04/2022    AST 19 06/04/2022    ALKPHOS 115 06/04/2022    BILITOT 0.3 06/04/2022     No results found for: LIPASE    Lab Results   Component Value Date    INR 1.2 06/03/2022    APTT 33.8 06/02/2022    APTT 32.5 03/23/2022       Review of most recent HgbA1c  No results found for: LABA1C  No results found for: EAG    Nutritional assessment screen for wound healing issues:  Lab Results   Component Value Date    LABALBU 2.4 (L) 06/04/2022         XR CHEST PORTABLE    Result Date: 6/2/2022  Unremarkable portable chest radiograph. 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 2.6 cm distal abdominal aortic aneurysm. Treated gastric cancer with moderate hiatal hernia.     Preliminary report was provided by Dr. Julia Stone, the on-call radiologist, at 21:42  hours     Final report to follow from IR.              CTA CHEST ABDOMEN PELVIS W WO CONTRAST    Result Date: 6/3/2022  1. No extravasation to suggest active GI bleed. 2. Small areas of pulmonary embolism are present in the right lower lobe. Clot burden is quite small. 3. DVT is also likely present in the left femoral veins. 4. Previously described gastric mass again noted.  It is difficult to measure to evaluate for progression. 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[facility]    Former heavy tobacco smoker    Gastroesophageal reflux disease    Hyponatremia    Hypoalbuminemia due to protein-calorie malnutrition (HCC)    Syncope due to orthostatic hypotension    Hypomagnesemia    Corticosteroid dependence ([facility]    Hypoproteinemia (HCC)    Do not resuscitate status    Fatigue    History of gastric cancer    Encounter for palliative care    Debility    Weakness    Hypercoagulable state, secondary ([facility]    Adult body mass index less than 19    Acute pulmonary embolism without acute cor pulmonale (HCC)    ABILIO (iron deficiency anemia)    Malignant neoplasm of overlapping sites of stomach (HCC)    Gastric adenocarcinoma (HCC)  Resolved Problems:    Severe sepsis with lactic acidosis (HCC)    ABLA (acute blood loss anemia)    GIB (gastrointestinal bleeding)         Stomach cancer, s/p chemo just a few days ago. Try to manage GI bleeding medically, to avoid emergency surgery, which would be much more riskier. Care Management per Hospitalist  -Hgb 4.9 s/p 3 units over last 2 days  -2 additional units pending;  Albumin  -Serial Hgb, IVF  -Pt on Midodrine for BP support   GI following  -planning EGD today  General Surgery will follow  -Further orders per Dr. Kathya Feliz, NP

## 2022-06-04 NOTE — PROGRESS NOTES
TRANSFER - OUT REPORT:    Verbal report given to Bharat RASHID Yonatan Winkler on [facility]  being transferred to  [id number] for routine post-op       Report consisted of patient's Situation, Background, Assessment and   Recommendations(SBAR). Information from the following report(s) Nurse Handoff Report was reviewed with the receiving nurse. Lines:   Single Lumen Implantable Port Left Chest (Active)   Port A Cath Status Accessed 06/03/22 1946   Criteria for Appropriate Use Hemodynamically unstable, requiring monitoring lines, vasopressors, or volume resuscitation 06/04/22 0819   Site Assessment Clean, dry & intact 06/04/22 0819   Line Care Cap changed; Connections checked and tightened 06/04/22 0819   Alcohol Cap Used Yes 06/04/22 0819   Date of Last Dressing Change 06/04/22 06/04/22 0819   Dressing Type Transparent; Antimicrobial 06/04/22 0819   Dressing Status Clean, dry & intact 06/04/22 0819   Dressing Intervention New 06/04/22 0819   Date Accessed  06/02/22 06/03/22 1946   Single Lumen Status Flushed; Infusing 06/03/22 1946   Date needle changed 06/02/22 06/03/22 1946       Peripheral IV 06/04/22 Left;Proximal;Anterior Forearm (Active)       Peripheral IV 06/04/22 Right; Anterior Cephalic (Active)        Opportunity for questions and clarification was provided.       Patient transported with:  Registered Nurse

## 2022-06-04 NOTE — OP NOTE
Esophagogastroduodenoscopy    DATE of PROCEDURE: 6/4/2022    INDICATION:GI Bleed, gastric cancer    POSTPROCEDURE DIAGNOSIS: Food in stomach    MEDICATIONS ADMINISTERED: MAC anesthesia (see anesthesia report)    INSTRUMENT: [id number]    PROCEDURE:  After obtaining informed consent, the patient was placed in the left lateral position and sedated. The endoscope was advanced under direct vision without difficulty. The esophagus, stomach (including retroflexed views) and duodenum were evaluated. The patient was taken to the recovery area in stable condition.     FINDINGS:  ESOPHAGUS: Normal  STOMACH:Obstructed by food material, I am unable to pass around or through the food material  DUODENUM: Not examined    Estimated blood loss: 0-minimal   Specimens obtained during procedure: none    PLAN:Initiate IV reglan repeat attempt at EGD in am

## 2022-06-04 NOTE — PROGRESS NOTES
TRANSFER - IN REPORT:    Verbal report received from Slim TABARES on [facility]  being received from [phone] [phone] for ordered procedure      Report consisted of patient's Situation, Background, Assessment and   Recommendations(SBAR). Information from the following report(s) Recent Results and Procedure Verification was reviewed with the receiving nurse. Opportunity for questions and clarification was provided. Assessment completed upon patient's arrival to unit and care assumed.

## 2022-06-04 NOTE — ANESTHESIA POSTPROCEDURE EVALUATION
Department of Anesthesiology  Postprocedure Note    Patient: Primo Elizabeth  MRN: [id number]  YOB: 1955  Date of evaluation: 6/4/2022  Time:  6:09 PM     Procedure Summary     Date: 06/04/22 Room / Location: [facility] ENDO [facility] 1 / [facility] ENDOSCOPY    Anesthesia Start: 6959 Anesthesia Stop:     Procedure: EGD ESOPHAGOGASTRODUODENOSCOPY (N/A Upper GI Region) Diagnosis:       Complaint of melena      (melena)    Surgeons: Edgard Killian MD Responsible Provider:     Anesthesia Type: TIVA ASA Status: 4 - Emergent          Anesthesia Type: No value filed. Stephen Phase I:      Stephen Phase II:      Last vitals: Reviewed and per EMR flowsheets.        Anesthesia Post Evaluation    Patient location during evaluation: PACU  Patient participation: complete - patient participated  Level of consciousness: awake and alert  Airway patency: patent  Nausea & Vomiting: no nausea and no vomiting  Complications: no  Cardiovascular status: hemodynamically stable  Respiratory status: acceptable, nonlabored ventilation and spontaneous ventilation  Hydration status: euvolemic  Comments: BP (!) 140/80   Pulse 77   Temp 97.9 °F (36.6 °C)   Resp 18   Ht 5' 9\" (1.753 m)   Wt 118 lb (53.5 kg)   SpO2 96%   BMI 17.43 kg/m²     Multimodal analgesia pain management approach

## 2022-06-04 NOTE — ANESTHESIA PRE PROCEDURE
Department of Anesthesiology  Preprocedure Note       Name:  Riddhi Skinner   Age:  78 y.o.  :  1955                                          MRN:  [id number]         Date:  2022      Surgeon: Merna Minaya):  Bar Torre MD    Procedure: Procedure(s):  EGD ESOPHAGOGASTRODUODENOSCOPY    Medications prior to admission:   Prior to Admission medications    Medication Sig Start Date End Date Taking? Authorizing Provider   dexamethasone (DECADRON) 1 MG tablet Take 1 mg by mouth 2 times a day 22   Ar Automatic Reconciliation   dexamethasone (DECADRON) 4 MG tablet Take 2 tabs twice daily the day before chemo and the day after chemo.  3/30/22   Ar Automatic Reconciliation   lidocaine-prilocaine (EMLA) 2.5-2.5 % cream Apply topically as needed 3/30/22   Ar Automatic Reconciliation   ondansetron (ZOFRAN) 8 MG tablet Take 8 mg by mouth every 8 hours as needed for Nausea 3/30/22   Ar Automatic Reconciliation   prochlorperazine (COMPAZINE) 10 MG tablet Take 5 mg by mouth every 6 hours as needed (nausea) 3/30/22   Ar Automatic Reconciliation       Current medications:    Current Facility-Administered Medications   Medication Dose Route Frequency Provider Last Rate Last Admin    [START ON 2022] vancomycin random level reminder   Other Once Travis South MD        0.9 % sodium chloride infusion   IntraVENous PRN WALLY Andrea - CNP        0.9 % sodium chloride infusion   IntraVENous PRN Bar Torre MD        furosemide (LASIX) injection 20 mg  20 mg IntraVENous PRN Bar Torre MD        acetaminophen (TYLENOL) tablet 650 mg  650 mg Oral Q6H PRN Clay Gutiérrez MD        Or    acetaminophen (TYLENOL) suppository 650 mg  650 mg Rectal Q6H PRN Clay Gutiérrez MD        vancomycin (VANCOCIN) 1,000 mg in sodium chloride 0.9 % 250 mL IVPB ([id number])  1,000 mg IntraVENous Q12H Travis South MD   Stopped at 22 0742    midodrine (PROAMATINE) tablet 10 mg  10 mg Oral TID PRN Shanika Varma DO  metronidazole (FLAGYL) 500 mg in 0.9% NaCl 100 mL IVPB premix  500 mg IntraVENous Q12H Alarcon Adas, DO   Stopped at 06/04/22 0850    lactated ringers infusion   IntraVENous Continuous Alarcon Adas,  mL/hr at 06/04/22 0615 New Bag at 06/04/22 0615    pantoprazole (PROTONIX) 40 mg in sodium chloride (PF) 10 mL injection  40 mg IntraVENous Q12H Alarcon Adas, DO   40 mg at 06/04/22 1617    sodium chloride flush 0.9 % injection 5-40 mL  5-40 mL IntraVENous 2 times per day Alarcon Adas, DO   10 mL at 06/04/22 0935    sodium chloride flush 0.9 % injection 5-40 mL  5-40 mL IntraVENous PRN Alarcon Adas, DO        cefepime (MAXIPIME) 2000 mg IVPB minibag in NS  2,000 mg IntraVENous Q8H Alarcon Adas, DO 12.5 mL/hr at 06/04/22 1334 2,000 mg at 06/04/22 1334    dexamethasone (DECADRON) tablet 3 mg  3 mg Oral 2 times per day Alarcon Adas, DO   3 mg at 06/04/22 7067    Followed by   Andreia Serna ON 6/5/2022] dexamethasone (DECADRON) tablet 1 mg  1 mg Oral 2 times per day Alarcon Adas, DO        sucralfate (CARAFATE) tablet 1 g  1 g Oral 4 times per day Alarcon Adas, DO   1 g at 06/04/22 1140    sodium chloride flush 0.9 % injection 10 mL  10 mL IntraVENous ONCE PRN Alarcon Adas, DO        folic acid (FOLVITE) tablet 1 mg  1 mg Oral Daily Alarcon Adas, DO   1 mg at 06/04/22 0925    mirtazapine (REMERON) tablet 7.5 mg  7.5 mg Oral Nightly Alarcon Adas, DO   7.5 mg at 06/03/22 2207    ondansetron (ZOFRAN) injection 4 mg  4 mg IntraVENous Q6H PRN Alarcon Adas, DO   4 mg at 06/03/22 1744    saliva substitute (BIOTENE/MOUTH KOTE) liquid   Oral PRN Alarcon Adas, DO        Medela Tender Care Lanolin cream   Topical PRN Alarcon Adas, DO           Allergies:     Allergies   Allergen Reactions    Iron     Tetanus Antitoxin        Problem List:    Patient Active Problem List   Diagnosis Code    ABILIO (iron deficiency anemia) D50.9    [facility] sites of [facility] C16.8    Malignant neoplasm of body of stomach (HCC) C16.2    Gastric adenocarcinoma (HCC) C16.9    Septic shock (HCC) A41.9, R65.21    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[facility] 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    Former heavy tobacco smoker Z87.891    Gastroesophageal reflux disease K21.9    Loss of weight R63.4    Open fracture of proximal phalanx of left thumb S62.512B    Thumb amputation status, left [id number]    Tobacco use disorder F17.200    Hyponatremia E87.1    Hypoalbuminemia due to protein-calorie malnutrition (HCC) E88.09, E46    Syncope due to orthostatic hypotension I95.1    Hypomagnesemia E83.42    Corticosteroid dependence (HCC) F19.20    Hypoproteinemia (HCC) E77.8    Do not resuscitate status Z66    Fatigue R53.83    History of gastric cancer Z85.028    Encounter for palliative care Z51.5    Debility R53.81    Weakness R53.1    Hypercoagulable state, secondary ([facility] D68.69    Adult body mass index less than 19 Z68.1    Acute pulmonary embolism without acute cor pulmonale (HCC) I26.99       Past Medical History:        Diagnosis Date    ABLA (acute blood loss anemia) 6/2/2022    GIB (gastrointestinal bleeding) 6/2/2022    HTN (hypertension)     Severe sepsis with lactic acidosis ([facility] 6/2/2022       Past Surgical History:        Procedure Laterality Date    COLONOSCOPY N/A 3/9/2022    COLONOSCOPY/ 22 performed [facility]ire MD [facility] ENDOSCOPY    KNEE ARTHROSCOPY      OTHER SURGICAL HISTORY      none       Social History:    Social History     Tobacco Use    Smoking status: Former Smoker    Smokeless tobacco: Former User   Substance Use Topics    Alcohol use: Not Currently                                Counseling given: Not Answered      Vital Signs (Current):   Vitals:    06/04/22 1245 06/04/22 1252 06/04/22 1400 06/04/22 1510   BP: 108/60 (!) 107/59 130/61 128/67   Pulse: 63 88 83 91   Resp: 18 18 18 18   Temp: 97.3 °F (36.3 °C) 97.4 °F (36.3 °C) 97.8 °F (36.6 °C) 97.2 °F (36.2 °C)   TempSrc: Oral Oral Oral Oral SpO2: 95% 93% 95% 98%   Weight:       Height:                                                  BP Readings from Last 3 Encounters:   06/04/22 128/67   05/31/22 110/72   05/30/22 118/70       NPO Status:                                                                                 BMI:   Wt Readings from Last 3 Encounters:   06/02/22 118 lb (53.5 kg)   05/31/22 118 lb (53.5 kg)   05/18/22 120 lb 9.6 oz (54.7 kg)     Body mass index is 17.43 kg/m².     CBC:   Lab Results   Component Value Date    WBC 13.9 06/04/2022    RBC 1.99 06/04/2022    HGB 4.9 06/04/2022    HCT 15.4 06/04/2022    MCV 83.9 06/04/2022    RDW 16.9 06/04/2022     06/04/2022       CMP:   Lab Results   Component Value Date     06/04/2022    K 4.3 06/04/2022     06/04/2022    CO2 23 06/04/2022    BUN 31 06/04/2022    CREATININE 0.20 06/04/2022    GFRAA >60 06/04/2022    AGRATIO 0.4 05/17/2022    LABGLOM >60 06/04/2022    GLUCOSE 101 06/04/2022    PROT 4.7 06/04/2022    CALCIUM 8.0 06/04/2022    BILITOT 0.3 06/04/2022    ALKPHOS 115 06/04/2022    ALKPHOS 176 05/17/2022    AST 19 06/04/2022    ALT 13 06/04/2022       POC Tests:   Recent Labs     06/03/22  1802   POCGLU 337*       Coags:   Lab Results   Component Value Date    PROTIME 15.2 06/03/2022    INR 1.2 06/03/2022    APTT 33.8 06/02/2022    APTT 32.5 03/23/2022       HCG (If Applicable): No results found for: PREGTESTUR, PREGSERUM, HCG, HCGQUANT     ABGs: No results found for: PHART, PO2ART, QOX0NGC, EAE1LRO, BEART, A4TGRAPQ     Type & Screen (If Applicable):  No results found for: LABABO, LABRH    Drug/Infectious Status (If Applicable):  No results found for: HIV, HEPCAB    COVID-19 Screening (If Applicable): No results found for: COVID19        Anesthesia Evaluation  Patient summary reviewed  Airway: Mallampati: II          Dental: normal exam   (+) poor dentition      Pulmonary:Negative Pulmonary ROS breath sounds clear to auscultation Cardiovascular:  Exercise tolerance: good (>4 METS),   (+) dysrhythmias: SVT,     (-) past MI, murmur and peripheral edema      Rhythm: regular  Rate: normal                    Neuro/Psych:   Negative Neuro/Psych ROS     (-) CVA           GI/Hepatic/Renal: Neg GI/Hepatic/Renal ROS  (+) GERD:,           Endo/Other: Negative Endo/Other ROS   (+) electrolyte abnormalities, malignancy/cancer. ROS comment: Frail, malnourished,    Proufound anemia, 4.9 this morning, s/p 1u PRBCs Abdominal:             Vascular: negative vascular ROS. Other Findings:           Anesthesia Plan      TIVA     ASA 4 - emergent     (Discussed risks with Dr. Jaime Kennedy, patient and spouse including inceased risk of arrhyhtmia, hypotension  And the compensatory plan of less sedation which increases risk of awareness. After 1u HR is 65 and /65, patient with dilute urine and no shortness of breath with clear lung harper, Dr. Jaime Kennedy indicates that hemospray could reduce tumor bleeding for up to 72hours, and thus I elected to proceed trying to weigh all the risks and benefits of this brief procedure with the patient's clinical picture. Plan is for mild sedation and Pharyngeal popularization. Post 6/4/ EGD Note: patient prone to obstruction, 2md of midaz, 30mg propofol and 10mg ketamine, and LTA yielded ~20minutes of unconsciousness. Full stomach despite \"no food in months\" per patient spouse.)  Induction: intravenous. Anesthetic plan and risks discussed with patient.                         Ismael Joe MD   6/4/2022

## 2022-06-04 NOTE — PROGRESS NOTES
Date of Outreach Update:  Alex Hudson was seen and assessed. @[facility]([id number])@  Vitals:    06/04/22 1245 06/04/22 1252 06/04/22 1400 06/04/22 1510   BP: 108/60 (!) 107/59 130/61 128/67   Pulse: 63 88 83 91   Resp: 18 18 18 18   Temp: 97.3 °F (36.3 °C) 97.4 °F (36.3 °C) 97.8 °F (36.6 °C) 97.2 °F (36.2 °C)   TempSrc: Oral Oral Oral Oral   SpO2: 95% 93% 95% 98%   Weight:       Height:             Pain Assessment  @[facility]([id number])@               Previous Outreach assessment has been reviewed. There have been no significant clinical changes since the completion of the last dated Outreach assessment. EGD to be completed this afternoon, will follow closely. Will continue to follow up per outreach protocol.     Signed By:   Juana Hoffmann RN    June 4, 2022 4:14 PM

## 2022-06-04 NOTE — PROGRESS NOTES
SPEECH LANGUAGE PATHOLOGY: DYSPHAGIA  Initial Assessment and Discharge    NAME: Khushbu Geller  :   MRN: [id number]    ADMISSION DATE: 2022  ADMITTING DIAGNOSIS: has ABILIO (iron deficiency anemia); Malignant neoplasm of overlapping sites of stomach ([facility] Malignant neoplasm of body of stomach ([facility] Gastric adenocarcinoma ([facility] Septic shock ([facility] 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[facility] Former heavy tobacco smoker; Gastroesophageal reflux disease; Loss of weight; Open fracture of proximal phalanx of left thumb; Thumb amputation status, left; Tobacco use disorder; Hyponatremia; Hypoalbuminemia due to protein-calorie malnutrition ([facility] Syncope due to orthostatic hypotension; Hypomagnesemia; Corticosteroid dependence ([facility] Hypoproteinemia ([facility] Do not resuscitate status; Fatigue; History of gastric cancer; Encounter for palliative care; Debility; Weakness; Hypercoagulable state, secondary ([facility] Adult body mass index less than 19; and Acute pulmonary embolism without acute cor pulmonale (HCC) on their problem list.  Date of Eval: 2022  ICD-10: Treatment Diagnosis: R13.11 Dysphagia, Oral Phase    RECOMMENDATIONS   Diet:     Liquid Consistency Recommendation: Clear    Medications: Whole with water     Referral To: GI   Compensatory Swallowing Strategies:    Therapeutic Intervention:    Patient continues to require skilled intervention:    D/C Recommendations: No follow up therapy recommended post discharge     ASSESSMENT    Dysphagia Diagnosis: Swallow function appears WFL  Dysphagia Impression : Pt on clears at this time. RN reports no further PO trials allowed on this date. Pt tolerated thin liquids via straw and pureed with no overt signs/sx of aspiration. will sign off at this time. please reconsult if needed.       GENERAL    History of Present Injury/Illness: Mr. Stacie Parks  has a past medical history of ABLA (acute blood loss anemia), GIB (gastrointestinal bleeding), HTN (hypertension), and Severe sepsis with lactic acidosis ([address] . He also  has a past surgical history that includes other surgical history; Knee arthroscopy; and Colonoscopy (N/A, 3/9/2022). Chart Reviewed: Yes  Behavior/Cognition: Alert  Patient Position: upright in bed  Communication Observation: Functional  Follows Directions: Simple                       Current Diet :  (clears)  Current Liquid Diet : Clear  Pain:   Patient does not c/o pain                   Vision: Within Functional Limits            Hearing: Within functional limits    Hearing Exceptions: Hard of hearing/hearing concerns  OBJECTIVE    Patient Positioning: Upright in bed   Oral Motor   Labial: No impairment  Dentition: Natural  Oral Hygiene: Clean  Oral Hygiene Comments: adequate  Lingual: No impairment  Dentition: Adequate        Baseline Vocal Quality: Normal  Oropharyngeal Phase: Thin - straw;Pureed  Neuromuscular Estim Used: No  Assessment Method(s): Observation  Patient Position: upright in bed  Vocal Quality: No Impairment  Consistency Presented: Pureed; Thin  How Presented: Self-fed/presented;Spoon;Straw  Bolus Acceptance: No impairment  Bolus Formation/Control: No impairment  Propulsion: No impairment  Oral Residue: None  Initiation of Swallow: No impairment  Aspiration Signs/Symptoms: None  Pharyngeal Phase Characteristics: No impairment, issues, or problems              PLAN    Duration/Frequency: No treatment indicated at this time    Dysphagia Outcome and Severity Scale (ALTA)  Dysphagia Outcome Severity Scale: Level 7: Normal in all situations  Interpretation of Tool: The Dysphagia Outcome and Severity Scale (ALTA) is a simple, easy-to-use, 7-point scale developed to systematically rate the functional severity of dysphagia based on objective assessment and make recommendations for diet level, independence level, and type of nutrition.    Normal(7), Functional(6), Mild(5), Mild-Moderate(4), Moderate(3), Moderate-Severe(2), Severe(1)         Education: RN  Patient Education: aspiration risk, dysphagia  Patient Education Response: Verbalizes understanding    Current Medications:   No current facility-administered medications on file prior to encounter. Current Outpatient Medications on File Prior to Encounter   Medication Sig Dispense Refill    dexamethasone (DECADRON) 1 MG tablet Take 1 mg by mouth 2 times a day      dexamethasone (DECADRON) 4 MG tablet Take 2 tabs twice daily the day before chemo and the day after chemo.       lidocaine-prilocaine (EMLA) 2.5-2.5 % cream Apply topically as needed      ondansetron (ZOFRAN) 8 MG tablet Take 8 mg by mouth every 8 hours as needed for Nausea      prochlorperazine (COMPAZINE) 10 MG tablet Take 5 mg by mouth every 6 hours as needed (nausea)         PRECAUTIONS/ALLERGIES: Iron and Tetanus antitoxin   Safety Devices in place: Yes  Type of devices: Call light within reach,Nurse notified  Restraints Initially in Place: No    Therapy Time  SLP Individual Minutes  Time In: 1010  Time Out: [id number] [address]oss  Minutes: [address], [address]  6/4/2022 10:42 AM

## 2022-06-04 NOTE — PROGRESS NOTES
Date of Outreach Update:  Aditya Nails was seen and assessed. Previous Outreach assessment has been reviewed. There have been no significant clinical changes since the completion of the last dated Outreach assessment. Will continue to follow up per outreach protocol.     Signed By:   Ynes Fritz RN    June 4, 2022 2:00 AM

## 2022-06-04 NOTE — CONSULTS
[facility] Consult Note           Referring Provider:  HU blankenship DO    Consult Date:  6/4/2022    Admit Date:  6/2/2022    Chief Complaint:  melena    Subjective:     History of Present Illness:  Patient is a 78 y.o. male with PMH including but not limited to Gastric CA, s/p chemo , who is seen in consultation at the request of Dr. Joanne Khan for GI Bleeding. He has a known Gastric CA involving the majority of his stomach initially diagnosed in March. He was admitted and seen by me on Thursday secondary to GI bleeding. It was recommended that his surgeon be consulted. It is unclear if changes were made to his plan of surgery. He had an episode of melena last pm, associated with transient hypotension. CTA 6/2 demonstrated no large volume gastric bleeding    PMH:  Past Medical History:   Diagnosis Date    ABLA (acute blood loss anemia) 6/2/2022    GIB (gastrointestinal bleeding) 6/2/2022    HTN (hypertension)     Severe sepsis with lactic acidosis ([address] 6/2/2022       PSH:  Past Surgical History:   Procedure Laterality Date    COLONOSCOPY N/A 3/9/2022    COLONOSCOPY/ 22 performed by Joaquina Mcnally MD at [address] [place] [address] ARTHROSCOPY      OTHER SURGICAL HISTORY      none       Allergies: Allergies   Allergen Reactions    Iron     Tetanus Antitoxin        Home Medications:  Prior to Admission medications    Medication Sig Start Date End Date Taking? Authorizing Provider   dexamethasone (DECADRON) 1 MG tablet Take 1 mg by mouth 2 times a day 4/18/22   Ar Automatic Reconciliation   dexamethasone (DECADRON) 4 MG tablet Take 2 tabs twice daily the day before chemo and the day after chemo.  3/30/22   Ar Automatic Reconciliation   lidocaine-prilocaine (EMLA) 2.5-2.5 % cream Apply topically as needed 3/30/22   Ar Automatic Reconciliation   ondansetron (ZOFRAN) 8 MG tablet Take 8 mg by mouth every 8 hours as needed for Nausea 3/30/22   Ar Automatic Reconciliation   prochlorperazine (COMPAZINE) 10 MG tablet Take 5 mg by mouth every 6 hours as needed (nausea) 3/30/22   Ar Automatic Reconciliation       [facility] Medications:  Current Facility-Administered Medications   Medication Dose Route Frequency    0.9 % sodium chloride infusion   IntraVENous PRN    [START ON 6/5/2022] vancomycin random level reminder   Other Once    0.9 % sodium chloride infusion   IntraVENous PRN    acetaminophen (TYLENOL) tablet 650 mg  650 mg Oral Q6H PRN    Or    acetaminophen (TYLENOL) suppository 650 mg  650 mg Rectal Q6H PRN    vancomycin (VANCOCIN) 1,000 mg in sodium chloride 0.9 % 250 mL IVPB ([id number])  1,000 mg IntraVENous Q12H    albumin human 25 % IV solution 25 g  25 g IntraVENous Q6H    0.9 % sodium chloride infusion   IntraVENous PRN    midodrine (PROAMATINE) tablet 10 mg  10 mg Oral TID PRN    metronidazole (FLAGYL) 500 mg in 0.9% NaCl 100 mL IVPB premix  500 mg IntraVENous Q12H    lactated ringers infusion   IntraVENous Continuous    0.9 % sodium chloride infusion   IntraVENous PRN    0.9 % sodium chloride infusion   IntraVENous PRN    pantoprazole (PROTONIX) 40 mg in sodium chloride (PF) 10 mL injection  40 mg IntraVENous Q12H    sodium chloride flush 0.9 % injection 5-40 mL  5-40 mL IntraVENous 2 times per day    sodium chloride flush 0.9 % injection 5-40 mL  5-40 mL IntraVENous PRN    cefepime (MAXIPIME) 2000 mg IVPB minibag in NS  2,000 mg IntraVENous Q8H    dexamethasone (DECADRON) tablet 3 mg  3 mg Oral 2 times per day    Followed by   Audie Parsons ON 6/5/2022] dexamethasone (DECADRON) tablet 1 mg  1 mg Oral 2 times per day    sucralfate (CARAFATE) tablet 1 g  1 g Oral 4 times per day    sodium chloride flush 0.9 % injection 10 mL  10 mL IntraVENous ONCE PRN    folic acid (FOLVITE) tablet 1 mg  1 mg Oral Daily    mirtazapine (REMERON) tablet 7.5 mg  7.5 mg Oral Nightly    ondansetron (ZOFRAN) injection 4 mg  4 mg IntraVENous Q6H PRN    saliva substitute (BIOTENE/MOUTH KOTE) liquid   Oral PRN    Nicci Tender Care Lanolin cream   Topical PRN       Social History:  Social History     Tobacco Use    Smoking status: Former Smoker    Smokeless tobacco: Former User   Substance Use Topics    Alcohol use: Not Currently       Pt denies any history of drug use, blood transfusions, or tattoos. Family History:  Family History   Problem Relation Age of Onset    Other Other         non-contributory       Review of Systems:  A detailed 10 system ROS is obtained, with pertinent positives as listed above. All others are negative. Diet:      Objective:     Physical Exam:  Vitals:  /80   Pulse 81   Temp 97.5 °F (36.4 °C) (Oral)   Resp 12   Ht 5' 9\" (1.753 m)   Wt 118 lb (53.5 kg)   SpO2 98%   BMI 17.43 kg/m²   Gen:  Pt is alert, cooperative, no acute distress  Skin:  Extremities and face reveal no rashes. HEENT: Sclerae anicteric. Extra-occular muscles are intact. No oral ulcers. No abnormal pigmentation of the lips. The neck is supple. Cardiovascular: Regular rate and rhythm. No murmurs, gallops, or rubs. Respiratory:  Comfortable breathing with no accessory muscle use. Clear breath sounds anteriorly with no wheezes, rales, or rhonchi. GI:  Abdomen nondistended, soft, and nontender. Normal active bowel sounds. No enlargement of the liver or spleen. No masses palpable. Rectal:  Deferred  Musculoskeletal:  No pitting edema of the lower legs. Neurological:  Gross memory appears intact. Patient is alert and oriented. Psychiatric:  Mood appears appropriate with judgement intact. Lymphatic:  No cervical or supraclavicular adenopathy.     Laboratory:    Recent Labs     06/02/22  1312 06/02/22  1953 06/03/22  0709 06/03/22  1410 06/03/22  1807 06/04/22  0348   WBC 20.4*  --  8.4  --  19.1* 13.9*   HGB 5.6* 6.8* 7.8* 7.8* 6.4* 5.3*   HCT 19.5* 22.2* 24.8* 25.4* 21.9* 16.7*     --  287  --  496* 198   MCV 83.7  --  81.0  --  84.6 83.9   *  --   --  130* 131* 134*   K 4.6  --   -- 4.3 4.9 4.3   CL 96*  --   --  101 101 104   CO2 25  --   --  24 17* 23   BUN 23  --   --  27* 28* 31*   MG 1.8  --   --   --   --   --    AST 17  --   --  10* 17 19   ALT 15  --   --  11* 12 13   INR  --  1.1  --   --  1.2  --    APTT  --  33.8  --   --   --   --           Assessment:     Principal Problem:    Septic shock (HCC)  Active Problems:    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HCC)    Former heavy tobacco smoker    Gastroesophageal reflux disease    Hyponatremia    Hypoalbuminemia due to protein-calorie malnutrition (HCC)    Syncope due to orthostatic hypotension    Hypomagnesemia    Corticosteroid dependence (HCC)    Hypoproteinemia (HCC)    Do not resuscitate status    Fatigue    History of gastric cancer    Encounter for palliative care    Debility    Weakness    Hypercoagul[facility] state, secondary [facility]    Adult body mass index less than 19    Acute pulmonary embolism without acute cor pulmonale (HCC)    ABILIO (iron deficiency anemia)    Malignant neoplasm of overlapping sites of stomach (HCC)    Gastric adenocarcinoma (HCC)  Resolved Problems:    Severe sepsis with lactic acidosis (HCC)    ABLA (acute blood loss anemia)    GIB (gastrointestinal bleeding)      Plan:     GI Bleed-  In the setting of known Gastric malignancy. Plan for EGD later today. He should receive additional blood, as we should expect continued bleeding from necrotic tumor until surgery.

## 2022-06-04 NOTE — PROGRESS NOTES
Roby Ramirez CRITICAL CARE OUTREACH NURSE PROGRESS REPORT      SUBJECTIVE: Called to assess patient secondary to ICU outreach protocol. @[facility]([id number])@  Vitals:    06/04/22 0819 06/04/22 1215 06/04/22 1228 06/04/22 1252   BP: 112/80 106/71 106/71 (!) (P) 107/59   Pulse: 81  89    Resp: 12 16 16 (P) 18   Temp: 97.5 °F (36.4 °C)  97.1 °F (36.2 °C) (P) 97.4 °F (36.3 °C)   TempSrc: Oral Axillary Oral (P) Oral   SpO2: 98%  98% (P) 93%   Weight:       Height:          EKG: normal EKG, normal sinus rhythm, unchanged from previous tracings. LAB DATA:    Recent Labs     06/02/22  1312 06/02/22  1312 06/03/22  1410 06/03/22  1807 06/04/22  0348   *   < > 130* 131* 134*   K 4.6   < > 4.3 4.9 4.3   CL 96*   < > 101 101 104   CO2 25   < > 24 17* 23   BUN 23   < > 27* 28* 31*   GFRAA >60   < > >60 >60 >60   MG 1.8  --   --   --   --    PHOS  --   --   --   --  3.5   GLOB 3.1   < > 3.0 2.7 2.3   ALT 15   < > 11* 12 13    < > = values in this interval not displayed. Recent Labs     06/03/22  0709 06/03/22  1410 06/03/22  1807 06/04/22  0348 06/04/22  1102   WBC 8.4  --  19.1* 13.9*  --    HGB 7.8*   < > 6.4* 5.3* 4.9*   HCT 24.8*   < > 21.9* 16.7* 15.4*     --  496* 198  --     < > = values in this interval not displayed. OBJECTIVE: On arrival to room, I found patient to be resting in bed, asleep, but easily arousable. ASSESSMENT:  Pt in bed, lung sounds clear, S1 S2. Oriented to self, but quickly falls asleep during conversation. Multiple episodes of bloody BMs and continual decrease in Hgb. GI to be reconsulted per primary RN and 2 more units of PRBC to be administered. Surgery to also re-evaluate potential need for sx treatment of gastric mass. PLAN:  Will continue monitoring per ICU outreach protocol. Spoke with Hospitalist NP: pt at high risk for unit transfer d/t decreased Hgb and continued bleeding - will follow along very closely.

## 2022-06-04 NOTE — PROGRESS NOTES
Second unit of blood started at 7137 per order/policy. Dr. Summer Rojas present and running blood for patient. Co-signed and verified per policy.  See Blood Admin documentation

## 2022-06-04 NOTE — PROGRESS NOTES
Pt received I unit blood. Hgb came back 6.2, attending provider notified. Transfering to [facility] lab for the procedure. Verbal report given to oRssy TABARES at bedside.

## 2022-06-04 NOTE — PROGRESS NOTES
Hospitalist Progress Note   Admit Date:  2022  1:05 PM   Name:  Johan Ziegler   Age:  78 y.o. Sex:  male  :  1955   MRN:  [id number]   Room:  [facility]/01    Presenting Complaint: Loss of Consciousness    Reason(s) for Admission: Syncope and collapse [R55]  Hemorrhagic shock ([address] [R57.8]  Shock ([address] [R57.9]  History of gastric cancer [Z85.028]  Septic shock ([address] [A41.9, R65.21]  Gastrointestinal hemorrhage, unspecified gastrointestinal hemorrhage type [facility] Course & Interval History:   66M PMHx gastric adenocarcinoma s/p 4 cycles chemo, last dose 22, 50 pack year smoking history,  HTN, who presented  after having a syncopal episode with 2 day history of near syncope. He started feeling weak and tired then stood up and passed out. Endorsed abdominal pain. Noted that patient has been unable to tolerate solid food for months, and consumes several ensures per day but still had  significant weight loss. Wife also reported  breathing abnormally and very shallow, with some chest discomfort    Prior to arrival, HR was erratic jumping from normal to as high as 160-170, described as SVT vs Sinus tach per EMS. [facility] rec'd 1500 cc LR bolus and 4 mg zofran while en route. In ED, he was hypotensive, BP 74/54, , RR 22. He rec'd S/p  30cc/kg sepsis, vancomcyin, cefepime 1 UpRBC ordered    Patient with pre syncopal episodes yesterday and given 2 units PRBC. AC held. IR consulted for possible IVC filter due to continuous GI bleeding. Subjective/24hr Events (22):  Endorses fatigue, bloody stools,  denies CP, N/V. Family questioning swelling RUE near elbow which is result of patient accidentally pulling out IV yesterday. Will image if swelling gets worse. 2 lrg bloody stools per nursing       ROS:  10 systems reviewed and negative except as noted above. Assessment & Plan:   Septic Shock, hemorrhagic shock  GPR bacteremia  Admission  RR 22 BP 74/54 without clear source.  S/p 30 cc/kg sepsis Bolus. S/p pRBC 2U 6/2.   - albumin q6hx4 doses  - Vancomycin (6/2-. .), Cefepime (6/2-. Blanchie Bevels )  - decadron 4 mg f/b 3 mg BID x 3 days then reduce back to home dose of dexamethasone 1 mg BID. - follow blood cultures. Check random cortisol.   - Midodrine 10 mg TID prn for MAP persistently <65   6/4/22  -BCx positive for GPC x 1 bottle(anaerobic). Continue Vanc/Cefepime/Flagyl  -Repeat BCx pending   -IVF     Chronic Anemia   Admission Hgb 5.6; Suspected abla but GI consult signed off. Most likely BM suppression from chemo/malignancy plus nutritional deficiencies. S/p pRBC 2U 6/2; s/p b12 6951 mcg IM   - folic acid  - PPI IV BID and carafate   - consult hematology  6/4/22  -Continued GI loss  -Hgb 4.9 s/p 3 units over last 2 days  -2 additional units pending; Albumin  -GS notified of continued blood loss  -GI with plans for EGD today although bleeding likely from necrotic tumor  -Serial Hgb, IVF  -ICU [place] also following  -Pt on Midodrine for BP support         Hypercoagulable state  CT CAP with small PE, femoral DVT. Discussed with surgery, no current plan for urgent intervention  - apixaban  - duplex BLE  6/4/22  -Holding AC due to bleeding  [facility] consulted for possible IVC placement   -Will need Hematology f/u      Cancer cachexia, severe protein caloric malnutrition, Hypoproteinemia  - remeron qhs  - Steroids as above. - Ensure clear added per nutrition; appreciate assistance   - TPN being considered      Gastric adenocarcinoma   s/p 4 cycle chemotherapy  - Oncology and GS onboard    - Patient will undergo total gastrectomy once clinically improved     Former smoker   noted          Discharge Planning:      TBD    Diet:  ADULT DIET; Clear Liquid;  No red dye  ADULT ORAL NUTRITION SUPPLEMENT; Breakfast, Lunch, Dinner; Clear Liquid Oral Supplement  ADULT ORAL NUTRITION SUPPLEMENT; Breakfast, Lunch, Dinner; Clear Liquid Oral Supplement  DVT PPx: SCD  Code status: DNR    There is a high probability of acute organ impairment or life-threatening deterioration in the patient's condition from:   Hemorrhagic shock    Total critical care time spent:35 minutes. Time is indicative of direct patient attendance at bedside and on the patient's floor nearby. Includes time spent at bedside performing history and exam, performing chart review, discussing findings and treatment plan with patient and/or family, discussing patient with nursing staff, consultants and colleagues, and ordering/reviewing pertinent laboratory and radiographic evaluations. Time excludes procedures. CPT:  17079: First 30-74 minutes  77990: Each block of 30 min. beyond 76    [address] done: no  (If yes, please chart separate ACP note). Objective:   Patient Vitals for the past 24 hrs:   Temp Pulse Resp BP SpO2   06/04/22 0819 97.5 °F (36.4 °C) 81 12 112/80 98 %   06/04/22 0320 97.5 °F (36.4 °C) 84 18 126/85 100 %   06/03/22 2337 97.3 °F (36.3 °C) 89 20 117/83 100 %   06/03/22 2058 97.5 °F (36.4 °C) 86 19 110/84 100 %   06/03/22 2043 97.5 °F (36.4 °C) 99 20 111/81 100 %   06/03/22 1946 -- (!) 112 24 110/78 97 %   06/03/22 1930 -- (!) 112 -- 108/82 100 %   06/03/22 1730 97.3 °F (36.3 °C) (!) 110 -- 88/62 --   06/03/22 1606 97.6 °F (36.4 °C) 85 17 129/84 --   06/03/22 1334 -- -- -- 80/70 --     @[id number])@    Estimated body mass index is 17.43 kg/m² as calculated from the following:    Height as of this encounter: 5' 9\" (1.753 m). Weight as of this encounter: 118 lb (53.5 kg). Intake/Output Summary (Last 24 hours) at 6/4/2022 1215  Last data filed at 6/4/2022 9961  Gross per 24 hour   Intake 5001.03 ml   Output 900 ml   Net 4101.03 ml         Physical Exam:     Blood pressure 112/80, pulse 81, temperature 97.5 °F (36.4 °C), temperature source Oral, resp. rate 12, height 5' 9\" (1.753 m), weight 118 lb (53.5 kg), SpO2 98 %. General:    Ill appearing;  No overt distress  Head:  Normocephalic, atraumatic  Eyes:  Sclerae appear normal.  Pupils equally round. ENT:  Nares appear normal, no drainage. Moist oral mucosa  Neck:  No restricted ROM. Trachea midline   CV:   RRR. No m/r/g. No jugular venous distension. Lungs:   CTAB. No wheezing, rhonchi, or rales. Respirations even, unlabored  Abdomen: Bowel sounds present. Soft, nontender, nondistended. Extremities: No cyanosis or clubbing. No edema  Skin:     No rashes. Pallor  Warm and dry. Neuro:  CN II-XII grossly intact. A&Ox3  Psych:  Normal mood and affect.       I have reviewed ordered lab tests and independently visualized imaging below:    Recent Labs:  Recent Results (from the past 48 hour(s))   EKG 12 Lead    Collection Time: 06/02/22  1:10 PM   Result Value Ref Range    Ventricular Rate 108 BPM    Atrial Rate 109 BPM    P-R Interval 167 ms    QRS Duration 73 ms    Q-T Interval 326 ms    QTc Calculation (Bazett) 437 ms    P Axis 51 degrees    R Axis 41 degrees    T Axis 66 degrees    Diagnosis Sinus tachycardia    Culture, Blood 1    Collection Time: 06/02/22  1:12 PM    Specimen: Blood   Result Value Ref Range    Special Requests PORT      Gram stain GRAM POSITIVE RODS      Gram stain ANAEROBIC BOTTLE POSITIVE      Gram stain        RESULTS VERIFIED, PHONED TO AND READ BACK BY DR GELLER AT 0739 ON 6.3.22, ROLF    Culture CULTURE IN [facility] UPDATES TO FOLLOW     Lactic Acid    Collection Time: 06/02/22  1:12 PM   Result Value Ref Range    Lactic Acid, Plasma 5.1 (HH) 0.4 - 2.0 MMOL/L   CBC with Auto Differential    Collection Time: 06/02/22  1:12 PM   Result Value Ref Range    WBC 20.4 (H) 4.3 - 11.1 K/uL    RBC 2.33 (L) 4.23 - 5.6 M/uL    Hemoglobin 5.6 (LL) 13.6 - 17.2 g/dL    Hematocrit 19.5 (L) 41.1 - 50.3 %    MCV 83.7 79.6 - 97.8 FL    MCH 24.0 (L) 26.1 - 32.9 PG    MCHC 28.7 (L) 31.4 - 35.0 g/dL    RDW 19.0 (H) 11.9 - 14.6 %    Platelets 903 711 - 367 K/uL    MPV 8.8 (L) 9.4 - 12.3 FL    nRBC 0.00 0.0 - 0.2 K/uL Differential Type AUTOMATED      Seg Neutrophils 79 (H) 43 - 78 %    Lymphocytes 5 (L) 13 - 44 %    Monocytes 10 4.0 - 12.0 %    Eosinophils % 0 (L) 0.5 - 7.8 %    Basophils 0 0.0 - 2.0 %    Immature Granulocytes 5 0.0 - 5.0 %    Segs Absolute 16.0 (H) 1.7 - 8.2 K/UL    Absolute Lymph # 1.1 0.5 - 4.6 K/UL    Absolute Mono # 2.1 (H) 0.1 - 1.3 K/UL    Absolute Eos # 0.0 0.0 - 0.8 K/UL    Basophils Absolute 0.0 0.0 - 0.2 K/UL    Absolute Immature Granulocyte 1.1 (H) 0.0 - 0.5 K/UL   CMP    Collection Time: 06/02/22  1:12 PM   Result Value Ref Range    Sodium 131 (L) 136 - 145 mmol/L    Potassium 4.6 3.5 - 5.1 mmol/L    Chloride 96 (L) 98 - 107 mmol/L    CO2 25 21 - 32 mmol/L    Anion Gap 10 7 - 16 mmol/L    Glucose 195 (H) 65 - 100 mg/dL    BUN 23 8 - 23 MG/DL    CREATININE 0.40 (L) 0.8 - 1.5 MG/DL    GFR African American >60 >60 ml/min/1.73m2    GFR Non- >60 >60 ml/min/1.73m2    Calcium 7.9 (L) 8.3 - 10.4 MG/DL    Total Bilirubin 0.2 0.2 - 1.1 MG/DL    ALT 15 12 - 65 U/L    AST 17 15 - 37 U/L    Alk Phosphatase 181 (H) 50 - 136 U/L    Total Protein 4.1 (L) 6.3 - 8.2 g/dL    Albumin 1.0 (L) 3.2 - 4.6 g/dL    Globulin 3.1 2.3 - 3.5 g/dL    Albumin/Globulin Ratio 0.3 (L) 1.2 - 3.5     Procalcitonin    Collection Time: 06/02/22  1:12 PM   Result Value Ref Range    Procalcitonin 1.02 (H) 0.00 - 0.49 ng/mL   Troponin    Collection Time: 06/02/22  1:12 PM   Result Value Ref Range    Troponin, High Sensitivity 8.8 0 - 14 pg/mL   Magnesium    Collection Time: 06/02/22  1:12 PM   Result Value Ref Range    Magnesium 1.8 1.8 - 2.4 mg/dL   TYPE AND SCREEN    Collection Time: 06/02/22  1:36 PM   Result Value Ref Range    Crossmatch expiration date 06/05/2022,5279     ABO/Rh O POSITIVE     Antibody Screen NEG     Unit Number [id number]     Product Code Blood Bank RC LR     Unit Divison 00     Dispense Status Blood Bank TRANSFUSED     Crossmatch Result Compatible     Unit Number [id number]     Product Code Blood Bank RC LR     Unit Divison 00     Dispense Status Blood Bank TRANSFUSED     Crossmatch Result Compatible     Unit Number [id number]     Product Code Blood Bank RC LR     Unit Divison 00     Dispense Status Blood Bank TRANSFUSED     Crossmatch Result Compatible     Unit Number [id number]     Product Code Blood Bank RC LR     Unit Divison 00     Dispense Status Blood Bank ALLOCATED     Crossmatch Result Compatible    PREPARE RBC (CROSSMATCH), 1 Units    Collection Time: 06/02/22  1:45 PM   Result Value Ref Range    History Check Historical check performed    PREPARE RBC (CROSSMATCH), 1 Units    Collection Time: 06/02/22  3:15 PM   Result Value Ref Range    History Check Historical check performed    Culture, Blood 1    Collection Time: 06/02/22  7:53 PM    Specimen: Blood   Result Value Ref Range    Special Requests LEFT ANTECUBITAL      Culture NO GROWTH 2 DAYS     Lactate, Sepsis    Collection Time: 06/02/22  7:53 PM   Result Value Ref Range    Lactic Acid, Sepsis 1.6 0.4 - 2.0 MMOL/L   Hemoglobin and Hematocrit    Collection Time: 06/02/22  7:53 PM   Result Value Ref Range    Hemoglobin 6.8 (LL) 13.6 - 17.2 g/dL    Hematocrit 22.2 (L) 41.1 - 50.3 %   Lactic Acid    Collection Time: 06/02/22  7:53 PM   Result Value Ref Range    Lactic Acid, Plasma 1.6 0.4 - 2.0 MMOL/L   Protime-INR    Collection Time: 06/02/22  7:53 PM   Result Value Ref Range    Protime 14.6 (H) 12.6 - 14.5 sec    INR 1.1     APTT    Collection Time: 06/02/22  7:53 PM   Result Value Ref Range    PTT 33.8 24.1 - 35.1 SEC   TSH with Reflex    Collection Time: 06/02/22  7:53 PM   Result Value Ref Range    TSH w Free Thyroid if Abnormal 1.41 0.358 - 3.740 UIU/ML   Osmolality    Collection Time: 06/02/22  7:53 PM   Result Value Ref Range    Serum Osmolality 273 (L) 280 - 301 MOSM/kg H2O   Cortisol, Baseline    Collection Time: 06/02/22  7:53 PM   Result Value Ref Range    Cortisol 21.8 ug/dL   Urinalysis with Reflex to Culture    Collection Time: 06/02/22  9:30 PM    Specimen: Urine   Result Value Ref Range    Color, UA YELLOW      Appearance CLEAR      Specific Gravity, UA 1.015 1.001 - 1.023      pH, Urine 7.0 5.0 - 9.0      Protein, UA Negative NEG mg/dL    Glucose, UA Negative mg/dL    Ketones, Urine Negative NEG mg/dL    Bilirubin Urine Negative NEG      Blood, Urine Negative NEG      Urobilinogen, Urine 0.2 0.2 - 1.0 EU/dL    Nitrite, Urine Negative NEG      Leukocyte Esterase, Urine Negative NEG      WBC, UA 0 0 /hpf    RBC, UA 0 0 /hpf    BACTERIA, URINE 0 0 /hpf    Urine Culture if Indicated CULTURE NOT INDICATED BY UA RESULT      Epithelial Cells UA 0 0 /hpf    Casts 0 0 /lpf    Crystals 0 0 /LPF    Mucus, UA 0 0 /lpf   Sodium, urine, random    Collection Time: 06/02/22  9:30 PM   Result Value Ref Range    SODIUM, RANDOM URINE 16 MMOL/L   Osmolality, Urine    Collection Time: 06/02/22  9:30 PM   Result Value Ref Range    Osmolality, Ur 536 50 - 1400 MOSM/kg H2O   Lactic Acid    Collection Time: 06/02/22 10:35 PM   Result Value Ref Range    Lactic Acid, Plasma 0.9 0.4 - 2.0 MMOL/L   MSSA/MRSA Screen BY PCR    Collection Time: 06/02/22 10:48 PM    Specimen: Nasal; Blood   Result Value Ref Range    Special Requests NO SPECIAL REQUESTS      Culture (A)       MRSA target DNA not detected, SA target DNA detected. A MRSA negative, SA positive test result does not preclude MRSA nasal colonization.    Vancomycin Level, Random    Collection Time: 06/03/22  7:09 AM   Result Value Ref Range    Vancomycin Rm 26.1 UG/ML   CBC with Auto Differential    Collection Time: 06/03/22  7:09 AM   Result Value Ref Range    WBC 8.4 4.3 - 11.1 K/uL    RBC 3.06 (L) 4.23 - 5.6 M/uL    Hemoglobin 7.8 (L) 13.6 - 17.2 g/dL    Hematocrit 24.8 (L) 41.1 - 50.3 %    MCV 81.0 79.6 - 97.8 FL    MCH 25.5 (L) 26.1 - 32.9 PG    MCHC 31.5 31.4 - 35.0 g/dL    RDW 17.4 (H) 11.9 - 14.6 %    Platelets 221 314 - 616 K/uL    MPV 8.8 (L) 9.4 - 12.3 FL    nRBC 0.00 0.0 - 0.2 K/uL    Differential Type AUTOMATED      Seg Neutrophils 86 (H) 43 - 78 %    Lymphocytes 7 (L) 13 - 44 %    Monocytes 2 (L) 4.0 - 12.0 %    Eosinophils % 0 (L) 0.5 - 7.8 %    Basophils 0 0.0 - 2.0 %    Immature Granulocytes 5 0.0 - 5.0 %    Segs Absolute 7.2 1.7 - 8.2 K/UL    Absolute Lymph # 0.6 0.5 - 4.6 K/UL    Absolute Mono # 0.2 0.1 - 1.3 K/UL    Absolute Eos # 0.0 0.0 - 0.8 K/UL    Basophils Absolute 0.0 0.0 - 0.2 K/UL    Absolute Immature Granulocyte 0.4 0.0 - 0.5 K/UL   Hemoglobin and Hematocrit    Collection Time: 06/03/22  2:10 PM   Result Value Ref Range    Hemoglobin 7.8 (L) 13.6 - 17.2 g/dL    Hematocrit 25.4 (L) 41.1 - 50.3 %   Comprehensive Metabolic Panel w/ Reflex to MG    Collection Time: 06/03/22  2:10 PM   Result Value Ref Range    Sodium 130 (L) 136 - 145 mmol/L    Potassium 4.3 3.5 - 5.1 mmol/L    Chloride 101 98 - 107 mmol/L    CO2 24 21 - 32 mmol/L    Anion Gap 5 (L) 7 - 16 mmol/L    Glucose 161 (H) 65 - 100 mg/dL    BUN 27 (H) 8 - 23 MG/DL    CREATININE 0.20 (L) 0.8 - 1.5 MG/DL    GFR African American >60 >60 ml/min/1.73m2    GFR Non- >60 >60 ml/min/1.73m2    Calcium 8.4 8.3 - 10.4 MG/DL    Total Bilirubin 0.4 0.2 - 1.1 MG/DL    ALT 11 (L) 12 - 65 U/L    AST 10 (L) 15 - 37 U/L    Alk Phosphatase 115 50 - 136 U/L    Total Protein 5.1 (L) 6.3 - 8.2 g/dL    Albumin 2.1 (L) 3.2 - 4.6 g/dL    Globulin 3.0 2.3 - 3.5 g/dL    Albumin/Globulin Ratio 0.7 (L) 1.2 - 3.5     POCT Glucose    Collection Time: 06/03/22  6:02 PM   Result Value Ref Range    POC Glucose 337 (H) 65 - 100 mg/dL    Performed by: Tiffanie    CBC with Auto Differential    Collection Time: 06/03/22  6:07 PM   Result Value Ref Range    WBC 19.1 (H) 4.3 - 11.1 K/uL    RBC 2.59 (L) 4.23 - 5.6 M/uL    Hemoglobin 6.4 (LL) 13.6 - 17.2 g/dL    Hematocrit 21.9 (L) 41.1 - 50.3 %    MCV 84.6 79.6 - 97.8 FL    MCH 24.7 (L) 26.1 - 32.9 PG    MCHC 29.2 (L) 31.4 - 35.0 g/dL    RDW 17.7 (H) 11.9 - 14.6 %    Platelets 256 (H) 393 - 450 K/uL    MPV 9.0 (L) 9.4 - 12.3 FL    nRBC 0.00 0.0 - 0.2 K/uL    Seg Neutrophils 73 43 - 78 %    Lymphocytes 15 13 - 44 %    Monocytes 5 4.0 - 12.0 %    Eosinophils % 0 (L) 0.5 - 7.8 %    Basophils 0 0.0 - 2.0 %    Immature Granulocytes 7 (H) 0.0 - 5.0 %    Segs Absolute 13.9 (H) 1.7 - 8.2 K/UL    Absolute Lymph # 2.9 0.5 - 4.6 K/UL    Absolute Mono # 1.0 0.1 - 1.3 K/UL    Absolute Eos # 0.0 0.0 - 0.8 K/UL    Basophils Absolute 0.0 0.0 - 0.2 K/UL    Absolute Immature Granulocyte 1.3 (H) 0.0 - 0.5 K/UL    Differential Type AUTOMATED     Comprehensive Metabolic Panel    Collection Time: 06/03/22  6:07 PM   Result Value Ref Range    Sodium 131 (L) 136 - 145 mmol/L    Potassium 4.9 3.5 - 5.1 mmol/L    Chloride 101 98 - 107 mmol/L    CO2 17 (L) 21 - 32 mmol/L    Anion Gap 13 7 - 16 mmol/L    Glucose 371 (H) 65 - 100 mg/dL    BUN 28 (H) 8 - 23 MG/DL    CREATININE 0.50 (L) 0.8 - 1.5 MG/DL    GFR African American >60 >60 ml/min/1.73m2    GFR Non- >60 >60 ml/min/1.73m2    Calcium 8.0 (L) 8.3 - 10.4 MG/DL    Total Bilirubin 0.3 0.2 - 1.1 MG/DL    ALT 12 12 - 65 U/L    AST 17 15 - 37 U/L    Alk Phosphatase 146 (H) 50 - 136 U/L    Total Protein 4.2 (L) 6.3 - 8.2 g/dL    Albumin 1.5 (L) 3.2 - 4.6 g/dL    Globulin 2.7 2.3 - 3.5 g/dL    Albumin/Globulin Ratio 0.6 (L) 1.2 - 3.5     Protime-INR    Collection Time: 06/03/22  6:07 PM   Result Value Ref Range    Protime 15.2 (H) 12.6 - 14.5 sec    INR 1.2     Lactic Acid    Collection Time: 06/03/22  6:08 PM   Result Value Ref Range    Lactic Acid, Plasma 7.9 (HH) 0.4 - 2.0 MMOL/L   PREPARE RBC (CROSSMATCH), 2 Units    Collection Time: 06/03/22  6:15 PM   Result Value Ref Range    History Check Historical check performed    Comprehensive Metabolic Panel w/ Reflex to MG    Collection Time: 06/04/22  3:48 AM   Result Value Ref Range    Sodium 134 (L) 136 - 145 mmol/L    Potassium 4.3 3.5 - 5.1 mmol/L    Chloride 104 98 - 107 mmol/L    CO2 23 21 - 32 mmol/L    Anion Gap 7 7 - 16 mmol/L    Glucose 101 (H) 65 - 100 mg/dL    BUN 31 (H) 8 - 23 MG/DL    CREATININE 0.20 (L) 0.8 - 1.5 MG/DL    GFR African American >60 >60 ml/min/1.73m2    GFR Non- >60 >60 ml/min/1.73m2    Calcium 8.0 (L) 8.3 - 10.4 MG/DL    Total Bilirubin 0.3 0.2 - 1.1 MG/DL    ALT 13 12 - 65 U/L    AST 19 15 - 37 U/L    Alk Phosphatase 115 50 - 136 U/L    Total Protein 4.7 (L) 6.3 - 8.2 g/dL    Albumin 2.4 (L) 3.2 - 4.6 g/dL    Globulin 2.3 2.3 - 3.5 g/dL    Albumin/Globulin Ratio 1.0 (L) 1.2 - 3.5     CBC with Auto Differential    Collection Time: 06/04/22  3:48 AM   Result Value Ref Range    WBC 13.9 (H) 4.3 - 11.1 K/uL    RBC 1.99 (L) 4.23 - 5.6 M/uL    Hemoglobin 5.3 (LL) 13.6 - 17.2 g/dL    Hematocrit 16.7 (L) 41.1 - 50.3 %    MCV 83.9 79.6 - 97.8 FL    MCH 26.6 26.1 - 32.9 PG    MCHC 31.7 31.4 - 35.0 g/dL    RDW 16.9 (H) 11.9 - 14.6 %    Platelets 037 757 - 429 K/uL    MPV 9.1 (L) 9.4 - 12.3 FL    nRBC 0.00 0.0 - 0.2 K/uL    Seg Neutrophils 83 (H) 43 - 78 %    Lymphocytes 7 (L) 13 - 44 %    Monocytes 4 4.0 - 12.0 %    Eosinophils % 0 (L) 0.5 - 7.8 %    Basophils 0 0.0 - 2.0 %    Immature Granulocytes 6 (H) 0.0 - 5.0 %    Segs Absolute 11.5 (H) 1.7 - 8.2 K/UL    Absolute Lymph # 1.0 0.5 - 4.6 K/UL    Absolute Mono # 0.6 0.1 - 1.3 K/UL    Absolute Eos # 0.0 0.0 - 0.8 K/UL    Basophils Absolute 0.0 0.0 - 0.2 K/UL    Absolute Immature Granulocyte 0.8 (H) 0.0 - 0.5 K/UL    RBC Comment SLIGHT  ANISOCYTOSIS + POIKILOCYTOSIS        RBC Comment OCCASIONAL  POLYCHROMASIA        RBC Comment OCCASIONAL  OVALOCYTES        RBC Comment SLIGHT  MICROCYTOSIS        WBC Comment Result Confirmed By Smear      Platelet Comment ADEQUATE      Differential Type AUTOMATED     Phosphorus    Collection Time: 06/04/22  3:48 AM   Result Value Ref Range    Phosphorus 3.5 2.3 - 3.7 MG/DL   Lactic Acid    Collection Time: 06/04/22  3:48 AM   Result Value Ref Range    Lactic Acid, Plasma 0.8 0.4 - 2.0 MMOL/L   PREPARE RBC (CROSSMATCH), 1 Units    Collection Time: 06/04/22  4:00 AM   Result Value Ref Range    History Check Historical check performed    Hemoglobin and Hematocrit    Collection Time: 06/04/22 11:02 AM   Result Value Ref Range    Hemoglobin 4.9 (LL) 13.6 - 17.2 g/dL    Hematocrit 15.4 (L) 41.1 - 50.3 %       [unfilled]    Other Studies:  [unfilled]    Current Meds:  Current Facility-Administered Medications   Medication Dose Route Frequency    [START ON 6/5/2022] vancomycin random level reminder   Other Once    0.9 % sodium chloride infusion   IntraVENous PRN    acetaminophen (TYLENOL) tablet 650 mg  650 mg Oral Q6H PRN    Or    acetaminophen (TYLENOL) suppository 650 mg  650 mg Rectal Q6H PRN    vancomycin (VANCOCIN) 1,000 mg in sodium chloride 0.9 % 250 mL IVPB ([id number])  1,000 mg IntraVENous Q12H    albumin human 25 % IV solution 25 g  25 g IntraVENous Q6H    midodrine (PROAMATINE) tablet 10 mg  10 mg Oral TID PRN    metronidazole (FLAGYL) 500 mg in 0.9% NaCl 100 mL IVPB premix  500 mg IntraVENous Q12H    lactated ringers infusion   IntraVENous Continuous    pantoprazole (PROTONIX) 40 mg in sodium chloride (PF) 10 mL injection  40 mg IntraVENous Q12H    sodium chloride flush 0.9 % injection 5-40 mL  5-40 mL IntraVENous 2 times per day    sodium chloride flush 0.9 % injection 5-40 mL  5-40 mL IntraVENous PRN    cefepime (MAXIPIME) 2000 mg IVPB minibag in NS  2,000 mg IntraVENous Q8H    dexamethasone (DECADRON) tablet 3 mg  3 mg Oral 2 times per day    Followed by   Castro Wright ON 6/5/2022] dexamethasone (DECADRON) tablet 1 mg  1 mg Oral 2 times per day    sucralfate (CARAFATE) tablet 1 g  1 g Oral 4 times per day    sodium chloride flush 0.9 % injection 10 mL  10 mL IntraVENous ONCE PRN    folic acid (FOLVITE) tablet 1 mg  1 mg Oral Daily    mirtazapine (REMERON) tablet 7.5 mg  7.5 mg Oral Nightly    ondansetron (ZOFRAN) injection 4 mg  4 mg IntraVENous Q6H PRN    saliva substitute (BIOTENE/MOUTH KOTE) liquid Oral PRN    Medela Tender Care Lanolin cream   Topical PRN       Signed:  Meagan Canales, WALLY - CNP    Part of this note may have been written by using a voice dictation software. The note has been proof read but may still contain some grammatical/other typographical errors.

## 2022-06-04 NOTE — PROGRESS NOTES
Second unit of blood started at 5254 per order/policy. Dr. Cameron Angie present and running blood for patient. Co-signed and verified per policy. See Blood Admin documentation.

## 2022-06-05 ENCOUNTER — ANESTHESIA EVENT (OUTPATIENT)
Dept: ENDOSCOPY | Age: 67
DRG: 853 | End: 2022-06-05
Payer: MEDICARE

## 2022-06-05 ENCOUNTER — ANESTHESIA (OUTPATIENT)
Dept: ENDOSCOPY | Age: 67
DRG: 853 | End: 2022-06-05
Payer: MEDICARE

## 2022-06-05 LAB
ALBUMIN SERPL-MCNC: 2.4 G/DL (ref 3.2–4.6)
ALBUMIN/GLOB SERPL: 1 {RATIO} (ref 1.2–3.5)
ALP SERPL-CCNC: 110 U/L (ref 50–136)
ALT SERPL-CCNC: 14 U/L (ref 12–65)
ANION GAP SERPL CALC-SCNC: 8 MMOL/L (ref 7–16)
AST SERPL-CCNC: 22 U/L (ref 15–37)
BASOPHILS # BLD: 0 K/UL (ref 0–0.2)
BASOPHILS NFR BLD: 0 % (ref 0–2)
BILIRUB SERPL-MCNC: 0.3 MG/DL (ref 0.2–1.1)
BUN SERPL-MCNC: 16 MG/DL (ref 8–23)
CALCIUM SERPL-MCNC: 8.2 MG/DL (ref 8.3–10.4)
CHLORIDE SERPL-SCNC: 106 MMOL/L (ref 98–107)
CO2 SERPL-SCNC: 25 MMOL/L (ref 21–32)
CREAT SERPL-MCNC: <0.2 MG/DL (ref 0.8–1.5)
DIFFERENTIAL METHOD BLD: ABNORMAL
EOSINOPHIL # BLD: 0 K/UL (ref 0–0.8)
EOSINOPHIL NFR BLD: 0 % (ref 0.5–7.8)
ERYTHROCYTE [DISTWIDTH] IN BLOOD BY AUTOMATED COUNT: 15.7 % (ref 11.9–14.6)
GLOBULIN SER CALC-MCNC: 2.4 G/DL (ref 2.3–3.5)
GLUCOSE SERPL-MCNC: 105 MG/DL (ref 65–100)
HCT VFR BLD AUTO: 23.3 % (ref 41.1–50.3)
HCT VFR BLD AUTO: 26.3 % (ref 41.1–50.3)
HCT VFR BLD AUTO: 28.7 % (ref 41.1–50.3)
HGB BLD-MCNC: 7.5 G/DL (ref 13.6–17.2)
HGB BLD-MCNC: 8.4 G/DL (ref 13.6–17.2)
HGB BLD-MCNC: 9.3 G/DL (ref 13.6–17.2)
IMM GRANULOCYTES # BLD AUTO: 0.5 K/UL (ref 0–0.5)
IMM GRANULOCYTES NFR BLD AUTO: 4 % (ref 0–5)
LYMPHOCYTES # BLD: 0.8 K/UL (ref 0.5–4.6)
LYMPHOCYTES NFR BLD: 8 % (ref 13–44)
MCH RBC QN AUTO: 27.6 PG (ref 26.1–32.9)
MCHC RBC AUTO-ENTMCNC: 32.2 G/DL (ref 31.4–35)
MCV RBC AUTO: 85.7 FL (ref 79.6–97.8)
MONOCYTES # BLD: 0.4 K/UL (ref 0.1–1.3)
MONOCYTES NFR BLD: 4 % (ref 4–12)
NEUTS SEG # BLD: 9.3 K/UL (ref 1.7–8.2)
NEUTS SEG NFR BLD: 84 % (ref 43–78)
NRBC # BLD: 0.03 K/UL (ref 0–0.2)
PHOSPHATE SERPL-MCNC: 2.8 MG/DL (ref 2.3–3.7)
PLATELET # BLD AUTO: 153 K/UL (ref 150–450)
PMV BLD AUTO: 9 FL (ref 9.4–12.3)
POTASSIUM SERPL-SCNC: 3.9 MMOL/L (ref 3.5–5.1)
PROT SERPL-MCNC: 4.8 G/DL (ref 6.3–8.2)
RBC # BLD AUTO: 2.72 M/UL (ref 4.23–5.6)
SODIUM SERPL-SCNC: 139 MMOL/L (ref 138–145)
VANCOMYCIN SERPL-MCNC: 13.7 UG/ML
WBC # BLD AUTO: 11.1 K/UL (ref 4.3–11.1)

## 2022-06-05 PROCEDURE — 85025 COMPLETE CBC W/AUTO DIFF WBC: CPT

## 2022-06-05 PROCEDURE — 2580000003 HC RX 258: Performed by: FAMILY MEDICINE

## 2022-06-05 PROCEDURE — 80053 COMPREHEN METABOLIC PANEL: CPT

## 2022-06-05 PROCEDURE — A4216 STERILE WATER/SALINE, 10 ML: HCPCS | Performed by: FAMILY MEDICINE

## 2022-06-05 PROCEDURE — 2500000003 HC RX 250 WO HCPCS: Performed by: FAMILY MEDICINE

## 2022-06-05 PROCEDURE — 6360000002 HC RX W HCPCS

## 2022-06-05 PROCEDURE — 36415 COLL VENOUS BLD VENIPUNCTURE: CPT

## 2022-06-05 PROCEDURE — 99999 PR OFFICE/OUTPT VISIT,PROCEDURE ONLY: CPT | Performed by: SURGERY

## 2022-06-05 PROCEDURE — 2580000003 HC RX 258: Performed by: ANESTHESIOLOGY

## 2022-06-05 PROCEDURE — 6370000000 HC RX 637 (ALT 250 FOR IP): Performed by: FAMILY MEDICINE

## 2022-06-05 PROCEDURE — 7100000000 HC PACU RECOVERY - FIRST 15 MIN: Performed by: INTERNAL MEDICINE

## 2022-06-05 PROCEDURE — 6360000002 HC RX W HCPCS: Performed by: INTERNAL MEDICINE

## 2022-06-05 PROCEDURE — 80202 ASSAY OF VANCOMYCIN: CPT

## 2022-06-05 PROCEDURE — 7100000001 HC PACU RECOVERY - ADDTL 15 MIN: Performed by: INTERNAL MEDICINE

## 2022-06-05 PROCEDURE — 1090000002 HH PPS REVENUE DEBIT

## 2022-06-05 PROCEDURE — 6360000002 HC RX W HCPCS: Performed by: SURGERY

## 2022-06-05 PROCEDURE — 0DJ08ZZ INSPECTION OF UPPER INTESTINAL TRACT, VIA NATURAL OR ARTIFICIAL OPENING ENDOSCOPIC: ICD-10-PCS | Performed by: INTERNAL MEDICINE

## 2022-06-05 PROCEDURE — 1100000000 HC RM PRIVATE

## 2022-06-05 PROCEDURE — 2580000003 HC RX 258: Performed by: INTERNAL MEDICINE

## 2022-06-05 PROCEDURE — 1090000001 HH PPS REVENUE CREDIT

## 2022-06-05 PROCEDURE — 6360000002 HC RX W HCPCS: Performed by: ANESTHESIOLOGY

## 2022-06-05 PROCEDURE — 85014 HEMATOCRIT: CPT

## 2022-06-05 PROCEDURE — 84100 ASSAY OF PHOSPHORUS: CPT

## 2022-06-05 PROCEDURE — C9113 INJ PANTOPRAZOLE SODIUM, VIA: HCPCS | Performed by: FAMILY MEDICINE

## 2022-06-05 PROCEDURE — 6360000002 HC RX W HCPCS: Performed by: FAMILY MEDICINE

## 2022-06-05 RX ORDER — SODIUM CHLORIDE, SODIUM LACTATE, POTASSIUM CHLORIDE, CALCIUM CHLORIDE 600; 310; 30; 20 MG/100ML; MG/100ML; MG/100ML; MG/100ML
INJECTION, SOLUTION INTRAVENOUS CONTINUOUS PRN
Status: DISCONTINUED | OUTPATIENT
Start: 2022-06-05 | End: 2022-06-05 | Stop reason: SDUPTHER

## 2022-06-05 RX ORDER — MIDAZOLAM HYDROCHLORIDE 1 MG/ML
INJECTION INTRAMUSCULAR; INTRAVENOUS PRN
Status: DISCONTINUED | OUTPATIENT
Start: 2022-06-05 | End: 2022-06-05 | Stop reason: SDUPTHER

## 2022-06-05 RX ORDER — PROPOFOL 10 MG/ML
INJECTION, EMULSION INTRAVENOUS PRN
Status: DISCONTINUED | OUTPATIENT
Start: 2022-06-05 | End: 2022-06-05 | Stop reason: SDUPTHER

## 2022-06-05 RX ORDER — KETAMINE HYDROCHLORIDE 100 MG/ML
INJECTION, SOLUTION INTRAMUSCULAR; INTRAVENOUS PRN
Status: DISCONTINUED | OUTPATIENT
Start: 2022-06-05 | End: 2022-06-05 | Stop reason: SDUPTHER

## 2022-06-05 RX ADMIN — PROPOFOL 20 MG: 10 INJECTION, EMULSION INTRAVENOUS at 07:58

## 2022-06-05 RX ADMIN — METOCLOPRAMIDE HYDROCHLORIDE 10 MG: 5 INJECTION INTRAMUSCULAR; INTRAVENOUS at 06:21

## 2022-06-05 RX ADMIN — KETAMINE HYDROCHLORIDE 30 MG: 100 INJECTION, SOLUTION INTRAMUSCULAR; INTRAVENOUS at 07:57

## 2022-06-05 RX ADMIN — CEFEPIME HYDROCHLORIDE 2000 MG: 2 INJECTION, POWDER, FOR SOLUTION INTRAVENOUS at 16:06

## 2022-06-05 RX ADMIN — SODIUM CHLORIDE, PRESERVATIVE FREE 40 MG: 5 INJECTION INTRAVENOUS at 04:41

## 2022-06-05 RX ADMIN — SODIUM CHLORIDE, SODIUM LACTATE, POTASSIUM CHLORIDE, AND CALCIUM CHLORIDE: 600; 310; 30; 20 INJECTION, SOLUTION INTRAVENOUS at 17:49

## 2022-06-05 RX ADMIN — CEFEPIME HYDROCHLORIDE 2000 MG: 2 INJECTION, POWDER, FOR SOLUTION INTRAVENOUS at 06:21

## 2022-06-05 RX ADMIN — CEFEPIME HYDROCHLORIDE 2000 MG: 2 INJECTION, POWDER, FOR SOLUTION INTRAVENOUS at 21:58

## 2022-06-05 RX ADMIN — SODIUM CHLORIDE, PRESERVATIVE FREE 10 ML: 5 INJECTION INTRAVENOUS at 09:24

## 2022-06-05 RX ADMIN — SUCRALFATE 1 G: 1 TABLET ORAL at 00:39

## 2022-06-05 RX ADMIN — METOCLOPRAMIDE HYDROCHLORIDE 10 MG: 5 INJECTION INTRAMUSCULAR; INTRAVENOUS at 23:59

## 2022-06-05 RX ADMIN — ACETAMINOPHEN 650 MG: 325 TABLET ORAL at 17:48

## 2022-06-05 RX ADMIN — MIRTAZAPINE 7.5 MG: 15 TABLET, FILM COATED ORAL at 22:04

## 2022-06-05 RX ADMIN — MIDAZOLAM 2 MG: 1 INJECTION INTRAMUSCULAR; INTRAVENOUS at 07:57

## 2022-06-05 RX ADMIN — SUCRALFATE 1 G: 1 TABLET ORAL at 23:59

## 2022-06-05 RX ADMIN — SUCRALFATE 1 G: 1 TABLET ORAL at 17:39

## 2022-06-05 RX ADMIN — SODIUM CHLORIDE, PRESERVATIVE FREE 10 ML: 5 INJECTION INTRAVENOUS at 22:20

## 2022-06-05 RX ADMIN — SODIUM CHLORIDE, SODIUM LACTATE, POTASSIUM CHLORIDE, AND CALCIUM CHLORIDE: 600; 310; 30; 20 INJECTION, SOLUTION INTRAVENOUS at 07:50

## 2022-06-05 RX ADMIN — VANCOMYCIN HYDROCHLORIDE 1000 MG: 1 INJECTION, POWDER, LYOPHILIZED, FOR SOLUTION INTRAVENOUS at 06:21

## 2022-06-05 RX ADMIN — SODIUM CHLORIDE, SODIUM LACTATE, POTASSIUM CHLORIDE, AND CALCIUM CHLORIDE: 600; 310; 30; 20 INJECTION, SOLUTION INTRAVENOUS at 10:57

## 2022-06-05 RX ADMIN — METOCLOPRAMIDE HYDROCHLORIDE 10 MG: 5 INJECTION INTRAMUSCULAR; INTRAVENOUS at 17:39

## 2022-06-05 RX ADMIN — DEXAMETHASONE 1 MG: 0.5 TABLET ORAL at 22:01

## 2022-06-05 RX ADMIN — ALTEPLASE 1 MG: 2.2 INJECTION, POWDER, LYOPHILIZED, FOR SOLUTION INTRAVENOUS at 13:29

## 2022-06-05 RX ADMIN — SODIUM CHLORIDE, PRESERVATIVE FREE 40 MG: 5 INJECTION INTRAVENOUS at 15:58

## 2022-06-05 RX ADMIN — SUCRALFATE 1 G: 1 TABLET ORAL at 06:21

## 2022-06-05 RX ADMIN — METOCLOPRAMIDE HYDROCHLORIDE 10 MG: 5 INJECTION INTRAMUSCULAR; INTRAVENOUS at 00:39

## 2022-06-05 RX ADMIN — METOCLOPRAMIDE HYDROCHLORIDE 10 MG: 5 INJECTION INTRAMUSCULAR; INTRAVENOUS at 11:45

## 2022-06-05 RX ADMIN — VANCOMYCIN HYDROCHLORIDE 1000 MG: 1 INJECTION, POWDER, LYOPHILIZED, FOR SOLUTION INTRAVENOUS at 17:34

## 2022-06-05 RX ADMIN — METRONIDAZOLE 500 MG: 500 INJECTION, SOLUTION INTRAVENOUS at 17:38

## 2022-06-05 ASSESSMENT — PAIN DESCRIPTION - ONSET: ONSET: SUDDEN

## 2022-06-05 ASSESSMENT — PAIN SCALES - GENERAL
PAINLEVEL_OUTOF10: 3
PAINLEVEL_OUTOF10: 0

## 2022-06-05 ASSESSMENT — PAIN DESCRIPTION - PAIN TYPE: TYPE: ACUTE PAIN

## 2022-06-05 ASSESSMENT — PAIN DESCRIPTION - LOCATION: LOCATION: HEAD

## 2022-06-05 NOTE — PROGRESS NOTES
Date of Outreach Update:  Nneka Obregon was seen and assessed. @[facility]([id number])@  Vitals:    06/05/22 0855 06/05/22 0930 06/05/22 1111 06/05/22 1517   BP: 127/76 (!) 141/66 (!) 146/78 (!) 155/75   Pulse: 52 58 54 57   Resp: 15 16 18 18   Temp:  97.3 °F (36.3 °C) 98.7 °F (37.1 °C) 97.9 °F (36.6 °C)   TempSrc:  Axillary Oral Oral   SpO2: 100%  100% 100%   Weight:       Height:             EGD today STOMACH: Lumen obstructed by food or tumor material.  No active bleeding seen in limited view    Will need follow up per MD GI    All labs and notes reviewed     H/H remaining stable will cont to trend    No acute events today / pt in good spirits with family at bedside / follow up with primary RN made     No further needs at this time     Previous Outreach assessment has been reviewed. There have been no significant clinical changes since the completion of the last dated Outreach assessment. Will continue to follow up per outreach protocol.     Signed By:   Shell Torbiio RN    June 5, 2022 5:35 PM

## 2022-06-05 NOTE — PROGRESS NOTES
Received call from tele that pt joaquín as low as 45. Notified doctor on call. Is very lethargic dt procedure with sedation today.  Will continue to monitor

## 2022-06-05 NOTE — PROGRESS NOTES
Date of Outreach Update:  Lalitha Martin was seen and assessed. @[facility]([id number])@  Vitals:    06/04/22 1805 06/04/22 1814 06/04/22 1837 06/04/22 2045   BP: (!) 140/80 134/80 (!) 146/73 (!) 139/92   Pulse: 77 66 73 53   Resp: 18 18 18 16   Temp:   (!) 96.7 °F (35.9 °C)    TempSrc:   Axillary    SpO2: 96% 96% 97%    Weight:       Height:             Pain Assessment  @[facility]([id number])@     Previous Outreach assessment has been reviewed. Alert and oriented to person, place. VSS, lethargic from EGD this afternoon, but able to wake up easily. Hgb 6.2, addressed concerns with primary RN. RN reached out to doctor, 2 units of PRBCs order. 3 bloody stools during the day, 1 during nighttime so far. Labs reviewed. Spoke with wife, Tedyd Ambrocio at bedside about any concerns. None at this time. Will continue to follow up per outreach protocol.     Signed By:   Chloe Marvin RN    June 4, 2022 9:35 PM

## 2022-06-05 NOTE — ANESTHESIA PRE PROCEDURE
Department of Anesthesiology  Preprocedure Note       Name:  Marina Middleton   Age:  78 y.o.  :  1955                                          MRN:  [id number]         Date:  2022      Surgeon: Lamar Pfeiffer):  Rogelio Bates MD    Procedure: Procedure(s):  EGD ESOPHAGOGASTRODUODENOSCOPY    Medications prior to admission:   Prior to Admission medications    Medication Sig Start Date End Date Taking? Authorizing Provider   dexamethasone (DECADRON) 1 MG tablet Take 1 mg by mouth 2 times a day 22   Ar Automatic Reconciliation   dexamethasone (DECADRON) 4 MG tablet Take 2 tabs twice daily the day before chemo and the day after chemo. 3/30/22   Ar Automatic Reconciliation   lidocaine-prilocaine (EMLA) 2.5-2.5 % cream Apply topically as needed 3/30/22   Ar Automatic Reconciliation   ondansetron (ZOFRAN) 8 MG tablet Take 8 mg by mouth every 8 hours as needed for Nausea 3/30/22   Ar Automatic Reconciliation   prochlorperazine (COMPAZINE) 10 MG tablet Take 5 mg by mouth every 6 hours as needed (nausea) 3/30/22   Ar Automatic Reconciliation       Current medications:    No current facility-administered medications for this visit. No current outpatient medications on file.      Facility-Administered Medications Ordered in Other Visits   Medication Dose Route Frequency Provider Last Rate Last Admin    vancomycin random level reminder   Other Once Kylie Wyman MD        0.9 % sodium chloride infusion   IntraVENous PRN WALLY Short - CNP        0.9 % sodium chloride infusion   IntraVENous PRN Rogelio Bates MD        furosemide (LASIX) injection 20 mg  20 mg IntraVENous PRN Rogelio Bates MD        lidocaine (XYLOCAINE) 4 % external solution             metoclopramide (REGLAN) injection 10 mg  10 mg IntraVENous Q6H Rogelio Bates MD   10 mg at 22 1833    acetaminophen (TYLENOL) tablet 650 mg  650 mg Oral Q6H PRN Jen Cano MD        Or    acetaminophen (TYLENOL) suppository 650 mg 650 mg Rectal Q6H PRN Marcelle Agosto MD        vancomycin (VANCOCIN) 1,000 mg in sodium chloride 0.9 % 250 mL IVPB ([id number])  1,000 mg IntraVENous Q12H Alvina Garcia  mL/hr at 06/04/22 1830 1,000 mg at 06/04/22 1830    midodrine (PROAMATINE) tablet 10 mg  10 mg Oral TID PRN Orvil Soho, DO        metronidazole (FLAGYL) 500 mg in 0.9% NaCl 100 mL IVPB premix  500 mg IntraVENous Q12H Orvil Soho,  mL/hr at 06/04/22 1836 500 mg at 06/04/22 1836    lactated ringers infusion   IntraVENous Continuous Orvil Soho,  mL/hr at 06/04/22 0615 New Bag at 06/04/22 0615    pantoprazole (PROTONIX) 40 mg in sodium chloride (PF) 10 mL injection  40 mg IntraVENous Q12H Orvil Soho, DO   40 mg at 06/04/22 1617    sodium chloride flush 0.9 % injection 5-40 mL  5-40 mL IntraVENous 2 times per day Orvil Soho, DO   10 mL at 06/04/22 0935    sodium chloride flush 0.9 % injection 5-40 mL  5-40 mL IntraVENous PRN Orvil Soho, DO        cefepime (MAXIPIME) 2000 mg IVPB minibag in NS  2,000 mg IntraVENous Q8H Orvil Soho, DO 12.5 mL/hr at 06/04/22 2152 2,000 mg at 06/04/22 2152    dexamethasone (DECADRON) tablet 3 mg  3 mg Oral 2 times per day Orvil Soho, DO   3 mg at 06/04/22 2148    Followed by   [facility] dexamethasone (DECADRON) tablet 1 mg  1 mg Oral 2 times per day Orvil Soho, DO        sucralfate (CARAFATE) tablet 1 g  1 g Oral 4 times per day Orvil Soho, DO   1 g at 06/04/22 1833    sodium chloride flush 0.9 % injection 10 mL  10 mL IntraVENous ONCE PRN Orvil Soho, DO        folic acid (FOLVITE) tablet 1 mg  1 mg Oral Daily Orvil Soho, DO   1 mg at 06/04/22 0925    mirtazapine (REMERON) tablet 7.5 mg  7.5 mg Oral Nightly Orvil Soho, DO   7.5 mg at 06/04/22 2151    ondansetron (ZOFRAN) injection 4 mg  4 mg IntraVENous Q6H PRN Orvil Soho, DO   4 mg at 06/03/22 1744    saliva substitute (BIOTENE/MOUTH KOTE) liquid   Oral PRN Orvil Soho, DO        [facility] Lanolin cream   Topical PRN Cortes Jimenez, DO           Allergies:     Allergies   Allergen Reactions    Iron     Tetanus Antitoxin        Problem List:    Patient Active Problem List   Diagnosis Code    ABILIO (iron deficiency anemia) D50.9    Malignant neoplasm of overlapping sites of stomach ([facility] [address] C16.8    Malignant neoplasm of body of stomach (HCC) C16.2    Gastric adenocarcinoma (HCC) C16.9    Septic shock (HCC) A41.9, R65.21    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[facility] [address] R64    Former heavy tobacco smoker Z87.891    Gastroesophageal reflux disease K21.9    Loss of weight R63.4    Open fracture of proximal phalanx of left thumb S62.512B    Thumb amputation status, 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    Tobacco use disorder F17.200    Hyponatremia E87.1    Hypoalbuminemia due to protein-calorie malnutrition (HCC) E88.09, E46    Syncope due to orthostatic hypotension I95.1    Hypomagnesemia E83.42    Corticosteroid dependence (HCC) F19.20    Hypoproteinemia (HCC) E77.8    Do not resuscitate status Z66    Fatigue R53.83    History of gastric cancer Z85.028    Encounter for palliative care Z51.5    Debility R53.81    Weakness R53.1    Hypercoagulable state, secondary ([facility] [address] D68.69    Adult body mass index less than 19 Z68.1    Acute pulmonary embolism without acute cor pulmonale (HCC) I26.99       Past Medical History:        Diagnosis Date    ABLA (acute blood loss anemia) 6/2/2022    GIB (gastrointestinal bleeding) 6/2/2022    HTN (hypertension)     Severe sepsis with lactic acidosis ([facility] [address] 6/2/2022       Past Surgical History:        Procedure Laterality Date    COLONOSCOPY N/A 3/9/2022    COLONOSCOPY/ 22 performed by Martinez Mckeon MD at [facility] ENDOSCOPY    KNEE ARTHROSCOPY      OTHER SURGICAL HISTORY      none       Social History:    Social History     Tobacco Use    Smoking status: Former Smoker    Smokeless tobacco: Former User   Substance Use Topics    Alcohol use: Not Currently                                Counseling given: Not Answered      Vital Signs (Current): There were no vitals filed for this visit.                                            BP Readings from Last 3 Encounters:   06/04/22 127/80   05/31/22 110/72   05/30/22 118/70       NPO Status:                                                                                 BMI:   Wt Readings from Last 3 Encounters:   06/02/22 118 lb (53.5 kg)   05/31/22 118 lb (53.5 kg)   05/18/22 120 lb 9.6 oz (54.7 kg)     There is no height or weight on file to calculate BMI.    CBC:   Lab Results   Component Value Date    WBC 13.9 06/04/2022    RBC 1.99 06/04/2022    HGB 7.4 06/04/2022    HCT 22.5 06/04/2022    MCV 83.9 06/04/2022    RDW 16.9 06/04/2022     06/04/2022       CMP:   Lab Results   Component Value Date     06/04/2022    K 4.3 06/04/2022     06/04/2022    CO2 23 06/04/2022    BUN 31 06/04/2022    CREATININE 0.20 06/04/2022    GFRAA >60 06/04/2022    AGRATIO 0.4 05/17/2022    LABGLOM >60 06/04/2022    GLUCOSE 101 06/04/2022    PROT 4.7 06/04/2022    CALCIUM 8.0 06/04/2022    BILITOT 0.3 06/04/2022    ALKPHOS 115 06/04/2022    ALKPHOS 176 05/17/2022    AST 19 06/04/2022    ALT 13 06/04/2022       POC Tests:   Recent Labs     06/03/22  1802   POCGLU 337*       Coags:   Lab Results   Component Value Date    PROTIME 15.2 06/03/2022    INR 1.2 06/03/2022    APTT 33.8 06/02/2022    APTT 32.5 03/23/2022       HCG (If Applicable): No results found for: PREGTESTUR, PREGSERUM, HCG, HCGQUANT     ABGs: No results found for: PHART, PO2ART, FYR3RWI, XTH5WFN, BEART, N4GDKGTX     Type & Screen (If Applicable):  No results found for: LABABO, LABRH    Drug/Infectious Status (If Applicable):  No results found for: HIV, HEPCAB    COVID-19 Screening (If Applicable): No results found for: COVID19        Anesthesia Evaluation  Patient summary reviewed  Airway: Mallampati: II          Dental: normal exam   (+) poor dentition      Pulmonary:Negative Pulmonary ROS breath sounds clear to auscultation                             Cardiovascular:  Exercise tolerance: poor (<4 METS),   (+) hypertension:, dysrhythmias: SVT,     (-) past MI, murmur and peripheral edema      Rhythm: regular  Rate: normal                    Neuro/Psych:   Negative Neuro/Psych ROS     (-) CVA           GI/Hepatic/Renal: Neg GI/Hepatic/Renal ROS  (+) GERD:,           Endo/Other: Negative Endo/Other ROS   (+) electrolyte abnormalities, malignancy/cancer. ROS comment: Frail, malnourished,    Proufound anemia, 4.9 this morning, s/p 1u PRBCs Abdominal:             Vascular: negative vascular ROS. Other Findings:             Anesthesia Plan      TIVA     ASA 4 - emergent       Induction: intravenous. Anesthetic plan and risks discussed with patient.                         Simone Storey MD   6/5/2022

## 2022-06-05 NOTE — ANESTHESIA POSTPROCEDURE EVALUATION
Department of Anesthesiology  Postprocedure Note    Patient: Judy Jolley  MRN: [id number]  YOB: 1955  Date of evaluation: 6/5/2022  Time:  8:51 AM     Procedure Summary     Date: 06/05/22 Room / Location: [facility] ENDO [facility] 1 / [facility] ENDOSCOPY    Anesthesia Start: 2118 Anesthesia Stop: 5935    Procedure: EGD ESOPHAGOGASTRODUODENOSCOPY (N/A Upper GI Region) Diagnosis:       Melena      (Melena [K92.1])    Surgeons: Ferny Stafford MD Responsible Provider: Nabil Aguilera MD    Anesthesia Type: other ASA Status: 4 - Emergent          Anesthesia Type: other    Stephen Phase I: Stephen Score: 8    Stephen Phase II:      Last vitals: Reviewed and per EMR flowsheets.        Anesthesia Post Evaluation    Patient location during evaluation: PACU  Patient participation: complete - patient participated  Level of consciousness: responsive to physical stimuli, responsive to light touch and responsive to verbal stimuli  Airway patency: patent  Nausea & Vomiting: no nausea  Complications: no  Cardiovascular status: blood pressure returned to baseline  Respiratory status: acceptable  Hydration status: euvolemic

## 2022-06-05 NOTE — OP NOTE
Esophagogastroduodenoscopy    DATE of PROCEDURE: 6/5/2022    INDICATION: GI bleed    POSTPROCEDURE DIAGNOSIS: Incomplete examination    MEDICATIONS ADMINISTERED: MAC anesthesia (see anesthesia report)    INSTRUMENT: [id number]    PROCEDURE:  After obtaining informed consent, the patient was placed in the left lateral position and sedated. The endoscope was advanced under direct vision without difficulty. The esophagus, stomach (including retroflexed views) and duodenum were evaluated. The patient was taken to the recovery area in stable condition. FINDINGS:  ESOPHAGUS: Normal  STOMACH: Lumen obstructed by food or tumor material.  No active bleeding seen in limited view  DUODENUM: Not Seen    Estimated blood loss: 0-minimal   Specimens obtained during procedure: none    PLAN: Unable to evaluate stomach on 2 successive tries, Continue serial hgb.   He would likely benefit from earlier surgery

## 2022-06-05 NOTE — PROGRESS NOTES
VANCO DAILY FOLLOW UP NOTE  [address] [facility] Pharmacokinetic Monitoring Service - Vancomycin    Consulting Provider: Dr. Roverto Nuno   Indication: sepsis  Target Concentration: Goal AUC/WILFREDO 400-600 mg*hr/L  Day of Therapy: 4  Additional Antimicrobials: cefepime    Pertinent Laboratory Values: Wt Readings from Last 1 Encounters:   06/02/22 118 lb (53.5 kg)     Temp Readings from Last 1 Encounters:   06/05/22 97.3 °F (36.3 °C) (Axillary)     Recent Labs     06/02/22  1312 06/03/22  0709 06/03/22  1807 06/04/22  0348 06/05/22  0400   BUN 23   < > 28* 31* 16   CREATININE 0.40*   < > 0.50* 0.20* <0.20*   WBC 20.4*   < > 19.1* 13.9* 11.1   PROCAL 1.02*  --   --   --   --     < > = values in this interval not displayed. Estimated Creatinine Clearance: 271 mL/min (based on SCr of 0.2 mg/dL). Lab Results   Component Value Date    VANCORANDOM 13.7 06/05/2022       MRSA Nasal Swab: N/A. Non-respiratory infection. .      Assessment:  Date/Time Dose Concentration AUC   6/3 0709 750 mg q8h 26.1 601   6/5 0400 1000 mg q12h 13.7 482   Note: Serum concentrations collected for AUC dosing may appear elevated if collected in close proximity to the dose administered, this is not necessarily an indication of toxicity    Plan:  Current dosing regimen is therapeutic  Continue current dose  Repeat vancomycin concentrations will be ordered as clinically appropriate   Pharmacy will continue to monitor patient and adjust therapy as indicated    Thank you for the consult,  Jessy Ma, [facility]

## 2022-06-05 NOTE — PROGRESS NOTES
Hospitalist Progress Note   Admit Date:  2022  1:05 PM   Name:  Emy Kim   Age:  78 y.o. Sex:  male  :  1955   MRN:  [id number]   Room:  [facility]/    Presenting Complaint: Loss of Consciousness    Reason(s) for Admission: Syncope and collapse [R55]  Hemorrhagic shock ([address] [R57.8]  Shock ([address] [R57.9]  History of gastric cancer [Z85.028]  Septic shock ([address] [A41.9, R65.21]  Gastrointestinal hemorrhage, unspecified gastrointestinal hemorrhage type [facility] Course & Interval History:   66M PMHx gastric adenocarcinoma s/p 4 cycles chemo, last dose 22, 50 pack year smoking history,  HTN, who presented  after having a syncopal episode with 2 day history of near syncope. He started feeling weak and tired then stood up and passed out. Endorsed abdominal pain. Noted that patient has been unable to tolerate solid food for months, and consumes several ensures per day but still had  significant weight loss. Wife also reported  breathing abnormally and very shallow, with some chest discomfort    Prior to arrival, HR was erratic jumping from normal to as high as 160-170, described as SVT vs Sinus tach per EMS. Jass Wells rec'd 1500 cc LR bolus and 4 mg zofran while en route. In ED, he was hypotensive, BP 74/54, , RR 22. He rec'd S/p  30cc/kg sepsis, vancomcyin, cefepime 1 UpRBC ordered    Patient with pre syncopal episodes yesterday and given 2 units PRBC. AC held. IR consulted for possible IVC filter due to continuous GI bleeding. Subjective/24hr Events (22):  6/ Endorses fatigue, bloody stools,denies CP, N/V. Family questioning swelling RUE near elbow which is result of patient accidentally pulling out IV yesterday. Will image if swelling gets worse. 2 lrg bloody stools per nursing   6 Denies bloody stools, but with continued fatigue and poor appetite    EGD x 2 unsuccessful due to food particles obstructing access.       ROS:  10 systems reviewed and negative except as noted above. Assessment & Plan:   Septic Shock, hemorrhagic shock  GPR bacteremia  Admission  RR 22 BP 74/54 without clear source. S/p 30 cc/kg sepsis Bolus. S/p pRBC 2U 6/2.   - albumin q6hx4 doses  - Vancomycin (6/2-. .), Cefepime (6/2-. Cherl Spinner )  - decadron 4 mg f/b 3 mg BID x 3 days then reduce back to home dose of dexamethasone 1 mg BID. - follow blood cultures. Check random cortisol.   - Midodrine 10 mg TID prn for MAP persistently <65   6/5/22  -BCx positive for GPC x 1 bottle(anaerobic). Continue Vanc/Cefepime/Flagyl  -Repeat BCx NGTD     Chronic Anemia   Admission Hgb 5.6; Suspected abla but GI consult signed off. Most likely BM suppression from chemo/malignancy plus nutritional deficiencies. S/p pRBC 2U 6/2; s/p b12 3806 mcg IM   - folic acid  - PPI IV BID and carafate   - consult hematology  6/4/22  -Continued GI loss  -Hgb 4.9 s/p 3 units over last 2 days  -2 additional units pending; Albumin  -GS notified of continued blood loss  -GI with plans for EGD today although bleeding likely from necrotic tumor  -Serial Hgb, IVF  -ICU [place] also following  -Pt on Midodrine for BP support   6/5/22  -Hgb improving; now 8.4  -Continue to monitor   -Will need FE supplement after tx of infection  -EGD unsuccessful yesterday due to obstruction with food particles. Patient given reglan overnight but EGD was still unsuccessful  -Surgery pending        Hypercoagulable state  CT CAP with small PE, femoral DVT. Discussed with surgery, no current plan for urgent intervention  - apixaban  - duplex BLE  6/4/22  -Holding AC due to bleeding  -IR consulted for possible IVC placement   -Will need Hematology f/u      Cancer cachexia, severe protein caloric malnutrition, Hypoproteinemia  - remeron qhs  - Steroids as above.    - Ensure clear added per nutrition; appreciate assistance   - TPN being considered      Gastric adenocarcinoma   s/p 4 cycle chemotherapy  - Oncology and GS onboard    - Patient will undergo total gastrectomy once clinically improved  6/5/22  -surgery following; dispo of surgery pending      Former smoker   noted          Discharge Planning:      TBD    Diet:  Diet NPO  DVT PPx: SCD  Code status: DNR            Objective:     Patient Vitals for the past 24 hrs:   Temp Pulse Resp BP SpO2   06/05/22 1111 98.7 °F (37.1 °C) 54 18 (!) 146/78 100 %   06/05/22 0930 97.3 °F (36.3 °C) 58 16 (!) 141/66 --   06/05/22 0855 -- 52 15 127/76 100 %   06/05/22 0850 -- 55 16 126/77 100 %   06/05/22 0845 97.4 °F (36.3 °C) 74 22 132/71 100 %   06/05/22 0840 -- 56 27 121/66 100 %   06/05/22 0835 -- 57 15 117/63 --   06/05/22 0830 -- 50 15 124/67 100 %   06/05/22 0825 -- 51 15 122/65 99 %   06/05/22 0820 -- 54 17 117/63 99 %   06/05/22 0812 97.5 °F (36.4 °C) 57 16 115/62 98 %   06/05/22 0752 -- 54 18 (!) 143/73 97 %   06/05/22 0655 97.9 °F (36.6 °C) 62 17 (!) 141/67 98 %   06/05/22 0318 97.8 °F (36.6 °C) (!) 48 18 135/67 97 %   06/04/22 2342 97.7 °F (36.5 °C) 78 18 127/80 97 %   06/04/22 2045 -- 53 16 (!) 139/92 --   06/04/22 1837 (!) 96.7 °F (35.9 °C) 73 18 (!) 146/73 97 %   06/04/22 1814 -- 66 18 134/80 96 %   06/04/22 1805 -- 77 18 (!) 140/80 96 %   06/04/22 1755 -- 67 18 134/79 97 %   06/04/22 1745 -- 68 18 139/76 95 %   06/04/22 1721 97.9 °F (36.6 °C) 74 17 (!) 161/72 95 %   06/04/22 1510 97.2 °F (36.2 °C) 91 18 128/67 98 %     @BSHSIFLOW(2444:last)@    Estimated body mass index is 17.43 kg/m² as calculated from the following:    Height as of this encounter: 5' 9\" (1.753 m). Weight as of this encounter: 118 lb (53.5 kg). Intake/Output Summary (Last 24 hours) at 6/5/2022 1430  Last data filed at 6/5/2022 1328  Gross per 24 hour   Intake 2068 ml   Output 3075 ml   Net -1007 ml         Physical Exam:     Blood pressure (!) 146/78, pulse 54, temperature 98.7 °F (37.1 °C), temperature source Oral, resp. rate 18, height 5' 9\" (1.753 m), weight 118 lb (53.5 kg), SpO2 100 %. General:    Ill appearing;  No overt distress  Head:  Normocephalic, atraumatic  Eyes:  Sclerae appear normal.  Pupils equally round. ENT:  Nares appear normal, no drainage. Moist oral mucosa  Neck:  No restricted ROM. Trachea midline   CV:   RRR. No m/r/g. No jugular venous distension. Lungs:   CTAB. No wheezing, rhonchi, or rales. Respirations even, unlabored  Abdomen: Bowel sounds present. Soft, nontender, nondistended. Extremities: No cyanosis or clubbing. No edema  Skin:     No rashes. Pallor  Warm and dry. Ecchymosis upper and lower torso     Neuro:  CN II-XII grossly intact. A&Ox3  Psych:  Normal mood and affect.       I have reviewed ordered lab tests and independently visualized imaging below:    Recent Labs:  Recent Results (from the past 48 hour(s))   POCT Glucose    Collection Time: 06/03/22  6:02 PM   Result Value Ref Range    POC Glucose 337 (H) 65 - 100 mg/dL    Performed by: Tiffanie    CBC with Auto Differential    Collection Time: 06/03/22  6:07 PM   Result Value Ref Range    WBC 19.1 (H) 4.3 - 11.1 K/uL    RBC 2.59 (L) 4.23 - 5.6 M/uL    Hemoglobin 6.4 (LL) 13.6 - 17.2 g/dL    Hematocrit 21.9 (L) 41.1 - 50.3 %    MCV 84.6 79.6 - 97.8 FL    MCH 24.7 (L) 26.1 - 32.9 PG    MCHC 29.2 (L) 31.4 - 35.0 g/dL    RDW 17.7 (H) 11.9 - 14.6 %    Platelets 478 (H) 481 - 450 K/uL    MPV 9.0 (L) 9.4 - 12.3 FL    nRBC 0.00 0.0 - 0.2 K/uL    Seg Neutrophils 73 43 - 78 %    Lymphocytes 15 13 - 44 %    Monocytes 5 4.0 - 12.0 %    Eosinophils % 0 (L) 0.5 - 7.8 %    Basophils 0 0.0 - 2.0 %    Immature Granulocytes 7 (H) 0.0 - 5.0 %    Segs Absolute 13.9 (H) 1.7 - 8.2 K/UL    Absolute Lymph # 2.9 0.5 - 4.6 K/UL    Absolute Mono # 1.0 0.1 - 1.3 K/UL    Absolute Eos # 0.0 0.0 - 0.8 K/UL    Basophils Absolute 0.0 0.0 - 0.2 K/UL    Absolute Immature Granulocyte 1.3 (H) 0.0 - 0.5 K/UL    Differential Type AUTOMATED     Comprehensive Metabolic Panel    Collection Time: 06/03/22  6:07 PM   Result Value Ref Range    Sodium 131 (L) 136 - 145 mmol/L Potassium 4.9 3.5 - 5.1 mmol/L    Chloride 101 98 - 107 mmol/L    CO2 17 (L) 21 - 32 mmol/L    Anion Gap 13 7 - 16 mmol/L    Glucose 371 (H) 65 - 100 mg/dL    BUN 28 (H) 8 - 23 MG/DL    CREATININE 0.50 (L) 0.8 - 1.5 MG/DL    GFR African American >60 >60 ml/min/1.73m2    GFR Non- >60 >60 ml/min/1.73m2    Calcium 8.0 (L) 8.3 - 10.4 MG/DL    Total Bilirubin 0.3 0.2 - 1.1 MG/DL    ALT 12 12 - 65 U/L    AST 17 15 - 37 U/L    Alk Phosphatase 146 (H) 50 - 136 U/L    Total Protein 4.2 (L) 6.3 - 8.2 g/dL    Albumin 1.5 (L) 3.2 - 4.6 g/dL    Globulin 2.7 2.3 - 3.5 g/dL    Albumin/Globulin Ratio 0.6 (L) 1.2 - 3.5     Protime-INR    Collection Time: 06/03/22  6:07 PM   Result Value Ref Range    Protime 15.2 (H) 12.6 - 14.5 sec    INR 1.2     Lactic Acid    Collection Time: 06/03/22  6:08 PM   Result Value Ref Range    Lactic Acid, Plasma 7.9 (HH) 0.4 - 2.0 MMOL/L   PREPARE RBC (CROSSMATCH), 2 Units    Collection Time: 06/03/22  6:15 PM   Result Value Ref Range    History Check Historical check performed    PLEASE READ & DOCUMENT PPD TEST IN 72 HRS    Collection Time: 06/04/22 12:00 AM   Result Value Ref Range    PPD, (POC) Negative Negative    mm Induration 0 0 - 5 mm   Comprehensive Metabolic Panel w/ Reflex to MG    Collection Time: 06/04/22  3:48 AM   Result Value Ref Range    Sodium 134 (L) 136 - 145 mmol/L    Potassium 4.3 3.5 - 5.1 mmol/L    Chloride 104 98 - 107 mmol/L    CO2 23 21 - 32 mmol/L    Anion Gap 7 7 - 16 mmol/L    Glucose 101 (H) 65 - 100 mg/dL    BUN 31 (H) 8 - 23 MG/DL    CREATININE 0.20 (L) 0.8 - 1.5 MG/DL    GFR African American >60 >60 ml/min/1.73m2    GFR Non- >60 >60 ml/min/1.73m2    Calcium 8.0 (L) 8.3 - 10.4 MG/DL    Total Bilirubin 0.3 0.2 - 1.1 MG/DL    ALT 13 12 - 65 U/L    AST 19 15 - 37 U/L    Alk Phosphatase 115 50 - 136 U/L    Total Protein 4.7 (L) 6.3 - 8.2 g/dL    Albumin 2.4 (L) 3.2 - 4.6 g/dL    Globulin 2.3 2.3 - 3.5 g/dL    Albumin/Globulin Ratio 1.0 (L) 1.2 - 3.5     CBC with Auto Differential    Collection Time: 06/04/22  3:48 AM   Result Value Ref Range    WBC 13.9 (H) 4.3 - 11.1 K/uL    RBC 1.99 (L) 4.23 - 5.6 M/uL    Hemoglobin 5.3 (LL) 13.6 - 17.2 g/dL    Hematocrit 16.7 (L) 41.1 - 50.3 %    MCV 83.9 79.6 - 97.8 FL    MCH 26.6 26.1 - 32.9 PG    MCHC 31.7 31.4 - 35.0 g/dL    RDW 16.9 (H) 11.9 - 14.6 %    Platelets 245 575 - 642 K/uL    MPV 9.1 (L) 9.4 - 12.3 FL    nRBC 0.00 0.0 - 0.2 K/uL    Seg Neutrophils 83 (H) 43 - 78 %    Lymphocytes 7 (L) 13 - 44 %    Monocytes 4 4.0 - 12.0 %    Eosinophils % 0 (L) 0.5 - 7.8 %    Basophils 0 0.0 - 2.0 %    Immature Granulocytes 6 (H) 0.0 - 5.0 %    Segs Absolute 11.5 (H) 1.7 - 8.2 K/UL    Absolute Lymph # 1.0 0.5 - 4.6 K/UL    Absolute Mono # 0.6 0.1 - 1.3 K/UL    Absolute Eos # 0.0 0.0 - 0.8 K/UL    Basophils Absolute 0.0 0.0 - 0.2 K/UL    Absolute Immature Granulocyte 0.8 (H) 0.0 - 0.5 K/UL    RBC Comment SLIGHT  ANISOCYTOSIS + POIKILOCYTOSIS        RBC Comment OCCASIONAL  POLYCHROMASIA        RBC Comment OCCASIONAL  OVALOCYTES        RBC Comment SLIGHT  MICROCYTOSIS        WBC Comment Result Confirmed By Smear      Platelet Comment ADEQUATE      Differential Type AUTOMATED     Phosphorus    Collection Time: 06/04/22  3:48 AM   Result Value Ref Range    Phosphorus 3.5 2.3 - 3.7 MG/DL   Lactic Acid    Collection Time: 06/04/22  3:48 AM   Result Value Ref Range    Lactic Acid, Plasma 0.8 0.4 - 2.0 MMOL/L   PREPARE RBC (CROSSMATCH), 1 Units    Collection Time: 06/04/22  4:00 AM   Result Value Ref Range    History Check Historical check performed    Culture, Blood 1    Collection Time: 06/04/22  7:59 AM    Specimen: Blood   Result Value Ref Range    Special Requests LEFT  Antecubital        Culture NO GROWTH AFTER 22 HOURS     Hemoglobin and Hematocrit    Collection Time: 06/04/22 11:02 AM   Result Value Ref Range    Hemoglobin 4.9 (LL) 13.6 - 17.2 g/dL    Hematocrit 15.4 (L) 41.1 - 50.3 %   PREPARE RBC (CROSSMATCH), 1 Units    Collection Time: 06/04/22 12:15 PM   Result Value Ref Range    History Check Historical check performed    PREPARE RBC (CROSSMATCH), 2 Units    Collection Time: 06/04/22 12:30 PM   Result Value Ref Range    History Check Historical check performed    Hemoglobin and Hematocrit    Collection Time: 06/04/22  4:29 PM   Result Value Ref Range    Hemoglobin 6.2 (LL) 13.6 - 17.2 g/dL    Hematocrit 19.1 (L) 41.1 - 50.3 %   Hemoglobin and Hematocrit    Collection Time: 06/04/22 10:52 PM   Result Value Ref Range    Hemoglobin 7.4 (L) 13.6 - 17.2 g/dL    Hematocrit 22.5 (L) 41.1 - 50.3 %   Vancomycin Level, Random    Collection Time: 06/05/22  4:00 AM   Result Value Ref Range    Vancomycin Rm 13.7 UG/ML   CBC with Auto Differential    Collection Time: 06/05/22  4:00 AM   Result Value Ref Range    WBC 11.1 4.3 - 11.1 K/uL    RBC 2.72 (L) 4.23 - 5.6 M/uL    Hemoglobin 7.5 (L) 13.6 - 17.2 g/dL    Hematocrit 23.3 (L) 41.1 - 50.3 %    MCV 85.7 79.6 - 97.8 FL    MCH 27.6 26.1 - 32.9 PG    MCHC 32.2 31.4 - 35.0 g/dL    RDW 15.7 (H) 11.9 - 14.6 %    Platelets 154 459 - 387 K/uL    MPV 9.0 (L) 9.4 - 12.3 FL    nRBC 0.03 0.0 - 0.2 K/uL    Differential Type AUTOMATED      Seg Neutrophils 84 (H) 43 - 78 %    Lymphocytes 8 (L) 13 - 44 %    Monocytes 4 4.0 - 12.0 %    Eosinophils % 0 (L) 0.5 - 7.8 %    Basophils 0 0.0 - 2.0 %    Immature Granulocytes 4 0.0 - 5.0 %    Segs Absolute 9.3 (H) 1.7 - 8.2 K/UL    Absolute Lymph # 0.8 0.5 - 4.6 K/UL    Absolute Mono # 0.4 0.1 - 1.3 K/UL    Absolute Eos # 0.0 0.0 - 0.8 K/UL    Basophils Absolute 0.0 0.0 - 0.2 K/UL    Absolute Immature Granulocyte 0.5 0.0 - 0.5 K/UL   Comprehensive Metabolic Panel w/ Reflex to MG    Collection Time: 06/05/22  4:00 AM   Result Value Ref Range    Sodium 139 138 - 145 mmol/L    Potassium 3.9 3.5 - 5.1 mmol/L    Chloride 106 98 - 107 mmol/L    CO2 25 21 - 32 mmol/L    Anion Gap 8 7 - 16 mmol/L    Glucose 105 (H) 65 - 100 mg/dL    BUN 16 8 - 23 MG/DL CREATININE <0.20 (L) 0.8 - 1.5 MG/DL    GFR  0 (L) >60 ml/min/1.73m2    GFR Non- 0 (L) >60 ml/min/1.73m2    Calcium 8.2 (L) 8.3 - 10.4 MG/DL    Total Bilirubin 0.3 0.2 - 1.1 MG/DL    ALT 14 12 - 65 U/L    AST 22 15 - 37 U/L    Alk Phosphatase 110 50 - 136 U/L    Total Protein 4.8 (L) 6.3 - 8.2 g/dL    Albumin 2.4 (L) 3.2 - 4.6 g/dL    Globulin 2.4 2.3 - 3.5 g/dL    Albumin/Globulin Ratio 1.0 (L) 1.2 - 3.5     Phosphorus    Collection Time: 06/05/22  4:00 AM   Result Value Ref Range    Phosphorus 2.8 2.3 - 3.7 MG/DL   Hemoglobin and Hematocrit    Collection Time: 06/05/22 12:27 PM   Result Value Ref Range    Hemoglobin 8.4 (L) 13.6 - 17.2 g/dL    Hematocrit 26.3 (L) 41.1 - 50.3 %       [unfilled]    Other Studies:  [unfilled]    Current Meds:  Current Facility-Administered Medications   Medication Dose Route Frequency    0.9 % sodium chloride infusion   IntraVENous PRN    0.9 % sodium chloride infusion   IntraVENous PRN    furosemide (LASIX) injection 20 mg  20 mg IntraVENous PRN    metoclopramide (REGLAN) injection 10 mg  10 mg IntraVENous Q6H    acetaminophen (TYLENOL) tablet 650 mg  650 mg Oral Q6H PRN    Or    acetaminophen (TYLENOL) suppository 650 mg  650 mg Rectal Q6H PRN    vancomycin (VANCOCIN) 1,000 mg in sodium chloride 0.9 % 250 mL IVPB ([id number])  1,000 mg IntraVENous Q12H    midodrine (PROAMATINE) tablet 10 mg  10 mg Oral TID PRN    metronidazole (FLAGYL) 500 mg in 0.9% NaCl 100 mL IVPB premix  500 mg IntraVENous Q12H    lactated ringers infusion   IntraVENous Continuous    pantoprazole (PROTONIX) 40 mg in sodium chloride (PF) 10 mL injection  40 mg IntraVENous Q12H    sodium chloride flush 0.9 % injection 5-40 mL  5-40 mL IntraVENous 2 times per day    sodium chloride flush 0.9 % injection 5-40 mL  5-40 mL IntraVENous PRN    cefepime (MAXIPIME) 2000 mg IVPB minibag in NS  2,000 mg IntraVENous Q8H    dexamethasone (DECADRON) tablet 3 mg  3 mg Oral 2 times per day    Followed by    dexamethasone (DECADRON) tablet 1 mg  1 mg Oral 2 times per day    sucralfate (CARAFATE) tablet 1 g  1 g Oral 4 times per day    sodium chloride flush 0.9 % injection 10 mL  10 mL IntraVENous ONCE PRN    folic acid (FOLVITE) tablet 1 mg  1 mg Oral Daily    mirtazapine (REMERON) tablet 7.5 mg  7.5 mg Oral Nightly    ondansetron (ZOFRAN) injection 4 mg  4 mg IntraVENous Q6H PRN    saliva substitute (BIOTENE/MOUTH KOTE) liquid   Oral PRN    Medela Tender Care Lanolin cream   Topical PRN       Signed:  WALLY Trejo CNP    Part of this note may have been written by using a voice dictation software. The note has been proof read but may still contain some grammatical/other typographical errors.

## 2022-06-05 NOTE — PERIOP NOTE
TRANSFER - OUT REPORT:    Verbal report given to RAYSA Treviño on Cha Chino  being transferred to [address] [place] [address] for routine post-op       Report consisted of patients Situation, Background, Assessment and   Recommendations(SBAR). Information from the following report(s) Nurse Handoff Report, MAR, Cardiac Rhythm SR and Neuro Assessment was reviewed with the receiving nurse. Lines:   Single Lumen Implantable Port Left Chest (Active)   Port A Cath Status Accessed 06/03/22 1946   Criteria for Appropriate Use Hemodynamically unstable, requiring monitoring lines, vasopressors, or volume resuscitation 06/05/22 0812   Site Assessment Clean, dry & intact 06/05/22 0812   Line Care Connections checked and tightened 06/05/22 0812   Alcohol Cap Used Yes 06/04/22 0819   Date of Last Dressing Change 06/04/22 06/04/22 1933   Dressing Type Transparent; Antimicrobial 06/05/22 0812   Dressing Status Clean, dry & intact 06/05/22 0812   Dressing Intervention New 06/04/22 0819   Date Accessed  06/02/22 06/04/22 1933   Access Needle Gauge 20 G 06/04/22 1933   Access Needle Length 0.75 inches 06/04/22 1933   Single Lumen Status Infusing 06/04/22 1933   Date needle changed 06/02/22 06/04/22 1933       Peripheral IV 06/04/22 Left;Proximal;Anterior Forearm (Active)   Site Assessment Clean, dry & intact 06/05/22 0812   Line Status Infusing 06/05/22 0812   Line Care Connections checked and tightened 06/05/22 0812   Phlebitis Assessment No symptoms 06/05/22 0812   Infiltration Assessment 0 06/05/22 0812   Alcohol Cap Used No 06/05/22 0812   Dressing Status Clean, dry & intact 06/05/22 0812   Dressing Type Transparent 06/05/22 0812       Peripheral IV 06/04/22 Right; Anterior Cephalic (Active)   Site Assessment Ecchymotic;Clean, dry & intact 06/05/22 0812   Line Status Normal saline locked 06/05/22 0812   Phlebitis Assessment No symptoms 06/05/22 0812   Infiltration Assessment 0 06/05/22 0812   Alcohol Cap Used No 06/05/22 0812   Dressing Status Clean, dry & intact 06/05/22 0812   Dressing Type Transparent 06/05/22 0812        Opportunity for questions and clarification was provided. Patient transported with:   Monitor  O2 @ 2 liters  Registered Nurse    VTE prophylaxis orders have been written for [employer].

## 2022-06-05 NOTE — PROGRESS NOTES
Attempted to see pt for General Surgery rounding. Pt out of room for GI procedure.          Raquel Welch, NP

## 2022-06-05 NOTE — PROGRESS NOTES
TRANSFER - IN REPORT:    Verbal report received from [facility] on Janeth Clemons  being received from [phone] for ordered procedure      Report consisted of patient's Situation, Background, Assessment and   Recommendations(SBAR). Information from the following report(s) Procedure Verification was reviewed with the receiving nurse. Opportunity for questions and clarification was provided. Assessment completed upon patient's arrival to unit and care assumed.

## 2022-06-06 ENCOUNTER — TELEPHONE (OUTPATIENT)
Dept: ONCOLOGY | Age: 67
End: 2022-06-06

## 2022-06-06 ENCOUNTER — HOME CARE VISIT (OUTPATIENT)
Dept: HOME HEALTH SERVICES | Facility: HOME HEALTH | Age: 67
End: 2022-06-06
Payer: MEDICARE

## 2022-06-06 ENCOUNTER — APPOINTMENT (OUTPATIENT)
Dept: INTERVENTIONAL RADIOLOGY/VASCULAR | Age: 67
DRG: 853 | End: 2022-06-06
Payer: MEDICARE

## 2022-06-06 PROBLEM — E43 SEVERE PROTEIN-CALORIE MALNUTRITION (HCC): Status: ACTIVE | Noted: 2022-06-06

## 2022-06-06 LAB
ABO + RH BLD: NORMAL
ANION GAP SERPL CALC-SCNC: 5 MMOL/L (ref 7–16)
BLD PROD TYP BPU: NORMAL
BLOOD BANK DISPENSE STATUS: NORMAL
BLOOD GROUP ANTIBODIES SERPL: NORMAL
BPU ID: NORMAL
BUN SERPL-MCNC: 15 MG/DL (ref 8–23)
CALCIUM SERPL-MCNC: 8.4 MG/DL (ref 8.3–10.4)
CHLORIDE SERPL-SCNC: 105 MMOL/L (ref 98–107)
CO2 SERPL-SCNC: 28 MMOL/L (ref 21–32)
CREAT SERPL-MCNC: 0.2 MG/DL (ref 0.8–1.5)
CROSSMATCH RESULT: NORMAL
GLUCOSE SERPL-MCNC: 92 MG/DL (ref 65–100)
HCT VFR BLD AUTO: 30.6 % (ref 41.1–50.3)
HGB BLD-MCNC: 9.9 G/DL (ref 13.6–17.2)
PHOSPHATE SERPL-MCNC: 2.3 MG/DL (ref 2.3–3.7)
POTASSIUM SERPL-SCNC: 3.5 MMOL/L (ref 3.5–5.1)
SODIUM SERPL-SCNC: 138 MMOL/L (ref 138–145)
SPECIMEN EXP DATE BLD: NORMAL
UNIT DIVISION: 0

## 2022-06-06 PROCEDURE — 85018 HEMOGLOBIN: CPT

## 2022-06-06 PROCEDURE — 6360000002 HC RX W HCPCS: Performed by: INTERNAL MEDICINE

## 2022-06-06 PROCEDURE — 2580000003 HC RX 258: Performed by: FAMILY MEDICINE

## 2022-06-06 PROCEDURE — 6360000002 HC RX W HCPCS: Performed by: FAMILY MEDICINE

## 2022-06-06 PROCEDURE — 2500000003 HC RX 250 WO HCPCS: Performed by: INTERNAL MEDICINE

## 2022-06-06 PROCEDURE — 6360000004 HC RX CONTRAST MEDICATION: Performed by: RADIOLOGY

## 2022-06-06 PROCEDURE — 1090000001 HH PPS REVENUE CREDIT

## 2022-06-06 PROCEDURE — 3E0436Z INTRODUCTION OF NUTRITIONAL SUBSTANCE INTO CENTRAL VEIN, PERCUTANEOUS APPROACH: ICD-10-PCS | Performed by: INTERNAL MEDICINE

## 2022-06-06 PROCEDURE — 2580000003 HC RX 258: Performed by: INTERNAL MEDICINE

## 2022-06-06 PROCEDURE — 6360000002 HC RX W HCPCS: Performed by: RADIOLOGY

## 2022-06-06 PROCEDURE — 1090000002 HH PPS REVENUE DEBIT

## 2022-06-06 PROCEDURE — 80048 BASIC METABOLIC PNL TOTAL CA: CPT

## 2022-06-06 PROCEDURE — 1100000000 HC RM PRIVATE

## 2022-06-06 PROCEDURE — A4216 STERILE WATER/SALINE, 10 ML: HCPCS | Performed by: FAMILY MEDICINE

## 2022-06-06 PROCEDURE — 2500000003 HC RX 250 WO HCPCS: Performed by: FAMILY MEDICINE

## 2022-06-06 PROCEDURE — 2500000003 HC RX 250 WO HCPCS: Performed by: RADIOLOGY

## 2022-06-06 PROCEDURE — 84100 ASSAY OF PHOSPHORUS: CPT

## 2022-06-06 PROCEDURE — 6370000000 HC RX 637 (ALT 250 FOR IP): Performed by: FAMILY MEDICINE

## 2022-06-06 PROCEDURE — C9113 INJ PANTOPRAZOLE SODIUM, VIA: HCPCS | Performed by: FAMILY MEDICINE

## 2022-06-06 PROCEDURE — 99153 MOD SED SAME PHYS/QHP EA: CPT

## 2022-06-06 PROCEDURE — 06H03DZ INSERTION OF INTRALUMINAL DEVICE INTO INFERIOR VENA CAVA, PERCUTANEOUS APPROACH: ICD-10-PCS | Performed by: RADIOLOGY

## 2022-06-06 PROCEDURE — 2580000003 HC RX 258: Performed by: RADIOLOGY

## 2022-06-06 RX ORDER — FENTANYL CITRATE 50 UG/ML
INJECTION, SOLUTION INTRAMUSCULAR; INTRAVENOUS
Status: COMPLETED | OUTPATIENT
Start: 2022-06-06 | End: 2022-06-06

## 2022-06-06 RX ORDER — SODIUM CHLORIDE 9 MG/ML
INJECTION, SOLUTION INTRAVENOUS CONTINUOUS PRN
Status: COMPLETED | OUTPATIENT
Start: 2022-06-06 | End: 2022-06-06

## 2022-06-06 RX ORDER — THIAMINE HYDROCHLORIDE 100 MG/ML
100 INJECTION, SOLUTION INTRAMUSCULAR; INTRAVENOUS DAILY
Status: DISCONTINUED | OUTPATIENT
Start: 2022-06-06 | End: 2022-06-09

## 2022-06-06 RX ORDER — POTASSIUM CHLORIDE 29.8 MG/ML
20 INJECTION INTRAVENOUS PRN
Status: DISCONTINUED | OUTPATIENT
Start: 2022-06-06 | End: 2022-06-21 | Stop reason: HOSPADM

## 2022-06-06 RX ORDER — MIDAZOLAM HYDROCHLORIDE 2 MG/2ML
INJECTION, SOLUTION INTRAMUSCULAR; INTRAVENOUS
Status: COMPLETED | OUTPATIENT
Start: 2022-06-06 | End: 2022-06-06

## 2022-06-06 RX ORDER — POTASSIUM CHLORIDE 7.45 MG/ML
10 INJECTION INTRAVENOUS PRN
Status: DISCONTINUED | OUTPATIENT
Start: 2022-06-06 | End: 2022-06-09

## 2022-06-06 RX ORDER — MAGNESIUM SULFATE IN WATER 40 MG/ML
2000 INJECTION, SOLUTION INTRAVENOUS PRN
Status: DISCONTINUED | OUTPATIENT
Start: 2022-06-06 | End: 2022-06-21 | Stop reason: HOSPADM

## 2022-06-06 RX ORDER — LIDOCAINE HYDROCHLORIDE 10 MG/ML
INJECTION, SOLUTION EPIDURAL; INFILTRATION; INTRACAUDAL; PERINEURAL
Status: COMPLETED | OUTPATIENT
Start: 2022-06-06 | End: 2022-06-06

## 2022-06-06 RX ORDER — FOLIC ACID 5 MG/ML
1 INJECTION, SOLUTION INTRAMUSCULAR; INTRAVENOUS; SUBCUTANEOUS DAILY
Status: DISCONTINUED | OUTPATIENT
Start: 2022-06-06 | End: 2022-06-06 | Stop reason: SDUPTHER

## 2022-06-06 RX ADMIN — FENTANYL CITRATE 25 MCG: 50 INJECTION INTRAMUSCULAR; INTRAVENOUS at 15:10

## 2022-06-06 RX ADMIN — FENTANYL CITRATE 50 MCG: 50 INJECTION INTRAMUSCULAR; INTRAVENOUS at 14:35

## 2022-06-06 RX ADMIN — CALCIUM GLUCONATE: 98 INJECTION, SOLUTION INTRAVENOUS at 18:29

## 2022-06-06 RX ADMIN — SUCRALFATE 1 G: 1 TABLET ORAL at 17:45

## 2022-06-06 RX ADMIN — METRONIDAZOLE 500 MG: 500 INJECTION, SOLUTION INTRAVENOUS at 06:37

## 2022-06-06 RX ADMIN — THIAMINE HYDROCHLORIDE 100 MG: 100 INJECTION, SOLUTION INTRAMUSCULAR; INTRAVENOUS at 18:31

## 2022-06-06 RX ADMIN — METOCLOPRAMIDE HYDROCHLORIDE 10 MG: 5 INJECTION INTRAMUSCULAR; INTRAVENOUS at 17:29

## 2022-06-06 RX ADMIN — SODIUM CHLORIDE, PRESERVATIVE FREE 10 ML: 5 INJECTION INTRAVENOUS at 22:13

## 2022-06-06 RX ADMIN — SODIUM CHLORIDE 500 ML/HR: 9 INJECTION, SOLUTION INTRAVENOUS at 14:35

## 2022-06-06 RX ADMIN — METRONIDAZOLE 500 MG: 500 INJECTION, SOLUTION INTRAVENOUS at 17:34

## 2022-06-06 RX ADMIN — SOYBEAN OIL 250 ML: 20 INJECTION, SOLUTION INTRAVENOUS at 18:29

## 2022-06-06 RX ADMIN — LIDOCAINE HYDROCHLORIDE 8 ML: 10 INJECTION, SOLUTION EPIDURAL; INFILTRATION; INTRACAUDAL; PERINEURAL at 14:50

## 2022-06-06 RX ADMIN — MIDAZOLAM HYDROCHLORIDE 0.5 MG: 1 INJECTION, SOLUTION INTRAMUSCULAR; INTRAVENOUS at 15:10

## 2022-06-06 RX ADMIN — DEXAMETHASONE 1 MG: 0.5 TABLET ORAL at 21:59

## 2022-06-06 RX ADMIN — CEFEPIME HYDROCHLORIDE 2000 MG: 2 INJECTION, POWDER, FOR SOLUTION INTRAVENOUS at 22:02

## 2022-06-06 RX ADMIN — FENTANYL CITRATE 25 MCG: 50 INJECTION INTRAMUSCULAR; INTRAVENOUS at 14:49

## 2022-06-06 RX ADMIN — MIDAZOLAM HYDROCHLORIDE 0.5 MG: 1 INJECTION, SOLUTION INTRAMUSCULAR; INTRAVENOUS at 14:49

## 2022-06-06 RX ADMIN — IOPAMIDOL 46 ML: 755 INJECTION, SOLUTION INTRAVENOUS at 15:13

## 2022-06-06 RX ADMIN — SODIUM CHLORIDE, PRESERVATIVE FREE 40 MG: 5 INJECTION INTRAVENOUS at 17:30

## 2022-06-06 RX ADMIN — VANCOMYCIN HYDROCHLORIDE 1000 MG: 1 INJECTION, POWDER, LYOPHILIZED, FOR SOLUTION INTRAVENOUS at 17:38

## 2022-06-06 RX ADMIN — SUCRALFATE 1 G: 1 TABLET ORAL at 05:38

## 2022-06-06 RX ADMIN — METOCLOPRAMIDE HYDROCHLORIDE 10 MG: 5 INJECTION INTRAMUSCULAR; INTRAVENOUS at 05:38

## 2022-06-06 RX ADMIN — SODIUM CHLORIDE, PRESERVATIVE FREE 10 ML: 5 INJECTION INTRAVENOUS at 09:27

## 2022-06-06 RX ADMIN — VANCOMYCIN HYDROCHLORIDE 1000 MG: 1 INJECTION, POWDER, LYOPHILIZED, FOR SOLUTION INTRAVENOUS at 05:39

## 2022-06-06 RX ADMIN — METOCLOPRAMIDE HYDROCHLORIDE 10 MG: 5 INJECTION INTRAMUSCULAR; INTRAVENOUS at 11:21

## 2022-06-06 RX ADMIN — CEFEPIME HYDROCHLORIDE 2000 MG: 2 INJECTION, POWDER, FOR SOLUTION INTRAVENOUS at 05:39

## 2022-06-06 RX ADMIN — FOLIC ACID 1 MG: 5 INJECTION, SOLUTION INTRAMUSCULAR; INTRAVENOUS; SUBCUTANEOUS at 17:41

## 2022-06-06 RX ADMIN — MIDAZOLAM HYDROCHLORIDE 1 MG: 1 INJECTION, SOLUTION INTRAMUSCULAR; INTRAVENOUS at 14:35

## 2022-06-06 RX ADMIN — MIRTAZAPINE 7.5 MG: 15 TABLET, FILM COATED ORAL at 22:00

## 2022-06-06 RX ADMIN — SODIUM CHLORIDE, PRESERVATIVE FREE 40 MG: 5 INJECTION INTRAVENOUS at 05:38

## 2022-06-06 ASSESSMENT — PAIN SCALES - GENERAL
PAINLEVEL_OUTOF10: 0

## 2022-06-06 NOTE — PROGRESS NOTES
Admit Date: 2022      Subjective: The patient is lying in bed with family at bedside. States that he is feeling better today. He has been able to pass flatus and have a bowel movement. Hemoglobin has been trending up, 9.9 today. He denies any nausea or emesis. Objective:       Vitals:    22 2318 22 0317 22 0745 22 1100   BP: (!) 146/91 131/79 (!) 141/88 (!) 138/90   Pulse: 73 64 64 87   Resp: 18 18 16 18   Temp: 97.8 °F (36.6 °C) 97.7 °F (36.5 °C) 97.7 °F (36.5 °C) 97.7 °F (36.5 °C)   TempSrc: Oral Oral Oral Oral   SpO2: 99%  98% 97%   Weight:       Height:           Temp (24hrs), Av.8 °F (36.6 °C), Min:97.7 °F (36.5 °C), Max:97.9 °F (36.6 °C)  . I&O reviewed as documented. Constitutional: Alert, oriented, cooperative patient in no acute distress;     Eyes:Sclera are clear. EOMs intact  ENMT: no external lesions gross hearing normal; no obvious neck masses, no ear or lip lesions, nares normal  CV: RRR. Normal perfusion  Resp: No JVD. Breathing is  non-labored; no audible wheezing. GI: soft and non-distended     Musculoskeletal: unremarkable with normal function. No embolic signs or cyanosis.    Neuro:  Oriented; moves all 4; no focal deficits  Psychiatric: normal affect and mood, no memory impairment    Labs:   Recent Results (from the past 24 hour(s))   Hemoglobin and Hematocrit    Collection Time: 22 11:26 PM   Result Value Ref Range    Hemoglobin 9.3 (L) 13.6 - 17.2 g/dL    Hematocrit 28.7 (L) 41.1 - 50.3 %   Phosphorus    Collection Time: 22  2:49 AM   Result Value Ref Range    Phosphorus 2.3 2.3 - 3.7 MG/DL   Basic Metabolic Panel    Collection Time: 22  2:49 AM   Result Value Ref Range    Sodium 138 138 - 145 mmol/L    Potassium 3.5 3.5 - 5.1 mmol/L    Chloride 105 98 - 107 mmol/L    CO2 28 21 - 32 mmol/L    Anion Gap 5 (L) 7 - 16 mmol/L    Glucose 92 65 - 100 mg/dL    BUN 15 8 - 23 MG/DL    CREATININE 0.20 (L) 0.8 - 1.5 MG/DL    GFR  American >60 >60 ml/min/1.73m2    GFR Non- >60 >60 ml/min/1.73m2    Calcium 8.4 8.3 - 10.4 MG/DL   Hemoglobin and Hematocrit    Collection Time: 06/06/22  9:46 AM   Result Value Ref Range    Hemoglobin 9.9 (L) 13.6 - 17.2 g/dL    Hematocrit 30.6 (L) 41.1 - 50.3 %           Assessment:     Principal Problem:    Septic shock (HCC)  Active Problems:    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HCC)    Former heavy tobacco smoker    Gastroesophageal reflux disease    Hyponatremia    Hypoalbuminemia due to protein-calorie malnutrition (HCC)    Syncope due to orthostatic hypotension    Hypomagnesemia    Corticosteroid dependence ([facility]    Hypoproteinemia (HCC)    Do not resuscitate status    Fatigue    History of gastric cancer    Encounter for palliative care    Debility    Weakness    Hypercoagulable state, secondary ([facility]    Adult body mass index less than 19    Acute pulmonary embolism without acute cor pulmonale (HCC)    Severe protein-calorie malnutrition (HCC)    ABILIO (iron deficiency anemia)    Malignant neoplasm of overlapping sites of stomach (HCC)    Gastric adenocarcinoma (HCC)  Resolved Problems:    Severe sepsis with lactic acidosis (HCC)    ABLA (acute blood loss anemia)    GIB (gastrointestinal bleeding)        Plan/Recommendations/Medical Decision Making:     Hemoglobin improving, 9.9 this morning, he has been having bowel function as well  Try to avoid emergent surgical intervention at this time, plan to keep surgery as scheduled on 6/22/2022 to allow recovery after chemotherapy(finished just a week ago)  Start Valery Sanabria MD

## 2022-06-06 NOTE — PROGRESS NOTES
.  [facility] Progress Note             Date: 6/6/2022    GI Problem: GI blood loss anemia, gastric cancer    History of Present Illness:   Patient is a 78 y.o. male with PMH including but not limited to Gastric CA, s/p chemo , who is referred by Dr. Roverto Nuno for GI Bleeding. He has a known Gastric CA involving the majority of his stomach initially diagnosed in March. Lena Farfan MD is seeing patient and he recommends surgery as planned on 6/22. CTA 6/2 demonstrated no large volume gastric bleeding. EGDs as noted. Stable today. Hgb 9.9.   No gross bleeding or new gi sx.       Hospital Medications:  Current Facility-Administered Medications   Medication Dose Route Frequency    potassium chloride 20 mEq/50 mL IVPB (Central Line)  20 mEq IntraVENous PRN    Or    potassium chloride 10 mEq/100 mL IVPB (Peripheral Line)  10 mEq IntraVENous PRN    magnesium sulfate 2000 mg in 50 mL IVPB premix  2,000 mg IntraVENous PRN    sodium phosphate 10 mmol in sodium chloride 0.9 % 250 mL IVPB  10 mmol IntraVENous PRN    Or    sodium phosphate 15 mmol in sodium chloride 0.9 % 250 mL IVPB  15 mmol IntraVENous PRN    Or    sodium phosphate 20 mmol in sodium chloride 0.9 % 500 mL IVPB  20 mmol IntraVENous PRN    PN-Adult Premix 4.25/10 - Central Line   IntraVENous Continuous TPN    fat emulsion 20 % infusion 250 mL  250 mL IntraVENous Daily    thiamine (B-1) injection 100 mg  100 mg IntraVENous Daily    folic acid 1 mg in sodium chloride 0.9 % 50 mL IVPB  1 mg IntraVENous Daily    0.9 % sodium chloride infusion   IntraVENous PRN    0.9 % sodium chloride infusion   IntraVENous PRN    furosemide (LASIX) injection 20 mg  20 mg IntraVENous PRN    metoclopramide (REGLAN) injection 10 mg  10 mg IntraVENous Q6H    acetaminophen (TYLENOL) tablet 650 mg  650 mg Oral Q6H PRN    Or    acetaminophen (TYLENOL) suppository 650 mg  650 mg Rectal Q6H PRN    vancomycin (VANCOCIN) 1,000 mg in sodium chloride 0.9 % 250 mL IVPB ([id number])  1,000 mg IntraVENous Q12H    midodrine (PROAMATINE) tablet 10 mg  10 mg Oral TID PRN    metronidazole (FLAGYL) 500 mg in 0.9% NaCl 100 mL IVPB premix  500 mg IntraVENous Q12H    lactated ringers infusion   IntraVENous Continuous    pantoprazole (PROTONIX) 40 mg in sodium chloride (PF) 10 mL injection  40 mg IntraVENous Q12H    sodium chloride flush 0.9 % injection 5-40 mL  5-40 mL IntraVENous 2 times per day    sodium chloride flush 0.9 % injection 5-40 mL  5-40 mL IntraVENous PRN    cefepime (MAXIPIME) 2000 mg IVPB minibag in NS  2,000 mg IntraVENous Q8H    dexamethasone (DECADRON) tablet 1 mg  1 mg Oral 2 times per day    sucralfate (CARAFATE) tablet 1 g  1 g Oral 4 times per day    sodium chloride flush 0.9 % injection 10 mL  10 mL IntraVENous ONCE PRN    mirtazapine (REMERON) tablet 7.5 mg  7.5 mg Oral Nightly    ondansetron (ZOFRAN) injection 4 mg  4 mg IntraVENous Q6H PRN    saliva substitute (BIOTENE/MOUTH KOTE) liquid   Oral PRN    Medela Tender Care Lanolin cream   Topical PRN       Objective:     Physical Exam:  Vitals:  Visit Vitals  /80   Pulse 79   Temp 97.1 °F (36.2 °C) (Tympanic)   Resp 10   Ht 5' 9\" (1.753 m)   Wt 118 lb (53.5 kg)   SpO2 98%   BMI 17.43 kg/m²       General: No acute distress. Skin:  Extremities and face reveal no rashes. No holloway erythema. No telangiectasias on the chest wall. HEENT: Sclerae anicteric. No oral ulcers. No abnormal pigmentation of the lips. The neck is supple. Cardiovascular: Regular rate and rhythm. No murmurs, gallops, or rubs. Respiratory:  Comfortable breathing  With no accessory muscle use. Clear breath sounds with no wheezes, rales, or rhonchi. GI:  Abdomen nondistended, soft, and nontender. Normal active bowel sounds. No enlargement of the liver or spleen. No masses palpable. Musculoskeletal:  No pitting edema of the lower legs. Extremities have good range of motion.    Neurological:  Gross memory appears intact. Patient is alert and oriented. Psychiatric:  Mood appears appropriate with judgement intact. Lymphatic:  No cervical or supraclavicular adenopathy. Laboratory:    Recent Labs     06/05/22  0400 06/05/22  1227 06/06/22  0946   WBC 11.1  --   --    RBC 2.72*  --   --    HGB 7.5*   < > 9.9*   HCT 23.3*   < > 30.6*     --   --     < > = values in this interval not displayed. Recent Labs     06/06/22  0249      K 3.5      CO2 28   BUN 15     Recent Labs     06/03/22  1807   INR 1.2     No results for input(s): ALB, TP, AML in the last 72 hours.     Invalid input(s): TBIL, CBIL, SGOT, GPT, AP, LPSE    Assessment:       Gastric cancer  Anemia, multifactorial      Plan:       Per surgery  No change in gi plans        Signed By: Radha Sena MD     June 6, 2022

## 2022-06-06 NOTE — TELEPHONE ENCOUNTER
Physical therapist called to informed provider the pt was admitted to the hospital last week for septic shock and they where unable to treat the pt.     Any questions call back

## 2022-06-06 NOTE — PLAN OF CARE
Care Plan outlined below is complete    Select Items      [x]    Moderate Sedation and General Procedures Care Plan (Adult)    [x]    Moderate Sedation (Adult)    [x]   *Patent airway    [x]   *Adequate oxygenation    [x]  *Absence of aspiration    [x]  *Hemodynamically stable    [x]  *Optimal pain control at patient's stated goal    [x]  *Absence of nausea/vomiting     [x]  *Anxiety reduced or absent    [x]  *Absence of injury    [x]  *Level of consciousness returns to baseline    [x]  Interventions    [x]  Emergency/resuscitation equipment readily available    [x]  Perform appropriate procedural time outs (universal protocol)    [x]  Assess effectiveness of medication therapy    [x]  Monitor for change in patient condition (eg: Vital signs; hypoxemia; mentalstatus; level of consciousness; skin temperature, color)    [x]  Physical assessment    [x]  Anxiety assessment    [x]  Airway assessment (eg: Airway patency; nasal cavity patency; soft palatevisualization; tongue placement)    [x]  Vital signs assessment    [x]  Pain assessment per assessed patient condition or unit standard    [x]  Oxygen administration    [x]  Pulse oximetry, if ordered    [x]  IV catheter insertion - peripheral    [x]  Capnography    [x]  Cardiac monitoring    [x]  Patient Education: Go to Patient Education Activity    [x]  Patient/Family Education

## 2022-06-06 NOTE — PROGRESS NOTES
VANCO DAILY FOLLOW UP NOTE  [address] [facility] Pharmacokinetic Monitoring Service - Vancomycin    Consulting Provider: Dr. Rahul Garza   Indication: sepsis  Target Concentration: Goal AUC/WILFREDO 400-600 mg*hr/L  Day of Therapy: 5  Additional Antimicrobials: cefepime    Pertinent Laboratory Values: Wt Readings from Last 1 Encounters:   06/02/22 118 lb (53.5 kg)     Temp Readings from Last 1 Encounters:   06/06/22 97.7 °F (36.5 °C) (Oral)     Recent Labs     06/03/22  1807 06/03/22  1807 06/04/22  0348 06/05/22  0400 06/06/22  0249   BUN 28*   < > 31* 16 15   CREATININE 0.50*   < > 0.20* <0.20* 0.20*   WBC 19.1*  --  13.9* 11.1  --     < > = values in this interval not displayed. Estimated Creatinine Clearance: 271 mL/min (A) (based on SCr of 0.2 mg/dL (L)). Lab Results   Component Value Date    VANCORANDOM 13.7 06/05/2022       MRSA Nasal Swab: N/A. Non-respiratory infection. .      Assessment:  Date/Time Dose Concentration AUC   6/3 0709 750 mg q8h 26.1 601   6/5 0400 1000 mg q12h 13.7 482   Note: Serum concentrations collected for AUC dosing may appear elevated if collected in close proximity to the dose administered, this is not necessarily an indication of toxicity    Plan:  Continue 1000 mg q12h  Repeat vancomycin concentrations will be ordered as clinically appropriate   Pharmacy will continue to monitor patient and adjust therapy as indicated    Thank you for the consult,  Juan MayoD, BCOP  Clinical Pharmacist  Contact via [employer]

## 2022-06-06 NOTE — OR NURSING
TRANSFER - OUT REPORT:           Verbal report given to Michelle Ruben on Dayla Matter  being transferred to IR Recovery for routine post-op              Report consisted of patients Situation, Background, Assessment and      Recommendations(SBAR). Information from the following report(s) SBAR, Procedure Summary and MAR was reviewed with the receiving nurse. Opportunity for questions and clarification was provided. Conscious Sedation:      100 Mcg of Fentanyl administered     2 Mg of Versed administered           Pt tolerated procedure well. Filter insertion site is at the base of the neck on the right side. Hemostasis obtained. Closed with Dermabond.

## 2022-06-06 NOTE — PROGRESS NOTES
Hospitalist Progress Note   Admit Date:  2022  1:05 PM   Name:  Josemanuel Caceres   Age:  78 y.o. Sex:  male  :  1955   MRN:  [id number]   Room:  [facility]/    Presenting Complaint: Loss of Consciousness    Reason(s) for Admission: Syncope and collapse [R55]  Hemorrhagic shock ([address] [R57.8]  Shock ([address] [R57.9]  History of gastric cancer [Z85.028]  Septic shock ([address] [A41.9, R65.21]  Gastrointestinal hemorrhage, unspecified gastrointestinal hemorrhage type [facility] Course & Interval History:   66M PMHx gastric adenocarcinoma s/p 4 cycles chemo, last dose 22, 50 pack year smoking history,  HTN, who presented  after having a syncopal episode with 2 day history of near syncope. He started feeling weak and tired then stood up and passed out. Endorsed abdominal pain. Noted that patient has been unable to tolerate solid food for months, and consumes several ensures per day but still had  significant weight loss. Wife also reported  breathing abnormally and very shallow, with some chest discomfort    Prior to arrival, HR was erratic jumping from normal to as high as 160-170, described as SVT vs Sinus tach per EMS. Theadora Barley rec'd 1500 cc LR bolus and 4 mg zofran while en route. In ED, he was hypotensive, BP 74/54, , RR 22. He rec'd S/p  30cc/kg sepsis, vancomcyin, cefepime 1 UpRBC ordered    Patient with pre syncopal episodes 6/3 and given 2 units PRBC. AC held. IR consulted for possible IVC filter due to continuous GI bleeding. Subjective/24hr Events (22):   Endorses fatigue, bloody stools,denies CP, N/V. Family questioning swelling RUE near elbow which is result of patient accidentally pulling out IV yesterday. Will image if swelling gets worse. 2 lrg bloody stools per nursing      Denies bloody stools, but with continued fatigue and poor appetite  EGD x 2 unsuccessful due to food particles obstructing access. ROS:  Denies CP, SOB. Endorses some fatigue. Hematemesis/hematochezia  Wife is concerned about nutritional status and is unopposed to TPN    Assessment & Plan:   Septic Shock, hemorrhagic shock  GPR bacteremia  6/6/22  Admission  RR 22 BP 74/54 without clear source. S/p 30 cc/kg sepsis Bolus. S/p pRBC 2U 6/2.   - albumin q6hx4 doses  - dexamethasone 1 mg BID. -BCx positive for GPC x 1 bottle(anaerobic). Continue Vanc/Cefepime/Flagyl  -Repeat BCx NGTD; Will consider de escalation tomorrow if BCx remain negative      Chronic Anemia   Admission Hgb 5.6; Suspected abla but GI consult signed off. Most likely BM suppression from chemo/malignancy plus nutritional deficiencies. S/p pRBC 2U 6/2; s/p b12 2612 mcg IM   - folic acid  - PPI IV BID and carafate   - consult hematology  6/6/22  -Patient has received total of 5 units of RBC since admit  -Hgb now 9.9  -ICU [place] also following  -Will need Fe supplementation once infection treatment   -EGD unsuccessful yesterday due to obstruction with food particles. Patient given reglan overnight but repeat EGD was still unsuccessful  -Surgery recs pending    Hypercoagulable state  CT CAP with small PE, femoral DVT. Discussed with surgery, no current plan for urgent intervention  - apixaban  - duplex BLE  6/6/22  -Holding AC due to bleeding  -IR to place IVC filter today   -Will need Hematology f/u OP     Cancer cachexia, severe protein caloric malnutrition, Hypoproteinemia  6/6/22  - remeron qhs  - Steroids as above.    - Ensure clear added per nutrition; appreciate assistance   - TPN being considered; patient has indwelling port     Gastric adenocarcinoma   s/p 4 cycle chemotherapy  - Oncology and GS onboard    - Patient will undergo total gastrectomy once clinically improved  6/5/22  -surgery following; dispo of surgery pending      Former smoker   noted          Discharge Planning:      TBD    Diet:  Diet NPO  DVT PPx: SCD  Code status: DNR            Objective:     Patient Vitals for the past 24 hrs:   Temp Pulse Resp BP SpO2   06/06/22 0745 97.7 °F (36.5 °C) 64 16 (!) 141/88 98 %   06/06/22 0317 97.7 °F (36.5 °C) 64 18 131/79 --   06/05/22 2318 97.8 °F (36.6 °C) 73 18 (!) 146/91 99 %   06/05/22 1853 97.7 °F (36.5 °C) 62 18 138/86 100 %   06/05/22 1517 97.9 °F (36.6 °C) 57 18 (!) 155/75 100 %   06/05/22 1111 98.7 °F (37.1 °C) 54 18 (!) 146/78 100 %         Estimated body mass index is 17.43 kg/m² as calculated from the following:    Height as of this encounter: 5' 9\" (1.753 m). Weight as of this encounter: 118 lb (53.5 kg). Intake/Output Summary (Last 24 hours) at 6/6/2022 1054  Last data filed at 6/6/2022 1040  Gross per 24 hour   Intake 3466 ml   Output 1925 ml   Net 1541 ml         Physical Exam:     Blood pressure (!) 141/88, pulse 64, temperature 97.7 °F (36.5 °C), temperature source Oral, resp. rate 16, height 5' 9\" (1.753 m), weight 118 lb (53.5 kg), SpO2 98 %. General:    Ill appearing; No overt distress  Head:  Normocephalic, atraumatic  Eyes:  Sclerae appear normal.  Pupils equally round. ENT:  Nares appear normal, no drainage. Moist oral mucosa  Neck:  No restricted ROM. Trachea midline   CV:   RRR. No m/r/g. No jugular venous distension. Lungs:   CTAB. No wheezing, rhonchi, or rales. Respirations even, unlabored  Abdomen: Bowel sounds present. Soft, nontender, nondistended. Extremities: No cyanosis or clubbing. No edema  Skin:     No rashes. Pallor  Warm and dry. Ecchymosis upper and lower torso     Neuro:  CN II-XII grossly intact. A&Ox3  Psych:  Normal mood and affect.       I have reviewed ordered lab tests and independently visualized imaging below:    Recent Labs:  Recent Results (from the past 48 hour(s))   Hemoglobin and Hematocrit    Collection Time: 06/04/22 11:02 AM   Result Value Ref Range    Hemoglobin 4.9 (LL) 13.6 - 17.2 g/dL    Hematocrit 15.4 (L) 41.1 - 50.3 %   PREPARE RBC (CROSSMATCH), 1 Units    Collection Time: 06/04/22 12:15 PM   Result Value Ref Range    History Check Historical check performed    PREPARE RBC (CROSSMATCH), 2 Units    Collection Time: 06/04/22 12:30 PM   Result Value Ref Range    History Check Historical check performed    Hemoglobin and Hematocrit    Collection Time: 06/04/22  4:29 PM   Result Value Ref Range    Hemoglobin 6.2 (LL) 13.6 - 17.2 g/dL    Hematocrit 19.1 (L) 41.1 - 50.3 %   Hemoglobin and Hematocrit    Collection Time: 06/04/22 10:52 PM   Result Value Ref Range    Hemoglobin 7.4 (L) 13.6 - 17.2 g/dL    Hematocrit 22.5 (L) 41.1 - 50.3 %   Vancomycin Level, Random    Collection Time: 06/05/22  4:00 AM   Result Value Ref Range    Vancomycin Rm 13.7 UG/ML   CBC with Auto Differential    Collection Time: 06/05/22  4:00 AM   Result Value Ref Range    WBC 11.1 4.3 - 11.1 K/uL    RBC 2.72 (L) 4.23 - 5.6 M/uL    Hemoglobin 7.5 (L) 13.6 - 17.2 g/dL    Hematocrit 23.3 (L) 41.1 - 50.3 %    MCV 85.7 79.6 - 97.8 FL    MCH 27.6 26.1 - 32.9 PG    MCHC 32.2 31.4 - 35.0 g/dL    RDW 15.7 (H) 11.9 - 14.6 %    Platelets 838 240 - 008 K/uL    MPV 9.0 (L) 9.4 - 12.3 FL    nRBC 0.03 0.0 - 0.2 K/uL    Differential Type AUTOMATED      Seg Neutrophils 84 (H) 43 - 78 %    Lymphocytes 8 (L) 13 - 44 %    Monocytes 4 4.0 - 12.0 %    Eosinophils % 0 (L) 0.5 - 7.8 %    Basophils 0 0.0 - 2.0 %    Immature Granulocytes 4 0.0 - 5.0 %    Segs Absolute 9.3 (H) 1.7 - 8.2 K/UL    Absolute Lymph # 0.8 0.5 - 4.6 K/UL    Absolute Mono # 0.4 0.1 - 1.3 K/UL    Absolute Eos # 0.0 0.0 - 0.8 K/UL    Basophils Absolute 0.0 0.0 - 0.2 K/UL    Absolute Immature Granulocyte 0.5 0.0 - 0.5 K/UL   Comprehensive Metabolic Panel w/ Reflex to MG    Collection Time: 06/05/22  4:00 AM   Result Value Ref Range    Sodium 139 138 - 145 mmol/L    Potassium 3.9 3.5 - 5.1 mmol/L    Chloride 106 98 - 107 mmol/L    CO2 25 21 - 32 mmol/L    Anion Gap 8 7 - 16 mmol/L    Glucose 105 (H) 65 - 100 mg/dL    BUN 16 8 - 23 MG/DL    CREATININE <0.20 (L) 0.8 - 1.5 MG/DL    GFR  0 (L) >60 ml/min/1.73m2    GFR Non-African American 0 (L) >60 ml/min/1.73m2    Calcium 8.2 (L) 8.3 - 10.4 MG/DL    Total Bilirubin 0.3 0.2 - 1.1 MG/DL    ALT 14 12 - 65 U/L    AST 22 15 - 37 U/L    Alk Phosphatase 110 50 - 136 U/L    Total Protein 4.8 (L) 6.3 - 8.2 g/dL    Albumin 2.4 (L) 3.2 - 4.6 g/dL    Globulin 2.4 2.3 - 3.5 g/dL    Albumin/Globulin Ratio 1.0 (L) 1.2 - 3.5     Phosphorus    Collection Time: 06/05/22  4:00 AM   Result Value Ref Range    Phosphorus 2.8 2.3 - 3.7 MG/DL   Hemoglobin and Hematocrit    Collection Time: 06/05/22 12:27 PM   Result Value Ref Range    Hemoglobin 8.4 (L) 13.6 - 17.2 g/dL    Hematocrit 26.3 (L) 41.1 - 50.3 %   Hemoglobin and Hematocrit    Collection Time: 06/05/22 11:26 PM   Result Value Ref Range    Hemoglobin 9.3 (L) 13.6 - 17.2 g/dL    Hematocrit 28.7 (L) 41.1 - 50.3 %   Phosphorus    Collection Time: 06/06/22  2:49 AM   Result Value Ref Range    Phosphorus 2.3 2.3 - 3.7 MG/DL   Basic Metabolic Panel    Collection Time: 06/06/22  2:49 AM   Result Value Ref Range    Sodium 138 138 - 145 mmol/L    Potassium 3.5 3.5 - 5.1 mmol/L    Chloride 105 98 - 107 mmol/L    CO2 28 21 - 32 mmol/L    Anion Gap 5 (L) 7 - 16 mmol/L    Glucose 92 65 - 100 mg/dL    BUN 15 8 - 23 MG/DL    CREATININE 0.20 (L) 0.8 - 1.5 MG/DL    GFR African American >60 >60 ml/min/1.73m2    GFR Non- >60 >60 ml/min/1.73m2    Calcium 8.4 8.3 - 10.4 MG/DL   Hemoglobin and Hematocrit    Collection Time: 06/06/22  9:46 AM   Result Value Ref Range    Hemoglobin 9.9 (L) 13.6 - 17.2 g/dL    Hematocrit 30.6 (L) 41.1 - 50.3 %           Other Studies:      Current Meds:  Current Facility-Administered Medications   Medication Dose Route Frequency    0.9 % sodium chloride infusion   IntraVENous PRN    0.9 % sodium chloride infusion   IntraVENous PRN    furosemide (LASIX) injection 20 mg  20 mg IntraVENous PRN    metoclopramide (REGLAN) injection 10 mg  10 mg IntraVENous Q6H    acetaminophen (TYLENOL) tablet 650 mg  650 mg Oral Q6H PRN    Or    acetaminophen (TYLENOL) suppository 650 mg  650 mg Rectal Q6H PRN    vancomycin (VANCOCIN) 1,000 mg in sodium chloride 0.9 % 250 mL IVPB ([id number])  1,000 mg IntraVENous Q12H    midodrine (PROAMATINE) tablet 10 mg  10 mg Oral TID PRN    metronidazole (FLAGYL) 500 mg in 0.9% NaCl 100 mL IVPB premix  500 mg IntraVENous Q12H    lactated ringers infusion   IntraVENous Continuous    pantoprazole (PROTONIX) 40 mg in sodium chloride (PF) 10 mL injection  40 mg IntraVENous Q12H    sodium chloride flush 0.9 % injection 5-40 mL  5-40 mL IntraVENous 2 times per day    sodium chloride flush 0.9 % injection 5-40 mL  5-40 mL IntraVENous PRN    cefepime (MAXIPIME) 2000 mg IVPB minibag in NS  2,000 mg IntraVENous Q8H    dexamethasone (DECADRON) tablet 1 mg  1 mg Oral 2 times per day    sucralfate (CARAFATE) tablet 1 g  1 g Oral 4 times per day    sodium chloride flush 0.9 % injection 10 mL  10 mL IntraVENous ONCE PRN    folic acid (FOLVITE) tablet 1 mg  1 mg Oral Daily    mirtazapine (REMERON) tablet 7.5 mg  7.5 mg Oral Nightly    ondansetron (ZOFRAN) injection 4 mg  4 mg IntraVENous Q6H PRN    saliva substitute (BIOTENE/MOUTH KOTE) liquid   Oral PRN    Medela Tender Care Lanolin cream   Topical PRN       Signed:  Nacho Cornelius, WALLY - CNP    Part of this note may have been written by using a voice dictation software. The note has been proof read but may still contain some grammatical/other typographical errors.

## 2022-06-06 NOTE — TELEPHONE ENCOUNTER
Call to Stuart Diez and he will let us know when they resume care after discharge. Pt admitted on 6-2-22.

## 2022-06-06 NOTE — PROGRESS NOTES
Physical Therapy Note:    Attempted to see patient this PM for physical therapy treatment  session. Patient is off the floor in IR at time of attempt. Will follow and re-attempt as schedule permits/patient available.  Thank you,    Natalia Deleon 88

## 2022-06-06 NOTE — CONSULTS
Comprehensive Nutrition Assessment    Type and Reason for Visit: Reassess,Consult  Malnutrition Screening Tool: Malnutrition Screen  Have you recently lost weight without trying?: 34 or more pounds (4 points)  Have you been eating poorly because of a decreased appetite?: Yes (1 point)  Malnutrition Screening Tool Score: 5 and Unintentional Weight Loss (Hospitalists)  TPN management (Hospitalist and Surgery)    Nutrition Recommendations/Plan:    Parenteral Nutrition:  Total parenteral nutrition  to begin at 1800  Initiate: Dex 10% , 4.25% AA 2 L (85ml/hr)   Hold 250 ml 20% lipids daily  To provide: 1020 kcal/d (64% of needs), 85 grams of protein/d (106% of needs), 200 grams of CHO/d and 2000 ml of total volume/d. Lytes/L:   120 meq NaCl, 20 mmol KPO4, 8 meq Mg, 4.5 meq Calcium  Other additives: MTE, MVI Monday Wednesday Friday due to national shortages   IVF:  Discontinue at 1800  Nutrition Related Medication Management:   Thiamin  mg/day day 1 of 7, 1 mg folic acid day 1 of 7  Nutritional Supplement Therapy:   Active electrolyte replacement per nutrition support protocols  Replacement indicated:  None  Labs:   BMP daily  Mg MWF  Phos MWF    Triglyceride tomorrow  POC Glucoses/SSI Not indicated     Malnutrition Assessment:  Malnutrition Status: Severe malnutrition  Context: Chronic Illness  Findings of clinical characteristics of malnutrition:   Energy Intake:  Mild decrease in energy intake (Comment) (only tolerating oral nutrition supplements as primary)  Weight Loss:  Greater than 20% over 1 year (38# (24.4%) in ~10 months)     Body Fat Loss:  Severe body fat loss Triceps,Fat Overlying Ribs,Buccal region   Muscle Mass Loss:  Severe muscle mass loss Temples (temporalis),Clavicles (pectoralis & deltoids),Thigh (quadraceps),Calf (gastrocnemius)  Fluid Accumulation:  No significant fluid accumulation     Strength:  Not Performed  Nutrition Assessment:  Nutrition History: History provided by patient and wife. Patient states that he has been trying to eat some solids but it feels like it just sits on his stomach. He states he will wake the next day and still feel full. Wife states that patient has not tried any solids in weeks. Patient states that he mostly has been consuming Ensure (regular) 6-7 per day. Wife states that she has been mixing peanut butter powder into Ensure to increase kcal and protein (potentially providing 60-70 additional kcal and 6-9 additional grams of protein per serving depending on brand). Patient endorses weight loss of 50-60# over the last year. Do You Have Any Cultural, Gnosticist, or Ethnic Food Preferences?: No   Nutrition Background:   Patient with PMH significant for HTN, gastric adenocarcinoma s/p neoadjuvant chemo with plans for surgery in 2-3 week. He presented with near syncopal episodes for last 2 days. He was admitted with shock. Nutrition Interval:  Noted events over the weekend: + bloody BMs, EGD failed x2 due to food particles obstructing access, no improvement with Reglan. Surgery consulted and no plans for emergent surgery and will remain scheduled 6/22. Patient seen briefly prior to going to IR for IVC, RAYSA Rojas present. Briefly discussed IV nutrition. Communication limited due to hard of hearing but patient was able to verbalize IV nutrition.    Abdominal Status (last documented):   Last BM (including prior to admit): 06/05/22, GI Symptoms: Diarrhea (black)   Pertinent Medications: Reglan, folic acid, Maxipime, Flagyl, Vanco, Zofran, Decadron, Remeron, Carafate, Biotene  IVF: LR @ 150 ml/hr  Pertinent Labs:   Lab Results   Component Value Date     06/06/2022    K 3.5 06/06/2022     06/06/2022    CO2 28 06/06/2022    BUN 15 06/06/2022    CREATININE 0.20 06/06/2022    GLUCOSE 92 06/06/2022    CALCIUM 8.4 06/06/2022    PHOS 2.3 06/06/2022    MG 1.8 06/02/2022          Nutrition Related Findings:   6/2:CLD. 6/4: NPO with bloody BM. 6/6: TPN consult, port in place.      Current Nutrition Therapies:  Diet NPO    Current Intake:   Average Meal Intake: NPO        Anthropometric Measures:  Height: 5' 9\" (175.3 cm)  Current Body Wt: 117 lb 15.1 oz (53.5 kg) (6/2), Weight source: Not Specified (last office wt 5/31)  BMI: 17.4  Admission Body Weight: 117 lb 15.1 oz (53.5 kg)  Ideal Body Weight (Kg) (Calculated): 73 kg (160 lbs), 73.7 %  Usual Body Wt: 156 lb (70.8 kg) (8/11/21 office wt), Percent weight change: -24.4       Edema: No data recorded  BMI Category Overweight (BMI 25.0-29. 9)  Estimated Daily Nutrient Needs:  Energy (kcal/day): 2752-3341 (Kcal/kg (30-35) Weight used: 53.5 kg Current (6/2)  Protein (g/day): 70-80 (1.3-1.5 g/kg) Weight Used: (Current) 53.5 kg  Fluid (ml/day):   (1 ml/kcal)    Nutrition Diagnosis:   · Inadequate oral intake related to altered GI function as evidenced by NPO or clear liquid status due to medical condition (bloody BM, EGD failed x 2 due to retained food)    · Severe malnutrition related to inadequate protein-energy intake as evidenced by Criteria as identified in malnutrition assessment    Nutrition Interventions:   Food and/or Nutrient Delivery: Continue NPO,Start Parenteral Nutrition     Coordination of Nutrition Care: Continue to monitor while inpatient       Goals: Active Goal:  (Diet advance to at least FLD by RD follow-up)       Nutrition Monitoring and Evaluation:      Food/Nutrient Intake Outcomes: Diet Advancement/Tolerance,Parenteral Nutrition Intake/Tolerance  Physical Signs/Symptoms Outcomes: Biochemical Data,GI Status,Fluid Status or Edema,Weight    Discharge Planning:     Too soon to determine    Aliya Esteban, [address], [address], [address]

## 2022-06-06 NOTE — PROGRESS NOTES
Date of Outreach Update:  Judy Jolley was seen and assessed. @[facility]([id number])@  Vitals:    06/05/22 0930 06/05/22 1111 06/05/22 1517 06/05/22 1853   BP: (!) 141/66 (!) 146/78 (!) 155/75 138/86   Pulse: 58 54 57 62   Resp: 16 18 18 18   Temp: 97.3 °F (36.3 °C) 98.7 °F (37.1 °C) 97.9 °F (36.6 °C) 97.7 °F (36.5 °C)   TempSrc: Axillary Oral Oral Oral   SpO2:  100% 100% 100%   Weight:       Height:              Previous Outreach assessment has been reviewed. Alert and oriented, patient states he feels much better today compared to other days. VSS, labs and chart reviewed, h/h 8.4/26. 3. trending every 12 hours, only one BM this evening. EGD was unsuccessful d/t gastric tumor, surgery to possible remove stomach sooner then planed. IR consulted for IVC filter for DVT left common femoral, possible tomorrow. Spoke with primary RN, no concerns at this time. Will continue to follow up per outreach protocol.     Signed By:   Lydia Smith RN    June 5, 2022 9:36 PM

## 2022-06-06 NOTE — OR NURSING
TRANSFER - OUT REPORT:           Verbal report given to Andrew Chase on Roger Chambers  being transferred to the 5th floor for routine progression of care      Report consisted of patients Situation, Background, Assessment and  Recommendations(SBAR). Information from the following report(s) SBAR, Procedure Summary and MAR was reviewed with the receiving nurse. Opportunity for questions and clarification was provided. Conscious Sedation:      100 Mcg of Fentanyl administered     2 Mg of Versed administered           Pt tolerated procedure well. Filter insertion site is at the base of the neck on the right side. Hemostasis obtained. Closed with Dermabond.

## 2022-06-07 LAB
ANION GAP SERPL CALC-SCNC: 8 MMOL/L (ref 7–16)
BACTERIA SPEC CULT: NORMAL
BUN SERPL-MCNC: 12 MG/DL (ref 8–23)
CALCIUM SERPL-MCNC: 7.8 MG/DL (ref 8.3–10.4)
CHLORIDE SERPL-SCNC: 103 MMOL/L (ref 98–107)
CO2 SERPL-SCNC: 25 MMOL/L (ref 21–32)
CREAT SERPL-MCNC: 0.2 MG/DL (ref 0.8–1.5)
ERYTHROCYTE [DISTWIDTH] IN BLOOD BY AUTOMATED COUNT: 18 % (ref 11.9–14.6)
GLUCOSE SERPL-MCNC: 124 MG/DL (ref 65–100)
HCT VFR BLD AUTO: 31 % (ref 41.1–50.3)
HGB BLD-MCNC: 9.9 G/DL (ref 13.6–17.2)
MAGNESIUM SERPL-MCNC: 2 MG/DL (ref 1.8–2.4)
MCH RBC QN AUTO: 28.2 PG (ref 26.1–32.9)
MCHC RBC AUTO-ENTMCNC: 31.9 G/DL (ref 31.4–35)
MCV RBC AUTO: 88.3 FL (ref 79.6–97.8)
NRBC # BLD: 0 K/UL (ref 0–0.2)
PHOSPHATE SERPL-MCNC: 3.3 MG/DL (ref 2.3–3.7)
PLATELET # BLD AUTO: 211 K/UL (ref 150–450)
PMV BLD AUTO: 9.1 FL (ref 9.4–12.3)
POTASSIUM SERPL-SCNC: 3.2 MMOL/L (ref 3.5–5.1)
POTASSIUM SERPL-SCNC: 3.7 MMOL/L (ref 3.5–5.1)
RBC # BLD AUTO: 3.51 M/UL (ref 4.23–5.6)
SERVICE CMNT-IMP: NORMAL
SODIUM SERPL-SCNC: 136 MMOL/L (ref 138–145)
TRIGL SERPL-MCNC: 424 MG/DL (ref 35–150)
WBC # BLD AUTO: 17.8 K/UL (ref 4.3–11.1)

## 2022-06-07 PROCEDURE — A4216 STERILE WATER/SALINE, 10 ML: HCPCS | Performed by: FAMILY MEDICINE

## 2022-06-07 PROCEDURE — 6360000002 HC RX W HCPCS: Performed by: INTERNAL MEDICINE

## 2022-06-07 PROCEDURE — 6360000002 HC RX W HCPCS: Performed by: NURSE PRACTITIONER

## 2022-06-07 PROCEDURE — 99232 SBSQ HOSP IP/OBS MODERATE 35: CPT | Performed by: SURGERY

## 2022-06-07 PROCEDURE — 1090000001 HH PPS REVENUE CREDIT

## 2022-06-07 PROCEDURE — 2580000003 HC RX 258: Performed by: FAMILY MEDICINE

## 2022-06-07 PROCEDURE — C9113 INJ PANTOPRAZOLE SODIUM, VIA: HCPCS | Performed by: FAMILY MEDICINE

## 2022-06-07 PROCEDURE — 83735 ASSAY OF MAGNESIUM: CPT

## 2022-06-07 PROCEDURE — 6360000002 HC RX W HCPCS: Performed by: FAMILY MEDICINE

## 2022-06-07 PROCEDURE — 84478 ASSAY OF TRIGLYCERIDES: CPT

## 2022-06-07 PROCEDURE — 1090000002 HH PPS REVENUE DEBIT

## 2022-06-07 PROCEDURE — 6370000000 HC RX 637 (ALT 250 FOR IP): Performed by: FAMILY MEDICINE

## 2022-06-07 PROCEDURE — 2580000003 HC RX 258: Performed by: INTERNAL MEDICINE

## 2022-06-07 PROCEDURE — 94760 N-INVAS EAR/PLS OXIMETRY 1: CPT

## 2022-06-07 PROCEDURE — 97530 THERAPEUTIC ACTIVITIES: CPT

## 2022-06-07 PROCEDURE — 1100000000 HC RM PRIVATE

## 2022-06-07 PROCEDURE — 84132 ASSAY OF SERUM POTASSIUM: CPT

## 2022-06-07 PROCEDURE — 85027 COMPLETE CBC AUTOMATED: CPT

## 2022-06-07 PROCEDURE — 80048 BASIC METABOLIC PNL TOTAL CA: CPT

## 2022-06-07 PROCEDURE — 2700000000 HC OXYGEN THERAPY PER DAY

## 2022-06-07 PROCEDURE — 2500000003 HC RX 250 WO HCPCS: Performed by: FAMILY MEDICINE

## 2022-06-07 PROCEDURE — 2500000003 HC RX 250 WO HCPCS: Performed by: INTERNAL MEDICINE

## 2022-06-07 PROCEDURE — 84100 ASSAY OF PHOSPHORUS: CPT

## 2022-06-07 PROCEDURE — 97110 THERAPEUTIC EXERCISES: CPT

## 2022-06-07 RX ADMIN — VANCOMYCIN HYDROCHLORIDE 1000 MG: 1 INJECTION, POWDER, LYOPHILIZED, FOR SOLUTION INTRAVENOUS at 18:47

## 2022-06-07 RX ADMIN — MAGNESIUM SULFATE HEPTAHYDRATE: 500 INJECTION, SOLUTION INTRAMUSCULAR; INTRAVENOUS at 18:58

## 2022-06-07 RX ADMIN — SODIUM CHLORIDE, PRESERVATIVE FREE 10 ML: 5 INJECTION INTRAVENOUS at 09:00

## 2022-06-07 RX ADMIN — SODIUM CHLORIDE, PRESERVATIVE FREE 40 MG: 5 INJECTION INTRAVENOUS at 06:49

## 2022-06-07 RX ADMIN — THIAMINE HYDROCHLORIDE 100 MG: 100 INJECTION, SOLUTION INTRAMUSCULAR; INTRAVENOUS at 09:48

## 2022-06-07 RX ADMIN — SODIUM CHLORIDE, PRESERVATIVE FREE 40 MG: 5 INJECTION INTRAVENOUS at 16:41

## 2022-06-07 RX ADMIN — DEXAMETHASONE 1 MG: 0.5 TABLET ORAL at 22:43

## 2022-06-07 RX ADMIN — METRONIDAZOLE 500 MG: 500 INJECTION, SOLUTION INTRAVENOUS at 08:04

## 2022-06-07 RX ADMIN — SUCRALFATE 1 G: 1 TABLET ORAL at 18:48

## 2022-06-07 RX ADMIN — POTASSIUM CHLORIDE 10 MEQ: 7.46 INJECTION, SOLUTION INTRAVENOUS at 10:52

## 2022-06-07 RX ADMIN — SODIUM CHLORIDE, PRESERVATIVE FREE 10 ML: 5 INJECTION INTRAVENOUS at 21:14

## 2022-06-07 RX ADMIN — SUCRALFATE 1 G: 1 TABLET ORAL at 00:51

## 2022-06-07 RX ADMIN — METOCLOPRAMIDE HYDROCHLORIDE 10 MG: 5 INJECTION INTRAMUSCULAR; INTRAVENOUS at 06:50

## 2022-06-07 RX ADMIN — FOLIC ACID 1 MG: 5 INJECTION, SOLUTION INTRAMUSCULAR; INTRAVENOUS; SUBCUTANEOUS at 09:48

## 2022-06-07 RX ADMIN — POTASSIUM CHLORIDE 10 MEQ: 7.46 INJECTION, SOLUTION INTRAVENOUS at 13:40

## 2022-06-07 RX ADMIN — CEFEPIME HYDROCHLORIDE 2000 MG: 2 INJECTION, POWDER, FOR SOLUTION INTRAVENOUS at 06:50

## 2022-06-07 RX ADMIN — METOCLOPRAMIDE HYDROCHLORIDE 10 MG: 5 INJECTION INTRAMUSCULAR; INTRAVENOUS at 13:05

## 2022-06-07 RX ADMIN — MIRTAZAPINE 7.5 MG: 15 TABLET, FILM COATED ORAL at 22:43

## 2022-06-07 RX ADMIN — VANCOMYCIN HYDROCHLORIDE 1000 MG: 1 INJECTION, POWDER, LYOPHILIZED, FOR SOLUTION INTRAVENOUS at 06:51

## 2022-06-07 RX ADMIN — DEXAMETHASONE 1 MG: 0.5 TABLET ORAL at 08:25

## 2022-06-07 RX ADMIN — POTASSIUM CHLORIDE 10 MEQ: 7.46 INJECTION, SOLUTION INTRAVENOUS at 15:23

## 2022-06-07 RX ADMIN — METRONIDAZOLE 500 MG: 500 INJECTION, SOLUTION INTRAVENOUS at 18:48

## 2022-06-07 RX ADMIN — METOCLOPRAMIDE HYDROCHLORIDE 10 MG: 5 INJECTION INTRAMUSCULAR; INTRAVENOUS at 00:51

## 2022-06-07 RX ADMIN — POTASSIUM CHLORIDE 10 MEQ: 7.46 INJECTION, SOLUTION INTRAVENOUS at 16:41

## 2022-06-07 RX ADMIN — SUCRALFATE 1 G: 1 TABLET ORAL at 06:51

## 2022-06-07 RX ADMIN — METOCLOPRAMIDE HYDROCHLORIDE 10 MG: 5 INJECTION INTRAMUSCULAR; INTRAVENOUS at 18:48

## 2022-06-07 RX ADMIN — SUCRALFATE 1 G: 1 TABLET ORAL at 13:06

## 2022-06-07 ASSESSMENT — PAIN SCALES - GENERAL: PAINLEVEL_OUTOF10: 0

## 2022-06-07 NOTE — PROGRESS NOTES
PHYSICAL THERAPY Initial Assessment and Daily Note  (Link to Caseload Tracking: PT Visit Days : 2  Acknowledge Orders  Time In/Out  PT Charge Capture  Rehab Caseload Tracker    Monster Montano is a 78 y.o. male   PRIMARY DIAGNOSIS: Septic shock ([facility] [address]  Syncope and collapse [R55]  Hemorrhagic shock (HCC) [R57.8]  Shock ([address] [R57.9]  History of gastric cancer [Z85.028]  Septic shock ([facility] [address] [A41.9, R65.21]  Gastrointestinal hemorrhage, unspecified gastrointestinal hemorrhage type [K92.2]    3 Days Post-Op  Reason for Referral: Generalized Muscle Weakness (M62.81)  Other abnormalities of gait and mobility (R26.89)  History of falling (Z91.81)  Inpatient: Payor: Young Gallegos / Plan: MEDICARE PART A / Product Type: *No Product type* /     ASSESSMENT:     REHAB RECOMMENDATIONS:   Recommendation to date pending progress:  Setting:  [facility] 24/7 care    Equipment:     3 in 1 Bedside Commode   Rolling Walker     ASSESSMENT:  Mr. Burt Conn was eager for PT although a bit worried that the same thing would happen and he would pass out. BP stable during orthostatics and hgb is also stable. He had a great session. Min assist supine to sit. Mod assist sit to stand and gait 3 ft to chair with cg using rolling walker. Took extra time for process getting BP at each stage. Once in the chair performed therex. Encouraged him to move when in the bed or in the chair and showed him exercises to do. We need him to be as strong as possible in preparation for surgery on 6/22. Today was a good day. He voiced sadness at how he has gotten so weak and how chemo has been so hard. He reflected on the strong active man he used to be. I suggested he enjoy today as a good day. A step in the right direction.        [facility] [address] [address] AM-PAC 6 Clicks Basic Mobility Inpatient Short Form  AM-PAC Mobility Inpatient   How much difficulty turning over in bed?: A Little  How much difficulty sitting down on / standing up from a chair with arms?: A Little  How much difficulty moving from lying on back to sitting on side of bed?: A Little  How much help from another person moving to and from a bed to a chair?: A Little  How much help from another person needed to walk in hospital room?: A Little  How much help from another person for climbing 3-5 steps with a railing?: A Lot  AM-PAC Inpatient Mobility Raw Score : 17  AM-PAC Inpatient T-Scale Score : 42.13  Mobility Inpatient CMS 0-100% Score: 50.57  Mobility Inpatient CMS G-Code Modifier : CK    SUBJECTIVE:   Mr. Adrianne Clemens states, \"I used to be so active and strong\"    Social/Functional Lives With: Spouse  Type of Home: House  Home Layout: One level  Bathroom Accessibility: Accessible  Receives Help From: Family  ADL Assistance: Independent  Homemaking Assistance: Independent  Ambulation Assistance: Independent  Transfer Assistance: Independent  Active : Yes  Mode of Transportation: Car  Occupation: Retired    OBJECTIVE:     PAIN: Alisia Aland / O2: Hellen Gola / Milon [employer]anger / [place] Remedies:   Pre Treatment:    0      Post Treatment: 0/10 Vitals   Vitals  Blood Pressure Lyin/87  Blood Pressure Sittin/105  Blood Pressure Standin/92    Oxygen      Mata Catheter and IV    RESTRICTIONS/PRECAUTIONS:  Restrictions/Precautions: Fall Risk                 GROSS EVALUATION: Intact Impaired (Comments):   AROM []  generally decreased due to decreased strength   PROM []    Strength []  generally decreased but functionally able to perform transfers with CGA-min A; grossly 3/5 bilaterally   Balance []     Posture [] Forward Head  Rounded Shoulders   Sensation [x]     Coordination []      Tone []     Edema []    Activity Tolerance []      []      COGNITION/  PERCEPTION: Intact Impaired (Comments):   Orientation [x]     Vision [x]     Hearing [x]     Cognition  [x]       MOBILITY: I Mod I S SBA CGA Min Mod Max Total  NT x2 Comments:   Bed Mobility    Rolling [] [] [] [] [x] [x] [] [] [] [] [] Increased assist needed post syncopal episode   Supine to Sit [] [] [] [] [x] [x] [] [] [] [] []    Scooting [] [] [] [] [] [] [] [] [x] [] [x]    Sit to Supine [] [] [] [] [] [] [] [] [] [] [x] Post syncopal episode   Transfers    Sit to Stand [] [] [] [] [] [] [x] [] [] [] [x]    Bed to Chair [] [] [] [] [x] [] [] [] [] [] []    Stand to Sit [] [] [] [] [x] [x] [] [] [] [] [x]     [] [] [] [] [] [] [] [] [] [] []    I=Independent, Mod I=Modified Independent, S=Supervision, SBA=Standby Assistance, CGA=Contact Guard Assistance,   Min=Minimal Assistance, Mod=Moderate Assistance, Max=Maximal Assistance, Total=Total Assistance, NT=Not Tested    GAIT: I Mod I S SBA CGA Min Mod Max Total  NT x2 Comments:   Level of Assistance [] [] [] [] [x] [] [] [] [] [] []    Distance 3 feet    DME Rolling Walker    Gait Quality Decreased martínez , Decreased step clearance and forward flexed posture    Weightbearing Status      Stairs      I=Independent, Mod I=Modified Independent, S=Supervision, SBA=Standby Assistance, CGA=Contact Guard Assistance,   Min=Minimal Assistance, Mod=Moderate Assistance, Max=Maximal Assistance, Total=Total Assistance, NT=Not Tested    PLAN:   ACUTE PHYSICAL THERAPY GOALS:   (Developed with and agreed upon by patient and/or caregiver. )  LTG:  (1.)Mr. Isra Ortiz will move from supine to sit and sit to supine , scoot up and down and roll side to side in bed with INDEPENDENCE within 7 treatment day(s). (2.)Mr. Isra Ortiz will transfer from bed to chair and chair to bed with MODIFIED INDEPENDENCE using the least restrictive device within 7 treatment day(s). (3.)Mr. Isra Ortiz will ambulate with STAND BY ASSIST for a minimum of 75 feet with the least restrictive device within 7 treatment day(s).   ________________________________________________________________________________________________      FREQUENCY AND DURATION: 3 times/week for duration of hospital stay or until stated goals are met, whichever comes first.    THERAPY PROGNOSIS: Fair    PROBLEM LIST:   (Skilled intervention is medically necessary to address:)  Decreased ADL/Functional Activities  Decreased Activity Tolerance  Decreased AROM/PROM  Decreased Balance  Decreased Gait Ability  Decreased Safety Awareness  Decreased Strength  Decreased Transfer Abilities INTERVENTIONS PLANNED:   (Benefits and precautions of physical therapy have been discussed with the patient.)  Self Care Training  Therapeutic Activity  Therapeutic Exercise/HEP  Neuromuscular Re-education  Gait Training  Education       TREATMENT:   EVALUATION: MODERATE COMPLEXITY: (Untimed Charge)    TREATMENT:   Therapeutic Activity (15 Minutes): Therapeutic activity included Rolling, Supine to Sit, Sit to Supine, Scooting, Transfer Training, Ambulation on level ground, Sitting balance  and Standing balance to improve functional Activity tolerance, Balance, Coordination, Mobility, Strength and ROM. Therapeutic Exercise (15 Minutes): Therapeutic exercises noted below to improve functional strength.      TREATMENT GRID:   Date:  6/7 Date:   Date:     Activity/Exercise Parameters Parameters Parameters   AP 15     LAQ 10     Marching 10     Adductor squeezes 10     Glut sets 5     Quad sets 5     Heelslides 5           AFTER TREATMENT PRECAUTIONS: Bed, Call light within reach and RN at bedside    INTERDISCIPLINARY COLLABORATION:  RN/ PCT, PT/ PTA and OT/ BHATT    EDUCATION: Education Given To: Patient  Education Provided: Role of Therapy;Plan of Care  Education Method: Verbal  Barriers to Learning: None  Education Outcome: Verbalized understanding    TIME IN/OUT:  Time In: 0936  Time Out: [address] 46  Minutes: [address], PT

## 2022-06-07 NOTE — PROGRESS NOTES
Comprehensive Nutrition Assessment    Type and Reason for Visit: Reassess  Malnutrition Screening Tool: Malnutrition Screen  Have you recently lost weight without trying?: 34 or more pounds (4 points)  Have you been eating poorly because of a decreased appetite?: Yes (1 point)  Malnutrition Screening Tool Score: 5 and Unintentional Weight Loss (Hospitalists)  TPN management (Hospitalist and Surgery)    Nutrition Recommendations/Plan:    Parenteral Nutrition:  Total parenteral nutrition  to begin at 1800  Initiate: Dex 10% , 4.25% AA 2 L (85ml/hr)   Hold 250 ml 20% lipids daily  To provide: 1020 kcal/d (64% of needs), 85 grams of protein/d (106% of needs), 200 grams of CHO/d and 2000 ml of total volume/d. Lytes/L:   150 meq NaCl, 10 mmol KPO4, 15 meq KAcetate, 8 meq Mg, 4.5 meq Calcium  Other additives: MTE, MVI Monday Wednesday Friday due to national shortages   Nutrition Related Medication Management:   Thiamin  mg/day day (end date 6/29), 1 mg folic acid day (end date 6/12)  Nutritional Supplement Therapy:   Active electrolyte replacement per nutrition support protocols  Replacement indicated:  Implemented  by RN per PRN protocol  Labs:   BMP daily  Mg tomorrow and MWF  Phos tomorrow and MWF    Triglyceride tomorrow  POC Glucoses/SSI Not indicated     Malnutrition Assessment:  Malnutrition Status: Severe malnutrition  Context: Chronic Illness  Findings of clinical characteristics of malnutrition:   Energy Intake:  Mild decrease in energy intake (Comment) (only tolerating oral nutrition supplements as primary)  Weight Loss:  Greater than 20% over 1 year (38# (24.4%) in ~10 months)     Body Fat Loss:  Severe body fat loss Triceps,Fat Overlying Ribs,Buccal region   Muscle Mass Loss:  Severe muscle mass loss Temples (temporalis),Clavicles (pectoralis & deltoids),Thigh (quadraceps),Calf (gastrocnemius)  Fluid Accumulation:  No significant fluid accumulation     Strength:  Not Performed  Nutrition Assessment:  Nutrition History: History provided by patient and wife. Patient states that he has been trying to eat some solids but it feels like it just sits on his stomach. He states he will wake the next day and still feel full. Wife states that patient has not tried any solids in weeks. Patient states that he mostly has been consuming Ensure (regular) 6-7 per day. Wife states that she has been mixing peanut butter powder into Ensure to increase kcal and protein (potentially providing 60-70 additional kcal and 6-9 additional grams of protein per serving depending on brand). Patient endorses weight loss of 50-60# over the last year. Do You Have Any Cultural, Jew, or Ethnic Food Preferences?: No   Nutrition Background:   Patient with PMH significant for HTN, gastric adenocarcinoma s/p neoadjuvant chemo with plans for surgery in 2-3 week. He presented with near syncopal episodes for last 2 days. He was admitted with shock. Nutrition Interval:  Patient sitting up to side of bed with PT at bedside. TPN infusing at ordered rate. Patient with no questions regarding TPN. Met with wife outside room. She states patient has more energy than he has had in weeks. She states that he had 3 black BMs yesterday. She has questions regarding discharge. Informed her that RD would inform CM of wife's request to speak with her. Discussed with RN, Hieu Bentley, holding lipids tonight. She is about to give 10 meq KCl x 4 via PIV.   Abdominal Status (last documented):   Last BM (including prior to admit): 06/06/22, GI Symptoms: Diarrhea   Pertinent Medications: Reglan, folic acid, Maxipime, Flagyl, Vanco, Zofran, Decadron, Remeron, Carafate, Biotene, Thiamine  Pertinent Labs:   Lab Results   Component Value Date     06/07/2022    K 3.2 06/07/2022     06/07/2022    CO2 25 06/07/2022    BUN 12 06/07/2022    CREATININE 0.20 06/07/2022    GLUCOSE 124 06/07/2022    CALCIUM 7.8 06/07/2022    PHOS 3.3 06/07/2022    MG 2.0 06/07/2022      Lab Results   Component Value Date    TRIG 424 06/07/2022   Remarks: Na low, K depleted - expect element of refeeding, Glucose slightly elevated but not within range to initiate POC glucoses/SSI - also likely elevated due to steroids, Phos upper end normal, Mg WNL, Triglycerides elevated - will hold lipids tonight    Nutrition Related Findings:   6/2:CLD. 6/4: NPO with bloody BM. EGD failed x2 6/4 and 6/5 due to food particles obstructing access despite Reglan. 6/6: TPN consult, port in place. 6/6 TPN initiated with lipids. Current Nutrition Therapies:  Diet NPO  PN-Adult Premix 4.25/10 - Central Line  Current Parenteral Nutrition Orders:  · Type and Formula: Premix Central (Dex 10% , 4.25% AA )   · Lipids: 250ml,Daily  · Duration: Continuous  · Rate/Volume: 2 L (85ml/hr)  · Current PN Order Provides: 1520 kcal/d (95% of needs), 85 grams of protein/d (106% of needs), 200 grams of CHO/d and 2000 ml of total volume/d.    · Goal PN Orders Provides:        Current Intake:   Average Meal Intake: NPO        Anthropometric Measures:  Height: 5' 9\" (175.3 cm)  Current Body Wt: 118 lb 2.7 oz (53.6 kg) (6/6), Weight source: Bed Scale  BMI: 17.4  Admission Body Weight: 117 lb 15.1 oz (53.5 kg)  Ideal Body Weight (Kg) (Calculated): 73 kg (160 lbs), 73.7 %  Usual Body Wt: 156 lb (70.8 kg) (8/11/21 office wt), Percent weight change: -24.4       Edema: No data recorded  BMI Category Underweight (BMI less than 18.5)  Estimated Daily Nutrient Needs:  Energy (kcal/day): 6875-4008 (Kcal/kg (30-35) Weight used: 53.5 kg Current (6/2)  Protein (g/day): 70-80 (1.3-1.5 g/kg) Weight Used: (Current) 53.5 kg  Fluid (ml/day):   (1 ml/kcal)    Nutrition Diagnosis:   · Inadequate oral intake related to altered GI function as evidenced by NPO or clear liquid status due to medical condition (bloody BM, EGD failed x 2 due to retained food)    · Severe malnutrition related to inadequate protein-energy intake as evidenced by Criteria as identified in malnutrition assessment    Nutrition Interventions:   Food and/or Nutrient Delivery: Continue NPO,Modify Parenteral Nutrition     Coordination of Nutrition Care: Continue to monitor while inpatient       Goals: Active Goal:  (Diet advance to at least FLD by RD follow-up)       Nutrition Monitoring and Evaluation:      Food/Nutrient Intake Outcomes: Parenteral Nutrition Intake/Tolerance  Physical Signs/Symptoms Outcomes: Biochemical Data,GI Status,Weight    Discharge Planning:     Too soon to determine    Isabel Ortega, [address] [facility] [address]

## 2022-06-07 NOTE — CARE COORDINATION
CM met with pt at bedside to discuss d/c planning. He requested that CM speak with his spouse Tesaris. CM attempted to call Tesaris but no answer and voicemail is not set up. CM will continue trying to call her. Pt's spouse called CM to discuss d/c planning. She sates she \"cannot take him home and physically take care of him. He has IVs, he is on a bed pan, and I don't have anyone to help me. \"  CM inquired if she was requesting referrals be sent to [facility] and she requested a referral be sent to [facility].   CM notified Bee Bangura NP of spouse's request and called the [facility] liaison with the referral.

## 2022-06-07 NOTE — PROGRESS NOTES
[facility] Progress Note         Admit Date:  6/2/2022    Today's Date:  6/7/2022    CC:  Gastric Cancer    Subjective:     Patient is resting comfortably in bed. Denies any abdominal pain, nausea, or vomiting, this morning. Esophagogastroduodenoscopy with Dr. Barbara Andrew of PROCEDURE: 6/5/2022     INDICATION: GI bleed     POSTPROCEDURE DIAGNOSIS: Incomplete examination     MEDICATIONS ADMINISTERED: MAC anesthesia (see anesthesia report)     INSTRUMENT: [id number]     PROCEDURE:  After obtaining informed consent, the patient was placed in the left lateral position and sedated. The endoscope was advanced under direct vision without difficulty. The esophagus, stomach (including retroflexed views) and duodenum were evaluated. The patient was taken to the recovery area in stable condition.     FINDINGS:  ESOPHAGUS: Normal  STOMACH: Lumen obstructed by food or tumor material.  No active bleeding seen in limited view  DUODENUM: Not Seen     Estimated blood loss: 0-minimal   Specimens obtained during procedure: none     PLAN: Unable to evaluate stomach on 2 successive tries, Continue serial hgb.   He would likely benefit from earlier surgery                  Routing History        Medications:   Current Facility-Administered Medications   Medication Dose Route Frequency    potassium chloride 20 mEq/50 mL IVPB (Central Line)  20 mEq IntraVENous PRN    Or    potassium chloride 10 mEq/100 mL IVPB (Peripheral Line)  10 mEq IntraVENous PRN    magnesium sulfate 2000 mg in 50 mL IVPB premix  2,000 mg IntraVENous PRN    sodium phosphate 10 mmol in sodium chloride 0.9 % 250 mL IVPB  10 mmol IntraVENous PRN    Or    sodium phosphate 15 mmol in sodium chloride 0.9 % 250 mL IVPB  15 mmol IntraVENous PRN    Or    sodium phosphate 20 mmol in sodium chloride 0.9 % 500 mL IVPB  20 mmol IntraVENous PRN    PN-Adult Premix 4.25/10 - Central Line   IntraVENous Continuous TPN    fat emulsion 20 % infusion 250 mL  250 mL IntraVENous Daily    thiamine (B-1) injection 100 mg  100 mg IntraVENous Daily    folic acid 1 mg in sodium chloride 0.9 % 50 mL IVPB  1 mg IntraVENous Daily    0.9 % sodium chloride infusion   IntraVENous PRN    0.9 % sodium chloride infusion   IntraVENous PRN    furosemide (LASIX) injection 20 mg  20 mg IntraVENous PRN    metoclopramide (REGLAN) injection 10 mg  10 mg IntraVENous Q6H    acetaminophen (TYLENOL) tablet 650 mg  650 mg Oral Q6H PRN    Or    acetaminophen (TYLENOL) suppository 650 mg  650 mg Rectal Q6H PRN    vancomycin (VANCOCIN) 1,000 mg in sodium chloride 0.9 % 250 mL IVPB (Vohm2Ohr)  1,000 mg IntraVENous Q12H    midodrine (PROAMATINE) tablet 10 mg  10 mg Oral TID PRN    metronidazole (FLAGYL) 500 mg in 0.9% NaCl 100 mL IVPB premix  500 mg IntraVENous Q12H    pantoprazole (PROTONIX) 40 mg in sodium chloride (PF) 10 mL injection  40 mg IntraVENous Q12H    sodium chloride flush 0.9 % injection 5-40 mL  5-40 mL IntraVENous 2 times per day    sodium chloride flush 0.9 % injection 5-40 mL  5-40 mL IntraVENous PRN    cefepime (MAXIPIME) 2000 mg IVPB minibag in NS  2,000 mg IntraVENous Q8H    dexamethasone (DECADRON) tablet 1 mg  1 mg Oral 2 times per day    sucralfate (CARAFATE) tablet 1 g  1 g Oral 4 times per day    sodium chloride flush 0.9 % injection 10 mL  10 mL IntraVENous ONCE PRN    mirtazapine (REMERON) tablet 7.5 mg  7.5 mg Oral Nightly    ondansetron (ZOFRAN) injection 4 mg  4 mg IntraVENous Q6H PRN    saliva substitute (BIOTENE/MOUTH KOTE) liquid   Oral PRN    Medela Tender Care Lanolin cream   Topical PRN       Review of Systems:  ROS was obtained, with pertinent positives as listed above. No chest pain or SOB. Diet:  TPN    Objective:   Vitals:  /84   Pulse 78   Temp 97.2 °F (36.2 °C) (Oral)   Resp 18   Ht 5' 9\" (1.753 m)   Wt 118 lb 4 oz (53.6 kg)   SpO2 98%   BMI 17.46 kg/m²   Intake/Output:  No intake/output data recorded.   06/05 1901 - 06/07 0700  In: 3466 [I.V.:3466]  Out: 2525 [Urine:2525]  Exam:  General appearance: alert, cooperative, no distress  Lungs: clear to auscultation bilaterally anteriorly  Heart: regular rate and rhythm  Abdomen: soft, non-tender.  Bowel sounds normal. No masses, no organomegaly  Extremities: extremities normal, atraumatic, no cyanosis or edema  Neuro:  alert and oriented    Data Review (Labs):    Recent Labs     06/04/22  1102 06/04/22  1629 06/04/22  2252 06/05/22  0400 06/05/22  1227 06/05/22  2326 06/06/22  0249 06/06/22  0946 06/07/22  0315   WBC  --   --   --  11.1  --   --   --   --  17.8*   HGB 4.9* 6.2* 7.4* 7.5* 8.4* 9.3*  --  9.9* 9.9*   HCT 15.4* 19.1* 22.5* 23.3* 26.3* 28.7*  --  30.6* 31.0*   PLT  --   --   --  153  --   --   --   --  211   MCV  --   --   --  85.7  --   --   --   --  88.3   NA  --   --   --  139  --   --  138  --  136*   K  --   --   --  3.9  --   --  3.5  --  3.2*   CL  --   --   --  106  --   --  105  --  103   CO2  --   --   --  25  --   --  28  --  25   BUN  --   --   --  16  --   --  15  --  12   MG  --   --   --   --   --   --   --   --  2.0   AST  --   --   --  22  --   --   --   --   --    ALT  --   --   --  14  --   --   --   --   --        Assessment:     Principal Problem:    Septic shock (HCC)  Active Problems:    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HCC)    Former heavy tobacco smoker    Gastroesophageal reflux disease    Hyponatremia    Hypoalbuminemia due to protein-calorie malnutrition (HCC)    Syncope due to orthostatic hypotension    Hypomagnesemia    Corticosteroid dependence (HCC)    Hypoproteinemia (HCC)    Do not resuscitate status    Fatigue    History of gastric cancer    Encounter for palliative care    Debility    Weakness    Hypercoagulable state, secondary ([facility] [address]    Adult body mass index less than 19    Acute pulmonary embolism without acute cor pulmonale (HCC)    Severe protein-calorie malnutrition (HCC)    ABILIO (iron deficiency anemia)    Malignant neoplasm of overlapping sites of stomach ([facility] [address]    Gastric adenocarcinoma ([facility] [address]  Resolved Problems:    Severe sepsis with lactic acidosis (HCC)    ABLA (acute blood loss anemia)    GIB (gastrointestinal bleeding)    79 yo male with known Gastric Malignancy who we were consulted on 6/4 for melena and Anemia. Underwent EGD on 6/5. Gastric cancer  Anemia, multifactorial  S/P EGD on 6/5/2022 outlined above    No overt GI bleeding. HGB stable at 9.9 this morning. Plan:     Plan is for surgery on 6/22 with Dr. Darcy Jin  Receiving TPN for nutrition  We will sign off. Please call with any questions or concerns. Marval Goldmann, APRN    Patient is seen and examined in collaboration with Dr. Jenaro Traore. Assessment and plan as per Dr. Jenaro Traore.

## 2022-06-07 NOTE — PROGRESS NOTES
VANCO DAILY FOLLOW UP NOTE  [address] [facility] Pharmacokinetic Monitoring Service - Vancomycin    Consulting Provider: Dr. Juan Rodriguez   Indication: sepsis  Target Concentration: Goal AUC/WILFREDO 400-600 mg*hr/L  Day of Therapy: 6  Additional Antimicrobials: cefepime    Pertinent Laboratory Values: Wt Readings from Last 1 Encounters:   06/06/22 118 lb 4 oz (53.6 kg)     Temp Readings from Last 1 Encounters:   06/07/22 97.2 °F (36.2 °C) (Oral)     Recent Labs     06/05/22  0400 06/06/22  0249 06/07/22  0315   BUN 16 15 12   CREATININE <0.20* 0.20* 0.20*   WBC 11.1  --  17.8*     Estimated Creatinine Clearance: 272 mL/min (A) (based on SCr of 0.2 mg/dL (L)). Lab Results   Component Value Date    VANCORANDOM 13.7 06/05/2022       MRSA Nasal Swab: N/A. Non-respiratory infection. .      Assessment:  Date/Time Dose Concentration AUC   6/3 0709 750 mg q8h 26.1 601   6/5 0400 1000 mg q12h 13.7 482   Note: Serum concentrations collected for AUC dosing may appear elevated if collected in close proximity to the dose administered, this is not necessarily an indication of toxicity    Plan:  Continue 1000 mg q12h  Repeat vancomycin concentrations will be ordered as clinically appropriate   Pharmacy will continue to monitor patient and adjust therapy as indicated    Thank you for the consult,  Juana Nunez, PharmD, BCOP  Clinical Pharmacist  Contact via [employer]

## 2022-06-07 NOTE — PROGRESS NOTES
Admit Date: 2022      Subjective: The patient is lying in bed with family at bedside. He has more energy today since starting the TPN. He states that he had 3 bowel movements. He denies any pain. He denies any nausea or emesis. Objective:       Vitals:    22 1922 22 2327 22 0226 22 0802   BP: 122/85 125/85 124/83 130/84   Pulse: 77 89 86 78   Resp: 18 18 18 18   Temp: 97.3 °F (36.3 °C) 97.4 °F (36.3 °C) 97.6 °F (36.4 °C) 97.2 °F (36.2 °C)   TempSrc: Oral Oral Oral Oral   SpO2: 98% 98% 98% 98%   Weight:       Height:           Temp (24hrs), Av.4 °F (36.3 °C), Min:97.1 °F (36.2 °C), Max:97.7 °F (36.5 °C)  . I&O reviewed as documented. Constitutional: Alert, oriented, cooperative patient in no acute distress;     Eyes:Sclera are clear. EOMs intact  ENMT: no external lesions gross hearing normal; no obvious neck masses, no ear or lip lesions, nares normal  CV: RRR. Normal perfusion  Resp: No JVD. Breathing is  non-labored; no audible wheezing. GI: soft and non-distended     Musculoskeletal: unremarkable with normal function. No embolic signs or cyanosis.    Neuro:  Oriented; moves all 4; no focal deficits  Psychiatric: normal affect and mood, no memory impairment    Labs:   Recent Results (from the past 24 hour(s))   Hemoglobin and Hematocrit    Collection Time: 22  9:46 AM   Result Value Ref Range    Hemoglobin 9.9 (L) 13.6 - 17.2 g/dL    Hematocrit 30.6 (L) 41.1 - 50.3 %   CBC    Collection Time: 22  3:15 AM   Result Value Ref Range    WBC 17.8 (H) 4.3 - 11.1 K/uL    RBC 3.51 (L) 4.23 - 5.6 M/uL    Hemoglobin 9.9 (L) 13.6 - 17.2 g/dL    Hematocrit 31.0 (L) 41.1 - 50.3 %    MCV 88.3 79.6 - 97.8 FL    MCH 28.2 26.1 - 32.9 PG    MCHC 31.9 31.4 - 35.0 g/dL    RDW 18.0 (H) 11.9 - 14.6 %    Platelets 705 814 - 890 K/uL    MPV 9.1 (L) 9.4 - 12.3 FL    nRBC 0.00 0.0 - 0.2 K/uL   Basic Metabolic Panel    Collection Time: 22  3:15 AM   Result Value Ref Range Sodium 136 (L) 138 - 145 mmol/L    Potassium 3.2 (L) 3.5 - 5.1 mmol/L    Chloride 103 98 - 107 mmol/L    CO2 25 21 - 32 mmol/L    Anion Gap 8 7 - 16 mmol/L    Glucose 124 (H) 65 - 100 mg/dL    BUN 12 8 - 23 MG/DL    CREATININE 0.20 (L) 0.8 - 1.5 MG/DL    GFR African American >60 >60 ml/min/1.73m2    GFR Non- >60 >60 ml/min/1.73m2    Calcium 7.8 (L) 8.3 - 10.4 MG/DL   Magnesium    Collection Time: 06/07/22  3:15 AM   Result Value Ref Range    Magnesium 2.0 1.8 - 2.4 mg/dL   Phosphorus    Collection Time: 06/07/22  3:15 AM   Result Value Ref Range    Phosphorus 3.3 2.3 - 3.7 MG/DL   Triglycerides    Collection Time: 06/07/22  3:15 AM   Result Value Ref Range    Triglycerides 424 (H) 35 - 150 MG/DL           Assessment:     Principal Problem:    Septic shock (HCC)  Active Problems:    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HCC)    Former heavy tobacco smoker    Gastroesophageal reflux disease    Hyponatremia    Hypoalbuminemia due to protein-calorie malnutrition (HCC)    Syncope due to orthostatic hypotension    Hypomagnesemia    Corticosteroid dependence (HCC)    Hypoproteinemia (HCC)    Do not resuscitate status    Fatigue    History of gastric cancer    Encounter for palliative care    Debility    Weakness    Hypercoagulable state, secondary [facility]    Adult body mass index less than 19    Acute pulmonary embolism without acute cor pulmonale (HCC)    Severe protein-calorie malnutrition (HCC)    ABILIO (iron deficiency anemia)    Malignant neoplasm of overlapping sites of stomach (HCC)    Gastric adenocarcinoma (HCC)  Resolved Problems:    Severe sepsis with lactic acidosis (HCC)    ABLA (acute blood loss anemia)    GIB (gastrointestinal bleeding)        Plan/Recommendations/Medical Decision Making:     Hemoglobin remained stable at 9.9 this morning  He continues to have multiple bowel movements  Patients surgery moved up to Thursday 6/9, he will be 3 weeks from his last dose of chemotherapy at that time  Continue TPN.    Surgery again discussed with him, risks and benefits discussed, all questions answered to his satisfaction.      Selene Mark MD

## 2022-06-07 NOTE — PROGRESS NOTES
Hospitalist Progress Note   Admit Date:  2022  1:05 PM   Name:  Tomasa Leigh   Age:  78 y.o. Sex:  male  :  1955   MRN:  [id number]   Room:  [facility]/01    Presenting Complaint: Loss of Consciousness    Reason(s) for Admission: Syncope and collapse [R55]  Hemorrhagic shock ([address] [R57.8]  Shock ([address] [R57.9]  History of gastric cancer [Z85.028]  Septic shock ([address] [A41.9, R65.21]  Gastrointestinal hemorrhage, unspecified gastrointestinal hemorrhage type [facility] Course & Interval History:   66M PMHx gastric adenocarcinoma s/p 4 cycles chemo, last dose 22, 50 pack year smoking history,  HTN, who presented  after having a syncopal episode with 2 day history of near syncope. He started feeling weak and tired then stood up and passed out. Endorsed abdominal pain. Noted that patient has been unable to tolerate solid food for months, and consumes several ensures per day but still had  significant weight loss. Wife also reported  breathing abnormally and very shallow, with some chest discomfort    Prior to arrival, HR was erratic jumping from normal to as high as 160-170, described as SVT vs Sinus tach per EMS. Leonor Perdomo rec'd 1500 cc LR bolus and 4 mg zofran while en route. In ED, he was hypotensive, BP 74/54, , RR 22. He rec'd S/p  30cc/kg sepsis, vancomcyin, cefepime 1 UpRBC ordered    Patient with pre syncopal episodes 6/3 and given 2 units PRBC. AC held. IR consulted for possible IVC filter due to continuous GI bleeding. Subjective/24hr Events (22):   Endorses fatigue, bloody stools,denies CP, N/V. Family questioning swelling RUE near elbow which is result of patient accidentally pulling out IV yesterday. Will image if swelling gets worse. 2 lrg bloody stools per nursing      Denies bloody stools, but with continued fatigue and poor appetite  EGD x 2 unsuccessful due to food particles obstructing access. ROS: 22  Denies CP, SOB. Endorses some fatigue.  Per CM spoke to wife who states she is unable to care for the patient at home. Amount of dependency is more than she is able to handle at this time and would like rehab placement for patient if possible. CM aware    Assessment & Plan:   Septic Shock, hemorrhagic shock  GPR bacteremia  6/6/22  Admission  RR 22 BP 74/54 without clear source. S/p 30 cc/kg sepsis Bolus. S/p pRBC 2U 6/2.   - albumin q6hx4 doses  - dexamethasone 1 mg BID. -BCx positive for GPC x 1 bottle(anaerobic). Continue Vanc/Cefepime/Flagyl  -Repeat BCx NGTD; Will consider de escalation tomorrow if BCx remain negative   6/7/22  -Repeat BCx remain negative and Cefepime discontinued     Chronic Anemia   Admission Hgb 5.6; Suspected abla but GI consult signed off. Most likely BM suppression from chemo/malignancy plus nutritional deficiencies. S/p pRBC 2U 6/2; s/p b12 5276 mcg IM   - folic acid  - PPI IV BID and carafate   - consult hematology  6/6/22  -Patient has received total of 5 units of RBC since admit  -Hgb now 9.9  -ICU [place] also following  -Will need Fe supplementation once infection treatment   -EGD unsuccessful yesterday due to obstruction with food particles. Patient given reglan overnight but repeat EGD was still unsuccessful  -Surgery 6/22/22 per Dr. Jevon Marcelo    Hypercoagulable state  CT CAP with small PE, femoral DVT. Discussed with surgery, no current plan for urgent intervention  - apixaban  - duplex BLE  6/7/22  -IR  placed IVC filter yesterday; appreciate assistance    -Will need Hematology f/u OP     Cancer cachexia, severe protein caloric malnutrition, Hypoproteinemia  6/7/22  - remeron qhs  - Steroids as above.    - Ensure clear added per nutrition; appreciate assistance   - TPN via indwelling port      Gastric adenocarcinoma   s/p 4 cycle chemotherapy  - Oncology and GS onboard    - Patient will undergo total gastrectomy once clinically improved  6/7/22  -surgery as previously scheduled on 6/22/22 per Dr. Jevon Marcelo       Former smoker   noted        Discharge Planning:      TBD    Diet:  Diet NPO  PN-Adult Premix 4.25/10 - Central Line  PN-Adult Premix 4.25/10 - Central Line  DVT PPx: SCD  Code status: DNR            Objective:     Patient Vitals for the past 24 hrs:   Temp Pulse Resp BP SpO2   06/07/22 1256 -- 76 -- -- --   06/07/22 1141 97.4 °F (36.3 °C) 78 19 (!) 143/89 94 %   06/07/22 0854 -- 79 -- -- --   06/07/22 0802 97.2 °F (36.2 °C) 78 18 130/84 98 %   06/07/22 0226 97.6 °F (36.4 °C) 86 18 124/83 98 %   06/06/22 2327 97.4 °F (36.3 °C) 89 18 125/85 98 %   06/06/22 1922 97.3 °F (36.3 °C) 77 18 122/85 98 %   06/06/22 1620 97.5 °F (36.4 °C) 91 18 118/89 99 %   06/06/22 1551 -- 83 16 117/82 94 %   06/06/22 1541 -- 80 16 119/80 93 %   06/06/22 1530 -- 79 16 123/81 95 %   06/06/22 1524 -- 79 10 129/80 98 %   06/06/22 1519 -- 78 14 114/83 99 %   06/06/22 1514 -- 78 14 115/80 98 %   06/06/22 1508 -- 83 14 124/81 97 %   06/06/22 1504 -- 84 17 115/77 96 %   06/06/22 1459 -- 86 14 111/78 94 %   06/06/22 1454 -- 85 16 131/83 97 %   06/06/22 1449 -- 83 14 117/85 96 %   06/06/22 1439 -- 84 16 127/88 97 %   06/06/22 1434 -- 89 17 (!) 134/90 97 %   06/06/22 1313 97.1 °F (36.2 °C) 83 18 (!) 141/92 100 %         Estimated body mass index is 17.46 kg/m² as calculated from the following:    Height as of this encounter: 5' 9\" (1.753 m). Weight as of this encounter: 118 lb 4 oz (53.6 kg). Intake/Output Summary (Last 24 hours) at 6/7/2022 1257  Last data filed at 6/7/2022 0930  Gross per 24 hour   Intake 0 ml   Output 1400 ml   Net -1400 ml         Physical Exam:     Blood pressure (!) 143/89, pulse 76, temperature 97.4 °F (36.3 °C), temperature source Oral, resp. rate 19, height 5' 9\" (1.753 m), weight 118 lb 4 oz (53.6 kg), SpO2 94 %. General:    Ill appearing; No overt distress  Head:  Normocephalic, atraumatic  Eyes:  Sclerae appear normal.  Pupils equally round. ENT:  Nares appear normal, no drainage. Moist oral mucosa  Neck:  No restricted ROM. Trachea midline   CV:   RRR. No m/r/g. No jugular venous distension. Lungs:   CTAB. No wheezing, rhonchi, or rales. Respirations even, unlabored  Abdomen: Bowel sounds present. Soft, nontender, nondistended. Extremities: No cyanosis or clubbing. No edema  Skin:     No rashes. Pallor  Warm and dry. Ecchymosis upper and lower torso     Neuro:  CN II-XII grossly intact. A&Ox3  Psych:  Normal mood and affect.       I have reviewed ordered lab tests and independently visualized imaging below:    Recent Labs:  Recent Results (from the past 48 hour(s))   Hemoglobin and Hematocrit    Collection Time: 06/05/22 11:26 PM   Result Value Ref Range    Hemoglobin 9.3 (L) 13.6 - 17.2 g/dL    Hematocrit 28.7 (L) 41.1 - 50.3 %   Phosphorus    Collection Time: 06/06/22  2:49 AM   Result Value Ref Range    Phosphorus 2.3 2.3 - 3.7 MG/DL   Basic Metabolic Panel    Collection Time: 06/06/22  2:49 AM   Result Value Ref Range    Sodium 138 138 - 145 mmol/L    Potassium 3.5 3.5 - 5.1 mmol/L    Chloride 105 98 - 107 mmol/L    CO2 28 21 - 32 mmol/L    Anion Gap 5 (L) 7 - 16 mmol/L    Glucose 92 65 - 100 mg/dL    BUN 15 8 - 23 MG/DL    CREATININE 0.20 (L) 0.8 - 1.5 MG/DL    GFR African American >60 >60 ml/min/1.73m2    GFR Non- >60 >60 ml/min/1.73m2    Calcium 8.4 8.3 - 10.4 MG/DL   Hemoglobin and Hematocrit    Collection Time: 06/06/22  9:46 AM   Result Value Ref Range    Hemoglobin 9.9 (L) 13.6 - 17.2 g/dL    Hematocrit 30.6 (L) 41.1 - 50.3 %   CBC    Collection Time: 06/07/22  3:15 AM   Result Value Ref Range    WBC 17.8 (H) 4.3 - 11.1 K/uL    RBC 3.51 (L) 4.23 - 5.6 M/uL    Hemoglobin 9.9 (L) 13.6 - 17.2 g/dL    Hematocrit 31.0 (L) 41.1 - 50.3 %    MCV 88.3 79.6 - 97.8 FL    MCH 28.2 26.1 - 32.9 PG    MCHC 31.9 31.4 - 35.0 g/dL    RDW 18.0 (H) 11.9 - 14.6 %    Platelets 738 416 - 467 K/uL    MPV 9.1 (L) 9.4 - 12.3 FL    nRBC 0.00 0.0 - 0.2 K/uL   Basic Metabolic Panel    Collection Time: 06/07/22  3:15 AM   Result Value Ref Range    Sodium 136 (L) 138 - 145 mmol/L    Potassium 3.2 (L) 3.5 - 5.1 mmol/L    Chloride 103 98 - 107 mmol/L    CO2 25 21 - 32 mmol/L    Anion Gap 8 7 - 16 mmol/L    Glucose 124 (H) 65 - 100 mg/dL    BUN 12 8 - 23 MG/DL    CREATININE 0.20 (L) 0.8 - 1.5 MG/DL    GFR African American >60 >60 ml/min/1.73m2    GFR Non- >60 >60 ml/min/1.73m2    Calcium 7.8 (L) 8.3 - 10.4 MG/DL   Magnesium    Collection Time: 06/07/22  3:15 AM   Result Value Ref Range    Magnesium 2.0 1.8 - 2.4 mg/dL   Phosphorus    Collection Time: 06/07/22  3:15 AM   Result Value Ref Range    Phosphorus 3.3 2.3 - 3.7 MG/DL   Triglycerides    Collection Time: 06/07/22  3:15 AM   Result Value Ref Range    Triglycerides 424 (H) 35 - 150 MG/DL           Other Studies:      Current Meds:  Current Facility-Administered Medications   Medication Dose Route Frequency    PN-Adult Premix 4.25/10 - Central Line   IntraVENous Continuous TPN    potassium chloride 20 mEq/50 mL IVPB (Central Line)  20 mEq IntraVENous PRN    Or    potassium chloride 10 mEq/100 mL IVPB (Peripheral Line)  10 mEq IntraVENous PRN    magnesium sulfate 2000 mg in 50 mL IVPB premix  2,000 mg IntraVENous PRN    sodium phosphate 10 mmol in sodium chloride 0.9 % 250 mL IVPB  10 mmol IntraVENous PRN    Or    sodium phosphate 15 mmol in sodium chloride 0.9 % 250 mL IVPB  15 mmol IntraVENous PRN    Or    sodium phosphate 20 mmol in sodium chloride 0.9 % 500 mL IVPB  20 mmol IntraVENous PRN    PN-Adult Premix 4.25/10 - Central Line   IntraVENous Continuous TPN    [Held by provider] fat emulsion 20 % infusion 250 mL  250 mL IntraVENous Daily    thiamine (B-1) injection 100 mg  100 mg IntraVENous Daily    folic acid 1 mg in sodium chloride 0.9 % 50 mL IVPB  1 mg IntraVENous Daily    0.9 % sodium chloride infusion   IntraVENous PRN    0.9 % sodium chloride infusion   IntraVENous PRN    furosemide (LASIX) injection 20 mg  20 mg IntraVENous PRN    metoclopramide (REGLAN) injection 10 mg  10 mg IntraVENous Q6H    acetaminophen (TYLENOL) tablet 650 mg  650 mg Oral Q6H PRN    Or    acetaminophen (TYLENOL) suppository 650 mg  650 mg Rectal Q6H PRN    vancomycin (VANCOCIN) 1,000 mg in sodium chloride 0.9 % 250 mL IVPB ([id number])  1,000 mg IntraVENous Q12H    midodrine (PROAMATINE) tablet 10 mg  10 mg Oral TID PRN    metronidazole (FLAGYL) 500 mg in 0.9% NaCl 100 mL IVPB premix  500 mg IntraVENous Q12H    pantoprazole (PROTONIX) 40 mg in sodium chloride (PF) 10 mL injection  40 mg IntraVENous Q12H    sodium chloride flush 0.9 % injection 5-40 mL  5-40 mL IntraVENous 2 times per day    sodium chloride flush 0.9 % injection 5-40 mL  5-40 mL IntraVENous PRN    dexamethasone (DECADRON) tablet 1 mg  1 mg Oral 2 times per day    sucralfate (CARAFATE) tablet 1 g  1 g Oral 4 times per day    sodium chloride flush 0.9 % injection 10 mL  10 mL IntraVENous ONCE PRN    mirtazapine (REMERON) tablet 7.5 mg  7.5 mg Oral Nightly    ondansetron (ZOFRAN) injection 4 mg  4 mg IntraVENous Q6H PRN    saliva substitute (BIOTENE/MOUTH KOTE) liquid   Oral PRN    Medela Tender Care Lanolin cream   Topical PRN       Signed:  WALLY Gastelum - CNP    Part of this note may have been written by using a voice dictation software. The note has been proof read but may still contain some grammatical/other typographical errors.

## 2022-06-08 ENCOUNTER — ANESTHESIA EVENT (OUTPATIENT)
Dept: SURGERY | Age: 67
DRG: 853 | End: 2022-06-08
Payer: MEDICARE

## 2022-06-08 LAB
ANION GAP SERPL CALC-SCNC: 5 MMOL/L (ref 7–16)
BASOPHILS # BLD: 0 K/UL (ref 0–0.2)
BASOPHILS NFR BLD: 0 % (ref 0–2)
BUN SERPL-MCNC: 8 MG/DL (ref 8–23)
CALCIUM SERPL-MCNC: 7.9 MG/DL (ref 8.3–10.4)
CHLORIDE SERPL-SCNC: 104 MMOL/L (ref 98–107)
CO2 SERPL-SCNC: 29 MMOL/L (ref 21–32)
CREAT SERPL-MCNC: <0.2 MG/DL (ref 0.8–1.5)
DIFFERENTIAL METHOD BLD: ABNORMAL
EOSINOPHIL # BLD: 0.1 K/UL (ref 0–0.8)
EOSINOPHIL NFR BLD: 1 % (ref 0.5–7.8)
ERYTHROCYTE [DISTWIDTH] IN BLOOD BY AUTOMATED COUNT: 18 % (ref 11.9–14.6)
GLUCOSE SERPL-MCNC: 99 MG/DL (ref 65–100)
HCT VFR BLD AUTO: 29 % (ref 41.1–50.3)
HGB BLD-MCNC: 9.1 G/DL (ref 13.6–17.2)
HISTORY CHECK: NORMAL
IMM GRANULOCYTES # BLD AUTO: 1.1 K/UL (ref 0–0.5)
IMM GRANULOCYTES NFR BLD AUTO: 8 % (ref 0–5)
LYMPHOCYTES # BLD: 2.1 K/UL (ref 0.5–4.6)
LYMPHOCYTES NFR BLD: 16 % (ref 13–44)
MAGNESIUM SERPL-MCNC: 2 MG/DL (ref 1.8–2.4)
MCH RBC QN AUTO: 28 PG (ref 26.1–32.9)
MCHC RBC AUTO-ENTMCNC: 31.4 G/DL (ref 31.4–35)
MCV RBC AUTO: 89.2 FL (ref 79.6–97.8)
MONOCYTES # BLD: 0.5 K/UL (ref 0.1–1.3)
MONOCYTES NFR BLD: 4 % (ref 4–12)
NEUTS SEG # BLD: 9.6 K/UL (ref 1.7–8.2)
NEUTS SEG NFR BLD: 71 % (ref 43–78)
NRBC # BLD: 0 K/UL (ref 0–0.2)
PHOSPHATE SERPL-MCNC: 2.5 MG/DL (ref 2.3–3.7)
PLATELET # BLD AUTO: 217 K/UL (ref 150–450)
PLATELET COMMENT: ADEQUATE
PMV BLD AUTO: 10 FL (ref 9.4–12.3)
POTASSIUM SERPL-SCNC: 3.6 MMOL/L (ref 3.5–5.1)
RBC # BLD AUTO: 3.25 M/UL (ref 4.23–5.6)
RBC MORPH BLD: ABNORMAL
RBC MORPH BLD: ABNORMAL
SODIUM SERPL-SCNC: 138 MMOL/L (ref 138–145)
TRIGL SERPL-MCNC: 286 MG/DL (ref 35–150)
WBC # BLD AUTO: 13.4 K/UL (ref 4.3–11.1)
WBC MORPH BLD: ABNORMAL

## 2022-06-08 PROCEDURE — 84478 ASSAY OF TRIGLYCERIDES: CPT

## 2022-06-08 PROCEDURE — 1090000002 HH PPS REVENUE DEBIT

## 2022-06-08 PROCEDURE — 1090000001 HH PPS REVENUE CREDIT

## 2022-06-08 PROCEDURE — C9113 INJ PANTOPRAZOLE SODIUM, VIA: HCPCS | Performed by: FAMILY MEDICINE

## 2022-06-08 PROCEDURE — 99232 SBSQ HOSP IP/OBS MODERATE 35: CPT | Performed by: SURGERY

## 2022-06-08 PROCEDURE — 6370000000 HC RX 637 (ALT 250 FOR IP): Performed by: FAMILY MEDICINE

## 2022-06-08 PROCEDURE — 97535 SELF CARE MNGMENT TRAINING: CPT

## 2022-06-08 PROCEDURE — 2580000003 HC RX 258: Performed by: FAMILY MEDICINE

## 2022-06-08 PROCEDURE — 6360000002 HC RX W HCPCS: Performed by: INTERNAL MEDICINE

## 2022-06-08 PROCEDURE — 6360000002 HC RX W HCPCS: Performed by: FAMILY MEDICINE

## 2022-06-08 PROCEDURE — 2500000003 HC RX 250 WO HCPCS: Performed by: STUDENT IN AN ORGANIZED HEALTH CARE EDUCATION/TRAINING PROGRAM

## 2022-06-08 PROCEDURE — 2500000003 HC RX 250 WO HCPCS: Performed by: FAMILY MEDICINE

## 2022-06-08 PROCEDURE — 83735 ASSAY OF MAGNESIUM: CPT

## 2022-06-08 PROCEDURE — 1100000000 HC RM PRIVATE

## 2022-06-08 PROCEDURE — 2500000003 HC RX 250 WO HCPCS: Performed by: INTERNAL MEDICINE

## 2022-06-08 PROCEDURE — 80048 BASIC METABOLIC PNL TOTAL CA: CPT

## 2022-06-08 PROCEDURE — 97110 THERAPEUTIC EXERCISES: CPT

## 2022-06-08 PROCEDURE — 97112 NEUROMUSCULAR REEDUCATION: CPT

## 2022-06-08 PROCEDURE — 84100 ASSAY OF PHOSPHORUS: CPT

## 2022-06-08 PROCEDURE — A4216 STERILE WATER/SALINE, 10 ML: HCPCS | Performed by: FAMILY MEDICINE

## 2022-06-08 PROCEDURE — 2580000003 HC RX 258: Performed by: INTERNAL MEDICINE

## 2022-06-08 PROCEDURE — 86923 COMPATIBILITY TEST ELECTRIC: CPT

## 2022-06-08 PROCEDURE — 97530 THERAPEUTIC ACTIVITIES: CPT

## 2022-06-08 PROCEDURE — 86901 BLOOD TYPING SEROLOGIC RH(D): CPT

## 2022-06-08 PROCEDURE — 85025 COMPLETE CBC W/AUTO DIFF WBC: CPT

## 2022-06-08 RX ADMIN — DEXAMETHASONE 1 MG: 0.5 TABLET ORAL at 21:44

## 2022-06-08 RX ADMIN — MIRTAZAPINE 7.5 MG: 15 TABLET, FILM COATED ORAL at 21:45

## 2022-06-08 RX ADMIN — MAGNESIUM SULFATE HEPTAHYDRATE: 500 INJECTION, SOLUTION INTRAMUSCULAR; INTRAVENOUS at 18:26

## 2022-06-08 RX ADMIN — SUCRALFATE 1 G: 1 TABLET ORAL at 00:46

## 2022-06-08 RX ADMIN — FOLIC ACID 1 MG: 5 INJECTION, SOLUTION INTRAMUSCULAR; INTRAVENOUS; SUBCUTANEOUS at 09:23

## 2022-06-08 RX ADMIN — SUCRALFATE 1 G: 1 TABLET ORAL at 05:34

## 2022-06-08 RX ADMIN — METOCLOPRAMIDE HYDROCHLORIDE 10 MG: 5 INJECTION INTRAMUSCULAR; INTRAVENOUS at 00:46

## 2022-06-08 RX ADMIN — VANCOMYCIN HYDROCHLORIDE 1000 MG: 1 INJECTION, POWDER, LYOPHILIZED, FOR SOLUTION INTRAVENOUS at 18:02

## 2022-06-08 RX ADMIN — SODIUM CHLORIDE, PRESERVATIVE FREE 10 ML: 5 INJECTION INTRAVENOUS at 21:50

## 2022-06-08 RX ADMIN — METOCLOPRAMIDE HYDROCHLORIDE 10 MG: 5 INJECTION INTRAMUSCULAR; INTRAVENOUS at 18:01

## 2022-06-08 RX ADMIN — SODIUM CHLORIDE, PRESERVATIVE FREE 10 ML: 5 INJECTION INTRAVENOUS at 09:24

## 2022-06-08 RX ADMIN — METOCLOPRAMIDE HYDROCHLORIDE 10 MG: 5 INJECTION INTRAMUSCULAR; INTRAVENOUS at 12:32

## 2022-06-08 RX ADMIN — VANCOMYCIN HYDROCHLORIDE 1000 MG: 1 INJECTION, POWDER, LYOPHILIZED, FOR SOLUTION INTRAVENOUS at 05:33

## 2022-06-08 RX ADMIN — DEXAMETHASONE 1 MG: 0.5 TABLET ORAL at 09:24

## 2022-06-08 RX ADMIN — SODIUM CHLORIDE, PRESERVATIVE FREE 40 MG: 5 INJECTION INTRAVENOUS at 03:15

## 2022-06-08 RX ADMIN — SUCRALFATE 1 G: 1 TABLET ORAL at 12:32

## 2022-06-08 RX ADMIN — METRONIDAZOLE 500 MG: 500 INJECTION, SOLUTION INTRAVENOUS at 05:40

## 2022-06-08 RX ADMIN — THIAMINE HYDROCHLORIDE 100 MG: 100 INJECTION, SOLUTION INTRAMUSCULAR; INTRAVENOUS at 09:23

## 2022-06-08 RX ADMIN — SUCRALFATE 1 G: 1 TABLET ORAL at 18:02

## 2022-06-08 RX ADMIN — METRONIDAZOLE 500 MG: 500 INJECTION, SOLUTION INTRAVENOUS at 18:02

## 2022-06-08 RX ADMIN — METOCLOPRAMIDE HYDROCHLORIDE 10 MG: 5 INJECTION INTRAMUSCULAR; INTRAVENOUS at 05:34

## 2022-06-08 RX ADMIN — SODIUM CHLORIDE, PRESERVATIVE FREE 40 MG: 5 INJECTION INTRAVENOUS at 16:16

## 2022-06-08 NOTE — PROGRESS NOTES
Comprehensive Nutrition Assessment    Type and Reason for Visit: Reassess  Malnutrition Screening Tool: Malnutrition Screen  Have you recently lost weight without trying?: 34 or more pounds (4 points)  Have you been eating poorly because of a decreased appetite?: Yes (1 point)  Malnutrition Screening Tool Score: 5 and Unintentional Weight Loss (Hospitalists)  TPN management (Hospitalist and Surgery)    Nutrition Recommendations/Plan:    Parenteral Nutrition:  Total parenteral nutrition  to begin at 1800  Change: Dex 15%, 5% AA 2 L (85ml/hr)   Hold 250 ml 20% lipids daily  To provide: 1420 kcal/d (88% of needs), 100 grams of protein/d (125% of needs/1.8 g/kg CBW), 300 grams of CHO/d and 2000 ml of total volume/d. Lytes/L:   150 meq NaCl, 20 mmol KPO4, 8 meq Mg, 4.5 meq Calcium  Other additives: MTE, MVI Monday Wednesday Friday due to national shortages   Nutrition Related Medication Management: Thiamin  mg/day day (end date 6/28), 1 mg folic acid day (end date 6/12)  Nutritional Supplement Therapy:   Active electrolyte replacement per nutrition support protocols  Replacement indicated:  None   Labs:   BMP daily  Mg MWF  Phos MWF    Triglyceride tomorrow  POC Glucoses/SSI Not indicated     Malnutrition Assessment:  Malnutrition Status: Severe malnutrition  Context: Chronic Illness  Findings of clinical characteristics of malnutrition:   Energy Intake:  Mild decrease in energy intake (Comment) (only tolerating oral nutrition supplements as primary)  Weight Loss:  Greater than 20% over 1 year (38# (24.4%) in ~10 months)     Body Fat Loss:  Severe body fat loss Triceps,Fat Overlying Ribs,Buccal region   Muscle Mass Loss:  Severe muscle mass loss Temples (temporalis),Clavicles (pectoralis & deltoids),Thigh (quadraceps),Calf (gastrocnemius)  Fluid Accumulation:  No significant fluid accumulation     Strength:  Not Performed  Nutrition Assessment:  Nutrition History: History provided by patient and wife. Patient states that he has been trying to eat some solids but it feels like it just sits on his stomach. He states he will wake the next day and still feel full. Wife states that patient has not tried any solids in weeks. Patient states that he mostly has been consuming Ensure (regular) 6-7 per day. Wife states that she has been mixing peanut butter powder into Ensure to increase kcal and protein (potentially providing 60-70 additional kcal and 6-9 additional grams of protein per serving depending on brand). Patient endorses weight loss of 50-60# over the last year. Do You Have Any Cultural, [employer]ybeterian, or Ethnic Food Preferences?: No   Nutrition Background:   Patient with PMH significant for HTN, gastric adenocarcinoma s/p neoadjuvant chemo with plans for surgery in 2-3 week. He presented with near syncopal episodes for last 2 days. He was admitted with shock. Nutrition Interval:  Patient reclined in bed with RN, Mahsa Fu, at bedside. He states that he is feeling better today. Noted total gastrectomy and jtube placement surgery date initially planned for 6/22 moved to tomorrow. He has no questions. Abdominal Status (last documented):   Last BM (including prior to admit): 06/06/22, GI Symptoms: Diarrhea   Pertinent Medications: Reglan, folic acid, Maxipime, Flagyl, Vanco, Zofran, Decadron, Remeron, Carafate, Biotene, Thiamine, Protonix  Electrolyte replacements: 6/7 KCl 10 meq x 4   Pertinent Labs:   Lab Results   Component Value Date     06/08/2022    K 3.6 06/08/2022     06/08/2022    CO2 29 06/08/2022    BUN 8 06/08/2022    CREATININE <0.20 06/08/2022    GLUCOSE 99 06/08/2022    CALCIUM 7.9 06/08/2022    PHOS 2.5 06/08/2022    MG 2.0 06/08/2022      Lab Results   Component Value Date    TRIG 286 06/08/2022   Remarks: Na WNL, K WNL following replacements 6/7, Glucose WNL, Phos trend down with decrease in PN last evening, Mg WNL, Triglycerides elevated - will hold lipids again tonight - ?  Related to infection - will recheck TG in am and consider resume vs dose reduced pending results    Nutrition Related Findings:   6/2:CLD. 6/4: NPO with bloody BM. EGD failed x2 6/4 and 6/5 due to food particles obstructing access despite Reglan. 6/6: TPN consult, port in place. 6/6 TPN initiated with lipids. 6/7 cont 2L 10%dex/4.25%AA, hold lipids due to high TG. Current Nutrition Therapies:  Diet NPO  PN-Adult Premix 4.25/10 - Central Line  Current Parenteral Nutrition Orders:  · Type and Formula: Premix Central (Dex 10% , 4.25% AA )   · Lipids: None  · Duration: Continuous  · Rate/Volume: 2 L (85ml/hr)  · Current PN Order Provides: 1020 kcal/d (64% of needs), 85 grams of protein/d (106% of needs), 200 grams of CHO/d and 2000 ml of total volume/d.   · Goal PN Orders Provides:        Current Intake:   Average Meal Intake: NPO        Anthropometric Measures:  Height: 5' 9\" (175.3 cm)  Current Body Wt: 118 lb 2.7 oz (53.6 kg) (6/6), Weight source: Bed Scale  BMI: 17.4  Admission Body Weight: 117 lb 15.1 oz (53.5 kg)  Ideal Body Weight (Kg) (Calculated): 73 kg (160 lbs), 73.7 %  Usual Body Wt: 156 lb (70.8 kg) (8/11/21 office wt), Percent weight change: -24.4       Edema: No data recorded  BMI Category Underweight (BMI less than 18.5)  Estimated Daily Nutrient Needs:  Energy (kcal/day): 2316-3954 (Kcal/kg (30-35) Weight used: 53.5 kg Current (6/2)  Protein (g/day): 70-80 (1.3-1.5 g/kg) Weight Used: (Current) 53.5 kg  Fluid (ml/day):   (1 ml/kcal)    Nutrition Diagnosis:   · Inadequate oral intake related to altered GI function as evidenced by NPO or clear liquid status due to medical condition (bloody BM, EGD failed x 2 due to retained food)    · Severe malnutrition related to inadequate protein-energy intake as evidenced by Criteria as identified in malnutrition assessment    Nutrition Interventions:   Food and/or Nutrient Delivery: Continue NPO,Modify Parenteral Nutrition     Coordination of Nutrition Care: Continue to monitor while inpatient       Goals: Active Goal: Tolerate nutrition support at goal rate,within 2 days       Nutrition Monitoring and Evaluation:      Food/Nutrient Intake Outcomes: Parenteral Nutrition Intake/Tolerance  Physical Signs/Symptoms Outcomes: Biochemical Data,GI Status,Fluid Status or Edema,Weight    Discharge Planning:     Too soon to determine    Dominique Merino, [address]

## 2022-06-08 NOTE — PROGRESS NOTES
END OF SHIFT:    Shift Summary:    TPN running as ordered   x4 10 MEQ of potassium given throughtout shift. I/Os:    No intake/output data recorded. I/O last 3 completed shifts:   In: 3328 [I.V.:3466]  Out: 3475 [ARCYS:4675]    Editha Peabody, RN

## 2022-06-08 NOTE — CARE COORDINATION
Pt discussed during IDR and chart screened by CM for d/c planning. Pt to have the following procedure today: exploratory laparotomy and total gastrectomy with placement of J-tube  [facility] Lester Gilbert met with CM to discuss d/c planning. Pt has been accepted to [facility]. CM sent Dr. Candi Galvin a message via [employer] to inquire if he would prefer pt to be d/c to [facility] before or after the procedure and she stated after. CM notified Lester Gilbert. CM updated pt/spouse as well. Current d/c plan is for pt to d/c to [facility] when cleared by general surgery and hospitalist s/p procedure. CM will continue to follow and remain available if any needs arise.

## 2022-06-08 NOTE — PROGRESS NOTES
Pt chewing gum and spitting it out pt educated on importance of being NPO and risk of eating prior to surgery, pt voices he understands.

## 2022-06-08 NOTE — PROGRESS NOTES
OCCUPATIONAL THERAPY Daily Note     OT Visit Days: 2   Time  OT Charge Capture  Rehab Caseload Tracker  OT Orders    Judy Jolley is a 78 y.o. male   PRIMARY DIAGNOSIS: Septic shock ([facility] [address]  Syncope and collapse [R55]  Hemorrhagic shock (HCC) [R57.8]  Shock ([address] [R57.9]  History of gastric cancer [Z85.028]  Septic shock ([facility] [address] [A41.9, R65.21]  Gastrointestinal hemorrhage, unspecified gastrointestinal hemorrhage type [K92.2]  Procedure(s) (LRB):  EGD ESOPHAGOGASTRODUODENOSCOPY (N/A)  4 Days Post-Op  Inpatient: Payor: MEDICARE / Plan: MEDICARE PART A / Product Type: *No Product type* /     ASSESSMENT:     REHAB RECOMMENDATIONS: CURRENT LEVEL OF FUNCTION:  (Most Recently Demonstrated)   Recommendation to date pending progress:  [address] [place] [address] with 24/7 supervision as patient is already receiving    Equipment:     3 in 1 Bedside Commode   Rolling Walker Bathing:   Not Tested  Dressing:   Not Tested  Feeding/Grooming:   Stand by Assist  Toileting:   Not Tested  Functional Mobility:   Contact Guard Assist- Minimal Assistance x2 RW     ASSESSMENT:  Mr. Esme Linton is a 79 y/o male presents with syncope and collapse. Pt with hx gastric adenocarcinoma and is current with chemotherapy. Today pt presents with decreased activity tolerance, balance, strength and mobility impacting ADLs. Pt overall min A for bed mobility, min A x2 RW for sit<>stand transfer and CGA x2 RW to take steps to bedside chair. Pt on RA with sats >96% throughout. Pt able to stand at edge of chair to complete grooming ADLs before needing to sit down--pt required frequent seated rest breaks today due to fatigue and dizziness. Pt was up for more walking, then got very dizzy and was unable to continue walking--BP taken 132/100, RN notified. Pt stated he felt better after sitting in recliner. Pt is making progress toward goals but is limited by dizziness. Continue POC.         SUBJECTIVE:     Mr. Esme Linton states, \"Hold on a minute, I'm getting dizzy\"     Social/Functional Lives With: Spouse  Type of Home: House  Home Layout: One level  Bathroom Accessibility: Accessible  Receives Help From: Family  ADL Assistance: Independent  Homemaking Assistance: Independent  Ambulation Assistance: Independent  Transfer Assistance: Independent  Active : Yes  Mode of Transportation: Car  Occupation: Retired    OBJECTIVE:     Venancioth Pott / Nelson Ebbing / Radene Flight: Mata Catheter and IV    RESTRICTIONS/PRECAUTIONS:  Restrictions/Precautions  Restrictions/Precautions: Fall Risk        PAIN: Pop Sae / O2:   Pre Treatment:   Pain Assessment: None - Denies Pain          Post Treatment: no c/o pain, resting comfortably in bedside chair Vitals   Vitals  BP: (!) 132/100  BP Location: Right upper arm  BP Method: Automatic  Patient Position: Up in chair (post activity)      Oxygen  O2 Device: None (Room air)  SpO2: 99 %         MOBILITY: I Mod I S SBA CGA Min Mod Max Total  NT x2 Comments:   Bed Mobility    Rolling [] [] [] [] [] [] [] [] [] [x] []    Supine to Sit [] [] [] [] [] [x] [] [] [] [] []    Scooting [] [] [] [] [x] [] [] [] [] [] []    Sit to Supine [] [] [] [] [] [] [] [] [] [x] []    Transfers    Sit to Stand [] [] [] [] [] [x] [] [] [] [] [x] RW   Bed to Chair [] [] [] [] [x] [] [] [] [] [] [x] RW   Stand to Sit [] [] [] [] [x] [] [] [] [] [] [x] RW   Tub/Shower [] [] [] [] [] [] [] [] [] [x] []     Toilet [] [] [] [] [] [] [] [] [] [x] []      [] [] [] [] [] [] [] [] [] [x] []    I=Independent, Mod I=Modified Independent, S=Supervision/Setup, SBA=Standby Assistance, CGA=Contact Guard Assistance, Min=Minimal Assistance, Mod=Moderate Assistance, Max=Maximal Assistance, Total=Total Assistance, NT=Not Tested    ACTIVITIES OF DAILY LIVING: I Mod I S SBA CGA Min Mod Max Total NT Comments   BASIC ADLs:              Bathing/ Showering [] [] [] [] [] [] [] [] [] [x]    Toileting [] [] [] [] [] [] [] [] [] [x]    Upper Body Dressing [] [] [] [] [] [] [] [] [] [x]    Lower Body Dressing [] [] [] [] [] [] [] [] [] [x]    Feeding [] [] [] [] [] [] [] [] [] [x]    Grooming [] [] [] [x] [] [] [] [] [] [] Brushing teeth and combing hair in standing at edge of chair for safety due to syncopal episodes   Personal Device Care [] [] [] [] [] [] [] [] [] [x]    Functional Mobility [] [] [] [] [x] [x] [] [] [] [] x2 RW   I=Independent, Mod I=Modified Independent, S=Supervision/Setup, SBA=Standby Assistance, CGA=Contact Guard Assistance, Min=Minimal Assistance, Mod=Moderate Assistance, Max=Maximal Assistance, Total=Total Assistance, NT=Not Tested    BALANCE: Good Fair+ Fair Fair- Poor NT Comments   Sitting Static [x] [] [] [] [] []    Sitting Dynamic [] [x] [] [] [] []              Standing Static [] [x] [] [] [] []    Standing Dynamic [] [x] [] [] [] []        PLAN:     FREQUENCY/DURATION   OT Plan of Care: 3 times/week for duration of hospital stay or until stated goals are met, whichever comes first.    ACUTE OCCUPATIONAL THERAPY GOALS:   (Developed with and agreed upon by patient and/or caregiver.)  1. Patient will complete lower body bathing and dressing with SUPERVISION and adaptive equipment as needed. 2. Patient will complete toileting with SUPERVISION. 3. Patient will complete grooming ADL with SUPERVISION. 4. Patient will tolerate 25 minutes of OT treatment with 1-2 rest breaks to increase activity tolerance for ADLs. 5. Patient will complete functional transfers with SUPERVISION and adaptive equipment as needed.    6. Patient will tolerate 10 minutes BUE exercises to increase strength for safe, functional transfers.      Timeframe: 7 visits     TREATMENT:     TREATMENT:   Co-Treatment PT/OT necessary due to patient's decreased overall endurance/tolerance levels, as well as need for high level skilled assistance to complete functional transfers/mobility and functional tasks  Self Care: (15): Procedure(s) (per grid) utilized to improve and/or restore self-care/home management as related to grooming and energy conservation training and functional transfers in prep for ADLs. Required minimal verbal and tactile cueing to facilitate activities of daily living skills and compensatory activities. Neuromuscular Re-education (16 Minutes): Neuromuscular Re-education included Balance Training, Coordination training, Functional mobility with facilitation, Postural training, Sitting balance training and Standing balance training to improve Balance, Coordination, Functional Mobility and Postural Control. TREATMENT GRID:  N/A    AFTER TREATMENT PRECAUTIONS: Alarm Activated, Call light within reach, Chair, Needs within reach, RN notified and Visitors at bedside    INTERDISCIPLINARY COLLABORATION:  RN/ PCT, PT/ PTA and OT/ BHATT    EDUCATION:  Education Given To: Patient; Family  Education Provided: Energy Conservation  Education Method: Verbal  Barriers to Learning: None  Education Outcome: Verbalized understanding    TOTAL TREATMENT DURATION AND TIME:  Time In: 1722  Time Out: [address]  Minutes: Marisela Garcia, OT

## 2022-06-08 NOTE — PROGRESS NOTES
VANCO DAILY FOLLOW UP NOTE  [address] [facility] Pharmacokinetic Monitoring Service - Vancomycin    Consulting Provider: Dr. Carlo Mills   Indication: sepsis  Target Concentration: Goal AUC/WILFREDO 400-600 mg*hr/L  Day of Therapy: 7  Additional Antimicrobials: cefepime    Pertinent Laboratory Values: Wt Readings from Last 1 Encounters:   06/06/22 118 lb 4 oz (53.6 kg)     Temp Readings from Last 1 Encounters:   06/08/22 97.8 °F (36.6 °C) (Oral)     Recent Labs     06/06/22  0249 06/07/22  0315 06/08/22  0325   BUN 15 12 8   CREATININE 0.20* 0.20* <0.20*   WBC  --  17.8* 13.4*     Estimated Creatinine Clearance: 272 mL/min (based on SCr of 0.2 mg/dL). Lab Results   Component Value Date    VANCORANDOM 13.7 06/05/2022       MRSA Nasal Swab: N/A. Non-respiratory infection. .      Assessment:  Date/Time Dose Concentration AUC   6/3 0709 750 mg q8h 26.1 601   6/5 0400 1000 mg q12h 13.7 482   Note: Serum concentrations collected for AUC dosing may appear elevated if collected in close proximity to the dose administered, this is not necessarily an indication of toxicity    Plan:  Continue 1000 mg q12h  Repeat vancomycin concentrations will be ordered as clinically appropriate   Pharmacy will continue to monitor patient and adjust therapy as indicated    Thank you for the consult,  Feroz Andrews, PharmD, BCOP  Clinical Pharmacist  Contact via [employer]

## 2022-06-08 NOTE — PROGRESS NOTES
Procedural consent, addendum to the surgical consent, and blood consent all explained to the patient signed and placed in the chart.

## 2022-06-08 NOTE — ANESTHESIA PRE PROCEDURE
Department of Anesthesiology  Preprocedure Note       Name:  Roger Chambers   Age:  78 y.o.  :  1955                                          MRN:  [id number]         Date:  2022      Surgeon: Mary Wilcox):  Brian Garcia MD    Procedure: Procedure(s):  TOTAL GASTRECTOMY, EXPLORATORY LAPAROTOMY, FEEDING J TUBE/ 501    Medications prior to admission:   Prior to Admission medications    Medication Sig Start Date End Date Taking? Authorizing Provider   dexamethasone (DECADRON) 1 MG tablet Take 1 mg by mouth 2 times a day 22   Ar Automatic Reconciliation   dexamethasone (DECADRON) 4 MG tablet Take 2 tabs twice daily the day before chemo and the day after chemo. 3/30/22   Ar Automatic Reconciliation   lidocaine-prilocaine (EMLA) 2.5-2.5 % cream Apply topically as needed 3/30/22   Ar Automatic Reconciliation   ondansetron (ZOFRAN) 8 MG tablet Take 8 mg by mouth every 8 hours as needed for Nausea 3/30/22   Ar Automatic Reconciliation   prochlorperazine (COMPAZINE) 10 MG tablet Take 5 mg by mouth every 6 hours as needed (nausea) 3/30/22   Ar Automatic Reconciliation       Current medications:    No current facility-administered medications for this visit. No current outpatient medications on file.      Facility-Administered Medications Ordered in Other Visits   Medication Dose Route Frequency Provider Last Rate Last Admin    PN-Adult Premix 5/15 - Central   IntraVENous Continuous TPN Hakan Begum MD 85 mL/hr at 22 1826 New Bag at 22 1826    potassium chloride 20 mEq/50 mL IVPB (Central Line)  20 mEq IntraVENous PRN Meagan Sor, APRN - CNP        Or    potassium chloride 10 mEq/100 mL IVPB (Peripheral Line)  10 mEq IntraVENous PRN Meagan Sor, APRN -  mL/hr at 22 1641 10 mEq at 22 1641    magnesium sulfate 2000 mg in 50 mL IVPB premix  2,000 mg IntraVENous PRN Meagan Sor, APRN - CNP        sodium phosphate 10 mmol in sodium chloride 0.9 % 250 mL IVPB  10 mmol IntraVENous PRN Hazle Flick, APRN - CNP        Or    sodium phosphate 15 mmol in sodium chloride 0.9 % 250 mL IVPB  15 mmol IntraVENous PRN Hazle Flick, APRN - CNP        Or    sodium phosphate 20 mmol in sodium chloride 0.9 % 500 mL IVPB  20 mmol IntraVENous PRN Hazle Flick, APRN - CNP        [Held by provider] fat emulsion 20 % infusion 250 mL  250 mL IntraVENous Daily Kvng Sal MD   Stopped at 06/07/22 0650    thiamine (B-1) injection 100 mg  100 mg IntraVENous Daily Kvng Sal MD   100 mg at 31/11/52 3614    folic acid 1 mg in sodium chloride 0.9 % 50 mL IVPB  1 mg IntraVENous Daily Kvng Sal MD   Stopped at 06/08/22 0955    0.9 % sodium chloride infusion   IntraVENous PRN Hazle Flick, APRN - CNP        0.9 % sodium chloride infusion   IntraVENous PRN Neville Hong MD        furosemide (LASIX) injection 20 mg  20 mg IntraVENous PRN Neville Hong MD        metoclopramide (REGLAN) injection 10 mg  10 mg IntraVENous Q6H Neville Hong MD   10 mg at 06/08/22 1801    acetaminophen (TYLENOL) tablet 650 mg  650 mg Oral Q6H PRN Catherine Lopez MD   650 mg at 06/05/22 1748    Or    acetaminophen (TYLENOL) suppository 650 mg  650 mg Rectal Q6H PRN Catherine Lopez MD        vancomycin (VANCOCIN) 1,000 mg in sodium chloride 0.9 % 250 mL IVPB ([id number])  1,000 mg IntraVENous Q12H Deepika Boyce  mL/hr at 06/08/22 1802 1,000 mg at 06/08/22 1802    midodrine (PROAMATINE) tablet 10 mg  10 mg Oral TID PRN Javier Shaker, DO        metronidazole (FLAGYL) 500 mg in 0.9% NaCl 100 mL IVPB premix  500 mg IntraVENous Q12H Javier Shaker,  mL/hr at 06/08/22 1802 500 mg at 06/08/22 1802    pantoprazole (PROTONIX) 40 mg in sodium chloride (PF) 10 mL injection  40 mg IntraVENous Q12H Javier Shaker, DO   40 mg at 06/08/22 1616    sodium chloride flush 0.9 % injection 5-40 mL  5-40 mL IntraVENous 2 times per day Javier Shaker, DO   10 mL at 06/08/22 1188    sodium chloride flush 0.9 % injection 5-40 mL  5-40 mL IntraVENous PRN Hector Slain, DO        dexamethasone (DECADRON) tablet 1 mg  1 mg Oral 2 times per day Hector Slain, DO   1 mg at 06/08/22 0924    sucralfate (CARAFATE) tablet 1 g  1 g Oral 4 times per day Hector Slain, DO   1 g at 06/08/22 1802    sodium chloride flush 0.9 % injection 10 mL  10 mL IntraVENous ONCE PRN Hector Slain, DO        mirtazapine (REMERON) tablet 7.5 mg  7.5 mg Oral Nightly Hector Slain, DO   7.5 mg at 06/07/22 2243    ondansetron (ZOFRAN) injection 4 mg  4 mg IntraVENous Q6H PRN Hector Slain, DO   4 mg at 06/03/22 1744    saliva substitute (BIOTENE/MOUTH KOTE) liquid   Oral PRN Hector Slain, DO        Medela Tender Care Lanolin cream   Topical PRN Hector Slain, DO           Allergies:     Allergies   Allergen Reactions    Iron     Tetanus Antitoxin        Problem List:    Patient Active Problem List   Diagnosis Code    ABILIO (iron deficiency anemia) D50.9    Malignant neoplasm of overlapping sites of stomach ([facility] C16.8    Malignant neoplasm of body of stomach (HCC) C16.2    Gastric adenocarcinoma (HCC) C16.9    Septic shock (HCC) A41.9, R65.21    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[facility] R64    Former heavy tobacco smoker Z87.891    Gastroesophageal reflux disease K21.9    Loss of weight R63.4    Open fracture of proximal phalanx of left thumb S62.512B    Thumb amputation status, left [id number]    Tobacco use disorder F17.200    Hyponatremia E87.1    Hypoalbuminemia due to protein-calorie malnutrition (HCC) E88.09, E46    Syncope due to orthostatic hypotension I95.1    Hypomagnesemia E83.42    Corticosteroid dependence (HCC) F19.20    Hypoproteinemia (HCC) E77.8    Do not resuscitate status Z66    Fatigue R53.83    History of gastric cancer Z85.028    Encounter for palliative care Z51.5    Debility R53.81    Weakness R53.1    Hypercoagulable state, secondary ([facility] D68.69    Adult body mass index less than 19 Z68.1    Acute pulmonary embolism without acute cor pulmonale (HCC) I26.99    Severe protein-calorie malnutrition ([facility] E43       Past Medical History:        Diagnosis Date    ABLA (acute blood loss anemia) 6/2/2022    GIB (gastrointestinal bleeding) 6/2/2022    HTN (hypertension)     Severe sepsis with lactic acidosis ([facility] 6/2/2022       Past Surgical History:        Procedure Laterality Date    COLONOSCOPY N/A 3/9/2022    COLONOSCOPY/ 22 performed by Felton Ruelas MD at [facility] IR IVC FILTER PLACEMENT W IMAGING  6/6/2022    IR IVC FILTER PLACEMENT W IMAGING 6/6/2022 [facility]    KNEE ARTHROSCOPY      OTHER SURGICAL HISTORY      none    UPPER GASTROINTESTINAL ENDOSCOPY N/A 6/4/2022    EGD ESOPHAGOGASTRODUODENOSCOPY performed by Christina Fraier MD at [facility] ENDOSCOPY       Social History:    Social History     Tobacco Use    Smoking status: Former Smoker    Smokeless tobacco: Former User   Substance Use Topics    Alcohol use: Not Currently                                Counseling given: Not Answered      Vital Signs (Current): There were no vitals filed for this visit.                                            BP Readings from Last 3 Encounters:   06/08/22 (!) 145/81   05/31/22 110/72   05/30/22 118/70       NPO Status:                                                                                 BMI:   Wt Readings from Last 3 Encounters:   06/06/22 118 lb 4 oz (53.6 kg)   05/31/22 118 lb (53.5 kg)   05/18/22 120 lb 9.6 oz (54.7 kg)     There is no height or weight on file to calculate BMI.    CBC:   Lab Results   Component Value Date    WBC 13.4 06/08/2022    RBC 3.25 06/08/2022    HGB 9.1 06/08/2022    HCT 29.0 06/08/2022    MCV 89.2 06/08/2022    RDW 18.0 06/08/2022     06/08/2022       CMP:   Lab Results   Component Value Date     06/08/2022    K 3.6 06/08/2022     06/08/2022    CO2 29 06/08/2022    BUN 8 06/08/2022    CREATININE <0.20 06/08/2022    GFRAA 0 06/08/2022 AGRATIO 0.4 05/17/2022    LABGLOM 0 06/08/2022    GLUCOSE 99 06/08/2022    PROT 4.8 06/05/2022    CALCIUM 7.9 06/08/2022    BILITOT 0.3 06/05/2022    ALKPHOS 110 06/05/2022    ALKPHOS 176 05/17/2022    AST 22 06/05/2022    ALT 14 06/05/2022       POC Tests:   No results for input(s): POCGLU, POCNA, POCK, POCCL, POCBUN, POCHEMO, POCHCT in the last 72 hours. Coags:   Lab Results   Component Value Date    PROTIME 15.2 06/03/2022    INR 1.2 06/03/2022    APTT 33.8 06/02/2022    APTT 32.5 03/23/2022       HCG (If Applicable): No results found for: PREGTESTUR, PREGSERUM, HCG, HCGQUANT     ABGs: No results found for: PHART, PO2ART, IVY3DAX, MKN3GTF, BEART, [id number]     Type & Screen (If Applicable):  No results found for: LABABO, LABRH    Drug/Infectious Status (If Applicable):  No results found for: HIV, HEPCAB    COVID-19 Screening (If Applicable): No results found for: COVID19        Anesthesia Evaluation  Patient summary reviewed  Airway: Mallampati: II  TM distance: >3 FB     Mouth opening: > = 3 FB   Dental:    (+) poor dentition      Pulmonary:Negative Pulmonary ROS breath sounds clear to auscultation                             Cardiovascular:  Exercise tolerance: poor (<4 METS),   (+) hypertension: mild, dysrhythmias: SVT,         Rhythm: regular  Rate: normal                 ROS comment: Hx of DVT had IVC filter placed in last week     Neuro/Psych:   Negative Neuro/Psych ROS              GI/Hepatic/Renal: Neg GI/Hepatic/Renal ROS  (+) GERD: well controlled,           Endo/Other: Negative Endo/Other ROS   (+) blood dyscrasia: anemia:., malignancy/cancer (gastric). ROS comment: Hb 9.1   Abdominal:             Vascular: negative vascular ROS. Other Findings:             Anesthesia Plan      general     ASA 4     (Discussed with patient and wife of possible arterial line, central line and/or ICU and ventilator postop.)  Induction: intravenous.   arterial line and central line    Anesthetic plan and risks discussed with patient and spouse. Use of blood products discussed with patient and spouse whom.                      Tammy Vera MD   6/8/2022

## 2022-06-08 NOTE — PROGRESS NOTES
Hospitalist Progress Note   Admit Date:  2022  1:05 PM   Name:  Mini Woodward   Age:  78 y.o. Sex:  male  :  1955   MRN:  [id number]   Room:  [facility]    Presenting Complaint: Loss of Consciousness     Reason(s) for Admission: Syncope and collapse [R55]  Hemorrhagic shock ([address] [R57.8]  Shock ([address] [R57.9]  History of gastric cancer [Z85.028]  Septic shock ([address] [A41.9, R65.21]  Gastrointestinal hemorrhage, unspecified gastrointestinal hemorrhage type [facility] Course & Interval History:   65-year-old male past medical history of gastric adenocarcinoma now status post 4 cycles of chemo with last dose on 2022, hypertension, 50-pack-year smoking history presented after having syncopal episode. Prior to arrival heart rate was erratic from normal to as high as 160-170 described as SVT versus sinus tach per EMS. Patient received fluids since he was hypotensive with a blood pressure of 74/54, heart rate of 110. Patient received 30 cc/kg sepsis bolus, vancomycin, cefepime and 1 unit packed red blood cell. IR consulted for IVC filter due to continuous GI bleed. Subjective/24hr Events (22): Patient was seen and examined at the bedside. No overnight events. Patient lying in bed comfortably without any major complaints. Continues to have bowel movements. Patient denied any cardiac chest pain, shortness of breath, abdominal pain, fever/chills. Assessment & Plan:   Septic shock, hemorrhagic shock  Gram-positive mildred bacteremia  - On admission patient had heart rate 110, blood pressure 74/54 without clear source  - Albumin every 6 hours x4 doses  - Dexamethasone 1 mg twice daily  - Blood culture positive for GPC x1 bottle, anaerobic.   Vanco cefepime Flagyl  - Repeat blood cultures nothing grown to date  - Cefepime discontinued     Chronic anemia  - On admission hemoglobin of 5.6  - Suspected acute blood loss anemia; likely BM suppression from chemo/malignancy plus nutritional deficiencies  - Status post packed red blood cells  - Hematology consulted  - Continue IV PPI and Carafate  - Patient has received a total of 5 units of red blood cell since admit  - Hemoglobin currently stable at this time  - Patient underwent EGD unfortunately was unsuccessful due to obstruction with food particles. Repeat EGD still unsuccessful  - Patient expected to have laparotomy and gastrectomy on 6/9 with general surgery    Hypercoagulable state  - CT chest abdomen pelvis with small PE, femoral DVT  - No current plan for urgent intervention per surgery  - IR placed IVC filter 6/6  - Patient will need follow-up hematology outpatient    Cancer cachexia, severe protein caloric malnutrition, hypoproteinemia  - Remeron nightly  - Steroids as noted above  - Ensure added per nutrition  - TPN via indwelling port    Gastric adenocarcinoma  - Status post 4 cycles of chemotherapy  - Oncology and general surgery following  - Patient to undergo laparotomy with total gastrectomy 6/9        Discharge Planning:    Dispo pending clinical course. Patient expected to go to [facility] once cleared by general surgery after gastrectomy.     Diet:  Diet NPO  PN-Adult Premix 4.25/10 - Central Line  PN-Adult Premix 5/15 - Central  DVT PPx: SCDs  Code status: DNR    Hospital Problems:  Principal Problem:    Septic shock ([address]  Active Problems:    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[address]    Former heavy tobacco smoker    Gastroesophageal reflux disease    Hyponatremia    Hypoalbuminemia due to protein-calorie malnutrition (HCC)    Syncope due to orthostatic hypotension    Hypomagnesemia    Corticosteroid dependence ([address]    Hypoproteinemia (HCC)    Do not resuscitate status    Fatigue    History of gastric cancer    Encounter for palliative care    Debility    Weakness    Hypercoagulable state, secondary ([address]    Adult body mass index less than 19    Acute pulmonary embolism without acute cor pulmonale (HCC)    Severe protein-calorie malnutrition ([facility]    ABILIO (iron deficiency anemia)    Malignant neoplasm of overlapping sites of stomach (HCC)    Malignant neoplasm of body of stomach (HCC)    Gastric adenocarcinoma ([facility]  Resolved Problems:    Severe sepsis with lactic acidosis (HCC)    ABLA (acute blood loss anemia)    GIB (gastrointestinal bleeding)      Objective:     Patient Vitals for the past 24 hrs:   Temp Pulse Resp BP SpO2   06/08/22 1045 97.3 °F (36.3 °C) 80 14 (!) 144/91 100 %   06/08/22 0745 97.8 °F (36.6 °C) 82 18 (!) 141/93 97 %   06/08/22 0418 98 °F (36.7 °C) 86 19 (!) 154/88 96 %   06/07/22 2235 97.9 °F (36.6 °C) 80 18 136/89 97 %   06/07/22 1947 98.7 °F (37.1 °C) 79 18 (!) 140/93 98 %   06/07/22 1519 -- 93 19 -- --   06/07/22 1509 98 °F (36.7 °C) -- 19 (!) 144/97 --   06/07/22 1256 -- 76 -- -- --       Oxygen Therapy  SpO2: 100 %  Pulse Oximetry Type: Continuous  Pulse Oximeter Device Mode: Intermittent  Pulse Oximeter Device Location: Left,Finger  O2 Device: None (Room air)  Skin Assessment: Clean, dry, & intact  Skin Protection for O2 Device: No  O2 Flow Rate (L/min): 1 L/min  O2 Delivery Method: Nasal cannula    Estimated body mass index is 17.46 kg/m² as calculated from the following:    Height as of this encounter: 5' 9\" (1.753 m). Weight as of this encounter: 118 lb 4 oz (53.6 kg). Intake/Output Summary (Last 24 hours) at 6/8/2022 1217  Last data filed at 6/8/2022 0920  Gross per 24 hour   Intake 3383 ml   Output 4050 ml   Net -667 ml         Physical Exam:   Blood pressure (!) 144/91, pulse 80, temperature 97.3 °F (36.3 °C), temperature source Oral, resp. rate 14, height 5' 9\" (1.753 m), weight 118 lb 4 oz (53.6 kg), SpO2 100 %. General:    Well nourished. Head:  Normocephalic, atraumatic  Eyes:  Sclerae appear normal.  Pupils equally round. ENT:  Nares appear normal, no drainage. Moist oral mucosa  Neck:  No restricted ROM. Trachea midline   CV:   RRR. No m/r/g. No jugular venous distension. Lungs:   CTAB. No wheezing, rhonchi, or rales. Respirations even, unlabored  Abdomen: Bowel sounds present. Soft, nontender, nondistended. Extremities: No cyanosis or clubbing. No edema  Skin:     No rashes and normal coloration. Warm and dry. Neuro:  CN II-XII grossly intact. Sensation intact. A&Ox3  Psych:  Normal mood and affect.       I have reviewed ordered lab tests and independently visualized imaging below:    Recent Labs:  Recent Results (from the past 48 hour(s))   CBC    Collection Time: 06/07/22  3:15 AM   Result Value Ref Range    WBC 17.8 (H) 4.3 - 11.1 K/uL    RBC 3.51 (L) 4.23 - 5.6 M/uL    Hemoglobin 9.9 (L) 13.6 - 17.2 g/dL    Hematocrit 31.0 (L) 41.1 - 50.3 %    MCV 88.3 79.6 - 97.8 FL    MCH 28.2 26.1 - 32.9 PG    MCHC 31.9 31.4 - 35.0 g/dL    RDW 18.0 (H) 11.9 - 14.6 %    Platelets 654 500 - 167 K/uL    MPV 9.1 (L) 9.4 - 12.3 FL    nRBC 0.00 0.0 - 0.2 K/uL   Basic Metabolic Panel    Collection Time: 06/07/22  3:15 AM   Result Value Ref Range    Sodium 136 (L) 138 - 145 mmol/L    Potassium 3.2 (L) 3.5 - 5.1 mmol/L    Chloride 103 98 - 107 mmol/L    CO2 25 21 - 32 mmol/L    Anion Gap 8 7 - 16 mmol/L    Glucose 124 (H) 65 - 100 mg/dL    BUN 12 8 - 23 MG/DL    CREATININE 0.20 (L) 0.8 - 1.5 MG/DL    GFR African American >60 >60 ml/min/1.73m2    GFR Non- >60 >60 ml/min/1.73m2    Calcium 7.8 (L) 8.3 - 10.4 MG/DL   Magnesium    Collection Time: 06/07/22  3:15 AM   Result Value Ref Range    Magnesium 2.0 1.8 - 2.4 mg/dL   Phosphorus    Collection Time: 06/07/22  3:15 AM   Result Value Ref Range    Phosphorus 3.3 2.3 - 3.7 MG/DL   Triglycerides    Collection Time: 06/07/22  3:15 AM   Result Value Ref Range    Triglycerides 424 (H) 35 - 150 MG/DL   Potassium    Collection Time: 06/07/22  7:23 PM   Result Value Ref Range    Potassium 3.7 3.5 - 5.1 mmol/L   Triglyceride    Collection Time: 06/08/22  3:17 AM   Result Value Ref Range    Triglycerides 286 (H) 35 - 150 MG/DL   Phosphorus Collection Time: 06/08/22  3:25 AM   Result Value Ref Range    Phosphorus 2.5 2.3 - 3.7 MG/DL   Magnesium    Collection Time: 06/08/22  3:25 AM   Result Value Ref Range    Magnesium 2.0 1.8 - 2.4 mg/dL   Basic Metabolic Panel    Collection Time: 06/08/22  3:25 AM   Result Value Ref Range    Sodium 138 138 - 145 mmol/L    Potassium 3.6 3.5 - 5.1 mmol/L    Chloride 104 98 - 107 mmol/L    CO2 29 21 - 32 mmol/L    Anion Gap 5 (L) 7 - 16 mmol/L    Glucose 99 65 - 100 mg/dL    BUN 8 8 - 23 MG/DL    CREATININE <0.20 (L) 0.8 - 1.5 MG/DL    GFR  0 (L) >60 ml/min/1.73m2    GFR Non- 0 (L) >60 ml/min/1.73m2    Calcium 7.9 (L) 8.3 - 10.4 MG/DL   CBC with Auto Differential    Collection Time: 06/08/22  3:25 AM   Result Value Ref Range    WBC 13.4 (H) 4.3 - 11.1 K/uL    RBC 3.25 (L) 4.23 - 5.6 M/uL    Hemoglobin 9.1 (L) 13.6 - 17.2 g/dL    Hematocrit 29.0 (L) 41.1 - 50.3 %    MCV 89.2 79.6 - 97.8 FL    MCH 28.0 26.1 - 32.9 PG    MCHC 31.4 31.4 - 35.0 g/dL    RDW 18.0 (H) 11.9 - 14.6 %    Platelets 677 127 - 403 K/uL    MPV 10.0 9.4 - 12.3 FL    nRBC 0.00 0.0 - 0.2 K/uL    Seg Neutrophils 71 43 - 78 %    Lymphocytes 16 13 - 44 %    Monocytes 4 4.0 - 12.0 %    Eosinophils % 1 0.5 - 7.8 %    Basophils 0 0.0 - 2.0 %    Immature Granulocytes 8 (H) 0.0 - 5.0 %    Segs Absolute 9.6 (H) 1.7 - 8.2 K/UL    Absolute Lymph # 2.1 0.5 - 4.6 K/UL    Absolute Mono # 0.5 0.1 - 1.3 K/UL    Absolute Eos # 0.1 0.0 - 0.8 K/UL    Basophils Absolute 0.0 0.0 - 0.2 K/UL    Absolute Immature Granulocyte 1.1 (H) 0.0 - 0.5 K/UL    RBC Comment SLIGHT  ANISOCYTOSIS + POIKILOCYTOSIS        RBC Comment OCCASIONAL  POLYCHROMASIA        WBC Comment Result Confirmed By Smear      Platelet Comment ADEQUATE      Differential Type AUTOMATED         Other Studies:  IR GUIDED IVC FILTER PLACEMENT   Final Result   Successful placement an infrarenal Cook Celect inferior vena cava filter.        Plan:   The patient may be evaluated in 4 months by interventional radiology in order to   evaluate for continued need of the IVC filter for versus filter removal.                  Vascular duplex lower extremity venous bilateral   Final Result   Negative for sonographic evidence of deep venous thrombosis right   lower extremity. LEFT LOWER EXTREMITY VENOUS DUPLEX ULTRASOUND. INDICATION: Left lower extremity pain. TECHNIQUE: Direct skin-contact high resolution grayscale images, color-flow   Doppler and duplex waveform analysis. FINDINGS: Abnormal intraluminal echoes within the common femoral and profunda   femoral veins. There is some flow. The superficial superficial femoral and   popliteal veins and upper calf veins are unremarkable. IMPRESSION: Positive for deep venous thrombosis left common femoral profunda   femoral veins, nonocclusive. CTA CHEST ABDOMEN PELVIS W WO CONTRAST   Final Result   1. No extravasation to suggest active GI bleed. 2. Small areas of pulmonary embolism are present in the right lower lobe. Clot   burden is quite small. 3. DVT is also likely present in the left femoral veins. 4. Previously described gastric mass again noted. It is difficult to measure to   evaluate for progression. XR CHEST PORTABLE   Final Result   Unremarkable portable chest radiograph.                          Current Meds:  Current Facility-Administered Medications   Medication Dose Route Frequency    PN-Adult Premix 5/15 - Central   IntraVENous Continuous TPN    PN-Adult Premix 4.25/10 - Central Line   IntraVENous Continuous TPN    potassium chloride 20 mEq/50 mL IVPB (Central Line)  20 mEq IntraVENous PRN    Or    potassium chloride 10 mEq/100 mL IVPB (Peripheral Line)  10 mEq IntraVENous PRN    magnesium sulfate 2000 mg in 50 mL IVPB premix  2,000 mg IntraVENous PRN    sodium phosphate 10 mmol in sodium chloride 0.9 % 250 mL IVPB  10 mmol IntraVENous PRN    Or    sodium phosphate 15 mmol in sodium chloride 0.9 % 250 mL IVPB  15 mmol IntraVENous PRN    Or    sodium phosphate 20 mmol in sodium chloride 0.9 % 500 mL IVPB  20 mmol IntraVENous PRN    [Held by provider] fat emulsion 20 % infusion 250 mL  250 mL IntraVENous Daily    thiamine (B-1) injection 100 mg  100 mg IntraVENous Daily    folic acid 1 mg in sodium chloride 0.9 % 50 mL IVPB  1 mg IntraVENous Daily    0.9 % sodium chloride infusion   IntraVENous PRN    0.9 % sodium chloride infusion   IntraVENous PRN    furosemide (LASIX) injection 20 mg  20 mg IntraVENous PRN    metoclopramide (REGLAN) injection 10 mg  10 mg IntraVENous Q6H    acetaminophen (TYLENOL) tablet 650 mg  650 mg Oral Q6H PRN    Or    acetaminophen (TYLENOL) suppository 650 mg  650 mg Rectal Q6H PRN    vancomycin (VANCOCIN) 1,000 mg in sodium chloride 0.9 % 250 mL IVPB ([id number])  1,000 mg IntraVENous Q12H    midodrine (PROAMATINE) tablet 10 mg  10 mg Oral TID PRN    metronidazole (FLAGYL) 500 mg in 0.9% NaCl 100 mL IVPB premix  500 mg IntraVENous Q12H    pantoprazole (PROTONIX) 40 mg in sodium chloride (PF) 10 mL injection  40 mg IntraVENous Q12H    sodium chloride flush 0.9 % injection 5-40 mL  5-40 mL IntraVENous 2 times per day    sodium chloride flush 0.9 % injection 5-40 mL  5-40 mL IntraVENous PRN    dexamethasone (DECADRON) tablet 1 mg  1 mg Oral 2 times per day    sucralfate (CARAFATE) tablet 1 g  1 g Oral 4 times per day    sodium chloride flush 0.9 % injection 10 mL  10 mL IntraVENous ONCE PRN    mirtazapine (REMERON) tablet 7.5 mg  7.5 mg Oral Nightly    ondansetron (ZOFRAN) injection 4 mg  4 mg IntraVENous Q6H PRN    saliva substitute (BIOTENE/MOUTH KOTE) liquid   Oral PRN    Medela Tender Care Lanolin cream   Topical PRN       Signed:  Jade Herr MD    Part of this note may have been written by using a voice dictation software. The note has been proof read but may still contain some grammatical/other typographical errors.

## 2022-06-08 NOTE — PROGRESS NOTES
PHYSICAL THERAPY Daily Note  (Link to Caseload Tracking: PT Visit Days : 2  Time In/Out PT Charge Capture  Rehab Caseload Tracker  Orders      Vanessa Johnson is a 78 y.o. male   PRIMARY DIAGNOSIS: Septic shock ([facility] [address]  Syncope and collapse [R55]  Hemorrhagic shock ([address] [R57.8]  Shock ([address] [R57.9]  History of gastric cancer [Z85.028]  Septic shock ([facility] [address] [A41.9, R65.21]  Gastrointestinal hemorrhage, unspecified gastrointestinal hemorrhage type [K92.2]  Procedure(s) (LRB):  EGD ESOPHAGOGASTRODUODENOSCOPY (N/A)  4 Days Post-Op  Inpatient: Payor: MEDICARE / Plan: MEDICARE PART A / Product Type: *No Product type* /     ASSESSMENT:     REHAB RECOMMENDATIONS:   Recommendation to date pending progress:  Setting:   Short-term Rehab    Equipment:     To Be Determined     ASSESSMENT:  Mr. Jorge Pitts was supine in bed on arrival. He is agreeable to PT and willing to get up into the chair. Supine to sit on EOB with SBA. He was able to transfer over to recliner with CGA x2. Exercises performed while sitting up in chair. He tolerated standing with CGA for dynamic standing balance activities. Pt agreeable to ambulate but once he stood and walked about 3 feet, he became very dizzy. Pt sat in chair and sats were in the mid to high 90s on RA. His BP was 132/100 . Pt left sitting up in chair with needs in reach.      SUBJECTIVE:   Mr. Jorge Pitts states, \"I'm very weak\"     Social/Functional Lives With: Spouse  Type of Home: House  Home Layout: One level  Bathroom Accessibility: Accessible  Receives Help From: Family  ADL Assistance: Independent  Homemaking Assistance: Independent  Ambulation Assistance: Independent  Transfer Assistance: Independent  Active : Yes  Mode of Transportation: Car  Occupation: Retired  OBJECTIVE:     PAIN: VITALS / O2: Amanda Atlanta / Ariela Simpson / Willa Conte:   Pre Treatment:  none         Post Treatment: none Vitals   Vitals  BP: (!) 132/100  BP Location: Right upper arm  BP Method: Automatic  Patient Position: Up in chair (after activity )    Oxygen  O2 Device: None (Room air)  SpO2: 95 % IV    RESTRICTIONS/PRECAUTIONS:        MOBILITY: I Mod I S SBA CGA Min Mod Max Total  NT x2 Comments:   Bed Mobility    Rolling [] [] [] [] [] [] [] [] [] [] []    Supine to Sit [] [] [] [x] [] [] [] [] [] [] []    Scooting [] [] [] [x] [] [] [] [] [] [] []    Sit to Supine [] [] [] [] [] [] [] [] [] [] []    Transfers    Sit to Stand [] [] [] [] [x] [x] [] [] [] [] []    Bed to Chair [] [] [] [] [x] [x] [] [] [] [] [x]    Stand to Sit [] [] [] [] [x] [x] [] [] [] [] []     [] [] [] [] [] [] [] [] [] [] []    I=Independent, Mod I=Modified Independent, S=Supervision, SBA=Standby Assistance, CGA=Contact Guard Assistance,   Min=Minimal Assistance, Mod=Moderate Assistance, Max=Maximal Assistance, Total=Total Assistance, NT=Not Tested    BALANCE: Good Fair+ Fair Fair- Poor NT Comments   Sitting Static [x] [] [] [] [] []    Sitting Dynamic [x] [] [] [] [] []              Standing Static [] [x] [] [] [] []    Standing Dynamic [] [] [x] [] [] []      GAIT: I Mod I S SBA CGA Min Mod Max Total  NT x2 Comments:   Level of Assistance [] [] [] [] [x] [x] [] [] [] [] []    Distance 5 feet    DME Rolling Walker    Gait Quality Decreased step clearance and Decreased step length    Weightbearing Status      Stairs      I=Independent, Mod I=Modified Independent, S=Supervision, SBA=Standby Assistance, CGA=Contact Guard Assistance,   Min=Minimal Assistance, Mod=Moderate Assistance, Max=Maximal Assistance, Total=Total Assistance, NT=Not Tested    PLAN:   ACUTE PHYSICAL THERAPY GOALS:   (Developed with and agreed upon by patient and/or caregiver. )  LTG:  (1.)Mr. Elana Bello will move from supine to sit and sit to supine , scoot up and down and roll side to side in bed with INDEPENDENCE within 7 treatment day(s). (2.)Mr. Elana Bello will transfer from bed to chair and chair to bed with MODIFIED INDEPENDENCE using the least restrictive device within 7 treatment day(s). (3.)Mr. Adrianne Clemens will ambulate with STAND BY ASSIST for a minimum of 75 feet with the least restrictive device within 7 treatment day(s). FREQUENCY AND DURATION: 3 times/week for duration of hospital stay or until stated goals are met, whichever comes first.    TREATMENT:   TREATMENT:   Therapeutic Activity (20 Minutes): Therapeutic activity included Supine to Sit, Scooting, Transfer Training, Ambulation on level ground, Sitting balance  and Standing balance to improve functional Activity tolerance, Balance, Mobility and Strength. Therapeutic Exercise (10 Minutes): Therapeutic exercises noted below to improve functional activity tolerance, strength and mobility.      TREATMENT GRID:   Date:  6/8/22 Date:   Date:     Activity/Exercise Parameters Parameters Parameters   Heel/toe taps 15 B      LAQs 15 B     marching 15 B                               AFTER TREATMENT PRECAUTIONS: Alarm Activated, Bed/Chair Locked, Chair and Visitors at bedside    INTERDISCIPLINARY COLLABORATION:  RN/ PCT and OT/ BHATT    EDUCATION:      TIME IN/OUT:  Time In: 1050  Time Out: 1120  Minutes: [address]ckett, PTA

## 2022-06-08 NOTE — PROGRESS NOTES
Admit Date: 2022      Subjective: The patient is lying comfortably in bed. He has no new complaints. Agreeable to proceed with surgery tomorrow. Denies any new nausea or emesis. Objective:       Vitals:    22 1947 22 2235 22 0418 22 0745   BP: (!) 140/93 136/89 (!) 154/88 (!) 141/93   Pulse: 79 80 86 82   Resp: 18 18 19 18   Temp: 98.7 °F (37.1 °C) 97.9 °F (36.6 °C) 98 °F (36.7 °C) 97.8 °F (36.6 °C)   TempSrc: Oral Oral Oral Oral   SpO2: 98% 97% 96% 97%   Weight:       Height:           Temp (24hrs), Av °F (36.7 °C), Min:97.4 °F (36.3 °C), Max:98.7 °F (37.1 °C)  . I&O reviewed as documented. Constitutional: Alert, oriented, cooperative patient in no acute distress;     Eyes:Sclera are clear. EOMs intact  ENMT: no external lesions gross hearing normal; no obvious neck masses, no ear or lip lesions, nares normal  CV: RRR. Normal perfusion  Resp: No JVD. Breathing is  non-labored; no audible wheezing. GI: soft and non-distended     Musculoskeletal: unremarkable with normal function. No embolic signs or cyanosis.    Neuro:  Oriented; moves all 4; no focal deficits  Psychiatric: normal affect and mood, no memory impairment    Labs:   Recent Results (from the past 24 hour(s))   Potassium    Collection Time: 22  7:23 PM   Result Value Ref Range    Potassium 3.7 3.5 - 5.1 mmol/L   Phosphorus    Collection Time: 22  3:25 AM   Result Value Ref Range    Phosphorus 2.5 2.3 - 3.7 MG/DL   Magnesium    Collection Time: 22  3:25 AM   Result Value Ref Range    Magnesium 2.0 1.8 - 2.4 mg/dL   Basic Metabolic Panel    Collection Time: 22  3:25 AM   Result Value Ref Range    Sodium 138 138 - 145 mmol/L    Potassium 3.6 3.5 - 5.1 mmol/L    Chloride 104 98 - 107 mmol/L    CO2 29 21 - 32 mmol/L    Anion Gap 5 (L) 7 - 16 mmol/L    Glucose 99 65 - 100 mg/dL    BUN 8 8 - 23 MG/DL    CREATININE <0.20 (L) 0.8 - 1.5 MG/DL    GFR  0 (L) >60 ml/min/1.73m2 GFR Non-African American 0 (L) >60 ml/min/1.73m2    Calcium 7.9 (L) 8.3 - 10.4 MG/DL   CBC with Auto Differential    Collection Time: 06/08/22  3:25 AM   Result Value Ref Range    WBC 13.4 (H) 4.3 - 11.1 K/uL    RBC 3.25 (L) 4.23 - 5.6 M/uL    Hemoglobin 9.1 (L) 13.6 - 17.2 g/dL    Hematocrit 29.0 (L) 41.1 - 50.3 %    MCV 89.2 79.6 - 97.8 FL    MCH 28.0 26.1 - 32.9 PG    MCHC 31.4 31.4 - 35.0 g/dL    RDW 18.0 (H) 11.9 - 14.6 %    Platelets 189 225 - 564 K/uL    MPV 10.0 9.4 - 12.3 FL    nRBC 0.00 0.0 - 0.2 K/uL    Differential Type PENDING            Assessment:     Principal Problem:    Septic shock (HCC)  Active Problems:    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HCC)    Former heavy tobacco smoker    Gastroesophageal reflux disease    Hyponatremia    Hypoalbuminemia due to protein-calorie malnutrition (HCC)    Syncope due to orthostatic hypotension    Hypomagnesemia    Corticosteroid dependence (HCC)    Hypoproteinemia (HCC)    Do not resuscitate status    Fatigue    History of gastric cancer    Encounter for palliative care    Debility    Weakness    Hypercoagulable state, secondary ([facility] [address]    Adult body mass index less than 19    Acute pulmonary embolism without acute cor pulmonale (HCC)    Severe protein-calorie malnutrition (HCC)    ABILIO (iron deficiency anemia)    Malignant neoplasm of overlapping sites of stomach (HCC)    Malignant neoplasm of body of stomach (HCC)    Gastric adenocarcinoma (HCC)  Resolved Problems:    Severe sepsis with lactic acidosis (HCC)    ABLA (acute blood loss anemia)    GIB (gastrointestinal bleeding)        Plan/Recommendations/Medical Decision Making:     Hemoglobin 9.1 this morning  He continues to have multiple bowel movements  Will proceed tomorrow with exploratory laparotomy and total gastrectomy with placement of J-tube  Continue TPN.   Will type and cross preoperatively  Verify informed consent  Remain n.p.o.

## 2022-06-08 NOTE — ANESTHESIA PRE PROCEDURE
Department of Anesthesiology  Preprocedure Note       Name:  Yana Samaniego   Age:  78 y.o.  :  1955                                          MRN:  [id number]         Date:  2022      Surgeon: Sunita Golden):  Luana De La O MD    Procedure: Procedure(s):  TOTAL GASTRECTOMY, EXPLORATORY LAPAROTOMY, FEEDING J TUBE/ 501    Medications prior to admission:   Prior to Admission medications    Medication Sig Start Date End Date Taking? Authorizing Provider   dexamethasone (DECADRON) 1 MG tablet Take 1 mg by mouth 2 times a day 22   Ar Automatic Reconciliation   dexamethasone (DECADRON) 4 MG tablet Take 2 tabs twice daily the day before chemo and the day after chemo. 3/30/22   Ar Automatic Reconciliation   lidocaine-prilocaine (EMLA) 2.5-2.5 % cream Apply topically as needed 3/30/22   Ar Automatic Reconciliation   ondansetron (ZOFRAN) 8 MG tablet Take 8 mg by mouth every 8 hours as needed for Nausea 3/30/22   Ar Automatic Reconciliation   prochlorperazine (COMPAZINE) 10 MG tablet Take 5 mg by mouth every 6 hours as needed (nausea) 3/30/22   Ar Automatic Reconciliation       Current medications:    No current facility-administered medications for this visit. No current outpatient medications on file.      Facility-Administered Medications Ordered in Other Visits   Medication Dose Route Frequency Provider Last Rate Last Admin    PN-Adult Premix 5/15 - Central   IntraVENous Continuous TPN Duncan Egan MD        potassium chloride 20 mEq/50 mL IVPB (Central Line)  20 mEq IntraVENous PRN Chales Flight, APRN - CNP        Or    potassium chloride 10 mEq/100 mL IVPB (Peripheral Line)  10 mEq IntraVENous PRN Chales Flight, APRN -  mL/hr at 22 1641 10 mEq at 22 1641    magnesium sulfate 2000 mg in 50 mL IVPB premix  2,000 mg IntraVENous PRN Chales Flight, APRN - CNP        sodium phosphate 10 mmol in sodium chloride 0.9 % 250 mL IVPB  10 mmol IntraVENous PRN Chales Flight, APRN - CNP        Or  sodium phosphate 15 mmol in sodium chloride 0.9 % 250 mL IVPB  15 mmol IntraVENous PRN WALLY Chavez CNP        Or    sodium phosphate 20 mmol in sodium chloride 0.9 % 500 mL IVPB  20 mmol IntraVENous PRN WALLY Chavez CNP        [Held by provider] fat emulsion 20 % infusion 250 mL  250 mL IntraVENous Daily Trish Bonilla MD   Stopped at 06/07/22 0650    thiamine (B-1) injection 100 mg  100 mg IntraVENous Daily Trish Bonilla MD   100 mg at 37/30/56 4369    folic acid 1 mg in sodium chloride 0.9 % 50 mL IVPB  1 mg IntraVENous Daily Trish Bonilla MD   Stopped at 06/08/22 0955    0.9 % sodium chloride infusion   IntraVENous PRN WALLY Chavez CNP        0.9 % sodium chloride infusion   IntraVENous PRN Sebas Vo MD        furosemide (LASIX) injection 20 mg  20 mg IntraVENous PRN Sebas Vo MD        metoclopramide (REGLAN) injection 10 mg  10 mg IntraVENous Q6H Sebas Vo MD   10 mg at 06/08/22 1801    acetaminophen (TYLENOL) tablet 650 mg  650 mg Oral Q6H PRN Edson Rooney MD   650 mg at 06/05/22 1748    Or    acetaminophen (TYLENOL) suppository 650 mg  650 mg Rectal Q6H PRN Edson Rooney MD        vancomycin (VANCOCIN) 1,000 mg in sodium chloride 0.9 % 250 mL IVPB ([id number])  1,000 mg IntraVENous Q12H Sabrina Disla  mL/hr at 06/08/22 1802 1,000 mg at 06/08/22 1802    midodrine (PROAMATINE) tablet 10 mg  10 mg Oral TID PRN Agnes Adas, DO        metronidazole (FLAGYL) 500 mg in 0.9% NaCl 100 mL IVPB premix  500 mg IntraVENous Q12H Agnes Adas,  mL/hr at 06/08/22 1802 500 mg at 06/08/22 1802    pantoprazole (PROTONIX) 40 mg in sodium chloride (PF) 10 mL injection  40 mg IntraVENous Q12H Agnes Adas, DO   40 mg at 06/08/22 1616    sodium chloride flush 0.9 % injection 5-40 mL  5-40 mL IntraVENous 2 times per day Alarcon Adas, DO   10 mL at 06/08/22 0924    sodium chloride flush 0.9 % injection 5-40 mL  5-40 mL IntraVENous PRN Katharyn Ports, DO        dexamethasone (DECADRON) tablet 1 mg  1 mg Oral 2 times per day Katharyn Ports, DO   1 mg at 06/08/22 0924    sucralfate (CARAFATE) tablet 1 g  1 g Oral 4 times per day Katharyn Ports, DO   1 g at 06/08/22 1802    sodium chloride flush 0.9 % injection 10 mL  10 mL IntraVENous ONCE PRN Katharyn Ports, DO        mirtazapine (REMERON) tablet 7.5 mg  7.5 mg Oral Nightly Katharyn Ports, DO   7.5 mg at 06/07/22 2243    ondansetron (ZOFRAN) injection 4 mg  4 mg IntraVENous Q6H PRN Katharyn Ports, DO   4 mg at 06/03/22 1744    saliva substitute (BIOTENE/MOUTH KOTE) liquid   Oral PRN Katharyn Ports, DO        Medela Tender Care Lanolin cream   Topical PRN Katharyn Ports, DO           Allergies:     Allergies   Allergen Reactions    Iron     Tetanus Antitoxin        Problem List:    Patient Active Problem List   Diagnosis Code    ABILIO (iron deficiency anemia) D50.9    Malignant neoplasm of overlapping sites of stomach ([facility] [address] C16.8    Malignant neoplasm of body of stomach (HCC) C16.2    Gastric adenocarcinoma (HCC) C16.9    Septic shock ([facility]) A41.9, R65.21    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 R64    Former heavy tobacco smoker Z87.891    Gastroesophageal reflux disease K21.9    Loss of weight R63.4    Open fracture of proximal phalanx of left thumb S62.512B    Thumb amputation status, left [id number]    Tobacco use disorder F17.200    Hyponatremia E87.1    Hypoalbuminemia due to protein-calorie malnutrition (HCC) E88.09, E46    Syncope due to orthostatic hypotension I95.1    Hypomagnesemia E83.42    Corticosteroid dependence (HCC) F19.20    Hypoproteinemia (HCC) E77.8    Do not resuscitate status Z66    Fatigue R53.83    History of gastric cancer Z85.028    Encounter for palliative care Z51.5    Debility R53.81    Weakness R53.1    Hypercoagulable state, secondary ([facility] [address] D68.69    Adult body mass index less than 19 Z68.1    Acute pulmonary embolism without acute cor pulmonale ([facility] [address] I26.99    Severe protein-calorie malnutrition ([facility] [address] E43       Past Medical History:        Diagnosis Date    ABLA (acute blood loss anemia) 6/2/2022    GIB (gastrointestinal bleeding) 6/2/2022    HTN (hypertension)     Severe sepsis with lactic acidosis ([facility] [address] 6/2/2022       Past Surgical History:        Procedure Laterality Date    COLONOSCOPY N/A 3/9/2022    COLONOSCOPY/ 22 performed by John Carrera MD at [facility] ENDOSCOPY    IR IVC FILTER PLACEMENT W IMAGING  6/6/2022    IR IVC FILTER PLACEMENT W IMAGING 6/6/2022 [facility]    KNEE ARTHROSCOPY      OTHER SURGICAL HISTORY      none    UPPER GASTROINTESTINAL ENDOSCOPY N/A 6/4/2022    EGD ESOPHAGOGASTRODUODENOSCOPY performed by Adams Jackson MD at [facility] ENDOSCOPY       Social History:    Social History     Tobacco Use    Smoking status: Former Smoker    Smokeless tobacco: Former User   Substance Use Topics    Alcohol use: Not Currently                                Counseling given: Not Answered      Vital Signs (Current): There were no vitals filed for this visit.                                            BP Readings from Last 3 Encounters:   06/08/22 (!) 145/81   05/31/22 110/72   05/30/22 118/70       NPO Status:                                                                                 BMI:   Wt Readings from Last 3 Encounters:   06/06/22 118 lb 4 oz (53.6 kg)   05/31/22 118 lb (53.5 kg)   05/18/22 120 lb 9.6 oz (54.7 kg)     There is no height or weight on file to calculate BMI.    CBC:   Lab Results   Component Value Date    WBC 13.4 06/08/2022    RBC 3.25 06/08/2022    HGB 9.1 06/08/2022    HCT 29.0 06/08/2022    MCV 89.2 06/08/2022    RDW 18.0 06/08/2022     06/08/2022       CMP:   Lab Results   Component Value Date     06/08/2022    K 3.6 06/08/2022     06/08/2022    CO2 29 06/08/2022    BUN 8 06/08/2022    CREATININE <0.20 06/08/2022    GFRAA 0 06/08/2022    AGRATIO 0.4 05/17/2022    LABGLOM 0 06/08/2022    GLUCOSE 99 06/08/2022    PROT 4.8 06/05/2022    CALCIUM 7.9 06/08/2022    BILITOT 0.3 06/05/2022    ALKPHOS 110 06/05/2022    ALKPHOS 176 05/17/2022    AST 22 06/05/2022    ALT 14 06/05/2022       POC Tests:   No results for input(s): POCGLU, POCNA, POCK, POCCL, POCBUN, POCHEMO, POCHCT in the last 72 hours. Coags:   Lab Results   Component Value Date    PROTIME 15.2 06/03/2022    INR 1.2 06/03/2022    APTT 33.8 06/02/2022    APTT 32.5 03/23/2022       HCG (If Applicable): No results found for: PREGTESTUR, PREGSERUM, HCG, HCGQUANT     ABGs: No results found for: PHART, PO2ART, VRC3VOP, GJT0KGO, BEART, S2OVYXCK     Type & Screen (If Applicable):  No results found for: LABABO, LABRH    Drug/Infectious Status (If Applicable):  No results found for: HIV, HEPCAB    COVID-19 Screening (If Applicable): No results found for: COVID19        Anesthesia Evaluation  Patient summary reviewed  Airway: Mallampati: II  TM distance: >3 FB     Mouth opening: > = 3 FB   Dental:    (+) poor dentition      Pulmonary:Negative Pulmonary ROS breath sounds clear to auscultation                             Cardiovascular:  Exercise tolerance: poor (<4 METS),   (+) hypertension: mild, dysrhythmias: SVT,         Rhythm: regular  Rate: normal                    Neuro/Psych:   Negative Neuro/Psych ROS              GI/Hepatic/Renal: Neg GI/Hepatic/Renal ROS  (+) GERD: well controlled,           Endo/Other: Negative Endo/Other ROS   (+) blood dyscrasia: anemia:., malignancy/cancer (stomach). ROS comment: Frail, malnourished    Hb 9.1   Abdominal:             Vascular: negative vascular ROS. Other Findings:             Anesthesia Plan      general     ASA 4       Induction: intravenous. Anesthetic plan and risks discussed with patient.                         Enrike Griffith MD   6/8/2022

## 2022-06-09 ENCOUNTER — ANESTHESIA (OUTPATIENT)
Dept: SURGERY | Age: 67
DRG: 853 | End: 2022-06-09
Payer: MEDICARE

## 2022-06-09 ENCOUNTER — APPOINTMENT (OUTPATIENT)
Dept: NON INVASIVE DIAGNOSTICS | Age: 67
DRG: 853 | End: 2022-06-09
Payer: MEDICARE

## 2022-06-09 ENCOUNTER — APPOINTMENT (OUTPATIENT)
Dept: GENERAL RADIOLOGY | Age: 67
DRG: 853 | End: 2022-06-09
Payer: MEDICARE

## 2022-06-09 LAB
ABO + RH BLD: NORMAL
ALBUMIN SERPL-MCNC: 2.1 G/DL (ref 3.2–4.6)
ALBUMIN/GLOB SERPL: 0.7 {RATIO} (ref 1.2–3.5)
ALP SERPL-CCNC: 100 U/L (ref 50–136)
ALT SERPL-CCNC: 9 U/L (ref 12–65)
ANION GAP SERPL CALC-SCNC: 7 MMOL/L (ref 7–16)
AST SERPL-CCNC: 15 U/L (ref 15–37)
BACTERIA SPEC CULT: NORMAL
BASE DEFICIT BLD-SCNC: 0.5 MMOL/L
BASE DEFICIT BLD-SCNC: 4.5 MMOL/L
BASE DEFICIT BLD-SCNC: 4.7 MMOL/L
BASOPHILS # BLD: 0 K/UL (ref 0–0.2)
BASOPHILS NFR BLD: 0 % (ref 0–2)
BILIRUB SERPL-MCNC: 0.2 MG/DL (ref 0.2–1.1)
BLD PROD TYP BPU: NORMAL
BLD PROD TYP BPU: NORMAL
BLOOD BANK DISPENSE STATUS: NORMAL
BLOOD BANK DISPENSE STATUS: NORMAL
BLOOD GROUP ANTIBODIES SERPL: NORMAL
BPU ID: NORMAL
BPU ID: NORMAL
BUN SERPL-MCNC: 9 MG/DL (ref 8–23)
CA-I BLD-MCNC: 1.13 MMOL/L (ref 1.12–1.32)
CA-I BLD-MCNC: 1.3 MMOL/L (ref 1.12–1.32)
CA-I BLD-MCNC: 1.36 MMOL/L (ref 1.12–1.32)
CALCIUM SERPL-MCNC: 8.3 MG/DL (ref 8.3–10.4)
CHLORIDE SERPL-SCNC: 104 MMOL/L (ref 98–107)
CO2 BLD-SCNC: 22 MMOL/L (ref 13–23)
CO2 BLD-SCNC: 22 MMOL/L (ref 13–23)
CO2 BLD-SCNC: 25 MMOL/L (ref 13–23)
CO2 SERPL-SCNC: 27 MMOL/L (ref 21–32)
CREAT SERPL-MCNC: <0.2 MG/DL (ref 0.8–1.5)
CROSSMATCH RESULT: NORMAL
CROSSMATCH RESULT: NORMAL
DIFFERENTIAL METHOD BLD: ABNORMAL
ECHO AO ASC DIAM: 4.2 CM
ECHO AO ASCENDING AORTA INDEX: 2.55 CM/M2
ECHO AO ROOT DIAM: 4.1 CM
ECHO AO ROOT INDEX: 2.48 CM/M2
ECHO AR MAX VEL PISA: 4.7 M/S
ECHO AV AREA PEAK VELOCITY: 2.1 CM2
ECHO AV AREA VTI: 2 CM2
ECHO AV AREA/BSA PEAK VELOCITY: 1.3 CM2/M2
ECHO AV AREA/BSA VTI: 1.2 CM2/M2
ECHO AV MEAN GRADIENT: 8 MMHG
ECHO AV MEAN GRADIENT: 8 MMHG
ECHO AV MEAN VELOCITY: 1.4 M/S
ECHO AV PEAK GRADIENT: 16 MMHG
ECHO AV PEAK VELOCITY: 2 M/S
ECHO AV REGURGITANT PHT: 880 MS
ECHO AV VELOCITY RATIO: 0.65
ECHO AV VTI: 40.9 CM
ECHO BSA: 1.61 M2
ECHO EST RA PRESSURE: 8 MMHG
ECHO IVC PROX: 2.2 CM
ECHO LA AREA 2C: 12.2 CM2
ECHO LA AREA 4C: 23.5 CM2
ECHO LA DIAMETER INDEX: 2 CM/M2
ECHO LA DIAMETER: 3.3 CM
ECHO LA MAJOR AXIS: 6.5 CM
ECHO LA MINOR AXIS: 5.3 CM
ECHO LA TO AORTIC ROOT RATIO: 0.8
ECHO LA VOL BP: 42 ML (ref 18–58)
ECHO LA VOL/BSA BIPLANE: 25 ML/M2 (ref 16–34)
ECHO LV E' LATERAL VELOCITY: 7 CM/S
ECHO LV E' SEPTAL VELOCITY: 6 CM/S
ECHO LV EDV 3D: 163 ML
ECHO LV EDV A2C: 131 ML
ECHO LV EDV A4C: 180 ML
ECHO LV EDV INDEX 3D: 99 ML/M2
ECHO LV EDV INDEX A4C: 109 ML/M2
ECHO LV EDV NDEX A2C: 79 ML/M2
ECHO LV EJECTION FRACTION 3D: 44 %
ECHO LV EJECTION FRACTION A2C: 44 %
ECHO LV EJECTION FRACTION A4C: 35 %
ECHO LV EJECTION FRACTION BIPLANE: 42 % (ref 55–100)
ECHO LV ESV 3D: 91 ML
ECHO LV ESV A2C: 73 ML
ECHO LV ESV A4C: 116 ML
ECHO LV ESV INDEX 3D: 55 ML/M2
ECHO LV ESV INDEX A2C: 44 ML/M2
ECHO LV ESV INDEX A4C: 70 ML/M2
ECHO LV FRACTIONAL SHORTENING: 19 % (ref 28–44)
ECHO LV GLOBAL LONGITUDINAL STRAIN (GLS): -12.7 %
ECHO LV INTERNAL DIMENSION DIASTOLE INDEX: 2.85 CM/M2
ECHO LV INTERNAL DIMENSION DIASTOLIC: 4.7 CM (ref 4.2–5.9)
ECHO LV INTERNAL DIMENSION SYSTOLIC INDEX: 2.3 CM/M2
ECHO LV INTERNAL DIMENSION SYSTOLIC: 3.8 CM
ECHO LV IVSD: 1.7 CM (ref 0.6–1)
ECHO LV MASS 2D: 324.4 G (ref 88–224)
ECHO LV MASS 3D INDEX: 113.3 G/M2
ECHO LV MASS 3D: 187 G
ECHO LV MASS INDEX 2D: 196.6 G/M2 (ref 49–115)
ECHO LV POSTERIOR WALL DIASTOLIC: 1.5 CM (ref 0.6–1)
ECHO LV RELATIVE WALL THICKNESS RATIO: 0.64
ECHO LVOT AREA: 3.1 CM2
ECHO LVOT AV VTI INDEX: 0.64
ECHO LVOT DIAM: 2 CM
ECHO LVOT MEAN GRADIENT: 3 MMHG
ECHO LVOT PEAK GRADIENT: 7 MMHG
ECHO LVOT PEAK VELOCITY: 1.3 M/S
ECHO LVOT STROKE VOLUME INDEX: 49.7 ML/M2
ECHO LVOT SV: 82 ML
ECHO LVOT VTI: 26.1 CM
ECHO MV A VELOCITY: 0.92 M/S
ECHO MV E DECELERATION TIME (DT): 190 MS
ECHO MV E VELOCITY: 0.73 M/S
ECHO MV E/A RATIO: 0.79
ECHO MV E/E' LATERAL: 10.43
ECHO MV E/E' RATIO (AVERAGED): 11.3
ECHO MV E/E' SEPTAL: 12.17
ECHO PV ACCELERATION TIME (AT): 108 MS
ECHO PV MAX VELOCITY: 1 M/S
ECHO PV PEAK GRADIENT: 4 MMHG
ECHO RIGHT VENTRICULAR SYSTOLIC PRESSURE (RVSP): 27 MMHG
ECHO RV BASAL DIMENSION: 3.1 CM
ECHO RV TAPSE: 2.4 CM (ref 1.7–?)
ECHO TV REGURGITANT MAX VELOCITY: 2.17 M/S
ECHO TV REGURGITANT PEAK GRADIENT: 19 MMHG
EOSINOPHIL # BLD: 0.1 K/UL (ref 0–0.8)
EOSINOPHIL NFR BLD: 1 % (ref 0.5–7.8)
ERYTHROCYTE [DISTWIDTH] IN BLOOD BY AUTOMATED COUNT: 18.4 % (ref 11.9–14.6)
GLOBULIN SER CALC-MCNC: 3.1 G/DL (ref 2.3–3.5)
GLUCOSE BLD STRIP.AUTO-MCNC: 206 MG/DL (ref 65–100)
GLUCOSE BLD STRIP.AUTO-MCNC: 238 MG/DL (ref 65–100)
GLUCOSE BLD STRIP.AUTO-MCNC: 256 MG/DL (ref 65–100)
GLUCOSE SERPL-MCNC: 104 MG/DL (ref 65–100)
HCO3 BLD-SCNC: 21.8 MMOL/L (ref 22–26)
HCO3 BLD-SCNC: 21.9 MMOL/L (ref 22–26)
HCO3 BLD-SCNC: 24.8 MMOL/L (ref 22–26)
HCT VFR BLD AUTO: 28.7 % (ref 41.1–50.3)
HGB BLD-MCNC: 8.9 G/DL (ref 13.6–17.2)
HISTORY CHECK: NORMAL
HISTORY CHECK: NORMAL
IMM GRANULOCYTES # BLD AUTO: 1.1 K/UL (ref 0–0.5)
IMM GRANULOCYTES NFR BLD AUTO: 9 % (ref 0–5)
LYMPHOCYTES # BLD: 1.5 K/UL (ref 0.5–4.6)
LYMPHOCYTES NFR BLD: 12 % (ref 13–44)
MAGNESIUM SERPL-MCNC: 2.1 MG/DL (ref 1.8–2.4)
MCH RBC QN AUTO: 27.7 PG (ref 26.1–32.9)
MCHC RBC AUTO-ENTMCNC: 31 G/DL (ref 31.4–35)
MCV RBC AUTO: 89.4 FL (ref 79.6–97.8)
MONOCYTES # BLD: 0.5 K/UL (ref 0.1–1.3)
MONOCYTES NFR BLD: 4 % (ref 4–12)
NEUTS SEG # BLD: 9.2 K/UL (ref 1.7–8.2)
NEUTS SEG NFR BLD: 74 % (ref 43–78)
NRBC # BLD: 0 K/UL (ref 0–0.2)
PCO2 BLD: 42.7 MMHG (ref 35–45)
PCO2 BLD: 45.7 MMHG (ref 35–45)
PCO2 BLD: 48 MMHG (ref 35–45)
PH BLD: 7.27 [PH] (ref 7.35–7.45)
PH BLD: 7.29 [PH] (ref 7.35–7.45)
PH BLD: 7.37 [PH] (ref 7.35–7.45)
PHOSPHATE SERPL-MCNC: 2.7 MG/DL (ref 2.3–3.7)
PLATELET # BLD AUTO: 232 K/UL (ref 150–450)
PLATELET COMMENT: ADEQUATE
PMV BLD AUTO: 9.7 FL (ref 9.4–12.3)
PO2 BLD: 145 MMHG (ref 75–100)
PO2 BLD: 185 MMHG (ref 75–100)
PO2 BLD: 185 MMHG (ref 75–100)
POTASSIUM BLD-SCNC: 3.6 MMOL/L (ref 3.5–5.1)
POTASSIUM BLD-SCNC: 4.1 MMOL/L (ref 3.5–5.1)
POTASSIUM BLD-SCNC: 4.5 MMOL/L (ref 3.5–5.1)
POTASSIUM SERPL-SCNC: 3.5 MMOL/L (ref 3.5–5.1)
PROT SERPL-MCNC: 5.2 G/DL (ref 6.3–8.2)
RBC # BLD AUTO: 3.21 M/UL (ref 4.23–5.6)
RBC MORPH BLD: ABNORMAL
SAO2 % BLD: 100 %
SAO2 % BLD: 99 %
SAO2 % BLD: 99 %
SERVICE CMNT-IMP: ABNORMAL
SERVICE CMNT-IMP: NORMAL
SODIUM BLD-SCNC: 133 MMOL/L (ref 136–145)
SODIUM BLD-SCNC: 137 MMOL/L (ref 136–145)
SODIUM BLD-SCNC: 138 MMOL/L (ref 136–145)
SODIUM SERPL-SCNC: 138 MMOL/L (ref 136–145)
SPECIMEN EXP DATE BLD: NORMAL
SPECIMEN SITE: ABNORMAL
TRIGL SERPL-MCNC: 289 MG/DL (ref 35–150)
UNIT DIVISION: 0
UNIT DIVISION: 0
WBC # BLD AUTO: 12.4 K/UL (ref 4.3–11.1)
WBC MORPH BLD: ABNORMAL

## 2022-06-09 PROCEDURE — 86923 COMPATIBILITY TEST ELECTRIC: CPT

## 2022-06-09 PROCEDURE — A4216 STERILE WATER/SALINE, 10 ML: HCPCS | Performed by: FAMILY MEDICINE

## 2022-06-09 PROCEDURE — 80053 COMPREHEN METABOLIC PANEL: CPT

## 2022-06-09 PROCEDURE — 76376 3D RENDER W/INTRP POSTPROCES: CPT

## 2022-06-09 PROCEDURE — 2700000000 HC OXYGEN THERAPY PER DAY

## 2022-06-09 PROCEDURE — 6360000002 HC RX W HCPCS: Performed by: NURSE ANESTHETIST, CERTIFIED REGISTERED

## 2022-06-09 PROCEDURE — 2580000003 HC RX 258: Performed by: SURGERY

## 2022-06-09 PROCEDURE — 39560 RESECT DIAPHRAGM SIMPLE: CPT | Performed by: SURGERY

## 2022-06-09 PROCEDURE — 82947 ASSAY GLUCOSE BLOOD QUANT: CPT

## 2022-06-09 PROCEDURE — 0FBG0ZZ EXCISION OF PANCREAS, OPEN APPROACH: ICD-10-PCS | Performed by: SURGERY

## 2022-06-09 PROCEDURE — 2580000003 HC RX 258: Performed by: NURSE ANESTHETIST, CERTIFIED REGISTERED

## 2022-06-09 PROCEDURE — 86901 BLOOD TYPING SEROLOGIC RH(D): CPT

## 2022-06-09 PROCEDURE — 3600000004 HC SURGERY LEVEL 4 BASE: Performed by: SURGERY

## 2022-06-09 PROCEDURE — 6360000002 HC RX W HCPCS: Performed by: REGISTERED NURSE

## 2022-06-09 PROCEDURE — 48140 PARTIAL REMOVAL OF PANCREAS: CPT | Performed by: SURGERY

## 2022-06-09 PROCEDURE — 0FB20ZZ EXCISION OF LEFT LOBE LIVER, OPEN APPROACH: ICD-10-PCS | Performed by: SURGERY

## 2022-06-09 PROCEDURE — 3700000001 HC ADD 15 MINUTES (ANESTHESIA): Performed by: SURGERY

## 2022-06-09 PROCEDURE — 2100000000 HC CCU R&B

## 2022-06-09 PROCEDURE — 0WBF0ZZ EXCISION OF ABDOMINAL WALL, OPEN APPROACH: ICD-10-PCS | Performed by: SURGERY

## 2022-06-09 PROCEDURE — 84100 ASSAY OF PHOSPHORUS: CPT

## 2022-06-09 PROCEDURE — 2500000003 HC RX 250 WO HCPCS: Performed by: INTERNAL MEDICINE

## 2022-06-09 PROCEDURE — 93306 TTE W/DOPPLER COMPLETE: CPT | Performed by: INTERNAL MEDICINE

## 2022-06-09 PROCEDURE — 88309 TISSUE EXAM BY PATHOLOGIST: CPT

## 2022-06-09 PROCEDURE — 83735 ASSAY OF MAGNESIUM: CPT

## 2022-06-09 PROCEDURE — 2500000003 HC RX 250 WO HCPCS: Performed by: SURGERY

## 2022-06-09 PROCEDURE — 49203 PR EXCISION/DESTRUCTION OPEN ABDOMINAL TUMORS 5 CM: CPT | Performed by: SURGERY

## 2022-06-09 PROCEDURE — 82803 BLOOD GASES ANY COMBINATION: CPT

## 2022-06-09 PROCEDURE — 22900 EXC ABDL TUM DEEP < 5 CM: CPT | Performed by: SURGERY

## 2022-06-09 PROCEDURE — 71045 X-RAY EXAM CHEST 1 VIEW: CPT

## 2022-06-09 PROCEDURE — 1090000001 HH PPS REVENUE CREDIT

## 2022-06-09 PROCEDURE — P9016 RBC LEUKOCYTES REDUCED: HCPCS

## 2022-06-09 PROCEDURE — C1713 ANCHOR/SCREW BN/BN,TIS/BN: HCPCS | Performed by: SURGERY

## 2022-06-09 PROCEDURE — 1090000002 HH PPS REVENUE DEBIT

## 2022-06-09 PROCEDURE — 2580000003 HC RX 258: Performed by: ANESTHESIOLOGY

## 2022-06-09 PROCEDURE — 76376 3D RENDER W/INTRP POSTPROCES: CPT | Performed by: INTERNAL MEDICINE

## 2022-06-09 PROCEDURE — 6360000002 HC RX W HCPCS: Performed by: INTERNAL MEDICINE

## 2022-06-09 PROCEDURE — 2720000010 HC SURG SUPPLY STERILE: Performed by: SURGERY

## 2022-06-09 PROCEDURE — 07B70ZX EXCISION OF THORAX LYMPHATIC, OPEN APPROACH, DIAGNOSTIC: ICD-10-PCS | Performed by: SURGERY

## 2022-06-09 PROCEDURE — 2580000003 HC RX 258: Performed by: INTERNAL MEDICINE

## 2022-06-09 PROCEDURE — 2500000003 HC RX 250 WO HCPCS: Performed by: NURSE ANESTHETIST, CERTIFIED REGISTERED

## 2022-06-09 PROCEDURE — 3700000000 HC ANESTHESIA ATTENDED CARE: Performed by: SURGERY

## 2022-06-09 PROCEDURE — 43621 REMOVAL OF STOMACH: CPT | Performed by: PHYSICIAN ASSISTANT

## 2022-06-09 PROCEDURE — 2500000003 HC RX 250 WO HCPCS: Performed by: FAMILY MEDICINE

## 2022-06-09 PROCEDURE — 85025 COMPLETE CBC W/AUTO DIFF WBC: CPT

## 2022-06-09 PROCEDURE — 2500000003 HC RX 250 WO HCPCS: Performed by: REGISTERED NURSE

## 2022-06-09 PROCEDURE — 94002 VENT MGMT INPAT INIT DAY: CPT

## 2022-06-09 PROCEDURE — 5A1935Z RESPIRATORY VENTILATION, LESS THAN 24 CONSECUTIVE HOURS: ICD-10-PCS | Performed by: INTERNAL MEDICINE

## 2022-06-09 PROCEDURE — 88305 TISSUE EXAM BY PATHOLOGIST: CPT

## 2022-06-09 PROCEDURE — 93356 MYOCRD STRAIN IMG SPCKL TRCK: CPT | Performed by: INTERNAL MEDICINE

## 2022-06-09 PROCEDURE — 6360000002 HC RX W HCPCS: Performed by: SURGERY

## 2022-06-09 PROCEDURE — 2580000003 HC RX 258: Performed by: REGISTERED NURSE

## 2022-06-09 PROCEDURE — 84295 ASSAY OF SERUM SODIUM: CPT

## 2022-06-09 PROCEDURE — 0WBH0ZZ EXCISION OF RETROPERITONEUM, OPEN APPROACH: ICD-10-PCS | Performed by: SURGERY

## 2022-06-09 PROCEDURE — 0DT60ZZ RESECTION OF STOMACH, OPEN APPROACH: ICD-10-PCS | Performed by: SURGERY

## 2022-06-09 PROCEDURE — 84478 ASSAY OF TRIGLYCERIDES: CPT

## 2022-06-09 PROCEDURE — 43621 REMOVAL OF STOMACH: CPT | Performed by: SURGERY

## 2022-06-09 PROCEDURE — 6360000002 HC RX W HCPCS: Performed by: FAMILY MEDICINE

## 2022-06-09 PROCEDURE — 99291 CRITICAL CARE FIRST HOUR: CPT | Performed by: INTERNAL MEDICINE

## 2022-06-09 PROCEDURE — 3600000014 HC SURGERY LEVEL 4 ADDTL 15MIN: Performed by: SURGERY

## 2022-06-09 PROCEDURE — 0D130ZA BYPASS LOWER ESOPHAGUS TO JEJUNUM, OPEN APPROACH: ICD-10-PCS | Performed by: SURGERY

## 2022-06-09 PROCEDURE — 47120 PARTIAL REMOVAL OF LIVER: CPT | Performed by: SURGERY

## 2022-06-09 PROCEDURE — 07TP0ZZ RESECTION OF SPLEEN, OPEN APPROACH: ICD-10-PCS | Performed by: SURGERY

## 2022-06-09 PROCEDURE — 2580000003 HC RX 258: Performed by: FAMILY MEDICINE

## 2022-06-09 PROCEDURE — P9045 ALBUMIN (HUMAN), 5%, 250 ML: HCPCS | Performed by: REGISTERED NURSE

## 2022-06-09 PROCEDURE — 2709999900 HC NON-CHARGEABLE SUPPLY: Performed by: SURGERY

## 2022-06-09 PROCEDURE — 0BBT0ZZ EXCISION OF DIAPHRAGM, OPEN APPROACH: ICD-10-PCS | Performed by: SURGERY

## 2022-06-09 PROCEDURE — 49905 OMENTAL FLAP INTRA-ABDOM: CPT | Performed by: SURGERY

## 2022-06-09 PROCEDURE — 0DHA3UZ INSERTION OF FEEDING DEVICE INTO JEJUNUM, PERCUTANEOUS APPROACH: ICD-10-PCS | Performed by: SURGERY

## 2022-06-09 PROCEDURE — C9113 INJ PANTOPRAZOLE SODIUM, VIA: HCPCS | Performed by: FAMILY MEDICINE

## 2022-06-09 PROCEDURE — 44015 INSERT NEEDLE CATH BOWEL: CPT | Performed by: SURGERY

## 2022-06-09 PROCEDURE — 36591 DRAW BLOOD OFF VENOUS DEVICE: CPT

## 2022-06-09 PROCEDURE — 84132 ASSAY OF SERUM POTASSIUM: CPT

## 2022-06-09 RX ORDER — METRONIDAZOLE 500 MG/100ML
500 INJECTION, SOLUTION INTRAVENOUS EVERY 8 HOURS
Status: DISCONTINUED | OUTPATIENT
Start: 2022-06-10 | End: 2022-06-10

## 2022-06-09 RX ORDER — SODIUM CHLORIDE 9 MG/ML
INJECTION, SOLUTION INTRAVENOUS PRN
Status: DISCONTINUED | OUTPATIENT
Start: 2022-06-09 | End: 2022-06-21 | Stop reason: HOSPADM

## 2022-06-09 RX ORDER — SODIUM CHLORIDE 9 MG/ML
INJECTION, SOLUTION INTRAVENOUS PRN
Status: DISCONTINUED | OUTPATIENT
Start: 2022-06-09 | End: 2022-06-09

## 2022-06-09 RX ORDER — PHENYLEPHRINE HCL IN 0.9% NACL 50MG/250ML
10-300 PLASTIC BAG, INJECTION (ML) INTRAVENOUS CONTINUOUS
Status: DISCONTINUED | OUTPATIENT
Start: 2022-06-09 | End: 2022-06-10

## 2022-06-09 RX ORDER — PROPOFOL 10 MG/ML
INJECTION, EMULSION INTRAVENOUS PRN
Status: DISCONTINUED | OUTPATIENT
Start: 2022-06-09 | End: 2022-06-09 | Stop reason: SDUPTHER

## 2022-06-09 RX ORDER — SODIUM CHLORIDE 0.9 % (FLUSH) 0.9 %
5-40 SYRINGE (ML) INJECTION EVERY 12 HOURS SCHEDULED
Status: DISCONTINUED | OUTPATIENT
Start: 2022-06-09 | End: 2022-06-21 | Stop reason: HOSPADM

## 2022-06-09 RX ORDER — ROCURONIUM BROMIDE 10 MG/ML
INJECTION, SOLUTION INTRAVENOUS PRN
Status: DISCONTINUED | OUTPATIENT
Start: 2022-06-09 | End: 2022-06-09 | Stop reason: SDUPTHER

## 2022-06-09 RX ORDER — SODIUM CHLORIDE 0.9 % (FLUSH) 0.9 %
5-40 SYRINGE (ML) INJECTION PRN
Status: DISCONTINUED | OUTPATIENT
Start: 2022-06-09 | End: 2022-06-09

## 2022-06-09 RX ORDER — ALBUMIN, HUMAN INJ 5% 5 %
SOLUTION INTRAVENOUS PRN
Status: DISCONTINUED | OUTPATIENT
Start: 2022-06-09 | End: 2022-06-09 | Stop reason: SDUPTHER

## 2022-06-09 RX ORDER — METRONIDAZOLE 500 MG/100ML
500 INJECTION, SOLUTION INTRAVENOUS EVERY 8 HOURS
Status: COMPLETED | OUTPATIENT
Start: 2022-06-09 | End: 2022-06-10

## 2022-06-09 RX ORDER — IPRATROPIUM BROMIDE AND ALBUTEROL SULFATE 2.5; .5 MG/3ML; MG/3ML
1 SOLUTION RESPIRATORY (INHALATION) EVERY 6 HOURS
Status: DISCONTINUED | OUTPATIENT
Start: 2022-06-09 | End: 2022-06-15

## 2022-06-09 RX ORDER — SUCCINYLCHOLINE CHLORIDE 20 MG/ML
INJECTION INTRAMUSCULAR; INTRAVENOUS PRN
Status: DISCONTINUED | OUTPATIENT
Start: 2022-06-09 | End: 2022-06-09 | Stop reason: SDUPTHER

## 2022-06-09 RX ORDER — PROPOFOL 10 MG/ML
5-50 INJECTION, EMULSION INTRAVENOUS CONTINUOUS
Status: DISCONTINUED | OUTPATIENT
Start: 2022-06-09 | End: 2022-06-10

## 2022-06-09 RX ORDER — SODIUM CHLORIDE 9 MG/ML
INJECTION, SOLUTION INTRAVENOUS CONTINUOUS PRN
Status: DISCONTINUED | OUTPATIENT
Start: 2022-06-09 | End: 2022-06-09 | Stop reason: SDUPTHER

## 2022-06-09 RX ORDER — LIDOCAINE HYDROCHLORIDE 20 MG/ML
INJECTION, SOLUTION EPIDURAL; INFILTRATION; INTRACAUDAL; PERINEURAL PRN
Status: DISCONTINUED | OUTPATIENT
Start: 2022-06-09 | End: 2022-06-09 | Stop reason: SDUPTHER

## 2022-06-09 RX ORDER — HYDROMORPHONE HYDROCHLORIDE 2 MG/ML
0.5 INJECTION, SOLUTION INTRAMUSCULAR; INTRAVENOUS; SUBCUTANEOUS
Status: DISCONTINUED | OUTPATIENT
Start: 2022-06-09 | End: 2022-06-10

## 2022-06-09 RX ORDER — VECURONIUM BROMIDE 1 MG/ML
INJECTION, POWDER, LYOPHILIZED, FOR SOLUTION INTRAVENOUS PRN
Status: DISCONTINUED | OUTPATIENT
Start: 2022-06-09 | End: 2022-06-09 | Stop reason: SDUPTHER

## 2022-06-09 RX ORDER — SODIUM CHLORIDE 0.9 % (FLUSH) 0.9 %
5-40 SYRINGE (ML) INJECTION EVERY 12 HOURS SCHEDULED
Status: DISCONTINUED | OUTPATIENT
Start: 2022-06-09 | End: 2022-06-09

## 2022-06-09 RX ORDER — ENOXAPARIN SODIUM 100 MG/ML
30 INJECTION SUBCUTANEOUS DAILY
Status: DISCONTINUED | OUTPATIENT
Start: 2022-06-10 | End: 2022-06-14

## 2022-06-09 RX ORDER — SODIUM CHLORIDE, SODIUM LACTATE, POTASSIUM CHLORIDE, CALCIUM CHLORIDE 600; 310; 30; 20 MG/100ML; MG/100ML; MG/100ML; MG/100ML
INJECTION, SOLUTION INTRAVENOUS CONTINUOUS
Status: DISCONTINUED | OUTPATIENT
Start: 2022-06-09 | End: 2022-06-11

## 2022-06-09 RX ORDER — LIDOCAINE HYDROCHLORIDE ANHYDROUS AND DEXTROSE MONOHYDRATE .4; 5 G/100ML; G/100ML
INJECTION, SOLUTION INTRAVENOUS CONTINUOUS PRN
Status: DISCONTINUED | OUTPATIENT
Start: 2022-06-09 | End: 2022-06-09 | Stop reason: SDUPTHER

## 2022-06-09 RX ORDER — SODIUM CHLORIDE 0.9 % (FLUSH) 0.9 %
5-40 SYRINGE (ML) INJECTION PRN
Status: DISCONTINUED | OUTPATIENT
Start: 2022-06-09 | End: 2022-06-21 | Stop reason: HOSPADM

## 2022-06-09 RX ORDER — CALCIUM CHLORIDE 100 MG/ML
INJECTION INTRAVENOUS; INTRAVENTRICULAR PRN
Status: DISCONTINUED | OUTPATIENT
Start: 2022-06-09 | End: 2022-06-09 | Stop reason: SDUPTHER

## 2022-06-09 RX ADMIN — ALBUMIN (HUMAN) 12.5 G: 12.5 INJECTION, SOLUTION INTRAVENOUS at 15:04

## 2022-06-09 RX ADMIN — METOCLOPRAMIDE HYDROCHLORIDE 10 MG: 5 INJECTION INTRAMUSCULAR; INTRAVENOUS at 06:04

## 2022-06-09 RX ADMIN — FOLIC ACID 1 MG: 5 INJECTION, SOLUTION INTRAMUSCULAR; INTRAVENOUS; SUBCUTANEOUS at 09:19

## 2022-06-09 RX ADMIN — LIDOCAINE HYDROCHLORIDE 100 MG: 20 INJECTION, SOLUTION EPIDURAL; INFILTRATION; INTRACAUDAL; PERINEURAL at 12:31

## 2022-06-09 RX ADMIN — CALCIUM CHLORIDE 0.25 G: 100 INJECTION INTRAVENOUS; INTRAVENTRICULAR at 18:44

## 2022-06-09 RX ADMIN — ROCURONIUM BROMIDE 50 MG: 50 INJECTION, SOLUTION INTRAVENOUS at 12:51

## 2022-06-09 RX ADMIN — VECURONIUM BROMIDE 2 MG: 1 INJECTION, POWDER, LYOPHILIZED, FOR SOLUTION INTRAVENOUS at 15:17

## 2022-06-09 RX ADMIN — ALBUMIN (HUMAN) 12.5 G: 12.5 INJECTION, SOLUTION INTRAVENOUS at 14:48

## 2022-06-09 RX ADMIN — CALCIUM CHLORIDE 0.25 G: 100 INJECTION INTRAVENOUS; INTRAVENTRICULAR at 18:24

## 2022-06-09 RX ADMIN — SODIUM CHLORIDE, POTASSIUM CHLORIDE, SODIUM LACTATE AND CALCIUM CHLORIDE: 600; 310; 30; 20 INJECTION, SOLUTION INTRAVENOUS at 11:41

## 2022-06-09 RX ADMIN — VECURONIUM BROMIDE 2 MG: 1 INJECTION, POWDER, LYOPHILIZED, FOR SOLUTION INTRAVENOUS at 14:45

## 2022-06-09 RX ADMIN — VECURONIUM BROMIDE 3 MG: 1 INJECTION, POWDER, LYOPHILIZED, FOR SOLUTION INTRAVENOUS at 19:15

## 2022-06-09 RX ADMIN — SODIUM CHLORIDE: 900 INJECTION INTRAVENOUS at 17:37

## 2022-06-09 RX ADMIN — PROPOFOL 50 MCG/KG/MIN: 10 INJECTION, EMULSION INTRAVENOUS at 22:30

## 2022-06-09 RX ADMIN — METRONIDAZOLE 500 MG: 500 INJECTION, SOLUTION INTRAVENOUS at 21:28

## 2022-06-09 RX ADMIN — PROPOFOL 50 MG: 10 INJECTION, EMULSION INTRAVENOUS at 13:14

## 2022-06-09 RX ADMIN — PROPOFOL 100 MG: 10 INJECTION, EMULSION INTRAVENOUS at 12:31

## 2022-06-09 RX ADMIN — CALCIUM CHLORIDE 0.25 G: 100 INJECTION INTRAVENOUS; INTRAVENTRICULAR at 18:37

## 2022-06-09 RX ADMIN — SODIUM CHLORIDE: 900 INJECTION INTRAVENOUS at 12:46

## 2022-06-09 RX ADMIN — PHENYLEPHRINE HYDROCHLORIDE 2 ML: 10 INJECTION INTRAVENOUS at 17:45

## 2022-06-09 RX ADMIN — PHENYLEPHRINE HYDROCHLORIDE 30 MCG/MIN: 10 INJECTION INTRAVENOUS at 22:38

## 2022-06-09 RX ADMIN — Medication 2000 MG: at 21:28

## 2022-06-09 RX ADMIN — PHENYLEPHRINE HYDROCHLORIDE 2 ML: 10 INJECTION INTRAVENOUS at 17:51

## 2022-06-09 RX ADMIN — PHENYLEPHRINE HYDROCHLORIDE 2 ML: 10 INJECTION INTRAVENOUS at 16:45

## 2022-06-09 RX ADMIN — ROCURONIUM BROMIDE 10 MG: 50 INJECTION, SOLUTION INTRAVENOUS at 13:50

## 2022-06-09 RX ADMIN — Medication 120 MG: at 12:31

## 2022-06-09 RX ADMIN — PHENYLEPHRINE HYDROCHLORIDE 2 ML: 10 INJECTION INTRAVENOUS at 17:33

## 2022-06-09 RX ADMIN — LIDOCAINE HYDROCHLORIDE 1.18 MG/MIN: 4 INJECTION, SOLUTION INTRAVENOUS at 13:09

## 2022-06-09 RX ADMIN — SODIUM CHLORIDE, POTASSIUM CHLORIDE, SODIUM LACTATE AND CALCIUM CHLORIDE: 600; 310; 30; 20 INJECTION, SOLUTION INTRAVENOUS at 21:08

## 2022-06-09 RX ADMIN — FENTANYL CITRATE 50 MCG: 50 INJECTION INTRAMUSCULAR; INTRAVENOUS at 13:13

## 2022-06-09 RX ADMIN — VECURONIUM BROMIDE 2 MG: 1 INJECTION, POWDER, LYOPHILIZED, FOR SOLUTION INTRAVENOUS at 14:17

## 2022-06-09 RX ADMIN — PHENYLEPHRINE HYDROCHLORIDE 2 ML: 10 INJECTION INTRAVENOUS at 16:55

## 2022-06-09 RX ADMIN — VECURONIUM BROMIDE 2 MG: 1 INJECTION, POWDER, LYOPHILIZED, FOR SOLUTION INTRAVENOUS at 17:06

## 2022-06-09 RX ADMIN — VECURONIUM BROMIDE 2 MG: 1 INJECTION, POWDER, LYOPHILIZED, FOR SOLUTION INTRAVENOUS at 15:45

## 2022-06-09 RX ADMIN — PHENYLEPHRINE HYDROCHLORIDE 10 MCG/MIN: 10 INJECTION INTRAVENOUS at 12:27

## 2022-06-09 RX ADMIN — CALCIUM CHLORIDE 0.25 G: 100 INJECTION INTRAVENOUS; INTRAVENTRICULAR at 18:21

## 2022-06-09 RX ADMIN — SODIUM CHLORIDE: 900 INJECTION INTRAVENOUS at 16:42

## 2022-06-09 RX ADMIN — THIAMINE HYDROCHLORIDE 100 MG: 100 INJECTION, SOLUTION INTRAMUSCULAR; INTRAVENOUS at 09:19

## 2022-06-09 RX ADMIN — SODIUM CHLORIDE, PRESERVATIVE FREE 10 ML: 5 INJECTION INTRAVENOUS at 09:19

## 2022-06-09 RX ADMIN — SODIUM CHLORIDE 25 ML: 9 INJECTION, SOLUTION INTRAVENOUS at 21:28

## 2022-06-09 RX ADMIN — VANCOMYCIN HYDROCHLORIDE 1000 MG: 1 INJECTION, POWDER, LYOPHILIZED, FOR SOLUTION INTRAVENOUS at 06:05

## 2022-06-09 RX ADMIN — METRONIDAZOLE 500 MG: 500 INJECTION, SOLUTION INTRAVENOUS at 06:05

## 2022-06-09 RX ADMIN — ALBUMIN (HUMAN) 12.5 G: 12.5 INJECTION, SOLUTION INTRAVENOUS at 18:20

## 2022-06-09 RX ADMIN — SODIUM CHLORIDE, PRESERVATIVE FREE 40 MG: 5 INJECTION INTRAVENOUS at 03:20

## 2022-06-09 RX ADMIN — SODIUM CHLORIDE, PRESERVATIVE FREE 10 ML: 5 INJECTION INTRAVENOUS at 21:28

## 2022-06-09 ASSESSMENT — PULMONARY FUNCTION TESTS
PIF_VALUE: 20.1
PIF_VALUE: 19
PIF_VALUE: 20.1
PIF_VALUE: 19.7
PIF_VALUE: 20.9
PIF_VALUE: 20.7
PIF_VALUE: 20.6
PIF_VALUE: 19.4
PIF_VALUE: 18.5
PIF_VALUE: 20.2
PIF_VALUE: 19.7
PIF_VALUE: 20.8
PIF_VALUE: 19.3
PIF_VALUE: 17.5

## 2022-06-09 ASSESSMENT — PAIN SCALES - GENERAL
PAINLEVEL_OUTOF10: 0

## 2022-06-09 ASSESSMENT — PAIN - FUNCTIONAL ASSESSMENT: PAIN_FUNCTIONAL_ASSESSMENT: 0-10

## 2022-06-09 ASSESSMENT — PAIN DESCRIPTION - PAIN TYPE
TYPE: SURGICAL PAIN
TYPE: SURGICAL PAIN

## 2022-06-09 NOTE — ANESTHESIA PROCEDURE NOTES
Airway  Date/Time: 6/9/2022 1:25 PM  Urgency: elective    Airway not difficult    General Information and Staff    Patient location during procedure: OR  Resident/CRNA: WALLY Resendiz - CRNA  Performed: resident/CRNA     Indications and Patient Condition  Indications for airway management: anesthesia  Preoxygenated: yes  Patient position: sniffing  MILS not maintained throughout  Mask difficulty assessment: not attempted    Final Airway Details  Final airway type: endotracheal airway      Successful airway: ETT  Cuffed: yes   Successful intubation technique: direct laryngoscopy  Endotracheal tube insertion site: oral  Blade: Mark  Blade size: #4  ETT size (mm): 8.0  Cormack-Lehane Classification: grade I - full view of glottis  Placement verified by: chest auscultation, capnometry and palpation of cuff   Measured from: lips  Number of attempts at approach: 1

## 2022-06-09 NOTE — PROGRESS NOTES
Occupational Therapy Note:    Attempted to see patient this AM for occupational therapy treatment  session. Patient is currently off the floor for procedure. Will follow and re-attempt as schedule permits/patient available.  Thank you,    Guilherme Schmitz

## 2022-06-09 NOTE — PERIOP NOTE
TRANSFER - IN REPORT:    Verbal report received from Rishi Gaytan on Roger Chambers  being received from [phone] for ordered procedure      Report consisted of patient's Situation, Background, Assessment and   Recommendations(SBAR). Information from the following report(s) Nurse Handoff Report, Adult Overview and MAR was reviewed with the receiving nurse. Opportunity for questions and clarification was provided. Assessment will be completed upon patient's arrival to unit and care assumed.

## 2022-06-09 NOTE — PROGRESS NOTES
Hospitalist Progress Note   Admit Date:  2022  1:05 PM   Name:  Tomasa Leigh   Age:  78 y.o. Sex:  male  :  1955   MRN:  [id number]   Room:  [facility]/    Presenting Complaint: Loss of Consciousness     Reason(s) for Admission: Syncope and collapse [R55]  Hemorrhagic shock ([address] [R57.8]  Shock ([address] [R57.9]  History of gastric cancer [Z85.028]  Septic shock ([address] [A41.9, R65.21]  Gastrointestinal hemorrhage, unspecified gastrointestinal hemorrhage type [facility] Course & Interval History:   43-year-old male past medical history of gastric adenocarcinoma now status post 4 cycles of chemo with last dose on 2022, hypertension, 50-pack-year smoking history presented after having syncopal episode. Prior to arrival heart rate was erratic from normal to as high as 160-170 described as SVT versus sinus tach per EMS. Patient received fluids since he was hypotensive with a blood pressure of 74/54, heart rate of 110. Patient received 30 cc/kg sepsis bolus, vancomycin, cefepime and 1 unit packed red blood cell. IR consulted for IVC filter due to continuous GI bleed. Subjective/24hr Events (22): Patient was seen and examined at the bedside. No overnight events. Patient lying in bed comfortably without any major complaints. Family at bedside. Patient ready for surgical procedure today. Patient denied any cardiac chest pain, shortness of breath, abdominal pain, fever/chills. Assessment & Plan:   Septic shock, hemorrhagic shock  Gram-positive mildred bacteremia  - On admission patient had heart rate 110, blood pressure 74/54 without clear source  - Albumin every 6 hours x4 doses  - Dexamethasone 1 mg twice daily  - Blood culture positive for GPC x1 bottle, anaerobic.   Vanco cefepime Flagyl  - Repeat blood cultures nothing grown to date  - Cefepime discontinued   - Continue to monitor daily CBC/BMP    Chronic anemia  - On admission hemoglobin of 5.6  - Suspected acute blood loss anemia; likely BM suppression from chemo/malignancy plus nutritional deficiencies  - Status post packed red blood cells  - Hematology consulted  - Continue IV PPI and Carafate  - Patient has received a total of 5 units of red blood cell since admit  - Hemoglobin currently stable at this time  - Patient underwent EGD unfortunately was unsuccessful due to obstruction with food particles. Repeat EGD still unsuccessful  - Patient expected to have laparotomy and gastrectomy on 6/9 with general surgery  - 6/9 patient undergoing procedure today    Hypercoagulable state  - CT chest abdomen pelvis with small PE, femoral DVT  - No current plan for urgent intervention per surgery  - IR placed IVC filter 6/6  - Patient will need follow-up hematology outpatient    Cancer cachexia, severe protein caloric malnutrition, hypoproteinemia  - Remeron nightly  - Steroids as noted above  - Ensure added per nutrition  - TPN via indwelling port    Gastric adenocarcinoma  - Status post 4 cycles of chemotherapy  - Oncology and general surgery following  - Patient to undergo laparotomy with total gastrectomy 6/9    Discharge Planning:    Dispo pending clinical course. Patient expected to go to [facility] once cleared by general surgery after gastrectomy.     Diet:  PN-Adult Premix 5/15 - Central  Diet NPO Exceptions are: Sips of Water with Meds  PN-Adult Premix 5/15 - Central  DVT PPx: SCDs  Code status: Full Code    Hospital Problems:  Principal Problem:    Septic shock ([address]  Active Problems:    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[address]    Former heavy tobacco smoker    Gastroesophageal reflux disease    Hyponatremia    Hypoalbuminemia due to protein-calorie malnutrition (HCC)    Syncope due to orthostatic hypotension    Hypomagnesemia    Corticosteroid dependence ([address]    Hypoproteinemia (HCC)    Do not resuscitate status    Fatigue    History of gastric cancer    Encounter for palliative care    Debility    Weakness    Hypercoagulable state, secondary [facility])    Adult body mass index less than 19    Acute pulmonary embolism without acute cor pulmonale (HCC)    Severe protein-calorie malnutrition (HCC)    ABILIO (iron deficiency anemia)    Malignant neoplasm of overlapping sites of stomach (HCC)    Malignant neoplasm of body of stomach (HCC)    Gastric adenocarcinoma (HCC)  Resolved Problems:    Severe sepsis with lactic acidosis (HCC)    ABLA (acute blood loss anemia)    GIB (gastrointestinal bleeding)      Objective:     Patient Vitals for the past 24 hrs:   Temp Pulse Resp BP SpO2   06/09/22 1104 97.7 °F (36.5 °C) 71 20 (!) 161/87 97 %   06/09/22 0828 -- 60 -- -- --   06/09/22 0755 97.8 °F (36.6 °C) 68 18 (!) 148/82 99 %   06/09/22 0315 98 °F (36.7 °C) 73 16 (!) 140/92 --   06/08/22 2236 97.7 °F (36.5 °C) 78 17 (!) 139/90 95 %   06/08/22 2013 98.2 °F (36.8 °C) 78 16 (!) 141/88 97 %   06/08/22 1658 -- 75 -- -- --   06/08/22 1446 97.8 °F (36.6 °C) 67 18 (!) 145/81 97 %   06/08/22 1415 -- 80 -- 139/79 --   06/08/22 1338 -- -- -- (!) 132/100 95 %   06/08/22 1336 -- -- -- (!) 132/100 --   06/08/22 1324 -- -- -- -- 99 %       Oxygen Therapy  SpO2: 97 %  Pulse Oximetry Type: Continuous  Pulse Oximeter Device Mode: Intermittent  Pulse Oximeter Device Location: Right,Finger  O2 Device: None (Room air)  Skin Assessment: Clean, dry, & intact  Skin Protection for O2 Device: No  O2 Flow Rate (L/min): 1 L/min  O2 Delivery Method: Nasal cannula    Estimated body mass index is 17.46 kg/m² as calculated from the following:    Height as of this encounter: 5' 9\" (1.753 m). Weight as of this encounter: 118 lb 4 oz (53.6 kg). Intake/Output Summary (Last 24 hours) at 6/9/2022 1246  Last data filed at 6/9/2022 1040  Gross per 24 hour   Intake 3759 ml   Output 2475 ml   Net 1284 ml         Physical Exam:   Blood pressure (!) 161/87, pulse 71, temperature 97.7 °F (36.5 °C), temperature source Oral, resp.  rate 20, height 5' 9\" (1.753 m), weight 118 lb 4 oz (53.6 kg), SpO2 97 %.  General:    Well nourished. Head:  Normocephalic, atraumatic  Eyes:  Sclerae appear normal.  Pupils equally round. ENT:  Nares appear normal, no drainage. Moist oral mucosa  Neck:  No restricted ROM. Trachea midline   CV:   RRR. No m/r/g. No jugular venous distension. Lungs:   CTAB. No wheezing, rhonchi, or rales. Respirations even, unlabored  Abdomen: Bowel sounds present. Soft, nontender, nondistended. Extremities: No cyanosis or clubbing. No edema  Skin:     No rashes and normal coloration. Warm and dry. Neuro:  CN II-XII grossly intact. Sensation intact. A&Ox3  Psych:  Normal mood and affect.       I have reviewed ordered lab tests and independently visualized imaging below:    Recent Labs:  Recent Results (from the past 48 hour(s))   Potassium    Collection Time: 06/07/22  7:23 PM   Result Value Ref Range    Potassium 3.7 3.5 - 5.1 mmol/L   Triglyceride    Collection Time: 06/08/22  3:17 AM   Result Value Ref Range    Triglycerides 286 (H) 35 - 150 MG/DL   Phosphorus    Collection Time: 06/08/22  3:25 AM   Result Value Ref Range    Phosphorus 2.5 2.3 - 3.7 MG/DL   Magnesium    Collection Time: 06/08/22  3:25 AM   Result Value Ref Range    Magnesium 2.0 1.8 - 2.4 mg/dL   Basic Metabolic Panel    Collection Time: 06/08/22  3:25 AM   Result Value Ref Range    Sodium 138 138 - 145 mmol/L    Potassium 3.6 3.5 - 5.1 mmol/L    Chloride 104 98 - 107 mmol/L    CO2 29 21 - 32 mmol/L    Anion Gap 5 (L) 7 - 16 mmol/L    Glucose 99 65 - 100 mg/dL    BUN 8 8 - 23 MG/DL    CREATININE <0.20 (L) 0.8 - 1.5 MG/DL    GFR  0 (L) >60 ml/min/1.73m2    GFR Non- 0 (L) >60 ml/min/1.73m2    Calcium 7.9 (L) 8.3 - 10.4 MG/DL   CBC with Auto Differential    Collection Time: 06/08/22  3:25 AM   Result Value Ref Range    WBC 13.4 (H) 4.3 - 11.1 K/uL    RBC 3.25 (L) 4.23 - 5.6 M/uL    Hemoglobin 9.1 (L) 13.6 - 17.2 g/dL    Hematocrit 29.0 (L) 41.1 - 50.3 %    MCV 89.2 79.6 - 97.8 FL MCH 28.0 26.1 - 32.9 PG    MCHC 31.4 31.4 - 35.0 g/dL    RDW 18.0 (H) 11.9 - 14.6 %    Platelets 714 259 - 681 K/uL    MPV 10.0 9.4 - 12.3 FL    nRBC 0.00 0.0 - 0.2 K/uL    Seg Neutrophils 71 43 - 78 %    Lymphocytes 16 13 - 44 %    Monocytes 4 4.0 - 12.0 %    Eosinophils % 1 0.5 - 7.8 %    Basophils 0 0.0 - 2.0 %    Immature Granulocytes 8 (H) 0.0 - 5.0 %    Segs Absolute 9.6 (H) 1.7 - 8.2 K/UL    Absolute Lymph # 2.1 0.5 - 4.6 K/UL    Absolute Mono # 0.5 0.1 - 1.3 K/UL    Absolute Eos # 0.1 0.0 - 0.8 K/UL    Basophils Absolute 0.0 0.0 - 0.2 K/UL    Absolute Immature Granulocyte 1.1 (H) 0.0 - 0.5 K/UL    RBC Comment SLIGHT  ANISOCYTOSIS + POIKILOCYTOSIS        RBC Comment OCCASIONAL  POLYCHROMASIA        WBC Comment Result Confirmed By Smear      Platelet Comment ADEQUATE      Differential Type AUTOMATED     PREPARE RBC (CROSSMATCH), 2 Units    Collection Time: 06/08/22  8:30 AM   Result Value Ref Range    History Check Historical check performed    TYPE AND SCREEN    Collection Time: 06/08/22 11:54 AM   Result Value Ref Range    Crossmatch expiration date 06/08/2022,2359     ABO/Rh O POSITIVE     Antibody Screen NEG     Unit Number [id number]     Product Code Blood Bank RC LR     Unit Divison 00     Dispense Status Blood Bank REL FROM [facility] [place]     Crossmatch Result Compatible     Unit Number [id number]     Product Code Blood Bank RC LR     Unit Divison 00     Dispense Status Blood Bank REL FROM [facility] [place]     Crossmatch Result Compatible    Phosphorus    Collection Time: 06/09/22  3:41 AM   Result Value Ref Range    Phosphorus 2.7 2.3 - 3.7 MG/DL   Triglyceride    Collection Time: 06/09/22  3:41 AM   Result Value Ref Range    Triglycerides 289 (H) 35 - 150 MG/DL   Comprehensive Metabolic Panel w/ Reflex to MG    Collection Time: 06/09/22  3:41 AM   Result Value Ref Range    Sodium 138 136 - 145 mmol/L    Potassium 3.5 3.5 - 5.1 mmol/L    Chloride 104 98 - 107 mmol/L    CO2 27 21 - 32 mmol/L    Anion Gap 7 7 - 16 mmol/L    Glucose 104 (H) 65 - 100 mg/dL    BUN 9 8 - 23 MG/DL    CREATININE <0.20 (L) 0.8 - 1.5 MG/DL    GFR  0 (L) >60 ml/min/1.73m2    GFR Non- 0 (L) >60 ml/min/1.73m2    Calcium 8.3 8.3 - 10.4 MG/DL    Total Bilirubin 0.2 0.2 - 1.1 MG/DL    ALT 9 (L) 12 - 65 U/L    AST 15 15 - 37 U/L    Alk Phosphatase 100 50 - 136 U/L    Total Protein 5.2 (L) 6.3 - 8.2 g/dL    Albumin 2.1 (L) 3.2 - 4.6 g/dL    Globulin 3.1 2.3 - 3.5 g/dL    Albumin/Globulin Ratio 0.7 (L) 1.2 - 3.5     CBC with Auto Differential    Collection Time: 06/09/22  3:41 AM   Result Value Ref Range    WBC 12.4 (H) 4.3 - 11.1 K/uL    RBC 3.21 (L) 4.23 - 5.6 M/uL    Hemoglobin 8.9 (L) 13.6 - 17.2 g/dL    Hematocrit 28.7 (L) 41.1 - 50.3 %    MCV 89.4 79.6 - 97.8 FL    MCH 27.7 26.1 - 32.9 PG    MCHC 31.0 (L) 31.4 - 35.0 g/dL    RDW 18.4 (H) 11.9 - 14.6 %    Platelets 262 133 - 173 K/uL    MPV 9.7 9.4 - 12.3 FL    nRBC 0.00 0.0 - 0.2 K/uL    Seg Neutrophils 74 43 - 78 %    Lymphocytes 12 (L) 13 - 44 %    Monocytes 4 4.0 - 12.0 %    Eosinophils % 1 0.5 - 7.8 %    Basophils 0 0.0 - 2.0 %    Immature Granulocytes 9 (H) 0.0 - 5.0 %    Segs Absolute 9.2 (H) 1.7 - 8.2 K/UL    Absolute Lymph # 1.5 0.5 - 4.6 K/UL    Absolute Mono # 0.5 0.1 - 1.3 K/UL    Absolute Eos # 0.1 0.0 - 0.8 K/UL    Basophils Absolute 0.0 0.0 - 0.2 K/UL    Absolute Immature Granulocyte 1.1 (H) 0.0 - 0.5 K/UL    RBC Comment MODERATE  ANISOCYTOSIS + POIKILOCYTOSIS        RBC Comment OCCASIONAL  MACROCYTOSIS        RBC Comment OCCASIONAL  POLYCHROMASIA        RBC Comment OCCASIONAL  BASOPHILIC STIPPLING        WBC Comment Result Confirmed By Smear      Platelet Comment ADEQUATE      Differential Type AUTOMATED     Magnesium    Collection Time: 06/09/22  3:41 AM   Result Value Ref Range    Magnesium 2.1 1.8 - 2.4 mg/dL   Transthoracic echocardiogram (TTE) complete with contrast, bubble, strain, and 3D PRN    Collection Time: 06/09/22  9:08 AM   Result Value Ref Range    LV EDV A2C 131 mL    LV EDV A4C 180 mL    LV EDV 3D 163 mL    LV ESV A2C 73 mL    LV ESV A4C 116 mL    LV ESV 3D 91 mL    EF 3D 44 %    LV Ejection Fraction A2C 44 %    LV Ejection Fraction A4C 35 %    EF BP 42 (A) 55 - 100 %    LVOT SV 82.0 ml    LV Mass 3D 187.0 g    IVSd 1.7 (A) 0.6 - 1.0 cm    LVIDd 4.7 4.2 - 5.9 cm    LVIDs 3.8 cm    LVOT Diameter 2.0 cm    LVOT Mean Gradient 3 mmHg    LVOT VTI 26.1 cm    LVOT Peak Velocity 1.3 m/s    LVOT Peak Gradient 7 mmHg    LVPWd 1.5 (A) 0.6 - 1.0 cm    LV E' Lateral Velocity 7 cm/s    LV E' Septal Velocity 6 cm/s    Global Longitudinal Strain -12.7 %    LVOT Area 3.1 cm2    LA Minor Axis 5.3 cm    LA Major Axis 6.5 cm    LA Area 2C 12.2 cm2    LA Area 4C 23.5 cm2    LA Volume BP 42 18 - 58 mL    LA Diameter 3.3 cm    AR .0 ms    AR Max Velocity PISA 4.7 m/s    AV Peak Velocity 2.0 m/s    AV Peak Gradient 16 mmHg    AV Mean Velocity 1.4 m/s    AV Mean Gradient 8 mmHg    AV Mean Gradient 8 mmHg    AV VTI 40.9 cm    AV Area by VTI 2.0 cm2    AV Area by Peak Velocity 2.1 cm2    Aortic Root 4.1 cm    Ascending Aorta 4.2 cm    IVC Proxmal 2.2 cm    MV E Wave Deceleration Time 190.0 ms    MV A Velocity 0.92 m/s    MV E Velocity 0.73 m/s    PV .0 ms    PV Max Velocity 1.0 m/s    PV Peak Gradient 4 mmHg    RV Basal Dimension 3.1 cm    TAPSE 2.4 1.7 cm    TR Max Velocity 2.17 m/s    TR Peak Gradient 19 mmHg    Body Surface Area 1.61 m2    Fractional Shortening 2D 19 28 - 44 %    LV ESV Index 3D 55 mL/m2    LV EDV Index 3D 99 mL/m2    LV ESV Index A4C 70 mL/m2    LV EDV Index A4C 109 mL/m2    LV ESV Index A2C 44 mL/m2    LV EDV Index A2C 79 mL/m2    LVIDd Index 2.85 cm/m2    LVIDs Index 2.30 cm/m2    LV RWT Ratio 0.64     LV Mass 2D 324.4 (A) 88 - 224 g    LV Mass 2D Index 196.6 (A) 49 - 115 g/m2    LV Mass Index 3D 113.3 g/m2    MV E/A 0.79     E/E' Ratio (Averaged) 11.30     E/E' Lateral 10.43     E/E' Septal 12.17     LA Volume Index BP 25 16 - 34 ml/m2 LVOT Stroke Volume Index 49.7 mL/m2    LA Size Index 2.00 cm/m2    LA/AO Root Ratio 0.80     Ao Root Index 2.48 cm/m2    Ascending Aorta Index 2.55 cm/m2    AV Velocity Ratio 0.65     LVOT:AV VTI Index 0.64     JOSEPH/BSA VTI 1.2 cm2/m2    JOSEPH/BSA Peak Velocity 1.3 cm2/m2    Est. RA Pressure 8 mmHg    RVSP 27 mmHg       Other Studies:  IR GUIDED IVC FILTER PLACEMENT   Final Result   Successful placement an infrarenal Cook Celect inferior vena cava filter. Plan:   The patient may be evaluated in 4 months by interventional radiology in order to   evaluate for continued need of the IVC filter for versus filter removal.                  Vascular duplex lower extremity venous bilateral   Final Result   Negative for sonographic evidence of deep venous thrombosis right   lower extremity. LEFT LOWER EXTREMITY VENOUS DUPLEX ULTRASOUND. INDICATION: Left lower extremity pain. TECHNIQUE: Direct skin-contact high resolution grayscale images, color-flow   Doppler and duplex waveform analysis. FINDINGS: Abnormal intraluminal echoes within the common femoral and profunda   femoral veins. There is some flow. The superficial superficial femoral and   popliteal veins and upper calf veins are unremarkable. IMPRESSION: Positive for deep venous thrombosis left common femoral profunda   femoral veins, nonocclusive. CTA CHEST ABDOMEN PELVIS W WO CONTRAST   Final Result   1. No extravasation to suggest active GI bleed. 2. Small areas of pulmonary embolism are present in the right lower lobe. Clot   burden is quite small. 3. DVT is also likely present in the left femoral veins. 4. Previously described gastric mass again noted. It is difficult to measure to   evaluate for progression. XR CHEST PORTABLE   Final Result   Unremarkable portable chest radiograph.                          Current Meds:  Current Facility-Administered Medications   Medication Dose Route Frequency    0.9 % sodium chloride infusion   IntraVENous PRN    ceFAZolin (ANCEF) 2000 mg in sterile water 20 mL IV syringe  2,000 mg IntraVENous On Call to OR    sodium chloride flush 0.9 % injection 5-40 mL  5-40 mL IntraVENous 2 times per day    sodium chloride flush 0.9 % injection 5-40 mL  5-40 mL IntraVENous PRN    lactated ringers infusion   IntraVENous Continuous    PN-Adult Premix 5/15 - Central   IntraVENous Continuous TPN    PN-Adult Premix 5/15 - Central   IntraVENous Continuous TPN    potassium chloride 20 mEq/50 mL IVPB (Central Line)  20 mEq IntraVENous PRN    Or    potassium chloride 10 mEq/100 mL IVPB (Peripheral Line)  10 mEq IntraVENous PRN    magnesium sulfate 2000 mg in 50 mL IVPB premix  2,000 mg IntraVENous PRN    sodium phosphate 10 mmol in sodium chloride 0.9 % 250 mL IVPB  10 mmol IntraVENous PRN    Or    sodium phosphate 15 mmol in sodium chloride 0.9 % 250 mL IVPB  15 mmol IntraVENous PRN    Or    sodium phosphate 20 mmol in sodium chloride 0.9 % 500 mL IVPB  20 mmol IntraVENous PRN    [Held by provider] fat emulsion 20 % infusion 250 mL  250 mL IntraVENous Daily    thiamine (B-1) injection 100 mg  100 mg IntraVENous Daily    folic acid 1 mg in sodium chloride 0.9 % 50 mL IVPB  1 mg IntraVENous Daily    0.9 % sodium chloride infusion   IntraVENous PRN    0.9 % sodium chloride infusion   IntraVENous PRN    furosemide (LASIX) injection 20 mg  20 mg IntraVENous PRN    metoclopramide (REGLAN) injection 10 mg  10 mg IntraVENous Q6H    acetaminophen (TYLENOL) tablet 650 mg  650 mg Oral Q6H PRN    Or    acetaminophen (TYLENOL) suppository 650 mg  650 mg Rectal Q6H PRN    vancomycin (VANCOCIN) 1,000 mg in sodium chloride 0.9 % 250 mL IVPB ([id number])  1,000 mg IntraVENous Q12H    midodrine (PROAMATINE) tablet 10 mg  10 mg Oral TID PRN    metronidazole (FLAGYL) 500 mg in 0.9% NaCl 100 mL IVPB premix  500 mg IntraVENous Q12H    pantoprazole (PROTONIX) 40 mg in sodium chloride (PF) 10 mL injection 40 mg IntraVENous Q12H    sodium chloride flush 0.9 % injection 5-40 mL  5-40 mL IntraVENous 2 times per day    sodium chloride flush 0.9 % injection 5-40 mL  5-40 mL IntraVENous PRN    dexamethasone (DECADRON) tablet 1 mg  1 mg Oral 2 times per day    sucralfate (CARAFATE) tablet 1 g  1 g Oral 4 times per day    sodium chloride flush 0.9 % injection 10 mL  10 mL IntraVENous ONCE PRN    mirtazapine (REMERON) tablet 7.5 mg  7.5 mg Oral Nightly    ondansetron (ZOFRAN) injection 4 mg  4 mg IntraVENous Q6H PRN    saliva substitute (BIOTENE/MOUTH KOTE) liquid   Oral PRN    Medela Tender Care Lanolin cream   Topical PRN     Facility-Administered Medications Ordered in Other Encounters   Medication Dose Route Frequency    lidocaine PF 2 % injection   IntraVENous PRN    propofol injection   IntraVENous PRN       Signed:  Kim Chandra MD    Part of this note may have been written by using a voice dictation software. The note has been proof read but may still contain some grammatical/other typographical errors.

## 2022-06-09 NOTE — PROGRESS NOTES
Comprehensive Nutrition Assessment    Type and Reason for Visit: Reassess  Malnutrition Screening Tool: Malnutrition Screen  Have you recently lost weight without trying?: 34 or more pounds (4 points)  Have you been eating poorly because of a decreased appetite?: Yes (1 point)  Malnutrition Screening Tool Score: 5 and Unintentional Weight Loss (Hospitalists)  TPN management (Hospitalist and Surgery)    Nutrition Recommendations/Plan:    Parenteral Nutrition:  Total parenteral nutrition  to begin at 1800  Continue: Dex 15%, 5% AA 2 L (85ml/hr)   Hold 250 ml 20% lipids daily  To provide: 1420 kcal/d (88% of needs), 100 grams of protein/d (125% of needs/1.8 g/kg CBW), 300 grams of CHO/d and 2000 ml of total volume/d. Lytes/L:   150 meq NaCl, 24 mmol KPO4, 8 meq Mg, 4.5 meq Calcium  Other additives: MTE, MVI Monday Wednesday Friday due to national shortages   Nutrition Related Medication Management: Thiamin  mg/day day (end date 6/25), 1 mg folic acid day (end date 6/12)  Nutritional Supplement Therapy:   Active electrolyte replacement per nutrition support protocols  Replacement indicated:  None   Labs:   BMP daily  Mg MWF  Phos MWF    Triglyceride tomorrow  POC Glucoses/SSI Not indicated     Malnutrition Assessment:  Malnutrition Status: Severe malnutrition  Context: Chronic Illness  Findings of clinical characteristics of malnutrition:   Energy Intake:  Mild decrease in energy intake (Comment) (only tolerating oral nutrition supplements as primary)  Weight Loss:  Greater than 20% over 1 year (38# (24.4%) in ~10 months)     Body Fat Loss:  Severe body fat loss Triceps,Fat Overlying Ribs,Buccal region   Muscle Mass Loss:  Severe muscle mass loss Temples (temporalis),Clavicles (pectoralis & deltoids),Thigh (quadraceps),Calf (gastrocnemius)  Fluid Accumulation:  No significant fluid accumulation     Strength:  Not Performed  Nutrition Assessment:  Nutrition History: History provided by patient and wife. Patient states that he has been trying to eat some solids but it feels like it just sits on his stomach. He states he will wake the next day and still feel full. Wife states that patient has not tried any solids in weeks. Patient states that he mostly has been consuming Ensure (regular) 6-7 per day. Wife states that she has been mixing peanut butter powder into Ensure to increase kcal and protein (potentially providing 60-70 additional kcal and 6-9 additional grams of protein per serving depending on brand). Patient endorses weight loss of 50-60# over the last year. Do You Have Any Cultural, Evangelical, or Ethnic Food Preferences?: No   Nutrition Background:   Patient with PMH significant for HTN, gastric adenocarcinoma s/p neoadjuvant chemo with plans for surgery in 2-3 week. He presented with near syncopal episodes for last 2 days. He was admitted with shock. Nutrition Interval:  Pt out of room for surgery (total gastrectomy and j-tube placement) at time of visit. Discussed pt with Mily Coleman RN. Abdominal Status (last documented):   Last BM (including prior to admit): 06/08/22, GI Symptoms: Diarrhea   Pertinent Medications: Decadron (held today), folic acid, Reglan, Flagyl, Remeron, Protonix, Carafate, thiamine, Vancomycin  Electrolyte replacements: 6/7 KCl 10 meq x 4   Pertinent Labs:   Lab Results   Component Value Date     06/09/2022    K 3.5 06/09/2022     06/09/2022    CO2 27 06/09/2022    BUN 9 06/09/2022    CREATININE <0.20 06/09/2022    GLUCOSE 104 06/09/2022    CALCIUM 8.3 06/09/2022    PHOS 2.7 06/09/2022    MG 2.1 06/09/2022      Lab Results   Component Value Date    TRIG 289 06/09/2022   Remarks: Na WNL, K WNL, but low normal, Glucose WNL, Phos low normal, Mg WNL, Triglycerides elevated - will hold lipids again tonight - ? Related to infection - will recheck TG in am and consider resume vs dose reduced pending results    Nutrition Related Findings:   6/2:CLD. 6/4: NPO with bloody BM.  EGD failed x2 6/4 and 6/5 due to food particles obstructing access despite Reglan. 6/6: TPN consult, port in place. 6/6 TPN initiated with lipids. 6/7 cont 2L 10%dex/4.25%AA, hold lipids due to high TG. Formula changed to Dex 15%, 5% AA 6/8. Current Nutrition Therapies:  PN-Adult Premix 5/15 - Central  Diet NPO Exceptions are: Sips of Water with Meds  Current Parenteral Nutrition Orders:  · Type and Formula: Premix Central (Dex 15%; 5% AA)   · Lipids: None  · Duration: Continuous  · Rate/Volume: 2 L (85ml/hr)  · Current PN Order Provides: infusing per order  · Goal PN Orders Provides: 1420 kcal/d (88% of needs), 100 grams of protein/d (125% of needs/1.8 g/kg CBW), 300 grams of CHO/d and 2000 ml of total volume/d.       Current Intake:   Average Meal Intake: NPO        Anthropometric Measures:  Height: 5' 9\" (175.3 cm)  Current Body Wt: 118 lb 2.7 oz (53.6 kg) (6/6), Weight source: Bed Scale  BMI: 17.4  Admission Body Weight: 117 lb 15.1 oz (53.5 kg)  Ideal Body Weight (Kg) (Calculated): 73 kg (160 lbs), 73.7 %  Usual Body Wt: 156 lb (70.8 kg) (8/11/21 office wt), Percent weight change: -24.4       Edema: No data recorded  BMI Category Underweight (BMI less than 18.5)  Estimated Daily Nutrient Needs:  Energy (kcal/day): 0008-9433 (Kcal/kg (30-35) Weight used: 53.5 kg Current (6/2)  Protein (g/day): 70-80 (1.3-1.5 g/kg) Weight Used: (Current) 53.5 kg  Fluid (ml/day):   (1 ml/kcal)    Nutrition Diagnosis:   · Inadequate oral intake related to altered GI function as evidenced by NPO or clear liquid status due to medical condition (bloody BM, EGD failed x 2 due to retained food)    · Severe malnutrition related to inadequate protein-energy intake as evidenced by Criteria as identified in malnutrition assessment    Nutrition Interventions:   Food and/or Nutrient Delivery: Continue NPO,Modify Parenteral Nutrition     Coordination of Nutrition Care: Continue to monitor while inpatient       Goals:   Previous Goal Met:

## 2022-06-09 NOTE — ANESTHESIA PROCEDURE NOTES
Arterial Line:    An arterial line was placed in the pre-op for the following indication(s): continuous blood pressure monitoring and blood sampling needed. A 20 gauge (size), (length), Arrow (type) catheter was placed, Seldinger technique not used, into the left radial artery, secured by Tegaderm. Events:  patient tolerated procedure well with no complications. 6/9/2022 11:45 AM6/9/2022 11:50 AM  Resident/CRNA: WALLY Guillen CRNA  Performed: Resident/CRNA   Preanesthetic Checklist  Completed: patient identified, IV checked, site marked, risks and benefits discussed, surgical/procedural consents, equipment checked, pre-op evaluation, timeout performed, anesthesia consent given, oxygen available, monitors applied/VS acknowledged, fire risk safety assessment completed and verbalized and blood product R/B/A discussed and consented

## 2022-06-10 ENCOUNTER — APPOINTMENT (OUTPATIENT)
Dept: GENERAL RADIOLOGY | Age: 67
DRG: 853 | End: 2022-06-10
Payer: MEDICARE

## 2022-06-10 LAB
ALBUMIN SERPL-MCNC: 2 G/DL (ref 3.2–4.6)
ALBUMIN/GLOB SERPL: 0.8 {RATIO} (ref 1.2–3.5)
ALP SERPL-CCNC: 69 U/L (ref 50–136)
ALT SERPL-CCNC: 22 U/L (ref 12–65)
ANION GAP SERPL CALC-SCNC: 9 MMOL/L (ref 7–16)
ARTERIAL PATENCY WRIST A: ABNORMAL
AST SERPL-CCNC: 20 U/L (ref 15–37)
BASE DEFICIT BLD-SCNC: 3.4 MMOL/L
BASOPHILS # BLD: 0.1 K/UL (ref 0–0.2)
BASOPHILS NFR BLD: 0 % (ref 0–2)
BDY SITE: ABNORMAL
BILIRUB SERPL-MCNC: 0.2 MG/DL (ref 0.2–1.1)
BUN SERPL-MCNC: 15 MG/DL (ref 8–23)
CALCIUM SERPL-MCNC: 7.9 MG/DL (ref 8.3–10.4)
CHLORIDE SERPL-SCNC: 110 MMOL/L (ref 98–107)
CO2 BLD-SCNC: 21 MMOL/L (ref 13–23)
CO2 SERPL-SCNC: 20 MMOL/L (ref 21–32)
CREAT SERPL-MCNC: 0.2 MG/DL (ref 0.8–1.5)
DIFFERENTIAL METHOD BLD: ABNORMAL
EOSINOPHIL # BLD: 0 K/UL (ref 0–0.8)
EOSINOPHIL NFR BLD: 0 % (ref 0.5–7.8)
ERYTHROCYTE [DISTWIDTH] IN BLOOD BY AUTOMATED COUNT: 18.2 % (ref 11.9–14.6)
GAS FLOW.O2 O2 DELIVERY SYS: ABNORMAL L/MIN
GLOBULIN SER CALC-MCNC: 2.5 G/DL (ref 2.3–3.5)
GLUCOSE BLD STRIP.AUTO-MCNC: 157 MG/DL (ref 65–100)
GLUCOSE BLD STRIP.AUTO-MCNC: 178 MG/DL (ref 65–100)
GLUCOSE SERPL-MCNC: 231 MG/DL (ref 65–100)
HCO3 BLD-SCNC: 20.9 MMOL/L (ref 22–26)
HCT VFR BLD AUTO: 34.1 % (ref 41.1–50.3)
HGB BLD-MCNC: 11 G/DL (ref 13.6–17.2)
IMM GRANULOCYTES # BLD AUTO: 1 K/UL (ref 0–0.5)
IMM GRANULOCYTES NFR BLD AUTO: 2 % (ref 0–5)
LYMPHOCYTES # BLD: 1 K/UL (ref 0.5–4.6)
LYMPHOCYTES NFR BLD: 2 % (ref 13–44)
MAGNESIUM SERPL-MCNC: 1.6 MG/DL (ref 1.8–2.4)
MAGNESIUM SERPL-MCNC: 1.8 MG/DL (ref 1.8–2.4)
MCH RBC QN AUTO: 28.4 PG (ref 26.1–32.9)
MCHC RBC AUTO-ENTMCNC: 32.3 G/DL (ref 31.4–35)
MCV RBC AUTO: 87.9 FL (ref 79.6–97.8)
MONOCYTES # BLD: 0.9 K/UL (ref 0.1–1.3)
MONOCYTES NFR BLD: 2 % (ref 4–12)
NEUTS SEG # BLD: 38.5 K/UL (ref 1.7–8.2)
NEUTS SEG NFR BLD: 93 % (ref 43–78)
NRBC # BLD: 0.02 K/UL (ref 0–0.2)
O2/TOTAL GAS SETTING VFR VENT: 40 %
PCO2 BLD: 34.3 MMHG (ref 35–45)
PEEP RESPIRATORY: 7 CMH2O
PH BLD: 7.39 [PH] (ref 7.35–7.45)
PHOSPHATE SERPL-MCNC: 3.1 MG/DL (ref 2.3–3.7)
PLATELET # BLD AUTO: 247 K/UL (ref 150–450)
PMV BLD AUTO: 9.7 FL (ref 9.4–12.3)
PO2 BLD: 132 MMHG (ref 75–100)
POTASSIUM SERPL-SCNC: 4 MMOL/L (ref 3.5–5.1)
PROT SERPL-MCNC: 4.5 G/DL (ref 6.3–8.2)
RBC # BLD AUTO: 3.88 M/UL (ref 4.23–5.6)
SAO2 % BLD: 99 % (ref 95–98)
SERVICE CMNT-IMP: ABNORMAL
SODIUM SERPL-SCNC: 139 MMOL/L (ref 136–145)
SPECIMEN TYPE: ABNORMAL
VENTILATION MODE VENT: ABNORMAL
VT SETTING VENT: 500 ML
WBC # BLD AUTO: 41.4 K/UL (ref 4.3–11.1)

## 2022-06-10 PROCEDURE — 80053 COMPREHEN METABOLIC PANEL: CPT

## 2022-06-10 PROCEDURE — 6370000000 HC RX 637 (ALT 250 FOR IP): Performed by: SURGERY

## 2022-06-10 PROCEDURE — 1090000001 HH PPS REVENUE CREDIT

## 2022-06-10 PROCEDURE — 87040 BLOOD CULTURE FOR BACTERIA: CPT

## 2022-06-10 PROCEDURE — 94640 AIRWAY INHALATION TREATMENT: CPT

## 2022-06-10 PROCEDURE — 2100000000 HC CCU R&B

## 2022-06-10 PROCEDURE — 83735 ASSAY OF MAGNESIUM: CPT

## 2022-06-10 PROCEDURE — 99291 CRITICAL CARE FIRST HOUR: CPT | Performed by: INTERNAL MEDICINE

## 2022-06-10 PROCEDURE — 2500000003 HC RX 250 WO HCPCS: Performed by: INTERNAL MEDICINE

## 2022-06-10 PROCEDURE — 71045 X-RAY EXAM CHEST 1 VIEW: CPT

## 2022-06-10 PROCEDURE — 6360000002 HC RX W HCPCS: Performed by: SURGERY

## 2022-06-10 PROCEDURE — 36600 WITHDRAWAL OF ARTERIAL BLOOD: CPT

## 2022-06-10 PROCEDURE — 6370000000 HC RX 637 (ALT 250 FOR IP): Performed by: INTERNAL MEDICINE

## 2022-06-10 PROCEDURE — 82803 BLOOD GASES ANY COMBINATION: CPT

## 2022-06-10 PROCEDURE — 2580000003 HC RX 258: Performed by: NURSE PRACTITIONER

## 2022-06-10 PROCEDURE — 6360000002 HC RX W HCPCS: Performed by: INTERNAL MEDICINE

## 2022-06-10 PROCEDURE — 94003 VENT MGMT INPAT SUBQ DAY: CPT

## 2022-06-10 PROCEDURE — 2700000000 HC OXYGEN THERAPY PER DAY

## 2022-06-10 PROCEDURE — 36415 COLL VENOUS BLD VENIPUNCTURE: CPT

## 2022-06-10 PROCEDURE — 2580000003 HC RX 258: Performed by: SURGERY

## 2022-06-10 PROCEDURE — 84100 ASSAY OF PHOSPHORUS: CPT

## 2022-06-10 PROCEDURE — 2500000003 HC RX 250 WO HCPCS: Performed by: SURGERY

## 2022-06-10 PROCEDURE — 82962 GLUCOSE BLOOD TEST: CPT

## 2022-06-10 PROCEDURE — 85025 COMPLETE CBC W/AUTO DIFF WBC: CPT

## 2022-06-10 PROCEDURE — 1090000002 HH PPS REVENUE DEBIT

## 2022-06-10 PROCEDURE — 6360000002 HC RX W HCPCS: Performed by: NURSE PRACTITIONER

## 2022-06-10 RX ORDER — HYDROMORPHONE HYDROCHLORIDE 1 MG/ML
0.5 INJECTION, SOLUTION INTRAMUSCULAR; INTRAVENOUS; SUBCUTANEOUS
Status: DISCONTINUED | OUTPATIENT
Start: 2022-06-10 | End: 2022-06-21 | Stop reason: HOSPADM

## 2022-06-10 RX ORDER — INSULIN LISPRO 100 [IU]/ML
0-4 INJECTION, SOLUTION INTRAVENOUS; SUBCUTANEOUS NIGHTLY
Status: DISCONTINUED | OUTPATIENT
Start: 2022-06-10 | End: 2022-06-21 | Stop reason: HOSPADM

## 2022-06-10 RX ORDER — METOPROLOL TARTRATE 5 MG/5ML
5 INJECTION INTRAVENOUS EVERY 8 HOURS
Status: DISCONTINUED | OUTPATIENT
Start: 2022-06-10 | End: 2022-06-12

## 2022-06-10 RX ORDER — INSULIN LISPRO 100 [IU]/ML
0-4 INJECTION, SOLUTION INTRAVENOUS; SUBCUTANEOUS
Status: DISCONTINUED | OUTPATIENT
Start: 2022-06-10 | End: 2022-06-21 | Stop reason: HOSPADM

## 2022-06-10 RX ORDER — OXYCODONE HCL 5 MG/5 ML
5 SOLUTION, ORAL ORAL EVERY 6 HOURS PRN
Status: DISCONTINUED | OUTPATIENT
Start: 2022-06-10 | End: 2022-06-21 | Stop reason: HOSPADM

## 2022-06-10 RX ORDER — GUAIFENESIN 100 MG/5ML
200 SOLUTION ORAL EVERY 6 HOURS
Status: DISCONTINUED | OUTPATIENT
Start: 2022-06-10 | End: 2022-06-21 | Stop reason: HOSPADM

## 2022-06-10 RX ORDER — CLONIDINE HYDROCHLORIDE 0.2 MG/1
0.2 TABLET ORAL ONCE
Status: DISCONTINUED | OUTPATIENT
Start: 2022-06-10 | End: 2022-06-10

## 2022-06-10 RX ORDER — CLONIDINE HYDROCHLORIDE 0.2 MG/1
0.2 TABLET ORAL EVERY 4 HOURS PRN
Status: DISCONTINUED | OUTPATIENT
Start: 2022-06-10 | End: 2022-06-21 | Stop reason: HOSPADM

## 2022-06-10 RX ADMIN — IPRATROPIUM BROMIDE AND ALBUTEROL SULFATE 1 AMPULE: .5; 3 SOLUTION RESPIRATORY (INHALATION) at 15:01

## 2022-06-10 RX ADMIN — IPRATROPIUM BROMIDE AND ALBUTEROL SULFATE 1 AMPULE: .5; 3 SOLUTION RESPIRATORY (INHALATION) at 20:00

## 2022-06-10 RX ADMIN — Medication 2000 MG: at 13:30

## 2022-06-10 RX ADMIN — IPRATROPIUM BROMIDE AND ALBUTEROL SULFATE 1 AMPULE: .5; 3 SOLUTION RESPIRATORY (INHALATION) at 00:29

## 2022-06-10 RX ADMIN — SODIUM ACETATE: 164 INJECTION, SOLUTION, CONCENTRATE INTRAVENOUS at 17:00

## 2022-06-10 RX ADMIN — SODIUM CHLORIDE, PRESERVATIVE FREE 40 ML: 5 INJECTION INTRAVENOUS at 09:39

## 2022-06-10 RX ADMIN — HYDROMORPHONE HYDROCHLORIDE 0.5 MG: 1 INJECTION, SOLUTION INTRAMUSCULAR; INTRAVENOUS; SUBCUTANEOUS at 19:34

## 2022-06-10 RX ADMIN — SODIUM CHLORIDE, POTASSIUM CHLORIDE, SODIUM LACTATE AND CALCIUM CHLORIDE: 600; 310; 30; 20 INJECTION, SOLUTION INTRAVENOUS at 07:55

## 2022-06-10 RX ADMIN — CLONIDINE HYDROCHLORIDE 0.2 MG: 0.2 TABLET ORAL at 21:59

## 2022-06-10 RX ADMIN — VANCOMYCIN HYDROCHLORIDE 1500 MG: 10 INJECTION, POWDER, LYOPHILIZED, FOR SOLUTION INTRAVENOUS at 15:00

## 2022-06-10 RX ADMIN — HYDROCORTISONE SODIUM SUCCINATE 50 MG: 100 INJECTION, POWDER, FOR SOLUTION INTRAMUSCULAR; INTRAVENOUS at 20:23

## 2022-06-10 RX ADMIN — PROPOFOL 30 MCG/KG/MIN: 10 INJECTION, EMULSION INTRAVENOUS at 01:05

## 2022-06-10 RX ADMIN — PROPOFOL 30 MCG/KG/MIN: 10 INJECTION, EMULSION INTRAVENOUS at 07:55

## 2022-06-10 RX ADMIN — ENOXAPARIN SODIUM 30 MG: 100 INJECTION SUBCUTANEOUS at 09:30

## 2022-06-10 RX ADMIN — Medication 2000 MG: at 20:32

## 2022-06-10 RX ADMIN — GUAIFENESIN 200 MG: 200 SOLUTION ORAL at 13:56

## 2022-06-10 RX ADMIN — HYDROCORTISONE SODIUM SUCCINATE 50 MG: 100 INJECTION, POWDER, FOR SOLUTION INTRAMUSCULAR; INTRAVENOUS at 00:13

## 2022-06-10 RX ADMIN — IPRATROPIUM BROMIDE AND ALBUTEROL SULFATE 1 AMPULE: .5; 3 SOLUTION RESPIRATORY (INHALATION) at 00:50

## 2022-06-10 RX ADMIN — METOPROLOL TARTRATE 5 MG: 5 INJECTION INTRAVENOUS at 16:54

## 2022-06-10 RX ADMIN — HYDROMORPHONE HYDROCHLORIDE 0.5 MG: 1 INJECTION, SOLUTION INTRAMUSCULAR; INTRAVENOUS; SUBCUTANEOUS at 12:17

## 2022-06-10 RX ADMIN — SODIUM CHLORIDE, PRESERVATIVE FREE 10 ML: 5 INJECTION INTRAVENOUS at 20:24

## 2022-06-10 RX ADMIN — Medication 2000 MG: at 05:07

## 2022-06-10 RX ADMIN — HYDROCORTISONE SODIUM SUCCINATE 50 MG: 100 INJECTION, POWDER, FOR SOLUTION INTRAMUSCULAR; INTRAVENOUS at 13:56

## 2022-06-10 RX ADMIN — METRONIDAZOLE 500 MG: 500 INJECTION, SOLUTION INTRAVENOUS at 05:30

## 2022-06-10 RX ADMIN — GUAIFENESIN 200 MG: 200 SOLUTION ORAL at 19:35

## 2022-06-10 RX ADMIN — HYDROCORTISONE SODIUM SUCCINATE 50 MG: 100 INJECTION, POWDER, FOR SOLUTION INTRAMUSCULAR; INTRAVENOUS at 05:11

## 2022-06-10 RX ADMIN — IPRATROPIUM BROMIDE AND ALBUTEROL SULFATE 1 AMPULE: .5; 3 SOLUTION RESPIRATORY (INHALATION) at 07:55

## 2022-06-10 ASSESSMENT — PULMONARY FUNCTION TESTS
PIF_VALUE: 16.1
PIF_VALUE: 21.6
PIF_VALUE: 16.9
PIF_VALUE: 17
PIF_VALUE: 16
PIF_VALUE: 16.4
PIF_VALUE: 16
PIF_VALUE: 16.3
PIF_VALUE: 13.7
PIF_VALUE: 16.2
PIF_VALUE: 6.2
PIF_VALUE: 15.9
PIF_VALUE: 15.5
PIF_VALUE: 15.2
PIF_VALUE: 16.1
PIF_VALUE: 15.7
PIF_VALUE: 17
PIF_VALUE: 17.5
PIF_VALUE: 21.2
PIF_VALUE: 19
PIF_VALUE: 10.8
PIF_VALUE: 20.6
PIF_VALUE: 18.7
PIF_VALUE: 13.1
PIF_VALUE: 14
PIF_VALUE: 14.9
PIF_VALUE: 15.3
PIF_VALUE: 14.8

## 2022-06-10 ASSESSMENT — PAIN SCALES - GENERAL
PAINLEVEL_OUTOF10: 0
PAINLEVEL_OUTOF10: 2
PAINLEVEL_OUTOF10: 0
PAINLEVEL_OUTOF10: 10
PAINLEVEL_OUTOF10: 8
PAINLEVEL_OUTOF10: 0
PAINLEVEL_OUTOF10: 0

## 2022-06-10 ASSESSMENT — PAIN - FUNCTIONAL ASSESSMENT
PAIN_FUNCTIONAL_ASSESSMENT: PREVENTS OR INTERFERES WITH MANY ACTIVE NOT PASSIVE ACTIVITIES
PAIN_FUNCTIONAL_ASSESSMENT: PREVENTS OR INTERFERES WITH MANY ACTIVE NOT PASSIVE ACTIVITIES

## 2022-06-10 ASSESSMENT — PAIN DESCRIPTION - LOCATION
LOCATION: ABDOMEN

## 2022-06-10 ASSESSMENT — PAIN DESCRIPTION - DESCRIPTORS
DESCRIPTORS: ACHING
DESCRIPTORS: ACHING
DESCRIPTORS: ACHING;SHARP;THROBBING

## 2022-06-10 ASSESSMENT — PAIN DESCRIPTION - ORIENTATION
ORIENTATION: MID

## 2022-06-10 ASSESSMENT — PAIN DESCRIPTION - FREQUENCY
FREQUENCY: CONTINUOUS
FREQUENCY: INTERMITTENT

## 2022-06-10 ASSESSMENT — PAIN DESCRIPTION - ONSET
ONSET: SUDDEN
ONSET: SUDDEN

## 2022-06-10 ASSESSMENT — PAIN SCALES - WONG BAKER
WONGBAKER_NUMERICALRESPONSE: 0

## 2022-06-10 ASSESSMENT — PAIN DESCRIPTION - PAIN TYPE
TYPE: SURGICAL PAIN
TYPE: SURGICAL PAIN

## 2022-06-10 NOTE — PROGRESS NOTES
TRANSFER - IN REPORT:    Verbal report received from [address], RN (name) on Aditya Nails  being received from OR (unit) for routine post-op      Report consisted of patients Situation, Background, Assessment and   Recommendations(SBAR). Information from the following report(s) Nurse Handoff Report, Adult Overview, Surgery Report, Intake/Output, MAR, Recent Results, Cardiac Rhythm NSR and Alarm Parameters was reviewed with the receiving nurse. Opportunity for questions and clarification was provided. Assessment completed upon patients arrival to unit and care assumed. Dual skin assessment performed, midline incision, J tube to gravity, L SHAYNE drain and R junior drain noted. Extensive bruising to RUE, blanchable redness to sacrum. Preventative allevyn placed and heel protectors in use.

## 2022-06-10 NOTE — ANESTHESIA POSTPROCEDURE EVALUATION
Department of Anesthesiology  Postprocedure Note    Patient: Jessy Santizo  MRN: [id number]  YOB: 1955  Date of evaluation: 6/9/2022  Time:  8:59 PM     Procedure Summary     Date: 06/09/22 Room / Location: [facility] MAIN OR 02 / [facility] MAIN OR    Anesthesia Start: 5106 Anesthesia Stop: 2031    Procedure: TOTAL GASTRECTOMY, EXPLORATORY LAPAROTOMY, FEEDING J TUBE (N/A Abdomen) Diagnosis:       Stomach cancer ([address]      (Stomach cancer ([address] [C16.9])    Providers: Ruth Sparrow MD Responsible Provider: Niki Nieto MD    Anesthesia Type: general ASA Status: 4          Anesthesia Type: No value filed. Stephen Phase I: Stephen Score: 10    Stephen Phase II:      Last vitals: Reviewed and per EMR flowsheets. Anesthesia Post Evaluation    Patient location during evaluation: ICU  Patient participation: complete - patient cannot participate  Level of consciousness: sedated and ventilated  Airway patency: patent  Nausea & Vomiting: no nausea  Complications: no  Cardiovascular status: hemodynamically stable  Respiratory status: acceptable and ventilator  Hydration status: euvolemic  Comments: BP (!) 105/58   Pulse 80   Temp 96.8 °F (36 °C)   Resp 15   Ht 5' 9\" (1.753 m)   Wt 118 lb 4 oz (53.6 kg)   SpO2 100%   BMI 17.46 kg/m²     Handoff given to RN and Critical Care Doc,  all questions answered, patient in stable condition.   Multimodal analgesia pain management approach

## 2022-06-10 NOTE — PLAN OF CARE
Problem: Skin/Tissue Integrity  Goal: Absence of new skin breakdown  Description: 1. Monitor for areas of redness and/or skin breakdown  2. Assess vascular access sites hourly  3. Every 4-6 hours minimum:  Change oxygen saturation probe site  4. Every 4-6 hours:  If on nasal continuous positive airway pressure, respiratory therapy assess nares and determine need for appliance change or resting period.   6/10/2022 1431 by Kamran Gill RN  Outcome: Progressing  6/10/2022 0043 by Ben Abdullahi RN  Outcome: Progressing     Problem: Safety - Adult  Goal: Free from fall injury  6/10/2022 1431 by Kamran Gill RN  Outcome: Progressing  Flowsheets (Taken 6/10/2022 0800)  Free From Fall Injury: Instruct family/caregiver on patient safety  6/10/2022 0043 by Ben Abdullahi RN  Outcome: Progressing  [address] (Taken 6/9/2022 2050)  Free From Fall Injury:   Instruct family/caregiver on patient safety   Based on caregiver fall risk screen, instruct family/caregiver to ask for assistance with transferring infant if caregiver noted to have fall risk factors     Problem: ABCDS Injury Assessment  Goal: Absence of physical injury  6/10/2022 1431 by Kamran Gill RN  Outcome: Progressing  6/10/2022 0043 by Ben Abdullahi RN  Outcome: Progressing  Flowsheets (Taken 6/9/2022 2050)  Absence of Physical Injury: Implement safety measures based on patient assessment     Problem: Pain  Goal: Verbalizes/displays adequate comfort level or baseline comfort level  Outcome: Progressing  Flowsheets  Taken 6/10/2022 0800 by Kamran Gill RN  Verbalizes/displays adequate comfort level or baseline comfort level: Encourage patient to monitor pain and request assistance  Taken 6/10/2022 0305 by Ben Abdullahi RN  Verbalizes/displays adequate comfort level or baseline comfort level: Encourage patient to monitor pain and request assistance     Problem: Discharge Planning  Goal: Discharge to home or other facility with appropriate resources  Outcome: Progressing  Flowsheets (Taken 6/10/2022 0800)  Discharge to home or other facility with appropriate resources: Identify barriers to discharge with patient and caregiver     Problem: Safety - Medical Restraint  Goal: Remains free of injury from restraints (Restraint for Interference with Medical Device)  Description: INTERVENTIONS:  1. Determine that other, less restrictive measures have been tried or would not be effective before applying the restraint  2. Evaluate the patient's condition at the time of restraint application  3. Inform patient/family regarding the reason for restraint  4.  Q2H: Monitor safety, psychosocial status, comfort, nutrition and hydration  Outcome: Progressing  Flowsheets (Taken 6/10/2022 0051 by Ariadna Nicholas RN)  Remains free of injury from restraints (restraint for interference with medical device):   Determine that other, less restrictive measures have been tried or would not be effective before applying the restraint   Evaluate the patient's condition at the time of restraint application   Inform patient/family regarding the reason for restraint   Every 2 hours: Monitor safety, psychosocial status, comfort, nutrition and hydration

## 2022-06-10 NOTE — PROGRESS NOTES
[facility] Critical Care Note[de-identified] 6/10/2022  Watson Shultz  Admission Date: 6/2/2022     Length of Stay: 8 days    Background: 78 y.o. y/o male with history of gastric carcinoma s/p neoadjuvant chemo (last dose May 29, 2022) with plans for subsequent total gastrectomy, HTN, admitted to hospitalist service 6/2 with syncopal episode and anemia (hgb 4.9 on admission) as well as gram positive mildred bacteremia (only 1/2, possible contaminate). Unable to tolerate solid food for months and has been losing weight. Was hypotension on pressors initially. Was covered with vanc and cefepime initially, flagyl added with GPR returned. CT c/a/p with small R sided PE, femoral DVT. Subsequently had large melanotic stools, transfused. EGD performed 6/4 but unable to see with food in stomach. Repeat 6/5 similar results. AC held and RI placed IVC filter 6/6.   6/9 was taken for total gastrectomy, left lateral lobectomy of liver, distal pancreatectomy and splenectomy, diaphragmatic resection, J tube placement, and retroperitoneal mass excision and lymphadenectomy, and left abdominal wall mass excision. To ICU on vent post op and requiring jesse. Notable PMH:  has a past medical history of ABLA (acute blood loss anemia), GIB (gastrointestinal bleeding), HTN (hypertension), and Severe sepsis with lactic acidosis ([address] 24 Hour events: Jesse was weaned off overnight. Changed to PS this morning. Currently on propofol for sedation but being weaned off and patient following commands in all extremities. TPN for nutrition. Flagyl and ancef for post surgical abx coverage.      ROS: unable to obtain     Lines: (insertion date)   ETT (adult) (Active)     Closed/Suction Drain Right RUQ Bulb (Active)       Closed/Suction Drain Left LUQ Bulb (Active)       Gastrostomy/Enterostomy/Jejunostomy Tube Gastrostomy LUQ 1 14 fr (Active)       Urinary Catheter 2 Way (Active)     Single Lumen Implantable Port Left Chest (Active) Arterial Line 06/09/22 Radial (Active)     Drips: current dose (range)  Dose (mcg/kg/min) Propofol : 30 mcg/kg/min  Dose (mcg/min) Phenylephrine : 0 mcg/min (map 91)     Pertinent Exam:         Blood pressure 107/80, pulse 88, temperature 97.6 °F (36.4 °C), temperature source Oral, resp. rate 20, height 5' 9\" (1.753 m), weight 145 lb 8.1 oz (66 kg), SpO2 100 %. Intake/Output Summary (Last 24 hours) at 6/10/2022 0754  Last data filed at 6/10/2022 0610  Gross per 24 hour   Intake 5241.79 ml   Output 5750 ml   Net -508.21 ml     Constitutional:  intubated and mechanically ventilated. EENMT:  Sclera clear, pupils equal, oral mucosa moist  Respiratory: CTA B, no w/r/r  Cardiovascular:  rrr, no m/r/g  Gastrointestinal:  post surgical, dressing and drains present with no bowel sounds. Musculoskeletal:  warm with no cyanosis, no lower extremity edema  Skin:  no jaundice or ecchymosis  Neurologic: sedated but moves all 4 extremities to command  Psychiatric: sedated and calm    CXR: 6/10/22      Recent Labs     06/08/22  0325 06/09/22  0341 06/10/22  0422   WBC 13.4* 12.4* 41.4*   HGB 9.1* 8.9* 11.0*   HCT 29.0* 28.7* 34.1*    232 247     Recent Labs     06/08/22  0325 06/09/22  0341 06/10/22  0053 06/10/22  0422    138  --  139   K 3.6 3.5  --  4.0    104  --  110*   CO2 29 27  --  20*   BUN 8 9  --  15   CREATININE <0.20* <0.20*  --  0.20*   MG 2.0 2.1 1.6*  --    PHOS 2.5 2.7  --   --    BILITOT  --  0.2  --  0.2   AST  --  15  --  20   ALT  --  9*  --  22   ALKPHOS  --  100  --  69     No results for input(s): TROPHS, NTPROBNP, CRP, ESR in the last 72 hours. Recent Labs     06/08/22  0325 06/09/22  0341 06/10/22  0422   GLUCOSE 99 104* 231*      ECHO: 06/02/22    TRANSTHORACIC ECHOCARDIOGRAM (TTE) COMPLETE (CONTRAST/BUBBLE/3D PRN) 06/09/2022  9:31 AM (Final)    Interpretation Summary    Left Ventricle: Reduced left ventricular systolic function. EF by 2D Simpsons Biplane is 42%.  Left ventricle size is normal. Severely increased wall thickness. Increased ventricular mass. Infiltrative cardiomyopathy should be considered. Global hypokinesis present. Abnormal diastolic function.   Aortic Valve: Thickened cusp. Mildly calcified cusp. Sclerosis of the aortic valve cusp. Moderate annular calcification vs appearance of aortic valve prosthesis [clinical correlation, clinical history]. Mild regurgitation.   Mitral Valve: Mildly thickened leaflet.   Aorta: Dilated aortic root. Dilated ascending aorta.   Pericardium: Small (<1 cm) localized pericardial effusion present around the right ventricle.   Technical qualifiers: Color flow Doppler was performed and pulse wave and/or continuous wave Doppler was performed. Signed by: James Hernandez MD on 6/9/2022  9:31 AM    Microbiology:   No results for input(s): CULTURE in the last 72 hours. Ventilator Settings Ideal body weight: 70.7 kg (155 lb 13.8 oz)  Mode FIO2 Rate Tidal Volume Pressure   AC/VC    40 %  15 bmp     500 mL   7     Peak airway pressure:     Minute ventilation: Minute Ventilation (l/min): 11.4 l/min  ABG:  Recent Labs     06/09/22  1832 06/09/22  1936 06/10/22  0324   PHAPOC 7.27* 7.29* 7.39   [id number] 48.0* 45.7* 34.3*   LH1HVVO 185* 185* 132*   [id number] 21.8* 21.9* 20.9*     Assessment and Plan:  (Medical Decision Making)   Impression: 78 y.o. male with gastric cancer, s/p joe adjuvant chemo. Admitted with syncope that was likely multifactorial, driven by recent weight loss, poor PO intake, and severe anemia into the 4's. GPR on blood culture but doubt this was sepsis as this was likely contaminate. Course complicated by discovery of PE and DVT, subsequent GI bleed likely due to his cancer, requiring IVC filter placement. No post op extensive resections and brought to ICU still intubated and on pressors. NEURO:   Sedation: propofol. Wean off. Analgesia: none currently.  Will likely need scheduled after extubation but not complaining of pain currently. CV:   Volume Status: appears euvolemic  Syncope: likely multifactorial as above. Cont to monitor. PULM:   Acute hypoxemic respiratory failure: Only on vent due to surgery. On ps 8/8 now and if dynamics look good will plan to extubate today. RENAL:  Electrolytes: replace as needed  GI:   Nutrition: TPN  Gastric cancer: s/p extensive resection 6/9. GI bleed: likely due to cancer. HEME:   Anemia: hgb now stable. Cont to monitor. Anticoagulation: Started on prophy lovenox today. With PE and DVT would like to increase to therapeutic dose, but will need surgery input. ID:   On post op abx. Believe 1/2 GPR blood culture was likely contamiante. ENDO:   Chronic steroid use: Unsure the underlying need for this. on 50 mg q8 hydrocortisone for now. Skin: no decub, turns, preventive care  Prophy: lovenox. Full Code    The patient is critically ill with respiratory failure, circulatory failure and requires high complexity decision making for assessment and support including frequent ventilator adjustment , frequent evaluation and titration of therapies , application of advanced monitoring technologies and extensive interpretation of multiple databases    Cumulative time devoted to patient care services by me for day of service is 37 mins.     Gifty Crespo MD

## 2022-06-10 NOTE — PROGRESS NOTES
Admit Date: 2022    POD 1 Day Post-Op    Procedure:  Procedure(s):  Exploratory laparotomy with total gastrectomy and esophagojejunostomy after extensive lysis of adhesions and dissections which added at least 3-4 hours to this case, Left lateral lobectomy of liver, Distal pancreatectomy and splenectomy, Combined diaphragmatic resection, Falciform ligament flap, Feeding J-tube placement, Retroperitoneal mass excision and lymphadenectomy, Left abdominal wall mass excision on posterior rectus sheath    Subjective:     Patient in ICU. Currently intubated and on propofol. AF, VSS. Objective:       Vitals:    06/10/22 1217 06/10/22 1300 06/10/22 1400 06/10/22 1501   BP:       Pulse: 100 96 95 97   Resp: 23 16 18 19   Temp:       TempSrc:       SpO2: 98% 98% 100% 96%   Weight:       Height:           Temp (24hrs), Av.4 °F (36.3 °C), Min:96.5 °F (35.8 °C), Max:98.8 °F (37.1 °C)  . I&O reviewed as documented. [unfilled]     Physical Exam:   Constitutional: Intubated and mechanically ventilated   /71   Pulse 97   Temp 97.9 °F (36.6 °C) (Axillary)   Resp 19   Ht 5' 9\" (1.753 m)   Wt 145 lb 8.1 oz (66 kg)   SpO2 96%   BMI 21.49 kg/m²   Eyes: Sclera are clear  ENMT: no external lesions' gross hearing normal; no obvious neck masses, no ear or lip lesions, nares normal  CV: RRR. Normal perfusion  Resp: No JVD. Breathing is  non-labored; no audible wheezing. GI: soft and non-distended, post op dressing c/d/i. Right and left surgical drain   : Mata - yogesh urine  Musculoskeletal: No embolic signs or cyanosis.    Neuro:  Sedated  Psychiatric: Sedated     Labs:   Recent Results (from the past 24 hour(s))   POCT Blood Gas & Electrolytes    Collection Time: 22  6:32 PM   Result Value Ref Range    pH, Arterial, POC 7.27 (L) 7.35 - 7.45      pCO2, Arterial, POC 48.0 (H) 35 - 45 MMHG    pO2, Arterial,  (H) 75 - 100 MMHG    POC Sodium 138 136 - 145 MMOL/L    POC Potassium 3.6 3.5 - 5. 1 MMOL/L    POC Ionized Calcium 1.36 (H) 1.12 - 1.32 mmol/L    POC Glucose 238 (H) 65 - 100 MG/DL    BASE DEFICIT (POC) 4.7 mmol/L    HCO3, Mixed 21.8 (L) 22 - 26 MMOL/L    POC TCO2 22 13 - 23 MMOL/L    POC O2 SAT 99 %    Source ARTERIAL      Performed by: Vincenzo     POC GFR  Cannot be calculated >60 ml/min/1.73m2    Glomerular Filtration Rate, POC Cannot be calculated >60 ml/min/1.73m2   POCT Blood Gas & Electrolytes    Collection Time: 06/09/22  7:36 PM   Result Value Ref Range    pH, Arterial, POC 7.29 (L) 7.35 - 7.45      pCO2, Arterial, POC 45.7 (H) 35 - 45 MMHG    pO2, Arterial,  (H) 75 - 100 MMHG    POC Sodium 137 136 - 145 MMOL/L    POC Potassium 4.1 3.5 - 5.1 MMOL/L    POC Ionized Calcium 1.30 1.12 - 1.32 mmol/L    POC Glucose 256 (H) 65 - 100 MG/DL    BASE DEFICIT (POC) 4.5 mmol/L    HCO3, Mixed 21.9 (L) 22 - 26 MMOL/L    POC TCO2 22 13 - 23 MMOL/L    POC O2  %    Source ARTERIAL      Performed by: Vincenzo     POC GFR  Cannot be calculated >60 ml/min/1.73m2    Glomerular Filtration Rate, POC Cannot be calculated >60 ml/min/1.73m2   Magnesium    Collection Time: 06/10/22 12:53 AM   Result Value Ref Range    Magnesium 1.6 (L) 1.8 - 2.4 mg/dL   POC Blood, Cord, Arterial    Collection Time: 06/10/22  3:24 AM   Result Value Ref Range    DEVICE ADULT VENT      FIO2 40 %    pH, Arterial, POC 7.39 7.35 - 7.45      pCO2, Arterial, POC 34.3 (L) 35 - 45 MMHG    pO2, Arterial,  (H) 75 - 100 MMHG    HCO3, Mixed 20.9 (L) 22 - 26 MMOL/L    SO2c, Arterial, POC 99.0 (H) 95 - 98 %    BASE DEFICIT (POC) 3.4 mmol/L    Mode Volume Control      POC TIDAL VOLUME 500 ml    POC PEEP 7 cmH2O    POC Loi's Test NOT APPLICABLE      Site DRAWN FROM ARTERIAL LINE      Specimen type: ARTERIAL      Performed by: Jeane     POC TCO2 21 13 - 23 MMOL/L    RESPIRATORY COMMENT: 11VE    Comprehensive Metabolic Panel w/ Reflex to MG    Collection Time: 06/10/22  4:22 AM   Result Value Ref Range    Sodium 139 136 - 145 mmol/L    Potassium 4.0 3.5 - 5.1 mmol/L    Chloride 110 (H) 98 - 107 mmol/L    CO2 20 (L) 21 - 32 mmol/L    Anion Gap 9 7 - 16 mmol/L    Glucose 231 (H) 65 - 100 mg/dL    BUN 15 8 - 23 MG/DL    CREATININE 0.20 (L) 0.8 - 1.5 MG/DL    GFR African American >60 >60 ml/min/1.73m2    GFR Non- >60 >60 ml/min/1.73m2    Calcium 7.9 (L) 8.3 - 10.4 MG/DL    Total Bilirubin 0.2 0.2 - 1.1 MG/DL    ALT 22 12 - 65 U/L    AST 20 15 - 37 U/L    Alk Phosphatase 69 50 - 136 U/L    Total Protein 4.5 (L) 6.3 - 8.2 g/dL    Albumin 2.0 (L) 3.2 - 4.6 g/dL    Globulin 2.5 2.3 - 3.5 g/dL    Albumin/Globulin Ratio 0.8 (L) 1.2 - 3.5     CBC with Auto Differential    Collection Time: 06/10/22  4:22 AM   Result Value Ref Range    WBC 41.4 (H) 4.3 - 11.1 K/uL    RBC 3.88 (L) 4.23 - 5.6 M/uL    Hemoglobin 11.0 (L) 13.6 - 17.2 g/dL    Hematocrit 34.1 (L) 41.1 - 50.3 %    MCV 87.9 79.6 - 97.8 FL    MCH 28.4 26.1 - 32.9 PG    MCHC 32.3 31.4 - 35.0 g/dL    RDW 18.2 (H) 11.9 - 14.6 %    Platelets 375 180 - 171 K/uL    MPV 9.7 9.4 - 12.3 FL    nRBC 0.02 0.0 - 0.2 K/uL    Differential Type AUTOMATED      Seg Neutrophils 93 (H) 43 - 78 %    Lymphocytes 2 (L) 13 - 44 %    Monocytes 2 (L) 4.0 - 12.0 %    Eosinophils % 0 (L) 0.5 - 7.8 %    Basophils 0 0.0 - 2.0 %    Immature Granulocytes 2 0.0 - 5.0 %    Segs Absolute 38.5 (H) 1.7 - 8.2 K/UL    Absolute Lymph # 1.0 0.5 - 4.6 K/UL    Absolute Mono # 0.9 0.1 - 1.3 K/UL    Absolute Eos # 0.0 0.0 - 0.8 K/UL    Basophils Absolute 0.1 0.0 - 0.2 K/UL    Absolute Immature Granulocyte 1.0 (H) 0.0 - 0.5 K/UL   Magnesium    Collection Time: 06/10/22  8:30 AM   Result Value Ref Range    Magnesium 1.8 1.8 - 2.4 mg/dL   Phosphorus    Collection Time: 06/10/22  8:30 AM   Result Value Ref Range    Phosphorus 3.1 2.3 - 3.7 MG/DL       Data Review     XR Results (most recent):  @BSHSILASTIMGCAT(KWT1179:1)@    Results (most recent):  @BSHSILASTIMGCAT(GKI8501:1)@   Assessment:     Principal Problem:    Septic shock ([facility]  Active Problems:    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[facility]    Former heavy tobacco smoker    Gastroesophageal reflux disease    Hyponatremia    Hypoalbuminemia due to protein-calorie malnutrition (HCC)    Syncope due to orthostatic hypotension    Hypomagnesemia    Corticosteroid dependence ([facility]    Hypoproteinemia (HCC)    Do not resuscitate status    Fatigue    History of gastric cancer    Encounter for palliative care    Debility    Weakness    Hypercoagulable state, secondary ([facility]    Adult body mass index less than 19    Acute pulmonary embolism without acute cor pulmonale (HCC)    Severe protein-calorie malnutrition (HCC)    Gastrointestinal hemorrhage    Encounter for weaning from ventilator ([facility]    ABILIO (iron deficiency anemia)    Malignant neoplasm of overlapping sites of stomach ([facility]    Malignant neoplasm of body of stomach ([facility]    Gastric adenocarcinoma ([facility]  Resolved Problems:    Severe sepsis with lactic acidosis (HCC)    ABLA (acute blood loss anemia)    GIB (gastrointestinal bleeding)        Plan/Recommendations/Medical Decision Making:     Care management per Hospitalist/ Intensivist  Extubate today as pt able to tolerate  DC propofol  Consult ID  TPN  IVF  No CPAP/ No BIPBP/ No Bagging  Follow labs  IV Abx    EMY Rico

## 2022-06-10 NOTE — PROGRESS NOTES
Comprehensive Nutrition Assessment    Type and Reason for Visit: Reassess  Malnutrition Screening Tool: Malnutrition Screen  Have you recently lost weight without trying?: 34 or more pounds (4 points)  Have you been eating poorly because of a decreased appetite?: Yes (1 point)  Malnutrition Screening Tool Score: 5 and Unintentional Weight Loss (Hospitalists)  TPN management (Hospitalist and Surgery)    Nutrition Recommendations/Plan:    Parenteral Nutrition:  Total parenteral nutrition  to begin at 1800  Continue: Dex 15%, 5% AA 2 L (85ml/hr)   Hold 250 ml 20% lipids daily; recheck TG tomorrow  To provide: 1420 kcal/d (88% of needs), 100 grams of protein/d (125% of needs/1.8 g/kg CBW), 300 grams of CHO/d and 2000 ml of total volume/d. Lytes/L:   150 meq Sodium (85 meq NaCl, 65 meq NaAcetate), 24 mmol KPO4, 8 meq Mg, 4.5 meq Calcium; to be formulated ~1:1 Cl:Acetate ratio  Other additives: MTE, MVI Monday Wednesday Friday due to national shortages   Nutrition Related Medication Management:   Thiamin  mg/day day (end date 9/62), 1 mg folic acid day (end date 6/12)  Nutritional Supplement Therapy:   Active electrolyte replacement per nutrition support protocols  Replacement indicated:  None   Labs:   BMP daily  Mg MWF  Phos MWF    Triglyceride tomorrow  POC Glucoses/SSI Activate: AM glucose >180 mg/dL     Malnutrition Assessment:  Malnutrition Status: Severe malnutrition  Context: Chronic Illness  Findings of clinical characteristics of malnutrition:   Energy Intake:  Mild decrease in energy intake (Comment) (only tolerating oral nutrition supplements as primary)  Weight Loss:  Greater than 20% over 1 year (38# (24.4%) in ~10 months)     Body Fat Loss:  Severe body fat loss Triceps,Fat Overlying Ribs,Buccal region   Muscle Mass Loss:  Severe muscle mass loss Temples (temporalis),Clavicles (pectoralis & deltoids),Thigh (quadraceps),Calf (gastrocnemius)  Fluid Accumulation:  No significant fluid accumulation  Strength:  Not Performed  Nutrition Assessment:  Nutrition History: History provided by patient and wife. Patient states that he has been trying to eat some solids but it feels like it just sits on his stomach. He states he will wake the next day and still feel full. Wife states that patient has not tried any solids in weeks. Patient states that he mostly has been consuming Ensure (regular) 6-7 per day. Wife states that she has been mixing peanut butter powder into Ensure to increase kcal and protein (potentially providing 60-70 additional kcal and 6-9 additional grams of protein per serving depending on brand). Patient endorses weight loss of 50-60# over the last year. Do You Have Any Cultural, Restorationism, or Ethnic Food Preferences?: No   Nutrition Background:   Patient with PMH significant for HTN, gastric adenocarcinoma s/p neoadjuvant chemo with plans for surgery in 2-3 week. He presented with near syncopal episodes for last 2 days. He was admitted with shock. Nutrition Interval:  Pt is s/p ex lap with total gastrectomy and esophagojejunostomy with extensive lysis of adhesion and dissection, left lateral lobectomy of liver, distal pancreatectomy and splenectomy, combined diaphragmatic resection, falciform ligament flap, j-tube placement, retroperitoneal mass excision and lymphadenectomy, and left abdominal wall mass excision. Pt extubated this morning. TPN infusing with Propofol off. Pt states having pain. Discussed with RN, Sheree Coulter. Abdominal Status (last documented):   Last BM (including prior to admit): 06/08/22, GI Symptoms: Other (Comment) (hx of CA)   Pertinent Medications:  Ancef, Solu-cortef, Flagyl, Protonix  Electrolyte replacements: 6/7 KCl 10 meq x 4   Pertinent Labs:   Lab Results   Component Value Date     06/10/2022    K 4.0 06/10/2022     06/10/2022    CO2 20 06/10/2022    BUN 15 06/10/2022    CREATININE 0.20 06/10/2022    GLUCOSE 231 06/10/2022    CALCIUM 7.9 06/10/2022 PHOS 3.1 06/10/2022    MG 1.8 06/10/2022      Lab Results   Component Value Date    TRIG 289 06/09/2022   Remarks: Na, K, Mg, and Phos WNL; Glucose elevated today after surgery. POC to be added. Cl elevated and CO2 low. Nutrition Related Findings:   6/2:CLD. 6/4: NPO with bloody BM. EGD failed x2 6/4 and 6/5 due to food particles obstructing access despite Reglan. 6/6: TPN consult, port in place. 6/6 TPN initiated with lipids. 6/7 cont 2L 10%dex/4.25%AA, hold lipids due to high TG. Formula changed to Dex 15%, 5% AA 6/8. Current Nutrition Therapies:  PN-Adult Premix 5/15 - Central  Diet NPO  Current Parenteral Nutrition Orders:  · Type and Formula: Premix Central (Dex 15%; 5% AA)   · Lipids: None  · Duration: Continuous  · Rate/Volume: 2 L (85ml/hr)  · Current PN Order Provides: infusing per order  · Goal PN Orders Provides: 1420 kcal/d (88% of needs), 100 grams of protein/d (125% of needs/1.8 g/kg CBW), 300 grams of CHO/d and 2000 ml of total volume/d.       Current Intake:   Average Meal Intake: NPO        Anthropometric Measures:  Height: 5' 9\" (175.3 cm)  Current Body Wt: 145 lb 8.1 oz (66 kg), Weight source: Bed Scale  BMI: 21.5  Admission Body Weight: 117 lb 15.1 oz (53.5 kg)  Ideal Body Weight (Kg) (Calculated): 73 kg (160 lbs), 73.7 %  Usual Body Wt: 156 lb (70.8 kg) (8/11/21 office wt), Percent weight change: -24.4       Edema: No data recorded  BMI Category Underweight (BMI less than 18.5)  Estimated Daily Nutrient Needs:  Energy (kcal/day): 2036-6265 (Kcal/kg (30-35) Weight used: 53.5 kg Current (6/2)  Protein (g/day): 70-80 (1.3-1.5 g/kg) Weight Used: (Current) 53.5 kg  Fluid (ml/day):   (1 ml/kcal)    Nutrition Diagnosis:   · Inadequate oral intake related to altered GI function as evidenced by NPO or clear liquid status due to medical condition (bloody BM, EGD failed x 2 due to retained food)    · Severe malnutrition related to inadequate protein-energy intake as evidenced by Criteria as identified in malnutrition assessment    Nutrition Interventions:   Food and/or Nutrient Delivery: Continue NPO,Modify Parenteral Nutrition     Coordination of Nutrition Care: Continue to monitor while inpatient       Goals:   Previous Goal Met: Progressing toward Goal(s)  Active Goal: Tolerate nutrition support at goal rate,within 2 days       Nutrition Monitoring and Evaluation:      Food/Nutrient Intake Outcomes: Parenteral Nutrition Intake/Tolerance  Physical Signs/Symptoms Outcomes: Biochemical Data,GI Status,Fluid Status or Edema,Weight    Discharge Planning:     Too soon to determine    Tracy Ramírez, [facility]

## 2022-06-10 NOTE — OP NOTE
[facility]  OPERATIVE REPORT    Name:  Darci Garcia  MR#:  [id number]  :  1955  ACCOUNT #:  [de-identified]  DATE OF SERVICE:  2022    PREOPERATIVE DIAGNOSIS:  Stomach cancer with complete blockage status post chemotherapy. POSTOPERATIVE DIAGNOSES:  1. Locally advanced stomach cancer with direct invasion into the distal esophagus, diaphragm, left lobe of the liver, pancreas and the retroperitoneum. 2.  Left abdominal wall mass within the posterior rectus sheath. PROCEDURES PERFORMED:  1. Exploratory laparotomy with total gastrectomy and esophagojejunostomy after extensive lysis of adhesions and dissections which added at least 3-4 hours to this case. 2.  Left lateral lobectomy of liver. 3.  Distal pancreatectomy and splenectomy. 4.  Combined diaphragmatic resection. 5.  Falciform ligament flap. 6.  Feeding J-tube placement. 7.  Retroperitoneal mass excision and lymphadenectomy. 8.  Left abdominal wall mass excision on posterior rectus sheath. SURGEON:  Silvana De La Garza MD    ASSISTANT:  VANITA Newton    ANESTHESIA:  General.    COMPLICATIONS:  none    SPECIMENS REMOVED:  As above    IMPLANTS:  Clenton Halter x2 at left pleural cavity and under left diaphragm    ESTIMATED BLOOD LOSS:  About 1000 mL. INDICATION:  This is a 74-year-old gentleman who presented with very large stomach cancer, extending just below the GE junction to the most part of his stomach. He had no evidence of distal metastases. He started neoadjuvant chemotherapy which he tolerated poorly. He was seen recently in my office to plan surgery after his chemotherapy; however, he presented to the emergency room with GI bleeding and he also is not able to eat anything over the last three weeks. He had significant weight loss and he had two EGD attempts. The stomach was completely occluded and the EGD was not feasible. The patient was recommended to have early surgery since his condition was deteriorating. distal stomach first.  We opened the gastrocolic ligaments and the lesser sac was entered and then the posterior wall of the distal antrum was dissected off the pancreas. We soon noticed that the proximal stomach invaded into the body of the pancreas. The proximal pancreas appeared to be okay and this was dissected off the stomach. Then the dissection continued along the greater curvature. The pylorus was dissected and the lymph node below the pylorus was dissected and then we were able to dissect into the transverse mesentery. The middle colic vein was able to be preserved. The right gastroepiploic vein was divided and then the right gastroepiploic artery was individually dissected and divided. This allowed us to dissect the first portion of duodenum completely. On the lesser sac, the stomach was found to be completely stuck onto the undersurface of the liver. With careful dissection, a space was made and then I was able to isolate the duodenum circumferentially. Then the duodenum was divided with an Endo-MALENA stapler with blue load. The stomach was then lifted upward. The next dissection was performed to release the left lobe of the liver. It was found out that this was completely invaded by the stomach cancer. I tried to dissect the left liver off the diaphragm and then the left diaphragm was also stuck onto the liver and also stuck onto the stomach. I was able to make a room just left to the left hepatic vein and then I was able to strum the left lateral lobe. At this point, liver resection was required to proceed further. The liver surface was cauterized and then I brought in the Endo-MALENA stapler. I used the stapler to develop the liver. This included the most part of left lateral lobe, especially left hepatic vein was divided. This created more room to look at the diaphragm and the hiatus. Unfortunately, the hiatus was not visible and was completely invaded.   I tried to separate the diaphragm from the GE junction. This turned out to be extremely difficult. I got into the left diaphragmatic veins several times. This was controlled with suture. At this point, I decided to go outside the GE junction since the GE junction was invaded by the cancer. I opened the diaphragm just below the pericardium and then I got into the pericardium. I actually saw the pericardium nicely. This was able to be . This anatomy got into the thoracic cavity above the diaphragm and I was able to place my finger on the diaphragm and the diaphragm was clearly invaded by multiple nodular lesions. I decided to proceed with en bloc resection of the diaphragm. This was done with a combination of a bipolar and a Bovie. The diaphragm was dissected along the GE junction. The GE junction itself was not violated since it was invaded by cancer. This was done on the left side first and I did get into the left thoracic cavity obviously and then dissection performed on the right side. Fortunately, his IVC  was not invaded and then we were able to create a plan to dissect the IVC. I created a plan to dissect right along the right flakita. The flakita was invaded and some of the flakita muscle was dissected towards the GE junction. Apparently, the GE junction also stuck onto the aorta posteriorly. At this point, further dissection turned out to be difficult and I decided to start from below. We decided to divide the pancreas at this point. The pancreas was isolated in the inferior border and then with some blunt and sharp dissection, I encircled the pancreas right to left to the superior mesenteric vein. An Endo-MALENA stapler with blue load reinforced with Seamguard. This was brought in to divide the body of the pancreas and then the pancreas was lifted distally.   It was stuck on the celiac trunk and with careful dissection, the celiac trunk was dissected and then I saw several small branches of left gastric artery. This was individually ligated with 0 silk and divided. This allowed me to lift the pancreas more further to the left side. At this point, we noticed more lymph nodes and also retroperitoneal mass. We decided to leave this later and so we continued to dissect the pancreas distally. The splenic flexure was taken down. The colon was protected. Attachments to the splenic flexure and diaphragm were taken down to lift the spleen and as noted, he does have very large spleen. This maneuver allowed us to lift the stomach, pancreas, spleen upward an then I was able to get into a plane right in front of aorta. Fortunately, aorta was not invaded. We were able to create a plane right outside adventitia and dissected the tissue off the aorta. This dissection continued into the mediastinum to connect with the previous dissection we made. As noted, there was pretty thick retroperitoneal tissues. This was divided with a vascular load stapler. This allowed us to lift the en bloc tissues off the retroperitoneum and lastly, only the esophagus was attached. With my hand, I could feel the cancer clearly extending into the distal esophagus. I used my finger to do the blunt dissection to dissect onto the mid esophagus and to allow to have enough esophagus for anastomosis. Two stay sutures were placed on the esophagus just above the presumed transection site and then the esophagus was divided with a Bovie. The gross margin was examined. It appeared to be normal although some mucosal lesion is close to the margin. The specimen was removed from the surgical field. This was an en bloc resection of the entire stomach, distal esophagus, diaphragm, left lobe of the liver, distal pancreas and the spleen. I looked at the retroperitoneum. There were multiple enlarged masses and the lymph nodes above the mass were stuck onto the left adrenal gland.   The adrenal gland was partially removed and multiple lymph nodes were removed at the same time. No palpable or visible disease at the end of the case. The surgical field was irrigated with sterile water. There was no active bleeding. Then we started reconstructions. The jejunum was identified about 20 cm from the ligament of Treitz, it was divided. This created a Jeannette limb and we made this Jeannette limb really long. This post behind the transverse colon. This reached to the distal esophagus easily without any tension. The jejunum was then laid below the esophagus. We made an enterotomy on the esophagus and then we used a stapler, [place] 60 stapler with blue load. We made an anastomosis within the sidewall of the jejunum and the back wall of the esophagus. The NG tube was also placed through the anastomosis into the jejunum. The enterotomy anteriorly was closed with interrupted 3-0 PDS stitch. After closure, Tisseel was applied around the anastomosis. I left a #19 Julian drain that went into the left chest and then right below the esophageal anastomosis and come out from the right side. The second Cortez Lolly drain was placed on the left side under the left diaphragm. Lastly, I placed a J-tube just distal to the enteroenterostomy. As noted, I made a Jeannette-en-Y anastomosis. The J-tube and the 14-Polish red rubber catheter was attached to the anterior abdominal wall in all four quadrants. Lastly, surgical field was inspected. I mobilized the falciform ligament further to bring this to the duodenal stump. The surgical field was reexamined at the end. No active bleeding. Then the midline fascia was closed on the fascia with #1 looped PDS in running fashion. Skin closed with staples. The patient tolerated the procedure well, transferred to recovery room in stable condition. All the instrument count and lap count were correct.     As noted, Alexus Carlisle was the first assistant of this case and he scrubbed in the key portion of this procedure to make this surgery safer and juju.       Marva Westbrook MD      [id number]  D:  06/09/2022 20:50  T:  06/10/2022 7:08  JOB #:  [id number]

## 2022-06-10 NOTE — BRIEF OP NOTE
Brief Postoperative Note      Patient: Ankit Rodriguez  YOB: 1955  MRN: [id number]    Date of Procedure: 6/9/2022    Pre-Op Diagnosis: Stomach cancer ([facility] [place] [address] [C16.9]    Post-Op Diagnosis:   1. locally advanced stomach cancer with direct invasion into distal esophagus, diaphragm, left lobe of liver, pancreas and retroperitoneum  2. Left abdominal wall mass within posterior rectal sheath. Procedures:  1 Ex lap with total gastrectomy and esophagojejunostomy, with extensive lysis of adhesion and dissection, which added at least 3-4 hours to the case  2. Left lateral lobectomy of liver  3. Distal pancreatectomy and splenectomy  4. Combined diaphragmatic resection  5. Falciform ligament flap  6. Feeding J tube  7. Retroperitoneal mass excision and lymphadenectomy  8.  Left abdominal wall mass excision, on posterior rectal sheath    Surgeon(s):  Fred Quijano MD    Assistant:  Surgical Assistant: VANITA Narayan    Anesthesia: General    Estimated Blood Loss (mL): 6318    Complications: None    Specimens:   ID Type Source Tests Collected by Time Destination   A : para esophageal lymph node #9 Tissue Lymph Node SURGICAL PATHOLOGY Fred Quijano MD 6/9/2022 1646    B : distal esophagous, total stomach, liver, pancrease, spleen, diaphram Tissue Esophagus SURGICAL PATHOLOGY Fred Quijano MD 6/9/2022 1703    C : retroperitoneal lymph node and mass Tissue Lymph Node SURGICAL PATHOLOGY Fred Quijano MD 6/9/2022 1720    D : Left Abdominal wall mass Tissue Abdomen SURGICAL PATHOLOGY Fred Quijano MD 6/9/2022 1905        Implants:  * No implants in log *      Drains:   Closed/Suction Drain Right RUQ Bulb (Active)   Dressing Status Clean, dry & intact 06/09/22 1930   Drain Status Other (comment) 06/09/22 1930       Closed/Suction Drain Left LUQ Bulb (Active)   Site Description Clean, dry & intact 06/09/22 1933   Drain Status To bulb suction 06/09/22 1933       Gastrostomy/Enterostomy/Jejunostomy Tube Gastrostomy LUQ 1 14 fr (Active)   Site Description Clean, dry & intact 06/09/22 1932   J Port Status Open to gravity drainage 06/09/22 1932   Dressing Status Clean, dry & intact 06/09/22 1932       Urinary Catheter 2 Way (Active)   $ Urethral catheter insertion $ Not inserted for procedure 06/03/22 0820   Catheter Indications Urinary retention (acute or chronic), continuous bladder irrigation or bladder outlet obstruction 06/09/22 0749   Site Assessment No urethral drainage 06/09/22 0749   Urine Color Yellow 06/09/22 0749   Urine Appearance Clear 06/09/22 0749   Collection Container Standard 06/09/22 0749   Securement Method Securing device (Describe) 06/09/22 0749   Catheter Care Completed Yes 06/09/22 0749   Catheter Best Practices  Drainage tube clipped to bed;Catheter secured to thigh; Tamper seal intact; Bag not on floor; Bag below bladder;Lack of dependent loop in tubing;Drainage bag less than half full 06/09/22 0749   Status Draining;Patent 06/09/22 0749   Output (mL) 900 mL 06/09/22 1040       Findings:  as post op    Electronically signed by Brian Garcia MD on 6/9/2022 at 8:06 PM

## 2022-06-10 NOTE — INTERDISCIPLINARY ROUNDS
Multi-D Rounds/Checklist (leapfrog):  Lines: can any be removed?: No   DVT Prophylaxis: Yes   Vent: HOB elevated? Yes              CC/Kg?: 7              Vent Day? : 1  Nutrition Ordered/appropriate: yES   Can antibiotics or other drugs be stopped?: No   Consults needed: No   A: Is pain control adequate? Yes   B: Sedation break and SBT? Yes   C: Is sedation choice appropriate? Yes   D: Delirium/CAM-ICU? No   E: Mobility goals/appropriateness? Yes   F: Family update and plan? wife is primary contact and is being updated daily by primary attending and nursing staff.     Joseph Nieto, APRN - CNP

## 2022-06-10 NOTE — CARE COORDINATION
Chart reviewed s/p tx to CCU post surgery by Dr. Thomas Yoon.   PROCEDURES PERFORMED:  - Exploratory laparotomy with total gastrectomy and esophagojejunostomy after extensive lysis of adhesions and dissections. -  Left lateral lobectomy of liver. -  Distal pancreatectomy and splenectomy. -   Combined diaphragmatic resection.  -  Falciform ligament flap. -   Feeding J-tube placement. -   Retroperitoneal mass excision and lymphadenectomy. -  Left abdominal wall mass excision on posterior rectus sheath    Extubated this am to 4L O2. Jesse off and sedation. TPN. Screen completed prior to tx. PPD for potential rehab needs. PT/OT/ST per MD. CM following. LOS 8 days.

## 2022-06-10 NOTE — PROGRESS NOTES
Bedside and verbal shift change report received from  Jak Lemus, [address] [place] [address] (offgoing nurse). Report included the following information SBAR, Kardex, Intake/Output, MAR, Recent Results, Cardiac Rhythm NSR, Alarm Parameters  and Quality Measures.      Dual skin assessment completed at bedside: no pressure injuries, surgical incisions to abdomen, clean, dry, intact (list pertinent skin assessment findings)    Dual verification of gtts completed (name of gtts verified): N/A

## 2022-06-10 NOTE — PROGRESS NOTES
VANCO DAILY FOLLOW UP NOTE  [facility] Pharmacokinetic Monitoring Service - Vancomycin    Consulting Provider: STEFFI Licona (ID)  Indication: GPR port infection  Target Concentration: Goal AUC/WILFREDO 400-600 mg*hr/L  Day of Therapy: 9 (EOT 6/16 per ID)  Additional Antimicrobials:     Pertinent Laboratory Values: Wt Readings from Last 1 Encounters:   06/06/22 118 lb 4 oz (53.6 kg)     Temp Readings from Last 1 Encounters:   06/08/22 97.8 °F (36.6 °C) (Oral)     Recent Labs     06/06/22  0249 06/07/22  0315 06/08/22  0325   BUN 15 12 8   CREATININE 0.20* 0.20* <0.20*   WBC  --  17.8* 13.4*     Estimated Creatinine Clearance: 272 mL/min (based on SCr of 0.2 mg/dL). Lab Results   Component Value Date    VANCORANDOM 13.7 06/05/2022       MRSA Nasal Swab: N/A. Non-respiratory infection. .      Assessment:  Date/Time Dose Concentration AUC   6/3 0709 750 mg q8h 26.1 601   6/5 0400 1000 mg q12h 13.7 482   Note: Serum concentrations collected for AUC dosing may appear elevated if collected in close proximity to the dose administered, this is not necessarily an indication of toxicity    Plan:  1. Last dose was 6/9 @ 0605; reloaded with 1500mg once, then 1250mg q24h   2. Predicted AUC/Tr 463/12.5  3. Repeat vancomycin concentration ordered for 6/11 @ 0600  4.  Pharmacy will continue to monitor patient and adjust therapy as indicated    Thank you for the consult,  Segundo Elam, PharmD  PGY1 Pharmacy Resident

## 2022-06-10 NOTE — CONSULTS
HISTORY AND PHYSICAL  Jerry Thomas  6/9/2022   Date of Admission:  6/2/2022    The patient's chart is reviewed and the patient is discussed with the staff. Subjective:     Patient is a 78 y.o. male presents with gastric cancer postoperatively    56-year-old male with history of gastric carcinoma s/p chemotherapy last dose on May 19, 2022, hypertension who was admitted to the hospitalist service with SVT syncopal attack and anemia in addition to septic shock with gram-positive rods bacteremia. Patient went for surgery today for gastrectomy but was found to have an extensive tumor and ended up with total gastrectomy esophageal jejunostomy, left lobectomy of the liver, distal pancreatectomy and splenectomy and combined diaphragmatic resection with J-tube inserted. Patient also had retroperitoneal mass excision and lymphadenectomy. .  Patient came from the OR sedated intubated on phenylephrine drip 30 mcg. Patient has received multiple blood transfusion during his stay in addition to antibiotic course. He is also known with PE and DVT and is status post IVC filter on 6/6/2022. Review of Systems  Review of systems not obtained due to patient factors. Current Outpatient Medications   Medication Instructions    dexamethasone (DECADRON) 4 MG tablet Take 2 tabs twice daily the day before chemo and the day after chemo.     dexamethasone (DECADRON) 1 mg, Oral, 2 times a day    lidocaine-prilocaine (EMLA) 2.5-2.5 % cream Topical, PRN    ondansetron (ZOFRAN) 8 mg, Oral, EVERY 8 HOURS PRN    prochlorperazine (COMPAZINE) 5 mg, Oral, EVERY 6 HOURS PRN      Past Medical History:   Diagnosis Date    ABLA (acute blood loss anemia) 6/2/2022    GIB (gastrointestinal bleeding) 6/2/2022    HTN (hypertension)     Severe sepsis with lactic acidosis ([facility] [address] 6/2/2022     Past Surgical History:   Procedure Laterality Date    COLONOSCOPY N/A 3/9/2022    COLONOSCOPY/ 22 performed by Yisel Rios MD at [facility] ENDOSCOPY    IR IVC FILTER PLACEMENT W IMAGING  6/6/2022    IR IVC FILTER PLACEMENT W IMAGING 6/6/2022 [facility]    KNEE ARTHROSCOPY      OTHER SURGICAL HISTORY      none    UPPER GASTROINTESTINAL ENDOSCOPY N/A 6/4/2022    EGD ESOPHAGOGASTRODUODENOSCOPY performed by Alyson Melo MD at [facility] ENDOSCOPY     Social History     Socioeconomic History    Marital status:      Spouse name: Not on file    Number of children: Not on file    Years of education: Not on file    Highest education level: Not on file   Occupational History    Not on file   Tobacco Use    Smoking status: Former Smoker    Smokeless tobacco: Former User   Substance and Sexual Activity    Alcohol use: Not Currently    Drug use: Not Currently    Sexual activity: Not on file   Other Topics Concern    Not on file   Social History Narrative    Not on file     Social Determinants of Health     Financial Resource Strain:     Difficulty of Paying Living Expenses: Not on file   Food Insecurity:     Worried About [address] in the Last Year: Not on file    [address] in the Last Year: Not on file   Transportation Needs:     Lack of Transportation (Medical): Not on file    Lack of Transportation (Non-Medical):  Not on file   Physical Activity:     Days of Exercise per Week: Not on file    Minutes of Exercise per Session: Not on file   Stress:     Feeling of Stress : Not on file   Social Connections:     Frequency of Communication with Friends and Family: Not on file    Frequency of Social Gatherings with Friends and Family: Not on file    Attends [employer] Services: Not on file    Active Member of Clubs or Organizations: Not on file    Attends Club or Organization Meetings: Not on file    Marital Status: Not on file   Intimate Partner Violence:     Fear of Current or Ex-Partner: Not on file    Emotionally Abused: Not on file    Physically Abused: Not on file  Sexually Abused: Not on file   Housing Stability:     Unable to Pay for Housing in the Last Year: Not on file    Number of Places Lived in the Last Year: Not on file    Unstable Housing in the Last Year: Not on file     Family History   Problem Relation Age of Onset    Other Other         non-contributory     Allergies   Allergen Reactions    Iron     Tetanus Antitoxin      Objective:     Vitals:    06/09/22 0755 06/09/22 0828 06/09/22 1104 06/09/22 2029   BP: (!) 148/82  (!) 161/87 (!) 105/58   Pulse: 68 60 71 80   Resp: 18  20 15   Temp: 97.8 °F (36.6 °C)  97.7 °F (36.5 °C) 96.8 °F (36 °C)   TempSrc: Oral  Oral    SpO2: 99%  97% 100%   Weight:       Height:         PHYSICAL EXAM   Constitutional: Emaciated acutely ill on mechanical ventilation sedated. EENMT:  Sclera clear, pupils equal, oral intubated  Respiratory: Clear equal air sounds bilateral no crackles no wheezing  Cardiovascular:  RRR without M,G,R  Gastrointestinal: Midline incision with multiple drains  Musculoskeletal: warm without cyanosis. There is no lower extremity edema  Skin:  no jaundice or rashes, no wounds   Neurologic: Sedated not following commands    CXR:  Results for orders placed during the hospital encounter of 06/02/22    XR CHEST 1 VIEW    Narrative  CHEST X-RAY, single portable view  6/9/2022    History: Postop    Technique: Single frontal view of the chest.    Comparison: Chest x-ray 6/20/2022    Findings:  Is stable left-sided venous port is seen. The cardiac silhouette is normal in  respect to size. No clear pneumothorax is seen although the left lung apex has  been clipped and therefore a trace pneumothorax at this level could be missed. Multiple apparent skin folds overlie the left chest.  Mild right basilar  atelectatic appearing changes are seen. No significant pleural effusion is  seen. Impression  1. New mild right basilar atelectatic appearing changes. No significant acute  changes otherwise seen.   It should be noted that the left lung apex has been  clipped limiting complete assessment for pneumothorax. This report was made using voice transcription. Despite my best efforts to avoid  any, transcription errors may persist. If there is any question about the  accuracy of the report or need for clarification, then please call [phone], or text me through perfectserv for clarification or correction. No results found for this or any previous visit. LAB:  Recent Labs     06/07/22 0315 06/08/22 0325 06/09/22 0341   WBC 17.8* 13.4* 12.4*   HGB 9.9* 9.1* 8.9*   HCT 31.0* 29.0* 28.7*    217 232     Recent Labs     06/07/22 0315 06/07/22 0315 06/07/22 1923 06/08/22 0325 06/09/22 0341   *  --   --  138 138   K 3.2*   < > 3.7 3.6 3.5     --   --  104 104   CO2 25  --   --  29 27   BUN 12  --   --  8 9   CREATININE 0.20*  --   --  <0.20* <0.20*   MG 2.0  --   --  2.0 2.1   PHOS 3.3  --   --  2.5 2.7   BILITOT  --   --   --   --  0.2   AST  --   --   --   --  15   ALT  --   --   --   --  9*   ALKPHOS  --   --   --   --  100    < > = values in this interval not displayed. No results for input(s): LACTATE, PROCAL, TROPHS, NTPROBNP, CRP, ESR in the last 72 hours. No results for input(s): PH, PCO2, PO2, HCO3 in the last 72 hours. Microbiology:   No results for input(s): CULTURE in the last 72 hours. Invalid input(s): STAIN  Assessment and Plan:  (Medical Decision Making)   Impression:  Patient is coming into the ICU status post extensive surgery including gastrectomy partial pancreatectomy left lobe of the liver lobectomy splenectomy and combined diaphragmatic resection with retroperitoneal mass excision. Patient came in intubated sedated on small dose of phenylephrine.           Principal Problem:  Acute hypoxemic respiratory failure on mechanical ventilation post operatively  Gastric cancer status post gastrectomy pancreatectomy left liver lobe lobectomy splenectomy   Septic shock [facility])   Former heavy tobacco smoker  Pulm embolism  Recent septic shock with gram-positive rods bacteremia   DVT   Severe protein-calorie malnutrition ([address]      Plan:  -Continue sedation overnight with fentanyl and propofol  -Titrate Jesse-Synephrine to keep map above 65  -Patient is on chronic steroids we will put patient on stress dose [id number] mg IV every 6 hours  -Patient was treated for gram-positive rods in his blood 2 out of 2 patient has Port-A-Cath and is now on only Flagyl. I will repeat blood cultures consider removing Port-A-Cath if remains positive.   -Follow urine output renal function  -Stat BMP and CBC  -Adjust mechanical ventilation use nonproductive strategy keep pulse ox above 90%  -ABG ordered  -Patient has IVC filter for his DVT and recent PE. No anticoagulation for now       More than 50% of the time documented was spent in face-to-face contact with the patient and in the care of the patient on the floor/unit where the patient is located. Emil Benitez MD    I have spent 36 minutes of critical care time this time was spent at bedside or in the unit this time was exclusive of any billable procedures patient is needing intensive care due to complex medical problems requiring complex medical decisions. Dictated using voice recognition software.   Proof read but unrecognized errors may exist.

## 2022-06-10 NOTE — H&P (VIEW-ONLY)
HISTORY AND PHYSICAL  Natasha James  6/9/2022   Date of Admission:  6/2/2022    The patient's chart is reviewed and the patient is discussed with the staff. Subjective:     Patient is a 78 y.o. male presents with gastric cancer postoperatively    69-year-old male with history of gastric carcinoma s/p chemotherapy last dose on May 19, 2022, hypertension who was admitted to the hospitalist service with SVT syncopal attack and anemia in addition to septic shock with gram-positive rods bacteremia. Patient went for surgery today for gastrectomy but was found to have an extensive tumor and ended up with total gastrectomy esophageal jejunostomy, left lobectomy of the liver, distal pancreatectomy and splenectomy and combined diaphragmatic resection with J-tube inserted. Patient also had retroperitoneal mass excision and lymphadenectomy. .  Patient came from the OR sedated intubated on phenylephrine drip 30 mcg. Patient has received multiple blood transfusion during his stay in addition to antibiotic course. He is also known with PE and DVT and is status post IVC filter on 6/6/2022. Review of Systems  Review of systems not obtained due to patient factors. Current Outpatient Medications   Medication Instructions    dexamethasone (DECADRON) 4 MG tablet Take 2 tabs twice daily the day before chemo and the day after chemo.     dexamethasone (DECADRON) 1 mg, Oral, 2 times a day    lidocaine-prilocaine (EMLA) 2.5-2.5 % cream Topical, PRN    ondansetron (ZOFRAN) 8 mg, Oral, EVERY 8 HOURS PRN    prochlorperazine (COMPAZINE) 5 mg, Oral, EVERY 6 HOURS PRN      Past Medical History:   Diagnosis Date    ABLA (acute blood loss anemia) 6/2/2022    GIB (gastrointestinal bleeding) 6/2/2022    HTN (hypertension)     Severe sepsis with lactic acidosis ([facility] [address] 6/2/2022     Past Surgical History:   Procedure Laterality Date    COLONOSCOPY N/A 3/9/2022    COLONOSCOPY/ 22 performed by Rikki Cazares MD at [facility]COPY    IR IVC FILTER PLACEMENT W IMAGING  6/6/2022    IR IVC FILTER PLACEMENT W IMAGING 6/6/2022 [facility]    KNEE ARTHROSCOPY      OTHER SURGICAL HISTORY      none    UPPER GASTROINTESTINAL ENDOSCOPY N/A 6/4/2022    EGD ESOPHAGOGASTRODUODENOSCOPY performed by Keiko Landrum MD at [facility] ENDOSCOPY     Social History     Socioeconomic History    Marital status:      Spouse name: Not on file    Number of children: Not on file    Years of education: Not on file    Highest education level: Not on file   Occupational History    Not on file   Tobacco Use    Smoking status: Former Smoker    Smokeless tobacco: Former User   Substance and Sexual Activity    Alcohol use: Not Currently    Drug use: Not Currently    Sexual activity: Not on file   Other Topics Concern    Not on file   Social History Narrative    Not on file     Social Determinants of Health     Financial Resource Strain:     Difficulty of Paying Living Expenses: Not on file   Food Insecurity:     Worried About [address] in the Last Year: Not on file    [address] in the Last Year: Not on file   Transportation Needs:     Lack of Transportation (Medical): Not on file    Lack of Transportation (Non-Medical):  Not on file   Physical Activity:     Days of Exercise per Week: Not on file    Minutes of Exercise per Session: Not on file   Stress:     Feeling of Stress : Not on file   Social Connections:     Frequency of Communication with Friends and Family: Not on file    Frequency of Social Gatherings with Friends and Family: Not on file    Attends Christianity Services: Not on file    Active Member of Clubs or Organizations: Not on file    Attends Club or Organization Meetings: Not on file    Marital Status: Not on file   Intimate Partner Violence:     Fear of Current or Ex-Partner: Not on file    Emotionally Abused: Not on file    Physically Abused: Not on file  Sexually Abused: Not on file   Housing Stability:     Unable to Pay for Housing in the Last Year: Not on file    Number of Places Lived in the Last Year: Not on file    Unstable Housing in the Last Year: Not on file     Family History   Problem Relation Age of Onset    Other Other         non-contributory     Allergies   Allergen Reactions    Iron     Tetanus Antitoxin      Objective:     Vitals:    06/09/22 0755 06/09/22 0828 06/09/22 1104 06/09/22 2029   BP: (!) 148/82  (!) 161/87 (!) 105/58   Pulse: 68 60 71 80   Resp: 18  20 15   Temp: 97.8 °F (36.6 °C)  97.7 °F (36.5 °C) 96.8 °F (36 °C)   TempSrc: Oral  Oral    SpO2: 99%  97% 100%   Weight:       Height:         PHYSICAL EXAM   Constitutional: Emaciated acutely ill on mechanical ventilation sedated. EENMT:  Sclera clear, pupils equal, oral intubated  Respiratory: Clear equal air sounds bilateral no crackles no wheezing  Cardiovascular:  RRR without M,G,R  Gastrointestinal: Midline incision with multiple drains  Musculoskeletal: warm without cyanosis. There is no lower extremity edema  Skin:  no jaundice or rashes, no wounds   Neurologic: Sedated not following commands    CXR:  Results for orders placed during the hospital encounter of 06/02/22    XR CHEST 1 VIEW    Narrative  CHEST X-RAY, single portable view  6/9/2022    History: Postop    Technique: Single frontal view of the chest.    Comparison: Chest x-ray 6/20/2022    Findings:  Is stable left-sided venous port is seen. The cardiac silhouette is normal in  respect to size. No clear pneumothorax is seen although the left lung apex has  been clipped and therefore a trace pneumothorax at this level could be missed. Multiple apparent skin folds overlie the left chest.  Mild right basilar  atelectatic appearing changes are seen. No significant pleural effusion is  seen. Impression  1. New mild right basilar atelectatic appearing changes. No significant acute  changes otherwise seen.   It should be noted that the left lung apex has been  clipped limiting complete assessment for pneumothorax. This report was made using voice transcription. Despite my best efforts to avoid  any, transcription errors may persist. If there is any question about the  accuracy of the report or need for clarification, then please call [phone], or text me through perfectserv for clarification or correction. No results found for this or any previous visit. LAB:  Recent Labs     06/07/22 0315 06/08/22 0325 06/09/22 0341   WBC 17.8* 13.4* 12.4*   HGB 9.9* 9.1* 8.9*   HCT 31.0* 29.0* 28.7*    217 232     Recent Labs     06/07/22 0315 06/07/22 0315 06/07/22 1923 06/08/22 0325 06/09/22 0341   *  --   --  138 138   K 3.2*   < > 3.7 3.6 3.5     --   --  104 104   CO2 25  --   --  29 27   BUN 12  --   --  8 9   CREATININE 0.20*  --   --  <0.20* <0.20*   MG 2.0  --   --  2.0 2.1   PHOS 3.3  --   --  2.5 2.7   BILITOT  --   --   --   --  0.2   AST  --   --   --   --  15   ALT  --   --   --   --  9*   ALKPHOS  --   --   --   --  100    < > = values in this interval not displayed. No results for input(s): LACTATE, PROCAL, TROPHS, NTPROBNP, CRP, ESR in the last 72 hours. No results for input(s): PH, PCO2, PO2, HCO3 in the last 72 hours. Microbiology:   No results for input(s): CULTURE in the last 72 hours. Invalid input(s): STAIN  Assessment and Plan:  (Medical Decision Making)   Impression:  Patient is coming into the ICU status post extensive surgery including gastrectomy partial pancreatectomy left lobe of the liver lobectomy splenectomy and combined diaphragmatic resection with retroperitoneal mass excision. Patient came in intubated sedated on small dose of phenylephrine.           Principal Problem:  Acute hypoxemic respiratory failure on mechanical ventilation post operatively  Gastric cancer status post gastrectomy pancreatectomy left liver lobe lobectomy splenectomy   Septic shock [facility])   Former heavy tobacco smoker  Pulm embolism  Recent septic shock with gram-positive rods bacteremia   DVT   Severe protein-calorie malnutrition ([address]      Plan:  -Continue sedation overnight with fentanyl and propofol  -Titrate Jesse-Synephrine to keep map above 65  -Patient is on chronic steroids we will put patient on stress dose [id number] mg IV every 6 hours  -Patient was treated for gram-positive rods in his blood 2 out of 2 patient has Port-A-Cath and is now on only Flagyl. I will repeat blood cultures consider removing Port-A-Cath if remains positive.   -Follow urine output renal function  -Stat BMP and CBC  -Adjust mechanical ventilation use nonproductive strategy keep pulse ox above 90%  -ABG ordered  -Patient has IVC filter for his DVT and recent PE. No anticoagulation for now       More than 50% of the time documented was spent in face-to-face contact with the patient and in the care of the patient on the floor/unit where the patient is located. Yulisa Bravo MD    I have spent 36 minutes of critical care time this time was spent at bedside or in the unit this time was exclusive of any billable procedures patient is needing intensive care due to complex medical problems requiring complex medical decisions. Dictated using voice recognition software.   Proof read but unrecognized errors may exist.

## 2022-06-10 NOTE — CONSULTS
Infectious Disease Consult    Today's Date: 6/10/2022   Admit Date: 6/2/2022    Impression:   · GPR bacteremia from PORT blood culture (6/2)- sent to [address] [place] [address] on 6/6; repeat culture 6/4 (peripheral) negative  · Gastric carcinoma: s/p chemo (last dose 5/19)  · S/p RP mass excision and lymphadenectomy with total gastrectomy, esophageal jejunostomy, left lobectomy of liver, distal pancreatectomy and splenectomy with diaphragmatic resection (6/9)  · Leukocytosis    Plan:   · Would resume Vancomycin and treat for a total of 14 days, EOT 6/16/22. · Discontinue Flagyl as this was added for anaerobic coverage, but not targeting GPR. · Would recommend removal of PortACath when feasible to do so. · *The ID team will not continue to follow. Please call with any other questions or concerns. Anti-infectives:   · Vancomycin 6/2-  · Cefepime 6/2-6/9  · Ancef in OR  · Flagyl 6/4-6/9    Subjective:   Date of Consultation:  Janey 10, 2022  Referring Physician: Suzanne Pisano NP     Patient is a 78 y.o. male with gastric carcinoma s/p chemotherapy last dose on 5/19. He presented on 6/2 with syncopal episodes and anemia. Blood culture drawn from his port with GPR. He was taken to OR on 6/9 and found to have extensive tumor and ended up with total gastrectomy, esophageal jejunostomy, left lobectomy of liver, distal pancreatectomy, and splenectomy with combined diaphragmatic resection with J tube insertion. He had RP mass excision and lymphadenectomy. He required postop ICU admission. He has PE and DVT requiring IVC filter which was placed on 6/6/22. ID has been consulted for the GPR in blood culture. Today patient is seen resting in bed.      Patient Active Problem List   Diagnosis    ABILIO (iron deficiency anemia)    Malignant neoplasm of overlapping sites of stomach ([address]    Malignant neoplasm of body of stomach ([address]    Gastric adenocarcinoma ([address]    Septic shock ([address]    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[address]    Former heavy tobacco smoker    Gastroesophageal reflux disease    Loss of weight    Open fracture of proximal phalanx of left thumb    Thumb amputation status, left    Tobacco use disorder    Hyponatremia    Hypoalbuminemia due to protein-calorie malnutrition (HCC)    Syncope due to orthostatic hypotension    Hypomagnesemia    Corticosteroid dependence (HCC)    Hypoproteinemia (HCC)    Do not resuscitate status    Fatigue    History of gastric cancer    Encounter for palliative care    Debility    Weakness    Hypercoagulable state, secondary ([facility] [address]    Adult body mass index less than 19    Acute pulmonary embolism without acute cor pulmonale (HCC)    Severe protein-calorie malnutrition (HCC)    Gastrointestinal hemorrhage    Encounter for weaning from ventilator [facility])     Past Medical History:   Diagnosis Date    ABLA (acute blood loss anemia) 6/2/2022    GIB (gastrointestinal bleeding) 6/2/2022    HTN (hypertension)     Severe sepsis with lactic acidosis ([facility] [address] 6/2/2022      Family History   Problem Relation Age of Onset    Other Other         non-contributory      Social History     Tobacco Use    Smoking status: Former Smoker    Smokeless tobacco: Former User   Substance Use Topics    Alcohol use: Not Currently     Past Surgical History:   Procedure Laterality Date    COLONOSCOPY N/A 3/9/2022    COLONOSCOPY/ 22 performed by Beryle Barrow, MD at [place] N/A 6/9/2022    Exploratory laparotomy with total gastrectomy and esophagojejunostomy after extensive lysis of adhesions and dissections which added at least 3-4 hours to this case, Left lateral lobectomy of liver, Distal pancreatectomy and splenectomy, Combined diaphragmatic resection, Falciform ligament flap, Feeding J-tube placement, Retroperitoneal mass excision and lymphadenectomy, Left abdominal wall mass ex    IR IVC FILTER PLACEMENT W IMAGING  6/6/2022    IR IVC FILTER PLACEMENT W IMAGING 6/6/2022 [facility]  KNEE ARTHROSCOPY      OTHER SURGICAL HISTORY      none    UPPER GASTROINTESTINAL ENDOSCOPY N/A 2022    EGD ESOPHAGOGASTRODUODENOSCOPY performed by Sebas Vo MD at [facility] ENDOSCOPY      Prior to Admission medications    Medication Sig Start Date End Date Taking? Authorizing Provider   dexamethasone (DECADRON) 1 MG tablet Take 1 mg by mouth 2 times a day 22   Ar Automatic Reconciliation   dexamethasone (DECADRON) 4 MG tablet Take 2 tabs twice daily the day before chemo and the day after chemo. 3/30/22   Ar Automatic Reconciliation   lidocaine-prilocaine (EMLA) 2.5-2.5 % cream Apply topically as needed 3/30/22   Ar Automatic Reconciliation   ondansetron (ZOFRAN) 8 MG tablet Take 8 mg by mouth every 8 hours as needed for Nausea 3/30/22   Ar Automatic Reconciliation   prochlorperazine (COMPAZINE) 10 MG tablet Take 5 mg by mouth every 6 hours as needed (nausea) 3/30/22   Ar Automatic Reconciliation       Allergies   Allergen Reactions    Iron     Tetanus Antitoxin         Review of Systems:  See HPI    Objective:     Visit Vitals  /80   Pulse 95   Temp 98.8 °F (37.1 °C) (Oral)   Resp 23   Ht 5' 9\" (1.753 m)   Wt 145 lb 8.1 oz (66 kg)   SpO2 100%   BMI 21.49 kg/m²     Temp (24hrs), Av.4 °F (36.3 °C), Min:96.5 °F (35.8 °C), Max:98.8 °F (37.1 °C)       Lines:  R chest port    Physical Exam:    General:  Alert, cooperative, thin   Eyes:  Sclera anicteric. Pupils equally round and reactive to light.    Mouth/Throat: postop NGT   Neck: Supple   Lungs:   Diminished to auscultation bilaterally, good effort   CV:  Regular rate and rhythm,no murmur, click, rub or gallop   Abdomen:   Postop dressing in place; J tube   Extremities: No cyanosis or edema   Skin: Skin color, texture, turgor normal. no acute rash or lesions   Lymph nodes: Cervical and supraclavicular normal   Musculoskeletal: No swelling or deformity   Lines/Devices:  Intact, no erythema, drainage or tenderness   Psych: Alert and oriented, normal mood affect given the setting       Data Review:     CBC:  Recent Labs     06/08/22  0325 06/09/22  0341 06/10/22  0422   WBC 13.4* 12.4* 41.4*   HGB 9.1* 8.9* 11.0*   HCT 29.0* 28.7* 34.1*    232 247       BMP:  Recent Labs     06/08/22  0325 06/09/22  0341 06/10/22  0422   BUN 8 9 15    138 139   K 3.6 3.5 4.0    104 110*   CO2 29 27 20*       LFTS:  Recent Labs     06/09/22  0341 06/10/22  0422   ALT 9* 22       Signed By: Roseann Edward, APRN - CNP     Janey 10, 2022

## 2022-06-10 NOTE — PROGRESS NOTES
Respiratory Mechanics completed and are as follows:    RSBI: 22.4  VC: 1010 mL  NIF: -36  RR: 29       Patient extubated per Dr. Sonali Reina to a 4L NC. Patient is able to communicate verbally, lift head off of pillow, and is negative for stridor. Breath sounds are clear and diminished. No complications with extubation.      Rosy Ott SR, RCP

## 2022-06-10 NOTE — PROGRESS NOTES
Ventilator check complete; patient has a #8. 0 ET tube secured at the 22 at the lip. Patient is  sedated. Patient is not able to follow commands. Breath sounds are clear and diminished. Trachea is midline, Negative for subcutaneous air, and chest excursion is symmetric. Patient is also Negative for cyanosis and is Negative for pitting edema. All alarms are set and audible. Resuscitation bag is  at the head of the bed.       Ventilator Settings  Mode FIO2 Rate Tidal Volume Pressure PEEP I:E Ratio   PS/CPAP  40 %       8 cm H2O    8 1:1.09      Peak airway pressure:     Minute ventilation: 12 l/min           Evelyn Dee SR, RCP

## 2022-06-10 NOTE — PROGRESS NOTES
Discussed case during interdisciplinary rounds. Patient is currently intubated and on ventilator. Will transition care to intensivist at this time. Hospitalist will sign off. Please call if have any questions.

## 2022-06-11 ENCOUNTER — APPOINTMENT (OUTPATIENT)
Dept: GENERAL RADIOLOGY | Age: 67
DRG: 853 | End: 2022-06-11
Payer: MEDICARE

## 2022-06-11 LAB
ALBUMIN SERPL-MCNC: 1.6 G/DL (ref 3.2–4.6)
ALBUMIN/GLOB SERPL: 0.6 {RATIO} (ref 1.2–3.5)
ALP SERPL-CCNC: 73 U/L (ref 50–136)
ALT SERPL-CCNC: 15 U/L (ref 12–65)
ANION GAP SERPL CALC-SCNC: 7 MMOL/L (ref 7–16)
AST SERPL-CCNC: 12 U/L (ref 15–37)
BASOPHILS # BLD: 0.1 K/UL (ref 0–0.2)
BASOPHILS NFR BLD: 0 % (ref 0–2)
BILIRUB SERPL-MCNC: 0.2 MG/DL (ref 0.2–1.1)
BUN SERPL-MCNC: 17 MG/DL (ref 8–23)
CALCIUM SERPL-MCNC: 8.2 MG/DL (ref 8.3–10.4)
CHLORIDE SERPL-SCNC: 112 MMOL/L (ref 98–107)
CO2 SERPL-SCNC: 24 MMOL/L (ref 21–32)
CREAT SERPL-MCNC: <0.2 MG/DL (ref 0.8–1.5)
DIFFERENTIAL METHOD BLD: ABNORMAL
EOSINOPHIL # BLD: 0 K/UL (ref 0–0.8)
EOSINOPHIL NFR BLD: 0 % (ref 0.5–7.8)
ERYTHROCYTE [DISTWIDTH] IN BLOOD BY AUTOMATED COUNT: 18.1 % (ref 11.9–14.6)
GLOBULIN SER CALC-MCNC: 2.9 G/DL (ref 2.3–3.5)
GLUCOSE BLD STRIP.AUTO-MCNC: 123 MG/DL (ref 65–100)
GLUCOSE BLD STRIP.AUTO-MCNC: 134 MG/DL (ref 65–100)
GLUCOSE BLD STRIP.AUTO-MCNC: 137 MG/DL (ref 65–100)
GLUCOSE BLD STRIP.AUTO-MCNC: 139 MG/DL (ref 65–100)
GLUCOSE SERPL-MCNC: 145 MG/DL (ref 65–100)
HCT VFR BLD AUTO: 28.6 % (ref 41.1–50.3)
HGB BLD-MCNC: 9 G/DL (ref 13.6–17.2)
IMM GRANULOCYTES # BLD AUTO: 0.7 K/UL (ref 0–0.5)
IMM GRANULOCYTES NFR BLD AUTO: 2 % (ref 0–5)
LYMPHOCYTES # BLD: 1.5 K/UL (ref 0.5–4.6)
LYMPHOCYTES NFR BLD: 4 % (ref 13–44)
MCH RBC QN AUTO: 27.9 PG (ref 26.1–32.9)
MCHC RBC AUTO-ENTMCNC: 31.5 G/DL (ref 31.4–35)
MCV RBC AUTO: 88.5 FL (ref 79.6–97.8)
MONOCYTES # BLD: 1.5 K/UL (ref 0.1–1.3)
MONOCYTES NFR BLD: 4 % (ref 4–12)
NEUTS SEG # BLD: 35.9 K/UL (ref 1.7–8.2)
NEUTS SEG NFR BLD: 91 % (ref 43–78)
NRBC # BLD: 0.03 K/UL (ref 0–0.2)
PLATELET # BLD AUTO: 305 K/UL (ref 150–450)
PMV BLD AUTO: 9.8 FL (ref 9.4–12.3)
POTASSIUM SERPL-SCNC: 3.6 MMOL/L (ref 3.5–5.1)
PROT SERPL-MCNC: 4.5 G/DL (ref 6.3–8.2)
RBC # BLD AUTO: 3.23 M/UL (ref 4.23–5.6)
SERVICE CMNT-IMP: ABNORMAL
SODIUM SERPL-SCNC: 143 MMOL/L (ref 136–145)
TRIGL SERPL-MCNC: 43 MG/DL (ref 35–150)
VANCOMYCIN SERPL-MCNC: 17.8 UG/ML
WBC # BLD AUTO: 39.7 K/UL (ref 4.3–11.1)

## 2022-06-11 PROCEDURE — 2580000003 HC RX 258: Performed by: SURGERY

## 2022-06-11 PROCEDURE — 2500000003 HC RX 250 WO HCPCS: Performed by: INTERNAL MEDICINE

## 2022-06-11 PROCEDURE — 6360000002 HC RX W HCPCS: Performed by: SURGERY

## 2022-06-11 PROCEDURE — 71045 X-RAY EXAM CHEST 1 VIEW: CPT

## 2022-06-11 PROCEDURE — 1090000001 HH PPS REVENUE CREDIT

## 2022-06-11 PROCEDURE — 2700000000 HC OXYGEN THERAPY PER DAY

## 2022-06-11 PROCEDURE — 6360000002 HC RX W HCPCS: Performed by: NURSE PRACTITIONER

## 2022-06-11 PROCEDURE — 6370000000 HC RX 637 (ALT 250 FOR IP): Performed by: NURSE PRACTITIONER

## 2022-06-11 PROCEDURE — 6370000000 HC RX 637 (ALT 250 FOR IP): Performed by: INTERNAL MEDICINE

## 2022-06-11 PROCEDURE — 85025 COMPLETE CBC W/AUTO DIFF WBC: CPT

## 2022-06-11 PROCEDURE — 2500000003 HC RX 250 WO HCPCS: Performed by: SURGERY

## 2022-06-11 PROCEDURE — 6370000000 HC RX 637 (ALT 250 FOR IP): Performed by: SURGERY

## 2022-06-11 PROCEDURE — 1100000000 HC RM PRIVATE

## 2022-06-11 PROCEDURE — 84478 ASSAY OF TRIGLYCERIDES: CPT

## 2022-06-11 PROCEDURE — 82962 GLUCOSE BLOOD TEST: CPT

## 2022-06-11 PROCEDURE — 99233 SBSQ HOSP IP/OBS HIGH 50: CPT | Performed by: INTERNAL MEDICINE

## 2022-06-11 PROCEDURE — 94760 N-INVAS EAR/PLS OXIMETRY 1: CPT

## 2022-06-11 PROCEDURE — 94640 AIRWAY INHALATION TREATMENT: CPT

## 2022-06-11 PROCEDURE — 80202 ASSAY OF VANCOMYCIN: CPT

## 2022-06-11 PROCEDURE — 2580000003 HC RX 258: Performed by: NURSE PRACTITIONER

## 2022-06-11 PROCEDURE — 1090000002 HH PPS REVENUE DEBIT

## 2022-06-11 PROCEDURE — 80053 COMPREHEN METABOLIC PANEL: CPT

## 2022-06-11 RX ORDER — DIMETHICONE, CAMPHOR (SYNTHETIC), MENTHOL, AND PHENOL 1.1; .5; .625; .5 G/100G; G/100G; G/100G; G/100G
OINTMENT TOPICAL PRN
Status: DISCONTINUED | OUTPATIENT
Start: 2022-06-11 | End: 2022-06-21 | Stop reason: HOSPADM

## 2022-06-11 RX ADMIN — GUAIFENESIN 200 MG: 200 SOLUTION ORAL at 17:39

## 2022-06-11 RX ADMIN — METOPROLOL TARTRATE 5 MG: 5 INJECTION INTRAVENOUS at 17:40

## 2022-06-11 RX ADMIN — SODIUM CHLORIDE, PRESERVATIVE FREE 40 ML: 5 INJECTION INTRAVENOUS at 10:55

## 2022-06-11 RX ADMIN — GUAIFENESIN 200 MG: 200 SOLUTION ORAL at 23:48

## 2022-06-11 RX ADMIN — SOYBEAN OIL 250 ML: 20 INJECTION, SOLUTION INTRAVENOUS at 17:39

## 2022-06-11 RX ADMIN — HYDROMORPHONE HYDROCHLORIDE 0.5 MG: 1 INJECTION, SOLUTION INTRAMUSCULAR; INTRAVENOUS; SUBCUTANEOUS at 02:04

## 2022-06-11 RX ADMIN — METOPROLOL TARTRATE 5 MG: 5 INJECTION INTRAVENOUS at 02:05

## 2022-06-11 RX ADMIN — GUAIFENESIN 200 MG: 200 SOLUTION ORAL at 12:14

## 2022-06-11 RX ADMIN — IPRATROPIUM BROMIDE AND ALBUTEROL SULFATE 1 AMPULE: .5; 3 SOLUTION RESPIRATORY (INHALATION) at 07:47

## 2022-06-11 RX ADMIN — HYDROCORTISONE SODIUM SUCCINATE 50 MG: 100 INJECTION, POWDER, FOR SOLUTION INTRAMUSCULAR; INTRAVENOUS at 06:39

## 2022-06-11 RX ADMIN — ENOXAPARIN SODIUM 30 MG: 100 INJECTION SUBCUTANEOUS at 10:00

## 2022-06-11 RX ADMIN — VANCOMYCIN HYDROCHLORIDE 1250 MG: 10 INJECTION, POWDER, LYOPHILIZED, FOR SOLUTION INTRAVENOUS at 12:09

## 2022-06-11 RX ADMIN — SODIUM ACETATE: 164 INJECTION, SOLUTION, CONCENTRATE INTRAVENOUS at 17:39

## 2022-06-11 RX ADMIN — Medication 2000 MG: at 05:20

## 2022-06-11 RX ADMIN — HYDROMORPHONE HYDROCHLORIDE 0.5 MG: 1 INJECTION, SOLUTION INTRAMUSCULAR; INTRAVENOUS; SUBCUTANEOUS at 05:12

## 2022-06-11 RX ADMIN — VANCOMYCIN HYDROCHLORIDE 1250 MG: 10 INJECTION, POWDER, LYOPHILIZED, FOR SOLUTION INTRAVENOUS at 02:05

## 2022-06-11 RX ADMIN — HYDROMORPHONE HYDROCHLORIDE 0.5 MG: 1 INJECTION, SOLUTION INTRAMUSCULAR; INTRAVENOUS; SUBCUTANEOUS at 20:13

## 2022-06-11 RX ADMIN — IPRATROPIUM BROMIDE AND ALBUTEROL SULFATE 1 AMPULE: .5; 3 SOLUTION RESPIRATORY (INHALATION) at 15:00

## 2022-06-11 RX ADMIN — HYDROMORPHONE HYDROCHLORIDE 0.5 MG: 1 INJECTION, SOLUTION INTRAMUSCULAR; INTRAVENOUS; SUBCUTANEOUS at 23:43

## 2022-06-11 RX ADMIN — METOPROLOL TARTRATE 5 MG: 5 INJECTION INTRAVENOUS at 10:00

## 2022-06-11 RX ADMIN — GUAIFENESIN 200 MG: 200 SOLUTION ORAL at 06:40

## 2022-06-11 RX ADMIN — IPRATROPIUM BROMIDE AND ALBUTEROL SULFATE 1 AMPULE: .5; 3 SOLUTION RESPIRATORY (INHALATION) at 20:48

## 2022-06-11 RX ADMIN — Medication 2000 MG: at 12:08

## 2022-06-11 RX ADMIN — METOPROLOL TARTRATE 5 MG: 5 INJECTION INTRAVENOUS at 23:46

## 2022-06-11 RX ADMIN — IPRATROPIUM BROMIDE AND ALBUTEROL SULFATE 1 AMPULE: .5; 3 SOLUTION RESPIRATORY (INHALATION) at 01:27

## 2022-06-11 RX ADMIN — SODIUM CHLORIDE, PRESERVATIVE FREE 10 ML: 5 INJECTION INTRAVENOUS at 21:21

## 2022-06-11 RX ADMIN — HYDROCORTISONE SODIUM SUCCINATE 50 MG: 100 INJECTION, POWDER, FOR SOLUTION INTRAMUSCULAR; INTRAVENOUS at 20:13

## 2022-06-11 RX ADMIN — CLONIDINE HYDROCHLORIDE 0.2 MG: 0.2 TABLET ORAL at 06:40

## 2022-06-11 RX ADMIN — GUAIFENESIN 200 MG: 200 SOLUTION ORAL at 01:59

## 2022-06-11 RX ADMIN — Medication: at 20:12

## 2022-06-11 RX ADMIN — SODIUM CHLORIDE, POTASSIUM CHLORIDE, SODIUM LACTATE AND CALCIUM CHLORIDE: 600; 310; 30; 20 INJECTION, SOLUTION INTRAVENOUS at 07:03

## 2022-06-11 ASSESSMENT — PAIN SCALES - GENERAL
PAINLEVEL_OUTOF10: 0
PAINLEVEL_OUTOF10: 7
PAINLEVEL_OUTOF10: 0
PAINLEVEL_OUTOF10: 0
PAINLEVEL_OUTOF10: 7
PAINLEVEL_OUTOF10: 0
PAINLEVEL_OUTOF10: 7
PAINLEVEL_OUTOF10: 0
PAINLEVEL_OUTOF10: 7

## 2022-06-11 ASSESSMENT — PAIN SCALES - WONG BAKER
WONGBAKER_NUMERICALRESPONSE: 0

## 2022-06-11 ASSESSMENT — PAIN DESCRIPTION - LOCATION
LOCATION: ABDOMEN

## 2022-06-11 ASSESSMENT — PAIN DESCRIPTION - DESCRIPTORS
DESCRIPTORS: ACHING

## 2022-06-11 ASSESSMENT — PAIN - FUNCTIONAL ASSESSMENT
PAIN_FUNCTIONAL_ASSESSMENT: PREVENTS OR INTERFERES SOME ACTIVE ACTIVITIES AND ADLS
PAIN_FUNCTIONAL_ASSESSMENT: PREVENTS OR INTERFERES SOME ACTIVE ACTIVITIES AND ADLS
PAIN_FUNCTIONAL_ASSESSMENT: PREVENTS OR INTERFERES WITH MANY ACTIVE NOT PASSIVE ACTIVITIES
PAIN_FUNCTIONAL_ASSESSMENT: PREVENTS OR INTERFERES WITH MANY ACTIVE NOT PASSIVE ACTIVITIES

## 2022-06-11 ASSESSMENT — PAIN DESCRIPTION - ORIENTATION
ORIENTATION: ANTERIOR
ORIENTATION: MID
ORIENTATION: MID
ORIENTATION: ANTERIOR

## 2022-06-11 ASSESSMENT — PAIN DESCRIPTION - FREQUENCY: FREQUENCY: CONTINUOUS

## 2022-06-11 ASSESSMENT — PAIN DESCRIPTION - ONSET: ONSET: ON-GOING

## 2022-06-11 ASSESSMENT — PAIN DESCRIPTION - PAIN TYPE: TYPE: SURGICAL PAIN

## 2022-06-11 NOTE — PROGRESS NOTES
[facility] Critical Care Note[de-identified] 6/11/2022  Cha [place]  Admission Date: 6/2/2022     Length of Stay: 9 days    Background:     78 y.o. y/o male with history of gastric carcinoma s/p neoadjuvant chemo (last dose May 29, 2022) with plans for subsequent total gastrectomy, HTN, admitted to hospitalist service 6/2 with syncopal episode and anemia (hgb 4.9 on admission) as well as gram positive mildred bacteremia (only 1/2, possible contaminate). Unable to tolerate solid food for months and has been losing weight. Was hypotension on pressors initially. Was covered with vanc and cefepime initially, flagyl added with GPR returned. CT c/a/p with small R sided PE, femoral DVT. Subsequently had large melanotic stools, transfused. EGD performed 6/4 but unable to see with food in stomach. Repeat 6/5 similar results. AC held and RI placed IVC filter 6/6.   6/9 was taken for total gastrectomy, left lateral lobectomy of liver, distal pancreatectomy and splenectomy, diaphragmatic resection, J tube placement, and retroperitoneal mass excision and lymphadenectomy, and left abdominal wall mass excision. To ICU on vent post op and requiring joe. Notable PMH:  has a past medical history of ABLA (acute blood loss anemia), GIB (gastrointestinal bleeding), HTN (hypertension), and Severe sepsis with lactic acidosis ([address] 24 Hour events:     Extubated yesterday  Off pressors      ROS: unable to obtain/negative except as listed elsewhere.      Lines: (insertion date)    Closed/Suction Drain Right RUQ Bulb (Active)       Closed/Suction Drain Left LUQ Bulb (Active)       NG/OG/NJ/NE Tube Right nostril (Active)       Gastrostomy/Enterostomy/Jejunostomy Tube Gastrostomy LUQ 1 14 fr (Active)       Urinary Catheter 2 Way (Active)     Single Lumen Implantable Port Left Chest (Active)       Arterial Line 06/09/22 Radial (Active)     Drips: current dose (range)  Dose (mcg/kg/min) Propofol : 20 mcg/kg/min  Dose (mcg/min) Phenylephrine : 0 mcg/min (map 91)     Pertinent Exam:         Blood pressure (!) 164/72, pulse 96, temperature 97.9 °F (36.6 °C), temperature source Axillary, resp. rate 21, height 5' 9\" (1.753 m), weight 145 lb 8.1 oz (66 kg), SpO2 94 %. Intake/Output Summary (Last 24 hours) at 6/11/2022 0749  Last data filed at 6/11/2022 0535  Gross per 24 hour   Intake 3707.74 ml   Output 1485 ml   Net 2222.74 ml     Constitutional:  intubated and mechanically ventilated. EENMT:  Sclera clear, pupils equal, oral mucosa moist  Respiratory: clear  Cardiovascular:  RRR  Gastrointestinal:  soft, dressing on   Musculoskeletal:  warm with no cyanosis, no lower extremity edema  Skin:  no jaundice or ecchymosis  Neurologic:awake  Psychiatric: sedated and paralyzed    CXR:     Recent Labs     06/09/22  0341 06/10/22  0422 06/11/22  0504   WBC 12.4* 41.4* 39.7*   HGB 8.9* 11.0* 9.0*   HCT 28.7* 34.1* 28.6*    247 305     Recent Labs     06/09/22  0341 06/10/22  0053 06/10/22  0422 06/10/22  0830 06/11/22  0503     --  139  --  143   K 3.5  --  4.0  --  3.6     --  110*  --  112*   CO2 27  --  20*  --  24   BUN 9  --  15  --  17   CREATININE <0.20*  --  0.20*  --  <0.20*   MG 2.1 1.6*  --  1.8  --    PHOS 2.7  --   --  3.1  --    BILITOT 0.2  --  0.2  --  0.2   AST 15  --  20  --  12*   ALT 9*  --  22  --  15   ALKPHOS 100  --  69  --  73     No results for input(s): TROPHS, NTPROBNP, CRP, ESR in the last 72 hours. Recent Labs     06/09/22  0341 06/10/22  0422 06/11/22  0503   GLUCOSE 104* 231* 145*      ECHO: 06/02/22    TRANSTHORACIC ECHOCARDIOGRAM (TTE) COMPLETE (CONTRAST/BUBBLE/3D PRN) 06/09/2022  9:31 AM (Final)    Interpretation Summary    Left Ventricle: Reduced left ventricular systolic function. EF by 2D Simpsons Biplane is 42%. Left ventricle size is normal. Severely increased wall thickness. Increased ventricular mass. Infiltrative cardiomyopathy should be considered. Global hypokinesis present. PULM:   Acute hypoxemic respiratory failure: Only on vent due to surgery, extubated yeseterday  on 2L NC  RENAL:  Electrolytes: replace as needed  GI:   Nutrition: TPN  Gastric cancer: s/p extensive resection 6/9. GI bleed: likely due to cancer. HEME:   Anemia: hgb now stable. Cont to monitor. Anticoagulation: Started on prophy lovenox today. With PE and DVT would like to increase to therapeutic dose, but will need surgery input. ID:   On post op abx. Believe 1/2 GPR blood culture was likely contamiante. ENDO:   Chronic steroid use: Unsure the underlying need for this. on 50 mg q8 hydrocortisone for now.    Skin: no decub, turns, preventive care  Prophy: lovenox.        He is on 2L ,stable from pulmonary stand point to transfer out of surgery       Full Code        Quinn Sanchez MD

## 2022-06-11 NOTE — PROGRESS NOTES
TRANSFER - OUT REPORT:    Verbal report given to Amy Cameron RN on Marina Middleton  being transferred to 2nd Floor for routine progression of patient care       Report consisted of patient's Situation, Background, Assessment and   Recommendations(SBAR). Information from the following report(s) Nurse Handoff Report, Adult Overview, Surgery Report, Recent Results and Quality Measures was reviewed with the receiving nurse. Lines:   Single Lumen Implantable Port Left Chest (Active)   Port A Cath Status Accessed 06/11/22 0800   Criteria for Appropriate Use Total parenteral nutrition 06/11/22 0800   Site Assessment Clean, dry & intact 06/11/22 0800   Line Care Connections checked and tightened; Chlorhexidine wipes;Ports disinfected; Tubing changed 06/11/22 0800   Alcohol Cap Used Yes 06/11/22 0800   Date of Last Dressing Change 06/04/22 06/11/22 0800   Dressing Type Antimicrobial;Transparent 06/11/22 0800   Dressing Status Clean, dry & intact 06/11/22 0800   Dressing Intervention New 06/08/22 1457   Date Accessed  06/02/22 06/11/22 0800   Access Attempts  1 05/25/22 0805   Access Needle Gauge 20 G 06/05/22 1749   Access Needle Length 0.75 inches 06/05/22 1749   Accessed By: Barak Sue RN 05/25/22 0805   Single Lumen Status Flushed;Brisk blood return; Infusing 06/11/22 0800   Date needle changed 06/02/22 06/05/22 1749   De-Access Date 05/25/22 05/25/22 0925   De-Access Time 0925 05/25/22 0925   De-Accessed By Barak Sue RN 05/25/22 0925       Peripheral IV 06/04/22 Left;Proximal;Anterior Forearm (Active)   Site Assessment Clean, dry & intact 06/11/22 0800   Line Status Flushed; Infusing 06/11/22 0800   Line Care Connections checked and tightened 06/11/22 0800   Phlebitis Assessment No symptoms 06/11/22 0800   Infiltration Assessment 0 06/11/22 0800   Alcohol Cap Used Yes 06/11/22 0800   Dressing Status Clean, dry & intact 06/11/22 0800   Dressing Type Transparent 06/11/22 0800   Dressing Intervention Other (Comment) 06/11/22 0303       Peripheral IV 06/09/22 Distal;Right Forearm (Active)   Site Assessment Clean, dry & intact 06/11/22 0800   Line Status Flushed;Capped 06/11/22 0800   Line Care Connections checked and tightened; Chlorhexidine wipes;Cap changed 06/11/22 0800   Phlebitis Assessment No symptoms 06/11/22 0800   Infiltration Assessment 0 06/11/22 0800   Alcohol Cap Used Yes 06/11/22 0800   Dressing Status Clean, dry & intact 06/11/22 0800   Dressing Type Transparent 06/11/22 0800   Dressing Intervention Other (Comment) 06/11/22 0303        Opportunity for questions and clarification was provided.       Patient transported with:  Registered Nurse and Tech       TPN @ 85 ml/hr  Vancomycin @ 166.7 ml/hr    NG Tube @ 58 cm at the nostril

## 2022-06-11 NOTE — PROGRESS NOTES
END OF SHIFT NOTE:    INTAKE/OUTPUT  06/10 0701 - 06/11 0700  In: 3707.7 [I.V.:1298.4]  Out: 7914 [Urine:1075; Drains:410]  Voiding: No  Catheter: Yes  Drain:   Closed/Suction Drain Right RUQ Bulb (Active)   Site Description Clean, dry & intact 06/11/22 1337   Dressing Status Clean, dry & intact 06/11/22 1337   Drainage Appearance Serosanguinous 06/11/22 1757   Drain Status Open to gravity drainage 06/11/22 1757   Output (ml) 0 ml 06/11/22 1757       Closed/Suction Drain Left LUQ Bulb (Active)   Site Description Clean, dry & intact 06/11/22 1337   Dressing Status Clean, dry & intact 06/11/22 1337   Drainage Appearance Serosanguinous 06/11/22 1757   Drain Status Compressed; To bulb suction 06/11/22 1757   Output (ml) 60 ml 06/11/22 1757       NG/OG/NJ/NE Tube Right nostril (Active)   Surrounding Skin Clean, dry & intact 06/11/22 1757   Securement device Tape 06/11/22 1757   Status Suction-low intermittent 06/11/22 1757   Placement Verified Gastric Contents 06/11/22 1200   NG/OG/NJ/NE External Measurement (cm) 58 cm 06/11/22 1757   Drainage Appearance Brown 06/11/22 1757   Output (mL) 70 ml 06/11/22 1757       Gastrostomy/Enterostomy/Jejunostomy Tube Gastrostomy LUQ 1 14 fr (Active)   Drainage Appearance Serous 06/11/22 0800   Site Description Clean, dry & intact 06/11/22 1757   J Port Status Irrigated;Clamped 06/11/22 1757   Surrounding Skin Clean, dry & intact 06/11/22 1757   Dressing Status Clean, dry & intact 06/11/22 1757   Dressing Type Dry dressing 06/11/22 1757   Free Water/Flush (mL) 40 mL 06/11/22 1757   Action Taken Other (comment) 06/11/22 1757       Urinary Catheter 2 Way (Active)   $ Urethral catheter insertion $ Not inserted for procedure 06/03/22 0820   Catheter Indications Urinary retention (acute or chronic), continuous bladder irrigation or bladder outlet obstruction;Perioperative use for selected surgical procedures 06/11/22 1337   Site Assessment No urethral drainage 06/11/22 1332   Urine Color Yellow 06/11/22 1337   Urine Appearance Clear 06/11/22 1337   Urine Odor Malodorous 06/11/22 1337   Collection Container Standard 06/11/22 1337   Securement Method Securing device (Describe) 06/11/22 1337   Catheter Care Completed Yes 06/11/22 0800   Catheter Best Practices  Drainage tube clipped to bed;Catheter secured to thigh; Tamper seal intact; Bag below bladder;Bag not on floor; Lack of dependent loop in tubing;Drainage bag less than half full 06/11/22 1337   Status Patent 06/11/22 1337   Manual Irrigation Volume Input (mL) 0 mL 06/11/22 0800   Output (mL) 300 mL 06/11/22 1615   Discontinuation Reason Per provider order 06/11/22 0800               Flatus: Patient does not have flatus present. Stool: 0  occurrences. Characteristics:  Stool Appearance: Watery  Stool Color: Brown  Stool Amount: Small  Stool Assessment  Incontinence: No  Stool Appearance: Watery  Stool Color: Brown  Stool Amount: Small  Stool Source: Rectum  Last BM (including prior to admit): 06/08/22    Emesis: 0 occurrences. Characteristics:        VITAL SIGNS  Patient Vitals for the past 12 hrs:   Temp Pulse Resp BP SpO2   06/11/22 1615 97.8 °F (36.6 °C) 59 18 137/80 94 %   06/11/22 1502 -- 94 20 -- 93 %   06/11/22 1346 97.6 °F (36.4 °C) 90 17 130/82 --   06/11/22 1300 -- 85 17 -- 94 %   06/11/22 1200 97.9 °F (36.6 °C) 81 18 129/81 93 %   06/11/22 1100 -- 83 19 -- 96 %   06/11/22 1000 -- 93 19 -- 97 %   06/11/22 0900 -- 93 17 -- 97 %   06/11/22 0800 98.1 °F (36.7 °C) 96 15 122/66 96 %   06/11/22 0747 -- 96 21 -- 94 %   06/11/22 0700 -- 96 14 -- 97 %   06/11/22 0640 -- -- -- Anahy Lorenzana 164/72 --       Pain Assessment  [facility]@  Pain Location: Abdomen  Patient's Stated Pain Goal: 0 - No pain    Ambulating  No    Shift report given to oncoming nurse at the bedside.     James Meigs, RN

## 2022-06-11 NOTE — PROGRESS NOTES
ICU OUTREACH NURSE PROGRESS REPORT      SUBJECTIVE: Called to assess patient secondary to transfer from critical care. Vitals:    06/11/22 1200 06/11/22 1300 06/11/22 1346 06/11/22 1502   BP: 129/81  130/82    Pulse: 81 85 90 94   Resp: 18 17 17 20   Temp: 97.9 °F (36.6 °C)  97.6 °F (36.4 °C)    TempSrc: Axillary  Oral    SpO2: 93% 94%  93%   Weight:       Height:            ASSESSMENT:  Patient alert and oriented. Respirations unlabored. Denies complaints currently. NGT and bilateral SHAYNE drains in place. VS, labs, and progress notes reviewed. Assisted with transfer from CCU. PLAN:  Will follow per ICU Outreach protocol.       [phone]

## 2022-06-11 NOTE — PROGRESS NOTES
VANCO DAILY FOLLOW UP NOTE  [facility] Pharmacokinetic Monitoring Service - Vancomycin    Consulting Provider: STEFFI Ruff (ID)  Indication: GPR port infection  Target Concentration: Goal AUC/WILFREDO 400-600 mg*hr/L  Day of Therapy: 10 (EOT 6/16 per ID)  Additional Antimicrobials:     Pertinent Laboratory Values: Wt Readings from Last 1 Encounters:   06/10/22 145 lb 8.1 oz (66 kg)     Temp Readings from Last 1 Encounters:   06/11/22 97.9 °F (36.6 °C) (Axillary)     Recent Labs     06/09/22  0341 06/10/22  0422 06/11/22  0503 06/11/22  0504   BUN 9 15 17  --    CREATININE <0.20* 0.20* <0.20*  --    WBC 12.4* 41.4*  --  39.7*     Estimated Creatinine Clearance: 335 mL/min (based on SCr of 0.2 mg/dL). Lab Results   Component Value Date    VANCORANDOM 17.8 06/11/2022       MRSA Nasal Swab: N/A. Non-respiratory infection. .      Assessment:  Date/Time Dose Concentration AUC   6/3 0709 750 mg q8h 26.1 601   6/5 0400 1000 mg q12h 13.7 482   6/11 0503 1250 mg q12h 17.8 439   Note: Serum concentrations collected for AUC dosing may appear elevated if collected in close proximity to the dose administered, this is not necessarily an indication of toxicity    Plan:  Current regimen is therapeutic  Will continue the current dose of 1250 mg every 12 hours for now   Repeat vancomycin concentration will be ordered when clinically indicated  Pharmacy will continue to monitor patient and adjust therapy as indicated    Thank you for the consult,  Tricia Lam, PharmD, BCPS  Clinical Pharmacist

## 2022-06-11 NOTE — PROGRESS NOTES
Admit Date: 2022    POD 2 Day Post-Op    Procedure:  Procedure(s):  Exploratory laparotomy with total gastrectomy and esophagojejunostomy after extensive lysis of adhesions and dissections which added at least 3-4 hours to this case, Left lateral lobectomy of liver, Distal pancreatectomy and splenectomy, Combined diaphragmatic resection, Falciform ligament flap, Feeding J-tube placement, Retroperitoneal mass excision and lymphadenectomy, Left abdominal wall mass excision on posterior rectus sheath    Subjective:     Patient remains in ICU. Was extubated yesterday and now off pressors. Pt is alert and interactive. AF, VSS. Objective:       Vitals:    22 1100 22 1200 22 1300 22 1346   BP:  129/81  130/82   Pulse: 83 81 85 90   Resp: 19 18 17 17   Temp:  97.9 °F (36.6 °C)  97.6 °F (36.4 °C)   TempSrc:  Axillary  Oral   SpO2: 96% 93% 94%    Weight:       Height:           Temp (24hrs), Av.9 °F (36.6 °C), Min:97.4 °F (36.3 °C), Max:98.5 °F (36.9 °C)  . I&O reviewed as documented. [unfilled]     Physical Exam:   Constitutional: Alert, NAD   /82   Pulse 90   Temp 97.6 °F (36.4 °C) (Oral)   Resp 17   Ht 5' 9\" (1.753 m)   Wt 145 lb 8.1 oz (66 kg)   SpO2 94%   BMI 21.49 kg/m²   Eyes: Sclera are clear  ENMT: no external lesions' gross hearing normal; no obvious neck masses, no ear or lip lesions, nares normal, NG to LIS  CV: RRR. Normal perfusion  Resp: No JVD. Breathing is  non-labored; no audible wheezing. GI: soft and non-distended, post op dressing c/d/i. Right and left surgical drain   : Mata - yogesh urine  Musculoskeletal: No embolic signs or cyanosis. Neuro:  Alert  Psychiatric: Calm and cooperative    Labs:   Recent Results (from the past 24 hour(s))   POCT Glucose    Collection Time: 06/10/22  4:54 PM   Result Value Ref Range    POC Glucose 178 (H) 65 - 100 mg/dL    Performed by:  Korina    POCT Glucose    Collection Time: 06/10/22 8:22 PM   Result Value Ref Range    POC Glucose 157 (H) 65 - 100 mg/dL    Performed by: Annabel Gray    Triglyceride    Collection Time: 06/11/22  5:03 AM   Result Value Ref Range    Triglycerides 43 35 - 150 MG/DL   Comprehensive Metabolic Panel w/ Reflex to MG    Collection Time: 06/11/22  5:03 AM   Result Value Ref Range    Sodium 143 136 - 145 mmol/L    Potassium 3.6 3.5 - 5.1 mmol/L    Chloride 112 (H) 98 - 107 mmol/L    CO2 24 21 - 32 mmol/L    Anion Gap 7 7 - 16 mmol/L    Glucose 145 (H) 65 - 100 mg/dL    BUN 17 8 - 23 MG/DL    CREATININE <0.20 (L) 0.8 - 1.5 MG/DL    GFR  0 (L) >60 ml/min/1.73m2    GFR Non- 0 (L) >60 ml/min/1.73m2    Calcium 8.2 (L) 8.3 - 10.4 MG/DL    Total Bilirubin 0.2 0.2 - 1.1 MG/DL    ALT 15 12 - 65 U/L    AST 12 (L) 15 - 37 U/L    Alk Phosphatase 73 50 - 136 U/L    Total Protein 4.5 (L) 6.3 - 8.2 g/dL    Albumin 1.6 (L) 3.2 - 4.6 g/dL    Globulin 2.9 2.3 - 3.5 g/dL    Albumin/Globulin Ratio 0.6 (L) 1.2 - 3.5     Vancomycin Level, Random    Collection Time: 06/11/22  5:03 AM   Result Value Ref Range    Vancomycin Rm 17.8 UG/ML   CBC with Auto Differential    Collection Time: 06/11/22  5:04 AM   Result Value Ref Range    WBC 39.7 (H) 4.3 - 11.1 K/uL    RBC 3.23 (L) 4.23 - 5.6 M/uL    Hemoglobin 9.0 (L) 13.6 - 17.2 g/dL    Hematocrit 28.6 (L) 41.1 - 50.3 %    MCV 88.5 79.6 - 97.8 FL    MCH 27.9 26.1 - 32.9 PG    MCHC 31.5 31.4 - 35.0 g/dL    RDW 18.1 (H) 11.9 - 14.6 %    Platelets 291 354 - 941 K/uL    MPV 9.8 9.4 - 12.3 FL    nRBC 0.03 0.0 - 0.2 K/uL    Differential Type AUTOMATED      Seg Neutrophils 91 (H) 43 - 78 %    Lymphocytes 4 (L) 13 - 44 %    Monocytes 4 4.0 - 12.0 %    Eosinophils % 0 (L) 0.5 - 7.8 %    Basophils 0 0.0 - 2.0 %    Immature Granulocytes 2 0.0 - 5.0 %    Segs Absolute 35.9 (H) 1.7 - 8.2 K/UL    Absolute Lymph # 1.5 0.5 - 4.6 K/UL    Absolute Mono # 1.5 (H) 0.1 - 1.3 K/UL    Absolute Eos # 0.0 0.0 - 0.8 K/UL    Basophils Absolute 0.1 0.0 - 0.2 K/UL    Absolute Immature Granulocyte 0.7 (H) 0.0 - 0.5 K/UL   POCT Glucose    Collection Time: 06/11/22  8:27 AM   Result Value Ref Range    POC Glucose 137 (H) 65 - 100 mg/dL    Performed by: Korina    POCT Glucose    Collection Time: 06/11/22 12:06 PM   Result Value Ref Range    POC Glucose 139 (H) 65 - 100 mg/dL    Performed by:  Korina        Data Review     XR Results (most recent):  @[employer](SKY3738:1)@    Results (most recent):  @[employer](BMO5596:1)@   Assessment:     Principal Problem:    Septic shock ([address]  Active Problems:    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[address]    Former heavy tobacco smoker    Gastroesophageal reflux disease    Hyponatremia    Hypoalbuminemia due to protein-calorie malnutrition (HCC)    Syncope due to orthostatic hypotension    Hypomagnesemia    Corticosteroid dependence ([address]    Hypoproteinemia (HCC)    Do not resuscitate status    Fatigue    History of gastric cancer    Encounter for palliative care    Debility    Weakness    Hypercoagulable state, secondary ([address]    Adult body mass index less than 19    Acute pulmonary embolism without acute cor pulmonale (HCC)    Severe protein-calorie malnutrition (HCC)    Gastrointestinal hemorrhage    Encounter for weaning from ventilator ([address]    ABILIO (iron deficiency anemia)    Malignant neoplasm of overlapping sites of stomach ([address]    Malignant neoplasm of body of stomach ([address]    Gastric adenocarcinoma ([address]  Resolved Problems:    Severe sepsis with lactic acidosis (HCC)    ABLA (acute blood loss anemia)    GIB (gastrointestinal bleeding)        Plan/Recommendations/Medical Decision Making:     Care management per Hospitalist/ Intensivist  NG to LIS  ID following  TPN  IVF  No CPAP/ No BIPBP/ No Bagging  Follow labs  IV Abx  Ok to transfer to surgical floor    EMY Rico

## 2022-06-11 NOTE — PLAN OF CARE
Problem: Skin/Tissue Integrity  Goal: Absence of new skin breakdown  Description: 1. Monitor for areas of redness and/or skin breakdown  2. Assess vascular access sites hourly  3. Every 4-6 hours minimum:  Change oxygen saturation probe site  4. Every 4-6 hours:  If on nasal continuous positive airway pressure, respiratory therapy assess nares and determine need for appliance change or resting period.   Outcome: Progressing     Problem: Safety - Adult  Goal: Free from fall injury  Outcome: Progressing  Flowsheets (Taken 6/10/2022 2255 by Tamika Daniels RN)  Free From Fall Injury:   Instruct family/caregiver on patient safety   Based on caregiver fall risk screen, instruct family/caregiver to ask for assistance with transferring infant if caregiver noted to have fall risk factors     Problem: ABCDS Injury Assessment  Goal: Absence of physical injury  Outcome: Progressing  Flowsheets (Taken 6/10/2022 2255 by Tamika Daniels RN)  Absence of Physical Injury: Implement safety measures based on patient assessment     Problem: Pain  Goal: Verbalizes/displays adequate comfort level or baseline comfort level  Outcome: Progressing  Flowsheets  Taken 6/11/2022 0800 by Ofelia Arellano RN  Verbalizes/displays adequate comfort level or baseline comfort level: Encourage patient to monitor pain and request assistance  Taken 6/11/2022 0303 by Tamika Daniels RN  Verbalizes/displays adequate comfort level or baseline comfort level:   Encourage patient to monitor pain and request assistance   Assess pain using appropriate pain scale   Administer analgesics based on type and severity of pain and evaluate response  Taken 6/10/2022 2303 by Tamika Daniels RN  Verbalizes/displays adequate comfort level or baseline comfort level:   Encourage patient to monitor pain and request assistance   Assess pain using appropriate pain scale   Administer analgesics based on type and severity of pain and evaluate response   Implement non-pharmacological measures as appropriate and evaluate response     Problem: Discharge Planning  Goal: Discharge to home or other facility with appropriate resources  Outcome: Progressing  Flowsheets (Taken 6/11/2022 0800)  Discharge to home or other facility with appropriate resources: Identify barriers to discharge with patient and caregiver     Problem: Safety - Medical Restraint  Goal: Remains free of injury from restraints (Restraint for Interference with Medical Device)  Description: INTERVENTIONS:  1. Determine that other, less restrictive measures have been tried or would not be effective before applying the restraint  2. Evaluate the patient's condition at the time of restraint application  3. Inform patient/family regarding the reason for restraint  4.  Q2H: Monitor safety, psychosocial status, comfort, nutrition and hydration  Outcome: Progressing

## 2022-06-11 NOTE — PROGRESS NOTES
Bedside and verbal shift change report received from  [facility] (offgoing nurse). Report included the following information SBAR, Kardex, Procedure Summary, MAR, Recent Results, Med Rec Status, Cardiac Rhythm NSR, Alarm Parameters  and Quality Measures.      Dual skin assessment completed at bedside: skin intact, no pressure injuries (list pertinent skin assessment findings)    Dual verification of gtts completed (name of gtts verified): N/A

## 2022-06-11 NOTE — FLOWSHEET NOTE
06/11/22 2587   Dual Clinician Skin Assessment   Dual Skin Assessment (4 Eyes) X   Second Clinical  (First and Last Name) Terris Canavan, RN   Pressure Injury Documentation Pressure Injury Noted-See Wound LDA to Document  (Sacrum red and not blanchable)   Skin Integumentary    Skin Integumentary (WDL) X   Skin Color Ecchymosis (comment); Flushed;Pale  (severe bruising the the right elbow; red on sacrum)   Skin Integrity Abrasion; Incision (see LDA); Redness;Scars (comment); Wound (see LDA)   Skin Condition/Temp Dry;Fragile; Warm;Poor turgor   Location sacrum; abd   Wound Prevention/Protection Method Yes   Location of Wound Prevention Coccyx; Sacrum   Orientation of Wound Prevention Mid;Posterior   Wound Offloading (Prevention Methods) Bed, pressure reduction mattress; Foam silicone;Pillows;Repositioning; Wedge pillows   Dressing Present  Yes   Skin Assessed Underneath Dressing This Shift Yes   Care Plan - Skin/Tissue Integrity Goals   Skin Integrity Remains Intact Monitor for areas of redness and/or skin breakdown;Assess vascular access sites hourly Inguinal hernia of left side without obstruction or gangrene

## 2022-06-11 NOTE — PROGRESS NOTES
TRANSFER - IN REPORT:    Verbal report received from Marco Marina RN on Carolyn Gravely  being received from CCU for routine progression of patient care      Report consisted of patient's Situation, Background, Assessment and   Recommendations(SBAR). Information from the following report(s) Nurse Handoff Report, Index, Surgery Report, Intake/Output, MAR and Recent Results was reviewed with the receiving nurse. Opportunity for questions and clarification was provided. Assessment completed upon patient's arrival to unit and care assumed.

## 2022-06-11 NOTE — PROGRESS NOTES
Comprehensive Nutrition Assessment    Type and Reason for Visit: Reassess  Malnutrition Screening Tool: Malnutrition Screen  Have you recently lost weight without trying?: 34 or more pounds (4 points)  Have you been eating poorly because of a decreased appetite?: Yes (1 point)  Malnutrition Screening Tool Score: 5 and Unintentional Weight Loss (Hospitalists)  TPN management (Hospitalist and Surgery)    Nutrition Recommendations/Plan:    Parenteral Nutrition:  Total parenteral nutrition  to begin at 1800  Continue: Dex 15%, 5% AA 2 L (85ml/hr)   Initiate 250 ml 20% lipids daily  To provide: 1920 kcal/d (103% of needs), 100 grams of protein/d (125% of needs/1.8 g/kg CBW), 300 grams of CHO/d and 2250 ml of total volume/d. Lytes/L:   130 meq Sodium (75 meq NaCl, 55 meq NaAcetate), 27 mmol KPO4, 8 meq Mg, 4.5 meq Calcium; to be formulated ~1:1 Cl:Acetate ratio  Other additives: MTE, MVI Monday Wednesday Friday due to national shortages   Nutrition Related Medication Management:   Thiamin  mg/day day (end date 1/06), 1 mg folic acid day (end date 6/12)  IVF:  Continue per surgery  Nutritional Supplement Therapy:   Active electrolyte replacement per nutrition support protocols  Replacement indicated:  None   Labs:   BMP daily  Mg MWF  Phos MWF    Triglyceride tomorrow  POC Glucoses/SSI Activate: AM glucose >180 mg/dL     Malnutrition Assessment:  Malnutrition Status: Severe malnutrition  Context: Chronic Illness  Findings of clinical characteristics of malnutrition:   Energy Intake:  Mild decrease in energy intake (Comment) (only tolerating oral nutrition supplements as primary)  Weight Loss:  Greater than 20% over 1 year (38# (24.4%) in ~10 months)     Body Fat Loss:  Severe body fat loss Triceps,Fat Overlying Ribs,Buccal region   Muscle Mass Loss:  Severe muscle mass loss Temples (temporalis),Clavicles (pectoralis & deltoids),Thigh (quadraceps),Calf (gastrocnemius)  Fluid Accumulation:  No significant fluid accumulation     Strength:  Not Performed  Nutrition Assessment:  Nutrition History: History provided by patient and wife. Patient states that he has been trying to eat some solids but it feels like it just sits on his stomach. He states he will wake the next day and still feel full. Wife states that patient has not tried any solids in weeks. Patient states that he mostly has been consuming Ensure (regular) 6-7 per day. Wife states that she has been mixing peanut butter powder into Ensure to increase kcal and protein (potentially providing 60-70 additional kcal and 6-9 additional grams of protein per serving depending on brand). Patient endorses weight loss of 50-60# over the last year. Do You Have Any Cultural, Adventist, or Ethnic Food Preferences?: No   Nutrition Background:   Patient with PMH significant for HTN, gastric adenocarcinoma s/p neoadjuvant chemo with plans for surgery in 2-3 week. He presented with near syncopal episodes for last 2 days. He was admitted with shock. Nutrition Interval:  Pt is s/p ex lap with total gastrectomy and esophagojejunostomy with extensive lysis of adhesion and dissection, left lateral lobectomy of liver, distal pancreatectomy and splenectomy, combined diaphragmatic resection, falciform ligament flap, j-tube placement, retroperitoneal mass excision and lymphadenectomy, and left abdominal wall mass excision 6/9. Pt sleeping at time of visit. TPN infusing per order with IVF at 25ml/hr. Yoel Rush, NP messaged regarding IVF, decision pending continuing IVF today. Per discussion with RN, Sheree Coulter, pt to possibly go back to floor today. Abdominal Status (last documented):   Last BM (including prior to admit): 06/08/22, GI Symptoms: Other (Comment) (abd pain)   Pertinent Medications:  Ancef, Solu-cortef, Flagyl, SSI (none required), Protonix, Vancomycin  IVF: LR @ 25ml/hr  Electrolyte replacements: 6/7 KCl 10 meq x 4   Pertinent Labs:   Lab Results   Component Value Date    NA 143 06/11/2022    K 3.6 06/11/2022     06/11/2022    CO2 24 06/11/2022    BUN 17 06/11/2022    CREATININE <0.20 06/11/2022    GLUCOSE 145 06/11/2022    CALCIUM 8.2 06/11/2022    PHOS 3.1 06/10/2022    MG 1.8 06/10/2022      Lab Results   Component Value Date    POCGLU 157 06/10/2022    POCGLU 178 06/10/2022    POCGLU 256 06/09/2022    POCGLU 238 06/09/2022    POCGLU 206 06/09/2022    POCGLU 337 06/03/2022     Lab Results   Component Value Date    TRIG 43 06/11/2022   Remarks: Na increasing. K decreased, may benefit with slight increase in TPN this evening. Cl slightly increased and CO2 increasing with change in Cl:Acetate ratio yesterday. Glucose <180 today. No coverage required yet. TG <200, initiating this evening. Nutrition Related Findings:   6/2:CLD. 6/4: NPO with bloody BM. EGD failed x2 6/4 and 6/5 due to food particles obstructing access despite Reglan. 6/6: TPN consult, port in place. 6/6 TPN initiated with lipids. 6/7 cont 2L 10%dex/4.25%AA, hold lipids due to high TG. Formula changed to Dex 15%, 5% AA 6/8. Current Nutrition Therapies:  Diet NPO  PN-Adult Premix 5/15 - Central  Current Parenteral Nutrition Orders:  · Type and Formula: Premix Central (Dex 15%; 5% AA)   · Lipids: None  · Duration: Continuous  · Rate/Volume: 2 L (85ml/hr)  · Current PN Order Provides: infusing per order  · Goal PN Orders Provides: 1420 kcal/d (88% of needs), 100 grams of protein/d (125% of needs/1.8 g/kg CBW), 300 grams of CHO/d and 2000 ml of total volume/d.     Current Intake:   Average Meal Intake: NPO        Anthropometric Measures:  Height: 5' 9\" (175.3 cm)  Current Body Wt: 145 lb 8.1 oz (66 kg), Weight source: Bed Scale  BMI: 21.5  Admission Body Weight: 117 lb 15.1 oz (53.5 kg)  Ideal Body Weight (Kg) (Calculated): 73 kg (160 lbs), 73.7 %  Usual Body Wt: 156 lb (70.8 kg) (8/11/21 office wt), Percent weight change: -24.4       Edema: No data recorded  BMI Category Underweight (BMI less than 18.5)  Estimated Daily Nutrient Needs:  Energy (kcal/day): 3377-5215 (Kcal/kg (30-35) Weight used: 53.5 kg Current (6/2)  Protein (g/day): 70-80 (1.3-1.5 g/kg) Weight Used: (Current) 53.5 kg  Fluid (ml/day):   (1 ml/kcal)    Nutrition Diagnosis:   · Inadequate oral intake related to altered GI function as evidenced by NPO or clear liquid status due to medical condition (bloody BM, EGD failed x 2 due to retained food)    · Severe malnutrition related to inadequate protein-energy intake as evidenced by Criteria as identified in malnutrition assessment    Nutrition Interventions:   Food and/or Nutrient Delivery: Continue NPO,Modify Parenteral Nutrition     Coordination of Nutrition Care: Continue to monitor while inpatient       Goals:   Previous Goal Met: Progressing toward Goal(s)  Active Goal: Tolerate nutrition support at goal rate,within 2 days       Nutrition Monitoring and Evaluation:      Food/Nutrient Intake Outcomes: Parenteral Nutrition Intake/Tolerance  Physical Signs/Symptoms Outcomes: Biochemical Data,GI Status,Fluid Status or Edema,Weight    Discharge Planning:     Too soon to determine    Henry Silver, [address] [place] [address]

## 2022-06-12 ENCOUNTER — APPOINTMENT (OUTPATIENT)
Dept: GENERAL RADIOLOGY | Age: 67
DRG: 853 | End: 2022-06-12
Payer: MEDICARE

## 2022-06-12 LAB
ALBUMIN SERPL-MCNC: 1.4 G/DL (ref 3.2–4.6)
ALBUMIN/GLOB SERPL: 0.5 {RATIO} (ref 1.2–3.5)
ALP SERPL-CCNC: 118 U/L (ref 50–136)
ALT SERPL-CCNC: 12 U/L (ref 12–65)
ANION GAP SERPL CALC-SCNC: 8 MMOL/L (ref 7–16)
AST SERPL-CCNC: 16 U/L (ref 15–37)
BASOPHILS # BLD: 0.1 K/UL (ref 0–0.2)
BASOPHILS NFR BLD: 0 % (ref 0–2)
BILIRUB SERPL-MCNC: 0.2 MG/DL (ref 0.2–1.1)
BUN SERPL-MCNC: 14 MG/DL (ref 8–23)
CALCIUM SERPL-MCNC: 8.3 MG/DL (ref 8.3–10.4)
CHLORIDE SERPL-SCNC: 109 MMOL/L (ref 98–107)
CO2 SERPL-SCNC: 27 MMOL/L (ref 21–32)
CREAT SERPL-MCNC: <0.2 MG/DL (ref 0.8–1.5)
DIFFERENTIAL METHOD BLD: ABNORMAL
EOSINOPHIL # BLD: 0 K/UL (ref 0–0.8)
EOSINOPHIL NFR BLD: 0 % (ref 0.5–7.8)
ERYTHROCYTE [DISTWIDTH] IN BLOOD BY AUTOMATED COUNT: 18 % (ref 11.9–14.6)
GLOBULIN SER CALC-MCNC: 3.1 G/DL (ref 2.3–3.5)
GLUCOSE BLD STRIP.AUTO-MCNC: 125 MG/DL (ref 65–100)
GLUCOSE BLD STRIP.AUTO-MCNC: 142 MG/DL (ref 65–100)
GLUCOSE BLD STRIP.AUTO-MCNC: 147 MG/DL (ref 65–100)
GLUCOSE SERPL-MCNC: 129 MG/DL (ref 65–100)
HCT VFR BLD AUTO: 27.5 % (ref 41.1–50.3)
HGB BLD-MCNC: 8.7 G/DL (ref 13.6–17.2)
IMM GRANULOCYTES # BLD AUTO: 1.2 K/UL (ref 0–0.5)
IMM GRANULOCYTES NFR BLD AUTO: 2 % (ref 0–5)
LYMPHOCYTES # BLD: 1.5 K/UL (ref 0.5–4.6)
LYMPHOCYTES NFR BLD: 3 % (ref 13–44)
MAGNESIUM SERPL-MCNC: 1.9 MG/DL (ref 1.8–2.4)
MCH RBC QN AUTO: 28.5 PG (ref 26.1–32.9)
MCHC RBC AUTO-ENTMCNC: 31.6 G/DL (ref 31.4–35)
MCV RBC AUTO: 90.2 FL (ref 79.6–97.8)
MONOCYTES # BLD: 1.9 K/UL (ref 0.1–1.3)
MONOCYTES NFR BLD: 4 % (ref 4–12)
NEUTS SEG # BLD: 47.3 K/UL (ref 1.7–8.2)
NEUTS SEG NFR BLD: 91 % (ref 43–78)
NRBC # BLD: 0.06 K/UL (ref 0–0.2)
PLATELET # BLD AUTO: 323 K/UL (ref 150–450)
PMV BLD AUTO: 10.3 FL (ref 9.4–12.3)
POTASSIUM SERPL-SCNC: 3.2 MMOL/L (ref 3.5–5.1)
PROT SERPL-MCNC: 4.5 G/DL (ref 6.3–8.2)
RBC # BLD AUTO: 3.05 M/UL (ref 4.23–5.6)
SERVICE CMNT-IMP: ABNORMAL
SODIUM SERPL-SCNC: 144 MMOL/L (ref 136–145)
TRIGL SERPL-MCNC: 90 MG/DL (ref 35–150)
WBC # BLD AUTO: 52.1 K/UL (ref 4.3–11.1)

## 2022-06-12 PROCEDURE — 6370000000 HC RX 637 (ALT 250 FOR IP): Performed by: SURGERY

## 2022-06-12 PROCEDURE — 2580000003 HC RX 258: Performed by: NURSE PRACTITIONER

## 2022-06-12 PROCEDURE — 36591 DRAW BLOOD OFF VENOUS DEVICE: CPT

## 2022-06-12 PROCEDURE — 6360000002 HC RX W HCPCS: Performed by: NURSE PRACTITIONER

## 2022-06-12 PROCEDURE — 2700000000 HC OXYGEN THERAPY PER DAY

## 2022-06-12 PROCEDURE — 71045 X-RAY EXAM CHEST 1 VIEW: CPT

## 2022-06-12 PROCEDURE — 94640 AIRWAY INHALATION TREATMENT: CPT

## 2022-06-12 PROCEDURE — 2500000003 HC RX 250 WO HCPCS: Performed by: SURGERY

## 2022-06-12 PROCEDURE — 84478 ASSAY OF TRIGLYCERIDES: CPT

## 2022-06-12 PROCEDURE — 1090000002 HH PPS REVENUE DEBIT

## 2022-06-12 PROCEDURE — 1090000001 HH PPS REVENUE CREDIT

## 2022-06-12 PROCEDURE — 80053 COMPREHEN METABOLIC PANEL: CPT

## 2022-06-12 PROCEDURE — 6360000002 HC RX W HCPCS: Performed by: SURGERY

## 2022-06-12 PROCEDURE — 83735 ASSAY OF MAGNESIUM: CPT

## 2022-06-12 PROCEDURE — 2580000003 HC RX 258: Performed by: SURGERY

## 2022-06-12 PROCEDURE — 94760 N-INVAS EAR/PLS OXIMETRY 1: CPT

## 2022-06-12 PROCEDURE — 82962 GLUCOSE BLOOD TEST: CPT

## 2022-06-12 PROCEDURE — 6360000002 HC RX W HCPCS: Performed by: FAMILY MEDICINE

## 2022-06-12 PROCEDURE — 85025 COMPLETE CBC W/AUTO DIFF WBC: CPT

## 2022-06-12 PROCEDURE — 2500000003 HC RX 250 WO HCPCS: Performed by: INTERNAL MEDICINE

## 2022-06-12 PROCEDURE — 1100000000 HC RM PRIVATE

## 2022-06-12 RX ORDER — GLUCAGON 1 MG/ML
1 KIT INJECTION PRN
Status: DISCONTINUED | OUTPATIENT
Start: 2022-06-12 | End: 2022-06-21 | Stop reason: HOSPADM

## 2022-06-12 RX ORDER — DEXTROSE MONOHYDRATE 50 MG/ML
100 INJECTION, SOLUTION INTRAVENOUS PRN
Status: DISCONTINUED | OUTPATIENT
Start: 2022-06-12 | End: 2022-06-21 | Stop reason: HOSPADM

## 2022-06-12 RX ORDER — METOPROLOL TARTRATE 5 MG/5ML
5 INJECTION INTRAVENOUS EVERY 6 HOURS
Status: DISCONTINUED | OUTPATIENT
Start: 2022-06-12 | End: 2022-06-15

## 2022-06-12 RX ORDER — POTASSIUM CHLORIDE 7.45 MG/ML
10 INJECTION INTRAVENOUS
Status: DISPENSED | OUTPATIENT
Start: 2022-06-12 | End: 2022-06-12

## 2022-06-12 RX ORDER — POTASSIUM CHLORIDE 7.45 MG/ML
10 INJECTION INTRAVENOUS ONCE
Status: COMPLETED | OUTPATIENT
Start: 2022-06-12 | End: 2022-06-12

## 2022-06-12 RX ADMIN — HYDROCORTISONE SODIUM SUCCINATE 50 MG: 100 INJECTION, POWDER, FOR SOLUTION INTRAMUSCULAR; INTRAVENOUS at 08:39

## 2022-06-12 RX ADMIN — METOPROLOL TARTRATE 5 MG: 5 INJECTION INTRAVENOUS at 19:50

## 2022-06-12 RX ADMIN — SODIUM CHLORIDE, PRESERVATIVE FREE 10 ML: 5 INJECTION INTRAVENOUS at 08:39

## 2022-06-12 RX ADMIN — METOPROLOL TARTRATE 5 MG: 5 INJECTION INTRAVENOUS at 08:39

## 2022-06-12 RX ADMIN — SODIUM ACETATE: 164 INJECTION, SOLUTION, CONCENTRATE INTRAVENOUS at 18:01

## 2022-06-12 RX ADMIN — HYDROMORPHONE HYDROCHLORIDE 0.5 MG: 1 INJECTION, SOLUTION INTRAMUSCULAR; INTRAVENOUS; SUBCUTANEOUS at 09:10

## 2022-06-12 RX ADMIN — SODIUM CHLORIDE, PRESERVATIVE FREE 40 ML: 5 INJECTION INTRAVENOUS at 21:00

## 2022-06-12 RX ADMIN — ENOXAPARIN SODIUM 30 MG: 100 INJECTION SUBCUTANEOUS at 08:39

## 2022-06-12 RX ADMIN — POTASSIUM CHLORIDE 10 MEQ: 7.46 INJECTION, SOLUTION INTRAVENOUS at 14:47

## 2022-06-12 RX ADMIN — POTASSIUM CHLORIDE 10 MEQ: 7.46 INJECTION, SOLUTION INTRAVENOUS at 18:02

## 2022-06-12 RX ADMIN — POTASSIUM CHLORIDE 10 MEQ: 7.46 INJECTION, SOLUTION INTRAVENOUS at 20:59

## 2022-06-12 RX ADMIN — HYDROMORPHONE HYDROCHLORIDE 0.5 MG: 1 INJECTION, SOLUTION INTRAMUSCULAR; INTRAVENOUS; SUBCUTANEOUS at 23:54

## 2022-06-12 RX ADMIN — SODIUM CHLORIDE: 9 INJECTION, SOLUTION INTRAVENOUS at 19:58

## 2022-06-12 RX ADMIN — IPRATROPIUM BROMIDE AND ALBUTEROL SULFATE 1 AMPULE: .5; 3 SOLUTION RESPIRATORY (INHALATION) at 15:20

## 2022-06-12 RX ADMIN — HYDROMORPHONE HYDROCHLORIDE 0.5 MG: 1 INJECTION, SOLUTION INTRAMUSCULAR; INTRAVENOUS; SUBCUTANEOUS at 05:33

## 2022-06-12 RX ADMIN — IPRATROPIUM BROMIDE AND ALBUTEROL SULFATE 1 AMPULE: .5; 3 SOLUTION RESPIRATORY (INHALATION) at 07:19

## 2022-06-12 RX ADMIN — VANCOMYCIN HYDROCHLORIDE 1250 MG: 10 INJECTION, POWDER, LYOPHILIZED, FOR SOLUTION INTRAVENOUS at 14:47

## 2022-06-12 RX ADMIN — POTASSIUM CHLORIDE 10 MEQ: 7.46 INJECTION, SOLUTION INTRAVENOUS at 13:07

## 2022-06-12 RX ADMIN — GUAIFENESIN 200 MG: 200 SOLUTION ORAL at 06:42

## 2022-06-12 RX ADMIN — SOYBEAN OIL 250 ML: 20 INJECTION, SOLUTION INTRAVENOUS at 18:02

## 2022-06-12 RX ADMIN — METOPROLOL TARTRATE 5 MG: 5 INJECTION INTRAVENOUS at 13:07

## 2022-06-12 RX ADMIN — IPRATROPIUM BROMIDE AND ALBUTEROL SULFATE 1 AMPULE: .5; 3 SOLUTION RESPIRATORY (INHALATION) at 20:46

## 2022-06-12 RX ADMIN — HYDROMORPHONE HYDROCHLORIDE 0.5 MG: 1 INJECTION, SOLUTION INTRAMUSCULAR; INTRAVENOUS; SUBCUTANEOUS at 19:44

## 2022-06-12 RX ADMIN — HYDROCORTISONE SODIUM SUCCINATE 25 MG: 100 INJECTION, POWDER, FOR SOLUTION INTRAMUSCULAR; INTRAVENOUS at 19:45

## 2022-06-12 RX ADMIN — HYDROMORPHONE HYDROCHLORIDE 0.5 MG: 1 INJECTION, SOLUTION INTRAMUSCULAR; INTRAVENOUS; SUBCUTANEOUS at 16:10

## 2022-06-12 RX ADMIN — POTASSIUM CHLORIDE 10 MEQ: 7.46 INJECTION, SOLUTION INTRAVENOUS at 11:30

## 2022-06-12 RX ADMIN — GUAIFENESIN 200 MG: 200 SOLUTION ORAL at 13:07

## 2022-06-12 RX ADMIN — SODIUM CHLORIDE, PRESERVATIVE FREE 30 ML: 5 INJECTION INTRAVENOUS at 06:42

## 2022-06-12 RX ADMIN — GUAIFENESIN 200 MG: 200 SOLUTION ORAL at 18:02

## 2022-06-12 RX ADMIN — VANCOMYCIN HYDROCHLORIDE 1250 MG: 10 INJECTION, POWDER, LYOPHILIZED, FOR SOLUTION INTRAVENOUS at 02:06

## 2022-06-12 ASSESSMENT — PAIN - FUNCTIONAL ASSESSMENT

## 2022-06-12 ASSESSMENT — PAIN SCALES - GENERAL
PAINLEVEL_OUTOF10: 4
PAINLEVEL_OUTOF10: 3
PAINLEVEL_OUTOF10: 8
PAINLEVEL_OUTOF10: 9
PAINLEVEL_OUTOF10: 7
PAINLEVEL_OUTOF10: 0
PAINLEVEL_OUTOF10: 7
PAINLEVEL_OUTOF10: 0
PAINLEVEL_OUTOF10: 0
PAINLEVEL_OUTOF10: 7

## 2022-06-12 ASSESSMENT — PAIN DESCRIPTION - ORIENTATION
ORIENTATION: ANTERIOR;LOWER
ORIENTATION: ANTERIOR
ORIENTATION: ANTERIOR
ORIENTATION: ANTERIOR;LOWER
ORIENTATION: ANTERIOR

## 2022-06-12 ASSESSMENT — PAIN DESCRIPTION - ONSET
ONSET: ON-GOING

## 2022-06-12 ASSESSMENT — PAIN DESCRIPTION - DESCRIPTORS
DESCRIPTORS: ACHING
DESCRIPTORS: ACHING;SORE;GNAWING
DESCRIPTORS: ACHING;SORE

## 2022-06-12 ASSESSMENT — PAIN SCALES - WONG BAKER: WONGBAKER_NUMERICALRESPONSE: 0

## 2022-06-12 ASSESSMENT — PAIN DESCRIPTION - PAIN TYPE
TYPE: SURGICAL PAIN

## 2022-06-12 ASSESSMENT — PAIN DESCRIPTION - LOCATION
LOCATION: ABDOMEN

## 2022-06-12 ASSESSMENT — PAIN DESCRIPTION - FREQUENCY
FREQUENCY: CONTINUOUS

## 2022-06-12 NOTE — PROGRESS NOTES
Date of Outreach Update:  Watson Shultz was seen and assessed. @[facility]([id number])@  Vitals:    06/11/22 2013 06/11/22 2312 06/11/22 2343 06/12/22 0352   BP:  133/86  (!) 147/86   Pulse:  98  95   Resp: 16 18 16 18   Temp:  97.9 °F (36.6 °C)  98.2 °F (36.8 °C)   TempSrc:  Oral  Oral   SpO2:  92%  92%   Weight:       Height:            Previous Outreach assessment has been reviewed. There have been no significant clinical changes since the completion of the last dated Outreach assessment. No concerns at this time. Will continue to follow up per outreach protocol.     Signed By:   Lindsay Jenkins RN    June 12, 2022 5:36 AM

## 2022-06-12 NOTE — PROGRESS NOTES
VANCO DAILY FOLLOW UP NOTE  [address] Pharmacokinetic Monitoring Service - Vancomycin    Consulting Provider: STEFFI Overton (ID)  Indication: GPR port infection  Target Concentration: Goal AUC/WILFREDO 400-600 mg*hr/L  Day of Therapy: 11 (EOT 6/16 per ID)  Additional Antimicrobials:     Pertinent Laboratory Values: Wt Readings from Last 1 Encounters:   06/12/22 147 lb (66.7 kg)     Temp Readings from Last 1 Encounters:   06/12/22 97.7 °F (36.5 °C) (Oral)     Recent Labs     06/10/22  0422 06/11/22  0503 06/11/22  0504 06/12/22  0451   BUN 15 17  --  14   CREATININE 0.20* <0.20*  --  <0.20*   WBC 41.4*  --  39.7* 52.1*     Estimated Creatinine Clearance: 338 mL/min (based on SCr of 0.2 mg/dL). Lab Results   Component Value Date    VANCORANDOM 17.8 06/11/2022       MRSA Nasal Swab: N/A. Non-respiratory infection. .      Assessment:  Date/Time Dose Concentration AUC   6/3 0709 750 mg q8h 26.1 601   6/5 0400 1000 mg q12h 13.7 482   6/11 0503 1250 mg q12h 17.8 439   Note: Serum concentrations collected for AUC dosing may appear elevated if collected in close proximity to the dose administered, this is not necessarily an indication of toxicity    Plan:  Current regimen is therapeutic  Will continue the current dose of 1250 mg every 12 hours for now   Repeat vancomycin concentration will be ordered when clinically indicated  Pharmacy will continue to monitor patient and adjust therapy as indicated    Thank you for the consult,  Gomez Galdamez, PharmD, BCPS  Clinical Pharmacist

## 2022-06-12 NOTE — PROGRESS NOTES
Color Yellow 06/12/22 1315   Urine Appearance Clear 06/12/22 1315   Urine Odor Malodorous 06/12/22 1315   Collection Container Standard 06/12/22 1315   Securement Method Securing device (Describe) 06/12/22 1315   Catheter Care Completed Yes 06/12/22 1315   Catheter Best Practices  Drainage tube clipped to bed;Catheter secured to thigh; Tamper seal intact; Bag below bladder;Bag not on floor; Lack of dependent loop in tubing;Drainage bag less than half full 06/12/22 1315   Status Draining;Patent 06/12/22 0734   Manual Irrigation Volume Input (mL) 0 mL 06/11/22 0800   Output (mL) 30 mL 06/12/22 1545   Discontinuation Reason Per provider order 06/11/22 0800               Flatus: Patient does not have flatus present. Stool: 0 occurrences. Characteristics:  Stool Appearance: Watery  Stool Color: Brown  Stool Amount: Small  Stool Assessment  Incontinence: No  Stool Appearance: Watery  Stool Color: Brown  Stool Amount: Small  Stool Source: Rectum  Last BM (including prior to admit): 06/08/22    Emesis: 0 occurrences. Characteristics:        VITAL SIGNS  Patient Vitals for the past 12 hrs:   Temp Pulse Resp BP SpO2   06/12/22 1545 98.4 °F (36.9 °C) 95 18 (!) 141/72 91 %   06/12/22 1520 -- 90 16 -- 91 %   06/12/22 1115 98.1 °F (36.7 °C) 93 18 (!) 154/85 94 %   06/12/22 0900 -- 82 -- (!) 147/91 --   06/12/22 0734 97.7 °F (36.5 °C) (!) 104 18 (!) 158/92 --   06/12/22 0719 -- 100 18 -- 90 %       Pain Assessment  @[facility](13)@  Pain Location: Abdomen  Patient's Stated Pain Goal: 3    Ambulating  No    Shift report given to oncoming nurse at the bedside.     Miles Juarez RN

## 2022-06-12 NOTE — PROGRESS NOTES
Admit Date: 2022    POD 3 Day Post-Op    Procedure:  Procedure(s):  Exploratory laparotomy with total gastrectomy and esophagojejunostomy after extensive lysis of adhesions and dissections which added at least 3-4 hours to this case, Left lateral lobectomy of liver, Distal pancreatectomy and splenectomy, Combined diaphragmatic resection, Falciform ligament flap, Feeding J-tube placement, Retroperitoneal mass excision and lymphadenectomy, Left abdominal wall mass excision on posterior rectus sheath    Subjective:     Patient out of ICU. More alert and interactive. NG patent. AF, VSS. K+3.2, WBC 52.1. Objective:       Vitals:    22 0719 22 0734 22 0900 22 1115   BP:  (!) 158/92 (!) 147/91 (!) 154/85   Pulse: 100 (!) 104 82 93   Resp: 18 18  18   Temp:  97.7 °F (36.5 °C)  98.1 °F (36.7 °C)   TempSrc:  Oral  Oral   SpO2: 90%   94%   Weight:       Height:           Temp (24hrs), Av.9 °F (36.6 °C), Min:97.6 °F (36.4 °C), Max:98.2 °F (36.8 °C)  . I&O reviewed as documented. [unfilled]     Physical Exam:   Constitutional: Alert, NAD   BP (!) 154/85   Pulse 93   Temp 98.1 °F (36.7 °C) (Oral)   Resp 18   Ht 5' 9\" (1.753 m)   Wt 147 lb (66.7 kg)   SpO2 94%   BMI 21.71 kg/m²   Eyes: Sclera are clear  ENMT: no external lesions' gross hearing normal; no obvious neck masses, no ear or lip lesions, nares normal, NG to LIS  CV: RRR. Normal perfusion  Resp: No JVD. Breathing is  non-labored; no audible wheezing. Room air  GI: soft and non-distended, post op dressing removed. Staples c/d/i. Right and left surgical drain   : Mata - 2175mL urine  Musculoskeletal: No embolic signs or cyanosis. Neuro:  Alert  Psychiatric: Calm and cooperative    Labs:   Recent Results (from the past 24 hour(s))   POCT Glucose    Collection Time: 22  5:43 PM   Result Value Ref Range    POC Glucose 134 (H) 65 - 100 mg/dL    Performed by:  Vivian    POCT Glucose    Collection Time: 06/11/22  8:47 PM   Result Value Ref Range    POC Glucose 123 (H) 65 - 100 mg/dL    Performed by: [facility]    Comprehensive Metabolic Panel w/ Reflex to MG    Collection Time: 06/12/22  4:51 AM   Result Value Ref Range    Sodium 144 136 - 145 mmol/L    Potassium 3.2 (L) 3.5 - 5.1 mmol/L    Chloride 109 (H) 98 - 107 mmol/L    CO2 27 21 - 32 mmol/L    Anion Gap 8 7 - 16 mmol/L    Glucose 129 (H) 65 - 100 mg/dL    BUN 14 8 - 23 MG/DL    CREATININE <0.20 (L) 0.8 - 1.5 MG/DL    GFR  0 (L) >60 ml/min/1.73m2    GFR Non- 0 (L) >60 ml/min/1.73m2    Calcium 8.3 8.3 - 10.4 MG/DL    Total Bilirubin 0.2 0.2 - 1.1 MG/DL    ALT 12 12 - 65 U/L    AST 16 15 - 37 U/L    Alk Phosphatase 118 50 - 136 U/L    Total Protein 4.5 (L) 6.3 - 8.2 g/dL    Albumin 1.4 (L) 3.2 - 4.6 g/dL    Globulin 3.1 2.3 - 3.5 g/dL    Albumin/Globulin Ratio 0.5 (L) 1.2 - 3.5     CBC with Auto Differential    Collection Time: 06/12/22  4:51 AM   Result Value Ref Range    WBC 52.1 (HH) 4.3 - 11.1 K/uL    RBC 3.05 (L) 4.23 - 5.6 M/uL    Hemoglobin 8.7 (L) 13.6 - 17.2 g/dL    Hematocrit 27.5 (L) 41.1 - 50.3 %    MCV 90.2 79.6 - 97.8 FL    MCH 28.5 26.1 - 32.9 PG    MCHC 31.6 31.4 - 35.0 g/dL    RDW 18.0 (H) 11.9 - 14.6 %    Platelets 944 620 - 341 K/uL    MPV 10.3 9.4 - 12.3 FL    nRBC 0.06 0.0 - 0.2 K/uL    Differential Type AUTOMATED      Seg Neutrophils 91 (H) 43 - 78 %    Lymphocytes 3 (L) 13 - 44 %    Monocytes 4 4.0 - 12.0 %    Eosinophils % 0 (L) 0.5 - 7.8 %    Basophils 0 0.0 - 2.0 %    Immature Granulocytes 2 0.0 - 5.0 %    Segs Absolute 47.3 (H) 1.7 - 8.2 K/UL    Absolute Lymph # 1.5 0.5 - 4.6 K/UL    Absolute Mono # 1.9 (H) 0.1 - 1.3 K/UL    Absolute Eos # 0.0 0.0 - 0.8 K/UL    Basophils Absolute 0.1 0.0 - 0.2 K/UL    Absolute Immature Granulocyte 1.2 (H) 0.0 - 0.5 K/UL   Triglyceride    Collection Time: 06/12/22  4:51 AM   Result Value Ref Range    Triglycerides 90 35 - 150 MG/DL   Magnesium    Collection Time: 06/12/22  4:51 AM   Result Value Ref Range    Magnesium 1.9 1.8 - 2.4 mg/dL   POCT Glucose    Collection Time: 06/12/22  7:34 AM   Result Value Ref Range    POC Glucose 142 (H) 65 - 100 mg/dL    Performed by: Vivian        Data Review     XR Results (most recent):  @[facility](FZS7776:1)@    Results (most recent):  @[facility]ILASTIMGCAT([id number]:1)@   Assessment:     Principal Problem:    Septic shock ([address]  Active Problems:    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[address]    Former heavy tobacco smoker    Gastroesophageal reflux disease    Hyponatremia    Hypoalbuminemia due to protein-calorie malnutrition (HCC)    Syncope due to orthostatic hypotension    Hypomagnesemia    Corticosteroid dependence ([address]    Hypoproteinemia (HCC)    Do not resuscitate status    Fatigue    History of gastric cancer    Encounter for palliative care    Debility    Weakness    Hypercoagulable state, secondary ([address]    Adult body mass index less than 19    Acute pulmonary embolism without acute cor pulmonale (HCC)    Severe protein-calorie malnutrition (HCC)    Gastrointestinal hemorrhage    Encounter for weaning from ventilator ([address]    ABILIO (iron deficiency anemia)    Malignant neoplasm of overlapping sites of stomach ([address]    Malignant neoplasm of body of stomach ([address]    Gastric adenocarcinoma ([address]  Resolved Problems:    Severe sepsis with lactic acidosis (HCC)    ABLA (acute blood loss anemia)    GIB (gastrointestinal bleeding)        Plan/Recommendations/Medical Decision Making:     Care management per Hospitalist/ Intensivist  NAKIA IBARRA  ID has signed off but recommended removing the port before stopping vancomycin on 6/16/2022.   TPN  IVF  No CPAP/ No BIPBP/ No Bagging  Follow labs  IV Abx  Transfer to surgical floor 6/11/22    EMY Wolfe

## 2022-06-12 NOTE — PROGRESS NOTES
Date of Outreach Update:  Sophy Jiang was seen and assessed. @[facility]([id number])@  Vitals:    06/11/22 1502 06/11/22 1615 06/11/22 1935 06/11/22 2013   BP:  137/80 (!) 147/93    Pulse: 94 59 95    Resp: 20 18 18 16   Temp:  97.8 °F (36.6 °C) 97.9 °F (36.6 °C)    TempSrc:  Oral Oral    SpO2: 93% 94% 92%    Weight:       Height:            Previous Outreach assessment has been reviewed. Alert and oriented, no c/o pain at this time. VSS, incision and drains c/d/i. NGT in place. Labs and chart reviewed. No further concerns. Will continue to follow up per outreach protocol.     Signed By:   China Ríos RN    June 11, 2022 10:55 PM

## 2022-06-12 NOTE — PROGRESS NOTES
Chart reviewed. Pt is stable from pulmonary standpoint. Pulmonary will sign off. Consult us if needed.

## 2022-06-12 NOTE — PROGRESS NOTES
ICU OUTREACH NURSE PROGRESS REPORT      SUBJECTIVE: Called to assess patient secondary to transfer from critical care. Vitals:    06/12/22 0545 06/12/22 0719 06/12/22 0734 06/12/22 0900   BP:   (!) 158/92 (!) 147/91   Pulse:  100 (!) 104 82   Resp:  18 18    Temp:   97.7 °F (36.5 °C)    TempSrc:   Oral    SpO2:  90%     Weight: 147 lb (66.7 kg)      Height:            ASSESSMENT:  Patient alert and oriented. Respirations unlabored on RA. Abdominal dressings intact. Bilateral SHAYNE drains with serosanguinous output. Reports pain well controlled with current regimen. VS, labs, and progress notes reviewed. PLAN:  Will follow per ICU Outreach protocol.       [phone]

## 2022-06-12 NOTE — PROGRESS NOTES
initially on pressors. Treated for GPR bacteremia-GPR bacteremia from PORT blood culture (6/2)- sent to [address] [place] [address] [facility] [employer] [facility] [employer] on 6/6; repeat culture 6/4 (peripheral) negative   CT c/a/p with small R sided PE, femoral DVT. Subsequently had large melanotic stools, transfused. EGD performed 6/4 but unable to see with food in stomach. Repeat 6/5 similar results. AC held and RI placed IVC filter 6/6.   6/9 was taken for total gastrectomy, left lateral lobectomy of liver, distal pancreatectomy and splenectomy, diaphragmatic resection, J tube placement, and retroperitoneal mass excision and lymphadenectomy, and left abdominal wall mass excision.  To ICU on vent post op and requiring joe. Pt was extubated on 6/10,was transferred to floor. ID evaluated pt on 6/10  \"I perceive that the GPR represents a real pathogen. Unless this is nocardia, actinomyces, lactobacillus, or leuconostoc, then I anticipate vancomycin will be active. He is no longer using the port outpatient and many potential species make biofilm so I recommend removing the port before stopping vancomycin on 6/16/2022\"    Subjective/24hr Events (06/12/22): Pt awake alert  Has ng tube  Mild abd pain  Spoke to family in the room    Assessment & Plan:     Principal Problem:    Septic shock ([address]  Off pressors    GPR Bacteremia  Vancomycin until 6/16 6/12- elevate wbc, solucortef dose decreased    On 6/9/22-  DATE OF SERVICE:  06/09/2022     PREOPERATIVE DIAGNOSIS:  Stomach cancer with complete blockage status post chemotherapy.     POSTOPERATIVE DIAGNOSES:  1. Locally advanced stomach cancer with direct invasion into the distal esophagus, diaphragm, left lobe of the liver, pancreas and the retroperitoneum. 2.  Left abdominal wall mass within the posterior rectus sheath.     PROCEDURES PERFORMED:  1.   Exploratory laparotomy with total gastrectomy and esophagojejunostomy after extensive lysis of adhesions and dissections which added at least 3-4 hours to this case.  2.  Left lateral lobectomy of liver. 3.  Distal pancreatectomy and splenectomy. 4.  Combined diaphragmatic resection. 5.  Falciform ligament flap. 6.  Feeding J-tube placement. 7.  Retroperitoneal mass excision and lymphadenectomy. 8.  Left abdominal wall mass excision on posterior rectus sheath. 6/12- presently ng tube   on tpn  Surgery following        Syncope due to orthostatic hypotension  Secondary to anemia- resolved. Hypomagnesemia  Resolved. Corticosteroid dependence ([address]  Looking at oncology notes, ?  Started by them  Dose decreased by surgery             Acute pulmonary embolism without acute cor pulmonale (HCC)  S/p IVC filter  Sq lovenox       Encounter for weaning from ventilator ([address]  Off ventilator since 6/10      ABILIO (iron deficiency anemia)  Blood transfusion during the stay          Diet:  Diet NPO  PN-Adult Premix 5/15 - Central  DVT PPx: scd  Code status: DNR    Hospital Problems:  Principal Problem:    Septic shock ([address]  Active Problems:    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[address]    Former heavy tobacco smoker    Gastroesophageal reflux disease    Hyponatremia    Hypoalbuminemia due to protein-calorie malnutrition (HCC)    Syncope due to orthostatic hypotension    Hypomagnesemia    Corticosteroid dependence ([address]    Hypoproteinemia (HCC)    Do not resuscitate status    Fatigue    History of gastric cancer    Encounter for palliative care    Debility    Weakness    Hypercoagulable state, secondary ([address]    Adult body mass index less than 19    Acute pulmonary embolism without acute cor pulmonale (HCC)    Severe protein-calorie malnutrition (HCC)    Gastrointestinal hemorrhage    Encounter for weaning from ventilator ([address]    ABILIO (iron deficiency anemia)    Malignant neoplasm of overlapping sites of stomach ([address]    Malignant neoplasm of body of stomach ([address]    Gastric adenocarcinoma ([address]  Resolved Problems:    Severe sepsis with lactic acidosis (HCC)    ABLA (acute blood loss anemia)    GIB (gastrointestinal bleeding)      Objective:     Patient Vitals for the past 24 hrs:   Temp Pulse Resp BP SpO2   06/12/22 0900 -- 82 -- (!) 147/91 --   06/12/22 0734 97.7 °F (36.5 °C) (!) 104 18 (!) 158/92 --   06/12/22 0719 -- 100 18 -- 90 %   06/12/22 0533 -- -- 18 -- --   06/12/22 0352 98.2 °F (36.8 °C) 95 18 (!) 147/86 92 %   06/11/22 2343 -- -- 16 -- --   06/11/22 2312 97.9 °F (36.6 °C) 98 18 133/86 92 %   06/11/22 2013 -- -- 16 -- --   06/11/22 1935 97.9 °F (36.6 °C) 95 18 (!) 147/93 92 %   06/11/22 1615 97.8 °F (36.6 °C) 59 18 137/80 94 %   06/11/22 1502 -- 94 20 -- 93 %   06/11/22 1346 97.6 °F (36.4 °C) 90 17 130/82 --   06/11/22 1300 -- 85 17 -- 94 %   06/11/22 1200 97.9 °F (36.6 °C) 81 18 129/81 93 %   06/11/22 1100 -- 83 19 -- 96 %       Oxygen Therapy  SpO2: 90 %  Pulse Oximetry Type: Continuous  Pulse via Oximetry: 86 beats per minute  Pulse Oximeter Device Mode: Continuous  Pulse Oximeter Device Location: Finger  O2 Device: None (Room air)  Oximetry Probe Site Changed: No  Skin Assessment: Clean, dry, & intact  Skin Protection for O2 Device: No  Orientation: Bilateral  Location: Cheek  Intervention(s): Hydrocolloid Dressing,Mouth Care,Reposition Device  FiO2 : 21 %  O2 Flow Rate (L/min): 0 L/min  Blood Gas  Performed?: Yes  Loi's Test #1: Collateral flow confirmed  Site #1: Left Radial  Site Prepped #1: Yes  Number of Attempts #1: 1  Pressure Held #1: Yes  Complications #1: None  How Tolerated?: Tolerated well  O2 Delivery Method: Endotracheal tube  Vent Mode: PS/CPAP    Estimated body mass index is 21.71 kg/m² as calculated from the following:    Height as of this encounter: 5' 9\" (1.753 m). Weight as of this encounter: 147 lb (66.7 kg).     Intake/Output Summary (Last 24 hours) at 6/12/2022 1058  Last data filed at 6/12/2022 0734  Gross per 24 hour   Intake 2019.42 ml   Output 2890 ml   Net -870.58 ml         Physical Exam:     Blood pressure (!) 147/91, pulse 82, temperature 97.7 °F (36.5 °C), temperature source Oral, resp. rate 18, height 5' 9\" (1.753 m), weight 147 lb (66.7 kg), SpO2 90 %. General:   Awake,alert ng tube  Head:  Normocephalic, atraumatic  Eyes:  Sclerae appear normal.  Pupils equally round. ENT:  Nares appear normal, no drainage. Moist oral mucosa  Neck:  No restricted ROM. Trachea midline   CV:   RRR. No m/r/g. No jugular venous distension. Lungs:   CTAB. No wheezing, rhonchi, or rales. Respirations even, unlabored  Abdomen:   Staples intact, mild tenderness to palpation  No bowel sounds  2 drainage tubes and one other tube in the rt  Extremities: No cyanosis or clubbing. No edema  Skin:     No rashes and normal coloration. Warm and dry. Neuro:  CN II-XII grossly intact. Sensation intact. A&Ox3  Psych:  Normal mood and affect. I have reviewed ordered lab tests and independently visualized imaging below:    Recent Labs:  Recent Results (from the past 48 hour(s))   POCT Glucose    Collection Time: 06/10/22  4:54 PM   Result Value Ref Range    POC Glucose 178 (H) 65 - 100 mg/dL    Performed by:  Korina    POCT Glucose    Collection Time: 06/10/22  8:22 PM   Result Value Ref Range    POC Glucose 157 (H) 65 - 100 mg/dL    Performed by: Juana Harris    Triglyceride    Collection Time: 06/11/22  5:03 AM   Result Value Ref Range    Triglycerides 43 35 - 150 MG/DL   Comprehensive Metabolic Panel w/ Reflex to MG    Collection Time: 06/11/22  5:03 AM   Result Value Ref Range    Sodium 143 136 - 145 mmol/L    Potassium 3.6 3.5 - 5.1 mmol/L    Chloride 112 (H) 98 - 107 mmol/L    CO2 24 21 - 32 mmol/L    Anion Gap 7 7 - 16 mmol/L    Glucose 145 (H) 65 - 100 mg/dL    BUN 17 8 - 23 MG/DL    CREATININE <0.20 (L) 0.8 - 1.5 MG/DL    GFR  0 (L) >60 ml/min/1.73m2    GFR Non- 0 (L) >60 ml/min/1.73m2    Calcium 8.2 (L) 8.3 - 10.4 MG/DL    Total Bilirubin 0.2 0.2 - 1.1 MG/DL    ALT 15 12 - 65 U/L    AST 12 (L) 15 - 37 U/L    Alk Phosphatase 73 50 - 136 U/L    Total Protein 4.5 (L) 6.3 - 8.2 g/dL    Albumin 1.6 (L) 3.2 - 4.6 g/dL    Globulin 2.9 2.3 - 3.5 g/dL    Albumin/Globulin Ratio 0.6 (L) 1.2 - 3.5     Vancomycin Level, Random    Collection Time: 06/11/22  5:03 AM   Result Value Ref Range    Vancomycin Rm 17.8 UG/ML   CBC with Auto Differential    Collection Time: 06/11/22  5:04 AM   Result Value Ref Range    WBC 39.7 (H) 4.3 - 11.1 K/uL    RBC 3.23 (L) 4.23 - 5.6 M/uL    Hemoglobin 9.0 (L) 13.6 - 17.2 g/dL    Hematocrit 28.6 (L) 41.1 - 50.3 %    MCV 88.5 79.6 - 97.8 FL    MCH 27.9 26.1 - 32.9 PG    MCHC 31.5 31.4 - 35.0 g/dL    RDW 18.1 (H) 11.9 - 14.6 %    Platelets 104 724 - 688 K/uL    MPV 9.8 9.4 - 12.3 FL    nRBC 0.03 0.0 - 0.2 K/uL    Differential Type AUTOMATED      Seg Neutrophils 91 (H) 43 - 78 %    Lymphocytes 4 (L) 13 - 44 %    Monocytes 4 4.0 - 12.0 %    Eosinophils % 0 (L) 0.5 - 7.8 %    Basophils 0 0.0 - 2.0 %    Immature Granulocytes 2 0.0 - 5.0 %    Segs Absolute 35.9 (H) 1.7 - 8.2 K/UL    Absolute Lymph # 1.5 0.5 - 4.6 K/UL    Absolute Mono # 1.5 (H) 0.1 - 1.3 K/UL    Absolute Eos # 0.0 0.0 - 0.8 K/UL    Basophils Absolute 0.1 0.0 - 0.2 K/UL    Absolute Immature Granulocyte 0.7 (H) 0.0 - 0.5 K/UL   POCT Glucose    Collection Time: 06/11/22  8:27 AM   Result Value Ref Range    POC Glucose 137 (H) 65 - 100 mg/dL    Performed by: Korina    POCT Glucose    Collection Time: 06/11/22 12:06 PM   Result Value Ref Range    POC Glucose 139 (H) 65 - 100 mg/dL    Performed by: Korina    POCT Glucose    Collection Time: 06/11/22  5:43 PM   Result Value Ref Range    POC Glucose 134 (H) 65 - 100 mg/dL    Performed by:  Vivian    POCT Glucose    Collection Time: 06/11/22  8:47 PM   Result Value Ref Range    POC Glucose 123 (H) 65 - 100 mg/dL    Performed by: KajalTIBURCOI    Comprehensive Metabolic Panel w/ Reflex to MG    Collection Time: 06/12/22  4:51 AM   Result Value Ref Range    Sodium 144 136 - 145 mmol/L    Potassium 3.2 (L) 3.5 - 5.1 mmol/L    Chloride 109 (H) 98 - 107 mmol/L    CO2 27 21 - 32 mmol/L    Anion Gap 8 7 - 16 mmol/L    Glucose 129 (H) 65 - 100 mg/dL    BUN 14 8 - 23 MG/DL    CREATININE <0.20 (L) 0.8 - 1.5 MG/DL    GFR  0 (L) >60 ml/min/1.73m2    GFR Non- 0 (L) >60 ml/min/1.73m2    Calcium 8.3 8.3 - 10.4 MG/DL    Total Bilirubin 0.2 0.2 - 1.1 MG/DL    ALT 12 12 - 65 U/L    AST 16 15 - 37 U/L    Alk Phosphatase 118 50 - 136 U/L    Total Protein 4.5 (L) 6.3 - 8.2 g/dL    Albumin 1.4 (L) 3.2 - 4.6 g/dL    Globulin 3.1 2.3 - 3.5 g/dL    Albumin/Globulin Ratio 0.5 (L) 1.2 - 3.5     CBC with Auto Differential    Collection Time: 06/12/22  4:51 AM   Result Value Ref Range    WBC 52.1 (HH) 4.3 - 11.1 K/uL    RBC 3.05 (L) 4.23 - 5.6 M/uL    Hemoglobin 8.7 (L) 13.6 - 17.2 g/dL    Hematocrit 27.5 (L) 41.1 - 50.3 %    MCV 90.2 79.6 - 97.8 FL    MCH 28.5 26.1 - 32.9 PG    MCHC 31.6 31.4 - 35.0 g/dL    RDW 18.0 (H) 11.9 - 14.6 %    Platelets 275 696 - 572 K/uL    MPV 10.3 9.4 - 12.3 FL    nRBC 0.06 0.0 - 0.2 K/uL    Differential Type AUTOMATED      Seg Neutrophils 91 (H) 43 - 78 %    Lymphocytes 3 (L) 13 - 44 %    Monocytes 4 4.0 - 12.0 %    Eosinophils % 0 (L) 0.5 - 7.8 %    Basophils 0 0.0 - 2.0 %    Immature Granulocytes 2 0.0 - 5.0 %    Segs Absolute 47.3 (H) 1.7 - 8.2 K/UL    Absolute Lymph # 1.5 0.5 - 4.6 K/UL    Absolute Mono # 1.9 (H) 0.1 - 1.3 K/UL    Absolute Eos # 0.0 0.0 - 0.8 K/UL    Basophils Absolute 0.1 0.0 - 0.2 K/UL    Absolute Immature Granulocyte 1.2 (H) 0.0 - 0.5 K/UL   Triglyceride    Collection Time: 06/12/22  4:51 AM   Result Value Ref Range    Triglycerides 90 35 - 150 MG/DL   Magnesium    Collection Time: 06/12/22  4:51 AM   Result Value Ref Range    Magnesium 1.9 1.8 - 2.4 mg/dL   POCT Glucose    Collection Time: 06/12/22  7:34 AM   Result Value Ref Range    POC Glucose 142 (H) 65 - 100 mg/dL    Performed by:  Vivian        Other Studies:  XR CHEST 1 VIEW   Final Result   Bilateral airspace opacities and left pleural effusion with interval   worsening on the left. XR CHEST 1 VIEW   Final Result      1. Persistent bibasilar opacities, likely atelectasis or consolidation. XR CHEST 1 VIEW   Final Result   Unchanged mild bibasilar lung atelectasis or infiltrates. XR CHEST 1 VIEW   Final Result   1. New mild right basilar atelectatic appearing changes. No significant acute   changes otherwise seen. It should be noted that the left lung apex has been   clipped limiting complete assessment for pneumothorax. This report was made using voice transcription. Despite my best efforts to avoid   any, transcription errors may persist. If there is any question about the   accuracy of the report or need for clarification, then please call [phone], or text me through [employer] for clarification or correction. IR GUIDED IVC FILTER PLACEMENT   Final Result   Successful placement an infrarenal Cook Celect inferior vena cava filter. Plan:   The patient may be evaluated in 4 months by interventional radiology in order to   evaluate for continued need of the IVC filter for versus filter removal.                  Vascular duplex lower extremity venous bilateral   Final Result   Negative for sonographic evidence of deep venous thrombosis right   lower extremity. LEFT LOWER EXTREMITY VENOUS DUPLEX ULTRASOUND. INDICATION: Left lower extremity pain. TECHNIQUE: Direct skin-contact high resolution grayscale images, color-flow   Doppler and duplex waveform analysis. FINDINGS: Abnormal intraluminal echoes within the common femoral and profunda   femoral veins. There is some flow. The superficial superficial femoral and   popliteal veins and upper calf veins are unremarkable. IMPRESSION: Positive for deep venous thrombosis left common femoral profunda   femoral veins, nonocclusive.       CTA CHEST ABDOMEN PELVIS W WO CONTRAST   Final Result   1. No extravasation to suggest active GI bleed. 2. Small areas of pulmonary embolism are present in the right lower lobe. Clot   burden is quite small. 3. DVT is also likely present in the left femoral veins. 4. Previously described gastric mass again noted. It is difficult to measure to   evaluate for progression. XR CHEST PORTABLE   Final Result   Unremarkable portable chest radiograph.                      XR CHEST 1 VIEW    (Results Pending)       Current Meds:  Current Facility-Administered Medications   Medication Dose Route Frequency    glucose chewable tablet 16 g  4 tablet Oral PRN    dextrose bolus 10% 125 mL  125 mL IntraVENous PRN    Or    dextrose bolus 10% 250 mL  250 mL IntraVENous PRN    glucagon injection 1 mg  1 mg IntraMUSCular PRN    dextrose 5 % solution  100 mL/hr IntraVENous PRN    hydrocortisone sodium succinate PF (SOLU-CORTEF) injection 25 mg  25 mg IntraVENous Q12H    metoprolol (LOPRESSOR) injection 5 mg  5 mg IntraVENous Q6 Months    potassium chloride 10 mEq/100 mL IVPB (Peripheral Line)  10 mEq IntraVENous Q1H    PN-Adult Premix 5/15 - Central   IntraVENous Continuous TPN    medicated lip ointment (BLISTEX)   Topical PRN    HYDROmorphone HCl PF (DILAUDID) injection 0.5 mg  0.5 mg IntraVENous Q2H PRN    oxyCODONE (ROXICODONE) 5 MG/5ML solution 5 mg  5 mg Per J Tube Q6H PRN    guaiFENesin (ROBITUSSIN) 100 MG/5ML oral solution 200 mg  200 mg Per J Tube Q6H    vancomycin (VANCOCIN) 1250 mg in sodium chloride 0.9% 250 mL IVPB  1,250 mg IntraVENous Q12H    insulin lispro (HUMALOG) injection vial 0-4 Units  0-4 Units SubCUTAneous TID WC    insulin lispro (HUMALOG) injection vial 0-4 Units  0-4 Units SubCUTAneous Nightly    cloNIDine (CATAPRES) tablet 0.2 mg  0.2 mg Per J Tube Q4H PRN    fat emulsion 20 % infusion 250 mL  250 mL IntraVENous Daily    sodium chloride flush 0.9 % injection 5-40 mL  5-40 mL IntraVENous 2 times per day    sodium chloride flush 0.9 % injection 5-40 mL  5-40 mL IntraVENous PRN    0.9 % sodium chloride infusion   IntraVENous PRN    enoxaparin Sodium (LOVENOX) injection 30 mg  30 mg SubCUTAneous Daily    ipratropium-albuterol (DUONEB) nebulizer solution 1 ampule  1 ampule Inhalation Q6H    potassium chloride 20 mEq/50 mL IVPB (Central Line)  20 mEq IntraVENous PRN    magnesium sulfate 2000 mg in 50 mL IVPB premix  2,000 mg IntraVENous PRN    ondansetron (ZOFRAN) injection 4 mg  4 mg IntraVENous Q6H PRN    Medela Tender Care Lanolin cream   Topical PRN       Signed:  Therese Amos MD

## 2022-06-12 NOTE — PROGRESS NOTES
Comprehensive Nutrition Assessment    Type and Reason for Visit: Reassess  Malnutrition Screening Tool: Malnutrition Screen  Have you recently lost weight without trying?: 34 or more pounds (4 points)  Have you been eating poorly because of a decreased appetite?: Yes (1 point)  Malnutrition Screening Tool Score: 5 and Unintentional Weight Loss (Hospitalists)  TPN management (Hospitalist and Surgery)    Nutrition Recommendations/Plan:    Parenteral Nutrition:  Total parenteral nutrition  to begin at 1800  Change: Dex 10% , 4.25% AA 2 L (85ml/hr)   Continue 250 ml 20% lipids daily  To provide: 1520 kcal/d (95% of needs), 85 grams of protein/d (106% of needs), 200 grams of CHO/d and 2250 ml of total volume/d. Lytes/L:   115 meq Sodium (65 meq NaCl, 50 meq NaAcetate), 30 mmol KPO4, 8 meq Mg, 4.5 meq Calcium; to be formulated ~1:1 Cl:Acetate ratio  Other additives: MTE, MVI Monday Wednesday Friday due to national shortages   Nutrition Related Medication Management: Thiamin  mg/day day (end date 5/68), 1 mg folic acid day (end date 6/12)  Nutritional Supplement Therapy:   Active electrolyte replacement per nutrition support protocols  Replacement indicated:   Active per MD order  Labs:   BMP daily  Mg MWF  Phos MWF    Triglyceride tomorrow  POC Glucoses/SSI Activate: AM glucose >180 mg/dL     Malnutrition Assessment:  Malnutrition Status: Severe malnutrition  Context: Chronic Illness  Findings of clinical characteristics of malnutrition:   Energy Intake:  Mild decrease in energy intake (Comment) (only tolerating oral nutrition supplements as primary)  Weight Loss:  Greater than 20% over 1 year (38# (24.4%) in ~10 months)     Body Fat Loss:  Severe body fat loss Triceps,Fat Overlying Ribs,Buccal region   Muscle Mass Loss:  Severe muscle mass loss Temples (temporalis),Clavicles (pectoralis & deltoids),Thigh (quadraceps),Calf (gastrocnemius)  Fluid Accumulation:  No significant fluid accumulation     Strength: Not Performed  Nutrition Assessment:  Nutrition History: History provided by patient and wife. Patient states that he has been trying to eat some solids but it feels like it just sits on his stomach. He states he will wake the next day and still feel full. Wife states that patient has not tried any solids in weeks. Patient states that he mostly has been consuming Ensure (regular) 6-7 per day. Wife states that she has been mixing peanut butter powder into Ensure to increase kcal and protein (potentially providing 60-70 additional kcal and 6-9 additional grams of protein per serving depending on brand). Patient endorses weight loss of 50-60# over the last year. Do You Have Any Cultural, Sikhism, or Ethnic Food Preferences?: No   Nutrition Background:   Patient with PMH significant for HTN, gastric adenocarcinoma s/p neoadjuvant chemo with plans for surgery in 2-3 week. He presented with near syncopal episodes for last 2 days. He was admitted with shock. Nutrition Interval:  Pt is s/p ex lap with total gastrectomy and esophagojejunostomy with extensive lysis of adhesion and dissection, left lateral lobectomy of liver, distal pancreatectomy and splenectomy, combined diaphragmatic resection, falciform ligament flap, j-tube placement, retroperitoneal mass excision and lymphadenectomy, and left abdominal wall mass excision 6/9. Pt lying in bed at time of visit with NGT to LIS. Noted 200ml output noted yesterday. TPN infusing per order. Replacements ordered per MD. Per ID note 6/10, \"Yes, I perceive that the GPR represents a real pathogen. Unless this is nocardia, actinomyces, lactobacillus, or leuconostoc, then I anticipate vancomycin will be active. He is no longer using the port outpatient and many potential species make biofilm so I recommend removing the port before stopping vancomycin on 6/16/2022. \" Discussed with Horacio Singh NP, continuing TPN through port at this time.     Abdominal Status (last documented): Last BM (including prior to admit): 06/08/22, GI Symptoms: Other (Comment) (hx of CA)   Pertinent Medications: SSI (none required), Vancomycin, solu-cortef  Electrolyte replacements: 6/7 KCl 10 meq x 4, KCl 10meq x5 6/12   Pertinent Labs:   Lab Results   Component Value Date     06/12/2022    K 3.2 06/12/2022     06/12/2022    CO2 27 06/12/2022    BUN 14 06/12/2022    CREATININE <0.20 06/12/2022    GLUCOSE 129 06/12/2022    CALCIUM 8.3 06/12/2022    PHOS 3.1 06/10/2022    MG 1.9 06/12/2022      Lab Results   Component Value Date    POCGLU 142 06/12/2022    POCGLU 123 06/11/2022    POCGLU 134 06/11/2022    POCGLU 139 06/11/2022    POCGLU 137 06/11/2022    POCGLU 157 06/10/2022     Lab Results   Component Value Date    TRIG 90 06/12/2022   Remarks: Sodium continues to trend up and is now high normal. Pt may decrease in TPN this evening. Hypokalemic today with 200ml output from NGT. K trending down since 6/10. Mg WNL. TG <200, continuing lipids this evening. Nutrition Related Findings:   6/2:CLD. 6/4: NPO with bloody BM. EGD failed x2 6/4 and 6/5 due to food particles obstructing access despite Reglan. 6/6: TPN consult, port in place. 6/6 TPN initiated with lipids. 6/7 cont 2L 10%dex/4.25%AA, hold lipids due to high TG. Formula changed to Dex 15%, 5% AA 6/8. Current Nutrition Therapies:  Diet NPO  PN-Adult Premix 5/15 - Central  Current Parenteral Nutrition Orders:  · Type and Formula: Premix Central (Dex 15%; 5% AA)   · Lipids: None  · Duration: Continuous  · Rate/Volume: 2 L (85ml/hr)  · Current PN Order Provides: infusing per order  · Goal PN Orders Provides: 1420 kcal/d (88% of needs), 100 grams of protein/d (125% of needs/1.8 g/kg CBW), 300 grams of CHO/d and 2000 ml of total volume/d.     Current Intake:   Average Meal Intake: NPO        Anthropometric Measures:  Height: 5' 9\" (175.3 cm)  Current Body Wt: 147 lb 0.8 oz (66.7 kg) (6/12), Weight source: Bed Scale  BMI: 21.7  Admission Body Weight: 117 lb 15.1 oz (53.5 kg)  Ideal Body Weight (Kg) (Calculated): 73 kg (160 lbs), 73.7 %  Usual Body Wt: 156 lb (70.8 kg) (8/11/21 office wt), Percent weight change: -24.4       Edema: No data recorded  BMI Category Underweight (BMI less than 18.5)  Estimated Daily Nutrient Needs:  Energy (kcal/day): 4098-7262 (Kcal/kg (30-35) Weight used: 53.5 kg Current (6/2)  Protein (g/day): 70-80 (1.3-1.5 g/kg) Weight Used: (Current) 53.5 kg  Fluid (ml/day):   (1 ml/kcal)    Nutrition Diagnosis:   · Inadequate oral intake related to altered GI function as evidenced by NPO or clear liquid status due to medical condition (bloody BM, EGD failed x 2 due to retained food)    · Severe malnutrition related to inadequate protein-energy intake as evidenced by Criteria as identified in malnutrition assessment    Nutrition Interventions:   Food and/or Nutrient Delivery: Continue NPO,Modify Parenteral Nutrition     Coordination of Nutrition Care: Continue to monitor while inpatient       Goals:   Previous Goal Met: Goal(s) Achieved  Active Goal: other (specify)  Maintain nutrition needs via TPN    Nutrition Monitoring and Evaluation:      Food/Nutrient Intake Outcomes: Parenteral Nutrition Intake/Tolerance  Physical Signs/Symptoms Outcomes: Biochemical Data,GI Status,Fluid Status or Edema,Weight    Discharge Planning:     Too soon to determine    Jessica Gutierrez, [address] [place] [address]

## 2022-06-12 NOTE — PROGRESS NOTES
END OF SHIFT NOTE:    INTAKE/OUTPUT  06/11 0701 - 06/12 0700  In: 634.4 [I.V.:594.4]  Out: 2025 [Urine:1775; Drains:180]  Voiding: no  Catheter: yes  Drain:   Closed/Suction Drain Right RUQ Bulb (Active)   Site Description Clean, dry & intact 06/11/22 1337   Dressing Status Clean, dry & intact 06/11/22 1337   Drainage Appearance Serosanguinous 06/11/22 1757   Drain Status Open to gravity drainage 06/11/22 1757   Output (ml) 0 ml 06/12/22 0007       Closed/Suction Drain Left LUQ Bulb (Active)   Site Description Clean, dry & intact 06/11/22 1337   Dressing Status Clean, dry & intact 06/11/22 1337   Drainage Appearance Serosanguinous 06/12/22 0007   Drain Status Compressed; To bulb suction 06/12/22 0007   Output (ml) 60 ml 06/12/22 0007       NG/OG/NJ/NE Tube Right nostril (Active)   Surrounding Skin Clean, dry & intact 06/11/22 1935   Securement device Tape 06/11/22 1935   Status Suction-low intermittent 06/11/22 1935   Placement Verified Gastric Contents 06/11/22 1200   NG/OG/NJ/NE External Measurement (cm) 58 cm 06/11/22 1935   Drainage Appearance Brown 06/11/22 1757   Output (mL) 70 ml 06/11/22 1757       Gastrostomy/Enterostomy/Jejunostomy Tube Gastrostomy LUQ 1 14 fr (Active)   Drainage Appearance Serous 06/11/22 1935   Site Description Clean, dry & intact 06/11/22 1935   J Port Status Clamped 06/11/22 1935   Surrounding Skin Clean, dry & intact 06/11/22 1935   Dressing Status Clean, dry & intact 06/11/22 1935   Dressing Type Dry dressing 06/11/22 1935   Free Water/Flush (mL) 40 mL 06/11/22 1757   Action Taken Other (comment) 06/11/22 1757       Urinary Catheter 2 Way (Active)   $ Urethral catheter insertion $ Not inserted for procedure 06/03/22 0820   Catheter Indications Perioperative use for selected surgical procedures 06/12/22 0007   Site Assessment No urethral drainage 06/12/22 0007   Urine Color Yellow 06/12/22 0007   Urine Appearance Clear 06/12/22 0007   Urine Odor Malodorous 06/11/22 1337   Collection

## 2022-06-13 ENCOUNTER — APPOINTMENT (OUTPATIENT)
Dept: GENERAL RADIOLOGY | Age: 67
DRG: 853 | End: 2022-06-13
Payer: MEDICARE

## 2022-06-13 LAB
ABO + RH BLD: NORMAL
ANION GAP SERPL CALC-SCNC: 7 MMOL/L (ref 7–16)
BASOPHILS # BLD: 0.1 K/UL (ref 0–0.2)
BASOPHILS NFR BLD: 0 % (ref 0–2)
BLD PROD TYP BPU: NORMAL
BLOOD BANK DISPENSE STATUS: NORMAL
BLOOD GROUP ANTIBODIES SERPL: NORMAL
BPU ID: NORMAL
BUN SERPL-MCNC: 13 MG/DL (ref 8–23)
CALCIUM SERPL-MCNC: 8.3 MG/DL (ref 8.3–10.4)
CHLORIDE SERPL-SCNC: 105 MMOL/L (ref 98–107)
CO2 SERPL-SCNC: 29 MMOL/L (ref 21–32)
CREAT SERPL-MCNC: <0.2 MG/DL (ref 0.8–1.5)
CROSSMATCH RESULT: NORMAL
DIFFERENTIAL METHOD BLD: ABNORMAL
EOSINOPHIL # BLD: 0 K/UL (ref 0–0.8)
EOSINOPHIL NFR BLD: 0 % (ref 0.5–7.8)
ERYTHROCYTE [DISTWIDTH] IN BLOOD BY AUTOMATED COUNT: 17.4 % (ref 11.9–14.6)
GLUCOSE BLD STRIP.AUTO-MCNC: 104 MG/DL (ref 65–100)
GLUCOSE BLD STRIP.AUTO-MCNC: 108 MG/DL (ref 65–100)
GLUCOSE BLD STRIP.AUTO-MCNC: 127 MG/DL (ref 65–100)
GLUCOSE BLD STRIP.AUTO-MCNC: 91 MG/DL (ref 65–100)
GLUCOSE SERPL-MCNC: 120 MG/DL (ref 65–100)
HCT VFR BLD AUTO: 27.5 % (ref 41.1–50.3)
HGB BLD-MCNC: 8.5 G/DL (ref 13.6–17.2)
IMM GRANULOCYTES # BLD AUTO: 1 K/UL (ref 0–0.5)
IMM GRANULOCYTES NFR BLD AUTO: 2 % (ref 0–5)
LYMPHOCYTES # BLD: 1.3 K/UL (ref 0.5–4.6)
LYMPHOCYTES NFR BLD: 3 % (ref 13–44)
MAGNESIUM SERPL-MCNC: 2 MG/DL (ref 1.8–2.4)
MCH RBC QN AUTO: 28.2 PG (ref 26.1–32.9)
MCHC RBC AUTO-ENTMCNC: 30.9 G/DL (ref 31.4–35)
MCV RBC AUTO: 91.4 FL (ref 79.6–97.8)
MONOCYTES # BLD: 1.7 K/UL (ref 0.1–1.3)
MONOCYTES NFR BLD: 4 % (ref 4–12)
NEUTS SEG # BLD: 41.4 K/UL (ref 1.7–8.2)
NEUTS SEG NFR BLD: 91 % (ref 43–78)
NRBC # BLD: 0.09 K/UL (ref 0–0.2)
PHOSPHATE SERPL-MCNC: 3.4 MG/DL (ref 2.3–3.7)
PLATELET # BLD AUTO: 383 K/UL (ref 150–450)
PMV BLD AUTO: 10 FL (ref 9.4–12.3)
POTASSIUM SERPL-SCNC: 3.4 MMOL/L (ref 3.5–5.1)
RBC # BLD AUTO: 3.01 M/UL (ref 4.23–5.6)
SERVICE CMNT-IMP: ABNORMAL
SERVICE CMNT-IMP: NORMAL
SODIUM SERPL-SCNC: 141 MMOL/L (ref 136–145)
SPECIMEN EXP DATE BLD: NORMAL
UNIT DIVISION: 0
WBC # BLD AUTO: 45.6 K/UL (ref 4.3–11.1)

## 2022-06-13 PROCEDURE — 94760 N-INVAS EAR/PLS OXIMETRY 1: CPT

## 2022-06-13 PROCEDURE — 2500000003 HC RX 250 WO HCPCS: Performed by: PHYSICIAN ASSISTANT

## 2022-06-13 PROCEDURE — 85025 COMPLETE CBC W/AUTO DIFF WBC: CPT

## 2022-06-13 PROCEDURE — 83735 ASSAY OF MAGNESIUM: CPT

## 2022-06-13 PROCEDURE — 1090000002 HH PPS REVENUE DEBIT

## 2022-06-13 PROCEDURE — 2700000000 HC OXYGEN THERAPY PER DAY

## 2022-06-13 PROCEDURE — 97164 PT RE-EVAL EST PLAN CARE: CPT

## 2022-06-13 PROCEDURE — 2580000003 HC RX 258: Performed by: SURGERY

## 2022-06-13 PROCEDURE — 2500000003 HC RX 250 WO HCPCS: Performed by: SURGERY

## 2022-06-13 PROCEDURE — 84100 ASSAY OF PHOSPHORUS: CPT

## 2022-06-13 PROCEDURE — 97530 THERAPEUTIC ACTIVITIES: CPT

## 2022-06-13 PROCEDURE — 2500000003 HC RX 250 WO HCPCS: Performed by: FAMILY MEDICINE

## 2022-06-13 PROCEDURE — 1090000001 HH PPS REVENUE CREDIT

## 2022-06-13 PROCEDURE — 94664 DEMO&/EVAL PT USE INHALER: CPT

## 2022-06-13 PROCEDURE — 6370000000 HC RX 637 (ALT 250 FOR IP): Performed by: SURGERY

## 2022-06-13 PROCEDURE — 82962 GLUCOSE BLOOD TEST: CPT

## 2022-06-13 PROCEDURE — 2580000003 HC RX 258: Performed by: NURSE PRACTITIONER

## 2022-06-13 PROCEDURE — 1100000000 HC RM PRIVATE

## 2022-06-13 PROCEDURE — 87070 CULTURE OTHR SPECIMN AEROBIC: CPT

## 2022-06-13 PROCEDURE — 71045 X-RAY EXAM CHEST 1 VIEW: CPT

## 2022-06-13 PROCEDURE — 99232 SBSQ HOSP IP/OBS MODERATE 35: CPT | Performed by: INTERNAL MEDICINE

## 2022-06-13 PROCEDURE — 94761 N-INVAS EAR/PLS OXIMETRY MLT: CPT

## 2022-06-13 PROCEDURE — 6360000002 HC RX W HCPCS: Performed by: SURGERY

## 2022-06-13 PROCEDURE — 80048 BASIC METABOLIC PNL TOTAL CA: CPT

## 2022-06-13 PROCEDURE — 6360000002 HC RX W HCPCS: Performed by: NURSE PRACTITIONER

## 2022-06-13 PROCEDURE — 6360000002 HC RX W HCPCS: Performed by: FAMILY MEDICINE

## 2022-06-13 PROCEDURE — 36591 DRAW BLOOD OFF VENOUS DEVICE: CPT

## 2022-06-13 PROCEDURE — 94640 AIRWAY INHALATION TREATMENT: CPT

## 2022-06-13 RX ORDER — POTASSIUM CHLORIDE 7.45 MG/ML
10 INJECTION INTRAVENOUS
Status: COMPLETED | OUTPATIENT
Start: 2022-06-13 | End: 2022-06-13

## 2022-06-13 RX ORDER — LIDOCAINE HYDROCHLORIDE 10 MG/ML
5 INJECTION, SOLUTION INFILTRATION; PERINEURAL ONCE
Status: COMPLETED | OUTPATIENT
Start: 2022-06-13 | End: 2022-06-13

## 2022-06-13 RX ADMIN — HYDROMORPHONE HYDROCHLORIDE 0.5 MG: 1 INJECTION, SOLUTION INTRAMUSCULAR; INTRAVENOUS; SUBCUTANEOUS at 07:03

## 2022-06-13 RX ADMIN — SOYBEAN OIL 250 ML: 20 INJECTION, SOLUTION INTRAVENOUS at 17:17

## 2022-06-13 RX ADMIN — MAGNESIUM SULFATE HEPTAHYDRATE: 500 INJECTION, SOLUTION INTRAMUSCULAR; INTRAVENOUS at 17:17

## 2022-06-13 RX ADMIN — VANCOMYCIN HYDROCHLORIDE 1250 MG: 10 INJECTION, POWDER, LYOPHILIZED, FOR SOLUTION INTRAVENOUS at 01:22

## 2022-06-13 RX ADMIN — METOPROLOL TARTRATE 5 MG: 5 INJECTION INTRAVENOUS at 01:16

## 2022-06-13 RX ADMIN — HYDROMORPHONE HYDROCHLORIDE 0.5 MG: 1 INJECTION, SOLUTION INTRAMUSCULAR; INTRAVENOUS; SUBCUTANEOUS at 22:16

## 2022-06-13 RX ADMIN — HYDROCORTISONE SODIUM SUCCINATE 25 MG: 100 INJECTION, POWDER, FOR SOLUTION INTRAMUSCULAR; INTRAVENOUS at 20:04

## 2022-06-13 RX ADMIN — METOPROLOL TARTRATE 5 MG: 5 INJECTION INTRAVENOUS at 20:04

## 2022-06-13 RX ADMIN — POTASSIUM CHLORIDE 10 MEQ: 7.46 INJECTION, SOLUTION INTRAVENOUS at 10:19

## 2022-06-13 RX ADMIN — POTASSIUM CHLORIDE 10 MEQ: 7.46 INJECTION, SOLUTION INTRAVENOUS at 08:37

## 2022-06-13 RX ADMIN — METOPROLOL TARTRATE 5 MG: 5 INJECTION INTRAVENOUS at 13:12

## 2022-06-13 RX ADMIN — METOPROLOL TARTRATE 5 MG: 5 INJECTION INTRAVENOUS at 08:37

## 2022-06-13 RX ADMIN — GUAIFENESIN 200 MG: 200 SOLUTION ORAL at 13:12

## 2022-06-13 RX ADMIN — GUAIFENESIN 200 MG: 200 SOLUTION ORAL at 01:16

## 2022-06-13 RX ADMIN — IPRATROPIUM BROMIDE AND ALBUTEROL SULFATE 1 AMPULE: .5; 3 SOLUTION RESPIRATORY (INHALATION) at 19:53

## 2022-06-13 RX ADMIN — OXYCODONE HYDROCHLORIDE 5 MG: 5 SOLUTION ORAL at 14:40

## 2022-06-13 RX ADMIN — SODIUM CHLORIDE, PRESERVATIVE FREE 10 ML: 5 INJECTION INTRAVENOUS at 08:55

## 2022-06-13 RX ADMIN — IPRATROPIUM BROMIDE AND ALBUTEROL SULFATE 1 AMPULE: .5; 3 SOLUTION RESPIRATORY (INHALATION) at 04:17

## 2022-06-13 RX ADMIN — ENOXAPARIN SODIUM 30 MG: 100 INJECTION SUBCUTANEOUS at 08:37

## 2022-06-13 RX ADMIN — HYDROCORTISONE SODIUM SUCCINATE 25 MG: 100 INJECTION, POWDER, FOR SOLUTION INTRAMUSCULAR; INTRAVENOUS at 08:37

## 2022-06-13 RX ADMIN — GUAIFENESIN 200 MG: 200 SOLUTION ORAL at 18:29

## 2022-06-13 RX ADMIN — POTASSIUM CHLORIDE 10 MEQ: 7.46 INJECTION, SOLUTION INTRAVENOUS at 11:53

## 2022-06-13 RX ADMIN — POTASSIUM CHLORIDE 10 MEQ: 7.46 INJECTION, SOLUTION INTRAVENOUS at 13:11

## 2022-06-13 RX ADMIN — GUAIFENESIN 200 MG: 200 SOLUTION ORAL at 06:09

## 2022-06-13 RX ADMIN — HYDROMORPHONE HYDROCHLORIDE 0.5 MG: 1 INJECTION, SOLUTION INTRAMUSCULAR; INTRAVENOUS; SUBCUTANEOUS at 16:07

## 2022-06-13 RX ADMIN — IPRATROPIUM BROMIDE AND ALBUTEROL SULFATE 1 AMPULE: .5; 3 SOLUTION RESPIRATORY (INHALATION) at 08:12

## 2022-06-13 RX ADMIN — SODIUM CHLORIDE, PRESERVATIVE FREE 10 ML: 5 INJECTION INTRAVENOUS at 21:19

## 2022-06-13 RX ADMIN — HYDROMORPHONE HYDROCHLORIDE 0.5 MG: 1 INJECTION, SOLUTION INTRAMUSCULAR; INTRAVENOUS; SUBCUTANEOUS at 03:47

## 2022-06-13 RX ADMIN — LIDOCAINE HYDROCHLORIDE 5 ML: 10 INJECTION, SOLUTION INFILTRATION; PERINEURAL at 15:27

## 2022-06-13 RX ADMIN — VANCOMYCIN HYDROCHLORIDE 1250 MG: 10 INJECTION, POWDER, LYOPHILIZED, FOR SOLUTION INTRAVENOUS at 13:13

## 2022-06-13 ASSESSMENT — PAIN DESCRIPTION - DESCRIPTORS
DESCRIPTORS: SORE
DESCRIPTORS: SORE
DESCRIPTORS: ACHING

## 2022-06-13 ASSESSMENT — PAIN SCALES - GENERAL
PAINLEVEL_OUTOF10: 9
PAINLEVEL_OUTOF10: 5
PAINLEVEL_OUTOF10: 6
PAINLEVEL_OUTOF10: 8
PAINLEVEL_OUTOF10: 7
PAINLEVEL_OUTOF10: 7

## 2022-06-13 ASSESSMENT — PAIN DESCRIPTION - FREQUENCY: FREQUENCY: CONTINUOUS

## 2022-06-13 ASSESSMENT — PAIN DESCRIPTION - DIRECTION: RADIATING_TOWARDS: ABDOMEN

## 2022-06-13 ASSESSMENT — PAIN DESCRIPTION - ONSET: ONSET: ON-GOING

## 2022-06-13 ASSESSMENT — PAIN DESCRIPTION - LOCATION
LOCATION: ABDOMEN

## 2022-06-13 ASSESSMENT — PAIN - FUNCTIONAL ASSESSMENT
PAIN_FUNCTIONAL_ASSESSMENT: PREVENTS OR INTERFERES SOME ACTIVE ACTIVITIES AND ADLS
PAIN_FUNCTIONAL_ASSESSMENT: PREVENTS OR INTERFERES WITH MANY ACTIVE NOT PASSIVE ACTIVITIES

## 2022-06-13 ASSESSMENT — PAIN DESCRIPTION - ORIENTATION
ORIENTATION: ANTERIOR
ORIENTATION: MID

## 2022-06-13 ASSESSMENT — PAIN DESCRIPTION - PAIN TYPE: TYPE: SURGICAL PAIN

## 2022-06-13 NOTE — PROGRESS NOTES
[facility] Critical Care Note[de-identified] 6/13/2022  Primo Elizabeth  Admission Date: 6/2/2022     Length of Stay: 11 days    Background:     78 y.o. y/o male with history of gastric carcinoma s/p neoadjuvant chemo (last dose May 29, 2022) with plans for subsequent total gastrectomy, HTN, admitted to hospitalist service 6/2 with syncopal episode and anemia (hgb 4.9 on admission) as well as gram positive mildred bacteremia (only 1/2, possible contaminate). Unable to tolerate solid food for months and has been losing weight. Was hypotension on pressors initially. Was covered with vanc and cefepime initially, flagyl added with GPR returned. CT c/a/p with small R sided PE, femoral DVT. Subsequently had large melanotic stools, transfused. EGD performed 6/4 but unable to see with food in stomach. Repeat 6/5 similar results. AC held and RI placed IVC filter 6/6.   6/9 was taken for total gastrectomy, left lateral lobectomy of liver, distal pancreatectomy and splenectomy, diaphragmatic resection, J tube placement, and retroperitoneal mass excision and lymphadenectomy, and left abdominal wall mass excision. To ICU on vent post op and requiring joe. Notable PMH:  has a past medical history of ABLA (acute blood loss anemia), GIB (gastrointestinal bleeding), HTN (hypertension), and Severe sepsis with lactic acidosis ([address] 24 Hour events:   Transferred out of ICU on 2LNC        ROS: unable to obtain/negative except as listed elsewhere.      Lines: (insertion date)    Closed/Suction Drain Right RUQ Bulb (Active)       Closed/Suction Drain Left LUQ Bulb (Active)       NG/OG/NJ/NE Tube Right nostril (Active)       Gastrostomy/Enterostomy/Jejunostomy Tube Gastrostomy LUQ 1 14 fr (Active)       Urinary Catheter 2 Way (Active)     Single Lumen Implantable Port Left Chest (Active)       Arterial Line 06/09/22 Radial (Active)     Drips: current dose (range)  Dose (mcg/kg/min) Propofol : 20 mcg/kg/min  Dose (mcg/min) Phenylephrine : 0 mcg/min (map 91)     Pertinent Exam:         Blood pressure (!) 127/90, pulse 56, temperature 97.5 °F (36.4 °C), temperature source Oral, resp. rate 18, height 5' 9\" (1.753 m), weight 147 lb (66.7 kg), SpO2 95 %. Intake/Output Summary (Last 24 hours) at 6/13/2022 1144  Last data filed at 6/13/2022 1020  Gross per 24 hour   Intake 3735 ml   Output 2260 ml   Net 1475 ml     Constitutional: AAO in NC, pain controlled  EENMT:  Sclera clear, pupils equal, oral mucosa moist  Respiratory: clear  Cardiovascular:  RRR  Gastrointestinal:  soft, dressing on, NG in place  Musculoskeletal:  warm with no cyanosis, no lower extremity edema  Skin:  no jaundice or ecchymosis  Neurologic:awake  Psychiatric:appropriate mood and affect    CXR:     Recent Labs     06/11/22  0504 06/12/22 0451 06/13/22  0403   WBC 39.7* 52.1* 45.6*   HGB 9.0* 8.7* 8.5*   HCT 28.6* 27.5* 27.5*    323 383     Recent Labs     06/11/22  0503 06/12/22 0451 06/13/22  0403    144 141   K 3.6 3.2* 3.4*   * 109* 105   CO2 24 27 29   BUN 17 14 13   CREATININE <0.20* <0.20* <0.20*   MG  --  1.9 2.0   PHOS  --   --  3.4   BILITOT 0.2 0.2  --    AST 12* 16  --    ALT 15 12  --    ALKPHOS 73 118  --      No results for input(s): TROPHS, NTPROBNP, CRP, ESR in the last 72 hours. Recent Labs     06/11/22  0503 06/12/22 0451 06/13/22  0403   GLUCOSE 145* 129* 120*      ECHO: 06/02/22    TRANSTHORACIC ECHOCARDIOGRAM (TTE) COMPLETE (CONTRAST/BUBBLE/3D PRN) 06/09/2022  9:31 AM (Final)    Interpretation Summary    Left Ventricle: Reduced left ventricular systolic function. EF by 2D Simpsons Biplane is 42%. Left ventricle size is normal. Severely increased wall thickness. Increased ventricular mass. Infiltrative cardiomyopathy should be considered. Global hypokinesis present. Abnormal diastolic function.   Aortic Valve: Thickened cusp. Mildly calcified cusp. Sclerosis of the aortic valve cusp.  Moderate annular calcification vs appearance of aortic valve prosthesis [clinical correlation, clinical history]. Mild regurgitation.   Mitral Valve: Mildly thickened leaflet.   Aorta: Dilated aortic root. Dilated ascending aorta.   Pericardium: Small (<1 cm) localized pericardial effusion present around the right ventricle.   Technical qualifiers: Color flow Doppler was performed and pulse wave and/or continuous wave Doppler was performed. Signed by: Cydney Merrill MD on 6/9/2022  9:31 AM    Microbiology:   No results for input(s): CULTURE in the last 72 hours. Ventilator Settings Ideal body weight: 70.7 kg (155 lb 13.8 oz)  Mode FIO2 Rate Tidal Volume Pressure   PS/CPAP    34 %  15 bmp     500 mL   8     Peak airway pressure:     Minute ventilation: Minute Ventilation (l/min): 12 l/min  ABG:  No results for input(s): PHAPOC, VRP9EUTX, SQ2VDHE, DAQ0ZAV, BE in the last 72 hours. Assessment and Plan:  (Medical Decision Making)       Impression: 78 y.o. male with gastric cancer, s/p joe adjuvant chemo. Admitted with syncope that was likely multifactorial, driven by recent weight loss, poor PO intake, and severe anemia into the 4's. GPR on blood culture but doubt this was sepsis as this was likely contaminate. Course complicated by discovery of PE and DVT, subsequent GI bleed likely due to his cancer, requiring IVC filter placement. No post op extensive resections and brought to ICU still intubated and on pressors.     NEURO:   Sedation: propofol. Wean off. Analgesia: none currently. Will likely need scheduled after extubation but not complaining of pain currently. CV:   Volume Status: appears euvolemic  Syncope: likely multifactorial as above. Cont to monitor. PULM:   Acute hypoxemic respiratory failure: Only on vent due to surgery, extubated yeseterday  on 2L NC  RENAL:  Electrolytes: replace as needed  GI:   Nutrition: TPN  Gastric cancer: s/p extensive resection 6/9. GI bleed: likely due to cancer.    HEME:   Anemia: hgb now stable. Cont to monitor. Anticoagulation: Started on prophy lovenox today. With PE and DVT would like to increase to therapeutic dose, but will need surgery input. ID:   On post op abx. Believe 1/2 GPR blood culture was likely contamiante. ENDO:   Chronic steroid use: Unsure the underlying need for this. on 50 mg q8 hydrocortisone for now. Skin: no decub, turns, preventive care  Prophy: lovenox.        He is on 2L,stable from pulmonary stand point   Mobilize as able - will see as needed.          DNR        Kash Link MD

## 2022-06-13 NOTE — PROGRESS NOTES
VANCO DAILY FOLLOW UP NOTE  [facility] Pharmacokinetic Monitoring Service - Vancomycin    Consulting Provider: STEFFI Goodman (ID)  Indication: GPR port infection  Target Concentration: Goal AUC/WILFREDO 400-600 mg*hr/L  EOT 6/16/22 per ID  Additional Antimicrobials:     Pertinent Laboratory Values: Wt Readings from Last 1 Encounters:   06/12/22 147 lb (66.7 kg)     Temp Readings from Last 1 Encounters:   06/13/22 97.5 °F (36.4 °C) (Oral)     Recent Labs     06/11/22  0503 06/11/22  0504 06/12/22  0451 06/13/22  0403   BUN 17  --  14 13   CREATININE <0.20*  --  <0.20* <0.20*   WBC  --  39.7* 52.1* 45.6*     Estimated Creatinine Clearance: 338 mL/min (based on SCr of 0.2 mg/dL). Lab Results   Component Value Date    VANCORANDOM 17.8 06/11/2022       MRSA Nasal Swab: N/A. Non-respiratory infection. .      Assessment:  Date/Time Dose Concentration AUC   6/3 0709 750 mg q8h 26.1 601   6/5 0400 1000 mg q12h 13.7 482   6/11 0503 1250 mg q12h 17.8 439   Note: Serum concentrations collected for AUC dosing may appear elevated if collected in close proximity to the dose administered, this is not necessarily an indication of toxicity    Plan:  Current dosing regimen is therapeutic  Continue current dose  Repeat vancomycin concentrations will be ordered as clinically appropriate   Pharmacy will continue to monitor patient and adjust therapy as indicated    Thank you for the consult,  Masha Khan.  Tarik Reyes, [facility]

## 2022-06-13 NOTE — PROGRESS NOTES
[facility] CRITICAL CARE OUTREACH NURSE PROGRESS REPORT      SUBJECTIVE: Called to assess patient secondary to transfer out of critical care. @[facility]([id number])@  Vitals:    06/12/22 1545 06/12/22 1911 06/12/22 1944 06/12/22 2048   BP: (!) 141/72 138/88     Pulse: 95 99  86   Resp: 18 18 18 18   Temp: 98.4 °F (36.9 °C) 97.9 °F (36.6 °C)     TempSrc: Oral Oral     SpO2: 91% 95%  95%   Weight:       Height:            LAB DATA:    Recent Labs     06/10/22  0053 06/10/22  0422 06/10/22  0830 06/11/22  0503 06/12/22  0451   NA  --  139  --  143 144   K  --  4.0  --  3.6 3.2*   CL  --  110*  --  112* 109*   CO2  --  20*  --  24 27   BUN  --  15  --  17 14   GFRAA  --  >60  --  0* 0*   MG 1.6*  --  1.8  --  1.9   PHOS  --   --  3.1  --   --    GLOB  --  2.5  --  2.9 3.1   ALT  --  22  --  15 12        Recent Labs     06/10/22  0422 06/11/22  0504 06/12/22  0451   WBC 41.4* 39.7* 52.1*   HGB 11.0* 9.0* 8.7*   HCT 34.1* 28.6* 27.5*    305 323          OBJECTIVE: On arrival to room, I found patient to be resting comfortably in bed               ASSESSMENT:  Awakens easily; oriented x4; pleasant and comfortable; abdominal incision and all drain sites clean, dry and intact. PLAN:  Will continue to follow per outreach protocol.

## 2022-06-13 NOTE — PROGRESS NOTES
END OF SHIFT NOTE:    INTAKE/OUTPUT  06/12 0701 - 06/13 0700  In: 3705 [I.V.:3665]  Out: 4212 [Urine:1130; Drains:190]  Voiding: no  Catheter: yes  Drain:   Closed/Suction Drain Right RUQ Bulb (Active)   Site Description Clean, dry & intact 06/12/22 1315   Dressing Status Clean, dry & intact 06/12/22 1315   Drainage Appearance Pink tinged 06/12/22 2252   Drain Status Open to gravity drainage 06/12/22 1315   Output (ml) 0 ml 06/13/22 0354       Closed/Suction Drain Left LUQ Bulb (Active)   Site Description Clean, dry & intact 06/12/22 1315   Dressing Status Clean, dry & intact 06/12/22 1315   Drainage Appearance Serosanguinous 06/12/22 2252   Drain Status Compressed; To bulb suction 06/12/22 1315   Output (ml) 40 ml 06/13/22 0354       NG/OG/NJ/NE Tube Right nostril (Active)   Surrounding Skin Intact 06/12/22 1930   Securement device Tape 06/12/22 1930   Status Suction-low intermittent 06/12/22 1930   Placement Verified Gastric Contents 06/12/22 1315   NG/OG/NJ/NE External Measurement (cm) 58 cm 06/12/22 1930   Drainage Appearance Brown 06/12/22 1930   Output (mL) 250 ml 06/13/22 0426       Gastrostomy/Enterostomy/Jejunostomy Tube Gastrostomy LUQ 1 14 fr (Active)   Drainage Appearance Serous 06/11/22 1935   Site Description Clean, dry & intact 06/12/22 1930   J Port Status Clamped 06/12/22 1930   Surrounding Skin Clean, dry & intact 06/12/22 1930   Dressing Status New dressing applied 06/12/22 1315   Dressing Type Dry dressing 06/12/22 1315   Free Water/Flush (mL) 40 mL 06/12/22 1315   Action Taken Other (comment) 06/12/22 1315       Urinary Catheter 2 Way (Active)   $ Urethral catheter insertion $ Not inserted for procedure 06/03/22 0820   Catheter Indications Perioperative use for selected surgical procedures 06/12/22 1930   Site Assessment No urethral drainage 06/12/22 1930   Urine Color Yellow 06/12/22 1930   Urine Appearance Clear 06/12/22 1930   Urine Odor Malodorous 06/12/22 1315   Collection Container Standard 06/12/22 1930   Securement Method Securing device (Describe) 06/12/22 1930   Catheter Care Completed Yes 06/12/22 1315   Catheter Best Practices  Drainage tube clipped to bed;Catheter secured to thigh; Tamper seal intact; Bag below bladder;Bag not on floor; Lack of dependent loop in tubing;Drainage bag less than half full 06/12/22 1930   Status Draining 06/12/22 1930   Manual Irrigation Volume Input (mL) 0 mL 06/11/22 0800   Output (mL) 500 mL 06/13/22 0351   Discontinuation Reason Per provider order 06/11/22 0800               Flatus: Patient does not have flatus present. Stool:  0occurrences. Characteristics:       Emesis: 0occurrences. Characteristics:        VITAL SIGNS  Patient Vitals for the past 12 hrs:   Temp Pulse Resp BP SpO2   06/13/22 0347 -- -- 18 -- --   06/13/22 0250 98.1 °F (36.7 °C) 88 18 (!) 141/91 91 %   06/13/22 0100 97.7 °F (36.5 °C) 100 16 (!) 141/97 92 %   06/12/22 2354 -- -- 18 -- --   06/12/22 2311 97.7 °F (36.5 °C) 85 18 (!) 140/94 91 %   06/12/22 2048 -- 86 18 -- 95 %   06/12/22 1944 -- -- 18 -- --   06/12/22 1911 97.9 °F (36.6 °C) 99 18 138/88 95 %       Pain Assessment  Medicated for pain with Dilaudid 0.5 mg x 3 this shift with relief; able to sleep after dilaudid. Ambulating  Not oob this shift. Turned several times. Shift report given to oncoming nurse at the bedside.     Surinder Vaca RN

## 2022-06-13 NOTE — PROGRESS NOTES
Comprehensive Nutrition Assessment    Type and Reason for Visit: Reassess  Malnutrition Screening Tool: Malnutrition Screen  Have you recently lost weight without trying?: 34 or more pounds (4 points)  Have you been eating poorly because of a decreased appetite?: Yes (1 point)  Malnutrition Screening Tool Score: 5 and Unintentional Weight Loss (Hospitalists)  TPN management (Hospitalist and Surgery)    Nutrition Recommendations/Plan:    Parenteral Nutrition:  Peripheral parenteral nutrition to begin at 1800  Change: Dex 5%, 4.25% AA 2 L (85ml/hr)   Continue 250 ml 20% lipids daily  To provide: 1180 kcal/d (74% of needs), 85 grams of protein/d (106% of needs), 100 grams of CHO/d and 2250 ml of total volume/d. Lytes/L:   80 meq Sodium (80 meq NaCl), 20 mmol KPO4, 8 meq Mg, 4.5 meq Calcium. Osmolality ~890   Other additives: MTE, MVI Monday Wednesday Friday due to national shortages   Nutritional Supplement Therapy:   Active electrolyte replacement per nutrition support protocols  Replacement indicated: Active per MD order  Labs:   BMP daily  Mg MWF  Phos MWF    Triglyceride weekly on Wednesdays if remains on TPN  POC Glucoses/SSI Activate: AM glucose >180 mg/dL      Enteral Nutrition: Managed by surgery  Trophic Enteral Feeds:  Initiate Standard without Fiber (Osmolite 1.2 Carloz) via Jejunostomy at 10 ml/hour. Do not advance. Water flush 30 BID  Trophic enteral infusions may provide up to 500 kcal/day, not intended to meet estimated needs. Meals and Snacks:  Continue current diet.  NPO     Malnutrition Assessment:  Malnutrition Status: Severe malnutrition  Context: Chronic Illness  Findings of clinical characteristics of malnutrition:   Energy Intake:  Mild decrease in energy intake (Comment) (only tolerating oral nutrition supplements as primary)  Weight Loss:  Greater than 20% over 1 year (38# (24.4%) in ~10 months)     Body Fat Loss:  Severe body fat loss Triceps,Fat Overlying Ribs,Buccal region   Muscle Mass Loss:  Severe muscle mass loss Temples (temporalis),Clavicles (pectoralis & deltoids),Thigh (quadraceps),Calf (gastrocnemius)  Fluid Accumulation:  No significant fluid accumulation     Strength:  Not Performed  Nutrition Assessment:  Nutrition History: History provided by patient and wife. Patient states that he has been trying to eat some solids but it feels like it just sits on his stomach. He states he will wake the next day and still feel full. Wife states that patient has not tried any solids in weeks. Patient states that he mostly has been consuming Ensure (regular) 6-7 per day. Wife states that she has been mixing peanut butter powder into Ensure to increase kcal and protein (potentially providing 60-70 additional kcal and 6-9 additional grams of protein per serving depending on brand). Patient endorses weight loss of 50-60# over the last year. Do You Have Any Cultural, Druze, or Ethnic Food Preferences?: No   Nutrition Background:   Patient with PMH significant for HTN, gastric adenocarcinoma s/p neoadjuvant chemo with plans for surgery in 2-3 week. He presented with near syncopal episodes for last 2 days. He was admitted with shock. Nutrition Interval:  Pt is s/p ex lap with total gastrectomy and esophagojejunostomy with extensive lysis of adhesion and dissection, left lateral lobectomy of liver, distal pancreatectomy and splenectomy, combined diaphragmatic resection, falciform ligament flap, j-tube placement (14 fr), retroperitoneal mass excision and lymphadenectomy, and left abdominal wall mass excision 6/9. Patient seen and discussed with Alberto Wang RN. TPN stopped for beside Port removal today as likely source of infection. TF initiated per surgery this am at 10 ml/hr with no advance. Will convert to PPN tonight and continue until TF advanced to meet at least 50% needs. NGT still to LIS with ~600 ml observed in cannister. OWUSU recorded over last 24 hrs 300 ml.  Perfect serve to Dr. Melinda Carnes regarding nutrition plan and electrolyte replacement. Abdominal Status (last documented):   Last BM (including prior to admit): 06/08/22, GI Symptoms: Other (Comment) (hx of CA)   Pertinent Medications: humalog HS - held, SSI (none required), Vancomycin, solu-cortef  Electrolyte replacements: 6/7 KCl 10 meq x 4, KCl 10meq x5 6/12, KCl 10 meq X4 6/13   Pertinent Labs:   Lab Results   Component Value Date     06/13/2022    K 3.4 06/13/2022     06/13/2022    CO2 29 06/13/2022    BUN 13 06/13/2022    CREATININE <0.20 06/13/2022    GLUCOSE 120 06/13/2022    CALCIUM 8.3 06/13/2022    PHOS 3.4 06/13/2022    MG 2.0 06/13/2022      Lab Results   Component Value Date    POCGLU 127 06/13/2022    POCGLU 125 06/12/2022    POCGLU 147 06/12/2022    POCGLU 142 06/12/2022    POCGLU 123 06/11/2022    POCGLU 134 06/11/2022     Remarks: Sodium WNL, persistent hypokalemic - ?related to OWUSU, Mg WNL, Phos WNL, glucose slightly elevated - not requiring insulin correction    Nutrition Related Findings:   6/2:CLD. 6/4: NPO with bloody BM. EGD failed x2 6/4 and 6/5 due to food particles obstructing access despite Reglan. 6/6: TPN consult, port in place. 6/6 TPN initiated with lipids. 6/7 cont 2L 10%dex/4.25%AA, hold lipids due to high TG. Formula changed to Dex 15%, 5% AA 6/8. Lipids initiated 6/11. Current Nutrition Therapies:  Diet NPO  PN-Adult Premix 4.25/10 - Central Line  ADULT TUBE FEEDING; Jejunostomy; Standard without Fiber; Continuous; 10; No; 30; Other (specify); Manual Flush BID and after each use  Current Parenteral Nutrition Orders:  · Type and Formula: Premix Central (Dex 10% , 4.25% AA )   · Lipids: 250ml  · Duration: Continuous  · Rate/Volume: 2 L (85ml/hr)  · Current PN Order Provides: stopped  · Goal PN Orders Provides: 1520 kcal/d (95% of needs), 85 grams of protein/d (106% of needs), 200 grams of CHO/d and 2250 ml of total volume/d.      Current Intake:   Average Meal Intake: NPO        Anthropometric Measures:  Height: 5' 9\" (175.3 cm)  Current Body Wt: 147 lb 0.8 oz (66.7 kg) (6/12), Weight source: Bed Scale  BMI: 21.7  Admission Body Weight: 117 lb 15.1 oz (53.5 kg)  Ideal Body Weight (Kg) (Calculated): 73 kg (160 lbs), 73.7 %  Usual Body Wt: 156 lb (70.8 kg) (8/11/21 office wt), Percent weight change: -24.4       Edema: No data recorded  BMI Category Underweight (BMI less than 18.5)  Estimated Daily Nutrient Needs:  Energy (kcal/day): 5374-9930 (Kcal/kg (30-35) Weight used: 53.5 kg Current (6/2)  Protein (g/day): 70-80 (1.3-1.5 g/kg) Weight Used: (Current) 53.5 kg  Fluid (ml/day):   (1 ml/kcal)    Nutrition Diagnosis:   · Inadequate oral intake related to altered GI structure as evidenced by  (s/p total gastrectomy)    · Severe malnutrition related to inadequate protein-energy intake as evidenced by Criteria as identified in malnutrition assessment    Nutrition Interventions:   Food and/or Nutrient Delivery: Continue NPO,Continue Current Tube Feeding,Modify Parenteral Nutrition     Coordination of Nutrition Care: Continue to monitor while inpatient       Goals:   Previous Goal Met: Goal(s) Achieved  Active Goal: other (specify)  Maintain TPN until able to advance TF to meet at least 50% needs    Nutrition Monitoring and Evaluation:      Food/Nutrient Intake Outcomes: Enteral Nutrition Intake/Tolerance,Parenteral Nutrition Intake/Tolerance  Physical Signs/Symptoms Outcomes: Biochemical Data,GI Status,Fluid Status or Edema,Weight    Discharge Planning:     Too soon to determine    Bebe Tamayo, [address]

## 2022-06-13 NOTE — PROGRESS NOTES
[facility] CRITICAL CARE OUTREACH NURSE PROGRESS REPORT      SUBJECTIVE: Called to assess patient secondary to transfer. Vitals:    06/13/22 0347 06/13/22 0703 06/13/22 0745 06/13/22 0816   BP:   (!) 149/90    Pulse:   (!) 105 85   Resp: 18 20 18 18   Temp:   97.9 °F (36.6 °C)    TempSrc:   Oral    SpO2:    95%   Weight:       Height:            LAB DATA:    Recent Labs     06/11/22  0503 06/12/22  0451 06/13/22  0403    144 141   K 3.6 3.2* 3.4*   * 109* 105   CO2 24 27 29   BUN 17 14 13   GFRAA 0* 0* 0*   MG  --  1.9 2.0   PHOS  --   --  3.4   GLOB 2.9 3.1  --    ALT 15 12  --         Recent Labs     06/11/22  0504 06/12/22  0451 06/13/22  0403   WBC 39.7* 52.1* 45.6*   HGB 9.0* 8.7* 8.5*   HCT 28.6* 27.5* 27.5*    323 383          OBJECTIVE: On arrival to room, I found the patient in bed resting with primary RN at bedside. ASSESSMENT:  found patient to be alert and oriented. PT denies pain or SOB. Respirations even and unlabored. Bilateral lung sounds clear. Drains patent and c/d/i. PT denies any needs or concerns at this time. No concerns per primary RN. PLAN:  Will continue to follow per outreach protocol. Primary RN to call with concerns. Port expected to be removed at bedside later today per primary RN and general surgery note.

## 2022-06-13 NOTE — PROGRESS NOTES
Admit Date: 2022    POD 4 Day Post-Op    Procedure:  Procedure(s):  Exploratory laparotomy with total gastrectomy and esophagojejunostomy after extensive lysis of adhesions and dissections which added at least 3-4 hours to this case, Left lateral lobectomy of liver, Distal pancreatectomy and splenectomy, Combined diaphragmatic resection, Falciform ligament flap, Feeding J-tube placement, Retroperitoneal mass excision and lymphadenectomy, Left abdominal wall mass excision on posterior rectus sheath    Subjective: The patient is lying comfortably in bed. He has no new complaints today. States pain is controlled. He denies any nausea or emesis. Objective:       Vitals:    22 0250 22 0347 22 0703 22 0745   BP: (!) 141/91   (!) 149/90   Pulse: 88   (!) 105   Resp: 18 18 20 18   Temp: 98.1 °F (36.7 °C)   97.9 °F (36.6 °C)   TempSrc: Oral   Oral   SpO2: 91%      Weight:       Height:           Temp (24hrs), Av °F (36.7 °C), Min:97.7 °F (36.5 °C), Max:98.4 °F (36.9 °C)  . I&O reviewed as documented. Physical Exam:   Constitutional: Alert, NAD   BP (!) 149/90   Pulse (!) 105   Temp 97.9 °F (36.6 °C) (Oral)   Resp 18   Ht 5' 9\" (1.753 m)   Wt 147 lb (66.7 kg)   SpO2 91%   BMI 21.71 kg/m²   Eyes: Sclera are clear  ENMT: no external lesions' gross hearing normal; no obvious neck masses, no ear or lip lesions, nares normal, NG to LIS  CV: RRR. Normal perfusion  Resp: No JVD. Breathing is  non-labored; no audible wheezing. Room air  GI: soft and non-distended,  Staples c/d/i. Right and left surgical drain with serosanguineous output  Musculoskeletal: No embolic signs or cyanosis. Neuro:  Alert  Psychiatric: Calm and cooperative    Labs:   Recent Results (from the past 24 hour(s))   POCT Glucose    Collection Time: 22  5:09 PM   Result Value Ref Range    POC Glucose 147 (H) 65 - 100 mg/dL    Performed by:  Vivian    POCT Glucose    Collection Time: 22  8:54 PM   Result Value Ref Range    POC Glucose 125 (H) 65 - 100 mg/dL    Performed by: Medhat Mini    Magnesium    Collection Time: 06/13/22  4:03 AM   Result Value Ref Range    Magnesium 2.0 1.8 - 2.4 mg/dL   Phosphorus    Collection Time: 06/13/22  4:03 AM   Result Value Ref Range    Phosphorus 3.4 2.3 - 3.7 MG/DL   Basic Metabolic Panel    Collection Time: 06/13/22  4:03 AM   Result Value Ref Range    Sodium 141 136 - 145 mmol/L    Potassium 3.4 (L) 3.5 - 5.1 mmol/L    Chloride 105 98 - 107 mmol/L    CO2 29 21 - 32 mmol/L    Anion Gap 7 7 - 16 mmol/L    Glucose 120 (H) 65 - 100 mg/dL    BUN 13 8 - 23 MG/DL    CREATININE <0.20 (L) 0.8 - 1.5 MG/DL    GFR  0 (L) >60 ml/min/1.73m2    GFR Non- 0 (L) >60 ml/min/1.73m2    Calcium 8.3 8.3 - 10.4 MG/DL   CBC with Auto Differential    Collection Time: 06/13/22  4:03 AM   Result Value Ref Range    WBC 45.6 (H) 4.3 - 11.1 K/uL    RBC 3.01 (L) 4.23 - 5.6 M/uL    Hemoglobin 8.5 (L) 13.6 - 17.2 g/dL    Hematocrit 27.5 (L) 41.1 - 50.3 %    MCV 91.4 79.6 - 97.8 FL    MCH 28.2 26.1 - 32.9 PG    MCHC 30.9 (L) 31.4 - 35.0 g/dL    RDW 17.4 (H) 11.9 - 14.6 %    Platelets 126 402 - 855 K/uL    MPV 10.0 9.4 - 12.3 FL    nRBC 0.09 0.0 - 0.2 K/uL    Differential Type AUTOMATED      Seg Neutrophils 91 (H) 43 - 78 %    Lymphocytes 3 (L) 13 - 44 %    Monocytes 4 4.0 - 12.0 %    Eosinophils % 0 (L) 0.5 - 7.8 %    Basophils 0 0.0 - 2.0 %    Immature Granulocytes 2 0.0 - 5.0 %    Segs Absolute 41.4 (H) 1.7 - 8.2 K/UL    Absolute Lymph # 1.3 0.5 - 4.6 K/UL    Absolute Mono # 1.7 (H) 0.1 - 1.3 K/UL    Absolute Eos # 0.0 0.0 - 0.8 K/UL    Basophils Absolute 0.1 0.0 - 0.2 K/UL    Absolute Immature Granulocyte 1.0 (H) 0.0 - 0.5 K/UL   POCT Glucose    Collection Time: 06/13/22  7:37 AM   Result Value Ref Range    POC Glucose 127 (H) 65 - 100 mg/dL    Performed by: Ajith        Data Review     Xray Result (most recent):  XR CHEST LIMITED ONE VIEW 06/13/2022    Narrative  Portable view of the chest    COMPARISON: Yesterday    CLINICAL HISTORY: Postop, follow-up. FINDINGS:    There is moderate left pleural effusion. Bilateral perihilar and basilar  opacities, likely atelectasis or vascular congestion. No pneumothorax. Left  chest port and enteric tube are stable. Heart is enlarged. Impression  1.  Moderate left pleural effusion, similar to prior exam.    2. No significant change compared to prior exam.      Assessment:     Principal Problem:    Septic shock (HCC)  Active Problems:    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HCC)    Former heavy tobacco smoker    Gastroesophageal reflux disease    Hyponatremia    Hypoalbuminemia due to protein-calorie malnutrition (HCC)    Syncope due to orthostatic hypotension    Hypomagnesemia    Corticosteroid dependence ([address]    Hypoproteinemia (HCC)    Do not resuscitate status    Fatigue    History of gastric cancer    Encounter for palliative care    Debility    Weakness    Hypercoagulable state, secondary ([address]    Adult body mass index less than 19    Acute pulmonary embolism without acute cor pulmonale (HCC)    Severe protein-calorie malnutrition (HCC)    Gastrointestinal hemorrhage    Encounter for weaning from ventilator ([address]    ABILIO (iron deficiency anemia)    Malignant neoplasm of overlapping sites of stomach ([address]    Malignant neoplasm of body of stomach ([address]    Gastric adenocarcinoma ([address]  Resolved Problems:    Severe sepsis with lactic acidosis (HCC)    ABLA (acute blood loss anemia)    GIB (gastrointestinal bleeding)        Plan/Recommendations/Medical Decision Making:     Care management per Hospitalist/ Intensivist  NG to LIS  Plan to remove port at bedside later today, per ID recommendations of removing port prior to vancomycin ending  TPN  Start on trickle tube feeds at 10 cc/hr  IVF  No CPAP/ No BIPBP/ No Bagging  Follow labs  IV Abx    Lloyd Tobin PA-C

## 2022-06-13 NOTE — PROGRESS NOTES
Spiritual Care Visit, initial visit. Visited with patient at bedside. Prayed for patient's healing and health. Visit by Dipika Foley, Staff .  Rona, Emanuel.B., B.A.

## 2022-06-13 NOTE — PROGRESS NOTES
Date of Outreach Update:  Carolyn Gravely was seen and assessed. Vitals:    06/13/22 0703 06/13/22 0745 06/13/22 0816 06/13/22 1045   BP:  (!) 149/90  (!) 127/90   Pulse:  (!) 105 85 56   Resp: 20 18 18 18   Temp:  97.9 °F (36.6 °C)  97.5 °F (36.4 °C)   TempSrc:  Oral  Oral   SpO2:   95%    Weight:       Height:            Assessment:  Denies any pain or SOB. Respirations even and unlabored. Bilateral lung sounds coarse. Increasing O2 demands with PT now requiring 8L NC    Previous Outreach assessment has been reviewed. There have been no significant clinical changes since the completion of the last dated Outreach assessment. No concerns per primary RN. Will continue to follow up per outreach protocol.     Signed By:   Sebas Vega RN    June 13, 2022 2:39 PM

## 2022-06-13 NOTE — PROGRESS NOTES
Hospitalist Progress Note   Admit Date:  2022  1:05 PM   Name:  Judy Jolley   Age:  78 y.o. Sex:  male  :  1955   MRN:  [id number]   Room:  220/    Presenting Complaint: Loss of Consciousness     Reason(s) for Admission: Syncope and collapse [R55]  Hemorrhagic shock ([address] [R57.8]  Shock ([address] [R57.9]  History of gastric cancer [Z85.028]  Septic shock ([address] [A41.9, R65.21]  Gastrointestinal hemorrhage, unspecified gastrointestinal hemorrhage type [facility] Course & Interval History:   Judy Jolley is a 78 y.o. male with medical history of gastric adenocarcinoma s/p 4 cycles chemo, last dose 22, 50 pack year smoking history,  HTN, who presented after having a syncopal episode today with 2 day history of near syncope. He started feeling wewak and tired then stood up and passed out. Had constipation yesterday and abdominal pain but no abdominal pain today. No melena, hematochezia. Denies fever, chills, n/v, diarrhea. Unable to tolerate solid food for months, drinks several ensures per day with significant weight loss. Wife notes yesterday he was breathing abnormally and very shallow, having some chest discomfort. 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 Has missed 1 or more doses but says his wife is good about giving him his medicine. Denies current NSAID use. Only takes what is prescribed. Has hd IV iron infusions in the past but no blood. Prior to arrival, HR was erratic jumping from normal to as high as 160-170, described as SVT vs Sinus tach per EMS. He rec'd 1500 cc LR bolus and 4 mg zofran while en route. In ED, he was hypotensive, BP 74/54, , RR 22.  He rec'd S/p  30cc/kg sepsis, vancomcyin, cefepime 1 UpRBC ordered     Labs: SNa 131 Procal 1.02 LA 5.1  Salb 1.0 ALK phos 181  WBC 20.4 hgb 5.6 (8.3 on 22) HCT 19.5   EKG sinus tach   CXR no acute findings      S/p sepsis bolus, vancomcyin, cefepime 1 UpRBC ordered     Pt was DIAGNOSES:  1. Locally advanced stomach cancer with direct invasion into the distal esophagus, diaphragm, left lobe of the liver, pancreas and the retroperitoneum. 2.  Left abdominal wall mass within the posterior rectus sheath.     PROCEDURES PERFORMED:  1. Exploratory laparotomy with total gastrectomy and esophagojejunostomy after extensive lysis of adhesions and dissections which added at least 3-4 hours to this case. 2.  Left lateral lobectomy of liver. 3.  Distal pancreatectomy and splenectomy. 4.  Combined diaphragmatic resection. 5.  Falciform ligament flap. 6.  Feeding J-tube placement. 7.  Retroperitoneal mass excision and lymphadenectomy. 8.  Left abdominal wall mass excision on posterior rectus sheath. 6/12- presently ng tube   on tpn  Surgery following  6/13-started on NG tube feeding 10 cc/h      Hypokalemia  Added IV KCl  Continue TPN        Syncope due to orthostatic hypotension  Secondary to anemia- resolved. Hypomagnesemia  Resolved. Corticosteroid dependence ([address]  Looking at oncology notes, ? Started by them  Dose decreased by surgery             Acute pulmonary embolism without acute cor pulmonale (HCC)  S/p IVC filter  Sq lovenox       Encounter for weaning from ventilator ([address]  Off ventilator since 6/10      ABILIO (iron deficiency anemia)  Blood transfusion during the stay          Diet:  Diet NPO  PN-Adult Premix 4.25/10 - Central Line  ADULT TUBE FEEDING; Jejunostomy; Standard without Fiber; Continuous; 10; No; 30; Other (specify);  Manual Flush BID and after each use  DVT PPx: scd  Code status: DNR    Hospital Problems:  Principal Problem:    Septic shock ([address]  Active Problems:    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[address]    Former heavy tobacco smoker    Gastroesophageal reflux disease    Hyponatremia    Hypoalbuminemia due to protein-calorie malnutrition (HCC)    Syncope due to orthostatic hypotension    Hypomagnesemia    Corticosteroid dependence ([address]    Hypoproteinemia ([address]    Do not resuscitate status    Fatigue    History of gastric cancer    Encounter for palliative care    Debility    Weakness    Hypercoagulable state, secondary ([place] [id number].)    Adult body mass index less than 19    Acute pulmonary embolism without acute cor pulmonale (HCC)    Severe protein-calorie malnutrition (HCC)    Gastrointestinal hemorrhage    Encounter for weaning from ventilator ([facility]    ABILIO (iron deficiency anemia)    Malignant neoplasm of overlapping sites of stomach ([facility]    Malignant neoplasm of body of stomach ([facility]    Gastric adenocarcinoma ([place] [address]  Resolved Problems:    Severe sepsis with lactic acidosis (HCC)    ABLA (acute blood loss anemia)    GIB (gastrointestinal bleeding)      Objective:     Patient Vitals for the past 24 hrs:   Temp Pulse Resp BP SpO2   06/13/22 0816 -- 85 18 -- 95 %   06/13/22 0745 97.9 °F (36.6 °C) (!) 105 18 (!) 149/90 --   06/13/22 0703 -- -- 20 -- --   06/13/22 0347 -- -- 18 -- --   06/13/22 0250 98.1 °F (36.7 °C) 88 18 (!) 141/91 91 %   06/13/22 0100 97.7 °F (36.5 °C) 100 16 (!) 141/97 92 %   06/12/22 2354 -- -- 18 -- --   06/12/22 2311 97.7 °F (36.5 °C) 85 18 (!) 140/94 91 %   06/12/22 2048 -- 86 18 -- 95 %   06/12/22 1944 -- -- 18 -- --   06/12/22 1911 97.9 °F (36.6 °C) 99 18 138/88 95 %   06/12/22 1545 98.4 °F (36.9 °C) 95 18 (!) 141/72 91 %   06/12/22 1520 -- 90 16 -- 91 %   06/12/22 1115 98.1 °F (36.7 °C) 93 18 (!) 154/85 94 %       Oxygen Therapy  SpO2: 95 %  Pulse Oximetry Type: Continuous  Pulse via Oximetry: 86 beats per minute  Pulse Oximeter Device Mode: Intermittent  Pulse Oximeter Device Location: Finger  O2 Device: Nasal cannula  Oximetry Probe Site Changed: No  Skin Assessment: Clean, dry, & intact  Skin Protection for O2 Device: No  Orientation: Bilateral  Location: Cheek  Intervention(s): Hydrocolloid Dressing,Mouth Care,Reposition Device  FiO2 : 34 %  O2 Flow Rate (L/min): 3 L/min  Blood Gas  Performed?: Yes  Loi's Test #1: Collateral flow confirmed  Site #1: Left Radial  Site Prepped #1: Yes  Number of Attempts #1: 1  Pressure Held #1: Yes  Complications #1: None  How Tolerated?: Tolerated well  O2 Delivery Method: Endotracheal tube  Vent Mode: PS/CPAP    Estimated body mass index is 21.71 kg/m² as calculated from the following:    Height as of this encounter: 5' 9\" (1.753 m). Weight as of this encounter: 147 lb (66.7 kg). Intake/Output Summary (Last 24 hours) at 6/13/2022 0917  Last data filed at 6/13/2022 0855  Gross per 24 hour   Intake 3705 ml   Output 1485 ml   Net 2220 ml         Physical Exam:     Blood pressure (!) 149/90, pulse 85, temperature 97.9 °F (36.6 °C), temperature source Oral, resp. rate 18, height 5' 9\" (1.753 m), weight 147 lb (66.7 kg), SpO2 95 %. General:   Awake,alert ng tube, was on 15 L/min, weaned to 8 L/min. Head:  Normocephalic, atraumatic  Eyes:  Sclerae appear normal.  Pupils equally round. ENT:  Nares appear normal, no drainage. Moist oral mucosa  Neck:  No restricted ROM. Trachea midline   CV:   RRR. No m/r/g. No jugular venous distension. Lungs:   CTAB. No wheezing, rhonchi, or rales. Respirations even, unlabored  Abdomen:   Staples intact, mild tenderness to palpation  No bowel sounds  2 drainage tubes and one other tube in the rt  Extremities: No cyanosis or clubbing. No edema  Skin:     No rashes and normal coloration. Warm and dry. Neuro:  CN II-XII grossly intact. Sensation intact. A&Ox3  Psych:  Normal mood and affect. I have reviewed ordered lab tests and independently visualized imaging below:    Recent Labs:  Recent Results (from the past 48 hour(s))   POCT Glucose    Collection Time: 06/11/22 12:06 PM   Result Value Ref Range    POC Glucose 139 (H) 65 - 100 mg/dL    Performed by: Korina    POCT Glucose    Collection Time: 06/11/22  5:43 PM   Result Value Ref Range    POC Glucose 134 (H) 65 - 100 mg/dL    Performed by:  Vivian    POCT Glucose    Collection Time: 06/11/22  8:47 PM Result Value Ref Range    POC Glucose 123 (H) 65 - 100 mg/dL    [facility]: [facility]    Comprehensive Metabolic Panel w/ Reflex to MG    Collection Time: 06/12/22  4:51 AM   Result Value Ref Range    Sodium 144 136 - 145 mmol/L    Potassium 3.2 (L) 3.5 - 5.1 mmol/L    Chloride 109 (H) 98 - 107 mmol/L    CO2 27 21 - 32 mmol/L    Anion Gap 8 7 - 16 mmol/L    Glucose 129 (H) 65 - 100 mg/dL    BUN 14 8 - 23 MG/DL    CREATININE <0.20 (L) 0.8 - 1.5 MG/DL    GFR  0 (L) >60 ml/min/1.73m2    GFR Non- 0 (L) >60 ml/min/1.73m2    Calcium 8.3 8.3 - 10.4 MG/DL    Total Bilirubin 0.2 0.2 - 1.1 MG/DL    ALT 12 12 - 65 U/L    AST 16 15 - 37 U/L    Alk Phosphatase 118 50 - 136 U/L    Total Protein 4.5 (L) 6.3 - 8.2 g/dL    Albumin 1.4 (L) 3.2 - 4.6 g/dL    Globulin 3.1 2.3 - 3.5 g/dL    Albumin/Globulin Ratio 0.5 (L) 1.2 - 3.5     CBC with Auto Differential    Collection Time: 06/12/22  4:51 AM   Result Value Ref Range    WBC 52.1 (HH) 4.3 - 11.1 K/uL    RBC 3.05 (L) 4.23 - 5.6 M/uL    Hemoglobin 8.7 (L) 13.6 - 17.2 g/dL    Hematocrit 27.5 (L) 41.1 - 50.3 %    MCV 90.2 79.6 - 97.8 FL    MCH 28.5 26.1 - 32.9 PG    MCHC 31.6 31.4 - 35.0 g/dL    RDW 18.0 (H) 11.9 - 14.6 %    Platelets 800 626 - 506 K/uL    MPV 10.3 9.4 - 12.3 FL    nRBC 0.06 0.0 - 0.2 K/uL    Differential Type AUTOMATED      Seg Neutrophils 91 (H) 43 - 78 %    Lymphocytes 3 (L) 13 - 44 %    Monocytes 4 4.0 - 12.0 %    Eosinophils % 0 (L) 0.5 - 7.8 %    Basophils 0 0.0 - 2.0 %    Immature Granulocytes 2 0.0 - 5.0 %    Segs Absolute 47.3 (H) 1.7 - 8.2 K/UL    Absolute Lymph # 1.5 0.5 - 4.6 K/UL    Absolute Mono # 1.9 (H) 0.1 - 1.3 K/UL    Absolute Eos # 0.0 0.0 - 0.8 K/UL    Basophils Absolute 0.1 0.0 - 0.2 K/UL    Absolute Immature Granulocyte 1.2 (H) 0.0 - 0.5 K/UL   Triglyceride    Collection Time: 06/12/22  4:51 AM   Result Value Ref Range    Triglycerides 90 35 - 150 MG/DL   Magnesium    Collection Time: 06/12/22  4:51 AM Result Value Ref Range    Magnesium 1.9 1.8 - 2.4 mg/dL   POCT Glucose    Collection Time: 06/12/22  7:34 AM   Result Value Ref Range    POC Glucose 142 (H) 65 - 100 mg/dL    Performed by: Vivian    POCT Glucose    Collection Time: 06/12/22  5:09 PM   Result Value Ref Range    POC Glucose 147 (H) 65 - 100 mg/dL    Performed by:  Vivian    POCT Glucose    Collection Time: 06/12/22  8:54 PM   Result Value Ref Range    POC Glucose 125 (H) 65 - 100 mg/dL    Performed by: Tam Akbar    Magnesium    Collection Time: 06/13/22  4:03 AM   Result Value Ref Range    Magnesium 2.0 1.8 - 2.4 mg/dL   Phosphorus    Collection Time: 06/13/22  4:03 AM   Result Value Ref Range    Phosphorus 3.4 2.3 - 3.7 MG/DL   Basic Metabolic Panel    Collection Time: 06/13/22  4:03 AM   Result Value Ref Range    Sodium 141 136 - 145 mmol/L    Potassium 3.4 (L) 3.5 - 5.1 mmol/L    Chloride 105 98 - 107 mmol/L    CO2 29 21 - 32 mmol/L    Anion Gap 7 7 - 16 mmol/L    Glucose 120 (H) 65 - 100 mg/dL    BUN 13 8 - 23 MG/DL    CREATININE <0.20 (L) 0.8 - 1.5 MG/DL    GFR  0 (L) >60 ml/min/1.73m2    GFR Non- 0 (L) >60 ml/min/1.73m2    Calcium 8.3 8.3 - 10.4 MG/DL   CBC with Auto Differential    Collection Time: 06/13/22  4:03 AM   Result Value Ref Range    WBC 45.6 (H) 4.3 - 11.1 K/uL    RBC 3.01 (L) 4.23 - 5.6 M/uL    Hemoglobin 8.5 (L) 13.6 - 17.2 g/dL    Hematocrit 27.5 (L) 41.1 - 50.3 %    MCV 91.4 79.6 - 97.8 FL    MCH 28.2 26.1 - 32.9 PG    MCHC 30.9 (L) 31.4 - 35.0 g/dL    RDW 17.4 (H) 11.9 - 14.6 %    Platelets 000 275 - 803 K/uL    MPV 10.0 9.4 - 12.3 FL    nRBC 0.09 0.0 - 0.2 K/uL    Differential Type AUTOMATED      Seg Neutrophils 91 (H) 43 - 78 %    Lymphocytes 3 (L) 13 - 44 %    Monocytes 4 4.0 - 12.0 %    Eosinophils % 0 (L) 0.5 - 7.8 %    Basophils 0 0.0 - 2.0 %    Immature Granulocytes 2 0.0 - 5.0 %    Segs Absolute 41.4 (H) 1.7 - 8.2 K/UL    Absolute Lymph # 1.3 0.5 - 4.6 K/UL    Absolute Mono # 1.7 (H) 0.1 - 1.3 K/UL    Absolute Eos # 0.0 0.0 - 0.8 K/UL    Basophils Absolute 0.1 0.0 - 0.2 K/UL    Absolute Immature Granulocyte 1.0 (H) 0.0 - 0.5 K/UL   POCT Glucose    Collection Time: 06/13/22  7:37 AM   Result Value Ref Range    POC Glucose 127 (H) 65 - 100 mg/dL    Performed by: [facility]        Other Studies:  XR CHEST 1 VIEW   Final Result      1. Moderate left pleural effusion, similar to prior exam.      2. No significant change compared to prior exam.      XR CHEST 1 VIEW   Final Result   Bilateral airspace opacities and left pleural effusion with interval   worsening on the left. XR CHEST 1 VIEW   Final Result      1. Persistent bibasilar opacities, likely atelectasis or consolidation. XR CHEST 1 VIEW   Final Result   Unchanged mild bibasilar lung atelectasis or infiltrates. XR CHEST 1 VIEW   Final Result   1. New mild right basilar atelectatic appearing changes. No significant acute   changes otherwise seen. It should be noted that the left lung apex has been   clipped limiting complete assessment for pneumothorax. This report was made using voice transcription. Despite my best efforts to avoid   any, transcription errors may persist. If there is any question about the   accuracy of the report or need for clarification, then please call [phone], or text me through perfectserv for clarification or correction. IR GUIDED IVC FILTER PLACEMENT   Final Result   Successful placement an infrarenal Cook Celect inferior vena cava filter. Plan:   The patient may be evaluated in 4 months by interventional radiology in order to   evaluate for continued need of the IVC filter for versus filter removal.                  Vascular duplex lower extremity venous bilateral   Final Result   Negative for sonographic evidence of deep venous thrombosis right   lower extremity. LEFT LOWER EXTREMITY VENOUS DUPLEX ULTRASOUND.       INDICATION: Left lower extremity pain. TECHNIQUE: Direct skin-contact high resolution grayscale images, color-flow   Doppler and duplex waveform analysis. FINDINGS: Abnormal intraluminal echoes within the common femoral and profunda   femoral veins. There is some flow. The superficial superficial femoral and   popliteal veins and upper calf veins are unremarkable. IMPRESSION: Positive for deep venous thrombosis left common femoral profunda   femoral veins, nonocclusive. CTA CHEST ABDOMEN PELVIS W WO CONTRAST   Final Result   1. No extravasation to suggest active GI bleed. 2. Small areas of pulmonary embolism are present in the right lower lobe. Clot   burden is quite small. 3. DVT is also likely present in the left femoral veins. 4. Previously described gastric mass again noted. It is difficult to measure to   evaluate for progression. XR CHEST PORTABLE   Final Result   Unremarkable portable chest radiograph.                      XR CHEST 1 VIEW    (Results Pending)       Current Meds:  Current Facility-Administered Medications   Medication Dose Route Frequency    potassium chloride 10 mEq/100 mL IVPB (Peripheral Line)  10 mEq IntraVENous Q1H    lidocaine 1 % injection 5 mL  5 mL IntraDERmal Once    glucose chewable tablet 16 g  4 tablet Oral PRN    dextrose bolus 10% 125 mL  125 mL IntraVENous PRN    Or    dextrose bolus 10% 250 mL  250 mL IntraVENous PRN    glucagon injection 1 mg  1 mg IntraMUSCular PRN    dextrose 5 % solution  100 mL/hr IntraVENous PRN    hydrocortisone sodium succinate PF (SOLU-CORTEF) injection 25 mg  25 mg IntraVENous Q12H    metoprolol (LOPRESSOR) injection 5 mg  5 mg IntraVENous Q6H    PN-Adult Premix 4.25/10 - Central Line   IntraVENous Continuous TPN    medicated lip ointment (BLISTEX)   Topical PRN    HYDROmorphone HCl PF (DILAUDID) injection 0.5 mg  0.5 mg IntraVENous Q2H PRN    oxyCODONE (ROXICODONE) 5 MG/5ML solution 5 mg  5 mg Per J Tube Q6H PRN    guaiFENesin (ROBITUSSIN) 100 MG/5ML oral solution 200 mg  200 mg Per J Tube Q6H    vancomycin (VANCOCIN) 1250 mg in sodium chloride 0.9% 250 mL IVPB  1,250 mg IntraVENous Q12H    insulin lispro (HUMALOG) injection vial 0-4 Units  0-4 Units SubCUTAneous TID WC    insulin lispro (HUMALOG) injection vial 0-4 Units  0-4 Units SubCUTAneous Nightly    cloNIDine (CATAPRES) tablet 0.2 mg  0.2 mg Per J Tube Q4H PRN    fat emulsion 20 % infusion 250 mL  250 mL IntraVENous Daily    sodium chloride flush 0.9 % injection 5-40 mL  5-40 mL IntraVENous 2 times per day    sodium chloride flush 0.9 % injection 5-40 mL  5-40 mL IntraVENous PRN    0.9 % sodium chloride infusion   IntraVENous PRN    enoxaparin Sodium (LOVENOX) injection 30 mg  30 mg SubCUTAneous Daily    ipratropium-albuterol (DUONEB) nebulizer solution 1 ampule  1 ampule Inhalation Q6H    potassium chloride 20 mEq/50 mL IVPB (Central Line)  20 mEq IntraVENous PRN    magnesium sulfate 2000 mg in 50 mL IVPB premix  2,000 mg IntraVENous PRN    ondansetron (ZOFRAN) injection 4 mg  4 mg IntraVENous Q6H PRN    Medela Tender Care Lanolin cream   Topical PRN       Signed:  Sowmya Canchola MD

## 2022-06-13 NOTE — CARE COORDINATION
CM continues to follow for discharge planning and/or CM needs. Pt discharge plan remains that he will transition to [facility] when medically stable. [facility] bed available today, however, pt surgeon reports pt not stable for transfer this day. CM to continue to monitor and update as needed.

## 2022-06-13 NOTE — PROGRESS NOTES
PHYSICAL THERAPY Daily Note and Re-evaluation  (Link to Caseload Tracking: PT Visit Days : 1  Acknowledge Orders  Time In/Out  PT Charge Capture  Rehab Caseload Tracker    Burnie Lefort is a 78 y.o. male   PRIMARY DIAGNOSIS: Septic shock ([address]  Syncope and collapse [R55]  Hemorrhagic shock ([address] [R57.8]  Shock ([address] [R57.9]  History of gastric cancer [Z85.028]  Septic shock ([address] [A41.9, R65.21]  Gastrointestinal hemorrhage, unspecified gastrointestinal hemorrhage type [K92.2]    4 Days Post-Op  Reason for Referral: Generalized Muscle Weakness (M62.81)  Other abnormalities of gait and mobility (R26.89)  History of falling (Z91.81)  Inpatient: Payor: MEDICARE / Plan: MEDICARE PART A / Product Type: *No Product type* /     ASSESSMENT:     REHAB RECOMMENDATIONS:   Recommendation to date pending progress:  [address]    Equipment:     To Be Determined     ASSESSMENT:  Mr. Ynes Jimenez presents s/p Exploratory laparotomy with total gastrectomy and esophagojejunostomy after extensive lysis of adhesions and dissections. He has a PMH of gastric adenocarcinoma with a recent completion of 4 rounds of chemo. Today he required total Ax2 to perform bed mobility. Tolerated sitting EOB several minutes. He stood with mod to max A x2. Able to take a couple small shuffling steps at EOB. He then required total Ax2 to transfer back to bed. Endorsed severe pain from moving. SpO2 remained >90% throughout treatment. Patient has experienced a significant decline in functional mobility after undergoing above surgery. He would benefit from resuming skilled acute care therapy to promote improved strength, ROM, activity tolerance and overall mobility.       [facility] [address] [address] AM-PAC 6 Clicks Basic Mobility Inpatient Short Form  AM-PAC Mobility Inpatient   How much difficulty turning over in bed?: Unable  How much difficulty sitting down on / standing up from a chair with arms?: A Lot  How much difficulty moving from lying on back to sitting on side of bed?: Unable  How much help from another person moving to and from a bed to a chair?: A Lot  How much help from another person needed to walk in hospital room?: A Lot  How much help from another person for climbing 3-5 steps with a railing?: Total  AM-PAC Inpatient Mobility Raw Score : 9  AM-PAC Inpatient T-Scale Score : 30.55  Mobility Inpatient CMS 0-100% Score: 81.38  Mobility Inpatient CMS G-Code Modifier : CM    SUBJECTIVE:   Mr. Adrianne Clemens states, \"I need some pain medicine. \"     Social/Functional Lives With: Spouse  Type of Home: House  Home Layout: One level  Bathroom Accessibility: Accessible  Receives Help From: Family  ADL Assistance: Independent  Homemaking Assistance: Independent  Ambulation Assistance: Independent  Transfer Assistance: Independent  Active : Yes  Mode of Transportation: Car  Occupation: Retired    OBJECTIVE:     PAIN: VITALS / O2: PRECAUTION / Milon Manger / [facility] [employer]:   Pre Treatment:   Pain Assessment: 0-10  Pain Level: 5  Pain Location: Abdomen  Non-Pharmaceutical Pain Intervention(s): Nurse notified (comment)      Post Treatment: 7/10 Vitals        Oxygen      Mata Catheter and IV    RESTRICTIONS/PRECAUTIONS:  Restrictions/Precautions: Fall Risk                 GROSS EVALUATION: Intact Impaired (Comments):   AROM []  generally decreased, functional   PROM []    Strength []  generally decreased but functionally  grossly 3/5 bilaterally   Balance []     Posture [] Forward Head  Rounded Shoulders   Sensation []     Coordination []      Tone []     Edema []    Activity Tolerance [] Patient limited by pain,Patient limited by fatigue    []      COGNITION/  PERCEPTION: Intact Impaired (Comments):   Orientation []     Vision []     Hearing []     Cognition  [x]       MOBILITY: I Mod I S SBA CGA Min Mod Max Total  NT x2 Comments:   Bed Mobility    Rolling [] [] [] [] [] [] [] [] [] [x] [] Increased assist needed post syncopal episode Supine to Sit [] [] [] [] [] [] [] [] [x] [] [x]    Scooting [] [] [] [] [] [] [] [] [x] [] [x]    Sit to Supine [] [] [] [] [] [] [x] [] [x] [] [x] Post syncopal episode   Transfers    Sit to Stand [] [] [] [] [] [] [] [x] [] [] [x]    Bed to Chair [] [] [] [] [] [] [] [] [] [] []    Stand to Sit [] [] [] [] [] [] [] [x] [] [] [x]     [] [] [] [] [] [] [] [] [] [] []    I=Independent, Mod I=Modified Independent, S=Supervision, SBA=Standby Assistance, CGA=Contact Guard Assistance,   Min=Minimal Assistance, Mod=Moderate Assistance, Max=Maximal Assistance, Total=Total Assistance, NT=Not Tested    GAIT: I Mod I S SBA CGA Min Mod Max Total  NT x2 Comments:   Level of Assistance [] [] [] [] [] [] [] [x] [] [] [x]    Distance 1' sidestepping at EOB feet    DME Rolling Walker    Gait Quality Decreased martínez , Decreased step clearance and forward flexed posture    Weightbearing Status      Stairs      I=Independent, Mod I=Modified Independent, S=Supervision, SBA=Standby Assistance, CGA=Contact Guard Assistance,   Min=Minimal Assistance, Mod=Moderate Assistance, Max=Maximal Assistance, Total=Total Assistance, NT=Not Tested    PLAN:   ACUTE PHYSICAL THERAPY GOALS:   (Developed with and agreed upon by patient and/or caregiver. )  LTG:  Not met 6/13/2022   (1.)  Updated Goals 6/13/2022    LTG:  (1.)Mr. Nick Gutierrez  will move from supine to sit and sit to supine via logrolling  to side in flat bed without siderails with moderate assistance  within 7 day(s). (2.)Mr. Nick Gutierrez  will transfer from bed to chair and chair to bed with moderate assistance  using the least restrictive/no device within 7 day(s). (3.)Mr. Nick Gutierrez  will ambulate with moderate assistance  for 10+ feet with the least restrictive device within 7 day(s).         ______________________________________________________________________________________________      FREQUENCY AND DURATION: 3 times/week for duration of hospital stay or until stated goals are met, whichever comes first.    THERAPY PROGNOSIS: Fair    PROBLEM LIST:   (Skilled intervention is medically necessary to address:)  Decreased ADL/Functional Activities  Decreased Activity Tolerance  Decreased AROM/PROM  Decreased Balance  Decreased Gait Ability  Decreased Safety Awareness  Decreased Strength  Decreased Transfer Abilities INTERVENTIONS PLANNED:   (Benefits and precautions of physical therapy have been discussed with the patient.)  Self Care Training  Therapeutic Activity  Therapeutic Exercise/HEP  Neuromuscular Re-education  Gait Training  Education       TREATMENT:   EVALUATION: MODERATE COMPLEXITY: (Untimed Charge)    TREATMENT:   Therapeutic Activity (23 Minutes): Therapeutic activity included Supine to Sit, Sit to Supine, Scooting, Transfer Training, Ambulation on level ground, Sitting balance  and Standing balance to improve functional Activity tolerance, Balance, Coordination, Mobility and Strength.     TREATMENT GRID:  N/A    AFTER TREATMENT PRECAUTIONS: Bed, Call light within reach, Needs within reach and RN notified    INTERDISCIPLINARY COLLABORATION:  RN/ PCT, PT/ PTA and Rehab Attendant    EDUCATION:      TIME IN/OUT:  Time In: 1528  Time Out: [address] [place]  Minutes: 25    A Jarred Real PT

## 2022-06-14 ENCOUNTER — PREP FOR PROCEDURE (OUTPATIENT)
Dept: SURGERY | Age: 67
End: 2022-06-14

## 2022-06-14 ENCOUNTER — APPOINTMENT (OUTPATIENT)
Dept: GENERAL RADIOLOGY | Age: 67
DRG: 853 | End: 2022-06-14
Payer: MEDICARE

## 2022-06-14 DIAGNOSIS — C16.9 MALIGNANT NEOPLASM OF STOMACH, UNSPECIFIED LOCATION (HCC): Primary | ICD-10-CM

## 2022-06-14 LAB
ALBUMIN SERPL-MCNC: 1.1 G/DL (ref 3.2–4.6)
ANION GAP SERPL CALC-SCNC: 6 MMOL/L (ref 7–16)
BASOPHILS # BLD: 0.1 K/UL (ref 0–0.2)
BASOPHILS NFR BLD: 0 % (ref 0–2)
BUN SERPL-MCNC: 11 MG/DL (ref 8–23)
CALCIUM SERPL-MCNC: 8.2 MG/DL (ref 8.3–10.4)
CHLORIDE SERPL-SCNC: 102 MMOL/L (ref 98–107)
CO2 SERPL-SCNC: 29 MMOL/L (ref 21–32)
CREAT SERPL-MCNC: <0.2 MG/DL (ref 0.8–1.5)
DIFFERENTIAL METHOD BLD: ABNORMAL
EOSINOPHIL # BLD: 0.1 K/UL (ref 0–0.8)
EOSINOPHIL NFR BLD: 0 % (ref 0.5–7.8)
ERYTHROCYTE [DISTWIDTH] IN BLOOD BY AUTOMATED COUNT: 16.9 % (ref 11.9–14.6)
GLUCOSE BLD STRIP.AUTO-MCNC: 118 MG/DL (ref 65–100)
GLUCOSE BLD STRIP.AUTO-MCNC: 120 MG/DL (ref 65–100)
GLUCOSE BLD STRIP.AUTO-MCNC: 124 MG/DL (ref 65–100)
GLUCOSE BLD STRIP.AUTO-MCNC: 128 MG/DL (ref 65–100)
GLUCOSE SERPL-MCNC: 113 MG/DL (ref 65–100)
HCT VFR BLD AUTO: 26.6 % (ref 41.1–50.3)
HGB BLD-MCNC: 8.2 G/DL (ref 13.6–17.2)
IMM GRANULOCYTES # BLD AUTO: 0.3 K/UL (ref 0–0.5)
IMM GRANULOCYTES NFR BLD AUTO: 1 % (ref 0–5)
LYMPHOCYTES # BLD: 1.3 K/UL (ref 0.5–4.6)
LYMPHOCYTES NFR BLD: 4 % (ref 13–44)
MAGNESIUM SERPL-MCNC: 2 MG/DL (ref 1.8–2.4)
MCH RBC QN AUTO: 27.4 PG (ref 26.1–32.9)
MCHC RBC AUTO-ENTMCNC: 30.8 G/DL (ref 31.4–35)
MCV RBC AUTO: 89 FL (ref 79.6–97.8)
MONOCYTES # BLD: 1.7 K/UL (ref 0.1–1.3)
MONOCYTES NFR BLD: 5 % (ref 4–12)
NEUTS SEG # BLD: 28.3 K/UL (ref 1.7–8.2)
NEUTS SEG NFR BLD: 89 % (ref 43–78)
NRBC # BLD: 0.08 K/UL (ref 0–0.2)
PLATELET # BLD AUTO: 436 K/UL (ref 150–450)
PMV BLD AUTO: 10.2 FL (ref 9.4–12.3)
POTASSIUM SERPL-SCNC: 3.2 MMOL/L (ref 3.5–5.1)
RBC # BLD AUTO: 2.99 M/UL (ref 4.23–5.6)
SERVICE CMNT-IMP: ABNORMAL
SODIUM SERPL-SCNC: 137 MMOL/L (ref 136–145)
WBC # BLD AUTO: 31.7 K/UL (ref 4.3–11.1)

## 2022-06-14 PROCEDURE — 02HV33Z INSERTION OF INFUSION DEVICE INTO SUPERIOR VENA CAVA, PERCUTANEOUS APPROACH: ICD-10-PCS | Performed by: FAMILY MEDICINE

## 2022-06-14 PROCEDURE — C1751 CATH, INF, PER/CENT/MIDLINE: HCPCS

## 2022-06-14 PROCEDURE — 97110 THERAPEUTIC EXERCISES: CPT

## 2022-06-14 PROCEDURE — 2500000003 HC RX 250 WO HCPCS: Performed by: FAMILY MEDICINE

## 2022-06-14 PROCEDURE — 83735 ASSAY OF MAGNESIUM: CPT

## 2022-06-14 PROCEDURE — 94640 AIRWAY INHALATION TREATMENT: CPT

## 2022-06-14 PROCEDURE — 6370000000 HC RX 637 (ALT 250 FOR IP): Performed by: SURGERY

## 2022-06-14 PROCEDURE — 6360000002 HC RX W HCPCS: Performed by: INTERNAL MEDICINE

## 2022-06-14 PROCEDURE — 2700000000 HC OXYGEN THERAPY PER DAY

## 2022-06-14 PROCEDURE — 97166 OT EVAL MOD COMPLEX 45 MIN: CPT

## 2022-06-14 PROCEDURE — 1090000002 HH PPS REVENUE DEBIT

## 2022-06-14 PROCEDURE — 2580000003 HC RX 258: Performed by: NURSE PRACTITIONER

## 2022-06-14 PROCEDURE — 71045 X-RAY EXAM CHEST 1 VIEW: CPT

## 2022-06-14 PROCEDURE — 6360000002 HC RX W HCPCS: Performed by: NURSE PRACTITIONER

## 2022-06-14 PROCEDURE — 36415 COLL VENOUS BLD VENIPUNCTURE: CPT

## 2022-06-14 PROCEDURE — 82040 ASSAY OF SERUM ALBUMIN: CPT

## 2022-06-14 PROCEDURE — 2580000003 HC RX 258: Performed by: SURGERY

## 2022-06-14 PROCEDURE — 99232 SBSQ HOSP IP/OBS MODERATE 35: CPT | Performed by: INTERNAL MEDICINE

## 2022-06-14 PROCEDURE — 94761 N-INVAS EAR/PLS OXIMETRY MLT: CPT

## 2022-06-14 PROCEDURE — 2500000003 HC RX 250 WO HCPCS: Performed by: SURGERY

## 2022-06-14 PROCEDURE — 2580000003 HC RX 258: Performed by: FAMILY MEDICINE

## 2022-06-14 PROCEDURE — 97535 SELF CARE MNGMENT TRAINING: CPT

## 2022-06-14 PROCEDURE — 1100000000 HC RM PRIVATE

## 2022-06-14 PROCEDURE — 80048 BASIC METABOLIC PNL TOTAL CA: CPT

## 2022-06-14 PROCEDURE — 6360000002 HC RX W HCPCS: Performed by: FAMILY MEDICINE

## 2022-06-14 PROCEDURE — 85025 COMPLETE CBC W/AUTO DIFF WBC: CPT

## 2022-06-14 PROCEDURE — P9047 ALBUMIN (HUMAN), 25%, 50ML: HCPCS | Performed by: INTERNAL MEDICINE

## 2022-06-14 PROCEDURE — 1090000001 HH PPS REVENUE CREDIT

## 2022-06-14 PROCEDURE — 36573 INSJ PICC RS&I 5 YR+: CPT

## 2022-06-14 PROCEDURE — 6360000002 HC RX W HCPCS: Performed by: SURGERY

## 2022-06-14 PROCEDURE — 97530 THERAPEUTIC ACTIVITIES: CPT

## 2022-06-14 PROCEDURE — 82962 GLUCOSE BLOOD TEST: CPT

## 2022-06-14 RX ORDER — SODIUM CHLORIDE 0.9 % (FLUSH) 0.9 %
5-40 SYRINGE (ML) INJECTION PRN
Status: CANCELLED | OUTPATIENT
Start: 2022-06-14

## 2022-06-14 RX ORDER — SODIUM CHLORIDE 0.9 % (FLUSH) 0.9 %
5-40 SYRINGE (ML) INJECTION EVERY 12 HOURS SCHEDULED
Status: DISCONTINUED | OUTPATIENT
Start: 2022-06-14 | End: 2022-06-21 | Stop reason: HOSPADM

## 2022-06-14 RX ORDER — SODIUM CHLORIDE 0.9 % (FLUSH) 0.9 %
5-40 SYRINGE (ML) INJECTION PRN
Status: DISCONTINUED | OUTPATIENT
Start: 2022-06-14 | End: 2022-06-21 | Stop reason: HOSPADM

## 2022-06-14 RX ORDER — SODIUM CHLORIDE 0.9 % (FLUSH) 0.9 %
5-40 SYRINGE (ML) INJECTION EVERY 12 HOURS SCHEDULED
Status: CANCELLED | OUTPATIENT
Start: 2022-06-14

## 2022-06-14 RX ORDER — ALBUMIN (HUMAN) 12.5 G/50ML
25 SOLUTION INTRAVENOUS EVERY 12 HOURS
Status: COMPLETED | OUTPATIENT
Start: 2022-06-14 | End: 2022-06-16

## 2022-06-14 RX ORDER — ENOXAPARIN SODIUM 100 MG/ML
30 INJECTION SUBCUTANEOUS EVERY 12 HOURS
Status: DISCONTINUED | OUTPATIENT
Start: 2022-06-14 | End: 2022-06-21

## 2022-06-14 RX ORDER — SODIUM CHLORIDE 9 MG/ML
INJECTION, SOLUTION INTRAVENOUS PRN
Status: CANCELLED | OUTPATIENT
Start: 2022-06-14

## 2022-06-14 RX ORDER — SODIUM CHLORIDE 9 MG/ML
25 INJECTION, SOLUTION INTRAVENOUS PRN
Status: DISCONTINUED | OUTPATIENT
Start: 2022-06-14 | End: 2022-06-21 | Stop reason: HOSPADM

## 2022-06-14 RX ORDER — POTASSIUM CHLORIDE 7.45 MG/ML
10 INJECTION INTRAVENOUS
Status: DISCONTINUED | OUTPATIENT
Start: 2022-06-14 | End: 2022-06-14 | Stop reason: SDUPTHER

## 2022-06-14 RX ORDER — POTASSIUM CHLORIDE 7.45 MG/ML
10 INJECTION INTRAVENOUS
Status: DISCONTINUED | OUTPATIENT
Start: 2022-06-14 | End: 2022-06-14

## 2022-06-14 RX ORDER — FUROSEMIDE 10 MG/ML
40 INJECTION INTRAMUSCULAR; INTRAVENOUS EVERY 12 HOURS
Status: DISCONTINUED | OUTPATIENT
Start: 2022-06-14 | End: 2022-06-17

## 2022-06-14 RX ORDER — LIDOCAINE HYDROCHLORIDE 10 MG/ML
5 INJECTION, SOLUTION INFILTRATION; PERINEURAL ONCE
Status: COMPLETED | OUTPATIENT
Start: 2022-06-14 | End: 2022-06-14

## 2022-06-14 RX ORDER — POTASSIUM CHLORIDE 7.45 MG/ML
10 INJECTION INTRAVENOUS
Status: COMPLETED | OUTPATIENT
Start: 2022-06-14 | End: 2022-06-14

## 2022-06-14 RX ADMIN — HYDROMORPHONE HYDROCHLORIDE 0.5 MG: 1 INJECTION, SOLUTION INTRAMUSCULAR; INTRAVENOUS; SUBCUTANEOUS at 06:05

## 2022-06-14 RX ADMIN — HYDROMORPHONE HYDROCHLORIDE 0.5 MG: 1 INJECTION, SOLUTION INTRAMUSCULAR; INTRAVENOUS; SUBCUTANEOUS at 02:17

## 2022-06-14 RX ADMIN — POTASSIUM CHLORIDE 10 MEQ: 7.46 INJECTION, SOLUTION INTRAVENOUS at 16:28

## 2022-06-14 RX ADMIN — SOYBEAN OIL 250 ML: 20 INJECTION, SOLUTION INTRAVENOUS at 17:26

## 2022-06-14 RX ADMIN — METOPROLOL TARTRATE 5 MG: 5 INJECTION INTRAVENOUS at 14:31

## 2022-06-14 RX ADMIN — POTASSIUM CHLORIDE 10 MEQ: 7.46 INJECTION, SOLUTION INTRAVENOUS at 15:29

## 2022-06-14 RX ADMIN — IPRATROPIUM BROMIDE AND ALBUTEROL SULFATE 1 AMPULE: .5; 3 SOLUTION RESPIRATORY (INHALATION) at 14:06

## 2022-06-14 RX ADMIN — SODIUM CHLORIDE, PRESERVATIVE FREE 10 ML: 5 INJECTION INTRAVENOUS at 20:40

## 2022-06-14 RX ADMIN — OXYCODONE HYDROCHLORIDE 5 MG: 5 SOLUTION ORAL at 20:40

## 2022-06-14 RX ADMIN — FUROSEMIDE 40 MG: 10 INJECTION, SOLUTION INTRAMUSCULAR; INTRAVENOUS at 14:47

## 2022-06-14 RX ADMIN — METOPROLOL TARTRATE 5 MG: 5 INJECTION INTRAVENOUS at 20:40

## 2022-06-14 RX ADMIN — VANCOMYCIN HYDROCHLORIDE 1250 MG: 10 INJECTION, POWDER, LYOPHILIZED, FOR SOLUTION INTRAVENOUS at 01:38

## 2022-06-14 RX ADMIN — VANCOMYCIN HYDROCHLORIDE 1250 MG: 10 INJECTION, POWDER, LYOPHILIZED, FOR SOLUTION INTRAVENOUS at 14:31

## 2022-06-14 RX ADMIN — LIDOCAINE HYDROCHLORIDE 5 ML: 10 INJECTION, SOLUTION INFILTRATION; PERINEURAL at 14:06

## 2022-06-14 RX ADMIN — ENOXAPARIN SODIUM 30 MG: 100 INJECTION SUBCUTANEOUS at 21:31

## 2022-06-14 RX ADMIN — HYDROMORPHONE HYDROCHLORIDE 0.5 MG: 1 INJECTION, SOLUTION INTRAMUSCULAR; INTRAVENOUS; SUBCUTANEOUS at 09:19

## 2022-06-14 RX ADMIN — GUAIFENESIN 200 MG: 200 SOLUTION ORAL at 18:16

## 2022-06-14 RX ADMIN — IPRATROPIUM BROMIDE AND ALBUTEROL SULFATE 1 AMPULE: .5; 3 SOLUTION RESPIRATORY (INHALATION) at 04:26

## 2022-06-14 RX ADMIN — GUAIFENESIN 200 MG: 200 SOLUTION ORAL at 01:38

## 2022-06-14 RX ADMIN — GUAIFENESIN 200 MG: 200 SOLUTION ORAL at 06:05

## 2022-06-14 RX ADMIN — IPRATROPIUM BROMIDE AND ALBUTEROL SULFATE 1 AMPULE: .5; 3 SOLUTION RESPIRATORY (INHALATION) at 19:46

## 2022-06-14 RX ADMIN — OXYCODONE HYDROCHLORIDE 5 MG: 5 SOLUTION ORAL at 14:47

## 2022-06-14 RX ADMIN — ALBUMIN (HUMAN) 25 G: 0.25 INJECTION, SOLUTION INTRAVENOUS at 14:47

## 2022-06-14 RX ADMIN — CALCIUM GLUCONATE: 98 INJECTION, SOLUTION INTRAVENOUS at 17:25

## 2022-06-14 RX ADMIN — POTASSIUM CHLORIDE 10 MEQ: 7.46 INJECTION, SOLUTION INTRAVENOUS at 17:25

## 2022-06-14 RX ADMIN — ENOXAPARIN SODIUM 30 MG: 100 INJECTION SUBCUTANEOUS at 08:54

## 2022-06-14 RX ADMIN — METOPROLOL TARTRATE 5 MG: 5 INJECTION INTRAVENOUS at 08:55

## 2022-06-14 RX ADMIN — SODIUM CHLORIDE, PRESERVATIVE FREE 10 ML: 5 INJECTION INTRAVENOUS at 08:55

## 2022-06-14 RX ADMIN — IPRATROPIUM BROMIDE AND ALBUTEROL SULFATE 1 AMPULE: .5; 3 SOLUTION RESPIRATORY (INHALATION) at 08:30

## 2022-06-14 RX ADMIN — POTASSIUM CHLORIDE 10 MEQ: 7.46 INJECTION, SOLUTION INTRAVENOUS at 08:55

## 2022-06-14 RX ADMIN — METOPROLOL TARTRATE 5 MG: 5 INJECTION INTRAVENOUS at 01:38

## 2022-06-14 RX ADMIN — GUAIFENESIN 200 MG: 200 SOLUTION ORAL at 12:11

## 2022-06-14 ASSESSMENT — PAIN DESCRIPTION - DESCRIPTORS
DESCRIPTORS: ACHING
DESCRIPTORS: SORE
DESCRIPTORS: ACHING
DESCRIPTORS: ACHING
DESCRIPTORS: SORE

## 2022-06-14 ASSESSMENT — PAIN DESCRIPTION - LOCATION
LOCATION: ABDOMEN

## 2022-06-14 ASSESSMENT — PAIN DESCRIPTION - ORIENTATION
ORIENTATION: MID
ORIENTATION: ANTERIOR
ORIENTATION: ANTERIOR
ORIENTATION: MID

## 2022-06-14 ASSESSMENT — PAIN SCALES - GENERAL
PAINLEVEL_OUTOF10: 7
PAINLEVEL_OUTOF10: 7
PAINLEVEL_OUTOF10: 4
PAINLEVEL_OUTOF10: 4
PAINLEVEL_OUTOF10: 2
PAINLEVEL_OUTOF10: 7
PAINLEVEL_OUTOF10: 3
PAINLEVEL_OUTOF10: 3

## 2022-06-14 NOTE — PROGRESS NOTES
PHYSICAL THERAPY Daily Note and AM  (Link to Caseload Tracking: PT Visit Days : 1  Time In/Out PT Charge Capture  Rehab Caseload Tracker  Orders      Ankit Rodriguez is a 78 y.o. male   PRIMARY DIAGNOSIS: Septic shock ([address]  Syncope and collapse [R55]  Hemorrhagic shock ([address] [R57.8]  Shock ([address] [R57.9]  History of gastric cancer [Z85.028]  Septic shock ([address] [A41.9, R65.21]  Gastrointestinal hemorrhage, unspecified gastrointestinal hemorrhage type [K92.2]  Procedure(s) (LRB):  Exploratory laparotomy with total gastrectomy and esophagojejunostomy after extensive lysis of adhesions and dissections which added at least 3-4 hours to this case, Left lateral lobectomy of liver, Distal pancreatectomy and splenectomy, Combined diaphragmatic resection, Falciform ligament flap, Feeding J-tube placement, Retroperitoneal mass excision and lymphadenectomy, Left abdominal wall mass excision on posterior rectus sheath (N/A)  5 Days Post-Op  Inpatient: Payor: MEDICARE / Plan: MEDICARE PART A / Product Type: *No Product type* /     ASSESSMENT:     REHAB RECOMMENDATIONS:   Recommendation to date pending progress:  [address]    Equipment:     To Be Determined     ASSESSMENT:  Mr. Michele Garduno is supine in bed and agreeable to therapy with some reservations. OT present for co treat. Bed mobility is with max to total assist x 2 as the patient is not moving his legs and can not sit up on his own however once edge of bed the patient is able to sit unsupported. Sit to stand and stand pivot transfer to the recliner with total assist x 1 with the patient trying to weight bear on his LE's. Patient has a lot of lines and tubes. Once in the recliner the patient stood again to move to the center of the chair. Instructed in exercises. Did well. Good session and progress demonstrated. Patient is left in the recliner with needs within reach and staff aware of location. MD visits during session.   Nice man, did better then he thought he could. Continue PT efforts.   LTC at d/c     SUBJECTIVE:   Mr. Adenike Monterroso states, \"OK\"     Social/Functional Lives With: Spouse  Type of Home: House  Home Layout: One level  Bathroom Accessibility: Accessible  Receives Help From: Family  ADL Assistance: Independent  Homemaking Assistance: Independent  Ambulation Assistance: Independent  Transfer Assistance: Independent  Active : Yes  Mode of Transportation: Car  Occupation: Retired  OBJECTIVE:     PAIN: VITALS / O2: PRECAUTION / Kathy Rui / Pieter Flurry:   Pre Treatment: 3/10         Post Treatment: 3/10 Vitals        Oxygen    Continuous Pulse Oximetry, Mata Catheter, IV, SHAYNE Drain, Nasogastric Tube, PEG and O2    RESTRICTIONS/PRECAUTIONS:        MOBILITY: I Mod I S SBA CGA Min Mod Max Total  NT x2 Comments:   Bed Mobility    Rolling [] [] [] [] [] [] [] [x] [x] [] [x]    Supine to Sit [] [] [] [] [] [] [] [x] [x] [] [x]    Scooting [] [] [] [] [] [] [] [x] [x] [] [x]    Sit to Supine [] [] [] [] [] [] [] [] [] [x] []    Transfers    Sit to Stand [] [] [] [] [] [] [] [x] [] [] []    Bed to Chair [] [] [] [] [] [] [] [x] [] [] []    Stand to Sit [] [] [] [] [] [] [] [x] [] [] []     [] [] [] [] [] [] [] [] [] [] []    I=Independent, Mod I=Modified Independent, S=Supervision, SBA=Standby Assistance, CGA=Contact Guard Assistance,   Min=Minimal Assistance, Mod=Moderate Assistance, Max=Maximal Assistance, Total=Total Assistance, NT=Not Tested    BALANCE: Good Fair+ Fair Fair- Poor NT Comments   Sitting Static [x] [] [] [] [] []    Sitting Dynamic [x] [] [] [] [] []              Standing Static [] [] [] [] [x] []    Standing Dynamic [] [] [] [] [x] []      GAIT: I Mod I S SBA CGA Min Mod Max Total  NT x2 Comments:   Level of Assistance [] [] [] [] [] [] [] [x] [] [] []    Distance   2 feet    DME stand pivot with staff    Gait Quality n/a    Weightbearing Status      Stairs      I=Independent, Mod I=Modified Independent, S=Supervision, SBA=Standby Assistance, CGA=Contact [employer],   Min=Minimal Assistance, Mod=Moderate Assistance, Max=Maximal Assistance, Total=Total Assistance, NT=Not Tested    PLAN:   ACUTE PHYSICAL THERAPY GOALS:   (Developed with and agreed upon by patient and/or caregiver.)  Updated Goals 6/13/2022     LTG:  (1.)Mr. Ezekiel Han  will move from supine to sit and sit to supine via logrolling  to side in flat bed without siderails with moderate assistance  within 7 day(s). (2.)Mr. Ezekiel Han  will transfer from bed to chair and chair to bed with moderate assistance  using the least restrictive/no device within 7 day(s). (3.)Mr. Ezekiel Han  will ambulate with moderate assistance  for 10+ feet with the least restrictive device within 7 day(s).        FREQUENCY AND DURATION: 3 times/week for duration of hospital stay or until stated goals are met, whichever comes first.    TREATMENT:   TREATMENT:   Co-Treatment PT/OT necessary due to patient's decreased overall endurance/tolerance levels, as well as need for high level skilled assistance to complete functional transfers/mobility and functional tasks  Therapeutic Activity (13 Minutes): Therapeutic activity included Rolling, Supine to Sit, Scooting, Transfer Training, Sitting balance  and Standing balance to improve functional Activity tolerance, Balance, Coordination, Mobility and Strength. Therapeutic Exercise (15 Minutes): Therapeutic exercises noted below to improve functional activity tolerance, AROM, strength and mobility.      TREATMENT GRID:   Date:  06/14/22 Date:   Date:     Activity/Exercise Parameters Parameters Parameters   LAQ 10     marching 10     Ankle pumps 10                                   AFTER TREATMENT PRECAUTIONS: Bed/Chair Locked, Chair, Needs within reach and RN notified    INTERDISCIPLINARY COLLABORATION:  RN/ PCT, PT/ PTA and OT/ BHATT    EDUCATION:      TIME IN/OUT:  Time In: 0921  Time Out: [facility]  Minutes: 28    Esther Ding PTA

## 2022-06-14 NOTE — PROGRESS NOTES
Hospitalist Progress Note   Admit Date:  2022  1:05 PM   Name:  Aditya Nails   Age:  78 y.o. Sex:  male  :  1955   MRN:  [id number]   Room:  /    Presenting Complaint: Loss of Consciousness     Reason(s) for Admission: Syncope and collapse [R55]  Hemorrhagic shock ([address] [R57.8]  Shock ([address] [R57.9]  History of gastric cancer [Z85.028]  Septic shock ([address] [A41.9, R65.21]  Gastrointestinal hemorrhage, unspecified gastrointestinal hemorrhage type [facility] Course & Interval History:   Aditya Nails is a 78 y.o. male with medical history of gastric adenocarcinoma s/p 4 cycles chemo, last dose 22, 50 pack year smoking history,  HTN, who presented after having a syncopal episode today with 2 day history of near syncope. He started feeling wewak and tired then stood up and passed out. Had constipation yesterday and abdominal pain but no abdominal pain today. No melena, hematochezia. Denies fever, chills, n/v, diarrhea. Unable to tolerate solid food for months, drinks several ensures per day with significant weight loss. Wife notes yesterday he was breathing abnormally and very shallow, having some chest discomfort. 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 Has missed 1 or more doses but says his wife is good about giving him his medicine. Denies current NSAID use. Only takes what is prescribed. Has hd IV iron infusions in the past but no blood. Prior to arrival, HR was erratic jumping from normal to as high as 160-170, described as SVT vs Sinus tach per EMS. He rec'd 1500 cc LR bolus and 4 mg zofran while en route. In ED, he was hypotensive, BP 74/54, , RR 22.  He rec'd S/p  30cc/kg sepsis, vancomcyin, cefepime 1 UpRBC ordered     Labs: SNa 131 Procal 1.02 LA 5.1  Salb 1.0 ALK phos 181  WBC 20.4 hgb 5.6 (8.3 on 22) HCT 19.5   EKG sinus tach   CXR no acute findings      S/p sepsis bolus, vancomcyin, cefepime 1 UpRBC ordered     Pt was initially on pressors. Treated for GPR bacteremia-GPR bacteremia from PORT blood culture (6/2)- sent to [address] [place] [address] [place] on 6/6; repeat culture 6/4 (peripheral) negative   CT c/a/p with small R sided PE, femoral DVT. Subsequently had large melanotic stools, transfused. EGD performed 6/4 but unable to see with food in stomach. Repeat 6/5 similar results. AC held and RI placed IVC filter 6/6.   6/9 was taken for total gastrectomy, left lateral lobectomy of liver, distal pancreatectomy and splenectomy, diaphragmatic resection, J tube placement, and retroperitoneal mass excision and lymphadenectomy, and left abdominal wall mass excision.  To ICU on vent post op and requiring joe. Pt was extubated on 6/10,was transferred to floor. ID evaluated pt on 6/10  \"I perceive that the GPR represents a real pathogen. Unless this is nocardia, actinomyces, lactobacillus, or leuconostoc, then I anticipate vancomycin will be active. He is no longer using the port outpatient and many potential species make biofilm so I recommend removing the port before stopping vancomycin on 6/16/2022\"    Since 6/12  Hospitalist was asked to resume care. Elevated white blood count, blood culture with gram-negative rods, ID recommended vancomycin until 6/16. Later on blood culture came back as Clostridium perfringens, repeat blood cultures negative, spoke to ID on 6/14, recommended continue present antibiotic course. Patient is on Decadron at home spoke to hematology [facility], was started on it during chemo and also secondary fatigue. Dr. Mike Shepard changed to Solu-Cortef 25 mg BID and dced. Patient port was taken out on 6/13. Pulmonary consulted as patient became hypoxic on 6/30 and required 8 L/min, chest x-ray showed moderate pleural effusion. Pulmonary started on diuretics, thoracentesis only if there is no improvement. Poor IV access, PICC line was ordered on 6/14.     Subjective/24hr Events (06/14/22):   6/14  Awake alert, NG tube feeding at 10 cc/h, on oxygen 7 L/min, says breathing okay  Later on today pulmonary about the patient, started on diuretics  Dr. Matias Duong discontinued Solu-Cortef  Potassium of 3.2, getting PPN. Assessment & Plan:         Septic shock ([address]  Off pressors    GPR Bacteremia  Vancomycin until 6/16 6/14-cultures from 6/2 showed Clostridium perfringens. Curbside with ID, recommended to finish the course of vancomycin and to 6/16. No new changes  Improving WBC    Hypoxic respiratory failure  Presently on 8 L/min  Chest x-ray moderate pleural effusion  Consulted pulmonary  6/14-presently on 7 L/min, pulmonary started the patient on diuretics    On 6/9/22-  DATE OF SERVICE:  06/09/2022     PREOPERATIVE DIAGNOSIS:  Stomach cancer with complete blockage status post chemotherapy.     POSTOPERATIVE DIAGNOSES:  1. Locally advanced stomach cancer with direct invasion into the distal esophagus, diaphragm, left lobe of the liver, pancreas and the retroperitoneum. 2.  Left abdominal wall mass within the posterior rectus sheath.     PROCEDURES PERFORMED:  1. Exploratory laparotomy with total gastrectomy and esophagojejunostomy after extensive lysis of adhesions and dissections which added at least 3-4 hours to this case. 2.  Left lateral lobectomy of liver. 3.  Distal pancreatectomy and splenectomy. 4.  Combined diaphragmatic resection. 5.  Falciform ligament flap. 6.  Feeding J-tube placement. 7.  Retroperitoneal mass excision and lymphadenectomy. 8.  Left abdominal wall mass excision on posterior rectus sheath. 6/12- presently ng tube   on tpn  Surgery following  6/13-started on NG tube feeding 10 cc/h  6/14-continue NG tube feeding, patient for a fluoroscopic Gastrografin swallow tomorrow. Hypokalemia  Added IV KCl  Continue TPN  6/14-added IV KCl        Syncope due to orthostatic hypotension  Secondary to anemia- resolved. Hypomagnesemia  Resolved. Corticosteroid dependence ([address]  Looking at oncology notes, ? Started by them  Dose decreased by surgery  6/14-discontinued by Dr. Padmini Monte with hematology few days ago said ,pt does not require long-term steroids. It was started during chemo and also secondary to fatigue. Acute pulmonary embolism without acute cor pulmonale (HCC)  S/p IVC filter  Sq lovenox       Encounter [facility]aning [facility]  Off ventilator since 6/10      ABILIO (iron deficiency anemia)  Blood transfusion during the stay          Diet:  Diet NPO  ADULT TUBE FEEDING; Jejunostomy; Standard without Fiber; Continuous; 10; No; 30; Other (specify);  Manual Flush BID and after each use  PN-Adult Premix 4.25/5 - Peripheral Line  DVT PPx: scd  Code status: DNR    Hospital Problems:  Principal Problem:    Septic shock ([facility]  Active Problems:    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[facility]    Former heavy tobacco smoker    Gastroesophageal reflux disease    Hyponatremia    Hypoalbuminemia due to protein-calorie malnutrition (HCC)    Syncope due to orthostatic hypotension    Hypomagnesemia    Corticosteroid dependence ([facility]    Hypoproteinemia (HCC)    Do not resuscitate status    Fatigue    History of gastric cancer    Encounter for palliative care    Debility    Weakness    Hypercoagulable state, secondary ([address]    Adult body mass index less than 19    Acute pulmonary embolism without acute cor pulmonale (HCC)    Severe protein-calorie malnutrition (HCC)    Gastrointestinal hemorrhage    Encounter for weaning from ventilator ([facility]    ABILIO (iron deficiency anemia)    Malignant neoplasm of overlapping sites of stomach ([facility]    Malignant neoplasm of body of stomach ([facility]    Gastric adenocarcinoma ([facility]  Resolved Problems:    Severe sepsis with lactic acidosis (HCC)    ABLA (acute blood loss anemia)    GIB (gastrointestinal bleeding)      Objective:     Patient Vitals for the past 24 hrs:   Temp Pulse Resp BP SpO2   06/14/22 0745 -- -- -- -- 94 %   06/14/22 0727 97.7 °F (36.5 °C) 89 17 (!) 140/89 97 %   06/14/22 0400 -- 88 -- -- --   06/14/22 0356 97.7 °F (36.5 °C) 89 18 138/86 99 %   06/14/22 0138 -- 90 -- (!) 136/91 --   06/14/22 0000 -- 89 -- -- --   06/13/22 2346 98 °F (36.7 °C) 94 18 136/88 96 %   06/13/22 1957 98.1 °F (36.7 °C) 97 18 130/79 95 %   06/13/22 1912 -- 92 -- -- --   06/13/22 1500 98.1 °F (36.7 °C) 56 18 (!) 133/90 --   06/13/22 1045 97.5 °F (36.4 °C) 56 18 (!) 127/90 --       Oxygen Therapy  SpO2: 94 %  Pulse Oximetry Type: Continuous  Pulse via Oximetry: 86 beats per minute  Pulse Oximeter Device Mode: Continuous  Pulse Oximeter Device Location: Finger  O2 Device: High flow nasal cannula  Oximetry Probe Site Changed: No  Skin Assessment: Clean, dry, & intact  Skin Protection for O2 Device: No  Orientation: Bilateral  Location: Cheek  Intervention(s): Hydrocolloid Dressing,Mouth Care,Reposition Device  FiO2 : 34 %  O2 Flow Rate (L/min): 7 L/min  Blood Gas  Performed?: Yes  Loi's Test #1: Collateral flow confirmed  Site #1: Left Radial  Site Prepped #1: Yes  Number of Attempts #1: 1  Pressure Held #1: Yes  Complications #1: None  How Tolerated?: Tolerated well  O2 Delivery Method: Endotracheal tube  Vent Mode: PS/CPAP    Estimated body mass index is 23.35 kg/m² as calculated from the following:    Height as of this encounter: 5' 9\" (1.753 m). Weight as of this encounter: 158 lb 2.2 oz (71.7 kg). Intake/Output Summary (Last 24 hours) at 6/14/2022 0911  Last data filed at 6/14/2022 0735  Gross per 24 hour   Intake 3563.3 ml   Output 2880 ml   Net 683.3 ml         Physical Exam:     Blood pressure (!) 140/89, pulse 89, temperature 97.7 °F (36.5 °C), temperature source Oral, resp. rate 17, height 5' 9\" (1.753 m), weight 158 lb 2.2 oz (71.7 kg), SpO2 94 %. General:   Awake,alert ng tube, 7 lit/min  Head:  Normocephalic, atraumatic  Eyes:  Sclerae appear normal.  Pupils equally round. ENT:  Nares appear normal, no drainage. Moist oral mucosa  Neck:  No restricted ROM. Trachea midline   CV:   RRR.   No K/uL    MPV 10.0 9.4 - 12.3 FL    nRBC 0.09 0.0 - 0.2 K/uL    Differential Type AUTOMATED      Seg Neutrophils 91 (H) 43 - 78 %    Lymphocytes 3 (L) 13 - 44 %    Monocytes 4 4.0 - 12.0 %    Eosinophils % 0 (L) 0.5 - 7.8 %    Basophils 0 0.0 - 2.0 %    Immature Granulocytes 2 0.0 - 5.0 %    Segs Absolute 41.4 (H) 1.7 - 8.2 K/UL    Absolute Lymph # 1.3 0.5 - 4.6 K/UL    Absolute Mono # 1.7 (H) 0.1 - 1.3 K/UL    Absolute Eos # 0.0 0.0 - 0.8 K/UL    Basophils Absolute 0.1 0.0 - 0.2 K/UL    Absolute Immature Granulocyte 1.0 (H) 0.0 - 0.5 K/UL   POCT Glucose    Collection Time: 06/13/22  7:37 AM   Result Value Ref Range    POC Glucose 127 (H) 65 - 100 mg/dL    Performed by: [employer]A    POCT Glucose    Collection Time: 06/13/22 11:29 AM   Result Value Ref Range    POC Glucose 104 (H) 65 - 100 mg/dL    Performed by: Landon    Culture, Catheter Tip    Collection Time: 06/13/22  3:23 PM    Specimen: CATH TIP   Result Value Ref Range    Special Requests NO SPECIAL REQUESTS      Culture        No growth after short period of incubation. Further results to follow after overnight incubation.    POCT Glucose    Collection Time: 06/13/22  4:15 PM   Result Value Ref Range    POC Glucose 91 65 - 100 mg/dL    Performed by: [employer]    POCT Glucose    Collection Time: 06/13/22  8:48 PM   Result Value Ref Range    POC Glucose 108 (H) 65 - 100 mg/dL    Performed by: Natashanirav Medellin    Basic Metabolic Panel    Collection Time: 06/14/22  7:11 AM   Result Value Ref Range    Sodium 137 136 - 145 mmol/L    Potassium 3.2 (L) 3.5 - 5.1 mmol/L    Chloride 102 98 - 107 mmol/L    CO2 29 21 - 32 mmol/L    Anion Gap 6 (L) 7 - 16 mmol/L    Glucose 113 (H) 65 - 100 mg/dL    BUN 11 8 - 23 MG/DL    CREATININE <0.20 (L) 0.8 - 1.5 MG/DL    GFR  0 (L) >60 ml/min/1.73m2    GFR Non- 0 (L) >60 ml/min/1.73m2    Calcium 8.2 (L) 8.3 - 10.4 MG/DL   CBC with Auto Differential    Collection Time: 06/14/22  7:11 AM   Result Value Ref Range    WBC 31.7 (H) 4.3 - 11.1 K/uL    RBC 2.99 (L) 4.23 - 5.6 M/uL    Hemoglobin 8.2 (L) 13.6 - 17.2 g/dL    Hematocrit 26.6 (L) 41.1 - 50.3 %    MCV 89.0 79.6 - 97.8 FL    MCH 27.4 26.1 - 32.9 PG    MCHC 30.8 (L) 31.4 - 35.0 g/dL    RDW 16.9 (H) 11.9 - 14.6 %    Platelets 407 612 - 615 K/uL    MPV 10.2 9.4 - 12.3 FL    nRBC 0.08 0.0 - 0.2 K/uL    Differential Type AUTOMATED      Seg Neutrophils 89 (H) 43 - 78 %    Lymphocytes 4 (L) 13 - 44 %    Monocytes 5 4.0 - 12.0 %    Eosinophils % 0 (L) 0.5 - 7.8 %    Basophils 0 0.0 - 2.0 %    Immature Granulocytes 1 0.0 - 5.0 %    Segs Absolute 28.3 (H) 1.7 - 8.2 K/UL    Absolute Lymph # 1.3 0.5 - 4.6 K/UL    Absolute Mono # 1.7 (H) 0.1 - 1.3 K/UL    Absolute Eos # 0.1 0.0 - 0.8 K/UL    Basophils Absolute 0.1 0.0 - 0.2 K/UL    Absolute Immature Granulocyte 0.3 0.0 - 0.5 K/UL   POCT Glucose    Collection Time: 06/14/22  7:50 AM   Result Value Ref Range    POC Glucose 118 (H) 65 - 100 mg/dL    Performed by: [facility]        Other Studies:  XR CHEST 1 VIEW   Final Result      1. Moderate left and small right pleural effusions, similar to prior exam.      XR CHEST 1 VIEW   Final Result      1. Moderate left pleural effusion, similar to prior exam.      2. No significant change compared to prior exam.      XR CHEST 1 VIEW   Final Result   Bilateral airspace opacities and left pleural effusion with interval   worsening on the left. XR CHEST 1 VIEW   Final Result      1. Persistent bibasilar opacities, likely atelectasis or consolidation. XR CHEST 1 VIEW   Final Result   Unchanged mild bibasilar lung atelectasis or infiltrates. XR CHEST 1 VIEW   Final Result   1. New mild right basilar atelectatic appearing changes. No significant acute   changes otherwise seen. It should be noted that the left lung apex has been   clipped limiting complete assessment for pneumothorax. This report was made using voice transcription. Despite my best efforts to avoid   any, transcription errors may persist. If there is any question about the   accuracy of the report or need for clarification, then please call [phone], or text me through [employer] for clarification or correction. IR GUIDED IVC FILTER PLACEMENT   Final Result   Successful placement an infrarenal Cook Celect inferior vena cava filter. Plan:   The patient may be evaluated in 4 months by interventional radiology in order to   evaluate for continued need of the IVC filter for versus filter removal.                  Vascular duplex lower extremity venous bilateral   Final Result   Negative for sonographic evidence of deep venous thrombosis right   lower extremity. LEFT LOWER EXTREMITY VENOUS DUPLEX ULTRASOUND. INDICATION: Left lower extremity pain. TECHNIQUE: Direct skin-contact high resolution grayscale images, color-flow   Doppler and duplex waveform analysis. FINDINGS: Abnormal intraluminal echoes within the common femoral and profunda   femoral veins. There is some flow. The superficial superficial femoral and   popliteal veins and upper calf veins are unremarkable. IMPRESSION: Positive for deep venous thrombosis left common femoral profunda   femoral veins, nonocclusive. CTA CHEST ABDOMEN PELVIS W WO CONTRAST   Final Result   1. No extravasation to suggest active GI bleed. 2. Small areas of pulmonary embolism are present in the right lower lobe. Clot   burden is quite small. 3. DVT is also likely present in the left femoral veins. 4. Previously described gastric mass again noted. It is difficult to measure to   evaluate for progression. XR CHEST PORTABLE   Final Result   Unremarkable portable chest radiograph.                      XR CHEST 1 VIEW    (Results Pending)       Current Meds:  Current Facility-Administered Medications   Medication Dose Route Frequency    potassium chloride 10 mEq/100 mL IVPB (Peripheral Line)  10 mEq IntraVENous Q1H    PN-Adult Premix 4.25/5 - Peripheral Line   IntraVENous Continuous TPN    glucose chewable tablet 16 g  4 tablet Oral PRN    dextrose bolus 10% 125 mL  125 mL IntraVENous PRN    Or    dextrose bolus 10% 250 mL  250 mL IntraVENous PRN    glucagon injection 1 mg  1 mg IntraMUSCular PRN    dextrose 5 % solution  100 mL/hr IntraVENous PRN    metoprolol (LOPRESSOR) injection 5 mg  5 mg IntraVENous Q6H    medicated lip ointment (BLISTEX)   Topical PRN    HYDROmorphone HCl PF (DILAUDID) injection 0.5 mg  0.5 mg IntraVENous Q2H PRN    oxyCODONE (ROXICODONE) 5 MG/5ML solution 5 mg  5 mg Per J Tube Q6H PRN    guaiFENesin (ROBITUSSIN) 100 MG/5ML oral solution 200 mg  200 mg Per J Tube Q6H    vancomycin (VANCOCIN) 1250 mg in sodium chloride 0.9% 250 mL IVPB  1,250 mg IntraVENous Q12H    insulin lispro (HUMALOG) injection vial 0-4 Units  0-4 Units SubCUTAneous TID WC    insulin lispro (HUMALOG) injection vial 0-4 Units  0-4 Units SubCUTAneous Nightly    cloNIDine (CATAPRES) tablet 0.2 mg  0.2 mg Per J Tube Q4H PRN    fat emulsion 20 % infusion 250 mL  250 mL IntraVENous Daily    sodium chloride flush 0.9 % injection 5-40 mL  5-40 mL IntraVENous 2 times per day    sodium chloride flush 0.9 % injection 5-40 mL  5-40 mL IntraVENous PRN    0.9 % sodium chloride infusion   IntraVENous PRN    enoxaparin Sodium (LOVENOX) injection 30 mg  30 mg SubCUTAneous Daily    ipratropium-albuterol (DUONEB) nebulizer solution 1 ampule  1 ampule Inhalation Q6H    potassium chloride 20 mEq/50 mL IVPB (Central Line)  20 mEq IntraVENous PRN    magnesium sulfate 2000 mg in 50 mL IVPB premix  2,000 mg IntraVENous PRN    ondansetron (ZOFRAN) injection 4 mg  4 mg IntraVENous Q6H PRN    Medela Tender Care Lanolin cream   Topical PRN       Signed:  Kadeem Davila MD

## 2022-06-14 NOTE — PROGRESS NOTES
END OF SHIFT NOTE:    INTAKE/OUTPUT  06/13 0701 - 06/14 0700  In: 3480.3 [I.V.:3197.3]  Out: 2227 [Urine:2025; Drains:245]  Voiding: NO  Catheter:YES  Drain:   Closed/Suction Drain Right RUQ Bulb (Active)   Site Description Clean, dry & intact 06/14/22 0217   Dressing Status Clean, dry & intact 06/14/22 0217   Drainage Appearance Pink tinged 06/14/22 0217   Drain Status Open to gravity drainage 06/14/22 0217   Output (ml) 80 ml 06/14/22 0605       Closed/Suction Drain Left LUQ Bulb (Active)   Site Description Clean, dry & intact 06/14/22 0217   Dressing Status Clean, dry & intact 06/14/22 0217   Drainage Appearance Serosanguinous 06/14/22 0217   Drain Status Compressed; To bulb suction 06/14/22 0217   Output (ml) 30 ml 06/14/22 0605       NG/OG/NJ/NE Tube Right nostril (Active)   Surrounding Skin Clean, dry & intact 06/13/22 1920   Securement device Tape 06/14/22 0217   Status Suction-low intermittent 06/14/22 0217   Placement Verified Gastric Contents 06/12/22 1315   NG/OG/NJ/NE External Measurement (cm) 58 cm 06/13/22 1920   Drainage Appearance Green 06/13/22 1920   Output (mL) 100 ml 06/14/22 0605       Gastrostomy/Enterostomy/Jejunostomy Tube Gastrostomy LUQ 1 14 fr (Active)   Drainage Appearance Serous 06/11/22 1935   Site Description Clean, dry & intact 06/14/22 0217   J Port Status Infusing 06/14/22 0217   Surrounding Skin Clean, dry & intact 06/13/22 1920   Dressing Status Clean, dry & intact 06/14/22 0217   Dressing Type Dry dressing 06/13/22 1920   Tube Feeding Standard without Fiber 06/14/22 0217   Tube feeding/verify rate (mL/hr) 10 mL/hr 06/14/22 3295   Tube Feeding Intake (mL) 113 ml 06/14/22 0605   Free Water/Flush (mL) 110 mL 06/14/22 9633   Action Taken Other (comment) 06/14/22 0138       Urinary Catheter 2 Way (Active)   $ Urethral catheter insertion $ Not inserted for procedure 06/03/22 0820   Catheter Indications Perioperative use for selected surgical procedures 06/14/22 0217   Site Assessment No urethral drainage 06/14/22 0217   Urine Color Yellow 06/14/22 0217   Urine Appearance Cloudy 06/14/22 0217   Urine Odor Malodorous 06/14/22 0217   Collection Container Standard 06/14/22 0217   Securement Method Securing device (Describe) 06/14/22 0217   Catheter Care Completed Yes 06/13/22 1920   Catheter Best Practices  Drainage tube clipped to bed;Catheter secured to thigh; Tamper seal intact; Bag below bladder;Bag not on floor; Lack of dependent loop in tubing;Drainage bag less than half full 06/14/22 0217   Status Draining 06/14/22 0217   Manual Irrigation Volume Input (mL) 0 mL 06/11/22 0800   Output (mL) 900 mL 06/14/22 0413   Discontinuation Reason Per provider order 06/11/22 0800               Flatus: Patient DOES NOT have flatus present. Stool: 0 occurrences. Characteristics:       Emesis: 0 occurrences. Characteristics:        VITAL SIGNS  Patient Vitals for the past 12 hrs:   Temp Pulse Resp BP SpO2   06/14/22 0400 -- 88 -- -- --   06/14/22 0356 97.7 °F (36.5 °C) 89 18 138/86 99 %   06/14/22 0138 -- 90 -- (!) 136/91 --   06/14/22 0000 -- 89 -- -- --   06/13/22 2346 98 °F (36.7 °C) 94 18 136/88 96 %   06/13/22 1957 98.1 °F (36.7 °C) 97 18 130/79 95 %   06/13/22 1912 -- 92 -- -- --       Pain Assessment  Pain controlled with PRN pain medication. Ambulating  NO    Pt rested quietly throughout the night. Turns self throughout the night. Shift report given to oncoming nurse at the bedside.     Alex Graham RN

## 2022-06-14 NOTE — PROGRESS NOTES
Pt's chart opened by primary RN with an alert that patient needed to be on contact precautions due to blood cultures showing positive for clostridium perfringens. Patient is in a shared bathroom room, RN making arrangements to move patient to another room on floor when it becomes avaliable. RN notified Abdulaziz WALDRON and hospitalist Dr. Melanie Florence. No new orders. RN reached out to [employer] RN with infection prevention. Per [employer], no need for contact isolation or to move him to a different room.

## 2022-06-14 NOTE — PROGRESS NOTES
Physician Progress Note      PATIENT:               Philip Menjivar  CSN #:                  [id number]  :                       1955  ADMIT DATE:       2022 1:05 PM  [address] [place] DATE:  RESPONDING  PROVIDER #:        Sam Burt MD        QUERY TEXT:    Type of Pleural Effusion: Please provide further specificity, if known.     Clinical indicators include: opacities, pneumothorax, pleural effusion,   cancer, malignant neoplasm, adenocarcinoma, infiltrates, pleural effusions,   carcinoma, pericardial effusion  Options provided:  -- Pleural effusion caused by malignancy  -- Pleural effusion caused by congestive heart failure  -- Hydrothorax or chylous effusion  -- Hemothorax  -- Influenzal pleural effusion  -- Other - I will add my own diagnosis  -- Disagree - Not applicable / Not valid  -- Disagree - Clinically Unable to determine / Unknown        PROVIDER RESPONSE TEXT:    Pleural effusion unclear etiology      Electronically signed by:  Sam Burt MD 2022 3:44 PM

## 2022-06-14 NOTE — PROGRESS NOTES
Date of Outreach Update:  Watson Shultz was seen and assessed. @[facility]([id number])@  Vitals:    06/13/22 1957 06/13/22 2346 06/14/22 0138 06/14/22 0356   BP: 130/79 136/88 (!) 136/91 138/86   Pulse: 97 94 90 89   Resp: 18 18  18   Temp: 98.1 °F (36.7 °C) 98 °F (36.7 °C)  97.7 °F (36.5 °C)   TempSrc: Axillary Oral  Oral   SpO2: 95% 96%  99%   Weight:       Height:         Patient remains 95% on 8L oxygen nasal cannula; unclear why patient still requires higher amount of oxygen. Previous Outreach assessment has been reviewed. There have been no significant clinical changes since the completion of the last dated Outreach assessment. Will continue to follow up per outreach protocol.     Signed By:   Shan Constantino RN    June 14, 2022 5:09 AM

## 2022-06-14 NOTE — PROGRESS NOTES
PICC Placement Note    PRE-PROCEDURE VERIFICATION    Correct Procedure: yes  Time out completed with assistant Kori Peoples RN, [facility] and all persons present in agreement with time out. Risks and benefits reviewed with pt  and informed consent obtained prior to assessment and procedure. Correct Site: yes  Temperature: Temp: 97.8 °F (36.6 °C), Temperature Source:    Recent Labs     06/14/22  0711   BUN 11      WBC 31.7*     Allergies: Iron and Tetanus antitoxin  Education materials for [facility] Care given to patient or family. PROCEDURE DETAIL  A double lumen PICC line was started for Total parenteral nutrition. The following documentation is in addition to the PICC properties in the lines/airways flowsheet:    Lot #: [id number]  Xylocaine used: Yes  Mid-Arm Circumference: 24 (cm)  Internal Catheter Length: 45 (cm)  External Catheter Length: 0 (cm)  Total Catheter Length: 45 (cm)  Vein Selection for PICC: right basilic  Central Line Insertion Bundle followed: Yes  Complication Related to Insertion: other: left arm slightly swollen and has ppn running through piv in left upper arm. Right arm bruised from shoulder to down below rac but was able to place picc in right upper arm. Both the insertion guidewire and ECG guidewire were removed intact all ports have positive blood return and were flush well with normal saline. The location of the tip of the PICC is verified using ECG technology. The tip is in the SVC per ECG reading. See image below.              Line is okay to use: yes      Nathalie Marcus RN, [facility]

## 2022-06-14 NOTE — PROGRESS NOTES
Comprehensive Nutrition Assessment    Type and Reason for Visit: Reassess  Malnutrition Screening Tool: Malnutrition Screen  Have you recently lost weight without trying?: 34 or more pounds (4 points)  Have you been eating poorly because of a decreased appetite?: Yes (1 point)  Malnutrition Screening Tool Score: 5 and Unintentional Weight Loss (Hospitalists)  TPN management (Hospitalist and Surgery)    Nutrition Recommendations/Plan:    Parenteral Nutrition: Okay to infuse via PICC if line okay to use prior to PN hang time. Peripheral parenteral nutrition to begin at 1800  Continue: Dex 5%, 4.25% AA 2 L (85ml/hr)   Continue 250 ml 20% lipids daily  To provide: 1180 kcal/d (74% of needs), 85 grams of protein/d (106% of needs), 100 grams of CHO/d and 2250 ml of total volume/d. Lytes/L:   80 meq Sodium (80 meq NaCl), 20 mmol KPO4, 8 meq Mg, 4.5 meq Calcium. Osmolality ~890. Lyte provisions limited due to osmolality currently still with only PIV for administration. Other additives: MTE, MVI Monday Wednesday Friday due to national shortages   Nutritional Supplement Therapy:   Active electrolyte replacement per nutrition support protocols  Replacement indicated: Active per MD order  Labs:   BMP daily  Mg MWF  Phos MWF    Triglyceride weekly on Wednesdays if remains on TPN  POC Glucoses/SSI Activate: AM glucose >180 mg/dL      Enteral Nutrition: Managed by surgery  Trophic Enteral Feeds:   Standard without Fiber (Osmolite 1.2 Carloz) via Jejunostomy increased to 20 ml/hour. Do not advance. Water flush 30 BID  Trophic enteral infusions may provide up to 500 kcal/day, not intended to meet estimated needs. Meals and Snacks:  Continue current diet.  NPO  Increase in TF + current PPN potentially providing 1840 kcal, 109 g pro     Malnutrition Assessment:  Malnutrition Status: Severe malnutrition  Context: Chronic Illness  Findings of clinical characteristics of malnutrition:   Energy Intake:  Mild decrease in energy intake (Comment) (only tolerating oral nutrition supplements as primary)  Weight Loss:  Greater than 20% over 1 year (38# (24.4%) in ~10 months)     Body Fat Loss:  Severe body fat loss Triceps,Fat Overlying Ribs,Buccal region   Muscle Mass Loss:  Severe muscle mass loss Temples (temporalis),Clavicles (pectoralis & deltoids),Thigh (quadraceps),Calf (gastrocnemius)  Fluid Accumulation:  No significant fluid accumulation     Strength:  Not Performed  Nutrition Assessment:  Nutrition History: History provided by patient and wife. Patient states that he has been trying to eat some solids but it feels like it just sits on his stomach. He states he will wake the next day and still feel full. Wife states that patient has not tried any solids in weeks. Patient states that he mostly has been consuming Ensure (regular) 6-7 per day. Wife states that she has been mixing peanut butter powder into Ensure to increase kcal and protein (potentially providing 60-70 additional kcal and 6-9 additional grams of protein per serving depending on brand). Patient endorses weight loss of 50-60# over the last year. Do You Have Any Cultural, Lutheran, or Ethnic Food Preferences?: No   Nutrition Background:   Patient with PMH significant for HTN, gastric adenocarcinoma s/p neoadjuvant chemo with plans for surgery in 2-3 week. He presented with near syncopal episodes for last 2 days. He was admitted with shock. Nutrition Interval:  Pt is s/p ex lap with total gastrectomy and esophagojejunostomy with extensive lysis of adhesion and dissection, left lateral lobectomy of liver, distal pancreatectomy and splenectomy, combined diaphragmatic resection, falciform ligament flap, j-tube placement (14 fr), retroperitoneal mass excision and lymphadenectomy, and left abdominal wall mass excision 6/9. Patient seen and discussed with Amy Li RN. PPN infusing via PIV. KCl replacements ordered but on hold due to loss of second PIV.  PICC placement ordered but pending. NGT still to LIS with scant amount observed in cannister. OWUSU recorded over last 24 hrs 300 ml. TF initiated per surgery yesterday at 10 ml/hr and advanced this am to 20 ml/hr. Abdominal Status (last documented):   Last BM (including prior to admit): 06/08/22, GI Symptoms: Other (Comment) (hx of CA)   Pertinent Medications: humalog HS - held, SSI (none required), Vancomycin, Zofran PRN (not utilizing)  Electrolyte replacements: 6/7 KCl 10 meq x 4, KCl 10meq x5 6/12, KCl 10 meq X4 6/13, KCl 10 meq x4 pending PICC placement   Pertinent Labs:   Lab Results   Component Value Date     06/14/2022    K 3.2 06/14/2022     06/14/2022    CO2 29 06/14/2022    BUN 11 06/14/2022    CREATININE <0.20 06/14/2022    GLUCOSE 113 06/14/2022    CALCIUM 8.2 06/14/2022    PHOS 3.4 06/13/2022    MG 2.0 06/14/2022      Lab Results   Component Value Date    POCGLU 120 06/14/2022    POCGLU 118 06/14/2022    POCGLU 108 06/13/2022    POCGLU 91 06/13/2022    POCGLU 104 06/13/2022    POCGLU 127 06/13/2022     Remarks: Sodium WNL but slight trend down, persistent hypokalemic - ?related to OWUSU, glucose slightly elevated - not requiring insulin correction    Nutrition Related Findings:   food particles obstructing access despite Reglan. 6/6: TPN consult, port in place. 6/6 TPN initiated with lipids. 6/7 cont 2L 10%dex/4.25%AA, hold lipids due to high TG. Formula changed to Dex 15%, 5% AA 6/8. Lipids initiated 6/11. 6/13: Port removed, TF initiated at 10 ml/hr, TPN changed to PPN + lipids. Current Nutrition Therapies:  Diet NPO  PN-Adult Premix 4.25/5 - Peripheral Line  ADULT TUBE FEEDING; Jejunostomy; Standard without Fiber; Continuous; 20; No; 30; Other (specify);  Manual Flush BID and after each use  Current Parenteral Nutrition Orders:  · Type and Formula: Premix Peripheral (Dex 5%, 4.25% AA )   · Lipids: 250ml  · Duration: Continuous  · Rate/Volume: 2 L (85ml/hr)  · Current PN Order Provides: infusing at ordered rate  · Goal PN Orders Provides: 1180 kcal/d (74% of needs), 85 grams of protein/d (106% of needs), 100 grams of CHO/d and 2250 ml of total volume/d. Current Intake:   Average Meal Intake: NPO        Anthropometric Measures:  Height: 5' 9\" (175.3 cm)  Current Body Wt: 158 lb 1.1 oz (71.7 kg) (6/14), Weight source: Bed Scale  BMI: 23.3  Admission Body Weight: 117 lb 15.1 oz (53.5 kg)  Ideal Body Weight (Kg) (Calculated): 73 kg (160 lbs), 73.7 %  Usual Body Wt: 156 lb (70.8 kg) (8/11/21 office wt), Percent weight change: -24.4       Edema: No data recorded  BMI Category Normal Weight (BMI 22.0 to 24.9) age over 72 (? accuracy, noted significant increase)  Estimated Daily Nutrient Needs:  Energy (kcal/day): 6941-8624 (Kcal/kg (30-35) Weight used: 53.5 kg Current (6/2)  Protein (g/day): 70-80 (1.3-1.5 g/kg) Weight Used: (Current) 53.5 kg  Fluid (ml/day):   (1 ml/kcal)    Nutrition Diagnosis:   · Inadequate oral intake related to altered GI structure as evidenced by  (s/p total gastrectomy)    · Severe malnutrition related to inadequate protein-energy intake as evidenced by Criteria as identified in malnutrition assessment    Nutrition Interventions:   Food and/or Nutrient Delivery: Continue NPO,Modify Parenteral Nutrition (TF per surgery)     Coordination of Nutrition Care: Continue to monitor while inpatient       Goals:   Previous Goal Met: Progressing toward Goal(s)  Active Goal: other (specify)  Maintain TPN until able to advance TF to meet at least 50% needs    Nutrition Monitoring and Evaluation:      Food/Nutrient Intake Outcomes: Enteral Nutrition Intake/Tolerance,Parenteral Nutrition Intake/Tolerance  Physical Signs/Symptoms Outcomes: Biochemical Data,GI Status,Weight    Discharge Planning:     Too soon to determine    Elias Magallon, [id number]

## 2022-06-14 NOTE — PROGRESS NOTES
[facility] CRITICAL CARE OUTREACH NURSE PROGRESS REPORT      SUBJECTIVE: Called to assess patient secondary to transfer out of ICU/CCU.      @[facility]([id number])@  Vitals:    06/13/22 0816 06/13/22 1045 06/13/22 1500 06/13/22 1957   BP:  (!) 127/90 (!) 133/90 130/79   Pulse: 85 56 56 97   Resp: 18 18 18 18   Temp:  97.5 °F (36.4 °C) 98.1 °F (36.7 °C) 98.1 °F (36.7 °C)   TempSrc:  Oral Oral Axillary   SpO2: 95%   95%   Weight:       Height:              LAB DATA:    Recent Labs     06/11/22  0503 06/12/22  0451 06/13/22  0403    144 141   K 3.6 3.2* 3.4*   * 109* 105   CO2 24 27 29   BUN 17 14 13   GFRAA 0* 0* 0*   MG  --  1.9 2.0   PHOS  --   --  3.4   GLOB 2.9 3.1  --    ALT 15 12  --         Recent Labs     06/11/22  0504 06/12/22  0451 06/13/22  0403   WBC 39.7* 52.1* 45.6*   HGB 9.0* 8.7* 8.5*   HCT 28.6* 27.5* 27.5*    323 383          OBJECTIVE: On arrival to room, I found patient to be quiet, restful, easily awakened to state he was comfortable and without any needs. ASSESSMENT:  Respirations even and unlabored; abdominal incision and drain sites all clean, dry and intact; tube feeding through jejunostomy red catheter. PLAN:  Will followup to evaluate oxygen needs.

## 2022-06-14 NOTE — PROGRESS NOTES
VANCO DAILY FOLLOW UP NOTE  [id number] [facility] Pharmacokinetic Monitoring Service - Vancomycin    Consulting Provider: STEFFI Metzger (ID)  Indication: GPR port infection  Target Concentration: Goal AUC/WILFREDO 400-600 mg*hr/L  EOT 6/16/22 per ID  Additional Antimicrobials:     Pertinent Laboratory Values: Wt Readings from Last 1 Encounters:   06/14/22 158 lb 2.2 oz (71.7 kg)     Temp Readings from Last 1 Encounters:   06/14/22 97.7 °F (36.5 °C) (Oral)     Recent Labs     06/12/22  0451 06/13/22  0403 06/14/22  0711   BUN 14 13 11   CREATININE <0.20* <0.20* <0.20*   WBC 52.1* 45.6* 31.7*     Estimated Creatinine Clearance: 358 mL/min (based on SCr of 0.2 mg/dL). Lab Results   Component Value Date    VANCORANDOM 17.8 06/11/2022       MRSA Nasal Swab: N/A. Non-respiratory infection. .      Assessment:  Date/Time Dose Concentration AUC   6/3 0709 750 mg q8h 26.1 601   6/5 0400 1000 mg q12h 13.7 482   6/11 0503 1250 mg q12h 17.8 439   Note: Serum concentrations collected for AUC dosing may appear elevated if collected in close proximity to the dose administered, this is not necessarily an indication of toxicity    Plan:  Current dosing regimen is therapeutic  Continue current dose  Repeat vancomycin concentrations will be ordered as clinically appropriate   Pharmacy will continue to monitor patient and adjust therapy as indicated    Thank you for the consult,  Maximino Hernandez.  Bethanie Mensah, PharmD, BCPS  Clinical Pharmacist

## 2022-06-14 NOTE — PROGRESS NOTES
[facility] Critical Care Note[de-identified] 6/14/2022  Paty Felipe  Admission Date: 6/2/2022     Length of Stay: 12 days    Background:     78 y.o. y/o male with history of gastric carcinoma s/p neoadjuvant chemo (last dose May 29, 2022) with plans for subsequent total gastrectomy, HTN, admitted to hospitalist service 6/2 with syncopal episode and anemia (hgb 4.9 on admission) as well as gram positive mildred bacteremia (only 1/2, possible contaminate). Unable to tolerate solid food for months and has been losing weight. Was hypotension on pressors initially. Was covered with vanc and cefepime initially, flagyl added with GPR returned. CT c/a/p with small R sided PE, femoral DVT. Subsequently had large melanotic stools, transfused. EGD performed 6/4 but unable to see with food in stomach. Repeat 6/5 similar results. AC held and RI placed IVC filter 6/6.   6/9 was taken for total gastrectomy, left lateral lobectomy of liver, distal pancreatectomy and splenectomy, diaphragmatic resection, J tube placement, and retroperitoneal mass excision and lymphadenectomy, and left abdominal wall mass excision. To ICU on vent post op and requiring joe. Notable PMH:  has a past medical history of ABLA (acute blood loss anemia), GIB (gastrointestinal bleeding), HTN (hypertension), and Severe sepsis with lactic acidosis ([address] 24 Hour events:   Remains on 2L HFNC. Denies significant dyspnea. ROS: unable to obtain/negative except as listed elsewhere.      Lines: (insertion date)    Closed/Suction Drain Right RUQ Bulb (Active)       Closed/Suction Drain Left LUQ Bulb (Active)       NG/OG/NJ/NE Tube Right nostril (Active)       Gastrostomy/Enterostomy/Jejunostomy Tube Gastrostomy LUQ 1 14 fr (Active)       Urinary Catheter 2 Way (Active)     Single Lumen Implantable Port Left Chest (Active)       Arterial Line 06/09/22 Radial (Active)     Drips: current dose (range)  Dose (mcg/kg/min) Propofol : 20 mcg/kg/min  Dose (mcg/min) Phenylephrine : 0 mcg/min (map 91)     Pertinent Exam:         Blood pressure 139/87, pulse 94, temperature 98.6 °F (37 °C), temperature source Axillary, resp. rate 17, height 5' 9\" (1.753 m), weight 158 lb 2.2 oz (71.7 kg), SpO2 95 %. Intake/Output Summary (Last 24 hours) at 6/14/2022 1450  Last data filed at 6/14/2022 1449  Gross per 24 hour   Intake 3603.3 ml   Output 2580 ml   Net 1023.3 ml     Constitutional: AAO in NC, pain controlled  EENMT:  Sclera clear, pupils equal, oral mucosa moist  Respiratory: clear  Cardiovascular:  RRR  Gastrointestinal:  soft, dressing on, NG in place  Musculoskeletal:  warm with no cyanosis, no lower extremity edema  Skin:  no jaundice or ecchymosis  Neurologic:awake  Psychiatric:appropriate mood and affect    CXR:     Recent Labs     06/12/22 0451 06/13/22 0403 06/14/22  0711   WBC 52.1* 45.6* 31.7*   HGB 8.7* 8.5* 8.2*   HCT 27.5* 27.5* 26.6*    383 436     Recent Labs     06/12/22 0451 06/13/22 0403 06/14/22  0711    141 137   K 3.2* 3.4* 3.2*   * 105 102   CO2 27 29 29   BUN 14 13 11   CREATININE <0.20* <0.20* <0.20*   MG 1.9 2.0 2.0   PHOS  --  3.4  --    BILITOT 0.2  --   --    AST 16  --   --    ALT 12  --   --    ALKPHOS 118  --   --      No results for input(s): TROPHS, NTPROBNP, CRP, ESR in the last 72 hours. Recent Labs     06/12/22 0451 06/13/22 0403 06/14/22  0711   GLUCOSE 129* 120* 113*      ECHO: 06/02/22    TRANSTHORACIC ECHOCARDIOGRAM (TTE) COMPLETE (CONTRAST/BUBBLE/3D PRN) 06/09/2022  9:31 AM (Final)    Interpretation Summary    Left Ventricle: Reduced left ventricular systolic function. EF by 2D Simpsons Biplane is 42%. Left ventricle size is normal. Severely increased wall thickness. Increased ventricular mass. Infiltrative cardiomyopathy should be considered. Global hypokinesis present. Abnormal diastolic function.   Aortic Valve: Thickened cusp. Mildly calcified cusp. Sclerosis of the aortic valve cusp.  Moderate annular calcification vs appearance of aortic valve prosthesis [clinical correlation, clinical history]. Mild regurgitation.   Mitral Valve: Mildly thickened leaflet.   Aorta: Dilated aortic root. Dilated ascending aorta.   Pericardium: Small (<1 cm) localized pericardial effusion present around the right ventricle.   Technical qualifiers: Color flow Doppler was performed and pulse wave and/or continuous wave Doppler was performed. Signed by: Reshma Payne MD on 6/9/2022  9:31 AM    Microbiology:   Recent Labs     06/13/22  1523   CULTURE No growth after short period of incubation. Further results to follow after overnight incubation. Ventilator Settings Ideal body weight: 70.7 kg (155 lb 13.8 oz)  Adjusted ideal body weight: 71.1 kg (156 lb 12.4 oz)  Mode FIO2 Rate Tidal Volume Pressure   PS/CPAP    34 %  15 bmp     500 mL   8     Peak airway pressure:     Minute ventilation: Minute Ventilation (l/min): 12 l/min  ABG:  No results for input(s): PHAPOC, [id number], [id number], [id number], BE in the last 72 hours. Assessment and Plan:  (Medical Decision Making)       Impression: 78 y.o. male with gastric cancer, s/p joe adjuvant chemo. Admitted with syncope that was likely multifactorial, driven by recent weight loss, poor PO intake, and severe anemia into the 4's. GPR on blood culture but doubt this was sepsis as this was likely contaminate. Course complicated by discovery of PE and DVT, subsequent GI bleed likely due to his cancer, requiring IVC filter placement. No post op extensive resections and brought to ICU still intubated and on pressors. Has severe hypoproteinemia and has been given fluids and blood during his hospitalization. He has pleural effusions but I do not recommend thoracentesis unless medical options have been exhausted. No diuresis has been administered and BP/renal function are stable today.       Active Hospital Problems    Gastrointestinal hemorrhage      Severe protein-calorie malnutrition ([facility] [address]  Check albumin now      Hypoproteinemia ([address]      Do not resuscitate status      Hypercoagulable state, secondary ([address]      Adult body mass index less than 19      Acute pulmonary embolism without acute cor pulmonale (HCC)  Small in RLL with DVt in left femoral veins on 6/2/22      Fatigue      History of gastric cancer      Debility      Weakness      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HCC)      Hypoalbuminemia due to protein-calorie malnutrition (HCC)      Syncope due to orthostatic hypotension      Hypomagnesemia      Corticosteroid dependence (HCC)      Gastroesophageal reflux disease      Gastric adenocarcinoma (HCC)      Malignant neoplasm of body of stomach (HCC)      Malignant neoplasm of overlapping sites of stomach (HCC)      ABILIO (iron deficiency anemia)    Bilateral pleural effusions  Likely due to hypoalbuminemia and recent volume resuscitation/transfusion. Medical therapy with lasix/albumin. These effusions are likely to recur. Will follow with you for now.     DNR        Gaylord Cogan, MD

## 2022-06-14 NOTE — PROGRESS NOTES
Admit Date: 2022    POD 5 Day Post-Op    Procedure:  Procedure(s):  Exploratory laparotomy with total gastrectomy and esophagojejunostomy after extensive lysis of adhesions and dissections which added at least 3-4 hours to this case, Left lateral lobectomy of liver, Distal pancreatectomy and splenectomy, Combined diaphragmatic resection, Falciform ligament flap, Feeding J-tube placement, Retroperitoneal mass excision and lymphadenectomy, Left abdominal wall mass excision on posterior rectus sheath    Subjective: The patient is sitting comfortably in chair. Has no new complaints today. States he has not passed flatus. He denies any nausea or emesis. Abdominal pain controlled. Objective:       Vitals:    22 0745 22 0830 22 1115 22 1125   BP:   134/87    Pulse:  93 91 91   Resp:  20 20    Temp:   97.8 °F (36.6 °C)    TempSrc:   Oral    SpO2: 94% 96% 95%    Weight:       Height:           Temp (24hrs), Av.9 °F (36.6 °C), Min:97.7 °F (36.5 °C), Max:98.1 °F (36.7 °C)  . I&O reviewed as documented. Physical Exam:   Constitutional: Alert, NAD   /87   Pulse 91 Comment: per monitor room  Temp 97.8 °F (36.6 °C) (Oral)   Resp 20   Ht 5' 9\" (1.753 m)   Wt 158 lb 2.2 oz (71.7 kg)   SpO2 95%   BMI 23.35 kg/m²   Eyes: Sclera are clear  ENMT: no external lesions' gross hearing normal; no obvious neck masses, no ear or lip lesions, nares normal, NG to LIS  CV: RRR. Normal perfusion  Resp: No JVD. Breathing is  non-labored; no audible wheezing. Room air  GI: soft and non-distended,  Staples c/d/i. Right and left surgical drain with serosanguineous output. J-tube intact. Musculoskeletal: No embolic signs or cyanosis.    Neuro:  Alert  Psychiatric: Calm and cooperative    Labs:   Recent Results (from the past 24 hour(s))   Culture, Catheter Tip    Collection Time: 22  3:23 PM    Specimen: CATH TIP   Result Value Ref Range    Special Requests NO SPECIAL REQUESTS Culture        No growth after short period of incubation. Further results to follow after overnight incubation.    POCT Glucose    Collection Time: 06/13/22  4:15 PM   Result Value Ref Range    POC Glucose 91 65 - 100 mg/dL    Performed by: StellaourAlexander    POCT Glucose    Collection Time: 06/13/22  8:48 PM   Result Value Ref Range    POC Glucose 108 (H) 65 - 100 mg/dL    Performed by: Anna Graham    Basic Metabolic Panel    Collection Time: 06/14/22  7:11 AM   Result Value Ref Range    Sodium 137 136 - 145 mmol/L    Potassium 3.2 (L) 3.5 - 5.1 mmol/L    Chloride 102 98 - 107 mmol/L    CO2 29 21 - 32 mmol/L    Anion Gap 6 (L) 7 - 16 mmol/L    Glucose 113 (H) 65 - 100 mg/dL    BUN 11 8 - 23 MG/DL    CREATININE <0.20 (L) 0.8 - 1.5 MG/DL    GFR  0 (L) >60 ml/min/1.73m2    GFR Non- 0 (L) >60 ml/min/1.73m2    Calcium 8.2 (L) 8.3 - 10.4 MG/DL   CBC with Auto Differential    Collection Time: 06/14/22  7:11 AM   Result Value Ref Range    WBC 31.7 (H) 4.3 - 11.1 K/uL    RBC 2.99 (L) 4.23 - 5.6 M/uL    Hemoglobin 8.2 (L) 13.6 - 17.2 g/dL    Hematocrit 26.6 (L) 41.1 - 50.3 %    MCV 89.0 79.6 - 97.8 FL    MCH 27.4 26.1 - 32.9 PG    MCHC 30.8 (L) 31.4 - 35.0 g/dL    RDW 16.9 (H) 11.9 - 14.6 %    Platelets 274 239 - 934 K/uL    MPV 10.2 9.4 - 12.3 FL    nRBC 0.08 0.0 - 0.2 K/uL    Differential Type AUTOMATED      Seg Neutrophils 89 (H) 43 - 78 %    Lymphocytes 4 (L) 13 - 44 %    Monocytes 5 4.0 - 12.0 %    Eosinophils % 0 (L) 0.5 - 7.8 %    Basophils 0 0.0 - 2.0 %    Immature Granulocytes 1 0.0 - 5.0 %    Segs Absolute 28.3 (H) 1.7 - 8.2 K/UL    Absolute Lymph # 1.3 0.5 - 4.6 K/UL    Absolute Mono # 1.7 (H) 0.1 - 1.3 K/UL    Absolute Eos # 0.1 0.0 - 0.8 K/UL    Basophils Absolute 0.1 0.0 - 0.2 K/UL    Absolute Immature Granulocyte 0.3 0.0 - 0.5 K/UL   Magnesium    Collection Time: 06/14/22  7:11 AM   Result Value Ref Range    Magnesium 2.0 1.8 - 2.4 mg/dL   POCT Glucose    Collection Time: 06/14/22  7:50 AM   Result Value Ref Range    POC Glucose 118 (H) 65 - 100 mg/dL    Performed by: [employer]        Data Review     Xray Result (most recent):  XR CHEST LIMITED ONE VIEW 06/14/2022    Narrative  Clinical history: Follow-up    TECHNIQUE: AP portable chest    COMPARISON: Yesterday. FINDINGS:    There is moderate left pleural effusion. There is small right pleural effusion. There is associated atelectasis. No pneumothorax. Cardiac mediastinal silhouette  is within normal limits. Enteric tube courses below the diaphragm. Impression  1.  Moderate left and small right pleural effusions, similar to prior exam.      Assessment:     Principal Problem:    Septic shock (HCC)  Active Problems:    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HCC)    Former heavy tobacco smoker    Gastroesophageal reflux disease    Hyponatremia    Hypoalbuminemia due to protein-calorie malnutrition (HCC)    Syncope due to orthostatic hypotension    Hypomagnesemia    Corticosteroid dependence ([address]    Hypoproteinemia (HCC)    Do not resuscitate status    Fatigue    History of gastric cancer    Encounter for palliative care    Debility    Weakness    Hypercoagulable state, secondary ([address]    Adult body mass index less than 19    Acute pulmonary embolism without acute cor pulmonale (HCC)    Severe protein-calorie malnutrition (HCC)    Gastrointestinal hemorrhage    Encounter for weaning from ventilator ([address]    ABILIO (iron deficiency anemia)    Malignant neoplasm of overlapping sites of stomach ([address]    Malignant neoplasm of body of stomach ([address]    Gastric adenocarcinoma ([address]  Resolved Problems:    Severe sepsis with lactic acidosis (HCC)    ABLA (acute blood loss anemia)    GIB (gastrointestinal bleeding)        Plan/Recommendations/Medical Decision Making:     Care management per Hospitalist/ Intensivist  NG to LIS  TPN  Start on trickle tube feeds at 20 cc/hr  IVF  No CPAP/ No BIPBP/ No Bagging  Follow labs  IV Abx  Swallow study tomorrow yenifer Avalos PA-C

## 2022-06-14 NOTE — PROGRESS NOTES
OCCUPATIONAL THERAPY Initial Assessment       OT Visit Days: 1  Acknowledge Orders  Time  OT Charge Capture  Rehab Caseload Tracker      Guanaco Castro is a 78 y.o. male   PRIMARY DIAGNOSIS: Septic shock ([facility]  Syncope and collapse [R55]  Hemorrhagic shock (HCC) [R57.8]  Shock ([address] [R57.9]  History of gastric cancer [Z85.028]  Septic shock ([facility] [A41.9, R65.21]  Gastrointestinal hemorrhage, unspecified gastrointestinal hemorrhage type [K92.2]  Procedure(s) (LRB):  Exploratory laparotomy with total gastrectomy and esophagojejunostomy after extensive lysis of adhesions and dissections which added at least 3-4 hours to this case, Left lateral lobectomy of liver, Distal pancreatectomy and splenectomy, Combined diaphragmatic resection, Falciform ligament flap, Feeding J-tube placement, Retroperitoneal mass excision and lymphadenectomy, Left abdominal wall mass excision on posterior rectus sheath (N/A)  5 Days Post-Op  Reason for Referral: Generalized Muscle Weakness (M62.81)  Inpatient: Payor: MEDICARE / Plan: MEDICARE PART A / Product Type: *No Product type* /     ASSESSMENT:     REHAB RECOMMENDATIONS:   Recommendation to date pending progress:  Setting:   Long Term Acute Care Facility    Equipment:     To Be Determined     ASSESSMENT:  Mr. Todd Mclean is a 78 y M with hx of HTN, IPA, gastric cancer. Pt admitted for septic shock associated with syncope episode. Pt had s/p 6/9/22 with extensive list of ectomy associated with stomach cancer that had spread. Pt received supine in bed. Supine > sit max A x2 due to weakness, fatigue. Static sitting EOB poor - F+ improving over time with less support required. Max A for donning socks sitting EOB maintaining sitting balance while therapist donned socks. Functional mobility bed > chair SPT max A x1 due to weakness, fatigue. Sitting in chair BUE therapeutic exercise requiring multiple breaks due to fatigue. Vitals stable during therapy.  Pt requires continued skilled OT services due to performing functionally below baseline. Pt has deficits in strength, balance, activity tolerance, and performing ADLs. [facility] [address] [address] AM-PAC 6 Clicks Daily Activity Inpatient Short Form:    AM-PAC Daily Activity Inpatient   How much help for putting on and taking off regular lower body clothing?: A Lot  How much help for Bathing?: A Lot  How much help for Toileting?: Total  How much help for putting on and taking off regular upper body clothing?: A Lot  How much help for taking care of personal grooming?: None  How much help for eating meals?: None  AM-PAC Inpatient Daily Activity Raw Score: 15  AM-PAC Inpatient ADL T-Scale Score : 34.69  ADL Inpatient CMS 0-100% Score: 56.46  ADL Inpatient CMS G-Code Modifier : CK           SUBJECTIVE:     Mr. Pili Caceres states, \"How long do I have to sit up in this chair? \"     Social/Functional Lives With: Spouse  Type of Home: House  Home Layout: One level  Bathroom Accessibility: Accessible  Receives Help From: Family  ADL Assistance: Independent  Homemaking Assistance: Independent  Ambulation Assistance: Independent  Transfer Assistance: Independent  Active : Yes  Mode of Transportation: Car  Occupation: Retired    OBJECTIVE:     Surekha Rogers / Jolieni Claw / AIRWAY: Continuous Pulse Oximetry, Mata Catheter, IV, SHAYNE Drain and Nasogastric Tube    RESTRICTIONS/PRECAUTIONS:  Restrictions/Precautions: Fall Risk    PAIN: VITALS / O2:   Pre Treatment:   Pain Assessment: 0-10  Pain Location: Abdomen      Post Treatment: 3/10       Vitals          Oxygen            GROSS EVALUATION: INTACT IMPAIRED   (See Comments)   UE AROM [x] []   UE PROM [] []   Strength []  generally decreased   Posture / Balance [] Sitting - Static: Fair,+  Sitting - Dynamic: Poor  Standing - Static: Fair,-  Standing - Dynamic: Fair,-   Sensation []     Coordination []       Tone [x]       Edema [x]    Activity Tolerance [] Patient limited by fatigue,Patient limited by pain     Hand Dominance R [] L []      COGNITION/  PERCEPTION: INTACT IMPAIRED   (See Comments)   Orientation [x]     Vision [x]     Hearing [x]     Cognition  [x]     Perception []       MOBILITY: I Mod I S SBA CGA Min Mod Max Total  NT x2 Comments:   Bed Mobility    Rolling [] [] [] [] [] [] [] [] [] [] []    Supine to Sit [] [] [] [] [] [] [] [x] [] [] []    Scooting [] [] [] [] [] [] [] [x] [] [] []    Sit to Supine [] [] [] [] [] [] [] [] [] [x] []    Transfers    Sit to Stand [] [] [] [] [] [] [] [x] [] [] []    Bed to Chair [] [] [] [] [] [] [] [x] [] [] []    Stand to Sit [] [] [] [] [] [] [] [x] [] [] []    Tub/Shower [] [] [] [] [] [] [] [] [] [x] []     Toilet [] [] [] [] [] [] [] [] [] [x] []      [] [] [] [] [] [] [] [] [] [] []    I=Independent, Mod I=Modified Independent, S=Supervision/Setup, SBA=Standby Assistance, CGA=Contact Guard Assistance, Min=Minimal Assistance, Mod=Moderate Assistance, Max=Maximal Assistance, Total=Total Assistance, NT=Not Tested    ACTIVITIES OF DAILY LIVING: I Mod I S SBA CGA Min Mod Max Total NT Comments   BASIC ADLs:              Upper Body Bathing  [] [] [] [] [] [] [] [] [] [x]    Lower Body Bathing [] [] [] [] [] [] [] [] [] [x]    Toileting [] [] [] [] [] [] [] [] [] [x]    Upper Body Dressing [] [] [] [] [] [] [] [] [] [x]    Lower Body Dressing [] [] [] [] [] [] [] [] [x] []    Feeding [] [] [] [] [] [] [] [] [] [x]    Grooming [] [] [] [] [] [] [] [] [] [x]    Personal Device Care [] [] [] [] [] [] [] [] [] [x]    Functional Mobility [] [] [] [] [] [] [] [x] [] []    I=Independent, Mod I=Modified Independent, S=Supervision/Setup, SBA=Standby Assistance, CGA=Contact Guard Assistance, Min=Minimal Assistance, Mod=Moderate Assistance, Max=Maximal Assistance, Total=Total Assistance, NT=Not Tested    PLAN:     FREQUENCY/DURATION   OT Plan of Care: 3 times/week for duration of hospital stay or until stated goals are met, whichever comes first.    ACUTE OCCUPATIONAL THERAPY GOALS:   (Developed with and Out: [facility]  Minutes: Yonatan Taylor, OT

## 2022-06-15 ENCOUNTER — APPOINTMENT (OUTPATIENT)
Dept: GENERAL RADIOLOGY | Age: 67
DRG: 853 | End: 2022-06-15
Payer: MEDICARE

## 2022-06-15 PROBLEM — E83.42 HYPOMAGNESEMIA: Status: RESOLVED | Noted: 2022-06-02 | Resolved: 2022-06-15

## 2022-06-15 PROBLEM — B96.7 CLOSTRIDIUM PERFRINGENS INFECTION: Status: ACTIVE | Noted: 2022-06-15

## 2022-06-15 PROBLEM — Z90.49 H/O RESECTION OF LIVER: Status: ACTIVE | Noted: 2022-06-15

## 2022-06-15 PROBLEM — Z90.411 HISTORY OF PARTIAL PANCREATECTOMY: Status: ACTIVE | Noted: 2022-06-15

## 2022-06-15 PROBLEM — E87.1 HYPONATREMIA: Status: RESOLVED | Noted: 2022-06-02 | Resolved: 2022-06-15

## 2022-06-15 PROBLEM — A41.9 SEPTIC SHOCK (HCC): Status: RESOLVED | Noted: 2022-06-02 | Resolved: 2022-06-15

## 2022-06-15 PROBLEM — R65.21 SEPTIC SHOCK (HCC): Status: RESOLVED | Noted: 2022-06-02 | Resolved: 2022-06-15

## 2022-06-15 PROBLEM — Z98.890 S/P EXPLORATORY LAPAROTOMY: Status: ACTIVE | Noted: 2022-06-15

## 2022-06-15 PROBLEM — Z90.81 S/P SPLENECTOMY: Status: ACTIVE | Noted: 2022-06-15

## 2022-06-15 PROBLEM — I47.1 SVT (SUPRAVENTRICULAR TACHYCARDIA) (HCC): Status: ACTIVE | Noted: 2022-06-15

## 2022-06-15 PROBLEM — Z93.4 JEJUNOSTOMY TUBE PRESENT (HCC): Status: ACTIVE | Noted: 2022-06-15

## 2022-06-15 PROBLEM — I87.1 IVC (INFERIOR VENA CAVA OBSTRUCTION): Status: ACTIVE | Noted: 2022-06-15

## 2022-06-15 LAB
AMYLASE FLD-CCNC: 11 U/L
ANION GAP SERPL CALC-SCNC: 7 MMOL/L (ref 7–16)
APPEARANCE FLD: NORMAL
BACTERIA SPEC CULT: NORMAL
BACTERIA SPEC CULT: NORMAL
BASOPHILS # BLD: 0.1 K/UL (ref 0–0.2)
BASOPHILS NFR BLD: 0 % (ref 0–2)
BUN SERPL-MCNC: 11 MG/DL (ref 8–23)
CALCIUM SERPL-MCNC: 8.1 MG/DL (ref 8.3–10.4)
CHLORIDE SERPL-SCNC: 98 MMOL/L (ref 98–107)
CO2 SERPL-SCNC: 31 MMOL/L (ref 21–32)
COLOR FLD: YELLOW
CREAT SERPL-MCNC: <0.2 MG/DL (ref 0.8–1.5)
DIFFERENTIAL METHOD BLD: ABNORMAL
EKG ATRIAL RATE: 153 BPM
EKG ATRIAL RATE: 90 BPM
EKG ATRIAL RATE: 92 BPM
EKG ATRIAL RATE: 96 BPM
EKG DIAGNOSIS: NORMAL
EKG P AXIS: 36 DEGREES
EKG P AXIS: 40 DEGREES
EKG P AXIS: 46 DEGREES
EKG P-R INTERVAL: 160 MS
EKG P-R INTERVAL: 174 MS
EKG P-R INTERVAL: 180 MS
EKG P-R INTERVAL: 182 MS
EKG Q-T INTERVAL: 322 MS
EKG Q-T INTERVAL: 344 MS
EKG Q-T INTERVAL: 360 MS
EKG Q-T INTERVAL: 362 MS
EKG QRS DURATION: 78 MS
EKG QRS DURATION: 80 MS
EKG QRS DURATION: 82 MS
EKG QRS DURATION: 90 MS
EKG QTC CALCULATION (BAZETT): 420 MS
EKG QTC CALCULATION (BAZETT): 445 MS
EKG QTC CALCULATION (BAZETT): 457 MS
EKG QTC CALCULATION (BAZETT): 507 MS
EKG R AXIS: -10 DEGREES
EKG R AXIS: -21 DEGREES
EKG R AXIS: -3 DEGREES
EKG R AXIS: 3 DEGREES
EKG T AXIS: 18 DEGREES
EKG T AXIS: 44 DEGREES
EKG T AXIS: 46 DEGREES
EKG T AXIS: 52 DEGREES
EKG VENTRICULAR RATE: 149 BPM
EKG VENTRICULAR RATE: 90 BPM
EKG VENTRICULAR RATE: 92 BPM
EKG VENTRICULAR RATE: 96 BPM
EOSINOPHIL # BLD: 0.1 K/UL (ref 0–0.8)
EOSINOPHIL NFR BLD: 0 % (ref 0.5–7.8)
ERYTHROCYTE [DISTWIDTH] IN BLOOD BY AUTOMATED COUNT: 16.5 % (ref 11.9–14.6)
GLUCOSE BLD STRIP.AUTO-MCNC: 110 MG/DL (ref 65–100)
GLUCOSE BLD STRIP.AUTO-MCNC: 111 MG/DL (ref 65–100)
GLUCOSE BLD STRIP.AUTO-MCNC: 113 MG/DL (ref 65–100)
GLUCOSE BLD STRIP.AUTO-MCNC: 114 MG/DL (ref 65–100)
GLUCOSE BLD STRIP.AUTO-MCNC: 122 MG/DL (ref 65–100)
GLUCOSE BLD STRIP.AUTO-MCNC: 123 MG/DL (ref 65–100)
GLUCOSE FLD-MCNC: 119 MG/DL
GLUCOSE SERPL-MCNC: 127 MG/DL (ref 65–100)
HCT VFR BLD AUTO: 25.7 % (ref 41.1–50.3)
HGB BLD-MCNC: 8.1 G/DL (ref 13.6–17.2)
IMM GRANULOCYTES # BLD AUTO: 0.3 K/UL (ref 0–0.5)
IMM GRANULOCYTES NFR BLD AUTO: 1 % (ref 0–5)
LDH FLD L TO P-CCNC: 96 U/L
LYMPHOCYTES # BLD: 1.3 K/UL (ref 0.5–4.6)
LYMPHOCYTES NFR BLD: 4 % (ref 13–44)
LYMPHOCYTES NFR BRONCH MANUAL: 4 %
MACROPHAGES NFR BRONCH MANUAL: 1 %
MAGNESIUM SERPL-MCNC: 1.9 MG/DL (ref 1.8–2.4)
MCH RBC QN AUTO: 28.1 PG (ref 26.1–32.9)
MCHC RBC AUTO-ENTMCNC: 31.5 G/DL (ref 31.4–35)
MCV RBC AUTO: 89.2 FL (ref 79.6–97.8)
MONOCYTES # BLD: 1.7 K/UL (ref 0.1–1.3)
MONOCYTES NFR BLD: 5 % (ref 4–12)
NEUTROPHILS NFR BRONCH MANUAL: 95 %
NEUTS SEG # BLD: 27.4 K/UL (ref 1.7–8.2)
NEUTS SEG NFR BLD: 89 % (ref 43–78)
NRBC # BLD: 0.02 K/UL (ref 0–0.2)
NUC CELL # FLD: 992 /CU MM
PHOSPHATE SERPL-MCNC: 3.3 MG/DL (ref 2.3–3.7)
PLATELET # BLD AUTO: 469 K/UL (ref 150–450)
PMV BLD AUTO: 10.3 FL (ref 9.4–12.3)
POTASSIUM SERPL-SCNC: 2.9 MMOL/L (ref 3.5–5.1)
POTASSIUM SERPL-SCNC: 2.9 MMOL/L (ref 3.5–5.1)
PROT FLD-MCNC: 1.4 G/DL
RBC # BLD AUTO: 2.88 M/UL (ref 4.23–5.6)
RBC # FLD: 3000 /CU MM
SERVICE CMNT-IMP: ABNORMAL
SERVICE CMNT-IMP: NORMAL
SERVICE CMNT-IMP: NORMAL
SODIUM SERPL-SCNC: 136 MMOL/L (ref 136–145)
SPECIMEN SOURCE FLD: NORMAL
TRIGL SERPL-MCNC: 88 MG/DL (ref 35–150)
WBC # BLD AUTO: 30.8 K/UL (ref 4.3–11.1)

## 2022-06-15 PROCEDURE — 93005 ELECTROCARDIOGRAM TRACING: CPT | Performed by: FAMILY MEDICINE

## 2022-06-15 PROCEDURE — C1729 CATH, DRAINAGE: HCPCS | Performed by: INTERNAL MEDICINE

## 2022-06-15 PROCEDURE — 32555 ASPIRATE PLEURA W/ IMAGING: CPT | Performed by: INTERNAL MEDICINE

## 2022-06-15 PROCEDURE — 6360000002 HC RX W HCPCS: Performed by: SURGERY

## 2022-06-15 PROCEDURE — 2580000003 HC RX 258: Performed by: FAMILY MEDICINE

## 2022-06-15 PROCEDURE — 6370000000 HC RX 637 (ALT 250 FOR IP): Performed by: PHYSICIAN ASSISTANT

## 2022-06-15 PROCEDURE — 2500000003 HC RX 250 WO HCPCS: Performed by: SURGERY

## 2022-06-15 PROCEDURE — 82150 ASSAY OF AMYLASE: CPT

## 2022-06-15 PROCEDURE — 1090000001 HH PPS REVENUE CREDIT

## 2022-06-15 PROCEDURE — 71045 X-RAY EXAM CHEST 1 VIEW: CPT

## 2022-06-15 PROCEDURE — 83735 ASSAY OF MAGNESIUM: CPT

## 2022-06-15 PROCEDURE — 1090000002 HH PPS REVENUE DEBIT

## 2022-06-15 PROCEDURE — P9047 ALBUMIN (HUMAN), 25%, 50ML: HCPCS | Performed by: INTERNAL MEDICINE

## 2022-06-15 PROCEDURE — 94761 N-INVAS EAR/PLS OXIMETRY MLT: CPT

## 2022-06-15 PROCEDURE — 6370000000 HC RX 637 (ALT 250 FOR IP): Performed by: FAMILY MEDICINE

## 2022-06-15 PROCEDURE — 82962 GLUCOSE BLOOD TEST: CPT

## 2022-06-15 PROCEDURE — 2500000003 HC RX 250 WO HCPCS: Performed by: FAMILY MEDICINE

## 2022-06-15 PROCEDURE — 89050 BODY FLUID CELL COUNT: CPT

## 2022-06-15 PROCEDURE — 84157 ASSAY OF PROTEIN OTHER: CPT

## 2022-06-15 PROCEDURE — 84132 ASSAY OF SERUM POTASSIUM: CPT

## 2022-06-15 PROCEDURE — 83615 LACTATE (LD) (LDH) ENZYME: CPT

## 2022-06-15 PROCEDURE — 87102 FUNGUS ISOLATION CULTURE: CPT

## 2022-06-15 PROCEDURE — 88305 TISSUE EXAM BY PATHOLOGIST: CPT

## 2022-06-15 PROCEDURE — 80048 BASIC METABOLIC PNL TOTAL CA: CPT

## 2022-06-15 PROCEDURE — 6370000000 HC RX 637 (ALT 250 FOR IP): Performed by: INTERNAL MEDICINE

## 2022-06-15 PROCEDURE — 84100 ASSAY OF PHOSPHORUS: CPT

## 2022-06-15 PROCEDURE — 87116 MYCOBACTERIA CULTURE: CPT

## 2022-06-15 PROCEDURE — 2580000003 HC RX 258: Performed by: SURGERY

## 2022-06-15 PROCEDURE — 6370000000 HC RX 637 (ALT 250 FOR IP): Performed by: SURGERY

## 2022-06-15 PROCEDURE — 88112 CYTOPATH CELL ENHANCE TECH: CPT

## 2022-06-15 PROCEDURE — 7100000010 HC PHASE II RECOVERY - FIRST 15 MIN: Performed by: INTERNAL MEDICINE

## 2022-06-15 PROCEDURE — 2580000003 HC RX 258: Performed by: NURSE PRACTITIONER

## 2022-06-15 PROCEDURE — 93005 ELECTROCARDIOGRAM TRACING: CPT | Performed by: INTERNAL MEDICINE

## 2022-06-15 PROCEDURE — 84478 ASSAY OF TRIGLYCERIDES: CPT

## 2022-06-15 PROCEDURE — 2700000000 HC OXYGEN THERAPY PER DAY

## 2022-06-15 PROCEDURE — 6360000002 HC RX W HCPCS: Performed by: INTERNAL MEDICINE

## 2022-06-15 PROCEDURE — 85025 COMPLETE CBC W/AUTO DIFF WBC: CPT

## 2022-06-15 PROCEDURE — 2709999900 HC NON-CHARGEABLE SUPPLY: Performed by: INTERNAL MEDICINE

## 2022-06-15 PROCEDURE — 94640 AIRWAY INHALATION TREATMENT: CPT

## 2022-06-15 PROCEDURE — 82945 GLUCOSE OTHER FLUID: CPT

## 2022-06-15 PROCEDURE — 6360000002 HC RX W HCPCS: Performed by: NURSE PRACTITIONER

## 2022-06-15 PROCEDURE — 2100000000 HC CCU R&B

## 2022-06-15 PROCEDURE — 2500000003 HC RX 250 WO HCPCS

## 2022-06-15 PROCEDURE — 0W993ZZ DRAINAGE OF RIGHT PLEURAL CAVITY, PERCUTANEOUS APPROACH: ICD-10-PCS | Performed by: INTERNAL MEDICINE

## 2022-06-15 PROCEDURE — 87205 SMEAR GRAM STAIN: CPT

## 2022-06-15 PROCEDURE — 7100000011 HC PHASE II RECOVERY - ADDTL 15 MIN: Performed by: INTERNAL MEDICINE

## 2022-06-15 PROCEDURE — 6360000002 HC RX W HCPCS: Performed by: FAMILY MEDICINE

## 2022-06-15 PROCEDURE — 3609027000 HC THORACENTESIS W/ULTRASOUND: Performed by: INTERNAL MEDICINE

## 2022-06-15 PROCEDURE — 36592 COLLECT BLOOD FROM PICC: CPT

## 2022-06-15 RX ORDER — BISACODYL 10 MG
10 SUPPOSITORY, RECTAL RECTAL DAILY
Status: DISCONTINUED | OUTPATIENT
Start: 2022-06-15 | End: 2022-06-21 | Stop reason: HOSPADM

## 2022-06-15 RX ORDER — METOPROLOL TARTRATE 5 MG/5ML
5 INJECTION INTRAVENOUS ONCE
Status: COMPLETED | OUTPATIENT
Start: 2022-06-15 | End: 2022-06-15

## 2022-06-15 RX ORDER — POTASSIUM CHLORIDE 7.45 MG/ML
10 INJECTION INTRAVENOUS
Status: DISPENSED | OUTPATIENT
Start: 2022-06-15 | End: 2022-06-16

## 2022-06-15 RX ORDER — LEVALBUTEROL INHALATION SOLUTION 0.63 MG/3ML
0.63 SOLUTION RESPIRATORY (INHALATION) EVERY 6 HOURS
Status: DISCONTINUED | OUTPATIENT
Start: 2022-06-15 | End: 2022-06-21 | Stop reason: HOSPADM

## 2022-06-15 RX ORDER — ALBUTEROL SULFATE 2.5 MG/3ML
2.5 SOLUTION RESPIRATORY (INHALATION) 4 TIMES DAILY
Status: DISCONTINUED | OUTPATIENT
Start: 2022-06-15 | End: 2022-06-15

## 2022-06-15 RX ORDER — METOPROLOL TARTRATE 5 MG/5ML
INJECTION INTRAVENOUS
Status: COMPLETED
Start: 2022-06-15 | End: 2022-06-15

## 2022-06-15 RX ORDER — 0.9 % SODIUM CHLORIDE 0.9 %
250 INTRAVENOUS SOLUTION INTRAVENOUS ONCE
Status: COMPLETED | OUTPATIENT
Start: 2022-06-15 | End: 2022-06-15

## 2022-06-15 RX ADMIN — HYDROMORPHONE HYDROCHLORIDE 0.5 MG: 1 INJECTION, SOLUTION INTRAMUSCULAR; INTRAVENOUS; SUBCUTANEOUS at 19:23

## 2022-06-15 RX ADMIN — SODIUM CHLORIDE 250 ML: 9 INJECTION, SOLUTION INTRAVENOUS at 21:56

## 2022-06-15 RX ADMIN — OXYCODONE HYDROCHLORIDE 5 MG: 5 SOLUTION ORAL at 21:47

## 2022-06-15 RX ADMIN — SODIUM CHLORIDE, PRESERVATIVE FREE 10 ML: 5 INJECTION INTRAVENOUS at 21:25

## 2022-06-15 RX ADMIN — POTASSIUM CHLORIDE 20 MEQ: 400 INJECTION, SOLUTION INTRAVENOUS at 12:38

## 2022-06-15 RX ADMIN — METOPROLOL TARTRATE 12.5 MG: 25 TABLET, FILM COATED ORAL at 21:25

## 2022-06-15 RX ADMIN — OXYCODONE HYDROCHLORIDE 5 MG: 5 SOLUTION ORAL at 05:58

## 2022-06-15 RX ADMIN — ALBUTEROL SULFATE 2.5 MG: 2.5 SOLUTION RESPIRATORY (INHALATION) at 11:05

## 2022-06-15 RX ADMIN — ALBUTEROL SULFATE 2.5 MG: 2.5 SOLUTION RESPIRATORY (INHALATION) at 14:51

## 2022-06-15 RX ADMIN — DEXTROSE 150 MG: 50 INJECTION, SOLUTION INTRAVENOUS at 23:36

## 2022-06-15 RX ADMIN — VANCOMYCIN HYDROCHLORIDE 1250 MG: 10 INJECTION, POWDER, LYOPHILIZED, FOR SOLUTION INTRAVENOUS at 16:07

## 2022-06-15 RX ADMIN — GUAIFENESIN 200 MG: 200 SOLUTION ORAL at 00:38

## 2022-06-15 RX ADMIN — AMIODARONE HYDROCHLORIDE 1 MG/MIN: 50 INJECTION, SOLUTION INTRAVENOUS at 23:46

## 2022-06-15 RX ADMIN — SODIUM CHLORIDE, PRESERVATIVE FREE 10 ML: 5 INJECTION INTRAVENOUS at 09:41

## 2022-06-15 RX ADMIN — METOPROLOL TARTRATE 5 MG: 5 INJECTION INTRAVENOUS at 21:56

## 2022-06-15 RX ADMIN — BISACODYL 10 MG: 10 SUPPOSITORY RECTAL at 15:34

## 2022-06-15 RX ADMIN — METOPROLOL TARTRATE 5 MG: 5 INJECTION INTRAVENOUS at 02:51

## 2022-06-15 RX ADMIN — METOPROLOL TARTRATE 5 MG: 5 INJECTION INTRAVENOUS at 10:25

## 2022-06-15 RX ADMIN — METOPROLOL TARTRATE 12.5 MG: 25 TABLET, FILM COATED ORAL at 10:38

## 2022-06-15 RX ADMIN — FUROSEMIDE 40 MG: 10 INJECTION, SOLUTION INTRAMUSCULAR; INTRAVENOUS at 03:56

## 2022-06-15 RX ADMIN — METOPROLOL TARTRATE 5 MG: 5 INJECTION INTRAVENOUS at 09:39

## 2022-06-15 RX ADMIN — GUAIFENESIN 200 MG: 200 SOLUTION ORAL at 14:00

## 2022-06-15 RX ADMIN — HYDROMORPHONE HYDROCHLORIDE 0.5 MG: 1 INJECTION, SOLUTION INTRAMUSCULAR; INTRAVENOUS; SUBCUTANEOUS at 02:01

## 2022-06-15 RX ADMIN — ENOXAPARIN SODIUM 30 MG: 100 INJECTION SUBCUTANEOUS at 21:25

## 2022-06-15 RX ADMIN — METOPROLOL TARTRATE 5 MG: 1 INJECTION, SOLUTION INTRAVENOUS at 10:25

## 2022-06-15 RX ADMIN — FUROSEMIDE 40 MG: 10 INJECTION, SOLUTION INTRAMUSCULAR; INTRAVENOUS at 16:06

## 2022-06-15 RX ADMIN — HYDROMORPHONE HYDROCHLORIDE 0.5 MG: 1 INJECTION, SOLUTION INTRAMUSCULAR; INTRAVENOUS; SUBCUTANEOUS at 09:41

## 2022-06-15 RX ADMIN — POTASSIUM CHLORIDE 20 MEQ: 400 INJECTION, SOLUTION INTRAVENOUS at 13:57

## 2022-06-15 RX ADMIN — ALBUTEROL SULFATE 2.5 MG: 2.5 SOLUTION RESPIRATORY (INHALATION) at 20:15

## 2022-06-15 RX ADMIN — METOPROLOL TARTRATE 5 MG: 5 INJECTION INTRAVENOUS at 00:38

## 2022-06-15 RX ADMIN — IPRATROPIUM BROMIDE AND ALBUTEROL SULFATE 1 AMPULE: .5; 3 SOLUTION RESPIRATORY (INHALATION) at 01:10

## 2022-06-15 RX ADMIN — GUAIFENESIN 200 MG: 200 SOLUTION ORAL at 05:55

## 2022-06-15 RX ADMIN — ENOXAPARIN SODIUM 30 MG: 100 INJECTION SUBCUTANEOUS at 09:37

## 2022-06-15 RX ADMIN — ALBUMIN (HUMAN) 25 G: 0.25 INJECTION, SOLUTION INTRAVENOUS at 03:56

## 2022-06-15 RX ADMIN — VANCOMYCIN HYDROCHLORIDE 1250 MG: 10 INJECTION, POWDER, LYOPHILIZED, FOR SOLUTION INTRAVENOUS at 02:01

## 2022-06-15 RX ADMIN — POTASSIUM CHLORIDE 20 MEQ: 400 INJECTION, SOLUTION INTRAVENOUS at 12:03

## 2022-06-15 RX ADMIN — GUAIFENESIN 200 MG: 200 SOLUTION ORAL at 18:08

## 2022-06-15 RX ADMIN — IPRATROPIUM BROMIDE AND ALBUTEROL SULFATE 1 AMPULE: .5; 3 SOLUTION RESPIRATORY (INHALATION) at 08:16

## 2022-06-15 RX ADMIN — ALBUMIN (HUMAN) 25 G: 0.25 INJECTION, SOLUTION INTRAVENOUS at 15:34

## 2022-06-15 RX ADMIN — OXYCODONE HYDROCHLORIDE 5 MG: 5 SOLUTION ORAL at 14:00

## 2022-06-15 RX ADMIN — POTASSIUM CHLORIDE 10 MEQ: 7.46 INJECTION, SOLUTION INTRAVENOUS at 23:32

## 2022-06-15 RX ADMIN — SODIUM CHLORIDE, PRESERVATIVE FREE 10 ML: 5 INJECTION INTRAVENOUS at 09:39

## 2022-06-15 ASSESSMENT — PAIN - FUNCTIONAL ASSESSMENT
PAIN_FUNCTIONAL_ASSESSMENT: NONE - DENIES PAIN

## 2022-06-15 ASSESSMENT — PAIN SCALES - GENERAL
PAINLEVEL_OUTOF10: 7
PAINLEVEL_OUTOF10: 0
PAINLEVEL_OUTOF10: 4
PAINLEVEL_OUTOF10: 7
PAINLEVEL_OUTOF10: 5

## 2022-06-15 ASSESSMENT — PAIN DESCRIPTION - DESCRIPTORS
DESCRIPTORS: ACHING;CRAMPING
DESCRIPTORS: ACHING
DESCRIPTORS: ACHING;CRAMPING

## 2022-06-15 ASSESSMENT — PAIN DESCRIPTION - ORIENTATION
ORIENTATION: MID

## 2022-06-15 ASSESSMENT — PAIN DESCRIPTION - LOCATION
LOCATION: ABDOMEN

## 2022-06-15 NOTE — PROGRESS NOTES
END OF SHIFT NOTE:    INTAKE/OUTPUT  06/14 0701 - 06/15 0700  In: 4072.5 [I.V.:3433.5]  Out: 1753 [Urine:5700; Drains:365]  Voiding: Yes  Catheter: No  Drain:   Closed/Suction Drain Right RUQ Bulb (Active)   Site Description Clean, dry & intact 06/15/22 0201   Dressing Status Clean, dry & intact 06/15/22 0201   Drainage Appearance Yellow 06/15/22 0201   Drain Status Open to gravity drainage 06/15/22 0201   Output (ml) 30 ml 06/15/22 0558       Closed/Suction Drain Left LUQ Bulb (Active)   Site Description Clean, dry & intact 06/15/22 0201   Dressing Status Clean, dry & intact 06/15/22 0201   Drainage Appearance Yellow 06/15/22 0201   Drain Status Compressed; To bulb suction 06/15/22 0201   Output (ml) 130 ml 06/15/22 0558       NG/OG/NJ/NE Tube Right nostril (Active)   Surrounding Skin Clean, dry & intact 06/15/22 0201   Securement device Tape 06/15/22 0201   Status Suction-low intermittent 06/15/22 0201   Placement Verified Gastric Contents 06/12/22 1315   NG/OG/NJ/NE External Measurement (cm) 58 cm 06/15/22 0201   Drainage Appearance Green 06/15/22 0201   Output (mL) 250 ml 06/15/22 0558       Gastrostomy/Enterostomy/Jejunostomy Tube Gastrostomy LUQ 1 14 fr (Active)   Drainage Appearance Serous 06/11/22 1935   Site Description Clean, dry & intact 06/15/22 0201   J Port Status Infusing 06/15/22 0201   Surrounding Skin Clean, dry & intact 06/15/22 0201   Dressing Status Clean, dry & intact 06/15/22 0201   Dressing Type Dry dressing 06/15/22 0201   Tube Feeding Standard without Fiber 06/15/22 0201   Tube feeding/verify rate (mL/hr) 20 mL/hr 06/15/22 0201   Tube Feeding Intake (mL) 245 ml 06/14/22 1738   Free Water/Flush (mL) 105 mL 06/15/22 0558   Action Taken Other (comment) 06/14/22 0138               Flatus: Patient does not have flatus present. Stool:  occurrences.     Characteristics:  Stool Appearance: Watery  Stool Color: Brown  Stool Amount: Small  Stool Assessment  Incontinence: No  Stool Appearance: Watery  Stool Color: Brown  Stool Amount: Small  Stool Source: Rectum  Last BM (including prior to admit): 06/08/22    Emesis:  occurrences. Characteristics:        VITAL SIGNS  Patient Vitals for the past 12 hrs:   Temp Pulse Resp BP SpO2   06/15/22 0330 98.4 °F (36.9 °C) 90 18 134/85 96 %   06/15/22 0251 -- 96 -- 135/84 --   06/15/22 0038 -- (!) 105 -- -- --   06/15/22 0025 -- (!) 105 20 (!) 140/93 95 %   06/14/22 2342 -- 90 -- -- --   06/14/22 2330 -- (!) 140 -- -- --   06/14/22 2303 98.6 °F (37 °C) 95 18 138/83 96 %   06/14/22 2135 -- -- -- -- 94 %   06/14/22 2040 -- (!) 101 -- 132/78 96 %   06/14/22 2010 -- -- -- -- 92 %   06/14/22 2008 98.1 °F (36.7 °C) 98 18 130/81 (!) 89 %   06/14/22 2006 -- 100 18 -- 90 %   06/14/22 1940 -- 98 16 121/72 (!) 83 %       Pain Assessment  Pt complaining of pain in abd. Pain controlled with PRN pain medication. Ambulating  No    Shift report given to oncoming nurse at the bedside.     Rica Lara, RAYSA

## 2022-06-15 NOTE — PROGRESS NOTES
Date of Outreach Update:  Natasha James was seen and assessed. @[facility]([id number])@  Vitals:    06/15/22 1038 06/15/22 1108 06/15/22 1453 06/15/22 1524   BP: 119/75   117/77   Pulse: (!) 102 93 99 94   Resp:  20 18 20   Temp:    98.2 °F (36.8 °C)   TempSrc:    Axillary   SpO2:  99% 94% 96%   Weight:       Height:          Previous Outreach assessment has been reviewed. There have been no significant clinical changes since the completion of the last dated Outreach assessment. Will continue to follow up per outreach protocol.     Signed By:   Jerry Arora RN    Janey 15, 2022 3:58 PM

## 2022-06-15 NOTE — INTERVAL H&P NOTE
Update History & Physical    The patient's progress note of 6/15/22 was reviewed with the patient and I examined the patient. There was no change. The surgical site was confirmed by the patient and me. Plan: The risks, benefits, expected outcome, and alternative to the recommended procedure have been discussed with the patient. Patient understands and wants to proceed with the procedure.      Electronically signed by Lynn Brown MD on 6/15/2022 at 8:50 AM

## 2022-06-15 NOTE — PROCEDURES
PROCEDURE:  DIAGNOSTIC THORACENTESIS, THERAPEUTIC THORACENTESIS    PRE-OP DIAGNOSIS:  R PLEURAL EFFUSION    POST-OP DIAGNOSIS:  R PLEURAL EFFUSION    VOLUME REMOVED: 800 cc    ANESTHESIA:  LOCAL ANESTHESIA WITH 1% LIDOCAINE 5 CC TOTAL. CHEST ULTRASOUND FINDINGS:    A Turbo-M, Sonosite ultrasound with a 5-16 mHz probe was used to image the chest and localize the pleural effusion on the bilateral chest.    A moderate anechoic space was seen on the right consistent with an uncomplicated pleural effusion. A small to medium complex pleural space was identified on ultrasound on the left. There were multiple loculations and septations present, with the pleural fluid having a mixed echogenic character. Marked lung consolidation seen. DESCRIPTION OF PROCEDURE:    After obtaining informed consent and localizing the safest location for thoracentesis, the  11th intercostal space was marked with a blunt, plastic needle cap in the mid scapular line. An [employer] AK-0100 Pleral-Seal thoracentesis kit was used to perform the procedure. The skin was cleansed with the supplied chlorhexidine swab and then draped in the usual fashion. Using the previously marked location as a guide, a 22 G 1.5 inch needle was used to inject 1% lidocaine into the skin and subcutaneous tissue, as well as onto the underlying rib and inter-costal muscles. Pleural fluid was aspirated to assure proper location and additional lidocaine was injected into the pleural space prior to removing the anesthesia needle. A 3mm incision was then made with the supplied scalpel in the usual fashion to facilitate the insertion of the thoracentesis needle. The needle with an 8 Polish thoracentesis catheter was then introduced into the chest through the previously made incision in the usual fashion, the rib localized with the needle, and the catheter then marched over the rib into the pleural space.     After aspirating fluid, the thoracentesis catheter was then placed into the chest using the needle itself as a trocar. The needle was then removed and the catheter was attached to the supplied tubing without complication. 800 cc of yogesh colored fluid was aspirated and sent for analysis. Fluid was sent for the following tests:    LDH  Total Protein  Glucose  Cell count with differential  Routine culture and Gram stain  Cytology  AFB  Fungus  Amylase      Post procedure US confirmed complete drainage of the effusion and presence of lung sliding, ruling out pneumothorax. ([id number])    EBL:  1 ml    COMPLICATIONS:  None    The L effusion may need IR drainage at some point.      Regla Jimenez MD

## 2022-06-15 NOTE — PROGRESS NOTES
Patient seen for follow up as outreach nurse. Patient on 10 liters high flow with O2 in the high 90s. Patient appears more comfortable and denies any complaints at this time. Will continue to monitor per outreach protocol.

## 2022-06-15 NOTE — PROGRESS NOTES
Monitor room called. Pt in afib/aflutter ain 150s. Stat EKG ordered per Dr Darryl Acosta order earlier. Message sent to Dr Rolando Barclay.   Awaiting orders

## 2022-06-15 NOTE — PROGRESS NOTES
Hospitalist Progress Note   Admit Date:  2022  1:05 PM   Name:  Carolyn Bell   Age:  78 y.o. Sex:  male  :  1955   MRN:  [id number]   Room:  219/01    Presenting Complaint: Loss of Consciousness     Reason(s) for Admission: Syncope and collapse [R55]  Hemorrhagic shock ([address] [R57.8]  Shock ([address] [R57.9]  History of gastric cancer [Z85.028]  Septic shock ([address] [A41.9, R65.21]  Gastrointestinal hemorrhage, unspecified gastrointestinal hemorrhage type [facility] Course & Interval History:     Please refer to the admission H&P for details of presentation. In summary, Carolyn Bell is a 78 y.o. male with medical history significant for   gastric adenocarcinoma s/p 4 cycles chemo, last dose 22, 50 pack year smoking history,  HTN who presented after having a syncopal episode today with 2 day history of near syncope. He has been unable to tolerate solid food for months and has been losing weight. In ED, he was hypotensive, BP 74/54, , RR 22. Hb noted to be 4.9. Workup also revealed  gram positive mildred bacteremia (only 1/2, possible contaminate). He was admitted to ICU due to septic shock requiring pressor support. Started on broad spectrum Abx. CT c/a/p with small R sided PE, femoral DVT. Subsequently had large melanotic stools. EGD performed  but unable to see with food in stomach. Repeat  similar results. AC held and IR placed IVC filter .   was taken for total gastrectomy, left lateral lobectomy of liver, distal pancreatectomy and splenectomy, diaphragmatic resection, J tube placement, and retroperitoneal mass excision and lymphadenectomy, and left abdominal wall mass excision. He again required ICU for vent support and pressor support post-op. He has been stable and was transferred to the floor. Noted to have worsening respiratory status on  requiring 8L of O2 NC. CXR showed moderate pleural effusions. He was started on albumin and diuretics. Underwent left sided thoracentesis with removal of 800cc. Later, went into SVT vs afib which resolved with metoprolol. He had 2 more episodes of SVTs which again resolved with IV metoprolol. EKGs unfortunately showed NSR (as they were done after IV lopressor). Subjective/24 hr Events (06/15/22) : Patient is seen and examined at bedside. Overnight, noted to have tachycardia which was reported to be SVT per staff and resolved with IV metoprolol. Had 3 more episodes of SVT/?afib/flutter after thoracentesis this AM. They all resolved after IV lopressor. Patient was asymptomatic throughout these episodes. Lethargic on my interview. Complaining of pain after thoracentesis. Not too interactive. Review of Systems:  Unable to obtain. Assessment & Plan:       Clostridium Perfringens Bacteremia   BCx from 6/2 showed Clostridium perfringens. Prior provider discussed with ID who recommended - IV Vancomycin til 6/16  06/15/22: WBC elevated to 30.8. Continue with vancomycin and monitor    Locally advanced Gastric adenocarcinoma   S/p 4 cyclesof chemo last on 5/19/22 6/9/2022 - S/p Ex lap with total gastrectomy and esophagojejunostomy , Left lateral lobectomy of liver, Distal pancreatectomy and splenectomy, Combined diaphragmatic resection,Feeding J-tube placement, Retroperitoneal mass excision and lymphadenectomy, Left abdominal wall mass excision on posterior rectus sheath  - management as per surgery  - on TPN with plan to wean off.   - on Jtube feeding    SVT  06/15/22: 3 episodes of sVT vs afib/flutter which resolved with IV metoprolol. No hx of arrhthymias.   - IV metoprolol PRN  - start on metoprolol 12.5mg PO via Jtube    Acute Hypoxic respiratory failure  B/l Pleural Effusion  06/15/22: worsening respiratory failure as now he is requiring 10L O2.   - give o2 supplement to maintain saturation > 92%  - pulmonary appreciated: s/p R thoracentesis with removal of 800cc  - continue on albumin and lasix 40mg IV BID    Hypokalemia  06/15/22: K+ of 2.9, mag normal  - replete with IV K+  - continue with telemetry    Corticosteroid dependence   No longer needing as it was started during chemo for fatigue    Acute pulmonary embolism without acute cor pulmonale   Small in RLL with DVt in left femoral veins on 6/2/22. S/p IVC filter  Sq lovenox       ABILIO   Hb stable    Cancer Cachexia // Severe Protein Calorie Malnutrition  - on TPN  - now on TF via J tube  - Nutrition followin    Diet:  Diet NPO  PN-Adult Premix 4.25/5 - Peripheral Line  ADULT TUBE FEEDING; Jejunostomy; Standard without Fiber; Continuous; 30; No; 30; Other (specify); Manual Flush BID and after each use  DVT PPx: lovenox  Code status: DNR    Critical care time: 36 minutes    There is a high probability of acute organ impairment or life-threatening deterioration in the patient's condition from: SVT, Afib/flutter, hypokalemia    Critical care interventions: metoprolol IV, stat EKG, KCl IV repletion, Telemetry    More than 50% of the time documented was spent in face-to-face contact with the patient and in the care of the patient on the floor/unit where the patient is located. Due to a high probability of clinically significant, life-threatening deterioration that could result in multiorgan failure,  this critically ill or injured patient required my highest level preparedness to intervene emergent. This critical care time included time spent at bedside performing patient interview and physical exam, time spent researching patient prior to interaction with patient, time spent discussing findings and treatment plan with patient and/or family, time spent discussing patient with consultants and colleagues, time spent reviewing pertinent laboratory and radiographic evaluations, time spent re-evaluating patient, or time spent discussing patient with nursing staff. The time listed is exclusive of any procedures which may have been performed. Hospital Problems:  Principal Problem (Resolved):    Septic shock ([facility]  Active Problems:    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HCC)    Former heavy tobacco smoker    Gastroesophageal reflux disease    Hypoalbuminemia due to protein-calorie malnutrition (HCC)    Syncope due to orthostatic hypotension    Corticosteroid dependence (HCC)    Hypoproteinemia (HCC)    Do not resuscitate status    Fatigue    History of gastric cancer    Encounter for palliative care    Debility    Weakness    Hypercoagulable state, secondary ([facility]    Adult body mass index less than 19    Acute pulmonary embolism without acute cor pulmonale (HCC)    Severe protein-calorie malnutrition (HCC)    Gastrointestinal hemorrhage    Encounter for weaning from ventilator ([facility]    S/P exploratory laparotomy    History of partial pancreatectomy    S/P splenectomy    Jejunostomy status (HCC)    H/O resection of liver    Clostridium perfringens infection    IVC (inferior vena cava obstruction)    SVT (supraventricular tachycardia) (HCC)    ABILIO (iron deficiency anemia)    Malignant neoplasm of overlapping sites of stomach (HCC)    Malignant neoplasm of body of stomach (HCC)    Gastric adenocarcinoma (HCC)  Resolved Problems:    Severe sepsis with lactic acidosis (HCC)    ABLA (acute blood loss anemia)    GIB (gastrointestinal bleeding)    Hyponatremia    Hypomagnesemia      Objective:     Patient Vitals for the past 24 hrs:   Temp Pulse Resp BP SpO2   06/15/22 1524 98.2 °F (36.8 °C) 94 20 117/77 96 %   06/15/22 1453 -- 99 18 -- 94 %   06/15/22 1108 -- 93 20 -- 99 %   06/15/22 1038 -- (!) 102 -- 119/75 --   06/15/22 0910 -- (!) 109 22 133/84 92 %   06/15/22 0905 -- (!) 130 20 132/83 91 %   06/15/22 0903 -- (!) 116 20 119/76 91 %   06/15/22 0855 -- (!) 103 20 127/64 90 %   06/15/22 0819 -- 98 22 -- 98 %   06/15/22 0802 98.1 °F (36.7 °C) (!) 106 24 129/76 95 %   06/15/22 0330 98.4 °F (36.9 °C) 90 18 134/85 96 %   06/15/22 0251 -- 96 -- 135/84 --   06/15/22 0038 -- (!) 105 -- -- --   06/15/22 0025 -- (!) 105 20 (!) 140/93 95 %   06/14/22 2342 -- 90 -- -- --   06/14/22 2330 -- (!) 140 -- -- --   06/14/22 2303 98.6 °F (37 °C) 95 18 138/83 96 %   06/14/22 2135 -- -- -- -- 94 %   06/14/22 2040 -- (!) 101 -- 132/78 96 %   06/14/22 2010 -- -- -- -- 92 %   06/14/22 2008 98.1 °F (36.7 °C) 98 18 130/81 (!) 89 %   06/14/22 2006 -- 100 18 -- 90 %   06/14/22 1940 -- 98 16 121/72 (!) 83 %       Oxygen Therapy  SpO2: 96 %  Pulse Oximetry Type: Continuous  Pulse via Oximetry: 86 beats per minute  Pulse Oximeter Device Mode: Continuous  Pulse Oximeter Device Location: Right,Finger  O2 Device: Nasal cannula  Oximetry Probe Site Changed: No  Skin Assessment: Clean, dry, & intact  Skin Protection for O2 Device: No  Orientation: Bilateral  Location: Cheek  Intervention(s): Hydrocolloid Dressing,Mouth Care,Reposition Device  FiO2 : 65 %  O2 Flow Rate (L/min): 11 L/min  Blood Gas  Performed?: Yes  Loi's Test #1: Collateral flow confirmed  Site #1: Left Radial  Site Prepped #1: Yes  Number of Attempts #1: 1  Pressure Held #1: Yes  Complications #1: None  How Tolerated?: Tolerated well  O2 Delivery Method: Endotracheal tube  Vent Mode: PS/CPAP    Estimated body mass index is 22.07 kg/m² as calculated from the following:    Height as of this encounter: 5' 9\" (1.753 m). Weight as of this encounter: 149 lb 7.4 oz (67.8 kg). Intake/Output Summary (Last 24 hours) at 6/15/2022 1725  Last data filed at 6/15/2022 1449  Gross per 24 hour   Intake 4182.51 ml   Output 5075 ml   Net -892.49 ml         Physical Exam:     Blood pressure 117/77, pulse 94, temperature 98.2 °F (36.8 °C), temperature source Axillary, resp. rate 20, height 5' 9\" (1.753 m), weight 149 lb 7.4 oz (67.8 kg), SpO2 96 %. General:    Cachetic, ill appearing, lethargic. Head:  Normocephalic, atraumatic  Eyes:  Sclerae appear normal.  Pupils equally round. ENT:  Nares appear normal, no drainage.   Moist oral mucosa  Neck:  No restricted ROM. Trachea midline   CV:   RRR. No m/r/g. No jugular venous distension. Lungs:   CTAB. No wheezing, Respirations even, unlabored  Abdomen:   Hypoactive BS, healing mid abdominal incision scar with staple, +J tube. Extremities: No cyanosis or clubbing. No edema  Skin:     No rashes and normal coloration. Warm and dry. Neuro:  Unable to assess  Psych:  Normal mood and affect.       I have reviewed ordered lab tests and independently visualized imaging below:    Recent Labs:  Recent Results (from the past 48 hour(s))   POCT Glucose    Collection Time: 06/13/22  8:48 PM   Result Value Ref Range    POC Glucose 108 (H) 65 - 100 mg/dL    Performed by: Radha Crouch    Basic Metabolic Panel    Collection Time: 06/14/22  7:11 AM   Result Value Ref Range    Sodium 137 136 - 145 mmol/L    Potassium 3.2 (L) 3.5 - 5.1 mmol/L    Chloride 102 98 - 107 mmol/L    CO2 29 21 - 32 mmol/L    Anion Gap 6 (L) 7 - 16 mmol/L    Glucose 113 (H) 65 - 100 mg/dL    BUN 11 8 - 23 MG/DL    CREATININE <0.20 (L) 0.8 - 1.5 MG/DL    GFR  0 (L) >60 ml/min/1.73m2    GFR Non- 0 (L) >60 ml/min/1.73m2    Calcium 8.2 (L) 8.3 - 10.4 MG/DL   CBC with Auto Differential    Collection Time: 06/14/22  7:11 AM   Result Value Ref Range    WBC 31.7 (H) 4.3 - 11.1 K/uL    RBC 2.99 (L) 4.23 - 5.6 M/uL    Hemoglobin 8.2 (L) 13.6 - 17.2 g/dL    Hematocrit 26.6 (L) 41.1 - 50.3 %    MCV 89.0 79.6 - 97.8 FL    MCH 27.4 26.1 - 32.9 PG    MCHC 30.8 (L) 31.4 - 35.0 g/dL    RDW 16.9 (H) 11.9 - 14.6 %    Platelets 623 030 - 874 K/uL    MPV 10.2 9.4 - 12.3 FL    nRBC 0.08 0.0 - 0.2 K/uL    Differential Type AUTOMATED      Seg Neutrophils 89 (H) 43 - 78 %    Lymphocytes 4 (L) 13 - 44 %    Monocytes 5 4.0 - 12.0 %    Eosinophils % 0 (L) 0.5 - 7.8 %    Basophils 0 0.0 - 2.0 %    Immature Granulocytes 1 0.0 - 5.0 %    Segs Absolute 28.3 (H) 1.7 - 8.2 K/UL    Absolute Lymph # 1.3 0.5 - 4.6 K/UL    Absolute Mono # 1.7 (H) 0.1 - 1.3 K/UL    Absolute Eos # 0.1 0.0 - 0.8 K/UL    Basophils Absolute 0.1 0.0 - 0.2 K/UL    Absolute Immature Granulocyte 0.3 0.0 - 0.5 K/UL   Magnesium    Collection Time: 06/14/22  7:11 AM   Result Value Ref Range    Magnesium 2.0 1.8 - 2.4 mg/dL   POCT Glucose    Collection Time: 06/14/22  7:50 AM   Result Value Ref Range    POC Glucose 118 (H) 65 - 100 mg/dL    Performed by: [employer]    POCT Glucose    Collection Time: 06/14/22 11:46 AM   Result Value Ref Range    POC Glucose 120 (H) 65 - 100 mg/dL    Performed by: [employer]    Albumin    Collection Time: 06/14/22  2:58 PM   Result Value Ref Range    Albumin 1.1 (L) 3.2 - 4.6 g/dL   POCT Glucose    Collection Time: 06/14/22  4:20 PM   Result Value Ref Range    POC Glucose 124 (H) 65 - 100 mg/dL    Performed by: [employer]    POCT Glucose    Collection Time: 06/14/22  8:57 PM   Result Value Ref Range    POC Glucose 128 (H) 65 - 100 mg/dL    Performed by: Rosia Marine    EKG 12 Lead    Collection Time: 06/15/22 12:49 AM   Result Value Ref Range    Ventricular Rate 90 BPM    Atrial Rate 90 BPM    P-R Interval 182 ms    QRS Duration 82 ms    Q-T Interval 344 ms    QTc Calculation (Bazett) 420 ms    P Axis 36 degrees    R Axis -21 degrees    T Axis 18 degrees    Diagnosis Normal sinus rhythm    Magnesium    Collection Time: 06/15/22  3:56 AM   Result Value Ref Range    Magnesium 1.9 1.8 - 2.4 mg/dL   Phosphorus    Collection Time: 06/15/22  3:56 AM   Result Value Ref Range    Phosphorus 3.3 2.3 - 3.7 MG/DL   Basic Metabolic Panel    Collection Time: 06/15/22  3:56 AM   Result Value Ref Range    Sodium 136 136 - 145 mmol/L    Potassium 2.9 (L) 3.5 - 5.1 mmol/L    Chloride 98 98 - 107 mmol/L    CO2 31 21 - 32 mmol/L    Anion Gap 7 7 - 16 mmol/L    Glucose 127 (H) 65 - 100 mg/dL    BUN 11 8 - 23 MG/DL    CREATININE <0.20 (L) 0.8 - 1.5 MG/DL    GFR  0 (L) >60 ml/min/1.73m2    GFR Non- 0 (L) >60 ml/min/1.73m2 Calcium 8.1 (L) 8.3 - 10.4 MG/DL   Triglyceride    Collection Time: 06/15/22  3:56 AM   Result Value Ref Range    Triglycerides 88 35 - 150 MG/DL   CBC with Auto Differential    Collection Time: 06/15/22  3:56 AM   Result Value Ref Range    WBC 30.8 (H) 4.3 - 11.1 K/uL    RBC 2.88 (L) 4.23 - 5.6 M/uL    Hemoglobin 8.1 (L) 13.6 - 17.2 g/dL    Hematocrit 25.7 (L) 41.1 - 50.3 %    MCV 89.2 79.6 - 97.8 FL    MCH 28.1 26.1 - 32.9 PG    MCHC 31.5 31.4 - 35.0 g/dL    RDW 16.5 (H) 11.9 - 14.6 %    Platelets 737 (H) 222 - 450 K/uL    MPV 10.3 9.4 - 12.3 FL    nRBC 0.02 0.0 - 0.2 K/uL    Differential Type AUTOMATED      Seg Neutrophils 89 (H) 43 - 78 %    Lymphocytes 4 (L) 13 - 44 %    Monocytes 5 4.0 - 12.0 %    Eosinophils % 0 (L) 0.5 - 7.8 %    Basophils 0 0.0 - 2.0 %    Immature Granulocytes 1 0.0 - 5.0 %    Segs Absolute 27.4 (H) 1.7 - 8.2 K/UL    Absolute Lymph # 1.3 0.5 - 4.6 K/UL    Absolute Mono # 1.7 (H) 0.1 - 1.3 K/UL    Absolute Eos # 0.1 0.0 - 0.8 K/UL    Basophils Absolute 0.1 0.0 - 0.2 K/UL    Absolute Immature Granulocyte 0.3 0.0 - 0.5 K/UL   POCT Glucose    Collection Time: 06/15/22  7:27 AM   Result Value Ref Range    POC Glucose 123 (H) 65 - 100 mg/dL    Performed by: Guerrero)CNA    Amylase, Body Fluid    Collection Time: 06/15/22  9:15 AM   Result Value Ref Range    Fluid Type RIGHT      Amylase, Fluid 11 U/L   Body fluid cell count    Collection Time: 06/15/22  9:15 AM   Result Value Ref Range    Specimen RIGHT      Color, Fluid YELLOW      Appearance, Fluid HAZY      RBC, Fluid 3,000 /cu mm    WBC, Fluid 992 /cu mm    Segmented Neutrophils, BAL 95 %    Lymphocytes, BAL 4 %    Macrophages, BAL 1 %   Glucose, Body Fluid    Collection Time: 06/15/22  9:15 AM   Result Value Ref Range    Fluid Type RIGHT      Glucose, Body Fluid 119 MG/DL   Lactate Dehydrogenase, Body Fluid    Collection Time: 06/15/22  9:15 AM   Result Value Ref Range    Fluid Type RIGHT      LD, Fluid 96 U/L   Protein, Body likely atelectasis or consolidation. XR CHEST 1 VIEW   Final Result   Unchanged mild bibasilar lung atelectasis or infiltrates. XR CHEST 1 VIEW   Final Result   1. New mild right basilar atelectatic appearing changes. No significant acute   changes otherwise seen. It should be noted that the left lung apex has been   clipped limiting complete assessment for pneumothorax. This report was made using voice transcription. Despite my best efforts to avoid   any, transcription errors may persist. If there is any question about the   accuracy of the report or need for clarification, then please call [phone], or text me through [employer] for clarification or correction. IR GUIDED IVC FILTER PLACEMENT   Final Result   Successful placement an infrarenal Cook Celect inferior vena cava filter. Plan:   The patient may be evaluated in 4 months by interventional radiology in order to   evaluate for continued need of the IVC filter for versus filter removal.                  Vascular duplex lower extremity venous bilateral   Final Result   Negative for sonographic evidence of deep venous thrombosis right   lower extremity. LEFT LOWER EXTREMITY VENOUS DUPLEX ULTRASOUND. INDICATION: Left lower extremity pain. TECHNIQUE: Direct skin-contact high resolution grayscale images, color-flow   Doppler and duplex waveform analysis. FINDINGS: Abnormal intraluminal echoes within the common femoral and profunda   femoral veins. There is some flow. The superficial superficial femoral and   popliteal veins and upper calf veins are unremarkable. IMPRESSION: Positive for deep venous thrombosis left common femoral profunda   femoral veins, nonocclusive. CTA CHEST ABDOMEN PELVIS W WO CONTRAST   Final Result   1. No extravasation to suggest active GI bleed. 2. Small areas of pulmonary embolism are present in the right lower lobe. Clot   burden is quite small.    3. DVT is also likely present in the left femoral veins. 4. Previously described gastric mass again noted. It is difficult to measure to   evaluate for progression. XR CHEST PORTABLE   Final Result   Unremarkable portable chest radiograph.                      FL GASTROGRAFFIN SWALLOW    (Results Pending)   XR CHEST 1 VIEW    (Results Pending)       Current Meds:  Current Facility-Administered Medications   Medication Dose Route Frequency    bisacodyl (DULCOLAX) suppository 10 mg  10 mg Rectal Daily    albuterol (PROVENTIL) nebulizer solution 2.5 mg  2.5 mg Nebulization 4x daily    metoprolol tartrate (LOPRESSOR) tablet 12.5 mg  12.5 mg Oral BID    PN-Adult Premix 4.25/5 - Peripheral Line   IntraVENous Continuous TPN    sodium chloride flush 0.9 % injection 5-40 mL  5-40 mL IntraVENous 2 times per day    sodium chloride flush 0.9 % injection 5-40 mL  5-40 mL IntraVENous PRN    0.9 % sodium chloride infusion  25 mL IntraVENous PRN    furosemide (LASIX) injection 40 mg  40 mg IntraVENous Q12H    albumin human 25 % IV solution 25 g  25 g IntraVENous Q12H    enoxaparin Sodium (LOVENOX) injection 30 mg  30 mg SubCUTAneous Q12H    glucose chewable tablet 16 g  4 tablet Oral PRN    dextrose bolus 10% 125 mL  125 mL IntraVENous PRN    Or    dextrose bolus 10% 250 mL  250 mL IntraVENous PRN    glucagon injection 1 mg  1 mg IntraMUSCular PRN    dextrose 5 % solution  100 mL/hr IntraVENous PRN    medicated lip ointment (BLISTEX)   Topical PRN    HYDROmorphone HCl PF (DILAUDID) injection 0.5 mg  0.5 mg IntraVENous Q2H PRN    oxyCODONE (ROXICODONE) 5 MG/5ML solution 5 mg  5 mg Per J Tube Q6H PRN    guaiFENesin (ROBITUSSIN) 100 MG/5ML oral solution 200 mg  200 mg Per J Tube Q6H    vancomycin (VANCOCIN) 1250 mg in sodium chloride 0.9% 250 mL IVPB  1,250 mg IntraVENous Q12H    insulin lispro (HUMALOG) injection vial 0-4 Units  0-4 Units SubCUTAneous TID WC    insulin lispro (HUMALOG) injection vial 0-4 Units 0-4 Units SubCUTAneous Nightly    cloNIDine (CATAPRES) tablet 0.2 mg  0.2 mg Per J Tube Q4H PRN    sodium chloride flush 0.9 % injection 5-40 mL  5-40 mL IntraVENous 2 times per day    sodium chloride flush 0.9 % injection 5-40 mL  5-40 mL IntraVENous PRN    0.9 % sodium chloride infusion   IntraVENous PRN    potassium chloride 20 mEq/50 mL IVPB (Central Line)  20 mEq IntraVENous PRN    magnesium sulfate 2000 mg in 50 mL IVPB premix  2,000 mg IntraVENous PRN    ondansetron (ZOFRAN) injection 4 mg  4 mg IntraVENous Q6H PRN    Medela Tender Care Lanolin cream   Topical PRN       Signed:  Jessa Reyna MD    Part of this note may have been written by using a voice dictation software. The note has been proof read but may still contain some grammatical/other typographical errors.

## 2022-06-15 NOTE — CARE COORDINATION
CM continues to follow for discharge planning and/or CM needs. Plan remains that pt will transition to [facility] when medically stable for discharge. No additional CM needs at this time. Will continue to follow and update as needed.

## 2022-06-15 NOTE — PROGRESS NOTES
Pt sat up on side of bed for thoracentesis. Consent obtained. Time out performed. Pts vitals monitored throughout procedure. Right ultrasound done and pic taken of pleural fluid.  ~800 ml yellow pleural fluid from Right. Pt tolerated procedure well with no adverse rxn. Specimens sent to the lab x 3 and labeled appropriately. Site dressed appropriately and report given to pts RN.    Lung sliding done and ultrasound findings reviewed by MD.

## 2022-06-15 NOTE — PROGRESS NOTES
[facility] CRITICAL CARE OUTREACH NURSE PROGRESS REPORT      SUBJECTIVE: Called to assess patient secondary to recent transfer from ICU . Vitals:    06/15/22 0855 06/15/22 0903 06/15/22 0905 06/15/22 0910   BP: 127/64 119/76 132/83 133/84   Pulse: (!) 103 (!) 116 (!) 130 (!) 109   Resp: 20 20 20 22   Temp:       TempSrc:       SpO2: 90% 91% 91% 92%   Weight:       Height:          EKG: normal EKG, normal sinus rhythm, occasional PVC noted, unifocal, unchanged from previous tracings. LAB DATA:    Recent Labs     06/13/22  0403 06/14/22  0711 06/15/22  0356    137 136   K 3.4* 3.2* 2.9*    102 98   CO2 29 29 31   BUN 13 11 11   GFRAA 0* 0* 0*   MG 2.0 2.0 1.9   PHOS 3.4  --  3.3        Recent Labs     06/13/22 0403 06/14/22  0711 06/15/22  0356   WBC 45.6* 31.7* 30.8*   HGB 8.5* 8.2* 8.1*   HCT 27.5* 26.6* 25.7*    436 469*          OBJECTIVE: On arrival to room, I found patient to be laying in bed, resting quietly. .      ASSESSMENT:  Pt resting quietly in bed with primary RN at bedside. Dr. Addie Gallego performed a R thoracentesis and removed 800 cc of fluid. Pt currently on 11 L NC - satting 92% at present. Pt not complaining of CP or SOB at this time. C/O shoulder pain. Pt with diminished breath sounds throughout - especially L side. Repositioned pt with primary RN to be sitting in the bed. PLAN: Will continue to monitor and follow per outreach protocol. Primary RN addressed directly - all concerns and questions addressed.

## 2022-06-15 NOTE — PROGRESS NOTES
Admit Date: 2022    POD 6 Day Post-Op    Procedure:  Procedure(s):  Exploratory laparotomy with total gastrectomy and esophagojejunostomy after extensive lysis of adhesions and dissections which added at least 3-4 hours to this case, Left lateral lobectomy of liver, Distal pancreatectomy and splenectomy, Combined diaphragmatic resection, Falciform ligament flap, Feeding J-tube placement, Retroperitoneal mass excision and lymphadenectomy, Left abdominal wall mass excision on posterior rectus sheath    Subjective: The patient is lying in bed, had increasing oxygen demands overnight. On 10 L of oxygen at this time. He does have a productive cough. He has not been passing much flatus, has not had a bowel movement. Objective:       Vitals:    06/15/22 0251 06/15/22 0330 06/15/22 0558 06/15/22 0802   BP: 135/84 134/85  129/76   Pulse: 96 90  (!) 106   Resp:  18  24   Temp:  98.4 °F (36.9 °C)  98.1 °F (36.7 °C)   TempSrc:  Oral  Oral   SpO2:  96%  95%   Weight:   149 lb 7.4 oz (67.8 kg)    Height:           Temp (24hrs), Av.3 °F (36.8 °C), Min:97.8 °F (36.6 °C), Max:98.6 °F (37 °C)  . I&O reviewed as documented. Physical Exam:   Constitutional: Alert, NAD   /76   Pulse (!) 106   Temp 98.1 °F (36.7 °C) (Oral)   Resp 24   Ht 5' 9\" (1.753 m)   Wt 149 lb 7.4 oz (67.8 kg)   SpO2 95%   BMI 22.07 kg/m²   Eyes: Sclera are clear  ENMT: no external lesions' gross hearing normal; no obvious neck masses, no ear or lip lesions, nares normal, NG to LIS  CV: RRR. Normal perfusion  GI: soft and non-distended,  Staples c/d/i. Right and left surgical drain with serosanguineous output. J-tube intact. Musculoskeletal: No embolic signs or cyanosis.    Neuro:  Alert  Psychiatric: Calm and cooperative    Labs:   Recent Results (from the past 24 hour(s))   POCT Glucose    Collection Time: 22 11:46 AM   Result Value Ref Range    POC Glucose 120 (H) 65 - 100 mg/dL    Performed by: Ajith Albumin    Collection Time: 06/14/22  2:58 PM   Result Value Ref Range    Albumin 1.1 (L) 3.2 - 4.6 g/dL   POCT Glucose    Collection Time: 06/14/22  4:20 PM   Result Value Ref Range    POC Glucose 124 (H) 65 - 100 mg/dL    Performed by: Ajith    POCT Glucose    Collection Time: 06/14/22  8:57 PM   Result Value Ref Range    POC Glucose 128 (H) 65 - 100 mg/dL    Performed by: Navneet Wright    EKG 12 Lead    Collection Time: 06/15/22 12:49 AM   Result Value Ref Range    Ventricular Rate 90 BPM    Atrial Rate 90 BPM    P-R Interval 182 ms    QRS Duration 82 ms    Q-T Interval 344 ms    QTc Calculation (Bazett) 420 ms    P Axis 36 degrees    R Axis -21 degrees    T Axis 18 degrees    Diagnosis Normal sinus rhythm    Magnesium    Collection Time: 06/15/22  3:56 AM   Result Value Ref Range    Magnesium 1.9 1.8 - 2.4 mg/dL   Phosphorus    Collection Time: 06/15/22  3:56 AM   Result Value Ref Range    Phosphorus 3.3 2.3 - 3.7 MG/DL   Basic Metabolic Panel    Collection Time: 06/15/22  3:56 AM   Result Value Ref Range    Sodium 136 136 - 145 mmol/L    Potassium 2.9 (L) 3.5 - 5.1 mmol/L    Chloride 98 98 - 107 mmol/L    CO2 31 21 - 32 mmol/L    Anion Gap 7 7 - 16 mmol/L    Glucose 127 (H) 65 - 100 mg/dL    BUN 11 8 - 23 MG/DL    CREATININE <0.20 (L) 0.8 - 1.5 MG/DL    GFR  0 (L) >60 ml/min/1.73m2    GFR Non- 0 (L) >60 ml/min/1.73m2    Calcium 8.1 (L) 8.3 - 10.4 MG/DL   Triglyceride    Collection Time: 06/15/22  3:56 AM   Result Value Ref Range    Triglycerides 88 35 - 150 MG/DL   CBC with Auto Differential    Collection Time: 06/15/22  3:56 AM   Result Value Ref Range    WBC 30.8 (H) 4.3 - 11.1 K/uL    RBC 2.88 (L) 4.23 - 5.6 M/uL    Hemoglobin 8.1 (L) 13.6 - 17.2 g/dL    Hematocrit 25.7 (L) 41.1 - 50.3 %    MCV 89.2 79.6 - 97.8 FL    MCH 28.1 26.1 - 32.9 PG    MCHC 31.5 31.4 - 35.0 g/dL    RDW 16.5 (H) 11.9 - 14.6 %    Platelets 147 (H) 200 - 450 K/uL    MPV 10.3 9.4 - 12.3 FL nRBC 0.02 0.0 - 0.2 K/uL    Differential Type AUTOMATED      Seg Neutrophils 89 (H) 43 - 78 %    Lymphocytes 4 (L) 13 - 44 %    Monocytes 5 4.0 - 12.0 %    Eosinophils % 0 (L) 0.5 - 7.8 %    Basophils 0 0.0 - 2.0 %    Immature Granulocytes 1 0.0 - 5.0 %    Segs Absolute 27.4 (H) 1.7 - 8.2 K/UL    Absolute Lymph # 1.3 0.5 - 4.6 K/UL    Absolute Mono # 1.7 (H) 0.1 - 1.3 K/UL    Absolute Eos # 0.1 0.0 - 0.8 K/UL    Basophils Absolute 0.1 0.0 - 0.2 K/UL    Absolute Immature Granulocyte 0.3 0.0 - 0.5 K/UL   POCT Glucose    Collection Time: 06/15/22  7:27 AM   Result Value Ref Range    POC Glucose 123 (H) 65 - 100 mg/dL    Performed by: Guerrero)CNA        Data Review     Xray Result (most recent):  XR CHEST LIMITED ONE VIEW 06/15/2022    Narrative  Chest portable    CLINICAL INDICATION: Postoperative follow-up, bilateral pleural effusions,  subacute shortness of breath    COMPARISON: Radiography yesterday    TECHNIQUE: single AP portable view chest at 5:00 AM semiupright    FINDINGS: Cardiac silhouette again appears enlarged but is mostly obscured. Moderate left and small right pleural effusions, associated groundglass and  consolidative opacities involving both mid to lower lungs, not definitely  changed. No evidence of a pneumothorax. A right PICC tip projects over  cavoatrial junction level. A feeding tube is partially seen coursing into the  abdomen. There is artifact due to some external tubes and wires. Impression  1. Essentially stable appearance of bilateral lung opacities and pleural  effusions, left worse than right.       Assessment:     Principal Problem:    Septic shock (HCC)  Active Problems:    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[address]    Former heavy tobacco smoker    Gastroesophageal reflux disease    Hyponatremia    Hypoalbuminemia due to protein-calorie malnutrition (HCC)    Syncope due to orthostatic hypotension    Hypomagnesemia    Corticosteroid dependence ([address]    Hypoproteinemia (HCC)    Do not resuscitate status    Fatigue    History of gastric cancer    Encounter for palliative care    Debility    Weakness    Hypercoagulable state, secondary ([facility]    Adult body mass index less than 19    Acute pulmonary embolism without acute cor pulmonale (HCC)    Severe protein-calorie malnutrition (HCC)    Gastrointestinal hemorrhage    Encounter for weaning from ventilator ([facility]    ABILIO (iron deficiency anemia)    Malignant neoplasm of overlapping sites of stomach ([facility]    Malignant neoplasm of body of stomach ([facility]    Gastric adenocarcinoma ([facility]  Resolved Problems:    Severe sepsis with lactic acidosis (HCC)    ABLA (acute blood loss anemia)    GIB (gastrointestinal bleeding)        Plan/Recommendations/Medical Decision Making:     Care management per Hospitalist  Worsening pleural effusion-pulmonology following  Swallow study today, pending results we will possibly remove NG tube  NG to LIS  Reduce TPN in half, do not renew this evening  Increase tube feeds to 30 cc/hr  No CPAP/ No BIPBP/ No Bagging  Follow labs  IV Abx  Will start suppository    Amanda Goins PA-C

## 2022-06-15 NOTE — PROGRESS NOTES
Pt had 800cc drawn off left lung per pulmonology. C/o pain after procedure. VS as noted. Pt on continuous pulse ox and pulse noted to be in 150's- 160's. Dilaudid 0.5mg IV given for pain. Scheduled lopressor given. Hospitalist called. Monitor room called and notified of afib/aflutter. Hospitalist arrived. EKG done. Heart rate down to 90s SR. A few minutes later, HR back up in 150s- 160s. Notified Dr Berkley Gil. Another EKG ordered, Lopressor 5mg IV x 1 given. Cardiology consult ordered. Pt now in [employer] [facility] [place] in 90s.     1115- cardiology consult discontinued

## 2022-06-15 NOTE — PROGRESS NOTES
VANCO DAILY FOLLOW UP NOTE  [facility] Pharmacokinetic Monitoring Service - Vancomycin    Consulting Provider: STEFFI Arteaga (ID)  Indication: GPR port infection  Target Concentration: Goal AUC/WILFREDO 400-600 mg*hr/L  EOT 6/16/22 per ID  Additional Antimicrobials:     Pertinent Laboratory Values: Wt Readings from Last 1 Encounters:   06/15/22 149 lb 7.4 oz (67.8 kg)     Temp Readings from Last 1 Encounters:   06/15/22 98.1 °F (36.7 °C) (Oral)     Recent Labs     06/13/22  0403 06/14/22  0711 06/15/22  0356   BUN 13 11 11   CREATININE <0.20* <0.20* <0.20*   WBC 45.6* 31.7* 30.8*     Estimated Creatinine Clearance: 344 mL/min (based on SCr of 0.2 mg/dL). Lab Results   Component Value Date    VANCORANDOM 17.8 06/11/2022       MRSA Nasal Swab: N/A. Non-respiratory infection. .      Assessment:  Date/Time Dose Concentration AUC   6/3 0709 750 mg q8h 26.1 601   6/5 0400 1000 mg q12h 13.7 482   6/11 0503 1250 mg q12h 17.8 439   Note: Serum concentrations collected for AUC dosing may appear elevated if collected in close proximity to the dose administered, this is not necessarily an indication of toxicity    Plan:  Current dosing regimen is therapeutic  Continue current dose  Repeat vancomycin concentrations will be ordered as clinically appropriate   Pharmacy will continue to monitor patient and adjust therapy as indicated    Thank you for the consult,  Sheryle Scotland, PharmD, BCPS  Clinical Pharmacist

## 2022-06-15 NOTE — PROGRESS NOTES
[address] [facility] CRITICAL CARE OUTREACH NURSE PROGRESS REPORT      SUBJECTIVE: Called to assess patient secondary to unable to get O2 sats on patient. @[facility]([id number])@  Vitals:    06/14/22 1438 06/14/22 2006 06/14/22 2008 06/14/22 2040   BP: 139/87  130/81 132/78   Pulse: 94 100 98 (!) 101   Resp: 17 18     Temp: 98.6 °F (37 °C)  98.1 °F (36.7 °C)    TempSrc: Axillary      SpO2: 95% 90% (!) 89%    Weight:       Height:            LAB DATA:    Recent Labs     06/12/22 0451 06/13/22 0403 06/14/22  0711    141 137   K 3.2* 3.4* 3.2*   * 105 102   CO2 27 29 29   BUN 14 13 11   GFRAA 0* 0* 0*   MG 1.9 2.0 2.0   PHOS  --  3.4  --    GLOB 3.1  --   --    ALT 12  --   --         Recent Labs     06/12/22 0451 06/13/22 0403 06/14/22  0711   WBC 52.1* 45.6* 31.7*   HGB 8.7* 8.5* 8.2*   HCT 27.5* 27.5* 26.6*    383 436          OBJECTIVE: On arrival to room, I found patient to be lying in bed, alert and responsive with primary RN and charge nurse at bedside. Pain Assessment  @[facility]([id number])@             @[facility](1180)@             @[facility](1188)@              ASSESSMENT: Patient with shallow, tachypneic respirations. O2 in the mid 80s on high flow. Placed on non-rebreather and RT to bedside. O2 up to the low 90s after non rebreather placed. Patient c/o pain 3/10 in abdomen and feels \"overhwhlemed\". PLAN:  RT to place patient on AirVo, primary RN to medicate for pain and patient needs decreased stimulation. Will continue to follow.

## 2022-06-15 NOTE — PROGRESS NOTES
[facility] Critical Care Note[de-identified] 6/15/2022  Lalitha Martin  Admission Date: 6/2/2022     Length of Stay: 13 days    Background:     78 y.o. y/o male with history of gastric carcinoma s/p neoadjuvant chemo (last dose May 29, 2022) with plans for subsequent total gastrectomy, HTN, admitted to hospitalist service 6/2 with syncopal episode and anemia (hgb 4.9 on admission) as well as gram positive mildred bacteremia (only 1/2, possible contaminate). Unable to tolerate solid food for months and has been losing weight. Was hypotension on pressors initially. Was covered with vanc and cefepime initially, flagyl added with GPR returned. CT c/a/p with small R sided PE, femoral DVT. Subsequently had large melanotic stools, transfused. EGD performed 6/4 but unable to see with food in stomach. Repeat 6/5 similar results. AC held and RI placed IVC filter 6/6.   6/9 was taken for total gastrectomy, left lateral lobectomy of liver, distal pancreatectomy and splenectomy, diaphragmatic resection, J tube placement, and retroperitoneal mass excision and lymphadenectomy, and left abdominal wall mass excision. To ICU on vent post op and requiring joe. Notable PMH:  has a past medical history of ABLA (acute blood loss anemia), GIB (gastrointestinal bleeding), HTN (hypertension), and Severe sepsis with lactic acidosis ([address] 24 Hour events:   Now on 10L O2 with sat 95%. Was on 2L yesterday. Diuresed a net 2L yesterday. Afebrile with WBC coming down. Albumin only 1.1. and now getting IV albumin. Tachycardic. ROS: unable to obtain/negative except as listed elsewhere.      Lines: (insertion date)    Closed/Suction Drain Right RUQ Bulb (Active)       Closed/Suction Drain Left LUQ Bulb (Active)       NG/OG/NJ/NE Tube Right nostril (Active)       Gastrostomy/Enterostomy/Jejunostomy Tube Gastrostomy LUQ 1 14 fr (Active)       Urinary Catheter 2 Way (Active)     Single Lumen Implantable Port Left Chest (Active) Arterial Line 06/09/22 Radial (Active)     Drips: current dose (range)  Dose (mcg/kg/min) Propofol : 0 mcg/kg/min  Dose (mcg/min) Phenylephrine : 0 mcg/min (map 91)     Pertinent Exam:         Blood pressure 129/76, pulse (!) 106, temperature 98.1 °F (36.7 °C), temperature source Oral, resp. rate 24, height 5' 9\" (1.753 m), weight 149 lb 7.4 oz (67.8 kg), SpO2 95 %. Intake/Output Summary (Last 24 hours) at 6/15/2022 0807  Last data filed at 6/15/2022 0558  Gross per 24 hour   Intake 4317.51 ml   Output 6510 ml   Net -2192.49 ml     Constitutional: AAO in NC, pain controlled  EENMT:  Sclera clear, pupils equal, oral mucosa moist  Respiratory: clear  Cardiovascular:  RRR  Gastrointestinal:  soft, dressing on, NG in place  Musculoskeletal:  warm with no cyanosis, no lower extremity edema  Skin:  no jaundice or ecchymosis  Neurologic:awake  Psychiatric: appropriate mood and affect    CXR:     6/15, 14:        Recent Labs     06/13/22 0403 06/14/22  0711 06/15/22  0356   WBC 45.6* 31.7* 30.8*   HGB 8.5* 8.2* 8.1*   HCT 27.5* 26.6* 25.7*    436 469*     Recent Labs     06/13/22  0403 06/14/22  0711 06/15/22  0356    137 136   K 3.4* 3.2* 2.9*    102 98   CO2 29 29 31   BUN 13 11 11   CREATININE <0.20* <0.20* <0.20*   MG 2.0 2.0 1.9   PHOS 3.4  --  3.3     No results for input(s): TROPHS, NTPROBNP, CRP, ESR in the last 72 hours. Recent Labs     06/13/22 0403 06/14/22  0711 06/15/22  0356   GLUCOSE 120* 113* 127*      ECHO: 06/02/22    TRANSTHORACIC ECHOCARDIOGRAM (TTE) COMPLETE (CONTRAST/BUBBLE/3D PRN) 06/09/2022  9:31 AM (Final)    Interpretation Summary    Left Ventricle: Reduced left ventricular systolic function. EF by 2D Simpsons Biplane is 42%. Left ventricle size is normal. Severely increased wall thickness. Increased ventricular mass. Infiltrative cardiomyopathy should be considered. Global hypokinesis present. Abnormal diastolic function.   Aortic Valve: Thickened cusp.  Mildly calcified cusp. Sclerosis of the aortic valve cusp. Moderate annular calcification vs appearance of aortic valve prosthesis [clinical correlation, clinical history]. Mild regurgitation.   Mitral Valve: Mildly thickened leaflet.   Aorta: Dilated aortic root. Dilated ascending aorta.   Pericardium: Small (<1 cm) localized pericardial effusion present around the right ventricle.   Technical qualifiers: Color flow Doppler was performed and pulse wave and/or continuous wave Doppler was performed. Signed by: Lucius Barrett MD on 6/9/2022  9:31 AM    Microbiology:   Recent Labs     06/13/22  1523   CULTURE NO GROWTH 1 DAY     Ventilator Settings Ideal body weight: 70.7 kg (155 lb 13.8 oz)  Mode FIO2 Rate Tidal Volume Pressure   PS/CPAP    65 %  15 bmp     500 mL   8     Peak airway pressure:     Minute ventilation: Minute Ventilation (l/min): 12 l/min  ABG:  No results for input(s): PHAPOC, [id number], IS1RSLN, GHX2ZXP, BE in the last 72 hours. Assessment and Plan:  (Medical Decision Making)       Impression: 78 y.o. male with gastric cancer, s/p joe adjuvant chemo. Admitted with syncope that was likely multifactorial, driven by recent weight loss, poor PO intake, and severe anemia into the 4's. GPR on blood culture but doubt this was sepsis as this was likely contaminate. Course complicated by discovery of PE and DVT, subsequent GI bleed likely due to his cancer, requiring IVC filter placement. No post op extensive resections and brought to ICU still intubated and on pressors. Has severe hypoproteinemia and has been given fluids and blood during his hospitalization. He has pleural effusions but I do not recommend thoracentesis unless medical options have been exhausted. No diuresis has been administered and BP/renal function are stable today. Active Hospital Problems    Gastrointestinal hemorrhage: Resolved with fairly stable H/H. Monitor.          Severe protein-calorie malnutrition ([address]): Very severe with last albumin only 1.1. Getting albumin IV with lasix. Hypoproteinemia ([facility]): as above      Do not resuscitate status      Hypercoagulable state, secondary [facility]): Now with DVT/PE. Getting lovenox 30 q12h. Now post IVC filter. Adult body mass index less than 19      Acute pulmonary embolism without acute cor pulmonale (HCC)  Small in RLL with DVt in left femoral veins on 6/2/22. On lovenox. Fatigue      History of gastric cancer      Debility: multifactorial.      Weakness      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[facility]): ongoing. Syncope due to orthostatic hypotension      Hypomagnesemia      Corticosteroid dependence (HCC)      Gastroesophageal reflux disease      Gastric adenocarcinoma (HCC)      Malignant neoplasm of body of stomach (HCC)      Malignant neoplasm of overlapping sites of stomach (HCC)      ABILIO (iron deficiency anemia)    Bilateral pleural effusions L > R  Stable on CXR but oxygenation significantly worse. Plan L thoracentesis to help improve gas exchange. Effusion may recur given low albumin.        DNR        Clarke Sanchez MD

## 2022-06-15 NOTE — PROGRESS NOTES
Occupational Therapy Note:    Attempted to see patient this AM for occupational therapy treatment session. Treatment deferred per RN request.  Will follow and re-attempt as schedule permits/patient available.      Thank you,    Stone Bro OTR/L

## 2022-06-15 NOTE — PROGRESS NOTES
Stat EKG done showing sinus tach. Informed Dr Candi Vargas of results. Dr Candi Vargas to come and look at EKG. HR running in the 90's with an O2 sat 89-90. Pt c/o pain. Dilaudid 0.5mg IV given per order. Report given to oncoming RN.

## 2022-06-15 NOTE — PROGRESS NOTES
deltoids),Thigh (quadraceps),Calf (gastrocnemius)  Fluid Accumulation:  No significant fluid accumulation     Strength:  Not Performed  Nutrition Assessment:  Nutrition History: History provided by patient and wife. Patient states that he has been trying to eat some solids but it feels like it just sits on his stomach. He states he will wake the next day and still feel full. Wife states that patient has not tried any solids in weeks. Patient states that he mostly has been consuming Ensure (regular) 6-7 per day. Wife states that she has been mixing peanut butter powder into Ensure to increase kcal and protein (potentially providing 60-70 additional kcal and 6-9 additional grams of protein per serving depending on brand). Patient endorses weight loss of 50-60# over the last year. Do You Have Any Cultural, Islam, or Ethnic Food Preferences?: No   Nutrition Background:   Patient with PMH significant for HTN, gastric adenocarcinoma s/p neoadjuvant chemo with plans for surgery in 2-3 week. He presented with near syncopal episodes for last 2 days. He was admitted with shock. Pt is s/p ex lap with total gastrectomy and esophagojejunostomy with extensive lysis of adhesion and dissection, left lateral lobectomy of liver, distal pancreatectomy and splenectomy, combined diaphragmatic resection, falciform ligament flap, j-tube placement (14 fr), retroperitoneal mass excision and lymphadenectomy, and left abdominal wall mass excision 6/9. Nutrition Interval:  Noted decreased O2 sats overnight with brief need for Airvo related to bilateral pleural effusions. Albumin and diurese initiated 6/14. Now s/p left thoracentesis this am with 800 ml removed. Plan for swallow study today and possible NGT removal pending results. Surgery has advanced TF to 30 ml/hr and per their note RN to reduce PN to 40 ml/hr now and not renew this evening. Patient states he is doing okay just in some pain.  Explained TF advance and per surgery not renewing PN this evening. He voiced understanding and had no questions. Discussed with RN, Ravinder Weeks, and adjusting PN rate now. Perfect serve sent to Dr. Meghan Yip regarding inability for RD to order electrolyte replacements and he will order. OWUSU recorded last 24 hrs: 500 ml   Abdominal Status (last documented):   Last BM (including prior to admit): 06/08/22, GI Symptoms: Other (Comment) (No flatus or BM since surgery)   Pertinent Medications: humalog HS - held, SSI (none required), Vancomycin, Zofran PRN (not utilizing), albumin, Lasix 40 mg Q12 hrs  Electrolyte replacements: 6/7 KCl 10 meq x 4, KCl 10meq x5 6/12, KCl 10 meq X4 6/13, KCl 10 meq x3 6/14  Pertinent Labs:   Lab Results   Component Value Date     06/15/2022    K 2.9 06/15/2022    CL 98 06/15/2022    CO2 31 06/15/2022    BUN 11 06/15/2022    CREATININE <0.20 06/15/2022    GLUCOSE 127 06/15/2022    CALCIUM 8.1 06/15/2022    PHOS 3.3 06/15/2022    MG 1.9 06/15/2022      Lab Results   Component Value Date    POCGLU 123 06/15/2022    POCGLU 128 06/14/2022    POCGLU 124 06/14/2022    POCGLU 120 06/14/2022    POCGLU 118 06/14/2022    POCGLU 108 06/13/2022     Remarks: Sodium WNL but continues to trend down, persistent hypokalemic - ?related to OWUSU, glucose slightly elevated - not requiring insulin correction    Nutrition Related Findings:   6/2:CLD. 6/4: NPO with bloody BM. EGD failed x2 6/4 and 6/5 due to food particles obstructing access despite Reglan. 6/6: TPN consult, port in place. 6/6 TPN initiated with lipids. 6/7 cont 2L 10%dex/4.25%AA, hold lipids due to high TG. Formula changed to Dex 15%, 5% AA 6/8. Lipids initiated 6/11. 6/13: POrt removed, TF initiated at 10 ml/hr per surgery, TPN changed to PPN + lipids. 6/14: TF increased to 20 ml/hr per surgery, PICC placed, PPN maintained due to PICC not placed at order time + PPN and TF advance meeting needs.       Current Nutrition Therapies:  Diet NPO  PN-Adult Premix 4.25/5 - Peripheral Line  ADULT TUBE FEEDING; Jejunostomy; Standard without Fiber; Continuous; 30; No; 30; Other (specify); Manual Flush BID and after each use  Current Tube Feeding (TF) Orders:  · Feeding Route: PEJ  · Formula: Standard without Fiber  · Schedule: Continuous  · Feeding Regimen: 30 ml/hr  · Additives/Modulars: None  · Water Flushes: 30 ml BID  · Current TF & Flush Orders Provides: 792 kcal (49% estimated needs), 36 g pro (51% estimated needs), 600 ml free water  · Goal TF & Flush Orders Provides:      Current Parenteral Nutrition Orders:  · Type and Formula: Premix Peripheral (Dex 5%, 4.25% AA )   · Lipids: 250ml  · Duration: Continuous  · Rate/Volume: 2 L (85ml/hr)  · Current PN Order Provides: infusing at ordered rate  · Goal PN Orders Provides: 1180 kcal/d (74% of needs), 85 grams of protein/d (106% of needs), 100 grams of CHO/d and 2250 ml of total volume/d.          Current Intake:   Average Meal Intake: NPO        Anthropometric Measures:  Height: 5' 9\" (175.3 cm)  Current Body Wt: 149 lb 7.6 oz (67.8 kg) (6/15), Weight source: Bed Scale  BMI: 22.1  Admission Body Weight: 117 lb 15.1 oz (53.5 kg)  Ideal Body Weight (Kg) (Calculated): 73 kg (160 lbs), 73.7 %  Usual Body Wt: 156 lb (70.8 kg) (8/11/21 office wt), Percent weight change: -24.4       Edema: No data recorded  BMI Category Normal Weight (BMI 22.0 to 24.9) age over 72 (? due to fluid status)  Estimated Daily Nutrient Needs:  Energy (kcal/day): 1322-0712 (Kcal/kg (30-35) Weight used: 53.5 kg Current (6/2)  Protein (g/day): 70-80 (1.3-1.5 g/kg) Weight Used: (Current) 53.5 kg  Fluid (ml/day):   (1 ml/kcal)    Nutrition Diagnosis:   · Inadequate oral intake related to altered GI structure as evidenced by  (s/p total gastrectomy)    · Severe malnutrition related to inadequate protein-energy intake as evidenced by Criteria as identified in malnutrition assessment    Nutrition Interventions:   Food and/or Nutrient Delivery: Continue NPO,Discontinue Parenteral Nutrition (Continue TF advance per surgery)     Coordination of Nutrition Care: Continue to monitor while inpatient       Goals:   Previous Goal Met: Goal(s) Achieved  Active Goal: Tolerate nutrition support at goal rate,within 2 days  Maintain TPN until able to advance TF to meet at least 50% needs    Nutrition Monitoring and Evaluation:      Food/Nutrient Intake Outcomes: Enteral Nutrition Intake/Tolerance  Physical Signs/Symptoms Outcomes: Biochemical Data,GI Status,Fluid Status or Edema,Weight    Discharge Planning:    Enteral Nutrition    Doneta Felton, [address] [place] [address]

## 2022-06-15 NOTE — PROGRESS NOTES
[address] at bedside. Pt O2 83% on 7L HFNC, Placed on 15L HFNC. No complaints of chest pain or SOB. AOx4. Shallow breathing. HR -98 B/P 121/72. Resp 16    1945 - RT and ICU [facility] at bedside. Pt O2 at 88% on HFNC. Moved to Non re-breather mask. Pt O2 94%. Nagi Astorga notified. Orders placed for airvo. 2006 - Pt placed on airvo at 40-65%. O2 - 89-92% Pt resting comfortably. No complaints at this time. 2030 - Dr. Lucas Childress updated on situation. Verbal order given to transfer Pt to ICU. House supervisor notified. Writer informed to contact hospitalist. Dr. Donato Astorga notified. Orders placed. 2135 - ICU [facility] at bedside. Weaned off airvo to 10L HFNC. O2 94 %. Writer informed by Santa Claus that pt would not be moved to ICU tonight. 200 - Wife Mayda Yi) updated on nights events. 2320 - Per monitor room pt sustaining in the 140s. Sinus Tach. MD notified. No complaints of SOB or chest pain. 2323 - Sinus tach 100 per monitor room. No new orders. 0025- Per monitor room SVT 170s. Apical 105 rate. B/P 140/93 O2 95% Temp 97.7. New orders. 0038 - Sinus tachy 105. Medication given. 0100 - Santa [facility] updated on pt condition.

## 2022-06-15 NOTE — PROGRESS NOTES
Spoke with Dr. Crystal Barker about pt status. Pt off of airvo. Sats= 94% on 8-10L NC. Pt is calm. Pt downgraded. ICU outreach RN following pt closely.

## 2022-06-15 NOTE — PROGRESS NOTES
Physical Therapy Note:    Attempted to see patient this AM for physical therapy treatment  session. Treatment deferred per RN request.   Will follow and re-attempt as schedule permits/patient available.  Thank you,    Donna Vicente, [facility]     Rehab Caseload Tracker

## 2022-06-16 ENCOUNTER — APPOINTMENT (OUTPATIENT)
Dept: GENERAL RADIOLOGY | Age: 67
DRG: 853 | End: 2022-06-16
Payer: MEDICARE

## 2022-06-16 PROBLEM — I48.92 ATRIAL FLUTTER (HCC): Status: ACTIVE | Noted: 2022-06-16

## 2022-06-16 LAB
ALBUMIN SERPL-MCNC: 1.8 G/DL (ref 3.2–4.6)
ALBUMIN/GLOB SERPL: 0.5 {RATIO} (ref 1.2–3.5)
ALP SERPL-CCNC: 346 U/L (ref 50–136)
ALT SERPL-CCNC: 12 U/L (ref 12–65)
ANION GAP SERPL CALC-SCNC: 5 MMOL/L (ref 7–16)
AST SERPL-CCNC: 15 U/L (ref 15–37)
BACTERIA SPEC CULT: NORMAL
BILIRUB SERPL-MCNC: 0.4 MG/DL (ref 0.2–1.1)
BUN SERPL-MCNC: 11 MG/DL (ref 8–23)
CALCIUM SERPL-MCNC: 8.3 MG/DL (ref 8.3–10.4)
CHLORIDE SERPL-SCNC: 97 MMOL/L (ref 98–107)
CO2 SERPL-SCNC: 34 MMOL/L (ref 21–32)
CREAT SERPL-MCNC: <0.2 MG/DL (ref 0.8–1.5)
EKG ATRIAL RATE: 283 BPM
EKG DIAGNOSIS: NORMAL
EKG Q-T INTERVAL: 306 MS
EKG QRS DURATION: 76 MS
EKG QTC CALCULATION (BAZETT): 476 MS
EKG R AXIS: 22 DEGREES
EKG T AXIS: 68 DEGREES
EKG VENTRICULAR RATE: 146 BPM
ERYTHROCYTE [DISTWIDTH] IN BLOOD BY AUTOMATED COUNT: 16.9 % (ref 11.9–14.6)
GLOBULIN SER CALC-MCNC: 3.3 G/DL (ref 2.3–3.5)
GLUCOSE BLD STRIP.AUTO-MCNC: 113 MG/DL (ref 65–100)
GLUCOSE BLD STRIP.AUTO-MCNC: 130 MG/DL (ref 65–100)
GLUCOSE BLD STRIP.AUTO-MCNC: 130 MG/DL (ref 65–100)
GLUCOSE BLD STRIP.AUTO-MCNC: 137 MG/DL (ref 65–100)
GLUCOSE SERPL-MCNC: 134 MG/DL (ref 65–100)
HCT VFR BLD AUTO: 25.5 % (ref 41.1–50.3)
HGB BLD-MCNC: 7.8 G/DL (ref 13.6–17.2)
MCH RBC QN AUTO: 27.7 PG (ref 26.1–32.9)
MCHC RBC AUTO-ENTMCNC: 30.6 G/DL (ref 31.4–35)
MCV RBC AUTO: 90.4 FL (ref 79.6–97.8)
NRBC # BLD: 0.02 K/UL (ref 0–0.2)
PLATELET # BLD AUTO: 490 K/UL (ref 150–450)
PMV BLD AUTO: 10.7 FL (ref 9.4–12.3)
POTASSIUM SERPL-SCNC: 3.6 MMOL/L (ref 3.5–5.1)
PROT SERPL-MCNC: 5.1 G/DL (ref 6.3–8.2)
RBC # BLD AUTO: 2.82 M/UL (ref 4.23–5.6)
SERVICE CMNT-IMP: ABNORMAL
SERVICE CMNT-IMP: NORMAL
SODIUM SERPL-SCNC: 136 MMOL/L (ref 138–145)
WBC # BLD AUTO: 28 K/UL (ref 4.3–11.1)

## 2022-06-16 PROCEDURE — 6360000002 HC RX W HCPCS: Performed by: INTERNAL MEDICINE

## 2022-06-16 PROCEDURE — 6370000000 HC RX 637 (ALT 250 FOR IP): Performed by: SURGERY

## 2022-06-16 PROCEDURE — 2700000000 HC OXYGEN THERAPY PER DAY

## 2022-06-16 PROCEDURE — 6360000004 HC RX CONTRAST MEDICATION: Performed by: PHYSICIAN ASSISTANT

## 2022-06-16 PROCEDURE — 6360000002 HC RX W HCPCS: Performed by: FAMILY MEDICINE

## 2022-06-16 PROCEDURE — 71045 X-RAY EXAM CHEST 1 VIEW: CPT

## 2022-06-16 PROCEDURE — 82784 ASSAY IGA/IGD/IGG/IGM EACH: CPT

## 2022-06-16 PROCEDURE — 80053 COMPREHEN METABOLIC PANEL: CPT

## 2022-06-16 PROCEDURE — 2100000000 HC CCU R&B

## 2022-06-16 PROCEDURE — 74220 X-RAY XM ESOPHAGUS 1CNTRST: CPT

## 2022-06-16 PROCEDURE — 87077 CULTURE AEROBIC IDENTIFY: CPT

## 2022-06-16 PROCEDURE — 0BC78ZZ EXTIRPATION OF MATTER FROM LEFT MAIN BRONCHUS, VIA NATURAL OR ARTIFICIAL OPENING ENDOSCOPIC: ICD-10-PCS | Performed by: INTERNAL MEDICINE

## 2022-06-16 PROCEDURE — 2580000003 HC RX 258: Performed by: SURGERY

## 2022-06-16 PROCEDURE — 94640 AIRWAY INHALATION TREATMENT: CPT

## 2022-06-16 PROCEDURE — 6360000002 HC RX W HCPCS: Performed by: NURSE PRACTITIONER

## 2022-06-16 PROCEDURE — 82962 GLUCOSE BLOOD TEST: CPT

## 2022-06-16 PROCEDURE — 99222 1ST HOSP IP/OBS MODERATE 55: CPT | Performed by: INTERNAL MEDICINE

## 2022-06-16 PROCEDURE — 1090000001 HH PPS REVENUE CREDIT

## 2022-06-16 PROCEDURE — 83521 IG LIGHT CHAINS FREE EACH: CPT

## 2022-06-16 PROCEDURE — 1090000002 HH PPS REVENUE DEBIT

## 2022-06-16 PROCEDURE — 6370000000 HC RX 637 (ALT 250 FOR IP): Performed by: FAMILY MEDICINE

## 2022-06-16 PROCEDURE — 99152 MOD SED SAME PHYS/QHP 5/>YRS: CPT | Performed by: INTERNAL MEDICINE

## 2022-06-16 PROCEDURE — 2580000003 HC RX 258: Performed by: FAMILY MEDICINE

## 2022-06-16 PROCEDURE — 85027 COMPLETE CBC AUTOMATED: CPT

## 2022-06-16 PROCEDURE — 2709999900 HC NON-CHARGEABLE SUPPLY: Performed by: INTERNAL MEDICINE

## 2022-06-16 PROCEDURE — 99233 SBSQ HOSP IP/OBS HIGH 50: CPT | Performed by: INTERNAL MEDICINE

## 2022-06-16 PROCEDURE — 87070 CULTURE OTHR SPECIMN AEROBIC: CPT

## 2022-06-16 PROCEDURE — 31645 BRNCHSC W/THER ASPIR 1ST: CPT | Performed by: INTERNAL MEDICINE

## 2022-06-16 PROCEDURE — 2500000003 HC RX 250 WO HCPCS: Performed by: SURGERY

## 2022-06-16 PROCEDURE — 3609027000 HC BRONCHOSCOPY: Performed by: INTERNAL MEDICINE

## 2022-06-16 PROCEDURE — P9047 ALBUMIN (HUMAN), 25%, 50ML: HCPCS | Performed by: INTERNAL MEDICINE

## 2022-06-16 PROCEDURE — 87186 SC STD MICRODIL/AGAR DIL: CPT

## 2022-06-16 PROCEDURE — 7100000010 HC PHASE II RECOVERY - FIRST 15 MIN: Performed by: INTERNAL MEDICINE

## 2022-06-16 PROCEDURE — 6360000002 HC RX W HCPCS: Performed by: SURGERY

## 2022-06-16 PROCEDURE — 2580000003 HC RX 258: Performed by: NURSE PRACTITIONER

## 2022-06-16 RX ORDER — LIDOCAINE HYDROCHLORIDE 20 MG/ML
JELLY TOPICAL PRN
Status: DISCONTINUED | OUTPATIENT
Start: 2022-06-16 | End: 2022-06-17 | Stop reason: ALTCHOICE

## 2022-06-16 RX ORDER — FLUMAZENIL 0.1 MG/ML
0.2 INJECTION, SOLUTION INTRAVENOUS ONCE
Status: DISCONTINUED | OUTPATIENT
Start: 2022-06-16 | End: 2022-06-18

## 2022-06-16 RX ORDER — NALOXONE HYDROCHLORIDE 0.4 MG/ML
0.4 INJECTION, SOLUTION INTRAMUSCULAR; INTRAVENOUS; SUBCUTANEOUS PRN
Status: DISCONTINUED | OUTPATIENT
Start: 2022-06-16 | End: 2022-06-17 | Stop reason: ALTCHOICE

## 2022-06-16 RX ORDER — PHENYLEPHRINE HCL IN 0.9% NACL 50MG/250ML
10-300 PLASTIC BAG, INJECTION (ML) INTRAVENOUS CONTINUOUS
Status: DISCONTINUED | OUTPATIENT
Start: 2022-06-16 | End: 2022-06-17

## 2022-06-16 RX ORDER — LANOLIN ALCOHOL/MO/W.PET/CERES
4.5 CREAM (GRAM) TOPICAL NIGHTLY PRN
Status: DISCONTINUED | OUTPATIENT
Start: 2022-06-16 | End: 2022-06-16

## 2022-06-16 RX ORDER — LANOLIN ALCOHOL/MO/W.PET/CERES
4.5 CREAM (GRAM) TOPICAL NIGHTLY PRN
Status: DISCONTINUED | OUTPATIENT
Start: 2022-06-16 | End: 2022-06-21 | Stop reason: HOSPADM

## 2022-06-16 RX ORDER — MIDAZOLAM HYDROCHLORIDE 1 MG/ML
1 INJECTION INTRAMUSCULAR; INTRAVENOUS ONCE
Status: COMPLETED | OUTPATIENT
Start: 2022-06-16 | End: 2022-06-16

## 2022-06-16 RX ORDER — ACETYLCYSTEINE 200 MG/ML
600 SOLUTION ORAL; RESPIRATORY (INHALATION) ONCE
Status: COMPLETED | OUTPATIENT
Start: 2022-06-16 | End: 2022-06-16

## 2022-06-16 RX ORDER — LIDOCAINE HYDROCHLORIDE 40 MG/ML
SOLUTION TOPICAL ONCE
Status: COMPLETED | OUTPATIENT
Start: 2022-06-16 | End: 2022-06-17

## 2022-06-16 RX ADMIN — FENTANYL CITRATE 25 MCG: 50 INJECTION, SOLUTION INTRAMUSCULAR; INTRAVENOUS at 09:56

## 2022-06-16 RX ADMIN — OXYCODONE HYDROCHLORIDE 5 MG: 5 SOLUTION ORAL at 04:03

## 2022-06-16 RX ADMIN — LEVALBUTEROL HYDROCHLORIDE 0.63 MG: 0.63 SOLUTION RESPIRATORY (INHALATION) at 21:37

## 2022-06-16 RX ADMIN — VANCOMYCIN HYDROCHLORIDE 1250 MG: 10 INJECTION, POWDER, LYOPHILIZED, FOR SOLUTION INTRAVENOUS at 02:29

## 2022-06-16 RX ADMIN — SODIUM CHLORIDE, PRESERVATIVE FREE 10 ML: 5 INJECTION INTRAVENOUS at 09:01

## 2022-06-16 RX ADMIN — MIDAZOLAM 2 MG: 1 INJECTION INTRAMUSCULAR; INTRAVENOUS at 09:57

## 2022-06-16 RX ADMIN — ALBUMIN (HUMAN) 25 G: 0.25 INJECTION, SOLUTION INTRAVENOUS at 04:04

## 2022-06-16 RX ADMIN — DIATRIZOATE MEGLUMINE AND DIATRIZOATE SODIUM 120 ML: 660; 100 LIQUID ORAL; RECTAL at 13:24

## 2022-06-16 RX ADMIN — OXYCODONE HYDROCHLORIDE 5 MG: 5 SOLUTION ORAL at 17:08

## 2022-06-16 RX ADMIN — POTASSIUM CHLORIDE 20 MEQ: 400 INJECTION, SOLUTION INTRAVENOUS at 02:30

## 2022-06-16 RX ADMIN — Medication 4.5 MG: at 04:03

## 2022-06-16 RX ADMIN — SODIUM CHLORIDE, PRESERVATIVE FREE 10 ML: 5 INJECTION INTRAVENOUS at 21:41

## 2022-06-16 RX ADMIN — AMIODARONE HYDROCHLORIDE 0.5 MG/MIN: 50 INJECTION, SOLUTION INTRAVENOUS at 08:24

## 2022-06-16 RX ADMIN — GUAIFENESIN 200 MG: 200 SOLUTION ORAL at 07:28

## 2022-06-16 RX ADMIN — POTASSIUM CHLORIDE 20 MEQ: 400 INJECTION, SOLUTION INTRAVENOUS at 01:35

## 2022-06-16 RX ADMIN — VANCOMYCIN HYDROCHLORIDE 1250 MG: 10 INJECTION, POWDER, LYOPHILIZED, FOR SOLUTION INTRAVENOUS at 14:20

## 2022-06-16 RX ADMIN — ENOXAPARIN SODIUM 30 MG: 100 INJECTION SUBCUTANEOUS at 09:01

## 2022-06-16 RX ADMIN — METOPROLOL TARTRATE 12.5 MG: 25 TABLET, FILM COATED ORAL at 21:41

## 2022-06-16 RX ADMIN — ACETYLCYSTEINE 600 MG: 200 INHALANT RESPIRATORY (INHALATION) at 09:37

## 2022-06-16 RX ADMIN — OXYCODONE HYDROCHLORIDE 5 MG: 5 SOLUTION ORAL at 22:39

## 2022-06-16 RX ADMIN — HYDROMORPHONE HYDROCHLORIDE 0.5 MG: 1 INJECTION, SOLUTION INTRAMUSCULAR; INTRAVENOUS; SUBCUTANEOUS at 09:00

## 2022-06-16 RX ADMIN — LEVALBUTEROL HYDROCHLORIDE 0.63 MG: 0.63 SOLUTION RESPIRATORY (INHALATION) at 07:28

## 2022-06-16 RX ADMIN — AMIODARONE HYDROCHLORIDE 0.5 MG/MIN: 50 INJECTION, SOLUTION INTRAVENOUS at 23:59

## 2022-06-16 RX ADMIN — LEVALBUTEROL HYDROCHLORIDE 0.63 MG: 0.63 SOLUTION RESPIRATORY (INHALATION) at 15:57

## 2022-06-16 RX ADMIN — FUROSEMIDE 40 MG: 10 INJECTION, SOLUTION INTRAMUSCULAR; INTRAVENOUS at 14:20

## 2022-06-16 RX ADMIN — FUROSEMIDE 40 MG: 10 INJECTION, SOLUTION INTRAMUSCULAR; INTRAVENOUS at 04:04

## 2022-06-16 RX ADMIN — METOPROLOL TARTRATE 12.5 MG: 25 TABLET, FILM COATED ORAL at 09:02

## 2022-06-16 RX ADMIN — GUAIFENESIN 200 MG: 200 SOLUTION ORAL at 19:18

## 2022-06-16 RX ADMIN — POTASSIUM CHLORIDE 10 MEQ: 7.46 INJECTION, SOLUTION INTRAVENOUS at 00:28

## 2022-06-16 RX ADMIN — ENOXAPARIN SODIUM 30 MG: 100 INJECTION SUBCUTANEOUS at 21:38

## 2022-06-16 RX ADMIN — FENTANYL CITRATE 25 MCG: 50 INJECTION, SOLUTION INTRAMUSCULAR; INTRAVENOUS at 10:01

## 2022-06-16 RX ADMIN — GUAIFENESIN 200 MG: 200 SOLUTION ORAL at 14:20

## 2022-06-16 RX ADMIN — GUAIFENESIN 200 MG: 200 SOLUTION ORAL at 00:29

## 2022-06-16 RX ADMIN — LEVALBUTEROL HYDROCHLORIDE 0.63 MG: 0.63 SOLUTION RESPIRATORY (INHALATION) at 01:50

## 2022-06-16 ASSESSMENT — PAIN DESCRIPTION - LOCATION
LOCATION: ABDOMEN

## 2022-06-16 ASSESSMENT — PAIN DESCRIPTION - ONSET
ONSET: GRADUAL
ONSET: GRADUAL

## 2022-06-16 ASSESSMENT — PAIN SCALES - GENERAL
PAINLEVEL_OUTOF10: 0
PAINLEVEL_OUTOF10: 4
PAINLEVEL_OUTOF10: 0
PAINLEVEL_OUTOF10: 0
PAINLEVEL_OUTOF10: 4
PAINLEVEL_OUTOF10: 4
PAINLEVEL_OUTOF10: 5
PAINLEVEL_OUTOF10: 0
PAINLEVEL_OUTOF10: 3
PAINLEVEL_OUTOF10: 0
PAINLEVEL_OUTOF10: 2
PAINLEVEL_OUTOF10: 7
PAINLEVEL_OUTOF10: 0
PAINLEVEL_OUTOF10: 7

## 2022-06-16 ASSESSMENT — PAIN - FUNCTIONAL ASSESSMENT
PAIN_FUNCTIONAL_ASSESSMENT: ACTIVITIES ARE NOT PREVENTED
PAIN_FUNCTIONAL_ASSESSMENT: NONE - DENIES PAIN
PAIN_FUNCTIONAL_ASSESSMENT: ACTIVITIES ARE NOT PREVENTED

## 2022-06-16 ASSESSMENT — PAIN DESCRIPTION - PAIN TYPE
TYPE: SURGICAL PAIN
TYPE: SURGICAL PAIN

## 2022-06-16 ASSESSMENT — PAIN DESCRIPTION - ORIENTATION
ORIENTATION: LOWER
ORIENTATION: ANTERIOR
ORIENTATION: ANTERIOR

## 2022-06-16 ASSESSMENT — PAIN DESCRIPTION - DESCRIPTORS
DESCRIPTORS: DISCOMFORT
DESCRIPTORS: ACHING
DESCRIPTORS: ACHING

## 2022-06-16 ASSESSMENT — PAIN DESCRIPTION - FREQUENCY
FREQUENCY: INTERMITTENT
FREQUENCY: INTERMITTENT

## 2022-06-16 NOTE — PROGRESS NOTES
Physical Therapy Note:    Therapist is discontinuing acute PT services secondary to transfer to the ICU. Please re-consult acute PT services when medically appropriate.  Thank you,    Juanito Cervantes, PT, DPT

## 2022-06-16 NOTE — PROGRESS NOTES
[facility] Critical Care Note[de-identified] 6/16/2022  Sg Johnson  Admission Date: 6/2/2022     Length of Stay: 14 days    Background:     78 y.o. y/o male with history of gastric carcinoma s/p neoadjuvant chemo (last dose May 29, 2022) with plans for subsequent total gastrectomy, HTN, admitted to hospitalist service 6/2 with syncopal episode and anemia (hgb 4.9 on admission) as well as gram positive mildred bacteremia (only 1/2, possible contaminate). Unable to tolerate solid food for months and has been losing weight. Was hypotension on pressors initially. Was covered with vanc and cefepime initially, flagyl added with GPR returned. CT c/a/p with small R sided PE, femoral DVT. Subsequently had large melanotic stools, transfused. EGD performed 6/4 but unable to see with food in stomach. Repeat 6/5 similar results. AC held and RI placed IVC filter 6/6.   6/9 was taken for total gastrectomy, left lateral lobectomy of liver, distal pancreatectomy and splenectomy, diaphragmatic resection, J tube placement, and retroperitoneal mass excision and lymphadenectomy, and left abdominal wall mass excision. To ICU on vent post op and requiring joe. Notable PMH:  has a past medical history of ABLA (acute blood loss anemia), GIB (gastrointestinal bleeding), HTN (hypertension), and Severe sepsis with lactic acidosis ([address] 24 Hour events:   Transferred to ICU last night due to a fib with RVR and has complete atelectasis of L lung. On amiodarone now with plans for cleanout bronch for mucus plugging. Had a bowel movement for first time since surgery. Fluid balance net negative 2.7L net negative. ROS: unable to obtain/negative except as listed elsewhere.      Lines: (insertion date)    Closed/Suction Drain Right RUQ Bulb (Active)       Closed/Suction Drain Left LUQ Bulb (Active)       NG/OG/NJ/NE Tube Right nostril (Active)       Gastrostomy/Enterostomy/Jejunostomy Tube Gastrostomy LUQ 1 14 fr (Active) Urinary Catheter 2 Way (Active)     Single Lumen Implantable Port Left Chest (Active)       Arterial Line 06/09/22 Radial (Active)     Drips: current dose (range)  Dose (mcg/kg/min) Propofol : 0 mcg/kg/min  Dose (mcg/min) Phenylephrine : 0 mcg/min (map 91)     Pertinent Exam:         Blood pressure 113/69, pulse 82, temperature 97.6 °F (36.4 °C), temperature source Oral, resp. rate 20, height 5' 9\" (1.753 m), weight 152 lb 8.9 oz (69.2 kg), SpO2 99 %. Intake/Output Summary (Last 24 hours) at 6/16/2022 0919  Last data filed at 6/16/2022 0739  Gross per 24 hour   Intake 1529.07 ml   Output 4195 ml   Net -2665.93 ml     Constitutional: AAO in NC, pain controlled, weak  EENMT:  Sclera clear, pupils equal, oral mucosa moist  Respiratory:decreased on left  Cardiovascular:  RRR  Gastrointestinal:  soft, dressing on, NG in place  Musculoskeletal:  warm with no cyanosis, no lower extremity edema  Skin:  no jaundice or ecchymosis  Neurologic:awake  Psychiatric: appropriate mood and affect    CXR:   Complete L sided atelectasis          Recent Labs     06/14/22  0711 06/15/22  0356 06/16/22  0412   WBC 31.7* 30.8* 28.0*   HGB 8.2* 8.1* 7.8*   HCT 26.6* 25.7* 25.5*    469* 490*     Recent Labs     06/14/22  0711 06/14/22  0711 06/15/22  0356 06/15/22  2209 06/16/22  0412     --  136  --  136*   K 3.2*   < > 2.9* 2.9* 3.6     --  98  --  97*   CO2 29  --  31  --  34*   BUN 11  --  11  --  11   CREATININE <0.20*  --  <0.20*  --  <0.20*   MG 2.0  --  1.9  --   --    PHOS  --   --  3.3  --   --    BILITOT  --   --   --   --  0.4   AST  --   --   --   --  15   ALT  --   --   --   --  12   ALKPHOS  --   --   --   --  346*    < > = values in this interval not displayed. No results for input(s): TROPHS, NTPROBNP, CRP, ESR in the last 72 hours.   Recent Labs     06/14/22  0711 06/15/22  0356 06/16/22  0412   GLUCOSE 113* 127* 134*      ECHO: 06/02/22    TRANSTHORACIC ECHOCARDIOGRAM (TTE) COMPLETE (CONTRAST/BUBBLE/3D PRN) 06/09/2022  9:31 AM (Final)    Interpretation Summary    Left Ventricle: Reduced left ventricular systolic function. EF by 2D Simpsons Biplane is 42%. Left ventricle size is normal. Severely increased wall thickness. Increased ventricular mass. Infiltrative cardiomyopathy should be considered. Global hypokinesis present. Abnormal diastolic function.   Aortic Valve: Thickened cusp. Mildly calcified cusp. Sclerosis of the aortic valve cusp. Moderate annular calcification vs appearance of aortic valve prosthesis [clinical correlation, clinical history]. Mild regurgitation.   Mitral Valve: Mildly thickened leaflet.   Aorta: Dilated aortic root. Dilated ascending aorta.   Pericardium: Small (<1 cm) localized pericardial effusion present around the right ventricle.   Technical qualifiers: Color flow Doppler was performed and pulse wave and/or continuous wave Doppler was performed. Signed by: Miguelangel Grigsby MD on 6/9/2022  9:31 AM    Microbiology:   Recent Labs     06/13/22  1523 06/15/22  0915   CULTURE NO GROWTH 2 DAYS PENDING     Ventilator Settings Ideal body weight: 70.7 kg (155 lb 13.8 oz)  Mode FIO2 Rate Tidal Volume Pressure   PS/CPAP    65 %  15 bmp     500 mL   8     Peak airway pressure:     Minute ventilation: Minute Ventilation (l/min): 12 l/min  ABG:  No results for input(s): PHAPOC, VAC4JOBU, NC7GLAY, HLW6VFA, BE in the last 72 hours. Thoracenteses  Date:   LEFT RIGHT  DESCRIPTION   6/15/22  800ml Transudate, yogesh           Pleural fluid analysis-  Transudate 6/15  Date:   Pleural fluid Serum ratio   Total protein 1.4     LDH LD 96         Assessment and Plan:  (Medical Decision Making)       Impression: 78 y.o. male with gastric cancer, s/p joe adjuvant chemo. Admitted with syncope that was likely multifactorial, driven by recent weight loss, poor PO intake, and severe anemia into the 4's.  GPR on blood culture but doubt this was sepsis as this was likely contaminate. Course complicated by discovery of PE and DVT, subsequent GI bleed likely due to his cancer, requiring IVC filter placement. No post op extensive resections and brought to ICU still intubated and on pressors. Has severe hypoproteinemia and has been given fluids and blood during his hospitalization. He has pleural effusions but I do not recommend thoracentesis unless medical options have been exhausted. No diuresis has been administered and BP/renal function are stable today. Active Hospital Problems    Gastrointestinal hemorrhage: Resolved with fairly stable H/H. Monitor. Severe protein-calorie malnutrition ([address]): Very severe with last albumin only 1.1. Getting albumin IV with lasix. Hypoproteinemia ([facility] [address] [address]): as above      Do not resuscitate status      Hypercoagulable state, secondary [facility]): Now with DVT/PE. Getting lovenox 30 q12h. Now post IVC filter. Adult body mass index less than 19      Acute pulmonary embolism without acute cor pulmonale (HCC)  Small in RLL with DVt in left femoral veins on 6/2/22. On lovenox. Fatigue      History of gastric cancer      Debility: multifactorial.      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 Syncope due to orthostatic hypotension      Hypomagnesemia      Corticosteroid dependence (HCC)      Gastroesophageal reflux disease      Gastric adenocarcinoma (HCC)      Malignant neoplasm of body of stomach (HCC)      Malignant neoplasm of overlapping sites of stomach (HCC)      ABILIO (iron deficiency anemia)    Bilateral pleural effusions L > R  Stable on CXR but oxygenation significantly worse. Plan L thoracentesis to help improve gas exchange. Effusion may recur given low albumin. Atelectasis on left is large and he needs bronch for mucus plugging today.     Mucus plugging is the cause for the atelectasis on left    DNR        Korey Rocha MD

## 2022-06-16 NOTE — PROGRESS NOTES
[facility] [facility] CRITICAL CARE OUTREACH NURSE PROGRESS REPORT      SUBJECTIVE: Called to assess patient secondary to transfer out of ICU.      @[facility]([id number])@  Vitals:    06/15/22 2007 06/15/22 2009 06/15/22 2010 06/15/22 2015   BP: (!) 136/120 104/73     Pulse: (!) 111 (!) 109 (!) 113    Resp: 19      Temp: 98.4 °F (36.9 °C)      TempSrc:       SpO2: 95%   95%   Weight:       Height:            LAB DATA:    Recent Labs     06/13/22 0403 06/14/22  0711 06/15/22  0356    137 136   K 3.4* 3.2* 2.9*    102 98   CO2 29 29 31   BUN 13 11 11   GFRAA 0* 0* 0*   MG 2.0 2.0 1.9   PHOS 3.4  --  3.3        Recent Labs     06/13/22 0403 06/14/22  0711 06/15/22  0356   WBC 45.6* 31.7* 30.8*   HGB 8.5* 8.2* 8.1*   HCT 27.5* 26.6* 25.7*    436 469*          OBJECTIVE: On arrival to room, I found patient to be in bed with eyes closed and wife at bedside. Pain Assessment  @[facility]([id number])@             @BSIFLOW(1180)@             @[facility][id number][facility]@              ASSESSMENT:  Patient with shallow, tachypneic respirations. Complaining of pain in abdomen and shortness of breath. O2 89-91% on 11 liters high flow NC. Patient denies any symptoms when tachycardia occurred throughout the day. Primary RN notified of c/o pain and pain meds offered and given. PLAN:  Dr Pierce Young to see patient and EKG. Will continue to monitor per outreach protocol.

## 2022-06-16 NOTE — PROCEDURES
PROCEDURE:  Bronchoscopy with airway cleanout    INDICATION: mucus plugging    EQUIPMENT: \"Olympus  Bronchoscope    ANESTHESIA:   Fentanyl 50 mcg IV;   Versed 2mg IV;       AIRWAY INSPECTION  Olympus  Bronchoscope         RIGHT  LOCATION NORM/ABNORM DESCRIPTION   Larynx NL    VOCAL CORDS NL    TRACHEA abNL Thick mucus draped over cecilia   CECILIA NL    RMSB NL    RUL NL    BI NL    RML NL    RLL NL    SUP SEGM RLL NL    MED BASAL NL    ANTERIOR BASAL NL    LATERAL BASAL NL    POSTERIOR BASAL NL          LEFT  LOCATION NORM/ABNORM DESCRIPTION   LMSB NL Thick white mucus plugging airway   LAVELLE NL ''   LINGULA NL ''   LLL NL \"   SUPERIOR SEG LLL NL \"   RANDAL-MEDIAL LLL NL \"   LATERAL LLL NL \"   POSTERIOR LLL NL \"     The following samples were obtained:    Bronchial Wash: bacterial cultures      The procedure was completed without complication and the patient tolerated the procedure well.     EBL:none    Recommendations:   Continue airway clearance measures    Mike Dumas MD

## 2022-06-16 NOTE — PROGRESS NOTES
2005: Per monitor room Pt in Aflutter  150. Asymptomatic. Pt resting quietly. Dr. Zehra Mccain notified. 2010: Per monitor room Pt back in sinus tach 113. No new orders placed. 2035: Pt NPO. Lopressor orders changed from PO to J tube. 2136: Per monitor room pt 160 SVT asymptomatic. Dr. Cecelia Gonzalez notified. New orders placed. 2230: Per monitor room. Pt aflutter/-150s. B/P 80/64. Dr. Cecelia Gonzalez notified. 2240: Manual B/P 80/48. Rapid response called. Writer, charge nurse, ICU [place], hospitalist, and staff nurses at bedside. Pt transferred to ICU for higher level of care. CCU 3300.   3190: Writer called Wife Erin Pagan) to update her on nights events.

## 2022-06-16 NOTE — PROGRESS NOTES
Bedside and verbal shift change report received from [address] [place] [address] (offgoing nurse). Report included the following information SBAR, Kardex, ED Summary, OR Summary, Procedure Summary, Intake/Output, MAR, Recent Results, Med Rec Status, Cardiac Rhythm NSR/A fib and Alarm Parameters .      Dual skin assessment completed at bedside: midline abd incision CDI, R & L drains, J tube (list pertinent skin assessment findings)    Dual verification of gtts completed (name of gtts verified): N/A

## 2022-06-16 NOTE — PROGRESS NOTES
Rapid Response called due to persistent SVT/aflutter and low BP. MAP remained >65. Patient reports no symptoms. STAT K that I ordered earlier tonight resulted still low at 2.9. Another 60meq of K ordered. Attempted valsalva maneuver with patient, but patient unfortunately could not maintain any significant increase in intraabdominal pressure. Carotid massage attempted without success. STAT EKG ordered, SVT. Will move patient to ICU to initiate amiodarone. If BP does not maintain MAPs >65, will start joe for BP support. Patient is DNR.

## 2022-06-16 NOTE — INTERDISCIPLINARY ROUNDS
Multi-D Rounds/Checklist (leapfrog):  Lines: can any be removed?: None     Closed/Suction Drain Right RUQ Bulb (Active)       Closed/Suction Drain Left LUQ Bulb (Active)       Gastrostomy/Enterostomy/Jejunostomy Tube Gastrostomy LUQ 1 14 fr (Active)     PICC Double Lumen 83/52/52 Right Basilic (Active)     DVT Prophylaxis: Ordered  Vent: N/A  Nutrition Ordered/appropriate: Ordered  Can antibiotics or other drugs be stopped?: Yes/End Date set  Consults needed: None  A: Is pain control adequate? Yes  B: Sedation break and SBT? N/A  C: Is sedation choice appropriate? Yes  D: Delirium/CAM-ICU? No  E: Mobility goals/appropriateness? Orders adjusted  F: Family update and plan? Wife is primary contact and is being updated daily by primary attending and nursing staff.     VANITA Mcbride

## 2022-06-16 NOTE — PROGRESS NOTES
PT Note:  Mr. Lis Islas   is being discharged from caseload at this time due to decline in status and transfer to unit. Thanks,  Petar Parada.  Margo Jeronimo

## 2022-06-16 NOTE — H&P
[facility] Cardiology Initial Cardiac Evaluation                 Date of  Admission: 6/2/2022  1:05 PM     Primary Care Physician: Mejia Younger MD  Primary Cardiologist: CAN  Referring Physician: Hospitalist    Supervising Cardiologist: Dr. Marianna Villatoro     Reason for cardiac evaluation: new onset A. Fib RVR      Parul Martinez is a 78 y.o. male with prior h/o gastric adenocarcinoma s/p 4 cycles chemo, last dose 5/19/22, 50 pack year smoking history,  HTN. Patient presented to ED with syncope. ED reported patient was feeling weak and stood up and passed out. Prior to arrival, HR was erratic jumping from normal to as high as 160-170, described as SVT vs Sinus tach per EMS. He rec'd 1500 cc LR bolus and 4 mg zofran while en route. In ED, he was hypotensive, BP 74/54, , RR 22. Labs in ED showed Na 131, procal 1.02, LA 5.1, wBC 20.4, Hgb 5.6. He rec'd  vancomcyin, cefepime and blood transfusion ordered. Hospitalist admitted patient with shock, anemia, cancer cachexia, and syncope. GI saw patient and patient underwent unsuccessful EGDs x2. His bl cx + on Vanco . CT chest with small PE and femoral DVT eliquis was started then stopped. IR placed IVC filter 6/6.  6/9 was taken for total gastrectomy, left lateral lobectomy of liver, distal pancreatectomy and splenectomy, diaphragmatic resection, J tube placement, and retroperitoneal mass excision and lymphadenectomy, and left abdominal wall mass excision. He again required ICU for vent support and pressor support post-op. Transferred to floor. Noted to have worsening respiratory status on 6/13 requiring 8L of O2 NC. CXR showed moderate pleural effusions. He was started on albumin and diuretics. Underwent left sided thoracentesis with removal of 800cc. Later, went into SVT vs afib which resolved with metoprolol. He had 2 more episodes of SVTs which again resolved with IV metoprolol. Last pm with A. Fib RVR and has complete atelectasis of L lung.   On amiodarone now with plans for cleanout bronch for mucus plugging. He had a thoracentesis yesterday which drained 800 cc of fluid. His echo on 6/9/2022 showed EF 42%, severe LV wall thickness ? infiltrative CM should be considered, global hypokinesis, small pericardial effusion. Patient underwent bronchoscopy this am for mucous plugging. Bedside monitor currently showing SR rates 80s.               Patient Active Problem List   Diagnosis    ABILIO (iron deficiency anemia)    Malignant neoplasm of overlapping sites of stomach ([address]    Malignant neoplasm of body of stomach ([address]    Gastric adenocarcinoma ([address]    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[address]    Former heavy tobacco smoker    Gastroesophageal reflux disease    Loss of weight    Open fracture of proximal phalanx of left thumb    Thumb amputation status, left    Tobacco use disorder    Hypoalbuminemia due to protein-calorie malnutrition ([address]    Syncope due to orthostatic hypotension    Corticosteroid dependence ([address]    Hypoproteinemia (HCC)    Do not resuscitate status    Fatigue    History of gastric cancer    Encounter for palliative care    Debility    Weakness    Hypercoagulable state, secondary ([address]    Adult body mass index less than 19    Acute pulmonary embolism without acute cor pulmonale (HCC)    Severe protein-calorie malnutrition (HCC)    Gastrointestinal hemorrhage    Encounter for weaning from ventilator ([address]    S/P exploratory laparotomy    History of partial pancreatectomy    S/P splenectomy    Jejunostomy status ([address]    H/O resection of liver    Clostridium perfringens infection    IVC (inferior vena cava obstruction)    SVT (supraventricular tachycardia) (HCC)    Bilateral pleural effusion    Mucus plugging of bronchi    Atelectasis    Atrial flutter (HCC)       Past Medical History:   Diagnosis Date    ABLA (acute blood loss anemia) 6/2/2022    GIB (gastrointestinal bleeding) 6/2/2022    HTN (hypertension)     Severe sepsis with Not on file    Ran Out of Food in the Last Year: Not on file   Transportation Needs:     Lack of Transportation (Medical): Not on file    Lack of Transportation (Non-Medical):  Not on file   Physical Activity:     Days of Exercise per Week: Not on file    Minutes of Exercise per Session: Not on file   Stress:     Feeling of Stress : Not on file   Social Connections:     Frequency of Communication with Friends and Family: Not on file    Frequency of Social Gatherings with Friends and Family: Not on file    Attends [employer] Services: Not on file    Active Member of Clubs or Organizations: Not on file    Attends Club or Organization Meetings: Not on file    Marital Status: Not on file   Intimate Partner Violence:     Fear of Current or Ex-Partner: Not on file    Emotionally Abused: Not on file    Physically Abused: Not on file    Sexually Abused: Not on file   Housing Stability:     Unable to Pay for Housing in the Last Year: Not on file    Number of Places Lived in the Last Year: Not on file    Unstable Housing in the Last Year: Not on file       Current Facility-Administered Medications   Medication Dose Route Frequency    phenylephrine (KALEIGH-SYNEPHRINE) 50 mg/250 mL infusion   mcg/min IntraVENous Continuous    melatonin tablet 4.5 mg  4.5 mg Per J Tube Nightly PRN    amiodarone (CORDARONE) 450 mg in dextrose 5 % 250 mL infusion ([id number])  0.5 mg/min IntraVENous Continuous    fentaNYL (SUBLIMAZE) injection 50 mcg  50 mcg IntraVENous PRN    flumazenil (ROMAZICON) injection 0.2 mg  0.2 mg IntraVENous Once    naloxone (NARCAN) injection 0.4 mg  0.4 mg IntraVENous PRN    lidocaine (XYLOCAINE) 2 % uro-jet   Topical PRN    lidocaine (XYLOCAINE) 4 % external solution   Topical Once    bisacodyl (DULCOLAX) suppository 10 mg  10 mg Rectal Daily    levalbuterol (XOPENEX) nebulization 0.63 mg  0.63 mg Nebulization Q6H    metoprolol tartrate (LOPRESSOR) tablet 12.5 mg  12.5 mg Per J Tube BID  sodium chloride flush 0.9 % injection 5-40 mL  5-40 mL IntraVENous 2 times per day    sodium chloride flush 0.9 % injection 5-40 mL  5-40 mL IntraVENous PRN    0.9 % sodium chloride infusion  25 mL IntraVENous PRN    furosemide (LASIX) injection 40 mg  40 mg IntraVENous Q12H    enoxaparin Sodium (LOVENOX) injection 30 mg  30 mg SubCUTAneous Q12H    glucose chewable tablet 16 g  4 tablet Oral PRN    dextrose bolus 10% 125 mL  125 mL IntraVENous PRN    Or    dextrose bolus 10% 250 mL  250 mL IntraVENous PRN    glucagon injection 1 mg  1 mg IntraMUSCular PRN    dextrose 5 % solution  100 mL/hr IntraVENous PRN    medicated lip ointment (BLISTEX)   Topical PRN    HYDROmorphone HCl PF (DILAUDID) injection 0.5 mg  0.5 mg IntraVENous Q2H PRN    oxyCODONE (ROXICODONE) 5 MG/5ML solution 5 mg  5 mg Per J Tube Q6H PRN    guaiFENesin (ROBITUSSIN) 100 MG/5ML oral solution 200 mg  200 mg Per J Tube Q6H    vancomycin (VANCOCIN) 1250 mg in sodium chloride 0.9% 250 mL IVPB  1,250 mg IntraVENous Q12H    insulin lispro (HUMALOG) injection vial 0-4 Units  0-4 Units SubCUTAneous TID WC    insulin lispro (HUMALOG) injection vial 0-4 Units  0-4 Units SubCUTAneous Nightly    cloNIDine (CATAPRES) tablet 0.2 mg  0.2 mg Per J Tube Q4H PRN    sodium chloride flush 0.9 % injection 5-40 mL  5-40 mL IntraVENous 2 times per day    sodium chloride flush 0.9 % injection 5-40 mL  5-40 mL IntraVENous PRN    0.9 % sodium chloride infusion   IntraVENous PRN    potassium chloride 20 mEq/50 mL IVPB (Central Line)  20 mEq IntraVENous PRN    magnesium sulfate 2000 mg in 50 mL IVPB premix  2,000 mg IntraVENous PRN    ondansetron (ZOFRAN) injection 4 mg  4 mg IntraVENous Q6H PRN    Medela Tender Care Lanolin cream   Topical PRN     Unable to obtain today.  Patient is   ROS     Physical Exam  Vitals:    06/16/22 1000 06/16/22 1002 06/16/22 1008 06/16/22 1015   BP:  119/70     Pulse: 79 79  82   Resp: 16 17  18   Temp:   98.2 °F (36.8 °C)    TempSrc:   Axillary    SpO2: 97% 96%  96%   Weight:       Height:           Physical Exam:  General: Well Developed, Well Nourished, No Acute Distress  HEENT: pupils equal and round, no abnormalities noted  Neck: supple, no JVD, no carotid bruits  Heart: S1S2 with RRR without murmurs or gallops  Lungs: Clear throughout auscultation bilaterally without adventitious sounds  Abd: soft, nontender, nondistended, with good bowel sounds  Ext: warm, no edema, calves supple/nontender, pulses 2+ bilaterally  Skin: warm and dry  Psychiatric: Normal mood and affect  Neurologic: Alert and oriented X 3    Cardiographics    Telemetry: SR rates 80s  ECG: last pm with A. Flutter  Echocardiogram: 6/9/2022 showed   Left Ventricle: Reduced left ventricular systolic function. EF by 2D Simpsons Biplane is 42%. Left ventricle size is normal. Severely increased wall thickness. Increased ventricular mass. Infiltrative cardiomyopathy should be considered. Global hypokinesis present. Abnormal diastolic function.   Aortic Valve: Thickened cusp. Mildly calcified cusp. Sclerosis of the aortic valve cusp. Moderate annular calcification vs appearance of aortic valve prosthesis [clinical correlation, clinical history]. Mild regurgitation.   Mitral Valve: Mildly thickened leaflet.   Aorta: Dilated aortic root. Dilated ascending aorta.   Pericardium: Small (<1 cm) localized pericardial effusion present around the right ventricle.   Technical qualifiers: Color flow Doppler was performed and pulse wave and/or continuous wave Doppler was performed.       Labs:   Recent Labs     06/14/22  0711 06/14/22  0711 06/15/22  0356 06/15/22  0356 06/15/22  2209 06/16/22  0412      < > 136  --   --  136*   K 3.2*   < > 2.9*   < > 2.9* 3.6   MG 2.0  --  1.9  --   --   --    BUN 11   < > 11  --   --  11   WBC 31.7*   < > 30.8*  --   --  28.0*   HGB 8.2*   < > 8.1*  --   --  7.8*   HCT 26.6*   < > 25.7*  --   --  25.5*      < > 469*  -- --  490*    < > = values in this interval not displayed. Assessment/Plan:     Assessment:      Principal Problem (Resolved):    Septic shock ([address]- per primary team; on IV vanco     Active Problems:    Atrial flutter ([address]- likely triggered by acute illness including sepsis, severe anemia, and    complete atelectasis of L lung. Currently in SR rates 80s. Continue po lopressor and IV   amio (short term). Will consider resuming AC once acute illness/anmeia resolves      Cancer cachexia [facility])- s/p gastric cancer now S/p surgery. Syncope due to orthostatic hypotension- likely on admission      History of gastric cancer- heme/onco seeing patient      Hypercoagulable state, secondary ([address]- small PE and DVT; now with filter. No AC with severe anemia. LV dysfunction mild: can consider Ace/ARB and BB outpatient once acute issues resolved. Continue IV lasix. Will check kappa/lambda free lights chains and SPEP to rule  Any infiltrated CM      We appreciate the opportunity to participate in this patient's care.      WALLY Rivas - CNP  Supervising MD: Dr. Ghada Valdez

## 2022-06-16 NOTE — PROGRESS NOTES
TRANSFER - OUT REPORT:    Verbal report given to [facility] (AJIT BURRIS) on Nneka Obregon  being transferred to CCU [phone] for change in patient condition (low b/p,elevated HR)       Report consisted of patient's Situation, Background, Assessment and   Recommendations(SBAR). Information from the following report(s) Surgery Report, Intake/Output, MAR and Event Log was reviewed with the receiving nurse. Lines:   PICC Double Lumen 14/07/43 Right Basilic (Active)   Central Line Being Utilized Yes 06/15/22 2315   Criteria for Appropriate Use Hemodynamically unstable, requiring monitoring lines, vasopressors, or volume resuscitation 06/15/22 2315   Site Assessment Clean, dry & intact 06/15/22 2315   Phlebitis Assessment No symptoms 06/15/22 2315   Infiltration Assessment 0 06/15/22 2315   External Catheter Length (cm) 0 cm 06/15/22 0916   Proximal Lumen Color/Status Purple; Alcohol cap applied; Infusing 06/15/22 2315   Distal Lumen Color/Status Red;Flushed;Alcohol cap applied; Infusing 06/15/22 2315   Line Care Other (Comment) 06/15/22 0916   Alcohol Cap Used Yes 06/15/22 2315   Date of Last Dressing Change 06/14/22 06/15/22 2315   Dressing Type Transparent; Antimicrobial;Securing device 06/15/22 2315   Dressing Status Clean, dry & intact 06/15/22 2315        Opportunity for questions and clarification was provided.       Patient transported with: ICU AUGIE

## 2022-06-16 NOTE — PROGRESS NOTES
Hospitalist Progress Note   Admit Date:  2022  1:05 PM   Name:  Johan Ziegler   Age:  78 y.o. Sex:  male  :  1955   MRN:  [id number]   Room:  [facility]/    Presenting Complaint: Loss of Consciousness     Reason(s) for Admission: Syncope and collapse [R55]  Hemorrhagic shock ([address] [R57.8]  Shock ([address] [R57.9]  History of gastric cancer [Z85.028]  Septic shock ([address] [A41.9, R65.21]  Gastrointestinal hemorrhage, unspecified gastrointestinal hemorrhage type [facility] Course & Interval History:     Please refer to the admission H&P for details of presentation. In summary, Johan Ziegler is a 78 y.o. male with medical history significant for   gastric adenocarcinoma s/p 4 cycles chemo, last dose 22, 50 pack year smoking history,  HTN who presented after having a syncopal episode today with 2 day history of near syncope. He has been unable to tolerate solid food for months and has been losing weight. In ED, he was hypotensive, BP 74/54, , RR 22. Hb noted to be 4.9. Workup also revealed  gram positive mildred bacteremia (only 1/2, possible contaminate). He was admitted to ICU due to septic shock requiring pressor support. Started on broad spectrum Abx. CT c/a/p with small R sided PE, femoral DVT. Subsequently had large melanotic stools. EGD performed  but unable to see with food in stomach. Repeat  similar results. AC held and IR placed IVC filter .   was taken for total gastrectomy, left lateral lobectomy of liver, distal pancreatectomy and splenectomy, diaphragmatic resection, J tube placement, and retroperitoneal mass excision and lymphadenectomy, and left abdominal wall mass excision. He again required ICU for vent support and pressor support post-op. He has been stable and was transferred to the floor. Noted to have worsening respiratory status on  requiring 8L of O2 NC. CXR showed moderate pleural effusions. He was started on albumin and diuretics. Underwent left sided thoracentesis with removal of 800cc. Later, went into SVT vs afib which resolved with metoprolol. He had 2 more episodes of SVTs which again resolved with IV metoprolol. EKGs unfortunately showed NSR (as they were done after IV lopressor). On 6/15, he had 3 more episodes of SVT/?afib/flutter after thoracentesis. They all resolved after IV lopressor. Patient was asymptomatic throughout these episodes. He was transferred to the ICU for Amio gtt. Subjective/24 hr Events (06/16/22) : I saw him post-bronchoscopy. He remains drowsy and slow to respond. Unable to obtain full ROS. Review of Systems:  Unable to obtain. Assessment & Plan:       Clostridium Perfringens Bacteremia   BCx from 6/2 showed Clostridium perfringens. Prior provider discussed with ID who recommended - IV Vancomycin til 6/16 06/16/22: WBC down to 28.0. Continue with vancomycin and monitor    Locally advanced Gastric adenocarcinoma   S/p 4 cyclesof chemo last on 5/19/22 6/9/2022 - S/p Ex lap with total gastrectomy and esophagojejunostomy , Left lateral lobectomy of liver, Distal pancreatectomy and splenectomy, Combined diaphragmatic resection,Feeding J-tube placement, Retroperitoneal mass excision and lymphadenectomy, Left abdominal wall mass excision on posterior rectus sheath  - management as per surgery  - on TPN with plan to wean off.   - on Jtube feeding    SVT  06/16/22: Moved to ICU. On Amio gtt. Continue metoprolol 12.5mg PO via Jtube. Cardiology consulted. - IV metoprolol PRN    Acute Hypoxic respiratory failure  B/l Pleural Effusion  06/16/22: Improving.  S/P bronchoscopy this AM   - give o2 supplement to maintain saturation > 92%  - pulmonary appreciated: 6/15 s/p R thoracentesis with removal of 800cc  - continue on albumin and lasix 40mg IV BID    Hypokalemia  06/16/22: K+ of 3.6, mag normal  - check BMP daily  - continue with telemetry    Corticosteroid dependence   No longer needing as it was started during chemo for fatigue    Acute pulmonary embolism without acute cor pulmonale   Small in RLL with DVt in left femoral veins on 6/2/22. S/p IVC filter  Sq lovenox    ABILIO   Hb stable    Cancer Cachexia // Severe Protein Calorie Malnutrition  - on TPN  - now on TF via J tube  - Nutrition followin    Diet:  Diet NPO  ADULT TUBE FEEDING; Jejunostomy; Standard without Fiber; Continuous; 40; No; 30; Other (specify);  Manual Flush BID and after each use  DVT PPx: lovenox  Code status: DNR        Hospital Problems:  Principal Problem (Resolved):    Septic shock ([address]  Active Problems:    Atrial flutter ([address]    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HCC)    Former heavy tobacco smoker    Gastroesophageal reflux disease    Hypoalbuminemia due to protein-calorie malnutrition (HCC)    Syncope due to orthostatic hypotension    Corticosteroid dependence ([address]    Hypoproteinemia (HCC)    Do not resuscitate status    Fatigue    History of gastric cancer    Encounter for palliative care    Debility    Weakness    Hypercoagulable state, secondary ([address]    Adult body mass index less than 19    Acute pulmonary embolism without acute cor pulmonale (HCC)    Severe protein-calorie malnutrition (HCC)    Gastrointestinal hemorrhage    Encounter for weaning from ventilator ([address]    S/P exploratory laparotomy    History of partial pancreatectomy    S/P splenectomy    Jejunostomy status ([address]    H/O resection of liver    Clostridium perfringens infection    IVC (inferior vena cava obstruction)    SVT (supraventricular tachycardia) (HCC)    Bilateral pleural effusion    Mucus plugging of bronchi    Atelectasis    ABILIO (iron deficiency anemia)    Malignant neoplasm of overlapping sites of stomach (HCC)    Malignant neoplasm of body of stomach (HCC)    Gastric adenocarcinoma (HCC)  Resolved Problems:    Severe sepsis with lactic acidosis (HCC)    ABLA (acute blood loss anemia)    GIB (gastrointestinal bleeding)    Hyponatremia    Hypomagnesemia      Objective: Patient Vitals for the past 24 hrs:   Temp Pulse Resp BP SpO2   06/16/22 1015 -- 82 18 -- 96 %   06/16/22 1008 98.2 °F (36.8 °C) -- -- -- --   06/16/22 1002 -- 79 17 119/70 96 %   06/16/22 1000 -- 79 16 -- 97 %   06/16/22 0957 -- 77 14 117/67 99 %   06/16/22 0952 -- 76 19 121/75 100 %   06/16/22 0945 -- 76 15 119/71 100 %   06/16/22 0943 -- 76 16 118/70 100 %   06/16/22 0938 -- 77 14 118/67 99 %   06/16/22 0930 -- 76 13 122/69 100 %   06/16/22 0902 -- 82 -- 113/69 --   06/16/22 0900 -- 83 15 113/69 99 %   06/16/22 0830 -- 84 17 116/71 100 %   06/16/22 0800 97.8 °F (36.6 °C) 84 15 117/71 100 %   06/16/22 0733 97.6 °F (36.4 °C) 84 20 135/67 99 %   06/16/22 0728 -- 83 20 -- 98 %   06/16/22 0715 -- 84 22 121/66 97 %   06/16/22 0700 -- 82 15 128/74 98 %   06/16/22 0445 -- 87 22 121/73 97 %   06/16/22 0433 -- -- 22 -- --   06/16/22 0430 -- 86 14 114/70 99 %   06/16/22 0415 -- 88 16 106/66 96 %   06/16/22 0400 -- 89 18 106/70 95 %   06/16/22 0345 98.2 °F (36.8 °C) 91 16 109/67 97 %   06/16/22 0330 -- 92 20 112/69 95 %   06/16/22 0315 -- 92 18 112/69 96 %   06/16/22 0300 -- 92 17 109/71 95 %   06/16/22 0245 -- 91 17 113/68 96 %   06/16/22 0230 -- 92 17 109/66 98 %   06/16/22 0215 -- 92 16 113/69 98 %   06/16/22 0200 -- 93 17 118/65 97 %   06/16/22 0145 -- 94 15 101/63 98 %   06/16/22 0130 -- 96 17 107/63 98 %   06/16/22 0115 -- 97 18 113/67 94 %   06/16/22 0100 -- 98 19 113/62 93 %   06/16/22 0045 -- (!) 101 19 119/65 96 %   06/16/22 0030 -- (!) 144 19 101/71 95 %   06/16/22 0015 -- (!) 143 19 99/74 97 %   06/16/22 0000 -- (!) 143 23 -- 96 %   06/15/22 2345 -- (!) 145 19 94/67 95 %   06/15/22 2330 -- (!) 152 24 90/66 92 %   06/15/22 2315 99 °F (37.2 °C) (!) 147 20 (!) 93/59 93 %   06/15/22 2156 -- (!) 126 -- -- --   06/15/22 2125 -- (!) 112 -- 128/77 --   06/15/22 2015 -- -- -- -- 95 %   06/15/22 2010 -- (!) 113 -- -- --   06/15/22 2009 -- (!) 109 -- 104/73 --   06/15/22 2007 98.4 °F (36.9 °C) (!) 111 19 (!) 136/120 95 %   06/15/22 2005 -- (!) 150 -- -- --   06/15/22 1524 98.2 °F (36.8 °C) 94 20 117/77 96 %   06/15/22 1453 -- 99 18 -- 94 %       Oxygen Therapy  SpO2: 96 %  Pulse Oximetry Type: Continuous  Pulse via Oximetry: 83 beats per minute  Pulse Oximeter Device Mode: Continuous  Pulse Oximeter Device Location: Right,Finger  O2 Device: High flow nasal cannula  Oximetry Probe Site Changed: No  Skin Assessment: Clean, dry, & intact  Skin Protection for O2 Device: N/A  Orientation: Bilateral  Location: Cheek  Intervention(s): Mouth Care  FiO2 : 65 %  O2 Flow Rate (L/min): 8 L/min  Blood Gas  Performed?: Yes  Loi's Test #1: Collateral flow confirmed  Site #1: Left Radial  Site Prepped #1: Yes  Number of Attempts #1: 1  Pressure Held #1: Yes  Complications #1: None  How Tolerated?: Tolerated well  O2 Delivery Method: Endotracheal tube  Vent Mode: PS/CPAP    Estimated body mass index is 22.53 kg/m² as calculated from the following:    Height as of this encounter: 5' 9\" (1.753 m). Weight as of this encounter: 152 lb 8.9 oz (69.2 kg). Intake/Output Summary (Last 24 hours) at 6/16/2022 1112  Last data filed at 6/16/2022 0800  Gross per 24 hour   Intake 1549.07 ml   Output 2995 ml   Net -1445.93 ml         Physical Exam:     Blood pressure 119/70, pulse 82, temperature 98.2 °F (36.8 °C), temperature source Axillary, resp. rate 18, height 5' 9\" (1.753 m), weight 152 lb 8.9 oz (69.2 kg), SpO2 96 %. General:    Cachetic, ill appearing, lethargic. Head:  Normocephalic, atraumatic  Eyes:  Sclerae appear normal.  Pupils equally round. ENT:  Nares appear normal, no drainage. Moist oral mucosa  Neck:  No restricted ROM. Trachea midline   CV:   RRR. No m/r/g. No jugular venous distension. Lungs:   CTAB. No wheezing, Respirations even, unlabored  Abdomen:   Hypoactive BS, healing mid abdominal incision scar with staple, +J tube. Extremities: No cyanosis or clubbing. No edema  Skin:     No rashes and normal coloration.    Warm and dry.     Neuro:  Unable to assess  Psych:  Drowsy     I have reviewed ordered lab tests and independently visualized imaging below:    Recent Labs:  Recent Results (from the past 48 hour(s))   POCT Glucose    Collection Time: 06/14/22 11:46 AM   Result Value Ref Range    POC Glucose 120 (H) 65 - 100 mg/dL    Performed by: [employer]    Albumin    Collection Time: 06/14/22  2:58 PM   Result Value Ref Range    Albumin 1.1 (L) 3.2 - 4.6 g/dL   POCT Glucose    Collection Time: 06/14/22  4:20 PM   Result Value Ref Range    POC Glucose 124 (H) 65 - 100 mg/dL    Performed by: [employer]    POCT Glucose    Collection Time: 06/14/22  8:57 PM   Result Value Ref Range    POC Glucose 128 (H) 65 - 100 mg/dL    Performed by: Junior Donovan    EKG 12 Lead    Collection Time: 06/15/22 12:49 AM   Result Value Ref Range    Ventricular Rate 90 BPM    Atrial Rate 90 BPM    P-R Interval 182 ms    QRS Duration 82 ms    Q-T Interval 344 ms    QTc Calculation (Bazett) 420 ms    P Axis 36 degrees    R Axis -21 degrees    T Axis 18 degrees    Diagnosis Normal sinus rhythm    Magnesium    Collection Time: 06/15/22  3:56 AM   Result Value Ref Range    Magnesium 1.9 1.8 - 2.4 mg/dL   Phosphorus    Collection Time: 06/15/22  3:56 AM   Result Value Ref Range    Phosphorus 3.3 2.3 - 3.7 MG/DL   Basic Metabolic Panel    Collection Time: 06/15/22  3:56 AM   Result Value Ref Range    Sodium 136 136 - 145 mmol/L    Potassium 2.9 (L) 3.5 - 5.1 mmol/L    Chloride 98 98 - 107 mmol/L    CO2 31 21 - 32 mmol/L    Anion Gap 7 7 - 16 mmol/L    Glucose 127 (H) 65 - 100 mg/dL    BUN 11 8 - 23 MG/DL    CREATININE <0.20 (L) 0.8 - 1.5 MG/DL    GFR  0 (L) >60 ml/min/1.73m2    GFR Non- 0 (L) >60 ml/min/1.73m2    Calcium 8.1 (L) 8.3 - 10.4 MG/DL   Triglyceride    Collection Time: 06/15/22  3:56 AM   Result Value Ref Range    Triglycerides 88 35 - 150 MG/DL   CBC with Auto Differential    Collection Time: 06/15/22  3:56 AM   Result Value Ref Range    WBC 30.8 (H) 4.3 - 11.1 K/uL    RBC 2.88 (L) 4.23 - 5.6 M/uL    Hemoglobin 8.1 (L) 13.6 - 17.2 g/dL    Hematocrit 25.7 (L) 41.1 - 50.3 %    MCV 89.2 79.6 - 97.8 FL    MCH 28.1 26.1 - 32.9 PG    MCHC 31.5 31.4 - 35.0 g/dL    RDW 16.5 (H) 11.9 - 14.6 %    Platelets 699 (H) 678 - 450 K/uL    MPV 10.3 9.4 - 12.3 FL    nRBC 0.02 0.0 - 0.2 K/uL    Differential Type AUTOMATED      Seg Neutrophils 89 (H) 43 - 78 %    Lymphocytes 4 (L) 13 - 44 %    Monocytes 5 4.0 - 12.0 %    Eosinophils % 0 (L) 0.5 - 7.8 %    Basophils 0 0.0 - 2.0 %    Immature Granulocytes 1 0.0 - 5.0 %    Segs Absolute 27.4 (H) 1.7 - 8.2 K/UL    Absolute Lymph # 1.3 0.5 - 4.6 K/UL    Absolute Mono # 1.7 (H) 0.1 - 1.3 K/UL    Absolute Eos # 0.1 0.0 - 0.8 K/UL    Basophils Absolute 0.1 0.0 - 0.2 K/UL    Absolute Immature Granulocyte 0.3 0.0 - 0.5 K/UL   POCT Glucose    Collection Time: 06/15/22  7:27 AM   Result Value Ref Range    POC Glucose 123 (H) 65 - 100 mg/dL    Performed by: Guerrero)CNA    Amylase, Body Fluid    Collection Time: 06/15/22  9:15 AM   Result Value Ref Range    Fluid Type RIGHT      Amylase, Fluid 11 U/L   Body fluid cell count    Collection Time: 06/15/22  9:15 AM   Result Value Ref Range    Specimen RIGHT      Color, Fluid YELLOW      Appearance, Fluid HAZY      RBC, Fluid 3,000 /cu mm    WBC, Fluid 992 /cu mm    Segmented Neutrophils, BAL 95 %    Lymphocytes, BAL 4 %    Macrophages, BAL 1 %   Glucose, Body Fluid    Collection Time: 06/15/22  9:15 AM   Result Value Ref Range    Fluid Type RIGHT      Glucose, Body Fluid 119 MG/DL   Lactate Dehydrogenase, Body Fluid    Collection Time: 06/15/22  9:15 AM   Result Value Ref Range    Fluid Type RIGHT      LD, Fluid 96 U/L   Protein, Body Fluid    Collection Time: 06/15/22  9:15 AM   Result Value Ref Range    Fluid Type RIGHT      Total Protein, Body Fluid 1.4 g/dL   Culture, Body Fluid    Collection Time: 06/15/22  9:15 AM    Specimen: Pleural Fluid    RIGHT   Result Value Ref Range    Special Requests NO SPECIAL REQUESTS      Gram stain 2 TO 5 WBCS SEEN PER OIF     Gram stain NO DEFINITE ORGANISM SEEN      Culture NO GROWTH 1 DAY     EKG 12 Lead    Collection Time: 06/15/22 10:02 AM   Result Value Ref Range    Ventricular Rate 96 BPM    Atrial Rate 96 BPM    P-R Interval 174 ms    QRS Duration 78 ms    Q-T Interval 362 ms    QTc Calculation (Bazett) 457 ms    P Axis 46 degrees    R Axis -3 degrees    T Axis 52 degrees    Diagnosis       Sinus rhythm with occasional Premature ventricular complexes   EKG 12 Lead    Collection Time: 06/15/22 10:28 AM   Result Value Ref Range    Ventricular Rate 92 BPM    Atrial Rate 92 BPM    P-R Interval 180 ms    QRS Duration 80 ms    Q-T Interval 360 ms    QTc Calculation (Bazett) 445 ms    P Axis 40 degrees    R Axis -10 degrees    T Axis 46 degrees    Diagnosis Normal sinus rhythm    POCT Glucose    Collection Time: 06/15/22 11:10 AM   Result Value Ref Range    POC Glucose 111 (H) 65 - 100 mg/dL    Performed by: Kraus ([place])    POCT Glucose    Collection Time: 06/15/22  4:20 PM   Result Value Ref Range    POC Glucose 110 (H) 65 - 100 mg/dL    Performed by: Guerrero)CNA    POCT Glucose    Collection Time: 06/15/22  4:33 PM   Result Value Ref Range    POC Glucose 113 (H) 65 - 100 mg/dL    Performed by: Shin    EKG 12 Lead    Collection Time: 06/15/22  6:52 PM   Result Value Ref Range    Ventricular Rate 149 BPM    Atrial Rate 153 BPM    P-R Interval 160 ms    QRS Duration 90 ms    Q-T Interval 322 ms    QTc Calculation (Bazett) 507 ms    R Axis 3 degrees    T Axis 44 degrees    Diagnosis Sinus tachycardia    POCT Glucose    Collection Time: 06/15/22  9:32 PM   Result Value Ref Range    POC Glucose 114 (H) 65 - 100 mg/dL    Performed by:  Christen    Potassium    Collection Time: 06/15/22 10:09 PM   Result Value Ref Range    Potassium 2.9 (L) 3.5 - 5.1 mmol/L   POCT Glucose Collection Time: 06/15/22 10:41 PM   Result Value Ref Range    POC Glucose 122 (H) 65 - 100 mg/dL    Performed by: Christen    EKG 12 Lead    Collection Time: 06/15/22 10:50 PM   Result Value Ref Range    Ventricular Rate 146 BPM    Atrial Rate 283 BPM    QRS Duration 76 ms    Q-T Interval 306 ms    QTc Calculation (Bazett) 476 ms    R Axis 22 degrees    T Axis 68 degrees    Diagnosis Atrial flutter with variable A-V block    CBC    Collection Time: 06/16/22  4:12 AM   Result Value Ref Range    WBC 28.0 (H) 4.3 - 11.1 K/uL    RBC 2.82 (L) 4.23 - 5.6 M/uL    Hemoglobin 7.8 (L) 13.6 - 17.2 g/dL    Hematocrit 25.5 (L) 41.1 - 50.3 %    MCV 90.4 79.6 - 97.8 FL    MCH 27.7 26.1 - 32.9 PG    MCHC 30.6 (L) 31.4 - 35.0 g/dL    RDW 16.9 (H) 11.9 - 14.6 %    Platelets 131 (H) 346 - 450 K/uL    MPV 10.7 9.4 - 12.3 FL    nRBC 0.02 0.0 - 0.2 K/uL   Comprehensive Metabolic Panel    Collection Time: 06/16/22  4:12 AM   Result Value Ref Range    Sodium 136 (L) 138 - 145 mmol/L    Potassium 3.6 3.5 - 5.1 mmol/L    Chloride 97 (L) 98 - 107 mmol/L    CO2 34 (H) 21 - 32 mmol/L    Anion Gap 5 (L) 7 - 16 mmol/L    Glucose 134 (H) 65 - 100 mg/dL    BUN 11 8 - 23 MG/DL    CREATININE <0.20 (L) 0.8 - 1.5 MG/DL    GFR  0 (L) >60 ml/min/1.73m2    GFR Non- 0 (L) >60 ml/min/1.73m2    Calcium 8.3 8.3 - 10.4 MG/DL    Total Bilirubin 0.4 0.2 - 1.1 MG/DL    ALT 12 12 - 65 U/L    AST 15 15 - 37 U/L    Alk Phosphatase 346 (H) 50 - 136 U/L    Total Protein 5.1 (L) 6.3 - 8.2 g/dL    Albumin 1.8 (L) 3.2 - 4.6 g/dL    Globulin 3.3 2.3 - 3.5 g/dL    Albumin/Globulin Ratio 0.5 (L) 1.2 - 3.5     POCT Glucose    Collection Time: 06/16/22  7:35 AM   Result Value Ref Range    POC Glucose 130 (H) 65 - 100 mg/dL    Performed by: Rey ([place])        Other Studies:  XR CHEST 1 VIEW   Final Result      1.  New near-complete opacification of the left hemithorax, consistent with   combination of pleural effusion and atelectasis. Consider possibility of mucous   plugging. XR CHEST 1 VIEW   Final Result   1. Essentially stable appearance of bilateral lung opacities and pleural   effusions, left worse than right. XR CHEST 1 VIEW   Final Result      1. Moderate left and small right pleural effusions, similar to prior exam.      XR CHEST 1 VIEW   Final Result      1. Moderate left pleural effusion, similar to prior exam.      2. No significant change compared to prior exam.      XR CHEST 1 VIEW   Final Result   Bilateral airspace opacities and left pleural effusion with interval   worsening on the left. XR CHEST 1 VIEW   Final Result      1. Persistent bibasilar opacities, likely atelectasis or consolidation. XR CHEST 1 VIEW   Final Result   Unchanged mild bibasilar lung atelectasis or infiltrates. XR CHEST 1 VIEW   Final Result   1. New mild right basilar atelectatic appearing changes. No significant acute   changes otherwise seen. It should be noted that the left lung apex has been   clipped limiting complete assessment for pneumothorax. This report was made using voice transcription. Despite my best efforts to avoid   any, transcription errors may persist. If there is any question about the   accuracy of the report or need for clarification, then please call [phone], or text me through [employer] for clarification or correction. IR GUIDED IVC FILTER PLACEMENT   Final Result   Successful placement an infrarenal Cook Celect inferior vena cava filter. Plan:   The patient may be evaluated in 4 months by interventional radiology in order to   evaluate for continued need of the IVC filter for versus filter removal.                  Vascular duplex lower extremity venous bilateral   Final Result   Negative for sonographic evidence of deep venous thrombosis right   lower extremity. LEFT LOWER EXTREMITY VENOUS DUPLEX ULTRASOUND.       INDICATION: Left lower extremity pain. TECHNIQUE: Direct skin-contact high resolution grayscale images, color-flow   Doppler and duplex waveform analysis. FINDINGS: Abnormal intraluminal echoes within the common femoral and profunda   femoral veins. There is some flow. The superficial superficial femoral and   popliteal veins and upper calf veins are unremarkable. IMPRESSION: Positive for deep venous thrombosis left common femoral profunda   femoral veins, nonocclusive. CTA CHEST ABDOMEN PELVIS W WO CONTRAST   Final Result   1. No extravasation to suggest active GI bleed. 2. Small areas of pulmonary embolism are present in the right lower lobe. Clot   burden is quite small. 3. DVT is also likely present in the left femoral veins. 4. Previously described gastric mass again noted. It is difficult to measure to   evaluate for progression. XR CHEST PORTABLE   Final Result   Unremarkable portable chest radiograph.                      FL GASTROGRAFFIN SWALLOW    (Results Pending)       Current Meds:  Current Facility-Administered Medications   Medication Dose Route Frequency    phenylephrine (KALEIGH-SYNEPHRINE) 50 mg/250 mL infusion   mcg/min IntraVENous Continuous    melatonin tablet 4.5 mg  4.5 mg Per J Tube Nightly PRN    amiodarone (CORDARONE) 450 mg in dextrose 5 % 250 mL infusion ([id number])  0.5 mg/min IntraVENous Continuous    fentaNYL (SUBLIMAZE) injection 50 mcg  50 mcg IntraVENous PRN    flumazenil (ROMAZICON) injection 0.2 mg  0.2 mg IntraVENous Once    naloxone (NARCAN) injection 0.4 mg  0.4 mg IntraVENous PRN    lidocaine (XYLOCAINE) 2 % uro-jet   Topical PRN    lidocaine (XYLOCAINE) 4 % external solution   Topical Once    bisacodyl (DULCOLAX) suppository 10 mg  10 mg Rectal Daily    levalbuterol (XOPENEX) nebulization 0.63 mg  0.63 mg Nebulization Q6H    metoprolol tartrate (LOPRESSOR) tablet 12.5 mg  12.5 mg Per J Tube BID    sodium chloride flush 0.9 % injection 5-40 mL  5-40 mL IntraVENous 2 times per day    sodium chloride flush 0.9 % injection 5-40 mL  5-40 mL IntraVENous PRN    0.9 % sodium chloride infusion  25 mL IntraVENous PRN    furosemide (LASIX) injection 40 mg  40 mg IntraVENous Q12H    enoxaparin Sodium (LOVENOX) injection 30 mg  30 mg SubCUTAneous Q12H    glucose chewable tablet 16 g  4 tablet Oral PRN    dextrose bolus 10% 125 mL  125 mL IntraVENous PRN    Or    dextrose bolus 10% 250 mL  250 mL IntraVENous PRN    glucagon injection 1 mg  1 mg IntraMUSCular PRN    dextrose 5 % solution  100 mL/hr IntraVENous PRN    medicated lip ointment (BLISTEX)   Topical PRN    HYDROmorphone HCl PF (DILAUDID) injection 0.5 mg  0.5 mg IntraVENous Q2H PRN    oxyCODONE (ROXICODONE) 5 MG/5ML solution 5 mg  5 mg Per J Tube Q6H PRN    guaiFENesin (ROBITUSSIN) 100 MG/5ML oral solution 200 mg  200 mg Per J Tube Q6H    vancomycin (VANCOCIN) 1250 mg in sodium chloride 0.9% 250 mL IVPB  1,250 mg IntraVENous Q12H    insulin lispro (HUMALOG) injection vial 0-4 Units  0-4 Units SubCUTAneous TID WC    insulin lispro (HUMALOG) injection vial 0-4 Units  0-4 Units SubCUTAneous Nightly    cloNIDine (CATAPRES) tablet 0.2 mg  0.2 mg Per J Tube Q4H PRN    sodium chloride flush 0.9 % injection 5-40 mL  5-40 mL IntraVENous 2 times per day    sodium chloride flush 0.9 % injection 5-40 mL  5-40 mL IntraVENous PRN    0.9 % sodium chloride infusion   IntraVENous PRN    potassium chloride 20 mEq/50 mL IVPB (Central Line)  20 mEq IntraVENous PRN    magnesium sulfate 2000 mg in 50 mL IVPB premix  2,000 mg IntraVENous PRN    ondansetron (ZOFRAN) injection 4 mg  4 mg IntraVENous Q6H PRN    Medela Tender Care Lanolin cream   Topical PRN       Signed:  Lin Sandifer, DO    Part of this note may have been written by using a voice dictation software. The note has been proof read but may still contain some grammatical/other typographical errors.

## 2022-06-16 NOTE — PROGRESS NOTES
Bedside and Verbal shift change report given to [facility] RN (oncoming nurse) by Silvestre Rojo RN (offgoing nurse). Report included the following information Nurse Handoff Report, Intake/Output, MAR, Recent Results and Cardiac Rhythm NSR. Patient is drowsy, awakens spontaneously, oriented x4. On 9L NC. Denies pain at this time. Midline abdominal incision c/d/i YULIET, L SHAYNE Drain, R junior drain, patent and draining.  Voiding via urinal.

## 2022-06-16 NOTE — PROGRESS NOTES
Occupational Therapy Note:    Cha Chino is being discharged from occupational therapy at this time due to decline in medical status & transfer to ICU. Please re-consult OT when patient is appropriate for our services.      Thank you,  Alyson Hurt, OT    Rehab Caseload Tracker

## 2022-06-16 NOTE — PROGRESS NOTES
Admit Date: 2022    POD 7 Day Post-Op    Procedure:  Procedure(s):  Exploratory laparotomy with total gastrectomy and esophagojejunostomy after extensive lysis of adhesions and dissections which added at least 3-4 hours to this case, Left lateral lobectomy of liver, Distal pancreatectomy and splenectomy, Combined diaphragmatic resection, Falciform ligament flap, Feeding J-tube placement, Retroperitoneal mass excision and lymphadenectomy, Left abdominal wall mass excision on posterior rectus sheath    Subjective: The patient was transferred to the CCU overnight due to SVT and atrial fibrillation. He is on an amiodarone drip. He had a thoracentesis yesterday which drained 800 cc of fluid. He states that he has been passing flatus and had bowel movement. Objective:       Vitals:    22 0445 22 0728 22 0733 22 0902   BP: 121/73  135/67 113/69   Pulse: 87 83 84 82   Resp: 22 20 20    Temp:   97.6 °F (36.4 °C)    TempSrc:   Oral    SpO2: 97% 98% 99%    Weight:       Height:           Temp (24hrs), Av.3 °F (36.8 °C), Min:97.6 °F (36.4 °C), Max:99 °F (37.2 °C)  . I&O reviewed as documented. Physical Exam:   Constitutional: Alert, NAD   /69   Pulse 82   Temp 97.6 °F (36.4 °C) (Oral)   Resp 20   Ht 5' 9\" (1.753 m)   Wt 152 lb 8.9 oz (69.2 kg)   SpO2 99%   BMI 22.53 kg/m²   Eyes: Sclera are clear  ENMT: no external lesions' gross hearing normal; no obvious neck masses, no ear or lip lesions, nares normal, NG to LIS  CV: RRR. Normal perfusion  GI: soft and non-distended,  Staples c/d/i. Right and left surgical drain with serosanguineous output. J-tube intact. Musculoskeletal: No embolic signs or cyanosis.    Neuro:  Alert  Psychiatric: Calm and cooperative    Labs:   Recent Results (from the past 24 hour(s))   EKG 12 Lead    Collection Time: 06/15/22 10:02 AM   Result Value Ref Range    Ventricular Rate 96 BPM    Atrial Rate 96 BPM    P-R Interval 174 ms    QRS Duration 78 ms    Q-T Interval 362 ms    QTc Calculation (Bazett) 457 ms    P Axis 46 degrees    R Axis -3 degrees    T Axis 52 degrees    Diagnosis       Sinus rhythm with occasional Premature ventricular complexes   EKG 12 Lead    Collection Time: 06/15/22 10:28 AM   Result Value Ref Range    Ventricular Rate 92 BPM    Atrial Rate 92 BPM    P-R Interval 180 ms    QRS Duration 80 ms    Q-T Interval 360 ms    QTc Calculation (Bazett) 445 ms    P Axis 40 degrees    R Axis -10 degrees    T Axis 46 degrees    Diagnosis Normal sinus rhythm    POCT Glucose    Collection Time: 06/15/22 11:10 AM   Result Value Ref Range    POC Glucose 111 (H) 65 - 100 mg/dL    Performed by: [employer])CNA    POCT Glucose    Collection Time: 06/15/22  4:20 PM   Result Value Ref Range    POC Glucose 110 (H) 65 - 100 mg/dL    Performed by: [employer]and[employer])CNA    POCT Glucose    Collection Time: 06/15/22  4:33 PM   Result Value Ref Range    POC Glucose 113 (H) 65 - 100 mg/dL    Performed by: Shin    EKG 12 Lead    Collection Time: 06/15/22  6:52 PM   Result Value Ref Range    Ventricular Rate 149 BPM    Atrial Rate 153 BPM    P-R Interval 160 ms    QRS Duration 90 ms    Q-T Interval 322 ms    QTc Calculation (Bazett) 507 ms    R Axis 3 degrees    T Axis 44 degrees    Diagnosis Sinus tachycardia    POCT Glucose    Collection Time: 06/15/22  9:32 PM   Result Value Ref Range    POC Glucose 114 (H) 65 - 100 mg/dL    Performed by: [employer]    Potassium    Collection Time: 06/15/22 10:09 PM   Result Value Ref Range    Potassium 2.9 (L) 3.5 - 5.1 mmol/L   POCT Glucose    Collection Time: 06/15/22 10:41 PM   Result Value Ref Range    POC Glucose 122 (H) 65 - 100 mg/dL    Performed by:  [facility]    EKG 12 Lead    Collection Time: 06/15/22 10:50 PM   Result Value Ref Range    Ventricular Rate 146 BPM    Atrial Rate 283 BPM    QRS Duration 76 ms    Q-T Interval 306 ms    QTc Calculation (Bazett) 476 ms    R with  combination of pleural effusion and atelectasis. Consider possibility of mucous  plugging. Assessment:     Principal Problem (Resolved):    Septic shock (HCC)  Active Problems:    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HCC)    Former heavy tobacco smoker    Gastroesophageal reflux disease    Hypoalbuminemia due to protein-calorie malnutrition (HCC)    Syncope due to orthostatic hypotension    Corticosteroid dependence (HCC)    Hypoproteinemia (HCC)    Do not resuscitate status    Fatigue    History of gastric cancer    Encounter for palliative care    Debility    Weakness    Hypercoagulable state, secondary ([address]    Adult body mass index less than 19    Acute pulmonary embolism without acute cor pulmonale (HCC)    Severe protein-calorie malnutrition (HCC)    Gastrointestinal hemorrhage    Encounter for weaning from ventilator ([address]    S/P exploratory laparotomy    History of partial pancreatectomy    S/P splenectomy    Jejunostomy status ([address]    H/O resection of liver    Clostridium perfringens infection    IVC (inferior vena cava obstruction)    SVT (supraventricular tachycardia) (HCC)    ABILIO (iron deficiency anemia)    Malignant neoplasm of overlapping sites of stomach (HCC)    Malignant neoplasm of body of stomach (HCC)    Gastric adenocarcinoma (HCC)  Resolved Problems:    Severe sepsis with lactic acidosis (HCC)    ABLA (acute blood loss anemia)    GIB (gastrointestinal bleeding)    Hyponatremia    Hypomagnesemia        Plan/Recommendations/Medical Decision Making:     Care management per critical care team  Pulmonology plans for bronchoscopy today due to mucous plugging  Remain n.p.o.   Increase tube feeds to 40 cc/hr  No CPAP/ No BIPBP/ No Bagging  Follow labs  IV Abx    Jess Cristobal PA-C

## 2022-06-16 NOTE — PROGRESS NOTES
TRANSFER - IN REPORT:    Verbal report received from [facility]  on Jessica Dowling  being received from 219 for change in patient condition (elevated HR, low BP)      Report consisted of patient's Situation, Background, Assessment and   Recommendations(SBAR). Information from the following report(s) Nurse Handoff Report, Index, ED Encounter Summary, Adult Overview, Surgery Report, Intake/Output, MAR, Recent Results and Cardiac Rhythm ST/SVT was reviewed with the receiving nurse. Opportunity for questions and clarification was provided. Assessment completed upon patient's arrival to unit and care assumed.

## 2022-06-16 NOTE — PROGRESS NOTES
Pt arrived to the floor at this time on 11 HFNC, A&Ox4 with a HR in the 140s, appearing to be in atrial flutter 2-1. Orders for an amiodarone gtt and bolus have been placed and will be administered when the latter arrives from pharmacy. A dual skin assessment was completed on admission with Andrei Mckinney RN while the pt was bathed with CHG wipes--the pt has scattered ecchymosis, an incision on his back covered with a band-aid from the thoracentesis earlier today, and has a midline abdominal incision closed with staples & open to air with a junior drain on the R side and a SHAYNE drain on the left. Both have serous output. He also has redness/a small area of breakdown noted on his sacrum. This was covered with an Allevyn.

## 2022-06-16 NOTE — PROGRESS NOTES
Rapid Response called for SVT/aflutter and hypotension. Patient asymptomatic sitting up in bed with eyes open. Primary RN, RT, charge nurse and hospitalist all at bedside. Patient transferred to ICU for higher level of care and amio gtt.

## 2022-06-16 NOTE — PROGRESS NOTES
Interdisciplinary team rounds were held 6/16/2022 with the following team members:Care Management, Nursing, Nurse Practitioner, Palliative Care, Pastoral Care, Pharmacy, Physician, Respiratory Therapy and Clinical Coordinator and the patient. Plan of care discussed. See clinical pathway and/or care plan for interventions and desired outcomes.

## 2022-06-16 NOTE — PROGRESS NOTES
Called with recurrent tachycardia, ELG sinus tach, has PE and DVT off anticoagulation due to GI bleed, started metoprolol via Jtube today, monitoring on tele, had afib and SVT prior today, placed cardiology consult for tomorrow, stopped by room and patient asleep and settled, on high flow O2, discussed with RN and he is back and forth in aflutter and sinus, nonsustained arrhythmia, dr Thomas Flores had been notified about increased aflutter rates and RN has orders to monitor, GHISLAINE holden also aware and checking in on patient  Israel Jacques MD

## 2022-06-16 NOTE — PROGRESS NOTES
Called to assist with bedside bronchoscopy. Consent obtained from patient. Time out performed. Pts vitals monitored throughout procedure. Scope # [id number] used. Procedure tolerated with no adverse rxn. 2 mg versed and 50mcg Fentanyl given Iv per MD order to pts picc line. Bronch wash sent to the lab x1 and labeled appropriately.

## 2022-06-17 ENCOUNTER — APPOINTMENT (OUTPATIENT)
Dept: GENERAL RADIOLOGY | Age: 67
DRG: 853 | End: 2022-06-17
Payer: MEDICARE

## 2022-06-17 LAB
ALBUMIN SERPL-MCNC: 1.7 G/DL (ref 3.2–4.6)
ALBUMIN/GLOB SERPL: 0.5 {RATIO} (ref 1.2–3.5)
ALP SERPL-CCNC: 359 U/L (ref 50–136)
ALT SERPL-CCNC: 12 U/L (ref 12–65)
ANION GAP SERPL CALC-SCNC: 5 MMOL/L (ref 7–16)
AST SERPL-CCNC: 13 U/L (ref 15–37)
BACTERIA SPEC CULT: NORMAL
BILIRUB SERPL-MCNC: 0.3 MG/DL (ref 0.2–1.1)
BUN SERPL-MCNC: 11 MG/DL (ref 8–23)
CALCIUM SERPL-MCNC: 8.1 MG/DL (ref 8.3–10.4)
CHLORIDE SERPL-SCNC: 94 MMOL/L (ref 98–107)
CO2 SERPL-SCNC: 35 MMOL/L (ref 21–32)
CREAT SERPL-MCNC: <0.2 MG/DL (ref 0.8–1.5)
ERYTHROCYTE [DISTWIDTH] IN BLOOD BY AUTOMATED COUNT: 16.5 % (ref 11.9–14.6)
GLOBULIN SER CALC-MCNC: 3.5 G/DL (ref 2.3–3.5)
GLUCOSE BLD STRIP.AUTO-MCNC: 107 MG/DL (ref 65–100)
GLUCOSE BLD STRIP.AUTO-MCNC: 115 MG/DL (ref 65–100)
GLUCOSE BLD STRIP.AUTO-MCNC: 116 MG/DL (ref 65–100)
GLUCOSE SERPL-MCNC: 175 MG/DL (ref 65–100)
GRAM STN SPEC: NORMAL
GRAM STN SPEC: NORMAL
HCT VFR BLD AUTO: 25 % (ref 41.1–50.3)
HGB BLD-MCNC: 7.6 G/DL (ref 13.6–17.2)
KAPPA LC FREE SER-MCNC: 42.3 MG/L (ref 3.3–19.4)
KAPPA LC FREE/LAMBDA FREE SER: 1.07 {RATIO} (ref 0.26–1.65)
LAMBDA LC FREE SERPL-MCNC: 39.5 MG/L (ref 5.7–26.3)
MAGNESIUM SERPL-MCNC: 1.8 MG/DL (ref 1.8–2.4)
MCH RBC QN AUTO: 27.1 PG (ref 26.1–32.9)
MCHC RBC AUTO-ENTMCNC: 30.4 G/DL (ref 31.4–35)
MCV RBC AUTO: 89.3 FL (ref 79.6–97.8)
NRBC # BLD: 0 K/UL (ref 0–0.2)
PHOSPHATE SERPL-MCNC: 2.7 MG/DL (ref 2.3–3.7)
PLATELET # BLD AUTO: 542 K/UL (ref 150–450)
PMV BLD AUTO: 10.2 FL (ref 9.4–12.3)
POTASSIUM SERPL-SCNC: 3 MMOL/L (ref 3.5–5.1)
PROT SERPL-MCNC: 5.2 G/DL (ref 6.3–8.2)
RBC # BLD AUTO: 2.8 M/UL (ref 4.23–5.6)
SERVICE CMNT-IMP: ABNORMAL
SERVICE CMNT-IMP: NORMAL
SODIUM SERPL-SCNC: 134 MMOL/L (ref 138–145)
WBC # BLD AUTO: 23.6 K/UL (ref 4.3–11.1)

## 2022-06-17 PROCEDURE — 6360000002 HC RX W HCPCS: Performed by: INTERNAL MEDICINE

## 2022-06-17 PROCEDURE — 99232 SBSQ HOSP IP/OBS MODERATE 35: CPT | Performed by: INTERNAL MEDICINE

## 2022-06-17 PROCEDURE — 97164 PT RE-EVAL EST PLAN CARE: CPT

## 2022-06-17 PROCEDURE — 6370000000 HC RX 637 (ALT 250 FOR IP): Performed by: SURGERY

## 2022-06-17 PROCEDURE — 82962 GLUCOSE BLOOD TEST: CPT

## 2022-06-17 PROCEDURE — 2580000003 HC RX 258: Performed by: SURGERY

## 2022-06-17 PROCEDURE — 97530 THERAPEUTIC ACTIVITIES: CPT

## 2022-06-17 PROCEDURE — 2580000003 HC RX 258: Performed by: FAMILY MEDICINE

## 2022-06-17 PROCEDURE — 6360000002 HC RX W HCPCS: Performed by: FAMILY MEDICINE

## 2022-06-17 PROCEDURE — 1090000001 HH PPS REVENUE CREDIT

## 2022-06-17 PROCEDURE — 97168 OT RE-EVAL EST PLAN CARE: CPT

## 2022-06-17 PROCEDURE — 94761 N-INVAS EAR/PLS OXIMETRY MLT: CPT

## 2022-06-17 PROCEDURE — 83735 ASSAY OF MAGNESIUM: CPT

## 2022-06-17 PROCEDURE — 97162 PT EVAL MOD COMPLEX 30 MIN: CPT

## 2022-06-17 PROCEDURE — 0BJ08ZZ INSPECTION OF TRACHEOBRONCHIAL TREE, VIA NATURAL OR ARTIFICIAL OPENING ENDOSCOPIC: ICD-10-PCS | Performed by: INTERNAL MEDICINE

## 2022-06-17 PROCEDURE — 6370000000 HC RX 637 (ALT 250 FOR IP): Performed by: FAMILY MEDICINE

## 2022-06-17 PROCEDURE — 94640 AIRWAY INHALATION TREATMENT: CPT

## 2022-06-17 PROCEDURE — 2700000000 HC OXYGEN THERAPY PER DAY

## 2022-06-17 PROCEDURE — 84100 ASSAY OF PHOSPHORUS: CPT

## 2022-06-17 PROCEDURE — 3609027000 HC BRONCHOSCOPY: Performed by: INTERNAL MEDICINE

## 2022-06-17 PROCEDURE — 80053 COMPREHEN METABOLIC PANEL: CPT

## 2022-06-17 PROCEDURE — 71045 X-RAY EXAM CHEST 1 VIEW: CPT

## 2022-06-17 PROCEDURE — 2100000000 HC CCU R&B

## 2022-06-17 PROCEDURE — 76604 US EXAM CHEST: CPT | Performed by: INTERNAL MEDICINE

## 2022-06-17 PROCEDURE — 2709999900 HC NON-CHARGEABLE SUPPLY: Performed by: INTERNAL MEDICINE

## 2022-06-17 PROCEDURE — 6370000000 HC RX 637 (ALT 250 FOR IP): Performed by: PHYSICIAN ASSISTANT

## 2022-06-17 PROCEDURE — 1090000002 HH PPS REVENUE DEBIT

## 2022-06-17 PROCEDURE — 31646 BRNCHSC W/THER ASPIR SBSQ: CPT | Performed by: INTERNAL MEDICINE

## 2022-06-17 PROCEDURE — 97535 SELF CARE MNGMENT TRAINING: CPT

## 2022-06-17 PROCEDURE — 85027 COMPLETE CBC AUTOMATED: CPT

## 2022-06-17 PROCEDURE — 97112 NEUROMUSCULAR REEDUCATION: CPT

## 2022-06-17 PROCEDURE — 6360000002 HC RX W HCPCS: Performed by: SURGERY

## 2022-06-17 PROCEDURE — 99152 MOD SED SAME PHYS/QHP 5/>YRS: CPT | Performed by: INTERNAL MEDICINE

## 2022-06-17 PROCEDURE — 6370000000 HC RX 637 (ALT 250 FOR IP): Performed by: INTERNAL MEDICINE

## 2022-06-17 RX ORDER — FLUMAZENIL 0.1 MG/ML
0.2 INJECTION, SOLUTION INTRAVENOUS PRN
Status: CANCELLED | OUTPATIENT
Start: 2022-06-17

## 2022-06-17 RX ORDER — LIDOCAINE HYDROCHLORIDE 20 MG/ML
JELLY TOPICAL PRN
Status: DISCONTINUED | OUTPATIENT
Start: 2022-06-17 | End: 2022-06-17 | Stop reason: ALTCHOICE

## 2022-06-17 RX ORDER — FUROSEMIDE 10 MG/ML
40 INJECTION INTRAMUSCULAR; INTRAVENOUS DAILY
Status: DISCONTINUED | OUTPATIENT
Start: 2022-06-18 | End: 2022-06-21 | Stop reason: HOSPADM

## 2022-06-17 RX ORDER — LIDOCAINE HYDROCHLORIDE 40 MG/ML
SOLUTION TOPICAL ONCE
Status: DISCONTINUED | OUTPATIENT
Start: 2022-06-17 | End: 2022-06-17 | Stop reason: ALTCHOICE

## 2022-06-17 RX ORDER — NALOXONE HYDROCHLORIDE 0.4 MG/ML
0.4 INJECTION, SOLUTION INTRAMUSCULAR; INTRAVENOUS; SUBCUTANEOUS PRN
Status: DISCONTINUED | OUTPATIENT
Start: 2022-06-17 | End: 2022-06-17 | Stop reason: SDUPTHER

## 2022-06-17 RX ORDER — MIDAZOLAM HYDROCHLORIDE 1 MG/ML
2 INJECTION, SOLUTION INTRAMUSCULAR; INTRAVENOUS PRN
Status: DISCONTINUED | OUTPATIENT
Start: 2022-06-17 | End: 2022-06-17 | Stop reason: ALTCHOICE

## 2022-06-17 RX ADMIN — ENOXAPARIN SODIUM 30 MG: 100 INJECTION SUBCUTANEOUS at 22:08

## 2022-06-17 RX ADMIN — BISACODYL 10 MG: 10 SUPPOSITORY RECTAL at 08:58

## 2022-06-17 RX ADMIN — LEVALBUTEROL HYDROCHLORIDE 0.63 MG: 0.63 SOLUTION RESPIRATORY (INHALATION) at 15:22

## 2022-06-17 RX ADMIN — METOPROLOL TARTRATE 12.5 MG: 25 TABLET, FILM COATED ORAL at 22:09

## 2022-06-17 RX ADMIN — LEVALBUTEROL HYDROCHLORIDE 0.63 MG: 0.63 SOLUTION RESPIRATORY (INHALATION) at 20:21

## 2022-06-17 RX ADMIN — FENTANYL CITRATE 25 MCG: 50 INJECTION, SOLUTION INTRAMUSCULAR; INTRAVENOUS at 10:21

## 2022-06-17 RX ADMIN — LEVALBUTEROL HYDROCHLORIDE 0.63 MG: 0.63 SOLUTION RESPIRATORY (INHALATION) at 01:46

## 2022-06-17 RX ADMIN — FUROSEMIDE 40 MG: 10 INJECTION, SOLUTION INTRAMUSCULAR; INTRAVENOUS at 03:37

## 2022-06-17 RX ADMIN — OXYCODONE HYDROCHLORIDE 5 MG: 5 SOLUTION ORAL at 04:47

## 2022-06-17 RX ADMIN — SODIUM CHLORIDE, PRESERVATIVE FREE 10 ML: 5 INJECTION INTRAVENOUS at 22:10

## 2022-06-17 RX ADMIN — MIDAZOLAM HYDROCHLORIDE 2 MG: 1 INJECTION, SOLUTION INTRAMUSCULAR; INTRAVENOUS at 10:21

## 2022-06-17 RX ADMIN — POTASSIUM CHLORIDE 20 MEQ: 400 INJECTION, SOLUTION INTRAVENOUS at 09:00

## 2022-06-17 RX ADMIN — SODIUM CHLORIDE, PRESERVATIVE FREE 10 ML: 5 INJECTION INTRAVENOUS at 09:00

## 2022-06-17 RX ADMIN — LIDOCAINE HYDROCHLORIDE: 40 SOLUTION ORAL at 10:21

## 2022-06-17 RX ADMIN — OXYCODONE HYDROCHLORIDE 5 MG: 5 SOLUTION ORAL at 15:06

## 2022-06-17 RX ADMIN — OXYCODONE HYDROCHLORIDE 5 MG: 5 SOLUTION ORAL at 09:30

## 2022-06-17 RX ADMIN — GUAIFENESIN 200 MG: 200 SOLUTION ORAL at 19:17

## 2022-06-17 RX ADMIN — POTASSIUM CHLORIDE 20 MEQ: 400 INJECTION, SOLUTION INTRAVENOUS at 07:00

## 2022-06-17 RX ADMIN — OXYCODONE HYDROCHLORIDE 5 MG: 5 SOLUTION ORAL at 22:09

## 2022-06-17 RX ADMIN — GUAIFENESIN 200 MG: 200 SOLUTION ORAL at 00:56

## 2022-06-17 RX ADMIN — LEVALBUTEROL HYDROCHLORIDE 0.63 MG: 0.63 SOLUTION RESPIRATORY (INHALATION) at 08:27

## 2022-06-17 RX ADMIN — METOPROLOL TARTRATE 12.5 MG: 25 TABLET, FILM COATED ORAL at 08:59

## 2022-06-17 RX ADMIN — ENOXAPARIN SODIUM 30 MG: 100 INJECTION SUBCUTANEOUS at 08:58

## 2022-06-17 RX ADMIN — GUAIFENESIN 200 MG: 200 SOLUTION ORAL at 08:30

## 2022-06-17 RX ADMIN — AMIODARONE HYDROCHLORIDE 0.5 MG/MIN: 50 INJECTION, SOLUTION INTRAVENOUS at 16:43

## 2022-06-17 RX ADMIN — POTASSIUM CHLORIDE 20 MEQ: 400 INJECTION, SOLUTION INTRAVENOUS at 06:16

## 2022-06-17 RX ADMIN — LIDOCAINE HYDROCHLORIDE: 20 JELLY TOPICAL at 10:21

## 2022-06-17 RX ADMIN — GUAIFENESIN 200 MG: 200 SOLUTION ORAL at 13:00

## 2022-06-17 ASSESSMENT — PAIN DESCRIPTION - PAIN TYPE: TYPE: SURGICAL PAIN

## 2022-06-17 ASSESSMENT — PAIN DESCRIPTION - ORIENTATION
ORIENTATION: ANTERIOR;RIGHT;LEFT
ORIENTATION: LOWER;ANTERIOR;MID
ORIENTATION: ANTERIOR

## 2022-06-17 ASSESSMENT — PAIN DESCRIPTION - FREQUENCY: FREQUENCY: INTERMITTENT

## 2022-06-17 ASSESSMENT — PAIN DESCRIPTION - LOCATION
LOCATION: ABDOMEN

## 2022-06-17 ASSESSMENT — PAIN SCALES - GENERAL
PAINLEVEL_OUTOF10: 3
PAINLEVEL_OUTOF10: 4
PAINLEVEL_OUTOF10: 0
PAINLEVEL_OUTOF10: 4
PAINLEVEL_OUTOF10: 0
PAINLEVEL_OUTOF10: 0

## 2022-06-17 ASSESSMENT — PAIN DESCRIPTION - DIRECTION: RADIATING_TOWARDS: ABDOMEN

## 2022-06-17 ASSESSMENT — PAIN DESCRIPTION - DESCRIPTORS
DESCRIPTORS: SORE
DESCRIPTORS: THROBBING
DESCRIPTORS: ACHING;DISCOMFORT;TENDER

## 2022-06-17 ASSESSMENT — PAIN - FUNCTIONAL ASSESSMENT: PAIN_FUNCTIONAL_ASSESSMENT: ACTIVITIES ARE NOT PREVENTED

## 2022-06-17 ASSESSMENT — PAIN DESCRIPTION - ONSET: ONSET: GRADUAL

## 2022-06-17 NOTE — INTERDISCIPLINARY ROUNDS
Multi-D Rounds/Checklist (leapfrog):  Lines: can any be removed?: None     Closed/Suction Drain Right RUQ Bulb (Active)       Closed/Suction Drain Left LUQ Bulb (Active)       Gastrostomy/Enterostomy/Jejunostomy Tube Gastrostomy LUQ 1 14 fr (Active)     PICC Double Lumen 45/20/72 Right Basilic (Active)     DVT Prophylaxis: Ordered  Vent: N/A  Nutrition Ordered/appropriate: Ordered  Can antibiotics or other drugs be stopped?: N/A  Consults needed: None  A: Is pain control adequate? Yes  B: Sedation break and SBT? N/A  C: Is sedation choice appropriate? N/A  D: Delirium/CAM-ICU? No  E: Mobility goals/appropriateness? Yes  F: Family update and plan? wife is primary contact and is being updated daily by primary attending and nursing staff.     Kala Roche NP, APRN - CNP

## 2022-06-17 NOTE — CARE COORDINATION
LMSW recieved a call from [facility] am that they have a bed for pt today if pt is stable for transfer to [facility] today. Updated Dr Bill Spann and Rehan Perez RN. Remain available to assist further as appropriate.

## 2022-06-17 NOTE — PROCEDURES
Procedures  PROCEDURE:  Bronchoscopy with airway inspection, subsequent    INDICATION: mucus plugging    EQUIPMENT:  Olympus  scope    ANESTHESIA:   Fentanyl 25 mcg IV;   Versed 2mg IV; Lidocaine 100 mg to tracheo-bronchial tree and vocal cords;   Please see ICU/CCU/CTICU medication record. AIRWAY INSPECTION  After obtaining informed consent, The bronchoscope above was advanced through a bite block orally into the trachea. RIGHT  LOCATION NORM/ABNORM DESCRIPTION   Larynx NL Mucus suctioned   VOCAL CORDS NL    TRACHEA NL Thin bubbling white mucus   BRENDAN NL    RMSB NL    RUL NL    BI NL    RML NL    RLL NL    SUP SEGM RLL NL    MED BASAL NL    ANTERIOR BASAL NL    LATERAL BASAL NL    POSTERIOR BASAL NL          LEFT  LOCATION NORM/ABNORM DESCRIPTION   LMSB NL Copious mucus secretions, thinner than yesterday   LAVELLE NL    LINGULA NL    LLL NL Copious mucus secretions, thinner than yesterday   SUPERIOR SEG LLL NL Copious mucus secretions, thinner than yesterday   RANDAL-MEDIAL LLL NL Copious mucus secretions, thinner than yesterday   LATERAL LLL NL Copious mucus secretions, thinner than yesterday   POSTERIOR LLL NL Copious mucus secretions, thinner than yesterday       The procedure was completed without complication and the patient tolerated the procedure well. EBL: none    Recommendations:   Continue airway clearance measures.   Erin Gracia MD

## 2022-06-17 NOTE — PROGRESS NOTES
[facility] CARDIOLOGY PROGRESS NOTE           6/17/2022 10:18 AM    Admit Date: 6/2/2022      Subjective:   Patient more interactive today, denies chest pain, palpitations, irregular heartbeats. Some dyspnea and cough. No leg swelling complaints. ROS:  Cardiovascular:  As noted above    Objective:      Vitals:    06/17/22 0832 06/17/22 0851 06/17/22 0902 06/17/22 0903   BP: 128/76 134/78 (!) 142/85 (!) 141/79   Pulse: 85  94 93   Resp: 23  24 (!) 33   Temp:       TempSrc:       SpO2: 98%      Weight:       Height:           Physical Exam:  General-No Acute Distress, awake, alert, interactive  Neck- supple, no JVD  CV- regular rate and rhythm no MRG  Lung-decreased at the bases with rales bilaterally  Abd- soft, nontender, nondistended  Ext- no edema bilaterally. Skin- warm and dry    Data Review:   Recent Labs     06/15/22  0356 06/15/22  2209 06/16/22  0412 06/17/22  0335     --  136* 134*   K 2.9*   < > 3.6 3.0*   MG 1.9  --   --  1.8   BUN 11  --  11 11   WBC 30.8*  --  28.0* 23.6*   HGB 8.1*  --  7.8* 7.6*   HCT 25.7*  --  25.5* 25.0*   *  --  490* 542*    < > = values in this interval not displayed. Assessment/Plan:     1. Atrial flutter:   -Would stop amiodarone drip at this time and monitor on telemetry.  -Resume therapeutic anticoagulation once ok from surgical perspective and blood counts allow. [id number] score 3 (HTN, age >71 , cardiomyopathy). -Now in sinus, hemodynamic stable. Likely exacerbated by hypoxia, mucous plugging, recent surgery, anemia.    -Tolerating metoprolol, changed to Toprol-XL as patient gets closer to discharge. 2. Cardiomyopathy: Continue metoprolol as above, reasonable to continue Lasix. Pleural effusions visualized on ultrasound today with pulmonology. Add on medical therapy including afterload reduction medications for cardiomyopathy as possible. SPEP/free light chains sent yesterday and pending at this time.   3. Essential hypertension:   -Controlled at this time  4. Gastric carcinoma:   -post op care per surgery/oncology  5. Hypoxic respiratory failure/Mucous plugging:   -Status post bronc, hypoxia improved. Lasix. 6. Acute blood loss anemia:   -Hemoglobin downtrending, monitor closely. Per primary team.  7. Pulmonary embolism/DVT:   -Post IVC filter 6/6/22, on prophylactic Lovenox, per pulmonology.     Elma Villarreal DO  6/17/2022 10:18 AM

## 2022-06-17 NOTE — PROCEDURES
PROCEDURE:  DIAGNOSTIC ULTRASOUND OF CHEST    DIAGNOSIS:  BILATERAL PLEURAL EFFUSION    CHEST ULTRASOUND FINDINGS:    A [employer] ultrasound was used to image the chest and localize the pleural effusion on the right chest- moderate in size and uncomplicated. On the left, there is significant atelectasis.     IMAGE: scanned to Isrrael Valentin MD

## 2022-06-17 NOTE — PROGRESS NOTES
Pt sat upright for pleural ultrasound. Consent noted on chart. Time out performed. Pts vitals monitored throughout procedure. Bilateral ultrasound done and pic taken of pleural fluid. Atelectasis noted/ Not enough fluid to perform thoracentesis at this time per md.   Pt tolerated procedure well with no adverse rxn. Bedside bronchoscopy done post ultrasound. Time out performed. Pts vitals monitored throughout procedure. Scope # [id number] used. Procedure tolerated with no adverse rxn. 2 mg versed and 25 mcg fentanyl given Iv per MD order to pts picc line. Pt fio2 increased to 10lpm HFNC  by RT for procedure. No specimens sent to the lab per md order. Report given to pts RAYSA Rebolledo.    Ultrasound findings reviewed by MD.

## 2022-06-17 NOTE — PROGRESS NOTES
PHYSICAL THERAPY Re-evaluation and AM  (Link to Caseload Tracking: PT Visit Days : 1  Acknowledge Orders  Time In/Out  PT Charge Capture  Rehab Caseload Tracker    Jessica Dowling is a 78 y.o. male   PRIMARY DIAGNOSIS: Septic shock ([address]  Syncope and collapse [R55]  Hemorrhagic shock ([address] [R57.8]  Shock ([address] [R57.9]  History of gastric cancer [Z85.028]  Septic shock ([address] [A41.9, R65.21]  Gastrointestinal hemorrhage, unspecified gastrointestinal hemorrhage type [K92.2]    Day of Surgery  Reason for Referral: Generalized Muscle Weakness (M62.81)  Other abnormalities of gait and mobility (R26.89)  History of falling (Z91.81)  Inpatient: Payor: MEDICARE / Plan: MEDICARE PART A / Product Type: *No Product type* /     ASSESSMENT:     REHAB RECOMMENDATIONS:   Recommendation to date pending progress:  Settin [facility]    Equipment:     3 in 1 Bedside Commode   Rolling Walker     ASSESSMENT:  Mr. Daksha Smith was re-evaluated today following surgery in CCU. He continues to present with decreased strength in B LEs. Pt also presents with balance deficits and decreased activity tolerance. Mr. Daksha Smith   was able to come to sitting on EOB with Alec and additional time today. He has fair sitting balance and was able to stand with Alec x 2/RW today. Pt demonstrated fair- standing balance and ambulated short distance to chair. Pt has short, shuffled steps and appeared to fatigue quickly today. After short rest break pt was able to ambulate again with Alec x 2/RW and chair follow. Mr. Daksha Smith goals have been updated and he could benefit from skilled PT to address above deficits.      [facility] [address] [address] AM-PAC 6 Clicks Basic Mobility Inpatient Short Form  AM-PAC Mobility Inpatient   How much difficulty turning over in bed?: A Little  How much difficulty sitting down on / standing up from a chair with arms?: A Little  How much difficulty moving from lying on back to sitting on side of bed?: A Little  How much help from another person moving to and from a bed to a chair?: A Little  How much help from another person needed to walk in hospital room?: A Little  How much help from another person for climbing 3-5 steps with a railing?: A Lot  AM-PAC Inpatient Mobility Raw Score : 17  AM-PAC Inpatient T-Scale Score : 42.13  Mobility Inpatient CMS 0-100% Score: 50.57  Mobility Inpatient CMS G-Code Modifier : CK    SUBJECTIVE:   Mr. Perez Emmanuel states, \"I need my cushion\"     Social/Functional Lives With: Spouse  Type of Home: House  Home Layout: One level  Bathroom Accessibility: Accessible  Receives Help From: Family  ADL Assistance: Independent  Homemaking Assistance: Independent  Ambulation Assistance: Independent  Transfer Assistance: Independent  Active : Yes  Mode of Transportation: Car  Occupation: Retired    OBJECTIVE:     PAIN: VITALS / O2: PRECAUTION / Daquan Ahr / Luis Fernando Slot:   Pre Treatment:   Pain Assessment: 0-10  Pain Level: 0      Post Treatment: 8/10 Vitals        Oxygen      Julian Drain, Continuous Pulse Oximetry, Mata Catheter, IV and PEG    RESTRICTIONS/PRECAUTIONS:  Restrictions/Precautions: Fall Risk                 GROSS EVALUATION: Intact Impaired (Comments):   AROM []  generally decreased due to decreased strength   PROM []    Strength []  generally decreased    Balance []     Posture [] Forward Head  Rounded Shoulders   Sensation []     Coordination []      Tone []     Edema []    Activity Tolerance []  fatigued with activity    []      COGNITION/  PERCEPTION: Intact Impaired (Comments):   Orientation [x]     Vision [x]     Hearing [x]     Cognition  [x]       MOBILITY: I Mod I S SBA CGA Min Mod Max Total  NT x2 Comments:   Bed Mobility    Rolling [] [] [] [] [x] [x] [] [] [] [] [] Increased assist needed post syncopal episode   Supine to Sit [] [] [] [] [] [x] [] [] [] [] []    Scooting [] [] [] [] [] [] [] [x] [] [] [x]    Sit to Supine [] [] [] [] [] [] [] [] [] [] [] Post syncopal episode   Transfers Sit to Stand [] [] [] [] [] [x] [] [] [] [] [x]    Bed to Chair [] [] [] [] [] [x] [] [] [] [] [x]    Stand to Sit [] [] [] [] [] [x] [] [] [] [] [x]     [] [] [] [] [] [] [] [] [] [] []    I=Independent, Mod I=Modified Independent, S=Supervision, SBA=Standby Assistance, CGA=Contact Guard Assistance,   Min=Minimal Assistance, Mod=Moderate Assistance, Max=Maximal Assistance, Total=Total Assistance, NT=Not Tested    GAIT: I Mod I S SBA CGA Min Mod Max Total  NT x2 Comments:   Level of Assistance [] [] [] [] [] [x] [] [] [] [] [x]    Distance 5 feet    DME Rolling Walker    Gait Quality Decreased martínez , Decreased step clearance and forward flexed posture    Weightbearing Status      Stairs      I=Independent, Mod I=Modified Independent, S=Supervision, SBA=Standby Assistance, CGA=Contact Guard Assistance,   Min=Minimal Assistance, Mod=Moderate Assistance, Max=Maximal Assistance, Total=Total Assistance, NT=Not Tested    PLAN:   ACUTE PHYSICAL THERAPY GOALS:   (Developed with and agreed upon by patient and/or caregiver. )  LTG:  (1.)Mr. Isra Ortiz will move from supine to sit and sit to supine , scoot up and down and roll side to side in bed with SUPERVISION within 7 treatment day(s). (2.)Mr. Isra Ortiz will transfer from bed to chair and chair to bed with STAND BY ASSIST using the least restrictive device within 7 treatment day(s). (3.)Mr. Isra Ortiz will ambulate with STAND BY ASSIST for 250 feet with the least restrictive device within 7 treatment day(s). (4.)Mr. Isra Ortiz will participate in therapeutic activity/exercises x 25 minutes for increased activity tolerance within 7 treatment days. (5.)Mr. Isra Ortiz will perform standing static and dynamic balance activities x 15 minutes with SUPERVISION to improve safety within 7 day(s). ________________________________________________________________________________________________        ________________________________________________________________________________________________      FREQUENCY AND DURATION: 3 times/week for duration of hospital stay or until stated goals are met, whichever comes first.    THERAPY PROGNOSIS: Fair    PROBLEM LIST:   (Skilled intervention is medically necessary to address:)  Decreased ADL/Functional Activities  Decreased Activity Tolerance  Decreased AROM/PROM  Decreased Balance  Decreased Gait Ability  Decreased Safety Awareness  Decreased Strength  Decreased Transfer Abilities INTERVENTIONS PLANNED:   (Benefits and precautions of physical therapy have been discussed with the patient.)  Self Care Training  Therapeutic Activity  Therapeutic Exercise/HEP  Neuromuscular Re-education  Gait Training  Education       TREATMENT:   Re-evaluation    TREATMENT:   Co-Treatment PT/OT necessary due to patient's decreased overall endurance/tolerance levels, as well as need for high level skilled assistance to complete functional transfers/mobility and functional tasks  Therapeutic Activity (23 Minutes): Therapeutic activity included Rolling, Supine to Sit, Scooting, Transfer Training, Ambulation on level ground, Sitting balance  and Standing balance to improve functional Activity tolerance, Balance, Coordination, Mobility and Strength. TREATMENT GRID:  N/A    AFTER TREATMENT PRECAUTIONS: Alarm Activated, Call light within reach, Chair and RN at bedside    INTERDISCIPLINARY COLLABORATION:  RN/ PCT, PT/ PTA and OT/ BHATT    EDUCATION:      TIME IN/OUT:  Time In: 2523  Time Out: 0902  Minutes: [address].  Maryan Schuler

## 2022-06-17 NOTE — PROGRESS NOTES
OCCUPATIONAL THERAPY Daily Note, Re-evaluation and AM       OT Visit Days: 1  Acknowledge Orders  Time  OT Charge Capture  Rehab Caseload Tracker      Nena Hoang is a 78 y.o. male   PRIMARY DIAGNOSIS: Septic shock ([address]  Syncope and collapse [R55]  Hemorrhagic shock ([address] [R57.8]  Shock ([address] [R57.9]  History of gastric cancer [Z85.028]  Septic shock ([address] [A41.9, R65.21]  Gastrointestinal hemorrhage, unspecified gastrointestinal hemorrhage type [K92.2]  Procedure(s) (LRB):  BRONCHOSCOPY (N/A)  CHEST ULTRASOUND (Bilateral)  Day of Surgery  Reason for Referral: Generalized Muscle Weakness (M62.81)  Inpatient: Payor: MEDICARE / Plan: MEDICARE PART A / Product Type: *No Product type* /     ASSESSMENT:     REHAB RECOMMENDATIONS:   Recommendation to date pending progress:  Setting:   Home Health Therapy    Equipment:     To Be Determined     ASSESSMENT:  Mr. Nick Gutierrez is a 78 y M with hx of HTN, IPA, gastric cancer. Pt admitted for septic shock associated with syncope episode. Pt had s/p 6/9/22 with extensive list of ectomy associated with stomach cancer that had spread. Presents in CCU; alert; mild confusion noted during conversation however oriented x 4. Very weak and de-conditioned today. Min to mod A x 2 for bed mobility. Mod A to daniela socks. Performed sit <> stand with min A x 2; ~5 steps to chair with min A x 2 x RW. Short seated rest break provided before completing sit <> stand to facilitate brianne-care in standing. Goals added to reflect pt's current status. Will continue skilled OT services due to performing functionally below baseline.       [address] [facility] [address] [address] AM-PAC 6 Clicks Daily Activity Inpatient Short Form:    AM-PAC Daily Activity Inpatient   How much help for putting on and taking off regular lower body clothing?: A Lot  How much help for Bathing?: A Lot  How much help for Toileting?: A Lot  How much help for putting on and taking off regular upper body clothing?: A Little  How much help for taking care of personal grooming?: A Little  How much help for eating meals?: A Little  AM-PAC Inpatient Daily Activity Raw Score: 15  AM-PAC Inpatient ADL T-Scale Score : 34.69  ADL Inpatient CMS 0-100% Score: 56.46  ADL Inpatient CMS G-Code Modifier : CK           SUBJECTIVE:     Mr. Todd Mclean states, \"I don't know if I can do this. \"    Social/Functional Lives With: Spouse  Type of Home: House  Home Layout: One level  Bathroom Accessibility: Accessible  Receives Help From: Family  ADL Assistance: Independent  Homemaking Assistance: Independent  Ambulation Assistance: Independent  Transfer Assistance: Independent  Active : Yes  Mode of Transportation: Car  Occupation: Retired    OBJECTIVE:     Shikha Lomeli / Amy Rashids / Lg Menjivar: Continuous Pulse Oximetry, Mata Catheter, IV, SHAYNE Drain and Nasogastric Tube    RESTRICTIONS/PRECAUTIONS:  Restrictions/Precautions: Fall Risk    PAIN: VITALS / O2:   Pre Treatment:      4/10    Post Treatment: 7/10       Vitals      WFL    Oxygen     Stable on RA       GROSS EVALUATION: INTACT IMPAIRED   (See Comments)   UE AROM [x] []   UE PROM [] []   Strength []  generally decreased   Posture / Balance [] Fair (+) EOB sitting; fair standing balance with use of RW   Sensation [x]     Coordination [x]       Tone [x]       Edema [x]    Activity Tolerance []   generally decreased on RA     Hand Dominance R [] L []      COGNITION/  PERCEPTION: INTACT IMPAIRED   (See Comments)   Orientation [x]     Vision [x]     Hearing [x]     Cognition  [] Mild confusion   Perception [x] Decreased insight     MOBILITY: I Mod I S SBA CGA Min Mod Max Total  NT x2 Comments:   Bed Mobility    Rolling [] [] [] [] [] [] [] [] [] [] []    Supine to Sit [] [] [] [] [] [] [x] [] [] [] [x]    Scooting [] [] [] [] [] [] [x] [] [] [] [x]    Sit to Supine [] [] [] [] [] [] [] [] [] [] []    Transfers    Sit to Stand [] [] [] [] [] [x] [] [] [] [] [x]    Bed to Chair [] [] [] [] [] [x] [] [] [] [] [x]    Stand to Sit [] [] [] [] [] [x] [] [] [] [] [x]    Tub/Shower [] [] [] [] [] [] [] [] [] [x] []     Toilet [] [] [] [] [] [] [] [] [] [x] []      [] [] [] [] [] [] [] [] [] [] []    I=Independent, Mod I=Modified Independent, S=Supervision/Setup, SBA=Standby Assistance, CGA=Contact Guard Assistance, Min=Minimal Assistance, Mod=Moderate Assistance, Max=Maximal Assistance, Total=Total Assistance, NT=Not Tested    ACTIVITIES OF DAILY LIVING: I Mod I S SBA CGA Min Mod Max Total NT Comments   BASIC ADLs:              Upper Body Bathing  [] [] [] [] [] [] [] [] [] [x]    Lower Body Bathing [] [] [] [] [] [] [] [] [] [x]    Toileting [] [] [] [] [] [] [] [x] [] [] In static standing   Upper Body Dressing [] [] [] [] [] [] [] [x] [] [] socks   Lower Body Dressing [] [] [] [] [] [] [] [] [] [x]    Feeding [] [] [] [] [] [] [] [] [] [x]    Grooming [] [] [] [x] [] [] [] [] [] []    Personal Device Care [] [] [] [] [] [] [] [] [] [x]    Functional Mobility [] [] [] [] [] [] [] [x] [] []    I=Independent, Mod I=Modified Independent, S=Supervision/Setup, SBA=Standby Assistance, CGA=Contact Guard Assistance, Min=Minimal Assistance, Mod=Moderate Assistance, Max=Maximal Assistance, Total=Total Assistance, NT=Not Tested    PLAN:     FREQUENCY/DURATION   OT Plan of Care: 3 times/week for duration of hospital stay or until stated goals are met, whichever comes first.    ACUTE OCCUPATIONAL THERAPY GOALS:   (Developed with and agreed upon by patient and/or caregiver.)  1. Patient will complete lower body bathing and dressing with SBA and adaptive equipment as needed. 2. Patient will complete toileting with SBA. 3. Patient will tolerate 30 minutes of OT treatment with 1-2 rest breaks to increase activity tolerance for ADLs. 4. Patient will complete functional transfers with SBA and adaptive equipment as needed. 5. Patient will complete functional mobility for household distances with SBA and adaptive equipment as needed.   6. Patient will complete self-grooming while standing edge of sink with SBA and adaptive equipment as needed. Timeframe: 7 visits           PROBLEM LIST:   (Skilled intervention is medically necessary to address:)  Decreased ADL/Functional Activities  Decreased Activity Tolerance  Decreased Balance  Decreased Strength  Decreased Transfer Abilities  Increased Pain   INTERVENTIONS PLANNED:  (Benefits and precautions of occupational therapy have been discussed with the patient.)  Self Care Training  Therapeutic Activity  Therapeutic Exercise/HEP  Neuromuscular Re-education  Education         TREATMENT:     EVALUATION: RE EVALUATION: (Untimed Charge)    TREATMENT:   Co-Treatment PT/OT necessary due to patient's decreased overall endurance/tolerance levels, as well as need for high level skilled assistance to complete functional transfers/mobility and functional tasks  Neuromuscular Re-education (10 Minutes): Neuromuscular Re-education included Balance Training, Coordination training, Postural training, Sitting balance training and Standing balance training to improve Balance, Coordination and Postural Control. Self Care (13 minutes): Patient participated in toileting, lower body dressing and grooming ADLs in unsupported sitting and standing with no verbal and   cueing to increase independence, decrease assistance required and increase activity tolerance. Patient also participated in functional mobility, functional transfer and energy conservation training to increase independence, decrease assistance required and increase activity tolerance.      TREATMENT GRID:   Date:  6/14/22 Date:   Date:     Activity/Exercise Parameters Parameters Parameters   AROM BUE overhead 2x10     Chair triceps 10                                         AFTER TREATMENT PRECAUTIONS: Alarm Activated, Bed/Chair Locked, Call light within reach, Chair, Needs within reach and RN notified    INTERDISCIPLINARY COLLABORATION:  RN/ PCT, PT/ PTA and OT/ BHATT    EDUCATION: TOTAL TREATMENT DURATION AND TIME:  Time In: 9464  Time Out: 0901  Minutes: [address], OT

## 2022-06-17 NOTE — PROGRESS NOTES
Comprehensive Nutrition Assessment    Type and Reason for Visit: Reassess  Malnutrition Screening Tool: Malnutrition Screen  Have you recently lost weight without trying?: 34 or more pounds (4 points)  Have you been eating poorly because of a decreased appetite?: Yes (1 point)  Malnutrition Screening Tool Score: 5 and Unintentional Weight Loss (Hospitalists)  TPN management (Hospitalist and Surgery)    Nutrition Recommendations/Plan:    Enteral Nutrition: Managed by surgery  Trophic Enteral Feeds:   Standard without Fiber (Osmolite 1.2 Carloz) via Jejunostomy increased to 50 ml/hour. Do not advance. Water flush 30 BID  Nutrition needs can be met with Osmolite 1.2 @ 65 ml/hr (formulated for 22 hr infusion) with free water flush 90 ml Q4 hrs. Regimen would provide 1716 kcal (100% estimated energy needs), 79 g pro (100% estimated protein needs), 1713 ml free water (~1 ml/hr). Meals and Snacks:  Continue current diet. NPO     Malnutrition Assessment:  Malnutrition Status: Severe malnutrition  Context: Chronic Illness  Findings of clinical characteristics of malnutrition:   Energy Intake:  Mild decrease in energy intake (Comment) (only tolerating oral nutrition supplements as primary)  Weight Loss:  Greater than 20% over 1 year (38# (24.4%) in ~10 months)     Body Fat Loss:  Severe body fat loss Triceps,Fat Overlying Ribs,Buccal region   Muscle Mass Loss:  Severe muscle mass loss Temples (temporalis),Clavicles (pectoralis & deltoids),Thigh (quadraceps),Calf (gastrocnemius)  Fluid Accumulation:  No significant fluid accumulation     Strength:  Not Performed  Nutrition Assessment:  Nutrition History: History provided by patient and wife. Patient states that he has been trying to eat some solids but it feels like it just sits on his stomach. He states he will wake the next day and still feel full. Wife states that patient has not tried any solids in weeks.  Patient states that he mostly has been consuming Ensure (regular) 6-7 per day. Wife states that she has been mixing peanut butter powder into Ensure to increase kcal and protein (potentially providing 60-70 additional kcal and 6-9 additional grams of protein per serving depending on brand). Patient endorses weight loss of 50-60# over the last year. Do You Have Any Cultural, Gnosticist, or Ethnic Food Preferences?: No   Nutrition Background:   Patient with PMH significant for HTN, gastric adenocarcinoma s/p neoadjuvant chemo with plans for surgery in 2-3 week. He presented with near syncopal episodes for last 2 days. He was admitted with shock. 6/9: Pt is s/p ex lap with total gastrectomy and esophagojejunostomy with extensive lysis of adhesion and dissection, left lateral lobectomy of liver, distal pancreatectomy and splenectomy, combined diaphragmatic resection, falciform ligament flap, j-tube placement (14 fr), retroperitoneal mass excision and lymphadenectomy, and left abdominal wall mass excision. 6/13: thoracentesis, SVT vs afib after  Nutrition Interval:  Noted events since last assessment. NGT removed. Repeat bronch 6/15, then SVT/afib again and Rapid response called. Transferred to ICU for amio gtt. Patient seen and discussed with RN. TF infusing at ordered rate of 50 ml/hr. Discussed that surgery is managing. She states that Dr. Mateo Burris stated patient could have sips of clear liquids today. Patient voiced no needs or questions.   Abdominal Status (last documented):   Last BM (including prior to admit): 06/17/22, GI Symptoms: Diarrhea,Flatus   Pertinent Medications: humalog HS - held, SSI (none required), Zofran PRN (not utilizing), Lasix changed to 40 mg daily  Continuous: amio  Electrolyte replacements: 6/7 KCl 10 meq x 4, KCl 10meq x5 6/12, KCl 10 meq X4 6/13, KCl 10 meq x3 6/14, KCl 20 meq x3 6/15, KCl 20 meq x2 6/16, KCl 20 meq x3 6/17  Pertinent Labs:   Lab Results   Component Value Date     06/17/2022    K 3.0 06/17/2022    CL 94 06/17/2022    CO2 35 06/17/2022    BUN 11 06/17/2022    CREATININE <0.20 06/17/2022    GLUCOSE 175 06/17/2022    CALCIUM 8.1 06/17/2022    PHOS 2.7 06/17/2022    MG 1.8 06/17/2022      Lab Results   Component Value Date    POCGLU 116 06/17/2022    POCGLU 137 06/16/2022    POCGLU 130 06/16/2022    POCGLU 113 06/16/2022    POCGLU 130 06/16/2022    POCGLU 122 06/15/2022     Remarks: Sodium deplete, persistent hypokalemic - replaced as above, glucose slightly elevated - not requiring insulin correction    Nutrition Related Findings:   6/2:CLD. 6/4: NPO with bloody BM. EGD failed x2 6/4 and 6/5 due to food particles obstructing access despite Reglan. 6/6: TPN consult, port in place. 6/6 TPN initiated with lipids. 6/7 cont 2L 10%dex/4.25%AA, hold lipids due to high TG. Formula changed to Dex 15%, 5% AA 6/8. Lipids initiated 6/11. 6/13: POrt removed, TF initiated at 10 ml/hr per surgery, TPN changed to PPN + lipids. 6/14: TF increased to 20 ml/hr per surgery, PICC placed, PPN maintained due to PICC not placed at order time + PPN and TF advance meeting needs. 6/15 TF advanced to 30 ml/hr per surgery not renewing TPN. Current Nutrition Therapies:  Diet NPO  ADULT TUBE FEEDING; Jejunostomy; Standard without Fiber; Continuous; 50; No; 30; Other (specify); Manual Flush BID and after each use  Current Tube Feeding (TF) Orders:  · Feeding Route: PEJ  · Formula: Standard without Fiber  · Schedule: Continuous  · Feeding Regimen: 65 ml/hr  · Additives/Modulars: None  · Water Flushes: 90 ml Q4 hrs  · Current TF & Flush Orders Provides: 80% goal  · Goal TF & Flush Orders Provides: 1716 kcal (100% estimated energy needs), 79 g pro (100% estimated protein needs), 1713 ml free water (~1 ml/hr).       Current Intake:   Average Meal Intake: NPO        Anthropometric Measures:  Height: 5' 9\" (175.3 cm)  Current Body Wt: 128 lb 1.4 oz (58.1 kg) (6/17), Weight source: Bed Scale  BMI: 18.9  Admission Body Weight: 117 lb 15.1 oz (53.5 kg)  Ideal Body Weight (Kg) (Calculated): 73 kg (160 lbs), 73.7 %  Usual Body Wt: 156 lb (70.8 kg) (8/11/21 office wt), Percent weight change: -24.4       Edema: No data recorded  BMI Category Underweight (BMI less than 22) age over 72  Estimated Daily Nutrient Needs:  Energy (kcal/day): 8096-2361 (Kcal/kg (30-35) Weight used: 53.5 kg Current (6/2)  Protein (g/day): 70-80 (1.3-1.5 g/kg) Weight Used: (Current) 53.5 kg  Fluid (ml/day):   (1 ml/kcal)    Nutrition Diagnosis:   · Inadequate oral intake related to altered GI structure as evidenced by  (s/p total gastrectomy)    · Severe malnutrition related to inadequate protein-energy intake as evidenced by Criteria as identified in malnutrition assessment    Nutrition Interventions:   Food and/or Nutrient Delivery: Continue NPO (Continue TF per surgery)     Coordination of Nutrition Care: Continue to monitor while inpatient       Goals:   Previous Goal Met: Progressing toward Goal(s)  Active Goal: Tolerate nutrition support at goal rate,within 2 days  Maintain TPN until able to advance TF to meet at least 50% needs    Nutrition Monitoring and Evaluation:      Food/Nutrient Intake Outcomes: Enteral Nutrition Intake/Tolerance  Physical Signs/Symptoms Outcomes: Biochemical Data,GI Status,Weight    Discharge Planning:    Enteral Nutrition    Leonard Yee [facility] [address]

## 2022-06-17 NOTE — PLAN OF CARE
Problem: Skin/Tissue Integrity  Goal: Absence of new skin breakdown  Description: 1. Monitor for areas of redness and/or skin breakdown  2. Assess vascular access sites hourly  3. Every 4-6 hours minimum:  Change oxygen saturation probe site  4. Every 4-6 hours:  If on nasal continuous positive airway pressure, respiratory therapy assess nares and determine need for appliance change or resting period.   Outcome: Progressing     Problem: Safety - Adult  Goal: Free from fall injury  Outcome: Progressing     Problem: ABCDS Injury Assessment  Goal: Absence of physical injury  Outcome: Progressing     Problem: Pain  Goal: Verbalizes/displays adequate comfort level or baseline comfort level  Outcome: Progressing     Problem: Discharge Planning  Goal: Discharge to home or other facility with appropriate resources  Outcome: Progressing  Flowsheets (Taken 6/16/2022 1925)  Discharge to home or other facility with appropriate resources: Identify barriers to discharge with patient and caregiver     Problem: Respiratory - Adult  Goal: Achieves optimal ventilation and oxygenation  Outcome: Progressing  Flowsheets (Taken 6/16/2022 1925)  Achieves optimal ventilation and oxygenation:   Assess for changes in respiratory status   Assess for changes in mentation and behavior   Position to facilitate oxygenation and minimize respiratory effort   Oxygen supplementation based on oxygen saturation or arterial blood gases   Encourage broncho-pulmonary hygiene including cough, deep breathe, incentive spirometry   Assess and instruct to report shortness of breath or any respiratory difficulty   Respiratory therapy support as indicated

## 2022-06-17 NOTE — PROGRESS NOTES
completed follow up visit. Patient was reflective and expressed his desire to be with his family and be well. Patient has 9 grandchildren. Patient was mournful in his reflection, but expressed a positive life review, despite current illness.  provided pastoral presence, prayer and empathetic listening.   Signed by  Milagro Loya M.Div.

## 2022-06-17 NOTE — PROGRESS NOTES
Admit Date: 2022    POD 8 Day Post-Op    Procedure:  Procedure(s):  Exploratory laparotomy with total gastrectomy and esophagojejunostomy after extensive lysis of adhesions and dissections which added at least 3-4 hours to this case, Left lateral lobectomy of liver, Distal pancreatectomy and splenectomy, Combined diaphragmatic resection, Falciform ligament flap, Feeding J-tube placement, Retroperitoneal mass excision and lymphadenectomy, Left abdominal wall mass excision on posterior rectus sheath    Subjective:     Pt remains in CCU. Bronch yesterday with mucus plugging and removal of copious mucus from left side. CXR today shows this persists. Pulmonary planning to repeat bronch today. He states that he has been passing flatus and had bowel movement. Objective:       Vitals:    22 1042 22 1047 22 1052 22 1058   BP: 123/71 116/68 115/72 129/71   Pulse: 79 81 83 85   Resp: 24 25 23 23   Temp:       TempSrc:       SpO2: 97% 95% 95% 92%   Weight:       Height:           Temp (24hrs), Av.4 °F (36.9 °C), Min:97.5 °F (36.4 °C), Max:98.9 °F (37.2 °C)  . I&O reviewed as documented. Physical Exam:   Constitutional: Alert, NAD   /71   Pulse 85   Temp 98.7 °F (37.1 °C) (Oral)   Resp 23   Ht 5' 9\" (1.753 m)   Wt 128 lb 1.4 oz (58.1 kg)   SpO2 92%   BMI 18.92 kg/m²   Eyes: Sclera are clear  ENMT: no external lesions' gross hearing normal; no obvious neck masses, no ear or lip lesions, nares normal  CV: RRR. Normal perfusion  GI: soft and non-distended,  Staples c/d/i. Right and left surgical drain with serosanguineous output. J-tube intact. Musculoskeletal: No embolic signs or cyanosis.    Neuro:  Alert  Psychiatric: Calm and cooperative    Labs:   Recent Results (from the past 24 hour(s))   POCT Glucose    Collection Time: 22  5:10 PM   Result Value Ref Range    POC Glucose 130 (H) 65 - 100 mg/dL    Performed by: Uvaldo    POCT Glucose    Collection Time: lobe when compared to prior exam.  There is opacification of large portion of the left hemithorax. No pneumothorax. Heart appears enlarged. Right-sided PICC is stable. Impression  Slight improved aeration of the left upper lobe when compared to prior exam.  Persistent large area of increased opacities in the left hemithorax may be due  to combination of large pleural effusion and atelectasis.       Assessment:     Principal Problem (Resolved):    Septic shock (HCC)  Active Problems:    Atrial flutter (HCC)    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HCC)    Former heavy tobacco smoker    Gastroesophageal reflux disease    Hypoalbuminemia due to protein-calorie malnutrition (HCC)    Syncope due to orthostatic hypotension    Corticosteroid dependence (HCC)    Hypoproteinemia (HCC)    Do not resuscitate status    Fatigue    History of gastric cancer    Encounter for palliative care    Debility    Weakness    Hypercoagulable state, secondary ([address]    Adult body mass index less than 19    Acute pulmonary embolism without acute cor pulmonale (HCC)    Severe protein-calorie malnutrition (HCC)    Gastrointestinal hemorrhage    Encounter for weaning from ventilator ([facility]    S/P exploratory laparotomy    History of partial pancreatectomy    S/P splenectomy    Jejunostomy status ([address]    H/O resection of liver    Clostridium perfringens infection    IVC (inferior vena cava obstruction)    SVT (supraventricular tachycardia) (HCC)    Bilateral pleural effusion    Mucus plugging of bronchi    Atelectasis    Hemorrhagic shock (HCC)    Syncope and collapse    ABILIO (iron deficiency anemia)    Malignant neoplasm of overlapping sites of stomach (HCC)    Malignant neoplasm of body of stomach (HCC)    Gastric adenocarcinoma (HCC)  Resolved Problems:    Severe sepsis with lactic acidosis (HCC)    ABLA (acute blood loss anemia)    GIB (gastrointestinal bleeding)    Hyponatremia    Hypomagnesemia        Plan/Recommendations/Medical Decision Making:     Care management per critical care team  Pulmonology plans for repeat bronchoscopy today due to mucous plugging  Remain n.p.o. for bronch - otherwise ok for Clears  Tube feeds @ 50 cc/hr  No CPAP/ No BIPBP/ No Bagging  Follow labs  IV Abx    Apolinar Garsia, NP

## 2022-06-17 NOTE — PROGRESS NOTES
[facility] Critical Care Note[de-identified] 6/17/2022  Declan Mendosa  Admission Date: 6/2/2022     Length of Stay: 15 days    Background:     78 y.o. y/o male with history of gastric carcinoma s/p neoadjuvant chemo (last dose May 29, 2022) with plans for subsequent total gastrectomy, HTN, admitted to hospitalist service 6/2 with syncopal episode and anemia (hgb 4.9 on admission) as well as gram positive mildred bacteremia (only 1/2, possible contaminate). Unable to tolerate solid food for months and has been losing weight. Was hypotension on pressors initially. Was covered with vanc and cefepime initially, flagyl added with GPR returned. CT c/a/p with small R sided PE, femoral DVT. Subsequently had large melanotic stools, transfused. EGD performed 6/4 but unable to see with food in stomach. Repeat 6/5 similar results. AC held and RI placed IVC filter 6/6.   6/9 was taken for total gastrectomy, left lateral lobectomy of liver, distal pancreatectomy and splenectomy, diaphragmatic resection, J tube placement, and retroperitoneal mass excision and lymphadenectomy, and left abdominal wall mass excision. To ICU on vent post op and requiring joe. Notable PMH:  has a past medical history of ABLA (acute blood loss anemia), GIB (gastrointestinal bleeding), HTN (hypertension), and Severe sepsis with lactic acidosis ([address] 24 Hour events:   Bronch yesterday with mucus plugging and removal of copious mucus from left side. CXR today shows this persists. He appears stronger today and is in chair. Fluid balance 1.8L net negative yesterday and renal function normal.      ROS: unable to obtain/negative except as listed elsewhere.      Lines: (insertion date)    Closed/Suction Drain Right RUQ Bulb (Active)       Closed/Suction Drain Left LUQ Bulb (Active)       NG/OG/NJ/NE Tube Right nostril (Active)       Gastrostomy/Enterostomy/Jejunostomy Tube Gastrostomy LUQ 1 14 fr (Active)       Urinary Catheter 2 Way (Active) Single Lumen Implantable Port Left Chest (Active)       Arterial Line 06/09/22 Radial (Active)     Drips: current dose (range)  Dose (mcg/kg/min) Propofol : 0 mcg/kg/min  Dose (mcg/min) Phenylephrine : 0 mcg/min (map 91)     Pertinent Exam:         Blood pressure 128/74, pulse 85, temperature 98.1 °F (36.7 °C), temperature source Oral, resp. rate 16, height 5' 9\" (1.753 m), weight 128 lb 1.4 oz (58.1 kg), SpO2 96 %. Intake/Output Summary (Last 24 hours) at 6/17/2022 0908  Last data filed at 6/17/2022 0700  Gross per 24 hour   Intake 2012.84 ml   Output 3580 ml   Net -1567.16 ml     Constitutional: AAO in NC, pain controlled, weak  EENMT:  Sclera clear, pupils equal, oral mucosa moist  Respiratory: scattered rhonchi  Cardiovascular:  RRR  Gastrointestinal:  soft, dressing on, NG in place  Musculoskeletal:  warm with no cyanosis, no lower extremity edema  Skin:  no jaundice or ecchymosis  Neurologic:awake  Psychiatric: appropriate mood and affect    CXR:   Complete L sided atelectasis          Recent Labs     06/15/22  0356 06/16/22 0412 06/17/22  0335   WBC 30.8* 28.0* 23.6*   HGB 8.1* 7.8* 7.6*   HCT 25.7* 25.5* 25.0*   * 490* 542*     Recent Labs     06/15/22  0356 06/15/22  0356 06/15/22  2209 06/16/22  0412 06/17/22  0335     --   --  136* 134*   K 2.9*   < > 2.9* 3.6 3.0*   CL 98  --   --  97* 94*   CO2 31  --   --  34* 35*   BUN 11  --   --  11 11   CREATININE <0.20*  --   --  <0.20* <0.20*   MG 1.9  --   --   --  1.8   PHOS 3.3  --   --   --  2.7   BILITOT  --   --   --  0.4 0.3   AST  --   --   --  15 13*   ALT  --   --   --  12 12   ALKPHOS  --   --   --  346* 359*    < > = values in this interval not displayed. No results for input(s): TROPHS, NTPROBNP, CRP, ESR in the last 72 hours.   Recent Labs     06/15/22  0356 06/16/22  0412 06/17/22  0335   GLUCOSE 127* 134* 175*      ECHO: 06/02/22    TRANSTHORACIC ECHOCARDIOGRAM (TTE) COMPLETE (CONTRAST/BUBBLE/3D PRN) 06/09/2022  9:31 AM (Final)    Interpretation Summary    Left Ventricle: Reduced left ventricular systolic function. EF by 2D Simpsons Biplane is 42%. Left ventricle size is normal. Severely increased wall thickness. Increased ventricular mass. Infiltrative cardiomyopathy should be considered. Global hypokinesis present. Abnormal diastolic function.   Aortic Valve: Thickened cusp. Mildly calcified cusp. Sclerosis of the aortic valve cusp. Moderate annular calcification vs appearance of aortic valve prosthesis [clinical correlation, clinical history]. Mild regurgitation.   Mitral Valve: Mildly thickened leaflet.   Aorta: Dilated aortic root. Dilated ascending aorta.   Pericardium: Small (<1 cm) localized pericardial effusion present around the right ventricle.   Technical qualifiers: Color flow Doppler was performed and pulse wave and/or continuous wave Doppler was performed. Signed by: Aleta Damian MD on 6/9/2022  9:31 AM    Microbiology:   Recent Labs     06/15/22  0915   CULTURE NO GROWTH 2 DAYS     Ventilator Settings Ideal body weight: 70.7 kg (155 lb 13.8 oz)  Mode FIO2 Rate Tidal Volume Pressure   PS/CPAP    65 %  15 bmp     500 mL   8     Peak airway pressure:     Minute ventilation: Minute Ventilation (l/min): 12 l/min  ABG:  No results for input(s): PHAPOC, BWY8SZRM, ZC0MRZS, RJH3NXJ, BE in the last 72 hours. Thoracenteses  Date:   LEFT RIGHT  DESCRIPTION   6/15/22  800ml Transudate, yogesh           Pleural fluid analysis-  Transudate 6/15  Date:   Pleural fluid Serum ratio   Total protein 1.4     LDH LD 96         Assessment and Plan:  (Medical Decision Making)       Impression: 78 y.o. male with gastric cancer, s/p joe adjuvant chemo. Admitted with syncope that was likely multifactorial, driven by recent weight loss, poor PO intake, and severe anemia into the 4's. GPR on blood culture but doubt this was sepsis as this was likely contaminate.  Course complicated by discovery of PE and DVT, subsequent GI bleed likely due to his cancer, requiring IVC filter placement. No post op extensive resections and brought to ICU still intubated and on pressors. Has severe hypoproteinemia and has been given fluids and blood during his hospitalization. He has pleural effusions but I do not recommend thoracentesis unless medical options have been exhausted. No diuresis has been administered and BP/renal function are stable today. Active Hospital Problems    Gastrointestinal hemorrhage: Resolved with fairly stable H/H. Monitor. Severe protein-calorie malnutrition ([address]): Very severe with last albumin only 1.1. Getting albumin IV with lasix. Hypoproteinemia ([facility] [address]): as above      Do not resuscitate status      Hypercoagulable state, secondary [facility]): Now with DVT/PE. Getting lovenox 30 q12h. Now post IVC filter. Adult body mass index less than 19      Acute pulmonary embolism without acute cor pulmonale (HCC)  Small in RLL with DVt in left femoral veins on 6/2/22. On lovenox. Fatigue      History of gastric cancer      Debility: multifactorial.      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 Syncope due to orthostatic hypotension      Hypomagnesemia      Corticosteroid dependence (HCC)      Gastroesophageal reflux disease      Gastric adenocarcinoma (HCC)      Malignant neoplasm of body of stomach (HCC)      Malignant neoplasm of overlapping sites of stomach (HCC)      ABILIO (iron deficiency anemia)    Bilateral pleural effusions L > R  Will reassess with ultrasound    Atelectasis on left likely needs repeat bronchoscopy.     Mucus plugging is the cause for the atelectasis on left    DNR        Kaye Nunn MD

## 2022-06-17 NOTE — PROGRESS NOTES
Hospitalist Progress Note   Admit Date:  2022  1:05 PM   Name:  Aditya Nails   Age:  78 y.o. Sex:  male  :  1955   MRN:  [id number]   Room:  [facility]/    Presenting Complaint: Loss of Consciousness     Reason(s) for Admission: Syncope and collapse [R55]  Hemorrhagic shock ([address] [R57.8]  Shock ([address] [R57.9]  History of gastric cancer [Z85.028]  Septic shock ([address] [A41.9, R65.21]  Gastrointestinal hemorrhage, unspecified gastrointestinal hemorrhage type [facility] Course & Interval History:     Please refer to the admission H&P for details of presentation. In summary, Aditya Nails is a 78 y.o. male with medical history significant for   gastric adenocarcinoma s/p 4 cycles chemo, last dose 22, 50 pack year smoking history,  HTN who presented after having a syncopal episode today with 2 day history of near syncope. He has been unable to tolerate solid food for months and has been losing weight. In ED, he was hypotensive, BP 74/54, , RR 22. Hb noted to be 4.9. Workup also revealed  gram positive mildred bacteremia (only 1/2, possible contaminate). He was admitted to ICU due to septic shock requiring pressor support. Started on broad spectrum Abx. CT c/a/p with small R sided PE, femoral DVT. Subsequently had large melanotic stools. EGD performed  but unable to see with food in stomach. Repeat  similar results. AC held and IR placed IVC filter .   was taken for total gastrectomy, left lateral lobectomy of liver, distal pancreatectomy and splenectomy, diaphragmatic resection, J tube placement, and retroperitoneal mass excision and lymphadenectomy, and left abdominal wall mass excision. He again required ICU for vent support and pressor support post-op. He has been stable and was transferred to the floor. Noted to have worsening respiratory status on  requiring 8L of O2 NC. CXR showed moderate pleural effusions. He was started on albumin and diuretics. Underwent left sided thoracentesis with removal of 800cc. Later, went into SVT vs afib which resolved with metoprolol. He had 2 more episodes of SVTs which again resolved with IV metoprolol. EKGs unfortunately showed NSR (as they were done after IV lopressor). On 6/15, he had 3 more episodes of SVT/?afib/flutter after thoracentesis. They all resolved after IV lopressor. Patient was asymptomatic throughout these episodes. He was transferred to the ICU for Amio gtt. Subjective/24 hr Events (06/17/22) : I saw him post-bronchoscopy. He is more alert. Denies abdominal pain. Still with cough. Denies CP/SOB. Denies N/V. Review of Systems:  Unable to obtain. Assessment & Plan:       Clostridium Perfringens Bacteremia   BCx from 6/2 showed Clostridium perfringens. Prior provider discussed with ID who recommended - IV Vancomycin til 6/16 06/17/22: WBC down to 23.6. Continue with vancomycin and monitor    Locally advanced Gastric adenocarcinoma   S/p 4 cyclesof chemo last on 5/19/22 6/9/2022 - S/p Ex lap with total gastrectomy and esophagojejunostomy , Left lateral lobectomy of liver, Distal pancreatectomy and splenectomy, Combined diaphragmatic resection,Feeding J-tube placement, Retroperitoneal mass excision and lymphadenectomy, Left abdominal wall mass excision on posterior rectus sheath  - management as per surgery  - on TPN with plan to wean off.   - on Jtube feeding    SVT  06/17/22: Moved to ICU. Taken off Amio gtt today. Continue metoprolol 12.5mg PO via Jtube. Cardiology following.  - IV metoprolol PRN    Acute Hypoxic respiratory failure  B/l Pleural Effusion  06/17/22: Improving.  S/P bronchoscopy this AM   - give o2 supplement to maintain saturation > 92%  - pulmonary appreciated: 6/15 s/p R thoracentesis with removal of 800cc  - continue on albumin and lasix 40mg IV BID    Hypokalemia  06/17/22: K+ of 3.6, mag normal  - check BMP daily  - continue with telemetry    Corticosteroid dependence   No longer needing as it was started during chemo for fatigue    Acute pulmonary embolism without acute cor pulmonale   Small in RLL with DVt in left femoral veins on 6/2/22. S/p IVC filter  Sq lovenox    ABILIO   Hb stable    Cancer Cachexia // Severe Protein Calorie Malnutrition  - on TPN  - now on TF via J tube  - Nutrition followin    Diet:  Diet NPO  ADULT TUBE FEEDING; Jejunostomy; Standard without Fiber; Continuous; 50; No; 30; Other (specify);  Manual Flush BID and after each use  DVT PPx: lovenox  Code status: DNR        Hospital Problems:  Principal Problem (Resolved):    Septic shock ([address]  Active Problems:    Atrial flutter ([address]    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HCC)    Former heavy tobacco smoker    Gastroesophageal reflux disease    Hypoalbuminemia due to protein-calorie malnutrition (HCC)    Syncope due to orthostatic hypotension    Corticosteroid dependence ([address]    Hypoproteinemia (HCC)    Do not resuscitate status    Fatigue    History of gastric cancer    Encounter for palliative care    Debility    Weakness    Hypercoagulable state, secondary ([address]    Adult body mass index less than 19    Acute pulmonary embolism without acute cor pulmonale (HCC)    Severe protein-calorie malnutrition (HCC)    Gastrointestinal hemorrhage    Encounter for weaning from ventilator ([address]    S/P exploratory laparotomy    History of partial pancreatectomy    S/P splenectomy    Jejunostomy status ([address]    H/O resection of liver    Clostridium perfringens infection    IVC (inferior vena cava obstruction)    SVT (supraventricular tachycardia) (HCC)    Bilateral pleural effusion    Mucus plugging of bronchi    Atelectasis    Hemorrhagic shock (HCC)    Syncope and collapse    ABILIO (iron deficiency anemia)    Malignant neoplasm of overlapping sites of stomach (HCC)    Malignant neoplasm of body of stomach (HCC)    Gastric adenocarcinoma (HCC)  Resolved Problems:    Severe sepsis with lactic acidosis (HCC)    ABLA (acute blood loss anemia)    GIB (gastrointestinal bleeding)    Hyponatremia    Hypomagnesemia      Objective:     Patient Vitals for the past 24 hrs:   Temp Pulse Resp BP SpO2   06/17/22 1058 -- 85 23 129/71 92 %   06/17/22 1052 -- 83 23 115/72 95 %   06/17/22 1047 -- 81 25 116/68 95 %   06/17/22 1042 -- 79 24 123/71 97 %   06/17/22 1037 -- 78 22 121/73 99 %   06/17/22 1032 -- 79 19 127/80 94 %   06/17/22 1028 -- 81 17 119/78 93 %   06/17/22 1023 -- 74 17 120/74 95 %   06/17/22 1018 -- 77 19 119/70 94 %   06/17/22 1003 -- 73 27 113/67 92 %   06/17/22 0932 -- 79 17 113/69 91 %   06/17/22 0903 -- 93 (!) 33 (!) 141/79 --   06/17/22 0902 -- 94 24 (!) 142/85 --   06/17/22 0851 -- -- -- 134/78 --   06/17/22 0832 -- 85 23 128/76 98 %   06/17/22 0828 -- 85 16 -- 96 %   06/17/22 0803 98.7 °F (37.1 °C) 85 22 127/75 94 %   06/17/22 0732 -- 83 19 115/70 94 %   06/17/22 0703 -- 83 25 128/74 95 %   06/17/22 0632 -- 85 23 128/74 96 %   06/17/22 0603 -- 84 15 119/71 96 %   06/17/22 0533 -- 85 21 127/73 96 %   06/17/22 0503 -- 85 19 127/74 95 %   06/17/22 0452 -- -- -- -- 96 %   06/17/22 0447 -- -- 17 -- --   06/17/22 0333 -- 83 28 136/74 --   06/17/22 0316 98.1 °F (36.7 °C) 84 22 129/78 94 %   06/17/22 0303 -- 86 18 129/78 93 %   06/17/22 0233 -- 83 18 (!) 140/77 96 %   06/17/22 0204 -- 81 17 138/60 97 %   06/17/22 0146 -- 77 15 -- 94 %   06/17/22 0133 -- 78 15 (!) 143/77 96 %   06/17/22 0103 -- 78 16 130/74 97 %   06/17/22 0033 -- 75 21 131/70 96 %   06/17/22 0003 -- 77 22 121/71 96 %   06/16/22 2337 97.5 °F (36.4 °C) 79 15 131/73 95 %   06/16/22 2333 -- 79 16 131/73 95 %   06/16/22 2303 -- 78 15 (!) 144/81 98 %   06/16/22 2300 -- 77 15 -- 98 %   06/16/22 2141 -- 81 -- -- --   06/16/22 2138 -- 80 20 -- 95 %   06/16/22 2133 -- 81 21 134/78 95 %   06/16/22 2102 -- 80 17 126/76 95 %   06/16/22 2033 -- 80 17 126/74 96 %   06/16/22 2002 -- 80 18 (!) 148/81 97 %   06/16/22 1933 -- 80 17 125/74 94 %   06/16/22 1925 98.9 °F (37.2 °C) 83 19 125/74 93 %   06/16/22 1902 -- 80 16 119/70 97 %   06/16/22 1800 -- 82 16 122/72 99 %   06/16/22 1730 -- 83 15 133/73 94 %   06/16/22 1715 -- 85 23 118/70 90 %   06/16/22 1700 -- 85 19 119/73 (!) 89 %   06/16/22 1630 -- 82 19 117/68 91 %   06/16/22 1600 -- 82 18 127/71 96 %   06/16/22 1557 -- 81 24 -- 94 %   06/16/22 1500 98.9 °F (37.2 °C) 83 22 128/83 99 %   06/16/22 1445 -- 81 19 129/76 93 %   06/16/22 1430 -- 82 18 123/68 93 %   06/16/22 1415 -- 81 19 119/67 94 %   06/16/22 1400 -- 80 18 129/71 98 %   06/16/22 1345 -- 82 21 124/70 92 %   06/16/22 1330 -- 85 23 122/72 90 %   06/16/22 1245 -- 84 20 (!) 105/58 98 %   06/16/22 1230 -- 85 12 101/60 100 %   06/16/22 1215 -- 84 16 (!) 102/58 96 %       Oxygen Therapy  SpO2: 92 %  Pulse Oximetry Type: Continuous  Pulse via Oximetry: 86 beats per minute  Pulse Oximeter Device Mode: Continuous  Pulse Oximeter Device Location: Finger  O2 Device: High flow nasal cannula  Oximetry Probe Site Changed: No  Skin Assessment: Clean, dry, & intact  Skin Protection for O2 Device: N/A  Orientation: Bilateral  Location: Cheek  Intervention(s): Mouth Care  FiO2 : 65 %  O2 Flow Rate (L/min): 5 L/min (Decreased)  Blood Gas  Performed?: Yes  Loi's Test #1: Collateral flow confirmed  Site #1: Left Radial  Site Prepped #1: Yes  Number of Attempts #1: 1  Pressure Held #1: Yes  Complications #1: None  How Tolerated?: Tolerated well  O2 Delivery Method: Endotracheal tube  Vent Mode: PS/CPAP    Estimated body mass index is 18.92 kg/m² as calculated from the following:    Height as of this encounter: 5' 9\" (1.753 m). Weight as of this encounter: 128 lb 1.4 oz (58.1 kg). Intake/Output Summary (Last 24 hours) at 6/17/2022 1203  Last data filed at 6/17/2022 0921  Gross per 24 hour   Intake 2012.84 ml   Output 3880 ml   Net -1867.16 ml         Physical Exam:     Blood pressure 129/71, pulse 85, temperature 98.7 °F (37.1 °C), temperature source Oral, resp.  rate 23, height 5' 9\" (1.753 m), weight 128 lb 1.4 oz (58.1 kg), SpO2 92 %. General:    Cachetic, ill appearing, lethargic. Head:  Normocephalic, atraumatic  Eyes:  Sclerae appear normal.  Pupils equally round. ENT:  Nares appear normal, no drainage. Moist oral mucosa  Neck:  No restricted ROM. Trachea midline   CV:   RRR. No m/r/g. No jugular venous distension. Lungs:   CTAB. No wheezing, Respirations even, unlabored  Abdomen:   Hypoactive BS, healing mid abdominal incision scar with staple, +J tube. Extremities: No cyanosis or clubbing. No edema  Skin:     No rashes and normal coloration. Warm and dry. Neuro:  Unable to assess  Psych:  Drowsy     I have reviewed ordered lab tests and independently visualized imaging below:    Recent Labs:  Recent Results (from the past 48 hour(s))   POCT Glucose    Collection Time: 06/15/22  4:20 PM   Result Value Ref Range    POC Glucose 110 (H) 65 - 100 mg/dL    Performed by: Kraus ([place])    POCT Glucose    Collection Time: 06/15/22  4:33 PM   Result Value Ref Range    POC Glucose 113 (H) 65 - 100 mg/dL    Performed by: Shin    EKG 12 Lead    Collection Time: 06/15/22  6:52 PM   Result Value Ref Range    Ventricular Rate 149 BPM    Atrial Rate 153 BPM    P-R Interval 160 ms    QRS Duration 90 ms    Q-T Interval 322 ms    QTc Calculation (Bazett) 507 ms    R Axis 3 degrees    T Axis 44 degrees    Diagnosis Sinus tachycardia    POCT Glucose    Collection Time: 06/15/22  9:32 PM   Result Value Ref Range    POC Glucose 114 (H) 65 - 100 mg/dL    Performed by: Christen    Potassium    Collection Time: 06/15/22 10:09 PM   Result Value Ref Range    Potassium 2.9 (L) 3.5 - 5.1 mmol/L   POCT Glucose    Collection Time: 06/15/22 10:41 PM   Result Value Ref Range    POC Glucose 122 (H) 65 - 100 mg/dL    Performed by:  AdelinaDigital Caddies    EKG 12 Lead    Collection Time: 06/15/22 10:50 PM   Result Value Ref Range    Ventricular Rate 146 BPM    Atrial Rate 283 BPM    QRS Duration 76 ms    Q-T Interval 306 ms    QTc Calculation (Bazett) 476 ms    R Axis 22 degrees    T Axis 68 degrees    Diagnosis Atrial flutter with variable A-V block    CBC    Collection Time: 06/16/22  4:12 AM   Result Value Ref Range    WBC 28.0 (H) 4.3 - 11.1 K/uL    RBC 2.82 (L) 4.23 - 5.6 M/uL    Hemoglobin 7.8 (L) 13.6 - 17.2 g/dL    Hematocrit 25.5 (L) 41.1 - 50.3 %    MCV 90.4 79.6 - 97.8 FL    MCH 27.7 26.1 - 32.9 PG    MCHC 30.6 (L) 31.4 - 35.0 g/dL    RDW 16.9 (H) 11.9 - 14.6 %    Platelets 586 (H) 007 - 450 K/uL    MPV 10.7 9.4 - 12.3 FL    nRBC 0.02 0.0 - 0.2 K/uL   Comprehensive Metabolic Panel    Collection Time: 06/16/22  4:12 AM   Result Value Ref Range    Sodium 136 (L) 138 - 145 mmol/L    Potassium 3.6 3.5 - 5.1 mmol/L    Chloride 97 (L) 98 - 107 mmol/L    CO2 34 (H) 21 - 32 mmol/L    Anion Gap 5 (L) 7 - 16 mmol/L    Glucose 134 (H) 65 - 100 mg/dL    BUN 11 8 - 23 MG/DL    CREATININE <0.20 (L) 0.8 - 1.5 MG/DL    GFR  0 (L) >60 ml/min/1.73m2    GFR Non- 0 (L) >60 ml/min/1.73m2    Calcium 8.3 8.3 - 10.4 MG/DL    Total Bilirubin 0.4 0.2 - 1.1 MG/DL    ALT 12 12 - 65 U/L    AST 15 15 - 37 U/L    Alk Phosphatase 346 (H) 50 - 136 U/L    Total Protein 5.1 (L) 6.3 - 8.2 g/dL    Albumin 1.8 (L) 3.2 - 4.6 g/dL    Globulin 3.3 2.3 - 3.5 g/dL    Albumin/Globulin Ratio 0.5 (L) 1.2 - 3.5     POCT Glucose    Collection Time: 06/16/22  7:35 AM   Result Value Ref Range    POC Glucose 130 (H) 65 - 100 mg/dL    Performed by: Rey ([place])    Culture, Respiratory    Collection Time: 06/16/22 10:09 AM    Specimen: Bronchial Washing   Result Value Ref Range    Special Requests NO SPECIAL REQUESTS      Gram stain PENDING     Culture LIGHT NORMAL RESPIRATORY TOM      Culture CULTURE IN PROGRESS,FURTHER UPDATES TO FOLLOW     POCT Glucose    Collection Time: 06/16/22 12:41 PM   Result Value Ref Range    POC Glucose 113 (H) 65 - 100 mg/dL    Performed by: Uvaldo    POCT Glucose    Collection Time: 06/16/22 5:10 PM   Result Value Ref Range    POC Glucose 130 (H) 65 - 100 mg/dL    Performed by: Uvaldo    POCT Glucose    Collection Time: 06/16/22  9:40 PM   Result Value Ref Range    POC Glucose 137 (H) 65 - 100 mg/dL    Performed by:  Albert    Magnesium    Collection Time: 06/17/22  3:35 AM   Result Value Ref Range    Magnesium 1.8 1.8 - 2.4 mg/dL   Phosphorus    Collection Time: 06/17/22  3:35 AM   Result Value Ref Range    Phosphorus 2.7 2.3 - 3.7 MG/DL   CBC    Collection Time: 06/17/22  3:35 AM   Result Value Ref Range    WBC 23.6 (H) 4.3 - 11.1 K/uL    RBC 2.80 (L) 4.23 - 5.6 M/uL    Hemoglobin 7.6 (L) 13.6 - 17.2 g/dL    Hematocrit 25.0 (L) 41.1 - 50.3 %    MCV 89.3 79.6 - 97.8 FL    MCH 27.1 26.1 - 32.9 PG    MCHC 30.4 (L) 31.4 - 35.0 g/dL    RDW 16.5 (H) 11.9 - 14.6 %    Platelets 164 (H) 394 - 450 K/uL    MPV 10.2 9.4 - 12.3 FL    nRBC 0.00 0.0 - 0.2 K/uL   Comprehensive Metabolic Panel    Collection Time: 06/17/22  3:35 AM   Result Value Ref Range    Sodium 134 (L) 138 - 145 mmol/L    Potassium 3.0 (L) 3.5 - 5.1 mmol/L    Chloride 94 (L) 98 - 107 mmol/L    CO2 35 (H) 21 - 32 mmol/L    Anion Gap 5 (L) 7 - 16 mmol/L    Glucose 175 (H) 65 - 100 mg/dL    BUN 11 8 - 23 MG/DL    CREATININE <0.20 (L) 0.8 - 1.5 MG/DL    GFR  0 (L) >60 ml/min/1.73m2    GFR Non- 0 (L) >60 ml/min/1.73m2    Calcium 8.1 (L) 8.3 - 10.4 MG/DL    Total Bilirubin 0.3 0.2 - 1.1 MG/DL    ALT 12 12 - 65 U/L    AST 13 (L) 15 - 37 U/L    Alk Phosphatase 359 (H) 50 - 136 U/L    Total Protein 5.2 (L) 6.3 - 8.2 g/dL    Albumin 1.7 (L) 3.2 - 4.6 g/dL    Globulin 3.5 2.3 - 3.5 g/dL    Albumin/Globulin Ratio 0.5 (L) 1.2 - 3.5     POCT Glucose    Collection Time: 06/17/22 10:56 AM   Result Value Ref Range    POC Glucose 116 (H) 65 - 100 mg/dL    Performed by: Nemo        Other Studies:  XR CHEST PORTABLE   Final Result      Slight improved aeration of the left upper lobe when compared to prior exam.   Persistent large area of increased opacities in the left hemithorax may be due   to combination of large pleural effusion and atelectasis. FL GASTROGRAFFIN SWALLOW   Final Result      1. No evidence of anastomotic leak. CPT code(s)91394      XR CHEST 1 VIEW   Final Result      1. New near-complete opacification of the left hemithorax, consistent with   combination of pleural effusion and atelectasis. Consider possibility of mucous   plugging. XR CHEST 1 VIEW   Final Result   1. Essentially stable appearance of bilateral lung opacities and pleural   effusions, left worse than right. XR CHEST 1 VIEW   Final Result      1. Moderate left and small right pleural effusions, similar to prior exam.      XR CHEST 1 VIEW   Final Result      1. Moderate left pleural effusion, similar to prior exam.      2. No significant change compared to prior exam.      XR CHEST 1 VIEW   Final Result   Bilateral airspace opacities and left pleural effusion with interval   worsening on the left. XR CHEST 1 VIEW   Final Result      1. Persistent bibasilar opacities, likely atelectasis or consolidation. XR CHEST 1 VIEW   Final Result   Unchanged mild bibasilar lung atelectasis or infiltrates. XR CHEST 1 VIEW   Final Result   1. New mild right basilar atelectatic appearing changes. No significant acute   changes otherwise seen. It should be noted that the left lung apex has been   clipped limiting complete assessment for pneumothorax. This report was made using voice transcription. Despite my best efforts to avoid   any, transcription errors may persist. If there is any question about the   accuracy of the report or need for clarification, then please call [phone], or text me through perfectserv for clarification or correction. IR GUIDED IVC FILTER PLACEMENT   Final Result   Successful placement an infrarenal Cook Celect inferior vena cava filter.        Plan: The patient may be evaluated in 4 months by interventional radiology in order to   evaluate for continued need of the IVC filter for versus filter removal.                  Vascular duplex lower extremity venous bilateral   Final Result   Negative for sonographic evidence of deep venous thrombosis right   lower extremity. LEFT LOWER EXTREMITY VENOUS DUPLEX ULTRASOUND. INDICATION: Left lower extremity pain. TECHNIQUE: Direct skin-contact high resolution grayscale images, color-flow   Doppler and duplex waveform analysis. FINDINGS: Abnormal intraluminal echoes within the common femoral and profunda   femoral veins. There is some flow. The superficial superficial femoral and   popliteal veins and upper calf veins are unremarkable. IMPRESSION: Positive for deep venous thrombosis left common femoral profunda   femoral veins, nonocclusive. CTA CHEST ABDOMEN PELVIS W WO CONTRAST   Final Result   1. No extravasation to suggest active GI bleed. 2. Small areas of pulmonary embolism are present in the right lower lobe. Clot   burden is quite small. 3. DVT is also likely present in the left femoral veins. 4. Previously described gastric mass again noted. It is difficult to measure to   evaluate for progression. XR CHEST PORTABLE   Final Result   Unremarkable portable chest radiograph.                          Current Meds:  Current Facility-Administered Medications   Medication Dose Route Frequency    [START ON 6/18/2022] furosemide (LASIX) injection 40 mg  40 mg IntraVENous Daily    phenylephrine (KALEIGH-SYNEPHRINE) 50 mg/250 mL infusion   mcg/min IntraVENous Continuous    melatonin tablet 4.5 mg  4.5 mg Per J Tube Nightly PRN    amiodarone (CORDARONE) 450 mg in dextrose 5 % 250 mL infusion ([id number])  0.5 mg/min IntraVENous Continuous    flumazenil (ROMAZICON) injection 0.2 mg  0.2 mg IntraVENous Once    diatrizoate meglumine-sodium (GASTROGRAFIN) 66-10 % solution 120 mL  120 mL Oral ONCE PRN    bisacodyl (DULCOLAX) suppository 10 mg  10 mg Rectal Daily    levalbuterol (XOPENEX) nebulization 0.63 mg  0.63 mg Nebulization Q6H    metoprolol tartrate (LOPRESSOR) tablet 12.5 mg  12.5 mg Per J Tube BID    sodium chloride flush 0.9 % injection 5-40 mL  5-40 mL IntraVENous 2 times per day    sodium chloride flush 0.9 % injection 5-40 mL  5-40 mL IntraVENous PRN    0.9 % sodium chloride infusion  25 mL IntraVENous PRN    enoxaparin Sodium (LOVENOX) injection 30 mg  30 mg SubCUTAneous Q12H    glucose chewable tablet 16 g  4 tablet Oral PRN    dextrose bolus 10% 125 mL  125 mL IntraVENous PRN    Or    dextrose bolus 10% 250 mL  250 mL IntraVENous PRN    glucagon injection 1 mg  1 mg IntraMUSCular PRN    dextrose 5 % solution  100 mL/hr IntraVENous PRN    medicated lip ointment (BLISTEX)   Topical PRN    HYDROmorphone HCl PF (DILAUDID) injection 0.5 mg  0.5 mg IntraVENous Q2H PRN    oxyCODONE (ROXICODONE) 5 MG/5ML solution 5 mg  5 mg Per J Tube Q6H PRN    guaiFENesin (ROBITUSSIN) 100 MG/5ML oral solution 200 mg  200 mg Per J Tube Q6H    insulin lispro (HUMALOG) injection vial 0-4 Units  0-4 Units SubCUTAneous TID WC    insulin lispro (HUMALOG) injection vial 0-4 Units  0-4 Units SubCUTAneous Nightly    cloNIDine (CATAPRES) tablet 0.2 mg  0.2 mg Per J Tube Q4H PRN    sodium chloride flush 0.9 % injection 5-40 mL  5-40 mL IntraVENous 2 times per day    sodium chloride flush 0.9 % injection 5-40 mL  5-40 mL IntraVENous PRN    0.9 % sodium chloride infusion   IntraVENous PRN    potassium chloride 20 mEq/50 mL IVPB (Central Line)  20 mEq IntraVENous PRN    magnesium sulfate 2000 mg in 50 mL IVPB premix  2,000 mg IntraVENous PRN    ondansetron (ZOFRAN) injection 4 mg  4 mg IntraVENous Q6H PRN    Medela Tender Care Lanolin cream   Topical PRN       Signed:  Jaylyn Willams DO    Part of this note may have been written by using a voice dictation software.   The note has been proof read but may still contain some grammatical/other typographical errors.

## 2022-06-17 NOTE — INTERDISCIPLINARY ROUNDS
Interdisciplinary team rounds were held 6/17/2022 with the following team members:Nursing, Nurse Practitioner, Palliative Care, Pastoral Care, Pharmacy, Physician, Respiratory Therapy and Clinical Coordinator and the patient. Plan of care discussed. See clinical pathway and/or care plan for interventions and desired outcomes.

## 2022-06-18 ENCOUNTER — APPOINTMENT (OUTPATIENT)
Dept: GENERAL RADIOLOGY | Age: 67
DRG: 853 | End: 2022-06-18
Payer: MEDICARE

## 2022-06-18 LAB
ALBUMIN SERPL-MCNC: 1.7 G/DL (ref 3.2–4.6)
ALBUMIN/GLOB SERPL: 0.5 {RATIO} (ref 1.2–3.5)
ALP SERPL-CCNC: 348 U/L (ref 50–136)
ALT SERPL-CCNC: 12 U/L (ref 12–65)
ANION GAP SERPL CALC-SCNC: 2 MMOL/L (ref 7–16)
AST SERPL-CCNC: 19 U/L (ref 15–37)
BILIRUB SERPL-MCNC: 0.2 MG/DL (ref 0.2–1.1)
BUN SERPL-MCNC: 11 MG/DL (ref 8–23)
CALCIUM SERPL-MCNC: 8.5 MG/DL (ref 8.3–10.4)
CHLORIDE SERPL-SCNC: 95 MMOL/L (ref 98–107)
CO2 SERPL-SCNC: 39 MMOL/L (ref 21–32)
CREAT SERPL-MCNC: <0.2 MG/DL (ref 0.8–1.5)
ERYTHROCYTE [DISTWIDTH] IN BLOOD BY AUTOMATED COUNT: 16.4 % (ref 11.9–14.6)
GLOBULIN SER CALC-MCNC: 3.6 G/DL (ref 2.3–3.5)
GLUCOSE BLD STRIP.AUTO-MCNC: 118 MG/DL (ref 65–100)
GLUCOSE BLD STRIP.AUTO-MCNC: 127 MG/DL (ref 65–100)
GLUCOSE BLD STRIP.AUTO-MCNC: 128 MG/DL (ref 65–100)
GLUCOSE BLD STRIP.AUTO-MCNC: 94 MG/DL (ref 65–100)
GLUCOSE SERPL-MCNC: 114 MG/DL (ref 65–100)
HCT VFR BLD AUTO: 25.9 % (ref 41.1–50.3)
HGB BLD-MCNC: 7.7 G/DL (ref 13.6–17.2)
MCH RBC QN AUTO: 26.7 PG (ref 26.1–32.9)
MCHC RBC AUTO-ENTMCNC: 29.7 G/DL (ref 31.4–35)
MCV RBC AUTO: 89.9 FL (ref 79.6–97.8)
NRBC # BLD: 0 K/UL (ref 0–0.2)
PLATELET # BLD AUTO: 567 K/UL (ref 150–450)
PMV BLD AUTO: 9.9 FL (ref 9.4–12.3)
POTASSIUM SERPL-SCNC: 3.6 MMOL/L (ref 3.5–5.1)
PROT SERPL-MCNC: 5.3 G/DL (ref 6.3–8.2)
RBC # BLD AUTO: 2.88 M/UL (ref 4.23–5.6)
SERVICE CMNT-IMP: ABNORMAL
SERVICE CMNT-IMP: NORMAL
SODIUM SERPL-SCNC: 136 MMOL/L (ref 138–145)
WBC # BLD AUTO: 21.3 K/UL (ref 4.3–11.1)

## 2022-06-18 PROCEDURE — 85027 COMPLETE CBC AUTOMATED: CPT

## 2022-06-18 PROCEDURE — 1090000001 HH PPS REVENUE CREDIT

## 2022-06-18 PROCEDURE — 2580000003 HC RX 258: Performed by: SURGERY

## 2022-06-18 PROCEDURE — 71045 X-RAY EXAM CHEST 1 VIEW: CPT

## 2022-06-18 PROCEDURE — 87077 CULTURE AEROBIC IDENTIFY: CPT

## 2022-06-18 PROCEDURE — 3609027000 HC BRONCHOSCOPY: Performed by: INTERNAL MEDICINE

## 2022-06-18 PROCEDURE — 32555 ASPIRATE PLEURA W/ IMAGING: CPT | Performed by: INTERNAL MEDICINE

## 2022-06-18 PROCEDURE — 6360000002 HC RX W HCPCS: Performed by: INTERNAL MEDICINE

## 2022-06-18 PROCEDURE — 2580000003 HC RX 258: Performed by: FAMILY MEDICINE

## 2022-06-18 PROCEDURE — 2709999900 HC NON-CHARGEABLE SUPPLY: Performed by: INTERNAL MEDICINE

## 2022-06-18 PROCEDURE — 6370000000 HC RX 637 (ALT 250 FOR IP): Performed by: PHYSICIAN ASSISTANT

## 2022-06-18 PROCEDURE — 87070 CULTURE OTHR SPECIMN AEROBIC: CPT

## 2022-06-18 PROCEDURE — 6370000000 HC RX 637 (ALT 250 FOR IP): Performed by: FAMILY MEDICINE

## 2022-06-18 PROCEDURE — 2700000000 HC OXYGEN THERAPY PER DAY

## 2022-06-18 PROCEDURE — 6370000000 HC RX 637 (ALT 250 FOR IP): Performed by: SURGERY

## 2022-06-18 PROCEDURE — 99152 MOD SED SAME PHYS/QHP 5/>YRS: CPT | Performed by: INTERNAL MEDICINE

## 2022-06-18 PROCEDURE — 6360000002 HC RX W HCPCS: Performed by: SURGERY

## 2022-06-18 PROCEDURE — 94640 AIRWAY INHALATION TREATMENT: CPT

## 2022-06-18 PROCEDURE — 80053 COMPREHEN METABOLIC PANEL: CPT

## 2022-06-18 PROCEDURE — 1090000002 HH PPS REVENUE DEBIT

## 2022-06-18 PROCEDURE — 82962 GLUCOSE BLOOD TEST: CPT

## 2022-06-18 PROCEDURE — 2100000000 HC CCU R&B

## 2022-06-18 PROCEDURE — 87186 SC STD MICRODIL/AGAR DIL: CPT

## 2022-06-18 PROCEDURE — 99232 SBSQ HOSP IP/OBS MODERATE 35: CPT | Performed by: INTERNAL MEDICINE

## 2022-06-18 PROCEDURE — 99233 SBSQ HOSP IP/OBS HIGH 50: CPT | Performed by: INTERNAL MEDICINE

## 2022-06-18 RX ORDER — LIDOCAINE HYDROCHLORIDE 20 MG/ML
JELLY TOPICAL PRN
Status: DISCONTINUED | OUTPATIENT
Start: 2022-06-18 | End: 2022-06-18

## 2022-06-18 RX ORDER — LIDOCAINE HYDROCHLORIDE 40 MG/ML
SOLUTION TOPICAL ONCE
Status: DISCONTINUED | OUTPATIENT
Start: 2022-06-18 | End: 2022-06-18

## 2022-06-18 RX ORDER — FLUMAZENIL 0.1 MG/ML
0.2 INJECTION, SOLUTION INTRAVENOUS PRN
Status: DISCONTINUED | OUTPATIENT
Start: 2022-06-18 | End: 2022-06-18

## 2022-06-18 RX ORDER — NALOXONE HYDROCHLORIDE 0.4 MG/ML
0.4 INJECTION, SOLUTION INTRAMUSCULAR; INTRAVENOUS; SUBCUTANEOUS PRN
Status: DISCONTINUED | OUTPATIENT
Start: 2022-06-18 | End: 2022-06-21 | Stop reason: HOSPADM

## 2022-06-18 RX ORDER — MIDAZOLAM HYDROCHLORIDE 2 MG/2ML
2 INJECTION, SOLUTION INTRAMUSCULAR; INTRAVENOUS PRN
Status: DISCONTINUED | OUTPATIENT
Start: 2022-06-18 | End: 2022-06-18

## 2022-06-18 RX ADMIN — OXYCODONE HYDROCHLORIDE 5 MG: 5 SOLUTION ORAL at 08:58

## 2022-06-18 RX ADMIN — OXYCODONE HYDROCHLORIDE 5 MG: 5 SOLUTION ORAL at 17:24

## 2022-06-18 RX ADMIN — SODIUM CHLORIDE, PRESERVATIVE FREE 10 ML: 5 INJECTION INTRAVENOUS at 21:30

## 2022-06-18 RX ADMIN — SODIUM CHLORIDE, PRESERVATIVE FREE 10 ML: 5 INJECTION INTRAVENOUS at 09:00

## 2022-06-18 RX ADMIN — METOPROLOL TARTRATE 12.5 MG: 25 TABLET, FILM COATED ORAL at 08:57

## 2022-06-18 RX ADMIN — MIDAZOLAM 2 MG: 1 INJECTION INTRAMUSCULAR; INTRAVENOUS at 11:03

## 2022-06-18 RX ADMIN — FENTANYL CITRATE 50 MCG: 50 INJECTION, SOLUTION INTRAMUSCULAR; INTRAVENOUS at 11:03

## 2022-06-18 RX ADMIN — METOPROLOL TARTRATE 12.5 MG: 25 TABLET, FILM COATED ORAL at 21:30

## 2022-06-18 RX ADMIN — HYDROMORPHONE HYDROCHLORIDE 0.5 MG: 1 INJECTION, SOLUTION INTRAMUSCULAR; INTRAVENOUS; SUBCUTANEOUS at 22:03

## 2022-06-18 RX ADMIN — ENOXAPARIN SODIUM 30 MG: 100 INJECTION SUBCUTANEOUS at 08:59

## 2022-06-18 RX ADMIN — GUAIFENESIN 200 MG: 200 SOLUTION ORAL at 14:00

## 2022-06-18 RX ADMIN — ENOXAPARIN SODIUM 30 MG: 100 INJECTION SUBCUTANEOUS at 21:30

## 2022-06-18 RX ADMIN — LEVALBUTEROL HYDROCHLORIDE 0.63 MG: 0.63 SOLUTION RESPIRATORY (INHALATION) at 09:31

## 2022-06-18 RX ADMIN — BISACODYL 10 MG: 10 SUPPOSITORY RECTAL at 08:58

## 2022-06-18 RX ADMIN — GUAIFENESIN 200 MG: 200 SOLUTION ORAL at 00:54

## 2022-06-18 RX ADMIN — GUAIFENESIN 200 MG: 200 SOLUTION ORAL at 08:58

## 2022-06-18 RX ADMIN — LEVALBUTEROL HYDROCHLORIDE 0.63 MG: 0.63 SOLUTION RESPIRATORY (INHALATION) at 15:00

## 2022-06-18 RX ADMIN — GUAIFENESIN 200 MG: 200 SOLUTION ORAL at 19:19

## 2022-06-18 RX ADMIN — FUROSEMIDE 40 MG: 10 INJECTION, SOLUTION INTRAMUSCULAR; INTRAVENOUS at 08:59

## 2022-06-18 RX ADMIN — LEVALBUTEROL HYDROCHLORIDE 0.63 MG: 0.63 SOLUTION RESPIRATORY (INHALATION) at 02:47

## 2022-06-18 ASSESSMENT — PAIN SCALES - GENERAL
PAINLEVEL_OUTOF10: 0
PAINLEVEL_OUTOF10: 4
PAINLEVEL_OUTOF10: 0
PAINLEVEL_OUTOF10: 2
PAINLEVEL_OUTOF10: 7
PAINLEVEL_OUTOF10: 0
PAINLEVEL_OUTOF10: 4
PAINLEVEL_OUTOF10: 4

## 2022-06-18 ASSESSMENT — PAIN DESCRIPTION - FREQUENCY
FREQUENCY: INTERMITTENT
FREQUENCY: INTERMITTENT

## 2022-06-18 ASSESSMENT — PAIN DESCRIPTION - LOCATION
LOCATION: ABDOMEN

## 2022-06-18 ASSESSMENT — PAIN - FUNCTIONAL ASSESSMENT
PAIN_FUNCTIONAL_ASSESSMENT: WONG-BAKER FACES
PAIN_FUNCTIONAL_ASSESSMENT: ACTIVITIES ARE NOT PREVENTED
PAIN_FUNCTIONAL_ASSESSMENT: WONG-BAKER FACES
PAIN_FUNCTIONAL_ASSESSMENT: WONG-BAKER FACES

## 2022-06-18 ASSESSMENT — PAIN DESCRIPTION - ORIENTATION
ORIENTATION: ANTERIOR

## 2022-06-18 ASSESSMENT — PAIN DESCRIPTION - DESCRIPTORS
DESCRIPTORS: SORE

## 2022-06-18 ASSESSMENT — PAIN DESCRIPTION - PAIN TYPE: TYPE: SURGICAL PAIN

## 2022-06-18 NOTE — PROGRESS NOTES
Hospitalist Progress Note   Admit Date:  2022  1:05 PM   Name:  Mini Woodward   Age:  78 y.o. Sex:  male  :  1955   MRN:  [id number]   Room:  [facility]/    Presenting Complaint: Loss of Consciousness     Reason(s) for Admission: Syncope and collapse [R55]  Hemorrhagic shock ([address] [R57.8]  Shock ([address] [R57.9]  History of gastric cancer [Z85.028]  Septic shock ([address] [A41.9, R65.21]  Gastrointestinal hemorrhage, unspecified gastrointestinal hemorrhage type [facility] Course & Interval History:     Please refer to the admission H&P for details of presentation. In summary, Mini Woodward is a 78 y.o. male with medical history significant for   gastric adenocarcinoma s/p 4 cycles chemo, last dose 22, 50 pack year smoking history,  HTN who presented after having a syncopal episode today with 2 day history of near syncope. He has been unable to tolerate solid food for months and has been losing weight. In ED, he was hypotensive, BP 74/54, , RR 22. Hb noted to be 4.9. Workup also revealed  gram positive mildred bacteremia (only 1/2, possible contaminate). He was admitted to ICU due to septic shock requiring pressor support. Started on broad spectrum Abx. CT c/a/p with small R sided PE, femoral DVT. Subsequently had large melanotic stools. EGD performed  but unable to see with food in stomach. Repeat  similar results. AC held and IR placed IVC filter .   was taken for total gastrectomy, left lateral lobectomy of liver, distal pancreatectomy and splenectomy, diaphragmatic resection, J tube placement, and retroperitoneal mass excision and lymphadenectomy, and left abdominal wall mass excision. He again required ICU for vent support and pressor support post-op. He has been stable and was transferred to the floor. Noted to have worsening respiratory status on  requiring 8L of O2 NC. CXR showed moderate pleural effusions. He was started on albumin and diuretics. Underwent left sided thoracentesis with removal of 800cc. Later, went into SVT vs afib which resolved with metoprolol. He had 2 more episodes of SVTs which again resolved with IV metoprolol. EKGs unfortunately showed NSR (as they were done after IV lopressor). On 6/15, he had 3 more episodes of SVT/?afib/flutter after thoracentesis. They all resolved after IV lopressor. Patient was asymptomatic throughout these episodes. He was transferred to the ICU for Amio gtt. Subjective/24 hr Events (06/18/22) : He is more alert. Complains of abdominal pain. Still with cough but improved. Denies CP/SOB. Denies N/V. Review of Systems:  Unable to obtain. Assessment & Plan:       Clostridium Perfringens Bacteremia   BCx from 6/2 showed Clostridium perfringens. Prior provider discussed with ID who recommended - IV Vancomycin til 6/16 - since completed  06/18/22: WBC down to 21.3. Locally advanced Gastric adenocarcinoma   S/p 4 cyclesof chemo last on 5/19/22 6/9/2022 - S/p Ex lap with total gastrectomy and esophagojejunostomy , Left lateral lobectomy of liver, Distal pancreatectomy and splenectomy, Combined diaphragmatic resection,Feeding J-tube placement, Retroperitoneal mass excision and lymphadenectomy, Left abdominal wall mass excision on posterior rectus sheath  - management as per surgery  - on TPN with plan to wean off.   - on Jtube feeding    SVT  06/18/22: Continue metoprolol 12.5mg PO via Jtube. Cardiology following.  - 6/17 Taken off Amio gtt today. - IV metoprolol PRN    Acute Hypoxic respiratory failure  B/l Pleural Effusion  06/18/22: Improving. S/P bronchoscopy on 6/16 and 6/17.  May need another one this AM - CXR pending.  - give o2 supplement to maintain saturation > 92%  - pulmonary appreciated: 6/15 s/p R thoracentesis with removal of 800cc  - continue lasix 40mg IV BID    Hypokalemia  06/18/22: K+ of 3.6, mag normal  - check BMP daily  - continue with telemetry    Corticosteroid dependence   No longer needing as it was started during chemo for fatigue    Acute pulmonary embolism without acute cor pulmonale   Small in RLL with DVt in left femoral veins on 6/2/22. S/p IVC filter  Sq lovenox    ABILIO   Hb stable    Cancer Cachexia // Severe Protein Calorie Malnutrition  - on TPN  - now on TF via J tube  - Nutrition followin    Diet:  ADULT TUBE FEEDING; Jejunostomy; Standard without Fiber; Continuous; 50; No; 30; Other (specify); Manual Flush BID and after each use  ADULT DIET;  Clear Liquid  ADULT ORAL NUTRITION SUPPLEMENT; Breakfast, Dinner; Standard High Calorie/High Protein Oral Supplement  DVT PPx: lovenox  Code status: DNR        Hospital Problems:  Principal Problem (Resolved):    Septic shock ([facility]  Active Problems:    Atrial flutter (HCC)    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HCC)    Former heavy tobacco smoker    Gastroesophageal reflux disease    Hypoalbuminemia due to protein-calorie malnutrition (HCC)    Syncope due to orthostatic hypotension    Corticosteroid dependence ([address]    Hypoproteinemia (HCC)    Do not resuscitate status    Fatigue    History of gastric cancer    Encounter for palliative care    Debility    Weakness    Hypercoagulable state, secondary ([address]    Adult body mass index less than 19    Acute pulmonary embolism without acute cor pulmonale (HCC)    Severe protein-calorie malnutrition (HCC)    Gastrointestinal hemorrhage    Encounter for weaning from ventilator ([address]    S/P exploratory laparotomy    History of partial pancreatectomy    S/P splenectomy    Jejunostomy status ([address]    H/O resection of liver    Clostridium perfringens infection    IVC (inferior vena cava obstruction)    SVT (supraventricular tachycardia) (HCC)    Bilateral pleural effusion    Mucus plugging of bronchi    Atelectasis    Hemorrhagic shock (HCC)    Syncope and collapse    ABILIO (iron deficiency anemia)    Malignant neoplasm of overlapping sites of stomach ([address]    Malignant neoplasm of body of stomach (HCC)    Gastric adenocarcinoma [facility])  Resolved Problems:    Severe sepsis with lactic acidosis (HCC)    ABLA (acute blood loss anemia)    GIB (gastrointestinal bleeding)    Hyponatremia    Hypomagnesemia      Objective:     Patient Vitals for the past 24 hrs:   Temp Pulse Resp BP SpO2   06/18/22 0702 99.1 °F (37.3 °C) 87 20 126/88 97 %   06/18/22 0633 -- 87 17 129/76 97 %   06/18/22 0602 -- 87 21 124/74 96 %   06/18/22 0533 -- 89 -- 133/78 99 %   06/18/22 0503 -- 87 22 137/79 --   06/18/22 0433 -- 85 22 133/75 98 %   06/18/22 0403 98.9 °F (37.2 °C) 89 18 127/72 97 %   06/18/22 0333 -- 86 25 125/72 95 %   06/18/22 0303 -- 87 20 121/73 95 %   06/18/22 0248 -- 86 13 -- 95 %   06/18/22 0233 -- 87 21 132/76 99 %   06/18/22 0203 -- 85 17 (!) 146/77 100 %   06/18/22 0133 -- 84 23 130/73 100 %   06/18/22 0103 -- 83 17 118/67 91 %   06/18/22 0033 -- 85 -- 113/65 92 %   06/18/22 0003 98.3 °F (36.8 °C) 83 25 125/75 94 %   06/17/22 2333 -- 80 24 117/72 96 %   06/17/22 2303 -- 78 (!) 31 118/72 96 %   06/17/22 2233 -- 79 17 115/68 93 %   06/17/22 2209 -- -- 22 -- --   06/17/22 2203 -- 89 23 136/76 92 %   06/17/22 2133 -- 85 18 119/70 95 %   06/17/22 2103 -- 86 17 115/71 93 %   06/17/22 2033 -- 85 18 117/70 92 %   06/17/22 2021 -- 87 18 -- 94 %   06/17/22 2000 -- 85 20 116/67 (!) 89 %   06/17/22 1945 -- 85 19 111/68 91 %   06/17/22 1914 97.9 °F (36.6 °C) 87 18 111/65 91 %   06/17/22 1833 -- 86 27 115/68 97 %   06/17/22 1803 -- 84 16 107/65 97 %   06/17/22 1734 -- 85 18 110/69 97 %   06/17/22 1703 -- 83 (!) 33 (!) 109/58 96 %   06/17/22 1632 -- 83 19 122/72 95 %   06/17/22 1618 -- 83 18 111/68 97 %   06/17/22 1603 -- 83 (!) 33 112/68 96 %   06/17/22 1547 -- 85 29 116/67 97 %   06/17/22 1532 -- 86 15 105/65 94 %   06/17/22 1522 -- 88 18 -- 96 %   06/17/22 1503 97.5 °F (36.4 °C) 88 21 109/64 94 %   06/17/22 1447 -- 91 20 116/63 95 %   06/17/22 1432 -- 96 22 114/70 95 %   06/17/22 1418 -- 91 18 122/78 97 %   06/17/22 1347 -- 89 18 120/75 95 % 06/17/22 1332 -- 91 22 118/74 95 %   06/17/22 1318 -- 90 22 116/72 96 %   06/17/22 1303 -- 90 19 122/73 95 %   06/17/22 1247 -- 95 26 127/80 95 %   06/17/22 1232 -- 89 17 121/75 95 %   06/17/22 1218 -- 90 20 127/76 96 %   06/17/22 1203 -- 88 20 114/69 97 %   06/17/22 1147 -- 88 19 124/78 95 %   06/17/22 1132 -- 87 20 118/75 96 %   06/17/22 1118 -- 86 22 121/71 96 %   06/17/22 1104 -- 86 24 125/71 --   06/17/22 1103 -- 86 23 124/73 92 %   06/17/22 1058 -- 85 23 129/71 92 %   06/17/22 1052 -- 83 23 115/72 95 %   06/17/22 1047 -- 81 25 116/68 95 %   06/17/22 1042 -- 79 24 123/71 97 %   06/17/22 1037 -- 78 22 121/73 99 %   06/17/22 1032 -- 79 19 127/80 94 %   06/17/22 1028 -- 81 17 119/78 93 %   06/17/22 1023 -- 74 17 120/74 95 %   06/17/22 1018 -- 77 19 119/70 94 %   06/17/22 1003 -- 73 27 113/67 92 %   06/17/22 0932 -- 79 17 113/69 91 %   06/17/22 0903 -- 93 (!) 33 (!) 141/79 --   06/17/22 0902 -- 94 24 (!) 142/85 --   06/17/22 0851 -- -- -- 134/78 --       Oxygen Therapy  SpO2: 97 %  Pulse Oximetry Type: Continuous  Pulse via Oximetry: 87 beats per minute  Pulse Oximeter Device Mode: Continuous  Pulse Oximeter Device Location: Finger,Right  O2 Device: High flow nasal cannula  Oximetry Probe Site Changed: No  Skin Assessment: Clean, dry, & intact  Skin Protection for O2 Device: N/A  Orientation: Bilateral  Location: Cheek  Intervention(s): Mouth Care  FiO2 : 65 %  O2 Flow Rate (L/min): 10 L/min  Blood Gas  Performed?: Yes  Loi's Test #1: Collateral flow confirmed  Site #1: Left Radial  Site Prepped #1: Yes  Number of Attempts #1: 1  Pressure Held #1: Yes  Complications #1: None  How Tolerated?: Tolerated well  O2 Delivery Method: Endotracheal tube  Vent Mode: PS/CPAP    Estimated body mass index is 18.52 kg/m² as calculated from the following:    Height as of this encounter: 5' 9\" (1.753 m). Weight as of this encounter: 125 lb 7.1 oz (56.9 kg).     Intake/Output Summary (Last 24 hours) at 6/18/2022 0840  Last data filed at 6/18/2022 0403  Gross per 24 hour   Intake 818.3 ml   Output 1205 ml   Net -386.7 ml         Physical Exam:     Blood pressure 126/88, pulse 87, temperature 99.1 °F (37.3 °C), temperature source Oral, resp. rate 20, height 5' 9\" (1.753 m), weight 125 lb 7.1 oz (56.9 kg), SpO2 97 %. General:    Cachetic, ill appearing, lethargic. Head:  Normocephalic, atraumatic  Eyes:  Sclerae appear normal.  Pupils equally round. ENT:  Nares appear normal, no drainage. Moist oral mucosa  Neck:  No restricted ROM. Trachea midline   CV:   RRR. No m/r/g. No jugular venous distension. Lungs:   Diminished left side. No wheezing, Respirations even, unlabored  Abdomen:   Hypoactive BS, healing mid abdominal incision scar with staple, +J tube. Extremities: No cyanosis or clubbing. No edema  Skin:     No rashes and normal coloration. Warm and dry.     Neuro:  CN II-XII grossly unremarkable  Psych:  Alert, no depression     I have reviewed ordered lab tests and independently visualized imaging below:    Recent Labs:  Recent Results (from the past 48 hour(s))   Culture, Respiratory    Collection Time: 06/16/22 10:09 AM    Specimen: Bronchial Washing   Result Value Ref Range    Special Requests NO SPECIAL REQUESTS      Gram stain 0 TO 3 WBCS SEEN PER OIF     Gram stain 0 TO 1 EPITHELIAL CELLS SEEN PER OIF     Gram stain FEW GRAM POSITIVE COCCI      Culture LIGHT NORMAL RESPIRATORY TOM      Culture CULTURE IN PROGRESS,FURTHER UPDATES TO FOLLOW     Kappa/Lambda Quantitative Free Light Chains, Serum    Collection Time: 06/16/22 12:39 PM   Result Value Ref Range    Free Kappa Light Chains 42.3 (H) 3.3 - 19.4 mg/L    Free Lambda Light Chains 39.5 (H) 5.7 - 26.3 mg/L    K/L RATIO 1.07 0.26 - 1.65     POCT Glucose    Collection Time: 06/16/22 12:41 PM   Result Value Ref Range    POC Glucose 113 (H) 65 - 100 mg/dL    Performed by: Uvaldo    POCT Glucose    Collection Time: 06/16/22  5:10 PM   Result Value Ref Range    POC Glucose 130 (H) 65 - 100 mg/dL    Performed by: Uvaldo    POCT Glucose    Collection Time: 06/16/22  9:40 PM   Result Value Ref Range    POC Glucose 137 (H) 65 - 100 mg/dL    Performed by: Albert    Magnesium    Collection Time: 06/17/22  3:35 AM   Result Value Ref Range    Magnesium 1.8 1.8 - 2.4 mg/dL   Phosphorus    Collection Time: 06/17/22  3:35 AM   Result Value Ref Range    Phosphorus 2.7 2.3 - 3.7 MG/DL   CBC    Collection Time: 06/17/22  3:35 AM   Result Value Ref Range    WBC 23.6 (H) 4.3 - 11.1 K/uL    RBC 2.80 (L) 4.23 - 5.6 M/uL    Hemoglobin 7.6 (L) 13.6 - 17.2 g/dL    Hematocrit 25.0 (L) 41.1 - 50.3 %    MCV 89.3 79.6 - 97.8 FL    MCH 27.1 26.1 - 32.9 PG    MCHC 30.4 (L) 31.4 - 35.0 g/dL    RDW 16.5 (H) 11.9 - 14.6 %    Platelets 679 (H) 580 - 450 K/uL    MPV 10.2 9.4 - 12.3 FL    nRBC 0.00 0.0 - 0.2 K/uL   Comprehensive Metabolic Panel    Collection Time: 06/17/22  3:35 AM   Result Value Ref Range    Sodium 134 (L) 138 - 145 mmol/L    Potassium 3.0 (L) 3.5 - 5.1 mmol/L    Chloride 94 (L) 98 - 107 mmol/L    CO2 35 (H) 21 - 32 mmol/L    Anion Gap 5 (L) 7 - 16 mmol/L    Glucose 175 (H) 65 - 100 mg/dL    BUN 11 8 - 23 MG/DL    CREATININE <0.20 (L) 0.8 - 1.5 MG/DL    GFR  0 (L) >60 ml/min/1.73m2    GFR Non- 0 (L) >60 ml/min/1.73m2    Calcium 8.1 (L) 8.3 - 10.4 MG/DL    Total Bilirubin 0.3 0.2 - 1.1 MG/DL    ALT 12 12 - 65 U/L    AST 13 (L) 15 - 37 U/L    Alk Phosphatase 359 (H) 50 - 136 U/L    Total Protein 5.2 (L) 6.3 - 8.2 g/dL    Albumin 1.7 (L) 3.2 - 4.6 g/dL    Globulin 3.5 2.3 - 3.5 g/dL    Albumin/Globulin Ratio 0.5 (L) 1.2 - 3.5     POCT Glucose    Collection Time: 06/17/22 10:56 AM   Result Value Ref Range    POC Glucose 116 (H) 65 - 100 mg/dL    Performed by: Nemo    POCT Glucose    Collection Time: 06/17/22  5:43 PM   Result Value Ref Range    POC Glucose 115 (H) 65 - 100 mg/dL    Performed by:  Ed LE Glucose    Collection Time: 06/17/22 10:28 PM   Result Value Ref Range    POC Glucose 107 (H) 65 - 100 mg/dL    Performed by: Destiny    POCT Glucose    Collection Time: 06/18/22  2:15 AM   Result Value Ref Range    POC Glucose 118 (H) 65 - 100 mg/dL    Performed by: Destiny    CBC    Collection Time: 06/18/22  4:22 AM   Result Value Ref Range    WBC 21.3 (H) 4.3 - 11.1 K/uL    RBC 2.88 (L) 4.23 - 5.6 M/uL    Hemoglobin 7.7 (L) 13.6 - 17.2 g/dL    Hematocrit 25.9 (L) 41.1 - 50.3 %    MCV 89.9 79.6 - 97.8 FL    MCH 26.7 26.1 - 32.9 PG    MCHC 29.7 (L) 31.4 - 35.0 g/dL    RDW 16.4 (H) 11.9 - 14.6 %    Platelets 678 (H) 597 - 450 K/uL    MPV 9.9 9.4 - 12.3 FL    nRBC 0.00 0.0 - 0.2 K/uL   Comprehensive Metabolic Panel    Collection Time: 06/18/22  4:22 AM   Result Value Ref Range    Sodium 136 (L) 138 - 145 mmol/L    Potassium 3.6 3.5 - 5.1 mmol/L    Chloride 95 (L) 98 - 107 mmol/L    CO2 39 (H) 21 - 32 mmol/L    Anion Gap 2 (L) 7 - 16 mmol/L    Glucose 114 (H) 65 - 100 mg/dL    BUN 11 8 - 23 MG/DL    CREATININE <0.20 (L) 0.8 - 1.5 MG/DL    GFR  0 (L) >60 ml/min/1.73m2    GFR Non- 0 (L) >60 ml/min/1.73m2    Calcium 8.5 8.3 - 10.4 MG/DL    Total Bilirubin 0.2 0.2 - 1.1 MG/DL    ALT 12 12 - 65 U/L    AST 19 15 - 37 U/L    Alk Phosphatase 348 (H) 50 - 136 U/L    Total Protein 5.3 (L) 6.3 - 8.2 g/dL    Albumin 1.7 (L) 3.2 - 4.6 g/dL    Globulin 3.6 (H) 2.3 - 3.5 g/dL    Albumin/Globulin Ratio 0.5 (L) 1.2 - 3.5         Other Studies:  XR CHEST PORTABLE   Final Result      Slight improved aeration of the left upper lobe when compared to prior exam.   Persistent large area of increased opacities in the left hemithorax may be due   to combination of large pleural effusion and atelectasis. FL GASTROGRAFFIN SWALLOW   Final Result      1. No evidence of anastomotic leak. CPT code(s)80458      XR CHEST 1 VIEW   Final Result      1.  New near-complete opacification of the left hemithorax, consistent with   combination of pleural effusion and atelectasis. Consider possibility of mucous   plugging. XR CHEST 1 VIEW   Final Result   1. Essentially stable appearance of bilateral lung opacities and pleural   effusions, left worse than right. XR CHEST 1 VIEW   Final Result      1. Moderate left and small right pleural effusions, similar to prior exam.      XR CHEST 1 VIEW   Final Result      1. Moderate left pleural effusion, similar to prior exam.      2. No significant change compared to prior exam.      XR CHEST 1 VIEW   Final Result   Bilateral airspace opacities and left pleural effusion with interval   worsening on the left. XR CHEST 1 VIEW   Final Result      1. Persistent bibasilar opacities, likely atelectasis or consolidation. XR CHEST 1 VIEW   Final Result   Unchanged mild bibasilar lung atelectasis or infiltrates. XR CHEST 1 VIEW   Final Result   1. New mild right basilar atelectatic appearing changes. No significant acute   changes otherwise seen. It should be noted that the left lung apex has been   clipped limiting complete assessment for pneumothorax. This report was made using voice transcription. Despite my best efforts to avoid   any, transcription errors may persist. If there is any question about the   accuracy of the report or need for clarification, then please call [phone], or text me through [email or web address] for clarification or correction. IR GUIDED IVC FILTER PLACEMENT   Final Result   Successful placement an infrarenal Cook Celect inferior vena cava filter. Plan:   The patient may be evaluated in 4 months by interventional radiology in order to   evaluate for continued need of the IVC filter for versus filter removal.                  Vascular duplex lower extremity venous bilateral   Final Result   Negative for sonographic evidence of deep venous thrombosis right   lower extremity.                 LEFT LOWER EXTREMITY VENOUS DUPLEX ULTRASOUND. INDICATION: Left lower extremity pain. TECHNIQUE: Direct skin-contact high resolution grayscale images, color-flow   Doppler and duplex waveform analysis. FINDINGS: Abnormal intraluminal echoes within the common femoral and profunda   femoral veins. There is some flow. The superficial superficial femoral and   popliteal veins and upper calf veins are unremarkable. IMPRESSION: Positive for deep venous thrombosis left common femoral profunda   femoral veins, nonocclusive. CTA CHEST ABDOMEN PELVIS W WO CONTRAST   Final Result   1. No extravasation to suggest active GI bleed. 2. Small areas of pulmonary embolism are present in the right lower lobe. Clot   burden is quite small. 3. DVT is also likely present in the left femoral veins. 4. Previously described gastric mass again noted. It is difficult to measure to   evaluate for progression. XR CHEST PORTABLE   Final Result   Unremarkable portable chest radiograph.                      XR CHEST PORTABLE    (Results Pending)   XR CHEST PORTABLE    (Results Pending)       Current Meds:  Current Facility-Administered Medications   Medication Dose Route Frequency    furosemide (LASIX) injection 40 mg  40 mg IntraVENous Daily    melatonin tablet 4.5 mg  4.5 mg Per J Tube Nightly PRN    flumazenil (ROMAZICON) injection 0.2 mg  0.2 mg IntraVENous Once    diatrizoate meglumine-sodium (GASTROGRAFIN) 66-10 % solution 120 mL  120 mL Oral ONCE PRN    bisacodyl (DULCOLAX) suppository 10 mg  10 mg Rectal Daily    levalbuterol (XOPENEX) nebulization 0.63 mg  0.63 mg Nebulization Q6H    metoprolol tartrate (LOPRESSOR) tablet 12.5 mg  12.5 mg Per J Tube BID    sodium chloride flush 0.9 % injection 5-40 mL  5-40 mL IntraVENous 2 times per day    sodium chloride flush 0.9 % injection 5-40 mL  5-40 mL IntraVENous PRN    0.9 % sodium chloride infusion  25 mL IntraVENous PRN    enoxaparin Sodium (LOVENOX) injection 30 mg  30 mg SubCUTAneous Q12H    glucose chewable tablet 16 g  4 tablet Oral PRN    dextrose bolus 10% 125 mL  125 mL IntraVENous PRN    Or    dextrose bolus 10% 250 mL  250 mL IntraVENous PRN    glucagon injection 1 mg  1 mg IntraMUSCular PRN    dextrose 5 % solution  100 mL/hr IntraVENous PRN    medicated lip ointment (BLISTEX)   Topical PRN    HYDROmorphone HCl PF (DILAUDID) injection 0.5 mg  0.5 mg IntraVENous Q2H PRN    oxyCODONE (ROXICODONE) 5 MG/5ML solution 5 mg  5 mg Per J Tube Q6H PRN    guaiFENesin (ROBITUSSIN) 100 MG/5ML oral solution 200 mg  200 mg Per J Tube Q6H    insulin lispro (HUMALOG) injection vial 0-4 Units  0-4 Units SubCUTAneous TID WC    insulin lispro (HUMALOG) injection vial 0-4 Units  0-4 Units SubCUTAneous Nightly    cloNIDine (CATAPRES) tablet 0.2 mg  0.2 mg Per J Tube Q4H PRN    sodium chloride flush 0.9 % injection 5-40 mL  5-40 mL IntraVENous 2 times per day    sodium chloride flush 0.9 % injection 5-40 mL  5-40 mL IntraVENous PRN    0.9 % sodium chloride infusion   IntraVENous PRN    potassium chloride 20 mEq/50 mL IVPB (Central Line)  20 mEq IntraVENous PRN    magnesium sulfate 2000 mg in 50 mL IVPB premix  2,000 mg IntraVENous PRN    ondansetron (ZOFRAN) injection 4 mg  4 mg IntraVENous Q6H PRN    Medela Tender Care Lanolin cream   Topical PRN       Signed:  Madiha Mari DO    Part of this note may have been written by using a voice dictation software. The note has been proof read but may still contain some grammatical/other typographical errors.

## 2022-06-18 NOTE — PROGRESS NOTES
[facility] CARDIOLOGY PROGRESS NOTE           6/18/2022 10:36 AM    Admit Date: 6/2/2022      Subjective:   Patient more interactive today, to [facility] on Monday is the plan. No CP. In sinus off the amio gtt  Plan for another bronch. ROS:  Cardiovascular:  As noted above    Objective:      Vitals:    06/18/22 0833 06/18/22 0857 06/18/22 0902 06/18/22 0931   BP: 134/77 134/77 137/73    Pulse: 87 87 90 75   Resp: 21 21 20 15   Temp:       TempSrc:       SpO2: 97% 97% 97% 99%   Weight:       Height:           Physical Exam:  General-No Acute Distress, awake, alert, interactive, weak  Neck- supple, no JVD  CV- regular rate and rhythm no MRG  Lung-decreased at the bases   Abd- soft, nontender, nondistended  Ext- no edema bilaterally. Skin- warm and dry    Data Review:   Recent Labs     06/17/22  0335 06/18/22  0422   * 136*   K 3.0* 3.6   MG 1.8  --    BUN 11 11   WBC 23.6* 21.3*   HGB 7.6* 7.7*   HCT 25.0* 25.9*   * 567*       Assessment/Plan:     1. Atrial flutter:   Off amio, follow, in sinus now.   -Resume therapeutic anticoagulation once ok from surgical perspective and blood counts allow.  -Now in sinus, hemodynamic stable. Likely exacerbated by hypoxia, mucous plugging, recent surgery, anemia.    -Tolerating metoprolol. 2. Cardiomyopathy: Continue metoprolol as above, reasonable to continue Lasix. Pleural effusions visualized on ultrasound today with pulmonology. Add on medical therapy including afterload reduction medications for cardiomyopathy as possible. Echo reviewed and SPEP/free light chains sent and pending at this time. 3. Essential hypertension:   -Controlled at this time  4. Gastric carcinoma: follow s/p sugery  5. Hypoxic respiratory failure/Mucous plugging: plan for another bronch  6. Acute blood loss anemia:   -Hemoglobin downtrending, monitor closely.   Per primary team.  7. Pulmonary embolism/DVT:   Post IVC filter 6/6/22, per pulmonology.     MARINA ARELLANO DO  6/18/2022 10:36 AM

## 2022-06-18 NOTE — PROGRESS NOTES
Admit Date: 2022    POD 9 Day Post-Op    Procedure:  Procedure(s):  Exploratory laparotomy with total gastrectomy and esophagojejunostomy after extensive lysis of adhesions and dissections which added at least 3-4 hours to this case, Left lateral lobectomy of liver, Distal pancreatectomy and splenectomy, Combined diaphragmatic resection, Falciform ligament flap, Feeding J-tube placement, Retroperitoneal mass excision and lymphadenectomy, Left abdominal wall mass excision on posterior rectus sheath    Subjective:     Pt remains in CCU. Underwent Bronch 22 and required repeat Bronch yesterday due to mucus plug. CXR this am showing; Large left pleural effusion with subtotal collapse of the left lung and Opacity in the right lung base may be caused by a small pleural effusion. +flatus/+BM  WBC down to 21.3. AF, NAD. Objective:       Vitals:    22 0833 22 0857 22 0902 22 0931   BP: 134/77 134/77 137/73    Pulse: 87 87 90 75   Resp: 21 21 20 15   Temp:       TempSrc:       SpO2: 97% 97% 97% 99%   Weight:       Height:           Temp (24hrs), Av.3 °F (36.8 °C), Min:97.5 °F (36.4 °C), Max:99.1 °F (37.3 °C)  . I&O reviewed as documented. Physical Exam:   Constitutional: Alert, NAD   /73   Pulse 75   Temp 99.1 °F (37.3 °C) (Oral)   Resp 15   Ht 5' 9\" (1.753 m)   Wt 125 lb 7.1 oz (56.9 kg)   SpO2 99%   BMI 18.52 kg/m²   Eyes: Sclera are clear  ENMT: no external lesions' gross hearing normal; no obvious neck masses, no ear or lip lesions, nares normal  CV: RRR. Normal perfusion  GI: soft and non-distended,  Staples c/d/i. Right and left surgical drain with serosanguineous output. J-tube intact. Musculoskeletal: No embolic signs or cyanosis.    Neuro:  Alert  Psychiatric: Calm and cooperative    Labs:   Recent Results (from the past 24 hour(s))   POCT Glucose    Collection Time: 22 10:56 AM   Result Value Ref Range    POC Glucose 116 (H) 65 - 100 mg/dL Performed by: Nemo    POCT Glucose    Collection Time: 06/17/22  5:43 PM   Result Value Ref Range    POC Glucose 115 (H) 65 - 100 mg/dL    Performed by:  Ed    POCT Glucose    Collection Time: 06/17/22 10:28 PM   Result Value Ref Range    POC Glucose 107 (H) 65 - 100 mg/dL    Performed by: Destiny    POCT Glucose    Collection Time: 06/18/22  2:15 AM   Result Value Ref Range    POC Glucose 118 (H) 65 - 100 mg/dL    Performed by: Destiny    CBC    Collection Time: 06/18/22  4:22 AM   Result Value Ref Range    WBC 21.3 (H) 4.3 - 11.1 K/uL    RBC 2.88 (L) 4.23 - 5.6 M/uL    Hemoglobin 7.7 (L) 13.6 - 17.2 g/dL    Hematocrit 25.9 (L) 41.1 - 50.3 %    MCV 89.9 79.6 - 97.8 FL    MCH 26.7 26.1 - 32.9 PG    MCHC 29.7 (L) 31.4 - 35.0 g/dL    RDW 16.4 (H) 11.9 - 14.6 %    Platelets 474 (H) 075 - 450 K/uL    MPV 9.9 9.4 - 12.3 FL    nRBC 0.00 0.0 - 0.2 K/uL   Comprehensive Metabolic Panel    Collection Time: 06/18/22  4:22 AM   Result Value Ref Range    Sodium 136 (L) 138 - 145 mmol/L    Potassium 3.6 3.5 - 5.1 mmol/L    Chloride 95 (L) 98 - 107 mmol/L    CO2 39 (H) 21 - 32 mmol/L    Anion Gap 2 (L) 7 - 16 mmol/L    Glucose 114 (H) 65 - 100 mg/dL    BUN 11 8 - 23 MG/DL    CREATININE <0.20 (L) 0.8 - 1.5 MG/DL    GFR  0 (L) >60 ml/min/1.73m2    GFR Non- 0 (L) >60 ml/min/1.73m2    Calcium 8.5 8.3 - 10.4 MG/DL    Total Bilirubin 0.2 0.2 - 1.1 MG/DL    ALT 12 12 - 65 U/L    AST 19 15 - 37 U/L    Alk Phosphatase 348 (H) 50 - 136 U/L    Total Protein 5.3 (L) 6.3 - 8.2 g/dL    Albumin 1.7 (L) 3.2 - 4.6 g/dL    Globulin 3.6 (H) 2.3 - 3.5 g/dL    Albumin/Globulin Ratio 0.5 (L) 1.2 - 3.5     POCT Glucose    Collection Time: 06/18/22  9:13 AM   Result Value Ref Range    POC Glucose 128 (H) 65 - 100 mg/dL    Performed by: Chavez (Alverson)        Data Review     Xray Result (most recent):  XR CHEST PORTABLE 06/18/2022    Narrative  PORTABLE CHEST 2 VIEWS    HISTORY: Shortness of breath and atelectasis    COMPARISON: June 17, 2022    FINDINGS: A right-sided PICC line terminates at the cavoatrial junction. There  is subtotal collapse of the left lung with a large left pleural effusion. There  is opacity in the right lung base that may be caused by a small pleural  effusion. EKG leads are present. Impression  1. Large left pleural effusion with subtotal collapse of the left lung. 2.  Opacity in the right lung base may be caused by a small pleural effusion.       Assessment:     Principal Problem (Resolved):    Septic shock (HCC)  Active Problems:    Atrial flutter (HCC)    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HCC)    Former heavy tobacco smoker    Gastroesophageal reflux disease    Hypoalbuminemia due to protein-calorie malnutrition (HCC)    Syncope due to orthostatic hypotension    Corticosteroid dependence (HCC)    Hypoproteinemia (HCC)    Do not resuscitate status    Fatigue    History of gastric cancer    Encounter for palliative care    Debility    Weakness    Hypercoagulable state, secondary ([facility] [address]    Adult body mass index less than 19    Acute pulmonary embolism without acute cor pulmonale (HCC)    Severe protein-calorie malnutrition (HCC)    Gastrointestinal hemorrhage    Encounter for weaning from ventilator ([facility] [address]    S/P exploratory laparotomy    History of partial pancreatectomy    S/P splenectomy    Jejunostomy status ([address]    H/O resection of liver    Clostridium perfringens infection    IVC (inferior vena cava obstruction)    SVT (supraventricular tachycardia) (HCC)    Bilateral pleural effusion    Mucus plugging of bronchi    Atelectasis    Hemorrhagic shock (HCC)    Syncope and collapse    ABILIO (iron deficiency anemia)    Malignant neoplasm of overlapping sites of stomach (HCC)    Malignant neoplasm of body of stomach (HCC)    Gastric adenocarcinoma (HCC)  Resolved Problems:    Severe sepsis with lactic acidosis (HCC)    ABLA (acute blood loss anemia)    GIB (gastrointestinal bleeding)    Hyponatremia    Hypomagnesemia        Plan/Recommendations/Medical Decision Making:     Care management per Intensivist  Bronch by Pulmonology 6/16 and 6/17 mucous plugging  Swallow study 6/16  CLD  Tube feeds @ 50 cc/hr  No CPAP/ No BIPBP/ No Bagging  Follow labs      Yen Rosario, NP

## 2022-06-18 NOTE — OP NOTE
Operative Note      Patient: Sg Johnson  YOB: 1955  MRN: [id number]    Date of Procedure: 6/18/2022    Pre-Op Diagnosis: Ineffective airway clearance [R06.89]    Post-Op Diagnosis: Same       Procedure(s):  BRONCHOSCOPY    Surgeon(s):  Whitney James MD    Assistant:   * No surgical staff found *    Anesthesia: IV Sedation    Estimated Blood Loss (mL): None    Complications: None    Specimens:   ID Type Source Tests Collected by Time Destination   1 : Bronch wash Respiratory Bronchial Washing CULTURE, RESPIRATORY Whitney James MD 6/18/2022 1105        Findings: Massive mucus plugging L > R    Detailed Description of Procedure:     PROCEDURE:  Bronchoscopy with airway inspection /cleanout     INDICATION: L Atx    EQUIPMENT: Olympus T 180 Bronchoscope    ANESTHESIA: Concious sedation with: Fentanyl 50 mcg IV; Versed 2mg IV; Lidocaine 100 mg to tracheo-bronchial tree and vocal cords; Please see ICU/CCU/CTICU medication record. AIRWAY INSPECTION  After obtaining informed consent, orally with use of a bite block, an Olympus T 180 Bronchoscope was introduced into the trachea without complication. RIGHT  LOCATION NORM/ABNORM DESCRIPTION   Larynx NL    VOCAL CORDS NL    TRACHEA NL    BRENDAN NL Partially obscured by thick white/yellow secretions. RMSB NL Mild mucus easily suctioned away.    RUL NL    BI NL Moderate mucus easily suctioned   RML NL    RLL NL    SUP SEGM RLL NL    MED BASAL NL Moderate mucus suctioned clear   ANTERIOR BASAL NL    LATERAL BASAL NL    POSTERIOR BASAL NL          LEFT  LOCATION NORM/ABNORM DESCRIPTION   LMSB NL Copious mucus cleared with suction and saline   LAVELLE NL Mild mucus   LINGULA NL Mild mucus   LLL NL Copious mucus cleared with suction and saline   SUPERIOR SEG LLL NL Copious mucus cleared with suction and saline   RANDAL-MEDIAL LLL NL Copious mucus cleared with suction and saline   LATERAL LLL NL Copious mucus cleared with suction and saline POSTERIOR LLL NL Copious mucus cleared with suction and saline     The following samples were obtained:    Sputum for GS, C&S    The procedure was completed without complication and the patient tolerated the procedure well.     EBL: None    Recommendations:     Continue aggressive mucus clearance measures        Electronically signed by Lynnette Coronel MD on 6/18/2022 at 11:15 AM

## 2022-06-18 NOTE — PROGRESS NOTES
[facility] Critical Care Note[de-identified] 6/18/2022  Watson Shultz  Admission Date: 6/2/2022     Length of Stay: 16 days    Background:     78 y.o. y/o male with history of gastric carcinoma s/p neoadjuvant chemo (last dose May 29, 2022) with plans for subsequent total gastrectomy, HTN, admitted to hospitalist service 6/2 with syncopal episode and anemia (hgb 4.9 on admission) as well as gram positive mildred bacteremia (only 1/2, possible contaminate). Unable to tolerate solid food for months and has been losing weight. Was hypotension on pressors initially. Was covered with vanc and cefepime initially, flagyl added with GPR returned. CT c/a/p with small R sided PE, femoral DVT. Subsequently had large melanotic stools, transfused. EGD performed 6/4 but unable to see with food in stomach. Repeat 6/5 similar results. AC held and RI placed IVC filter 6/6.   6/9 was taken for total gastrectomy, left lateral lobectomy of liver, distal pancreatectomy and splenectomy, diaphragmatic resection, J tube placement, and retroperitoneal mass excision and lymphadenectomy, and left abdominal wall mass excision. To ICU on vent post op and requiring joe. Notable PMH:  has a past medical history of ABLA (acute blood loss anemia), GIB (gastrointestinal bleeding), HTN (hypertension), and Severe sepsis with lactic acidosis ([address] 24 Hour events:     Required repeat cleanout bronchoscopy yesterday for L atx due to mucus plugging. No CXR done this AM. Currently on NC with sat of 97%. Afebrile and hemodynamically stable. WBC coming down. Accepted to [facility] for Monday 6/20.  Bronch wash from 6/16 growing NRF.      ROS:       Constitutional: negative for fever, chills, sweats  Cardiac: negative for chest pain, palpitations, syncope, edema  GI: negative for dysphagia, reflux, vomiting, diarrhea, abdominal pain, or melena  Neuro: negative for focal weakness, numbness, headache      Lines: (insertion date)    Closed/Suction Drain Right RUQ Bulb (Active)       Closed/Suction Drain Left LUQ Bulb (Active)       NG/OG/NJ/NE Tube Right nostril (Active)       Gastrostomy/Enterostomy/Jejunostomy Tube Gastrostomy LUQ 1 14 fr (Active)       Urinary Catheter 2 Way (Active)     Single Lumen Implantable Port Left Chest (Active)       Arterial Line 06/09/22 Radial (Active)     Drips: current dose (range)  Dose (mcg/kg/min) Propofol : 0 mcg/kg/min  Dose (mcg/min) Phenylephrine : 0 mcg/min (map 91)     Pertinent Exam:         Blood pressure 129/76, pulse 87, temperature 98.9 °F (37.2 °C), temperature source Oral, resp. rate 17, height 5' 9\" (1.753 m), weight 125 lb 7.1 oz (56.9 kg), SpO2 97 %. Intake/Output Summary (Last 24 hours) at 6/18/2022 0718  Last data filed at 6/18/2022 0403  Gross per 24 hour   Intake 818.3 ml   Output 1205 ml   Net -386.7 ml     Constitutional: AAO in NC, pain controlled, weak. On 10L O2.   EENMT:  Sclera clear, pupils equal, oral mucosa moist  Respiratory: scattered rhonchi; fair aeration on L  Cardiovascular:  RRR  Gastrointestinal:  soft, dressing on, NG in place  Musculoskeletal:  warm with no cyanosis, no lower extremity edema  Skin:  no jaundice or ecchymosis  Neurologic: awake  Psychiatric: appropriate mood and affect    CXR:     6/18: pending    6/17, 16    Complete L sided atelectasis          Recent Labs     06/16/22 0412 06/17/22  0335 06/18/22 0422   WBC 28.0* 23.6* 21.3*   HGB 7.8* 7.6* 7.7*   HCT 25.5* 25.0* 25.9*   * 542* 567*     Recent Labs     06/16/22 0412 06/17/22  0335 06/18/22  0422   * 134* 136*   K 3.6 3.0* 3.6   CL 97* 94* 95*   CO2 34* 35* 39*   BUN 11 11 11   CREATININE <0.20* <0.20* <0.20*   MG  --  1.8  --    PHOS  --  2.7  --    BILITOT 0.4 0.3 0.2   AST 15 13* 19   ALT 12 12 12   ALKPHOS 346* 359* 348*     No results for input(s): TROPHS, NTPROBNP, CRP, ESR in the last 72 hours.   Recent Labs     06/16/22  0412 06/17/22  0335 06/18/22  0422   GLUCOSE 134* 175* 114*      ECHO: 06/02/22    TRANSTHORACIC ECHOCARDIOGRAM (TTE) COMPLETE (CONTRAST/BUBBLE/3D PRN) 06/09/2022  9:31 AM (Final)    Interpretation Summary    Left Ventricle: Reduced left ventricular systolic function. EF by 2D Simpsons Biplane is 42%. Left ventricle size is normal. Severely increased wall thickness. Increased ventricular mass. Infiltrative cardiomyopathy should be considered. Global hypokinesis present. Abnormal diastolic function.   Aortic Valve: Thickened cusp. Mildly calcified cusp. Sclerosis of the aortic valve cusp. Moderate annular calcification vs appearance of aortic valve prosthesis [clinical correlation, clinical history]. Mild regurgitation.   Mitral Valve: Mildly thickened leaflet.   Aorta: Dilated aortic root. Dilated ascending aorta.   Pericardium: Small (<1 cm) localized pericardial effusion present around the right ventricle.   Technical qualifiers: Color flow Doppler was performed and pulse wave and/or continuous wave Doppler was performed. Signed by: Reshma Payne MD on 6/9/2022  9:31 AM    Microbiology:   Recent Labs     06/15/22  0915 06/16/22  1009   CULTURE NO GROWTH 2 DAYS LIGHT NORMAL RESPIRATORY TOM  CULTURE IN PROGRESS,FURTHER UPDATES TO FOLLOW         [place]  Date:   LEFT RIGHT  DESCRIPTION   6/15/22  800ml Transudate, yogesh           Pleural fluid analysis-  Transudate 6/15  Date:   Pleural fluid Serum ratio   Total protein 1.4     LDH LD 96         Assessment and Plan:  (Medical Decision Making)       Impression: 78 y.o. male with gastric cancer, s/p joe adjuvant chemo. Admitted with syncope that was likely multifactorial, driven by recent weight loss, poor PO intake, and severe anemia into the 4's. GPR on blood culture but doubt this was sepsis as this was likely contaminate. Course complicated by discovery of PE and DVT, subsequent GI bleed likely due to his cancer, requiring IVC filter placement.  No post op extensive resections and brought to ICU still intubated and on pressors. Has severe hypoproteinemia and has been given fluids and blood during his hospitalization. R effusion tapped with L looking loculated. Has required cleanout bronchs x 2 past 2 days for L atx. Active Hospital Problems    Gastrointestinal hemorrhage: Resolved with fairly stable H/H. Monitor. Severe protein-calorie malnutrition ([facility] [address]): Very severe with last albumin only 1.7 richard after supplementation. Getting albumin IV with lasix. Hypoproteinemia ([facility] [address]): as above      Do not resuscitate status      Hypercoagulable state, secondary [facility]): Now with DVT/PE. Getting lovenox 30 q12h. Now post IVC filter. Adult body mass index less than 19      Acute pulmonary embolism without acute cor pulmonale (HCC)  Small in RLL with DVt in left femoral veins on 6/2/22. On lovenox with prophylactic dose. Fatigue      History of gastric cancer:  Per surgery post op      Debility: multifactorial.      Weakness      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[facility] [address]): ongoing. Syncope due to orthostatic hypotension      Hypomagnesemia      Corticosteroid dependence (HCC)      Gastroesophageal reflux disease      Gastric adenocarcinoma (HCC)      Malignant neoplasm of body of stomach (HCC)      Malignant neoplasm of overlapping sites of stomach (HCC)      ABILIO (iron deficiency anemia)    Bilateral pleural effusions L > R  R tapped and transudate. L loculated and would need IR. Atelectasis on left: await CXR today  Bronch prn. Mucus plugging is the cause for the atelectasis on left. Acute Hypoxemic Respiratory Failure: remains on 10L but can wean. Wean oxygen as able and mobilize.      DNR        Clarke Sanchez MD

## 2022-06-19 ENCOUNTER — APPOINTMENT (OUTPATIENT)
Dept: GENERAL RADIOLOGY | Age: 67
DRG: 853 | End: 2022-06-19
Payer: MEDICARE

## 2022-06-19 LAB
ALBUMIN SERPL-MCNC: 1.7 G/DL (ref 3.2–4.6)
ALBUMIN/GLOB SERPL: 0.4 {RATIO} (ref 1.2–3.5)
ALP SERPL-CCNC: 291 U/L (ref 50–136)
ALT SERPL-CCNC: 10 U/L (ref 12–65)
ANION GAP SERPL CALC-SCNC: 6 MMOL/L (ref 7–16)
AST SERPL-CCNC: 12 U/L (ref 15–37)
BACTERIA SPEC CULT: ABNORMAL
BACTERIA SPEC CULT: ABNORMAL
BILIRUB SERPL-MCNC: 0.2 MG/DL (ref 0.2–1.1)
BUN SERPL-MCNC: 12 MG/DL (ref 8–23)
CALCIUM SERPL-MCNC: 8.6 MG/DL (ref 8.3–10.4)
CHLORIDE SERPL-SCNC: 93 MMOL/L (ref 98–107)
CO2 SERPL-SCNC: 38 MMOL/L (ref 21–32)
CREAT SERPL-MCNC: <0.2 MG/DL (ref 0.8–1.5)
ERYTHROCYTE [DISTWIDTH] IN BLOOD BY AUTOMATED COUNT: 16.4 % (ref 11.9–14.6)
GLOBULIN SER CALC-MCNC: 3.9 G/DL (ref 2.3–3.5)
GLUCOSE BLD STRIP.AUTO-MCNC: 115 MG/DL (ref 65–100)
GLUCOSE BLD STRIP.AUTO-MCNC: 124 MG/DL (ref 65–100)
GLUCOSE BLD STRIP.AUTO-MCNC: 146 MG/DL (ref 65–100)
GLUCOSE SERPL-MCNC: 118 MG/DL (ref 65–100)
GRAM STN SPEC: ABNORMAL
HCT VFR BLD AUTO: 25.3 % (ref 41.1–50.3)
HGB BLD-MCNC: 7.7 G/DL (ref 13.6–17.2)
MCH RBC QN AUTO: 27.2 PG (ref 26.1–32.9)
MCHC RBC AUTO-ENTMCNC: 30.4 G/DL (ref 31.4–35)
MCV RBC AUTO: 89.4 FL (ref 79.6–97.8)
NRBC # BLD: 0 K/UL (ref 0–0.2)
PLATELET # BLD AUTO: 597 K/UL (ref 150–450)
PMV BLD AUTO: 9.9 FL (ref 9.4–12.3)
POTASSIUM SERPL-SCNC: 3.5 MMOL/L (ref 3.5–5.1)
PROT SERPL-MCNC: 5.6 G/DL (ref 6.3–8.2)
RBC # BLD AUTO: 2.83 M/UL (ref 4.23–5.6)
SERVICE CMNT-IMP: ABNORMAL
SODIUM SERPL-SCNC: 137 MMOL/L (ref 136–145)
WBC # BLD AUTO: 20.1 K/UL (ref 4.3–11.1)

## 2022-06-19 PROCEDURE — 85027 COMPLETE CBC AUTOMATED: CPT

## 2022-06-19 PROCEDURE — 99233 SBSQ HOSP IP/OBS HIGH 50: CPT | Performed by: INTERNAL MEDICINE

## 2022-06-19 PROCEDURE — 6360000002 HC RX W HCPCS: Performed by: INTERNAL MEDICINE

## 2022-06-19 PROCEDURE — 94640 AIRWAY INHALATION TREATMENT: CPT

## 2022-06-19 PROCEDURE — 80053 COMPREHEN METABOLIC PANEL: CPT

## 2022-06-19 PROCEDURE — 2580000003 HC RX 258: Performed by: SURGERY

## 2022-06-19 PROCEDURE — 71045 X-RAY EXAM CHEST 1 VIEW: CPT

## 2022-06-19 PROCEDURE — 2700000000 HC OXYGEN THERAPY PER DAY

## 2022-06-19 PROCEDURE — 99232 SBSQ HOSP IP/OBS MODERATE 35: CPT | Performed by: INTERNAL MEDICINE

## 2022-06-19 PROCEDURE — 2580000003 HC RX 258: Performed by: FAMILY MEDICINE

## 2022-06-19 PROCEDURE — 2500000003 HC RX 250 WO HCPCS: Performed by: SURGERY

## 2022-06-19 PROCEDURE — 6370000000 HC RX 637 (ALT 250 FOR IP): Performed by: SURGERY

## 2022-06-19 PROCEDURE — 2580000003 HC RX 258: Performed by: INTERNAL MEDICINE

## 2022-06-19 PROCEDURE — 94760 N-INVAS EAR/PLS OXIMETRY 1: CPT

## 2022-06-19 PROCEDURE — 6360000002 HC RX W HCPCS: Performed by: SURGERY

## 2022-06-19 PROCEDURE — 82962 GLUCOSE BLOOD TEST: CPT

## 2022-06-19 PROCEDURE — 6370000000 HC RX 637 (ALT 250 FOR IP): Performed by: FAMILY MEDICINE

## 2022-06-19 PROCEDURE — 1090000002 HH PPS REVENUE DEBIT

## 2022-06-19 PROCEDURE — 2100000000 HC CCU R&B

## 2022-06-19 PROCEDURE — 1090000001 HH PPS REVENUE CREDIT

## 2022-06-19 RX ORDER — SODIUM CHLORIDE FOR INHALATION 3 %
4 VIAL, NEBULIZER (ML) INHALATION 2 TIMES DAILY
Status: DISCONTINUED | OUTPATIENT
Start: 2022-06-19 | End: 2022-06-21 | Stop reason: HOSPADM

## 2022-06-19 RX ADMIN — HYDROMORPHONE HYDROCHLORIDE 0.5 MG: 1 INJECTION, SOLUTION INTRAMUSCULAR; INTRAVENOUS; SUBCUTANEOUS at 03:26

## 2022-06-19 RX ADMIN — HYDROMORPHONE HYDROCHLORIDE 0.5 MG: 1 INJECTION, SOLUTION INTRAMUSCULAR; INTRAVENOUS; SUBCUTANEOUS at 22:01

## 2022-06-19 RX ADMIN — METOPROLOL TARTRATE 12.5 MG: 25 TABLET, FILM COATED ORAL at 10:07

## 2022-06-19 RX ADMIN — LEVALBUTEROL HYDROCHLORIDE 0.63 MG: 0.63 SOLUTION RESPIRATORY (INHALATION) at 21:11

## 2022-06-19 RX ADMIN — FUROSEMIDE 40 MG: 10 INJECTION, SOLUTION INTRAMUSCULAR; INTRAVENOUS at 10:07

## 2022-06-19 RX ADMIN — SODIUM CHLORIDE SOLN NEBU 3% 4 ML: 3 NEBU SOLN at 21:11

## 2022-06-19 RX ADMIN — ENOXAPARIN SODIUM 30 MG: 100 INJECTION SUBCUTANEOUS at 10:00

## 2022-06-19 RX ADMIN — GUAIFENESIN 200 MG: 200 SOLUTION ORAL at 18:17

## 2022-06-19 RX ADMIN — HYDROMORPHONE HYDROCHLORIDE 0.5 MG: 1 INJECTION, SOLUTION INTRAMUSCULAR; INTRAVENOUS; SUBCUTANEOUS at 10:07

## 2022-06-19 RX ADMIN — GUAIFENESIN 200 MG: 200 SOLUTION ORAL at 01:51

## 2022-06-19 RX ADMIN — SODIUM CHLORIDE SOLN NEBU 3% 4 ML: 3 NEBU SOLN at 09:16

## 2022-06-19 RX ADMIN — GUAIFENESIN 200 MG: 200 SOLUTION ORAL at 06:04

## 2022-06-19 RX ADMIN — OXYCODONE HYDROCHLORIDE 5 MG: 5 SOLUTION ORAL at 18:17

## 2022-06-19 RX ADMIN — METOPROLOL TARTRATE 12.5 MG: 25 TABLET, FILM COATED ORAL at 20:42

## 2022-06-19 RX ADMIN — LEVALBUTEROL HYDROCHLORIDE 0.63 MG: 0.63 SOLUTION RESPIRATORY (INHALATION) at 09:16

## 2022-06-19 RX ADMIN — GUAIFENESIN 200 MG: 200 SOLUTION ORAL at 12:03

## 2022-06-19 RX ADMIN — SODIUM CHLORIDE, PRESERVATIVE FREE 10 ML: 5 INJECTION INTRAVENOUS at 20:43

## 2022-06-19 RX ADMIN — SODIUM CHLORIDE, PRESERVATIVE FREE 10 ML: 5 INJECTION INTRAVENOUS at 10:00

## 2022-06-19 RX ADMIN — HYDROMORPHONE HYDROCHLORIDE 0.5 MG: 1 INJECTION, SOLUTION INTRAMUSCULAR; INTRAVENOUS; SUBCUTANEOUS at 15:19

## 2022-06-19 RX ADMIN — LEVALBUTEROL HYDROCHLORIDE 0.63 MG: 0.63 SOLUTION RESPIRATORY (INHALATION) at 04:02

## 2022-06-19 RX ADMIN — HYDROMORPHONE HYDROCHLORIDE 0.5 MG: 1 INJECTION, SOLUTION INTRAMUSCULAR; INTRAVENOUS; SUBCUTANEOUS at 06:05

## 2022-06-19 RX ADMIN — ENOXAPARIN SODIUM 30 MG: 100 INJECTION SUBCUTANEOUS at 21:25

## 2022-06-19 RX ADMIN — LEVALBUTEROL HYDROCHLORIDE 0.63 MG: 0.63 SOLUTION RESPIRATORY (INHALATION) at 14:30

## 2022-06-19 RX ADMIN — OXYCODONE HYDROCHLORIDE 5 MG: 5 SOLUTION ORAL at 12:03

## 2022-06-19 ASSESSMENT — PAIN - FUNCTIONAL ASSESSMENT
PAIN_FUNCTIONAL_ASSESSMENT: ACTIVITIES ARE NOT PREVENTED
PAIN_FUNCTIONAL_ASSESSMENT: ACTIVITIES ARE NOT PREVENTED
PAIN_FUNCTIONAL_ASSESSMENT: PREVENTS OR INTERFERES WITH MANY ACTIVE NOT PASSIVE ACTIVITIES
PAIN_FUNCTIONAL_ASSESSMENT: PREVENTS OR INTERFERES SOME ACTIVE ACTIVITIES AND ADLS

## 2022-06-19 ASSESSMENT — PAIN DESCRIPTION - LOCATION
LOCATION: ABDOMEN

## 2022-06-19 ASSESSMENT — PAIN SCALES - GENERAL
PAINLEVEL_OUTOF10: 5
PAINLEVEL_OUTOF10: 0
PAINLEVEL_OUTOF10: 7
PAINLEVEL_OUTOF10: 0
PAINLEVEL_OUTOF10: 7
PAINLEVEL_OUTOF10: 0
PAINLEVEL_OUTOF10: 5
PAINLEVEL_OUTOF10: 0

## 2022-06-19 ASSESSMENT — PAIN DESCRIPTION - DESCRIPTORS
DESCRIPTORS: SORE;TENDER
DESCRIPTORS: ACHING
DESCRIPTORS: SORE
DESCRIPTORS: SORE;ACHING
DESCRIPTORS: SORE;TENDER

## 2022-06-19 ASSESSMENT — PAIN DESCRIPTION - ORIENTATION
ORIENTATION: ANTERIOR
ORIENTATION: MID
ORIENTATION: ANTERIOR

## 2022-06-19 NOTE — PROGRESS NOTES
[facility] CARDIOLOGY PROGRESS NOTE           6/19/2022 12:05 PM    Admit Date: 6/2/2022      Subjective:   Patient more interactive today, to [facility] AT Replaced by [facility] on Monday is the plan. Family at the bedside. No CP. In sinus off the amio gtt        ROS:  Cardiovascular:  As noted above    Objective:      Vitals:    06/19/22 1007 06/19/22 1016 06/19/22 1037 06/19/22 1203   BP: 124/82      Pulse: 85 87     Resp: 20 21 17 16   Temp:       TempSrc:       SpO2:  93%     Weight:       Height:           Physical Exam:  General-No Acute Distress, awake, alert, interactive, weak  Neck- supple, no JVD  CV- regular rate and rhythm no MRG  Lung-decreased at the bases   Abd- soft, nontender, nondistended  Ext- no edema bilaterally. Skin- warm and dry    Data Review:   Recent Labs     06/17/22  0335 06/17/22  0335 06/18/22  0422 06/19/22  0230   *   < > 136* 137   K 3.0*   < > 3.6 3.5   MG 1.8  --   --   --    BUN 11   < > 11 12   WBC 23.6*   < > 21.3* 20.1*   HGB 7.6*   < > 7.7* 7.7*   HCT 25.0*   < > 25.9* 25.3*   *   < > 567* 597*    < > = values in this interval not displayed. Assessment/Plan:     1. Atrial flutter:   Off amio, follow, in sinus now.   -Resume therapeutic anticoagulation once ok from surgical perspective and blood counts allow.  -Now in sinus, hemodynamic stable. Likely exacerbated by hypoxia, mucous plugging, recent surgery, anemia.    -Tolerating metoprolol. 2. Cardiomyopathy: Continue metoprolol as above, reasonable to continue Lasix. Add on medical therapy including afterload reduction medications for cardiomyopathy as possible. Echo reviewed and SPEP/free light chains sent and pending at this time. 3. Essential hypertension:   -Controlled at this time  4. Gastric carcinoma: follow s/p sugery  5. Hypoxic respiratory failure/Mucous plugging: per pulm  6. Acute blood loss anemia:   -Hemoglobin downtrending, monitor closely.   Per primary team.  7. Pulmonary embolism/DVT:   Post IVC filter 6/6/22, per pulmonology.     MARINA ARELLANO DO  6/19/2022 12:05 PM

## 2022-06-19 NOTE — PROGRESS NOTES
Admit Date: 2022    POD 10 Day Post-Op    Procedure:  Procedure(s):  Exploratory laparotomy with total gastrectomy and esophagojejunostomy after extensive lysis of adhesions and dissections which added at least 3-4 hours to this case, Left lateral lobectomy of liver, Distal pancreatectomy and splenectomy, Combined diaphragmatic resection, Falciform ligament flap, Feeding J-tube placement, Retroperitoneal mass excision and lymphadenectomy, Left abdominal wall mass excision on posterior rectus sheath    Subjective:     Pt remains in CCU. Underwent Bronch , , and  due to mucus plug. CXR this am showing; Persistent bilateral lung infiltrates, No significant pneumothorax, interval decrease in the size of the large left pleural effusion, and increased aeration in the left upper lung. Pt tube feeds in progress at 50mL hr. Pt tolerating Clear Liquids. No nausea/ vomiting. +flatus/+BM. WBC 20.1. AF, NAD. AF, NAD. Objective:       Vitals:    22 1001 22 1007 22 1016 22 1037   BP: 124/82 124/82     Pulse: 91 85 87    Resp: 14 20 21 17   Temp:       TempSrc:       SpO2: (!) 81%  93%    Weight:       Height:           Temp (24hrs), Av °F (36.7 °C), Min:97.7 °F (36.5 °C), Max:98.7 °F (37.1 °C)  . I&O reviewed as documented. Physical Exam:   Constitutional: Alert, NAD   /82   Pulse 87   Temp 97.7 °F (36.5 °C) (Oral)   Resp 17   Ht 5' 9\" (1.753 m)   Wt 125 lb (56.7 kg)   SpO2 93%   BMI 18.46 kg/m²   Eyes: Sclera are clear  ENMT: no external lesions' gross hearing normal; no obvious neck masses, no ear or lip lesions, nares normal  CV: RRR. Normal perfusion  GI: soft and non-distended,  Staples c/d/i. Right and left surgical drain with serosanguineous output. J-tube intact. Musculoskeletal: No embolic signs or cyanosis.    Neuro:  Alert  Psychiatric: Calm and cooperative    Labs:   Recent Results (from the past 24 hour(s))   POCT Glucose    Collection Time: 22 2:41 PM   Result Value Ref Range    POC Glucose 94 65 - 100 mg/dL    Performed by: Chavez ([place]vers[place])    POCT Glucose    Collection Time: 06/18/22 10:06 PM   Result Value Ref Range    POC Glucose 127 (H) 65 - 100 mg/dL    Performed by: Destiny    CBC    Collection Time: 06/19/22  2:30 AM   Result Value Ref Range    WBC 20.1 (H) 4.3 - 11.1 K/uL    RBC 2.83 (L) 4.23 - 5.6 M/uL    Hemoglobin 7.7 (L) 13.6 - 17.2 g/dL    Hematocrit 25.3 (L) 41.1 - 50.3 %    MCV 89.4 79.6 - 97.8 FL    MCH 27.2 26.1 - 32.9 PG    MCHC 30.4 (L) 31.4 - 35.0 g/dL    RDW 16.4 (H) 11.9 - 14.6 %    Platelets 292 (H) 584 - 450 K/uL    MPV 9.9 9.4 - 12.3 FL    nRBC 0.00 0.0 - 0.2 K/uL   Comprehensive Metabolic Panel    Collection Time: 06/19/22  2:30 AM   Result Value Ref Range    Sodium 137 136 - 145 mmol/L    Potassium 3.5 3.5 - 5.1 mmol/L    Chloride 93 (L) 98 - 107 mmol/L    CO2 38 (H) 21 - 32 mmol/L    Anion Gap 6 (L) 7 - 16 mmol/L    Glucose 118 (H) 65 - 100 mg/dL    BUN 12 8 - 23 MG/DL    CREATININE <0.20 (L) 0.8 - 1.5 MG/DL    GFR  0 (L) >60 ml/min/1.73m2    GFR Non- 0 (L) >60 ml/min/1.73m2    Calcium 8.6 8.3 - 10.4 MG/DL    Total Bilirubin 0.2 0.2 - 1.1 MG/DL    ALT 10 (L) 12 - 65 U/L    AST 12 (L) 15 - 37 U/L    Alk Phosphatase 291 (H) 50 - 136 U/L    Total Protein 5.6 (L) 6.3 - 8.2 g/dL    Albumin 1.7 (L) 3.2 - 4.6 g/dL    Globulin 3.9 (H) 2.3 - 3.5 g/dL    Albumin/Globulin Ratio 0.4 (L) 1.2 - 3.5     POCT Glucose    Collection Time: 06/19/22  8:09 AM   Result Value Ref Range    POC Glucose 146 (H) 65 - 100 mg/dL    Performed by: Cyrus        Data Review     Xray Result (most recent):  XR CHEST PORTABLE 06/19/2022    Narrative  EXAMINATION: One view chest    HISTORY: Follow-up atelectasis    TECHNIQUE: Frontal chest.    COMPARISON: 6/18/2022    FINDINGS:  Stable right PICC line. Persistent bilateral lung infiltrates. There is no significant pneumothorax.     There has been interval decrease in the size of the large left pleural effusion. There is increased aeration in the left upper lung. The heart is not assessed. No other significant interval changes. Impression  1. Findings as described above. Assessment:     Principal Problem (Resolved):    Septic shock (HCC)  Active Problems:    Atrial flutter (HCC)    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[facility] [address]    Former heavy tobacco smoker    Gastroesophageal reflux disease    Hypoalbuminemia due to protein-calorie malnutrition (HCC)    Syncope due to orthostatic hypotension    Corticosteroid dependence (HCC)    Hypoproteinemia (HCC)    Do not resuscitate status    Fatigue    History of gastric cancer    Encounter for palliative care    Debility    Weakness    Hypercoagulable state, secondary ([facility] [address]    Adult body mass index less than 19    Acute pulmonary embolism without acute cor pulmonale (HCC)    Severe protein-calorie malnutrition (HCC)    Gastrointestinal hemorrhage    Encounter for weaning from ventilator ([facility] [address]    S/P exploratory laparotomy    History of partial pancreatectomy    S/P splenectomy    Jejunostomy status ([facility] [address]    H/O resection of liver    Clostridium perfringens infection    IVC (inferior vena cava obstruction)    SVT (supraventricular tachycardia) (HCC)    Bilateral pleural effusion    Mucus plugging of bronchi    Atelectasis    Hemorrhagic shock (HCC)    Syncope and collapse    ABILIO (iron deficiency anemia)    Malignant neoplasm of overlapping sites of stomach (HCC)    Malignant neoplasm of body of stomach (HCC)    Gastric adenocarcinoma (HCC)  Resolved Problems:    Severe sepsis with lactic acidosis (HCC)    ABLA (acute blood loss anemia)    GIB (gastrointestinal bleeding)    Hyponatremia    Hypomagnesemia        Plan/Recommendations/Medical Decision Making:     Care management per Intensivist  Bronch by Pulmonology 6/16, 6/17, and 6/18.     Swallow study 6/16 - ok  CLD  Tube feeds @ 50 cc/hr  No CPAP/ No BIPBP/ No Bagging  Follow ben Herrera, WOODY

## 2022-06-19 NOTE — PROGRESS NOTES
Hospitalist Progress Note   Admit Date:  2022  1:05 PM   Name:  Alex Hudson   Age:  78 y.o. Sex:  male  :  1955   MRN:  [id number]   Room:  [address] [place] [address]    Presenting Complaint: Loss of Consciousness     Reason(s) for Admission: Syncope and collapse [R55]  Hemorrhagic shock ([address] [R57.8]  Shock ([address] [R57.9]  History of gastric cancer [Z85.028]  Septic shock ([address] [A41.9, R65.21]  Gastrointestinal hemorrhage, unspecified gastrointestinal hemorrhage type [facility] & Interval History:     Please refer to the admission H&P for details of presentation. In summary, Alex Hudson is a 78 y.o. male with medical history significant for   gastric adenocarcinoma s/p 4 cycles chemo, last dose 22, 50 pack year smoking history,  HTN who presented after having a syncopal episode today with 2 day history of near syncope. He has been unable to tolerate solid food for months and has been losing weight. In ED, he was hypotensive, BP 74/54, , RR 22. Hb noted to be 4.9. Workup also revealed  gram positive mildred bacteremia (only 1/2, possible contaminate). He was admitted to ICU due to septic shock requiring pressor support. Started on broad spectrum Abx. CT c/a/p with small R sided PE, femoral DVT. Subsequently had large melanotic stools. EGD performed  but unable to see with food in stomach. Repeat  similar results. AC held and IR placed IVC filter .   was taken for total gastrectomy, left lateral lobectomy of liver, distal pancreatectomy and splenectomy, diaphragmatic resection, J tube placement, and retroperitoneal mass excision and lymphadenectomy, and left abdominal wall mass excision. He again required ICU for vent support and pressor support post-op. He has been stable and was transferred to the floor. Noted to have worsening respiratory status on  requiring 8L of O2 NC. CXR showed moderate pleural effusions. He was started on albumin and diuretics. Underwent left sided thoracentesis with removal of 800cc. Later, went into SVT vs afib which resolved with metoprolol. He had 2 more episodes of SVTs which again resolved with IV metoprolol. EKGs unfortunately showed NSR (as they were done after IV lopressor). On 6/15, he had 3 more episodes of SVT/?afib/flutter after thoracentesis. They all resolved after IV lopressor. Patient was asymptomatic throughout these episodes. He was transferred to the ICU for Amio gtt. Subjective/24 hr Events (06/19/22) : Says he feels better today. Complains of abdominal pain but improving. Still with cough but improved. Denies CP/SOB. Denies N/V. Review of Systems:    Negative except stated above  Assessment & Plan:       Clostridium Perfringens Bacteremia   BCx from 6/2 showed Clostridium perfringens. Completed IV Vancomycin on 6/16 per ID recs  06/19/22: WBC down to 20.1. Locally advanced Gastric adenocarcinoma   S/p 4 cyclesof chemo last on 5/19/22 6/9/2022 - S/p Ex lap with total gastrectomy and esophagojejunostomy , Left lateral lobectomy of liver, Distal pancreatectomy and splenectomy, Combined diaphragmatic resection,Feeding J-tube placement, Retroperitoneal mass excision and lymphadenectomy, Left abdominal wall mass excision on posterior rectus sheath  - management as per surgery  - on TPN with plan to wean off.   - on Jtube feeding    SVT (Resolved)  06/19/22: Continue metoprolol 12.5mg PO via Jtube. Cardiology following.  - 6/17 Taken off Amio gtt today.    - IV metoprolol PRN    Acute Hypoxic respiratory failure  B/l Pleural Effusion  06/19/22: Slightly worse today  - S/P bronchoscopy on 6/16, 6/17, and 6/18.  - give o2 supplement to maintain saturation > 92%  - pulmonary appreciated: 6/15 s/p R thoracentesis with removal of 800cc  - continue lasix 40mg IV BID    Hypokalemia  06/19/22: K+ of 3.5, mag normal  - check BMP daily  - continue with telemetry    Corticosteroid dependence   No longer needing as it was started during chemo for fatigue    Acute pulmonary embolism without acute cor pulmonale   Small in RLL with DVt in left femoral veins on 6/2/22. S/p IVC filter  Sq lovenox    ABILIO   Hb stable    Cancer Cachexia // Severe Protein Calorie Malnutrition  - on TPN  - now on TF via J tube  - Nutrition followin    Diet:  ADULT TUBE FEEDING; Jejunostomy; Standard without Fiber; Continuous; 50; No; 30; Other (specify); Manual Flush BID and after each use  ADULT DIET;  Clear Liquid  ADULT ORAL NUTRITION SUPPLEMENT; Breakfast, Dinner; Standard High Calorie/High Protein Oral Supplement  DVT PPx: lovenox  Code status: DNR        Hospital Problems:  Principal Problem (Resolved):    Septic shock ([address]  Active Problems:    Atrial flutter (HCC)    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HCC)    Former heavy tobacco smoker    Gastroesophageal reflux disease    Hypoalbuminemia due to protein-calorie malnutrition (HCC)    Syncope due to orthostatic hypotension    Corticosteroid dependence ([address]    Hypoproteinemia (HCC)    Do not resuscitate status    Fatigue    History of gastric cancer    Encounter for palliative care    Debility    Weakness    Hypercoagulable state, secondary ([address]    Adult body mass index less than 19    Acute pulmonary embolism without acute cor pulmonale (HCC)    Severe protein-calorie malnutrition (HCC)    Gastrointestinal hemorrhage    Encounter for weaning from ventilator ([address]    S/P exploratory laparotomy    History of partial pancreatectomy    S/P splenectomy    Jejunostomy status ([address]    H/O resection of liver    Clostridium perfringens infection    IVC (inferior vena cava obstruction)    SVT (supraventricular tachycardia) (HCC)    Bilateral pleural effusion    Mucus plugging of bronchi    Atelectasis    Hemorrhagic shock (HCC)    Syncope and collapse    ABILIO (iron deficiency anemia)    Malignant neoplasm of overlapping sites of stomach (HCC)    Malignant neoplasm of body of stomach (HCC)    Gastric adenocarcinoma (HCC)  Resolved 06/18/22 1402 -- 85 19 108/67 97 %   06/18/22 1343 -- 93 23 109/64 94 %   06/18/22 1323 -- 90 21 109/69 94 %   06/18/22 1300 -- 88 18 106/69 94 %   06/18/22 1243 -- 91 14 112/72 97 %   06/18/22 1223 -- 82 18 113/72 96 %   06/18/22 1202 -- 82 20 115/68 98 %   06/18/22 1143 -- 82 27 106/67 99 %   06/18/22 1123 98.3 °F (36.8 °C) 81 29 122/68 98 %   06/18/22 1118 -- 81 20 116/70 98 %   06/18/22 1112 -- 82 (!) 33 132/78 97 %   06/18/22 1107 -- 86 22 (!) 149/87 97 %   06/18/22 1103 -- 80 17 128/78 97 %   06/18/22 1100 -- 80 18 128/77 96 %   06/18/22 1030 -- 79 18 133/77 --   06/18/22 1000 -- 76 19 135/81 94 %   06/18/22 0931 -- 75 15 -- 99 %   06/18/22 0930 -- 76 23 130/75 98 %   06/18/22 0902 -- 90 20 137/73 97 %   06/18/22 0857 -- 87 21 134/77 97 %   06/18/22 0833 -- 87 21 134/77 97 %       Oxygen Therapy  SpO2: 90 %  Pulse Oximetry Type: Continuous  Pulse via Oximetry: 85 beats per minute  Pulse Oximeter Device Mode: Continuous  Pulse Oximeter Device Location: Finger,Right  O2 Device: High flow nasal cannula  Oximetry Probe Site Changed: No  Skin Assessment: Clean, dry, & intact  Skin Protection for O2 Device: N/A  Orientation: Bilateral  Location: Cheek  Intervention(s): Mouth Care  FiO2 : 65 %  O2 Flow Rate (L/min): 7 L/min  Blood Gas  Performed?: Yes  Loi's Test #1: Collateral flow confirmed  Site #1: Left Radial  Site Prepped #1: Yes  Number of Attempts #1: 1  Pressure Held #1: Yes  Complications #1: None  How Tolerated?: Tolerated well  O2 Delivery Method: Endotracheal tube  Vent Mode: PS/CPAP    Estimated body mass index is 18.46 kg/m² as calculated from the following:    Height as of this encounter: 5' 9\" (1.753 m). Weight as of this encounter: 125 lb (56.7 kg).     Intake/Output Summary (Last 24 hours) at 6/19/2022 0803  Last data filed at 6/19/2022 7972  Gross per 24 hour   Intake 1254 ml   Output 2455 ml   Net -1201 ml         Physical Exam:     Blood pressure 128/73, pulse 84, temperature 97.9 °F (36.6 °C), temperature source Oral, resp. rate 15, height 5' 9\" (1.753 m), weight 125 lb (56.7 kg), SpO2 90 %. General:    Cachetic, ill appearing, lethargic. Head:  Normocephalic, atraumatic  Eyes:  Sclerae appear normal.  Pupils equally round. ENT:  Nares appear normal, no drainage. Moist oral mucosa  Neck:  No restricted ROM. Trachea midline   CV:   RRR. No m/r/g. No jugular venous distension. Lungs:   Diminished left side. No wheezing, Respirations even, unlabored  Abdomen:   Hypoactive BS, healing mid abdominal incision scar with staple, +J tube. Extremities: No cyanosis or clubbing. No edema  Skin:     No rashes and normal coloration. Warm and dry. Neuro:  CN II-XII grossly unremarkable  Psych:  Alert, no depression     I have reviewed ordered lab tests and independently visualized imaging below:    Recent Labs:  Recent Results (from the past 48 hour(s))   POCT Glucose    Collection Time: 06/17/22 10:56 AM   Result Value Ref Range    POC Glucose 116 (H) 65 - 100 mg/dL    Performed by: Nemo    POCT Glucose    Collection Time: 06/17/22  5:43 PM   Result Value Ref Range    POC Glucose 115 (H) 65 - 100 mg/dL    Performed by:  Ed    POCT Glucose    Collection Time: 06/17/22 10:28 PM   Result Value Ref Range    POC Glucose 107 (H) 65 - 100 mg/dL    Performed by: Destiny    POCT Glucose    Collection Time: 06/18/22  2:15 AM   Result Value Ref Range    POC Glucose 118 (H) 65 - 100 mg/dL    Performed by: Destiny    CBC    Collection Time: 06/18/22  4:22 AM   Result Value Ref Range    WBC 21.3 (H) 4.3 - 11.1 K/uL    RBC 2.88 (L) 4.23 - 5.6 M/uL    Hemoglobin 7.7 (L) 13.6 - 17.2 g/dL    Hematocrit 25.9 (L) 41.1 - 50.3 %    MCV 89.9 79.6 - 97.8 FL    MCH 26.7 26.1 - 32.9 PG    MCHC 29.7 (L) 31.4 - 35.0 g/dL    RDW 16.4 (H) 11.9 - 14.6 %    Platelets 381 (H) 333 - 450 K/uL    MPV 9.9 9.4 - 12.3 FL    nRBC 0.00 0.0 - 0.2 K/uL   Comprehensive Metabolic Panel    Collection Time: 06/18/22  4:22 AM   Result Value Ref Range    Sodium 136 (L) 138 - 145 mmol/L    Potassium 3.6 3.5 - 5.1 mmol/L    Chloride 95 (L) 98 - 107 mmol/L    CO2 39 (H) 21 - 32 mmol/L    Anion Gap 2 (L) 7 - 16 mmol/L    Glucose 114 (H) 65 - 100 mg/dL    BUN 11 8 - 23 MG/DL    CREATININE <0.20 (L) 0.8 - 1.5 MG/DL    GFR  0 (L) >60 ml/min/1.73m2    GFR Non- 0 (L) >60 ml/min/1.73m2    Calcium 8.5 8.3 - 10.4 MG/DL    Total Bilirubin 0.2 0.2 - 1.1 MG/DL    ALT 12 12 - 65 U/L    AST 19 15 - 37 U/L    Alk Phosphatase 348 (H) 50 - 136 U/L    Total Protein 5.3 (L) 6.3 - 8.2 g/dL    Albumin 1.7 (L) 3.2 - 4.6 g/dL    Globulin 3.6 (H) 2.3 - 3.5 g/dL    Albumin/Globulin Ratio 0.5 (L) 1.2 - 3.5     POCT Glucose    Collection Time: 06/18/22  9:13 AM   Result Value Ref Range    POC Glucose 128 (H) 65 - 100 mg/dL    Performed by: Chavez (Alverson)    POCT Glucose    Collection Time: 06/18/22  2:41 PM   Result Value Ref Range    POC Glucose 94 65 - 100 mg/dL    Performed by: Chavez (Alverson)    POCT Glucose    Collection Time: 06/18/22 10:06 PM   Result Value Ref Range    POC Glucose 127 (H) 65 - 100 mg/dL    Performed by: Destiny    CBC    Collection Time: 06/19/22  2:30 AM   Result Value Ref Range    WBC 20.1 (H) 4.3 - 11.1 K/uL    RBC 2.83 (L) 4.23 - 5.6 M/uL    Hemoglobin 7.7 (L) 13.6 - 17.2 g/dL    Hematocrit 25.3 (L) 41.1 - 50.3 %    MCV 89.4 79.6 - 97.8 FL    MCH 27.2 26.1 - 32.9 PG    MCHC 30.4 (L) 31.4 - 35.0 g/dL    RDW 16.4 (H) 11.9 - 14.6 %    Platelets 617 (H) 426 - 450 K/uL    MPV 9.9 9.4 - 12.3 FL    nRBC 0.00 0.0 - 0.2 K/uL   Comprehensive Metabolic Panel    Collection Time: 06/19/22  2:30 AM   Result Value Ref Range    Sodium 137 136 - 145 mmol/L    Potassium 3.5 3.5 - 5.1 mmol/L    Chloride 93 (L) 98 - 107 mmol/L    CO2 38 (H) 21 - 32 mmol/L    Anion Gap 6 (L) 7 - 16 mmol/L    Glucose 118 (H) 65 - 100 mg/dL    BUN 12 8 - 23 MG/DL    CREATININE <0.20 (L) 0.8 - 1.5 MG/DL GFR African American 0 (L) >60 ml/min/1.73m2    GFR Non- 0 (L) >60 ml/min/1.73m2    Calcium 8.6 8.3 - 10.4 MG/DL    Total Bilirubin 0.2 0.2 - 1.1 MG/DL    ALT 10 (L) 12 - 65 U/L    AST 12 (L) 15 - 37 U/L    Alk Phosphatase 291 (H) 50 - 136 U/L    Total Protein 5.6 (L) 6.3 - 8.2 g/dL    Albumin 1.7 (L) 3.2 - 4.6 g/dL    Globulin 3.9 (H) 2.3 - 3.5 g/dL    Albumin/Globulin Ratio 0.4 (L) 1.2 - 3.5         Other Studies:  XR CHEST PORTABLE   Final Result      1. Findings as described above. XR CHEST PORTABLE   Final Result      1. Large left pleural effusion with subtotal collapse of the left lung. 2.  Opacity in the right lung base may be caused by a small pleural effusion. XR CHEST PORTABLE   Final Result      Slight improved aeration of the left upper lobe when compared to prior exam.   Persistent large area of increased opacities in the left hemithorax may be due   to combination of large pleural effusion and atelectasis. FL GASTROGRAFFIN SWALLOW   Final Result      1. No evidence of anastomotic leak. CPT code(s)68787      XR CHEST 1 VIEW   Final Result      1. New near-complete opacification of the left hemithorax, consistent with   combination of pleural effusion and atelectasis. Consider possibility of mucous   plugging. XR CHEST 1 VIEW   Final Result   1. Essentially stable appearance of bilateral lung opacities and pleural   effusions, left worse than right. XR CHEST 1 VIEW   Final Result      1. Moderate left and small right pleural effusions, similar to prior exam.      XR CHEST 1 VIEW   Final Result      1. Moderate left pleural effusion, similar to prior exam.      2. No significant change compared to prior exam.      XR CHEST 1 VIEW   Final Result   Bilateral airspace opacities and left pleural effusion with interval   worsening on the left. XR CHEST 1 VIEW   Final Result      1.  Persistent bibasilar opacities, likely atelectasis or consolidation. XR CHEST 1 VIEW   Final Result   Unchanged mild bibasilar lung atelectasis or infiltrates. XR CHEST 1 VIEW   Final Result   1. New mild right basilar atelectatic appearing changes. No significant acute   changes otherwise seen. It should be noted that the left lung apex has been   clipped limiting complete assessment for pneumothorax. This report was made using voice transcription. Despite my best efforts to avoid   any, transcription errors may persist. If there is any question about the   accuracy of the report or need for clarification, then please call [phone], or text me through [employer] for clarification or correction. IR GUIDED IVC FILTER PLACEMENT   Final Result   Successful placement an infrarenal Cook Celect inferior vena cava filter. Plan:   The patient may be evaluated in 4 months by interventional radiology in order to   evaluate for continued need of the IVC filter for versus filter removal.                  Vascular duplex lower extremity venous bilateral   Final Result   Negative for sonographic evidence of deep venous thrombosis right   lower extremity. LEFT LOWER EXTREMITY VENOUS DUPLEX ULTRASOUND. INDICATION: Left lower extremity pain. TECHNIQUE: Direct skin-contact high resolution grayscale images, color-flow   Doppler and duplex waveform analysis. FINDINGS: Abnormal intraluminal echoes within the common femoral and profunda   femoral veins. There is some flow. The superficial superficial femoral and   popliteal veins and upper calf veins are unremarkable. IMPRESSION: Positive for deep venous thrombosis left common femoral profunda   femoral veins, nonocclusive. CTA CHEST ABDOMEN PELVIS W WO CONTRAST   Final Result   1. No extravasation to suggest active GI bleed. 2. Small areas of pulmonary embolism are present in the right lower lobe. Clot   burden is quite small.    3. DVT is also likely present in the left femoral veins. 4. Previously described gastric mass again noted. It is difficult to measure to   evaluate for progression. XR CHEST PORTABLE   Final Result   Unremarkable portable chest radiograph.                      XR CHEST PORTABLE    (Results Pending)       Current Meds:  Current Facility-Administered Medications   Medication Dose Route Frequency    sodium chloride (Inhalant) 3 % nebulizer solution 4 mL  4 mL Nebulization BID    naloxone (NARCAN) injection 0.4 mg  0.4 mg IntraVENous PRN    furosemide (LASIX) injection 40 mg  40 mg IntraVENous Daily    melatonin tablet 4.5 mg  4.5 mg Per J Tube Nightly PRN    diatrizoate meglumine-sodium (GASTROGRAFIN) 66-10 % solution 120 mL  120 mL Oral ONCE PRN    bisacodyl (DULCOLAX) suppository 10 mg  10 mg Rectal Daily    levalbuterol (XOPENEX) nebulization 0.63 mg  0.63 mg Nebulization Q6H    metoprolol tartrate (LOPRESSOR) tablet 12.5 mg  12.5 mg Per J Tube BID    sodium chloride flush 0.9 % injection 5-40 mL  5-40 mL IntraVENous 2 times per day    sodium chloride flush 0.9 % injection 5-40 mL  5-40 mL IntraVENous PRN    0.9 % sodium chloride infusion  25 mL IntraVENous PRN    enoxaparin Sodium (LOVENOX) injection 30 mg  30 mg SubCUTAneous Q12H    glucose chewable tablet 16 g  4 tablet Oral PRN    dextrose bolus 10% 125 mL  125 mL IntraVENous PRN    Or    dextrose bolus 10% 250 mL  250 mL IntraVENous PRN    glucagon injection 1 mg  1 mg IntraMUSCular PRN    dextrose 5 % solution  100 mL/hr IntraVENous PRN    medicated lip ointment (BLISTEX)   Topical PRN    HYDROmorphone HCl PF (DILAUDID) injection 0.5 mg  0.5 mg IntraVENous Q2H PRN    oxyCODONE (ROXICODONE) 5 MG/5ML solution 5 mg  5 mg Per J Tube Q6H PRN    guaiFENesin (ROBITUSSIN) 100 MG/5ML oral solution 200 mg  200 mg Per J Tube Q6H    insulin lispro (HUMALOG) injection vial 0-4 Units  0-4 Units SubCUTAneous TID WC    insulin lispro (HUMALOG) injection vial 0-4 Units 0-4 Units SubCUTAneous Nightly    cloNIDine (CATAPRES) tablet 0.2 mg  0.2 mg Per J Tube Q4H PRN    sodium chloride flush 0.9 % injection 5-40 mL  5-40 mL IntraVENous 2 times per day    sodium chloride flush 0.9 % injection 5-40 mL  5-40 mL IntraVENous PRN    0.9 % sodium chloride infusion   IntraVENous PRN    potassium chloride 20 mEq/50 mL IVPB (Central Line)  20 mEq IntraVENous PRN    magnesium sulfate 2000 mg in 50 mL IVPB premix  2,000 mg IntraVENous PRN    ondansetron (ZOFRAN) injection 4 mg  4 mg IntraVENous Q6H PRN    Medela Tender Care Lanolin cream   Topical PRN       Signed:  Coit Shone, DO    Part of this note may have been written by using a voice dictation software. The note has been proof read but may still contain some grammatical/other typographical errors.

## 2022-06-19 NOTE — PROGRESS NOTES
[facility] Critical Care Note[de-identified] 6/19/2022  Sg Johnson  Admission Date: 6/2/2022     Length of Stay: 17 days    Background:     78 y.o. y/o male with history of gastric carcinoma s/p neoadjuvant chemo (last dose May 29, 2022) with plans for subsequent total gastrectomy, HTN, admitted to hospitalist service 6/2 with syncopal episode and anemia (hgb 4.9 on admission) as well as gram positive mildred bacteremia (only 1/2, possible contaminate). Unable to tolerate solid food for months and has been losing weight. Was hypotension on pressors initially. Was covered with vanc and cefepime initially, flagyl added with GPR returned. CT c/a/p with small R sided PE, femoral DVT. Subsequently had large melanotic stools, transfused. EGD performed 6/4 but unable to see with food in stomach. Repeat 6/5 similar results. AC held and RI placed IVC filter 6/6.   6/9 was taken for total gastrectomy, left lateral lobectomy of liver, distal pancreatectomy and splenectomy, diaphragmatic resection, J tube placement, and retroperitoneal mass excision and lymphadenectomy, and left abdominal wall mass excision. To ICU on vent post op and requiring joe. Notable PMH:  has a past medical history of ABLA (acute blood loss anemia), GIB (gastrointestinal bleeding), HTN (hypertension), and Severe sepsis with lactic acidosis ([address] 24 Hour events:     Bronched again yesterday for L atx. Copious secretions removed. Currently on 8L NC with sat 91-92%. No cough or sputum. WBC continues to fall slowly.    Bronch wash from 6/16 growing NRF.    ROS:     Constitutional: negative for fever, chills, sweats  Cardiac: negative for chest pain, palpitations, syncope, edema  GI: negative for dysphagia, reflux, vomiting, diarrhea, abdominal pain, or melena  Neuro: negative for focal weakness, numbness, headache      Lines: (insertion date)    Closed/Suction Drain Right RUQ Bulb (Active)       Closed/Suction Drain Left LUQ Bulb (Active) NG/OG/NJ/NE Tube Right nostril (Active)       Gastrostomy/Enterostomy/Jejunostomy Tube Gastrostomy LUQ 1 14 fr (Active)       Urinary Catheter 2 Way (Active)     Single Lumen Implantable Port Left Chest (Active)       Arterial Line 06/09/22 Radial (Active)     Drips: current dose (range)  Dose (mcg/kg/min) Propofol : 0 mcg/kg/min  Dose (mcg/min) Phenylephrine : 0 mcg/min (map 91)     Pertinent Exam:         Blood pressure 128/73, pulse 84, temperature 97.9 °F (36.6 °C), temperature source Oral, resp. rate 15, height 5' 9\" (1.753 m), weight 125 lb (56.7 kg), SpO2 90 %. Intake/Output Summary (Last 24 hours) at 6/19/2022 0717  Last data filed at 6/19/2022 4197  Gross per 24 hour   Intake 1254 ml   Output 2355 ml   Net -1101 ml     Constitutional: AAO in NC, pain controlled, weak. On 8L O2.   EENMT:  Sclera clear, pupils equal, oral mucosa moist  Respiratory: scattered rhonchi; improved aeration on L but still diminished posteriorly  Cardiovascular:  RRR  Gastrointestinal:  soft, dressing on, NG in place  Musculoskeletal:  warm with no cyanosis, no lower extremity edema  Skin:  no jaundice or ecchymosis  Neurologic: awake  Psychiatric: appropriate mood and affect    CXR:     6/19, 18:          6/17, 16    Complete L sided atelectasis          Recent Labs     06/17/22  0335 06/18/22  0422 06/19/22  0230   WBC 23.6* 21.3* 20.1*   HGB 7.6* 7.7* 7.7*   HCT 25.0* 25.9* 25.3*   * 567* 597*     Recent Labs     06/17/22  0335 06/18/22  0422 06/19/22  0230   * 136* 137   K 3.0* 3.6 3.5   CL 94* 95* 93*   CO2 35* 39* 38*   BUN 11 11 12   CREATININE <0.20* <0.20* <0.20*   MG 1.8  --   --    PHOS 2.7  --   --    BILITOT 0.3 0.2 0.2   AST 13* 19 12*   ALT 12 12 10*   ALKPHOS 359* 348* 291*     No results for input(s): TROPHS, NTPROBNP, CRP, ESR in the last 72 hours.   Recent Labs     06/17/22  0335 06/18/22  0422 06/19/22  0230   GLUCOSE 175* 114* 118*      ECHO: 06/02/22    TRANSTHORACIC ECHOCARDIOGRAM (TTE) COMPLETE (CONTRAST/BUBBLE/3D PRN) 06/09/2022  9:31 AM (Final)    Interpretation Summary    Left Ventricle: Reduced left ventricular systolic function. EF by 2D Simpsons Biplane is 42%. Left ventricle size is normal. Severely increased wall thickness. Increased ventricular mass. Infiltrative cardiomyopathy should be considered. Global hypokinesis present. Abnormal diastolic function.   Aortic Valve: Thickened cusp. Mildly calcified cusp. Sclerosis of the aortic valve cusp. Moderate annular calcification vs appearance of aortic valve prosthesis [clinical correlation, clinical history]. Mild regurgitation.   Mitral Valve: Mildly thickened leaflet.   Aorta: Dilated aortic root. Dilated ascending aorta.   Pericardium: Small (<1 cm) localized pericardial effusion present around the right ventricle.   Technical qualifiers: Color flow Doppler was performed and pulse wave and/or continuous wave Doppler was performed. Signed by: Emy Thornton MD on 6/9/2022  9:31 AM    Microbiology:   Recent Labs     06/16/22  1009   CULTURE MODERATE STAPHYLOCOCCUS AUREUS SENSITIVITY TO FOLLOW*  LIGHT NORMAL RESPIRATORY TOM         Thoracenteses  Date:   LEFT RIGHT  DESCRIPTION   6/15/22  800ml Transudate, yogesh           Pleural fluid analysis-  Transudate 6/15  Date:   Pleural fluid Serum ratio   Total protein 1.4     LDH LD 96         Assessment and Plan:  (Medical Decision Making)       Impression: 78 y.o. male with gastric cancer, s/p joe adjuvant chemo. Admitted with syncope that was likely multifactorial, driven by recent weight loss, poor PO intake, and severe anemia into the 4's. GPR on blood culture but doubt this was sepsis as this was likely contaminate. Course complicated by discovery of PE and DVT, subsequent GI bleed likely due to his cancer, requiring IVC filter placement. No post op extensive resections and brought to ICU still intubated and on pressors.     Has severe hypoproteinemia and has been given fluids and blood during his hospitalization. R effusion tapped with L looking loculated. Has required cleanout bronchs x 2 past 2 days for L atx. Active Hospital Problems    Gastrointestinal hemorrhage: Resolved with fairly stable H/H. Monitor. Severe protein-calorie malnutrition ([facility]): Very severe with last albumin only 1.7 richard after supplementation. Had received albumin IV with lasix. Hypoproteinemia ([facility]): as above      Do not resuscitate status      Hypercoagulable state, secondary [facility]): Now with DVT/PE. Getting lovenox 30 q12h. Now post IVC filter. Adult body mass index less than 19      Acute pulmonary embolism without acute cor pulmonale (HCC)  Small in RLL with DVt in left femoral veins on 6/2/22. On lovenox with prophylactic dose due to prior GI bleed. Fatigue      History of gastric cancer:  Per surgery post op      Debility: multifactorial.      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 Syncope due to orthostatic hypotension      Hypomagnesemia      Corticosteroid dependence (HCC)      Gastroesophageal reflux disease      Gastric adenocarcinoma [facility]): per surgery      Malignant neoplasm of body of stomach (HCC)      Malignant neoplasm of overlapping sites of stomach (HCC)      ABILIO (iron deficiency anemia)    Bilateral pleural effusions L > R  R tapped and transudate. L loculated and will need IR to assess for optimal drainage. Consult placed for tomorrow. Atelectasis on left: better on L. Bronch prn. Mucus plugging: severe and needed bronchs x 3 days. Better today but may need another before [facility] transfer    Acute Hypoxemic Respiratory Failure: remains on 8L. Wean oxygen as able and mobilize.      DNR        Wayne Perry MD

## 2022-06-20 ENCOUNTER — APPOINTMENT (OUTPATIENT)
Dept: INTERVENTIONAL RADIOLOGY/VASCULAR | Age: 67
DRG: 853 | End: 2022-06-20
Payer: MEDICARE

## 2022-06-20 ENCOUNTER — APPOINTMENT (OUTPATIENT)
Dept: GENERAL RADIOLOGY | Age: 67
DRG: 853 | End: 2022-06-20
Payer: MEDICARE

## 2022-06-20 PROBLEM — J18.9 HAP (HOSPITAL-ACQUIRED PNEUMONIA): Status: ACTIVE | Noted: 2022-06-20
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
## 2022-06-20 PROBLEM — J96.01 ACUTE RESPIRATORY FAILURE WITH HYPOXIA (HCC): Status: ACTIVE | Noted: 2022-06-20

## 2022-06-20 LAB
ALBUMIN SERPL ELPH-MCNC: 2.2 G/DL (ref 2.9–4.4)
ALBUMIN SERPL-MCNC: 1.6 G/DL (ref 3.2–4.6)
ALBUMIN/GLOB SERPL: 0.4 {RATIO} (ref 1.2–3.5)
ALBUMIN/GLOB SERPL: 1 {RATIO} (ref 0.7–1.7)
ALP SERPL-CCNC: 300 U/L (ref 50–136)
ALPHA1 GLOB SERPL ELPH-MCNC: 0.5 G/DL (ref 0–0.4)
ALPHA2 GLOB SERPL ELPH-MCNC: 0.7 G/DL (ref 0.4–1)
ALT SERPL-CCNC: 10 U/L (ref 12–65)
ANION GAP SERPL CALC-SCNC: 7 MMOL/L (ref 7–16)
AST SERPL-CCNC: 12 U/L (ref 15–37)
B-GLOBULIN SERPL ELPH-MCNC: 0.7 G/DL (ref 0.7–1.3)
BILIRUB SERPL-MCNC: 0.3 MG/DL (ref 0.2–1.1)
BUN SERPL-MCNC: 12 MG/DL (ref 8–23)
CALCIUM SERPL-MCNC: 8.4 MG/DL (ref 8.3–10.4)
CHLORIDE SERPL-SCNC: 93 MMOL/L (ref 98–107)
CO2 SERPL-SCNC: 36 MMOL/L (ref 21–32)
CREAT SERPL-MCNC: <0.2 MG/DL (ref 0.8–1.5)
ERYTHROCYTE [DISTWIDTH] IN BLOOD BY AUTOMATED COUNT: 16.5 % (ref 11.9–14.6)
GAMMA GLOB SERPL ELPH-MCNC: 0.5 G/DL (ref 0.4–1.8)
GLOBULIN SER CALC-MCNC: 4.3 G/DL (ref 2.3–3.5)
GLOBULIN SER-MCNC: 2.4 G/DL (ref 2.2–3.9)
GLUCOSE BLD STRIP.AUTO-MCNC: 106 MG/DL (ref 65–100)
GLUCOSE BLD STRIP.AUTO-MCNC: 80 MG/DL (ref 65–100)
GLUCOSE BLD STRIP.AUTO-MCNC: 94 MG/DL (ref 65–100)
GLUCOSE SERPL-MCNC: 101 MG/DL (ref 65–100)
HCT VFR BLD AUTO: 26.5 % (ref 41.1–50.3)
HGB BLD-MCNC: 8.2 G/DL (ref 13.6–17.2)
IGA SERPL-MCNC: 269 MG/DL (ref 61–437)
IGG SERPL-MCNC: 561 MG/DL (ref 603–1613)
IGM SERPL-MCNC: 51 MG/DL (ref 20–172)
INTERPRETATION SERPL IEP-IMP: ABNORMAL
M PROTEIN SERPL ELPH-MCNC: ABNORMAL G/DL
MAGNESIUM SERPL-MCNC: 1.8 MG/DL (ref 1.8–2.4)
MCH RBC QN AUTO: 27.2 PG (ref 26.1–32.9)
MCHC RBC AUTO-ENTMCNC: 30.9 G/DL (ref 31.4–35)
MCV RBC AUTO: 88 FL (ref 79.6–97.8)
NRBC # BLD: 0 K/UL (ref 0–0.2)
PHOSPHATE SERPL-MCNC: 3.7 MG/DL (ref 2.3–3.7)
PLATELET # BLD AUTO: 703 K/UL (ref 150–450)
PMV BLD AUTO: 9.9 FL (ref 9.4–12.3)
POTASSIUM SERPL-SCNC: 3.9 MMOL/L (ref 3.5–5.1)
PROT SERPL-MCNC: 4.6 G/DL (ref 6–8.5)
PROT SERPL-MCNC: 5.9 G/DL (ref 6.3–8.2)
RBC # BLD AUTO: 3.01 M/UL (ref 4.23–5.6)
SERVICE CMNT-IMP: ABNORMAL
SERVICE CMNT-IMP: NORMAL
SERVICE CMNT-IMP: NORMAL
SODIUM SERPL-SCNC: 136 MMOL/L (ref 136–145)
WBC # BLD AUTO: 21.9 K/UL (ref 4.3–11.1)

## 2022-06-20 PROCEDURE — 6360000002 HC RX W HCPCS: Performed by: SURGERY

## 2022-06-20 PROCEDURE — 6370000000 HC RX 637 (ALT 250 FOR IP): Performed by: SURGERY

## 2022-06-20 PROCEDURE — 2580000003 HC RX 258: Performed by: RADIOLOGY

## 2022-06-20 PROCEDURE — 99152 MOD SED SAME PHYS/QHP 5/>YRS: CPT

## 2022-06-20 PROCEDURE — 2580000003 HC RX 258: Performed by: SURGERY

## 2022-06-20 PROCEDURE — 85027 COMPLETE CBC AUTOMATED: CPT

## 2022-06-20 PROCEDURE — 87205 SMEAR GRAM STAIN: CPT

## 2022-06-20 PROCEDURE — 71045 X-RAY EXAM CHEST 1 VIEW: CPT

## 2022-06-20 PROCEDURE — 6370000000 HC RX 637 (ALT 250 FOR IP): Performed by: PHYSICIAN ASSISTANT

## 2022-06-20 PROCEDURE — 6370000000 HC RX 637 (ALT 250 FOR IP): Performed by: FAMILY MEDICINE

## 2022-06-20 PROCEDURE — 6360000002 HC RX W HCPCS: Performed by: INTERNAL MEDICINE

## 2022-06-20 PROCEDURE — 80053 COMPREHEN METABOLIC PANEL: CPT

## 2022-06-20 PROCEDURE — 2580000003 HC RX 258: Performed by: INTERNAL MEDICINE

## 2022-06-20 PROCEDURE — 2580000003 HC RX 258: Performed by: FAMILY MEDICINE

## 2022-06-20 PROCEDURE — 82962 GLUCOSE BLOOD TEST: CPT

## 2022-06-20 PROCEDURE — 0W9B30Z DRAINAGE OF LEFT PLEURAL CAVITY WITH DRAINAGE DEVICE, PERCUTANEOUS APPROACH: ICD-10-PCS | Performed by: INTERNAL MEDICINE

## 2022-06-20 PROCEDURE — 2100000000 HC CCU R&B

## 2022-06-20 PROCEDURE — 99232 SBSQ HOSP IP/OBS MODERATE 35: CPT | Performed by: INTERNAL MEDICINE

## 2022-06-20 PROCEDURE — 83735 ASSAY OF MAGNESIUM: CPT

## 2022-06-20 PROCEDURE — 2500000003 HC RX 250 WO HCPCS: Performed by: RADIOLOGY

## 2022-06-20 PROCEDURE — 94640 AIRWAY INHALATION TREATMENT: CPT

## 2022-06-20 PROCEDURE — 84100 ASSAY OF PHOSPHORUS: CPT

## 2022-06-20 PROCEDURE — 6360000002 HC RX W HCPCS: Performed by: RADIOLOGY

## 2022-06-20 RX ORDER — FENTANYL CITRATE 50 UG/ML
INJECTION, SOLUTION INTRAMUSCULAR; INTRAVENOUS
Status: COMPLETED | OUTPATIENT
Start: 2022-06-20 | End: 2022-06-20

## 2022-06-20 RX ORDER — SODIUM CHLORIDE 9 MG/ML
INJECTION, SOLUTION INTRAVENOUS CONTINUOUS PRN
Status: COMPLETED | OUTPATIENT
Start: 2022-06-20 | End: 2022-06-20

## 2022-06-20 RX ORDER — LIDOCAINE HYDROCHLORIDE 20 MG/ML
INJECTION, SOLUTION EPIDURAL; INFILTRATION; INTRACAUDAL; PERINEURAL
Status: COMPLETED | OUTPATIENT
Start: 2022-06-20 | End: 2022-06-20

## 2022-06-20 RX ORDER — MIDAZOLAM HYDROCHLORIDE 1 MG/ML
INJECTION, SOLUTION INTRAMUSCULAR; INTRAVENOUS
Status: COMPLETED | OUTPATIENT
Start: 2022-06-20 | End: 2022-06-20

## 2022-06-20 RX ADMIN — OXYCODONE HYDROCHLORIDE 5 MG: 5 SOLUTION ORAL at 15:42

## 2022-06-20 RX ADMIN — GUAIFENESIN 200 MG: 200 SOLUTION ORAL at 19:05

## 2022-06-20 RX ADMIN — ENOXAPARIN SODIUM 30 MG: 100 INJECTION SUBCUTANEOUS at 21:05

## 2022-06-20 RX ADMIN — HYDROMORPHONE HYDROCHLORIDE 0.5 MG: 1 INJECTION, SOLUTION INTRAMUSCULAR; INTRAVENOUS; SUBCUTANEOUS at 19:45

## 2022-06-20 RX ADMIN — PIPERACILLIN AND TAZOBACTAM 4500 MG: 4; .5 INJECTION, POWDER, LYOPHILIZED, FOR SOLUTION INTRAVENOUS at 09:00

## 2022-06-20 RX ADMIN — SODIUM CHLORIDE, PRESERVATIVE FREE 10 ML: 5 INJECTION INTRAVENOUS at 20:51

## 2022-06-20 RX ADMIN — PIPERACILLIN AND TAZOBACTAM 3375 MG: 3; .375 INJECTION, POWDER, LYOPHILIZED, FOR SOLUTION INTRAVENOUS at 16:45

## 2022-06-20 RX ADMIN — METOPROLOL TARTRATE 12.5 MG: 25 TABLET, FILM COATED ORAL at 20:51

## 2022-06-20 RX ADMIN — METOPROLOL TARTRATE 12.5 MG: 25 TABLET, FILM COATED ORAL at 09:32

## 2022-06-20 RX ADMIN — HYDROMORPHONE HYDROCHLORIDE 0.5 MG: 1 INJECTION, SOLUTION INTRAMUSCULAR; INTRAVENOUS; SUBCUTANEOUS at 06:38

## 2022-06-20 RX ADMIN — LEVALBUTEROL HYDROCHLORIDE 0.63 MG: 0.63 SOLUTION RESPIRATORY (INHALATION) at 16:21

## 2022-06-20 RX ADMIN — LIDOCAINE HYDROCHLORIDE 5 ML: 20 INJECTION, SOLUTION EPIDURAL; INFILTRATION; INTRACAUDAL; PERINEURAL at 12:43

## 2022-06-20 RX ADMIN — GUAIFENESIN 200 MG: 200 SOLUTION ORAL at 15:42

## 2022-06-20 RX ADMIN — SODIUM CHLORIDE, PRESERVATIVE FREE 40 ML: 5 INJECTION INTRAVENOUS at 09:46

## 2022-06-20 RX ADMIN — OXYCODONE HYDROCHLORIDE 5 MG: 5 SOLUTION ORAL at 09:50

## 2022-06-20 RX ADMIN — GUAIFENESIN 200 MG: 200 SOLUTION ORAL at 07:30

## 2022-06-20 RX ADMIN — SODIUM CHLORIDE SOLN NEBU 3% 4 ML: 3 NEBU SOLN at 07:43

## 2022-06-20 RX ADMIN — FENTANYL CITRATE 50 MCG: 50 INJECTION, SOLUTION INTRAMUSCULAR; INTRAVENOUS at 12:39

## 2022-06-20 RX ADMIN — FUROSEMIDE 40 MG: 10 INJECTION, SOLUTION INTRAMUSCULAR; INTRAVENOUS at 09:34

## 2022-06-20 RX ADMIN — MIDAZOLAM HYDROCHLORIDE 1 MG: 1 INJECTION, SOLUTION INTRAMUSCULAR; INTRAVENOUS at 12:39

## 2022-06-20 RX ADMIN — GUAIFENESIN 200 MG: 200 SOLUTION ORAL at 01:22

## 2022-06-20 RX ADMIN — SODIUM CHLORIDE 25 ML/HR: 9 INJECTION, SOLUTION INTRAVENOUS at 12:35

## 2022-06-20 RX ADMIN — LEVALBUTEROL HYDROCHLORIDE 0.63 MG: 0.63 SOLUTION RESPIRATORY (INHALATION) at 19:45

## 2022-06-20 RX ADMIN — BISACODYL 10 MG: 10 SUPPOSITORY RECTAL at 09:33

## 2022-06-20 RX ADMIN — SODIUM CHLORIDE, PRESERVATIVE FREE 10 ML: 5 INJECTION INTRAVENOUS at 09:33

## 2022-06-20 RX ADMIN — HYDROMORPHONE HYDROCHLORIDE 0.5 MG: 1 INJECTION, SOLUTION INTRAMUSCULAR; INTRAVENOUS; SUBCUTANEOUS at 03:20

## 2022-06-20 RX ADMIN — LEVALBUTEROL HYDROCHLORIDE 0.63 MG: 0.63 SOLUTION RESPIRATORY (INHALATION) at 07:42

## 2022-06-20 RX ADMIN — SODIUM CHLORIDE SOLN NEBU 3% 4 ML: 3 NEBU SOLN at 19:45

## 2022-06-20 ASSESSMENT — PAIN - FUNCTIONAL ASSESSMENT
PAIN_FUNCTIONAL_ASSESSMENT: PREVENTS OR INTERFERES WITH MANY ACTIVE NOT PASSIVE ACTIVITIES
PAIN_FUNCTIONAL_ASSESSMENT: NONE - DENIES PAIN
PAIN_FUNCTIONAL_ASSESSMENT: PREVENTS OR INTERFERES WITH MANY ACTIVE NOT PASSIVE ACTIVITIES

## 2022-06-20 ASSESSMENT — PAIN SCALES - GENERAL
PAINLEVEL_OUTOF10: 0
PAINLEVEL_OUTOF10: 7
PAINLEVEL_OUTOF10: 7
PAINLEVEL_OUTOF10: 0
PAINLEVEL_OUTOF10: 6
PAINLEVEL_OUTOF10: 7
PAINLEVEL_OUTOF10: 0
PAINLEVEL_OUTOF10: 6
PAINLEVEL_OUTOF10: 0

## 2022-06-20 ASSESSMENT — PAIN DESCRIPTION - PAIN TYPE: TYPE: ACUTE PAIN

## 2022-06-20 ASSESSMENT — PAIN DESCRIPTION - ORIENTATION
ORIENTATION: RIGHT
ORIENTATION: ANTERIOR
ORIENTATION: ANTERIOR
ORIENTATION: POSTERIOR
ORIENTATION: MID

## 2022-06-20 ASSESSMENT — PAIN DESCRIPTION - DESCRIPTORS
DESCRIPTORS: SORE;ACHING
DESCRIPTORS: ACHING;SHARP
DESCRIPTORS: SORE;TENDER
DESCRIPTORS: SORE;TENDER
DESCRIPTORS: ACHING

## 2022-06-20 ASSESSMENT — PAIN SCALES - WONG BAKER: WONGBAKER_NUMERICALRESPONSE: 0

## 2022-06-20 ASSESSMENT — PAIN DESCRIPTION - LOCATION
LOCATION: BACK
LOCATION: ABDOMEN

## 2022-06-20 ASSESSMENT — PAIN DESCRIPTION - ONSET: ONSET: OTHER (COMMENT)

## 2022-06-20 ASSESSMENT — PAIN DESCRIPTION - FREQUENCY: FREQUENCY: CONTINUOUS

## 2022-06-20 NOTE — INTERDISCIPLINARY ROUNDS
Multi-D Rounds/Checklist (leapfrog):  Lines: can any be removed?: None     Closed/Suction Drain Right RUQ Bulb (Active)       Closed/Suction Drain Left LUQ Bulb (Active)       Gastrostomy/Enterostomy/Jejunostomy Tube Gastrostomy LUQ 1 14 fr (Active)     PICC Double Lumen 63/18/51 Right Basilic (Active)     DVT Prophylaxis: Ordered  Vent: N/A  Nutrition Ordered/appropriate: Ordered  Can antibiotics or other drugs be stopped?: Yes/End Date set  Consults needed: IR  A: Is pain control adequate? Yes  B: Sedation break and SBT? N/A  C: Is sedation choice appropriate? N/A  D: Delirium/CAM-ICU? No  E: Mobility goals/appropriateness? Yes  F: Family update and plan? Spouse is primary contact and is being updated daily by primary attending and nursing staff.     Felipa Loco, APRN - CNP

## 2022-06-20 NOTE — PROGRESS NOTES
[facility] CARDIOLOGY PROGRESS NOTE           6/20/2022 6:55 AM    Admit Date: 6/2/2022      Subjective:   Patient more interactive today, to [facility] on Monday is the plan. Family at the bedside. No CP. In sinus off the amio gtt      6/20/22  Continues in normal sinus rhythm. Assume the plan for [facility] today is still in play. ROS:  Cardiovascular:  As noted above    Objective:      Vitals:    06/20/22 0415 06/20/22 0430 06/20/22 0445 06/20/22 0500   BP:  115/70     Pulse: 85 83 83 83   Resp: 12 15 18 24   Temp:       TempSrc:       SpO2: 100% 100% 100% 100%   Weight:       Height:           Physical Exam:  General-No Acute Distress, awake, alert, interactive, weak  Neck- supple, no JVD  CV- regular rate and rhythm no MRG  Lung-decreased at the bases   Abd- soft, nontender, nondistended  Ext- no edema bilaterally. Skin- warm and dry    Data Review:   Recent Labs     06/19/22  0230 06/20/22  0234    136   K 3.5 3.9   MG  --  1.8   BUN 12 12   WBC 20.1* 21.9*   HGB 7.7* 8.2*   HCT 25.3* 26.5*   * 703*       Assessment/Plan:     1. Atrial flutter:   Off amio, follow, in sinus now.   -Resume therapeutic anticoagulation once ok from surgical perspective and blood counts allow.  -Now in sinus, hemodynamic stable. Likely exacerbated by hypoxia, mucous plugging, recent surgery, anemia.    -Tolerating metoprolol. 2. Cardiomyopathy: Continue metoprolol as above, reasonable to continue Lasix. Add on medical therapy including afterload reduction medications for cardiomyopathy as possible. Echo reviewed and SPEP/free light chains sent and pending at this time. 3. Essential hypertension:   -Controlled at this time  4. Gastric carcinoma: follow s/p chad  5. Hypoxic respiratory failure/Mucous plugging: per pulm  6. Acute blood loss anemia:   -Hemoglobin downtrending, monitor closely.   Per primary team.  7. Pulmonary embolism/DVT:   Post IVC filter 6/6/22, per pulmonology. Plan on [facility] transfer. Continue current medicines.   Please call if needed    Rosemarie Leach MD  6/20/2022 6:55 AM

## 2022-06-20 NOTE — PROGRESS NOTES
This is a follow-up visit to the patient, providing a spiritual presence, emotional support and prayer. The patient appears to be resting.     Zaira Fernandez, [address] [facility]

## 2022-06-20 NOTE — OP NOTE
Department of Interventional Radiology  [phone]        Interventional Radiology Brief Procedure Note    Patient: Riddhi Skinner MRN: [id number]  SSN: [id number]    YOB: 1955  Age: 78 y.o. Sex: male      Date of Procedure: 6/20/2022    Pre-Procedure Diagnosis: pleural effusion    Post-Procedure Diagnosis: SAME    Procedure(s): left chest tube placement    Brief Description of Procedure: as above    Performed By: Eben Rob MD     Assistants: None    Anesthesia:Moderate Sedation    Estimated Blood Loss: None    Specimens:      Implants:  10 F chest tube    Findings: left pleural collection    Complications: None    Recommendations: record outputs.   May need tpa tomorrow     Follow Up: we will follow    Signed By: Eben Rob MD     June 20, 2022

## 2022-06-20 NOTE — PROGRESS NOTES
Admit Date: 2022    POD 11 Day Post-Op    Procedure:  Procedure(s):  Exploratory laparotomy with total gastrectomy and esophagojejunostomy after extensive lysis of adhesions and dissections which added at least 3-4 hours to this case, Left lateral lobectomy of liver, Distal pancreatectomy and splenectomy, Combined diaphragmatic resection, Falciform ligament flap, Feeding J-tube placement, Retroperitoneal mass excision and lymphadenectomy, Left abdominal wall mass excision on posterior rectus sheath    Subjective: The patient is laying in bed. Still having difficulty coughing. Tolerating tube feeds at 50cc/hr. He has been passing flatus and having BMs. Remains with persistent pleural effusion. Objective:       Vitals:    22 1016 22 1020 22 1031 22 1046   BP:   105/68    Pulse: 76  75 78   Resp: 23 25 19 (!) 36   Temp:       TempSrc:       SpO2: 98%  95% 98%   Weight:       Height:           Temp (24hrs), Av.5 °F (36.9 °C), Min:97.9 °F (36.6 °C), Max:98.9 °F (37.2 °C)  . I&O reviewed as documented. Physical Exam:   Constitutional: Alert, NAD   /68   Pulse 78   Temp 98.1 °F (36.7 °C) (Oral)   Resp (!) 36   Ht 5' 9\" (1.753 m)   Wt 125 lb (56.7 kg)   SpO2 98%   BMI 18.46 kg/m²   Eyes: Sclera are clear  ENMT: no external lesions' gross hearing normal; no obvious neck masses, no ear or lip lesions, nares normal  CV: RRR. Normal perfusion  GI: soft and non-distended,  Staples c/d/i. Right and left surgical drain with serosanguineous output. J-tube intact. Musculoskeletal: No embolic signs or cyanosis.    Neuro:  Alert  Psychiatric: Calm and cooperative    Labs:   Recent Results (from the past 24 hour(s))   POCT Glucose    Collection Time: 22 12:01 PM   Result Value Ref Range    POC Glucose 124 (H) 65 - 100 mg/dL    Performed by: Cyrus    POCT Glucose    Collection Time: 22  8:46 PM   Result Value Ref Range    POC Glucose 115 (H) 65 - 100 mg/dL    Performed by: Olman    Magnesium    Collection Time: 06/20/22  2:34 AM   Result Value Ref Range    Magnesium 1.8 1.8 - 2.4 mg/dL   Phosphorus    Collection Time: 06/20/22  2:34 AM   Result Value Ref Range    Phosphorus 3.7 2.3 - 3.7 MG/DL   CBC    Collection Time: 06/20/22  2:34 AM   Result Value Ref Range    WBC 21.9 (H) 4.3 - 11.1 K/uL    RBC 3.01 (L) 4.23 - 5.6 M/uL    Hemoglobin 8.2 (L) 13.6 - 17.2 g/dL    Hematocrit 26.5 (L) 41.1 - 50.3 %    MCV 88.0 79.6 - 97.8 FL    MCH 27.2 26.1 - 32.9 PG    MCHC 30.9 (L) 31.4 - 35.0 g/dL    RDW 16.5 (H) 11.9 - 14.6 %    Platelets 923 (H) 521 - 450 K/uL    MPV 9.9 9.4 - 12.3 FL    nRBC 0.00 0.0 - 0.2 K/uL   Comprehensive Metabolic Panel    Collection Time: 06/20/22  2:34 AM   Result Value Ref Range    Sodium 136 136 - 145 mmol/L    Potassium 3.9 3.5 - 5.1 mmol/L    Chloride 93 (L) 98 - 107 mmol/L    CO2 36 (H) 21 - 32 mmol/L    Anion Gap 7 7 - 16 mmol/L    Glucose 101 (H) 65 - 100 mg/dL    BUN 12 8 - 23 MG/DL    CREATININE <0.20 (L) 0.8 - 1.5 MG/DL    GFR  0 (L) >60 ml/min/1.73m2    GFR Non- 0 (L) >60 ml/min/1.73m2    Calcium 8.4 8.3 - 10.4 MG/DL    Total Bilirubin 0.3 0.2 - 1.1 MG/DL    ALT 10 (L) 12 - 65 U/L    AST 12 (L) 15 - 37 U/L    Alk Phosphatase 300 (H) 50 - 136 U/L    Total Protein 5.9 (L) 6.3 - 8.2 g/dL    Albumin 1.6 (L) 3.2 - 4.6 g/dL    Globulin 4.3 (H) 2.3 - 3.5 g/dL    Albumin/Globulin Ratio 0.4 (L) 1.2 - 3.5     POCT Glucose    Collection Time: 06/20/22  9:45 AM   Result Value Ref Range    POC Glucose 106 (H) 65 - 100 mg/dL    Performed by: Cyrus        Data Review     Xray Result (most recent):  XR CHEST PORTABLE 06/20/2022    Narrative  EXAMINATION: One view chest    HISTORY: F/U Atx    TECHNIQUE: Frontal chest.    COMPARISON: 6/19/2022    FINDINGS:  Stable right PICC line. Again there appears to be a mediastinal drain. Persistent bilateral lung infiltrates.     There is no significant pneumothorax. There is a stable, moderate-large, left pleural effusion. The heart is not adequately assessed. No other significant interval changes. Impression  1. Findings as described above. Assessment:     Principal Problem (Resolved):    Septic shock (HCC)  Active Problems:    Atrial flutter (HCC)    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[facility]    Former heavy tobacco smoker    Gastroesophageal reflux disease    Hypoalbuminemia due to protein-calorie malnutrition (HCC)    Syncope due to orthostatic hypotension    Corticosteroid dependence (HCC)    Hypoproteinemia (HCC)    Do not resuscitate status    Fatigue    History of gastric cancer    Encounter for palliative care    Debility    Weakness    Hypercoagulable state, secondary ([facility]    Adult body mass index less than 19    Acute pulmonary embolism without acute cor pulmonale (HCC)    Severe protein-calorie malnutrition (HCC)    Gastrointestinal hemorrhage    Encounter for weaning [facility] ventilator ([facility]    S/P exploratory laparotomy    History of partial pancreatectomy    S/P splenectomy    Jejunostomy status ([facility]    H/O resection of liver    Clostridium perfringens infection    IVC (inferior vena cava obstruction)    SVT (supraventricular tachycardia) (HCC)    Bilateral pleural effusion    Mucus plugging of bronchi    Atelectasis    Hemorrhagic shock (HCC)    Syncope and collapse    Acute respiratory failure with hypoxia (HCC)    HAP (hospital-acquired pneumonia)    ABILIO (iron deficiency anemia)    Malignant neoplasm of overlapping sites of stomach (HCC)    Malignant neoplasm of body of stomach (HCC)    Gastric adenocarcinoma (HCC)  Resolved Problems:    Severe sepsis with lactic acidosis (HCC)    ABLA (acute blood loss anemia)    GIB (gastrointestinal bleeding)    Hyponatremia    Hypomagnesemia        Plan/Recommendations/Medical Decision Making:     Care management per Intensivist  Bronch by Pulmonology 6/16, 6/17, and 6/18.     Remove Left Julian drain  NPO  Tube feeds @ 50 cc/hr  No CPAP/ No BIPBP/ No Bagging  Follow labs      Tasha Palma PA-C

## 2022-06-20 NOTE — PROGRESS NOTES
Physical Therapy Note:    Attempted to see patient this PM for physical therapy treatment  session. Patient was off the floor for procedure. Will follow and re-attempt as schedule permits/patient available. Thank you,    Janie Mcduffie.  Valeria Rollins     Rehab Caseload Tracker

## 2022-06-20 NOTE — PROGRESS NOTES
[facility] Critical Care Note[de-identified] 6/20/2022  Jerry Thomas  Admission Date: 6/2/2022     Length of Stay: 18 days    Background: 78 y.o. y/o male with history of gastric carcinoma s/p neoadjuvant chemo (last dose May 29, 2022) with plans for subsequent total gastrectomy, HTN, admitted to hospitalist service 6/2 with syncopal episode and anemia (hgb 4.9 on admission) as well as gram positive mildred bacteremia (only 1/2, possible contaminate). Unable to tolerate solid food for months and has been losing weight. Was hypotension on pressors initially. Was covered with vanc and cefepime initially, flagyl added with GPR returned. CT c/a/p with small R sided PE, femoral DVT. Subsequently had large melanotic stools, transfused. EGD performed 6/4 but unable to see with food in stomach. Repeat 6/5 similar results. AC held and IR placed IVC filter 6/6.   6/9 was taken for total gastrectomy, left lateral lobectomy of liver, distal pancreatectomy and splenectomy, diaphragmatic resection, J tube placement, and retroperitoneal mass excision and lymphadenectomy, and left abdominal wall mass excision.  To ICU on vent post op and requiring joe. Extubated 6/10. Was transferred out of the ICU and was stable for a few days. Then on 6/15 had increasing O2 requirements and developed pleural effusions. R thoracentesis 6/15 with 800cc removed. That night transferred to the ICU with a fib with RVR and complete atelectasis of L lung. Started amiodarone. Bronch performed 6/16 with thick mucus plugging seen particularly in the left lung. .   Bronch repeated 6/17 with ongoing but somewhat thinner secretions on the left. Bronch again repeated 6/18 with severe secretions seen in the left lung again. Notable PMH:  has a past medical history of ABLA (acute blood loss anemia), GIB (gastrointestinal bleeding), HTN (hypertension), and Severe sepsis with lactic acidosis ([address] 24 Hour events: Patient remains in ICU.  Cultures from recent bronchs with moderate GNR and staph aureus. Currently on no antibiotics. IR consult was placed yesterday to evaluate for possible drainage attempt of loculated L pleural effusion. The patient is currently satting 100% on 7L O2. Getting 3% saline nebs to help reduce mucus plugging. Pt denies any new complaints. ROS: negative except as listed elsewhere. Lines: (insertion date)    Closed/Suction Drain Right RUQ Bulb (Active)       Closed/Suction Drain Left LUQ Bulb (Active)       Gastrostomy/Enterostomy/Jejunostomy Tube Gastrostomy LUQ 1 14 fr (Active)     PICC Double Lumen 09/55/47 Right Basilic (Active)     Drips: current dose (range)  Dose (mcg/kg/min) Propofol : 20 mcg/kg/min  Dose (mcg/min) Phenylephrine : 0 mcg/min (MAP > 65)  Dose (mg/min) Amiodarone: 0 mg/min     Pertinent Exam:         Blood pressure 122/72, pulse 81, temperature 98.1 °F (36.7 °C), temperature source Oral, resp. rate 17, height 5' 9\" (1.753 m), weight 125 lb (56.7 kg), SpO2 100 %. Intake/Output Summary (Last 24 hours) at 6/20/2022 0754  Last data filed at 6/20/2022 0459  Gross per 24 hour   Intake 1725 ml   Output 1725 ml   Net 0 ml     Constitutional:  Awake, in NAD  EENMT:  Sclera clear, pupils equal, oral mucosa moist  Respiratory: Decreased at left base. Some right sided rhonchi  Cardiovascular:  rrr, no m/r/g  Gastrointestinal:  soft with mild tenderness; positive bowel sounds present  Musculoskeletal:  warm with no cyanosis, no lower extremity edema  Skin:  no jaundice or ecchymosis  Neurologic: alert and oriented to ppt. Moves all 4 extremities.    Psychiatric: calm    CXR: 6/20/22      Recent Labs     06/18/22 0422 06/19/22  0230 06/20/22  0234   WBC 21.3* 20.1* 21.9*   HGB 7.7* 7.7* 8.2*   HCT 25.9* 25.3* 26.5*   * 597* 703*     Recent Labs     06/18/22 0422 06/19/22  0230 06/20/22  0234   * 137 136   K 3.6 3.5 3.9   CL 95* 93* 93*   CO2 39* 38* 36*   GLUCOSE 114* 118* 101*   BUN 11 12 12 CREATININE <0.20* <0.20* <0.20*   MG  --   --  1.8   PHOS  --   --  3.7   BILITOT 0.2 0.2 0.3   AST 19 12* 12*   ALT 12 10* 10*   ALKPHOS 348* 291* 300*     No results for input(s): TROPHS, NTPROBNP, CRP, ESR in the last 72 hours. Recent Labs     06/18/22  0422 06/19/22  0230 06/20/22  0234   GLUCOSE 114* 118* 101*      ECHO: 06/02/22    TRANSTHORACIC ECHOCARDIOGRAM (TTE) COMPLETE (CONTRAST/BUBBLE/3D PRN) 06/09/2022  9:31 AM (Final)    Interpretation Summary    Left Ventricle: Reduced left ventricular systolic function. EF by 2D Simpsons Biplane is 42%. Left ventricle size is normal. Severely increased wall thickness. Increased ventricular mass. Infiltrative cardiomyopathy should be considered. Global hypokinesis present. Abnormal diastolic function.   Aortic Valve: Thickened cusp. Mildly calcified cusp. Sclerosis of the aortic valve cusp. Moderate annular calcification vs appearance of aortic valve prosthesis [clinical correlation, clinical history]. Mild regurgitation.   Mitral Valve: Mildly thickened leaflet.   Aorta: Dilated aortic root. Dilated ascending aorta.   Pericardium: Small (<1 cm) localized pericardial effusion present around the right ventricle.   Technical qualifiers: Color flow Doppler was performed and pulse wave and/or continuous wave Doppler was performed. Signed by: Lucius Barrett MD on 6/9/2022  9:31 AM    Microbiology:   Recent Labs     06/18/22  1105   CULTURE MODERATE GRAM NEGATIVE RODS SUBCULTURE IN PROGRESS*  CULTURE IN [address] UPDATES TO FOLLOW     Ventilator Settings Ideal body weight: 70.7 kg (155 lb 13.8 oz)  Mode FIO2 Rate Tidal Volume Pressure   PS/CPAP    65 %  15 bmp     500 mL   8     Peak airway pressure:     Minute ventilation: Minute Ventilation (l/min): 12 l/min  ABG:No results for input(s): PHAPOC, HNA2UGQP, RK2GWQF, IGW3UWK, BE in the last 72 hours.   Assessment and Plan:  (Medical Decision Making)   Impression: 78 y.o. male with gastric cancer, s/p joe adjuvant chemo. Admitted with syncope that was likely multifactorial, driven by recent weight loss, poor PO intake, and severe anemia into the 4's. GPR on blood culture but doubt this was sepsis as this was likely contaminate. Course complicated by discovery of PE and DVT, subsequent GI bleed likely due to his cancer, requiring IVC filter placement. Now post op extensive resections and brought to ICU intubated and on pressors. Able to be extubated but had gradual respiratory decline due to combination of pleural effusions and severe mucus plugging requiring multiple bronchoscopies.      Has severe hypoproteinemia and has been given fluids and blood during his hospitalization. R effusion tapped with L looking loculated. NEURO:   Sedation: none  Analgesia: prn meds. CV:   Volume Status: has been getting lasix and albumin. A flutter: Was on amiodarone. Now on metoprolol. Cardiology following. PULM:   Acute hypoxemic respiratory failure: On 7L and can wean further today. Pleural effusion: S/p R sided drainage. No attempt made on the left. IR has been consulted to see if they can drain the left. Mucus plugging: with severe atelectasis of the left lung. Better but has required nearly daily bronchs last week. Cont mucinex and 3% saline nebs. Add flutter valve. Daily CXR's. Pneumonia: mix of mssa and GNR on most recent bronch cultures. Associated with leukocytosis. Start zosyn. PE: with DVT. Only on prophy lovenox. Increase to therapeutic dosing when surgery ok. RENAL:  Electrolytes: replace as needed  GI:   Gastric Cancer: s/p extensive resection this hospitalization. Nutrition: Has clear liquid diet and tube feeding ordered. HEME:   Anemia: monitoring. Transfuse for < 7. Anticoagulation: prophy lovenox. See if can increase to therapeutic dosing today. ID:   HAP: mssa and GNR in recent bronch. Zosyn starting 6/20.    ENDO:   DM: continue insulin  Skin: no decub, turns, preventive care  Prophy: lovenox      DNR    The patient is critically ill with respiratory failure, circulatory failure and requires high complexity decision making for assessment and support including frequent ventilator adjustment , frequent evaluation and titration of therapies , application of advanced monitoring technologies and extensive interpretation of multiple databases    Cumulative time devoted to patient care services by me for day of service is 23 mins.     Kae Miles MD

## 2022-06-20 NOTE — PROGRESS NOTES
Hospitalist Progress Note   Admit Date:  2022  1:05 PM   Name:  Khushbu Geller   Age:  78 y.o. Sex:  male  :  1955   MRN:  [id number]   Room:  [facility]/    Presenting Complaint: Loss of Consciousness     Reason(s) for Admission: Syncope and collapse [R55]  Hemorrhagic shock ([address] [R57.8]  Shock ([address] [R57.9]  History of gastric cancer [Z85.028]  Septic shock ([address] [A41.9, R65.21]  Gastrointestinal hemorrhage, unspecified gastrointestinal hemorrhage type [facility] Course & Interval History:     Please refer to the admission H&P for details of presentation. In summary, Khushbu Geller is a 78 y.o. male with medical history significant for   gastric adenocarcinoma s/p 4 cycles chemo, last dose 22, 50 pack year smoking history,  HTN who presented after having a syncopal episode today with 2 day history of near syncope. He has been unable to tolerate solid food for months and has been losing weight. In ED, he was hypotensive, BP 74/54, , RR 22. Hb noted to be 4.9. Workup also revealed  gram positive mildred bacteremia (only 1/2, possible contaminate). He was admitted to ICU due to septic shock requiring pressor support. Started on broad spectrum Abx. CT c/a/p with small R sided PE, femoral DVT. Subsequently had large melanotic stools. EGD performed  but unable to see with food in stomach. Repeat  similar results. AC held and IR placed IVC filter .   was taken for total gastrectomy, left lateral lobectomy of liver, distal pancreatectomy and splenectomy, diaphragmatic resection, J tube placement, and retroperitoneal mass excision and lymphadenectomy, and left abdominal wall mass excision. He again required ICU for vent support and pressor support post-op. He has been stable and was transferred to the floor. Noted to have worsening respiratory status on  requiring 8L of O2 NC. CXR showed moderate pleural effusions. He was started on albumin and diuretics. Underwent left sided thoracentesis with removal of 800cc. Later, went into SVT vs afib which resolved with metoprolol. He had 2 more episodes of SVTs which again resolved with IV metoprolol. EKGs unfortunately showed NSR (as they were done after IV lopressor). On 6/15, he had 3 more episodes of SVT/?afib/flutter after thoracentesis. They all resolved after IV lopressor. Patient was asymptomatic throughout these episodes. He was transferred to the ICU for Amio gtt. Subjective/24 hr Events (06/20/22) : He feels about this same today. Complains of abdominal pain but improving. Still with cough but improved. Denies CP/SOB. Denies N/V. Review of Systems:    Negative except stated above  Assessment & Plan:       Clostridium Perfringens Bacteremia   BCx from 6/2 showed Clostridium perfringens. Completed IV Vancomycin on 6/16 per ID recs  06/20/22: WBC up today to 21.9    Locally advanced Gastric adenocarcinoma   S/p 4 cyclesof chemo last on 5/19/22 6/9/2022 - S/p Ex lap with total gastrectomy and esophagojejunostomy , Left lateral lobectomy of liver, Distal pancreatectomy and splenectomy, Combined diaphragmatic resection,Feeding J-tube placement, Retroperitoneal mass excision and lymphadenectomy, Left abdominal wall mass excision on posterior rectus sheath  - management as per surgery  - on TPN with plan to wean off.   - on Jtube feeding    SVT (Resolved)  06/20/22: Continue metoprolol 12.5mg PO via Jtube. Cardiology following.  - 6/17 Taken off Amio gtt today. - IV metoprolol PRN    Acute Hypoxic respiratory failure  B/l Pleural Effusion  06/20/22: Oxygenation improved. Start Zosyn based on bronch cultures. To go to IR today for chest tube.   - S/P bronchoscopy on 6/16, 6/17, and 6/18.  - give o2 supplement to maintain saturation > 92%  - pulmonary appreciated: 6/15 s/p R thoracentesis with removal of 800cc  - continue lasix 40mg IV BID    Hypokalemia  06/20/22: K+ of 3.9, mag normal  - check BMP daily  - continue with telemetry    Corticosteroid dependence   No longer needing as it was started during chemo for fatigue    Acute pulmonary embolism without acute cor pulmonale   Small in RLL with DVt in left femoral veins on 6/2/22. S/p IVC filter  Sq lovenox    ABILIO   Hb stable    Cancer Cachexia // Severe Protein Calorie Malnutrition  - on TPN  - now on TF via J tube  - Nutrition followin    Diet:  ADULT TUBE FEEDING; Jejunostomy; Standard without Fiber; Continuous; 50; No; 30; Other (specify); Manual Flush BID and after each use  ADULT DIET;  Clear Liquid  ADULT ORAL NUTRITION SUPPLEMENT; Breakfast, Dinner; Standard High Calorie/High Protein Oral Supplement  DVT PPx: lovenox  Code status: DNR        Hospital Problems:  Principal Problem (Resolved):    Septic shock ([facility]  Active Problems:    Atrial flutter (HCC)    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HCC)    Former heavy tobacco smoker    Gastroesophageal reflux disease    Hypoalbuminemia due to protein-calorie malnutrition (HCC)    Syncope due to orthostatic hypotension    Corticosteroid dependence ([facility]    Hypoproteinemia (HCC)    Do not resuscitate status    Fatigue    History of gastric cancer    Encounter for palliative care    Debility    Weakness    Hypercoagulable state, secondary ([address]    Adult body mass index less than 19    Acute pulmonary embolism without acute cor pulmonale (HCC)    Severe protein-calorie malnutrition (HCC)    Gastrointestinal hemorrhage    Encounter for weaning from ventilator ([facility]    S/P exploratory laparotomy    History of partial pancreatectomy    S/P splenectomy    Jejunostomy status ([facility]    H/O resection of liver    Clostridium perfringens infection    IVC (inferior vena cava obstruction)    SVT (supraventricular tachycardia) (HCC)    Bilateral pleural effusion    Mucus plugging of bronchi    Atelectasis    Hemorrhagic shock (HCC)    Syncope and collapse    Acute respiratory failure with hypoxia (HCC)    HAP (hospital-acquired pneumonia)    ABILIO (iron deficiency anemia)    Malignant neoplasm of overlapping sites of stomach (HCC)    Malignant neoplasm of body of stomach (HCC)    Gastric adenocarcinoma ([facility]  Resolved Problems:    Severe sepsis with lactic acidosis (HCC)    ABLA (acute blood loss anemia)    GIB (gastrointestinal bleeding)    Hyponatremia    Hypomagnesemia      Objective:     Patient Vitals for the past 24 hrs:   Temp Pulse Resp BP SpO2   06/20/22 1046 -- 78 (!) 36 -- 98 %   06/20/22 1031 -- 75 19 105/68 95 %   06/20/22 1020 -- -- 25 -- --   06/20/22 1016 -- 76 23 -- 98 %   06/20/22 1001 -- 79 (!) 35 127/73 98 %   06/20/22 0950 -- -- 20 -- --   06/20/22 0946 -- 79 19 -- 90 %   06/20/22 0932 -- 78 -- 115/68 --   06/20/22 0931 -- 78 19 115/68 94 %   06/20/22 0916 -- 80 18 -- 94 %   06/20/22 0901 -- 80 16 121/71 96 %   06/20/22 0846 -- 82 18 -- 96 %   06/20/22 0831 -- 81 16 114/71 100 %   06/20/22 0816 -- 80 21 -- 93 %   06/20/22 0801 -- 80 24 123/74 96 %   06/20/22 0746 -- 81 16 -- 100 %   06/20/22 0743 -- 81 17 -- 100 %   06/20/22 0731 98.1 °F (36.7 °C) 80 30 122/72 100 %   06/20/22 0716 -- 80 22 -- 100 %   06/20/22 0701 -- 80 (!) 66 121/75 100 %   06/20/22 0657 -- 81 25 -- 100 %   06/20/22 0500 -- 83 24 -- 100 %   06/20/22 0445 -- 83 18 -- 100 %   06/20/22 0430 -- 83 15 115/70 100 %   06/20/22 0415 -- 85 12 -- 100 %   06/20/22 0400 -- 84 16 114/70 100 %   06/20/22 0345 -- 84 16 -- 100 %   06/20/22 0330 -- 84 15 118/72 100 %   06/20/22 0315 -- 87 24 -- 100 %   06/20/22 0300 98.7 °F (37.1 °C) 86 17 121/72 100 %   06/20/22 0245 -- 85 18 -- 100 %   06/20/22 0230 -- 85 26 110/70 100 %   06/20/22 0215 -- 81 (!) 56 -- 100 %   06/20/22 0200 -- 81 (!) 47 120/75 100 %   06/20/22 0145 -- 84 (!) 51 -- 100 %   06/20/22 0130 -- 85 (!) 60 117/73 100 %   06/20/22 0115 -- 85 (!) 39 -- 100 %   06/20/22 0100 -- 85 (!) 42 120/76 100 %   06/20/22 0045 -- 86 19 -- 100 %   06/20/22 0030 -- 87 17 110/67 100 %   06/20/22 0015 -- 86 18 -- 100 %   06/20/22 0000 -- 86 16 119/68 100 %   06/19/22 2345 -- 86 17 -- 100 %   06/19/22 2330 -- 85 20 111/65 100 %   06/19/22 2315 -- 85 16 -- 100 %   06/19/22 2300 98.8 °F (37.1 °C) 86 17 117/71 100 %   06/19/22 2245 -- 86 28 -- 99 %   06/19/22 2230 -- 85 18 131/75 100 %   06/19/22 2215 -- 84 17 -- 100 %   06/19/22 2200 -- 85 21 123/72 98 %   06/19/22 2145 -- 82 20 -- 99 %   06/19/22 2130 -- 82 23 118/74 98 %   06/19/22 2115 -- 80 22 -- 100 %   06/19/22 2113 -- 80 (!) 43 -- 98 %   06/19/22 2100 -- 84 20 119/73 98 %   06/19/22 2045 -- 90 25 -- 96 %   06/19/22 2030 -- 92 (!) 32 113/73 90 %   06/19/22 2015 -- 92 20 -- 94 %   06/19/22 2000 -- 92 18 117/74 93 %   06/19/22 1945 -- 93 19 -- 95 %   06/19/22 1930 -- 92 18 125/77 96 %   06/19/22 1915 -- 92 19 -- 96 %   06/19/22 1901 98.7 °F (37.1 °C) 94 13 121/81 94 %   06/19/22 1900 -- 96 21 -- 96 %   06/19/22 1847 -- -- 22 -- --   06/19/22 1846 -- 93 28 -- 96 %   06/19/22 1831 -- 92 21 119/73 94 %   06/19/22 1816 -- 94 19 -- (!) 88 %   06/19/22 1801 -- 94 18 123/71 (!) 88 %   06/19/22 1746 -- 92 22 -- 94 %   06/19/22 1731 -- 93 18 116/71 94 %   06/19/22 1716 -- 94 17 -- 94 %   06/19/22 1701 -- 93 16 127/70 94 %   06/19/22 1646 -- 96 14 -- 92 %   06/19/22 1631 98.7 °F (37.1 °C) 93 26 119/71 95 %   06/19/22 1616 -- 92 17 -- 94 %   06/19/22 1601 -- 92 16 112/67 93 %   06/19/22 1549 -- -- 20 -- --   06/19/22 1546 -- 92 19 -- 92 %   06/19/22 1531 -- 87 (!) 38 117/67 (!) 86 %   06/19/22 1519 -- -- 23 -- --   06/19/22 1516 -- 93 24 -- --   06/19/22 1501 -- 85 21 (!) 123/52 92 %   06/19/22 1446 -- 83 (!) 43 -- 91 %   06/19/22 1431 98.9 °F (37.2 °C) 83 15 118/70 96 %   06/19/22 1430 -- -- -- -- 91 %   06/19/22 1416 -- 90 (!) 32 -- (!) 88 %   06/19/22 1401 -- 79 21 123/72 96 %   06/19/22 1346 -- 79 23 -- 96 %   06/19/22 1331 -- 79 19 121/70 94 %   06/19/22 1316 -- 78 (!) 32 -- 96 %   06/19/22 1301 -- 78 29 112/65 95 %   06/19/22 1246 -- 78 21 -- 95 %   06/19/22 1233 -- 79 26 -- 93 %   06/19/22 1231 -- 78 29 117/66 93 %   06/19/22 1216 -- 79 18 -- 90 %   06/19/22 1203 -- -- 16 -- --   06/19/22 1201 -- 80 18 108/64 90 %       Oxygen Therapy  SpO2: 98 %  Pulse Oximetry Type: Continuous  Pulse via Oximetry: 78 beats per minute  Pulse Oximeter Device Mode: Continuous  Pulse Oximeter Device Location: Forehead  O2 Device: Nasal cannula  Oximetry Probe Site Changed: No  Skin Assessment: Clean, dry, & intact  Skin Protection for O2 Device: N/A  Orientation: Bilateral  Location: Cheek  Intervention(s): Mouth Care  FiO2 : 65 %  O2 Flow Rate (L/min): 7 L/min  Blood Gas  Performed?: Yes  Loi's Test #1: Collateral flow confirmed  Site #1: Left Radial  Site Prepped #1: Yes  Number of Attempts #1: 1  Pressure Held #1: Yes  Complications #1: None  How Tolerated?: Tolerated well  O2 Delivery Method: Endotracheal tube  Vent Mode: PS/CPAP    Estimated body mass index is 18.46 kg/m² as calculated from the following:    Height as of this encounter: 5' 9\" (1.753 m). Weight as of this encounter: 125 lb (56.7 kg). Intake/Output Summary (Last 24 hours) at 6/20/2022 1155  Last data filed at 6/20/2022 0459  Gross per 24 hour   Intake 1725 ml   Output 575 ml   Net 1150 ml         Physical Exam:     Blood pressure 105/68, pulse 78, temperature 98.1 °F (36.7 °C), temperature source Oral, resp. rate (!) 36, height 5' 9\" (1.753 m), weight 125 lb (56.7 kg), SpO2 98 %. General:    Cachetic, ill appearing, lethargic. Head:  Normocephalic, atraumatic  Eyes:  Sclerae appear normal.  Pupils equally round. ENT:  Nares appear normal, no drainage. Moist oral mucosa  Neck:  No restricted ROM. Trachea midline   CV:   RRR. No m/r/g. No jugular venous distension. Lungs:   Diminished left side. No wheezing, Respirations even, unlabored  Abdomen:   Hypoactive BS, healing mid abdominal incision scar with staple, +J tube. Extremities: No cyanosis or clubbing. No edema  Skin:     No rashes and normal coloration. Warm and dry.     Neuro:  CN II-XII grossly unremarkable  Psych:  Alert, no depression     I have reviewed ordered lab tests and independently visualized imaging below:    Recent Labs:  Recent Results (from the past 48 hour(s))   POCT Glucose    Collection Time: 06/18/22  2:41 PM   Result Value Ref Range    POC Glucose 94 65 - 100 mg/dL    Performed by: Chavez (Alverson)    POCT Glucose    Collection Time: 06/18/22 10:06 PM   Result Value Ref Range    POC Glucose 127 (H) 65 - 100 mg/dL    Performed by: Destiny    CBC    Collection Time: 06/19/22  2:30 AM   Result Value Ref Range    WBC 20.1 (H) 4.3 - 11.1 K/uL    RBC 2.83 (L) 4.23 - 5.6 M/uL    Hemoglobin 7.7 (L) 13.6 - 17.2 g/dL    Hematocrit 25.3 (L) 41.1 - 50.3 %    MCV 89.4 79.6 - 97.8 FL    MCH 27.2 26.1 - 32.9 PG    MCHC 30.4 (L) 31.4 - 35.0 g/dL    RDW 16.4 (H) 11.9 - 14.6 %    Platelets 691 (H) 656 - 450 K/uL    MPV 9.9 9.4 - 12.3 FL    nRBC 0.00 0.0 - 0.2 K/uL   Comprehensive Metabolic Panel    Collection Time: 06/19/22  2:30 AM   Result Value Ref Range    Sodium 137 136 - 145 mmol/L    Potassium 3.5 3.5 - 5.1 mmol/L    Chloride 93 (L) 98 - 107 mmol/L    CO2 38 (H) 21 - 32 mmol/L    Anion Gap 6 (L) 7 - 16 mmol/L    Glucose 118 (H) 65 - 100 mg/dL    BUN 12 8 - 23 MG/DL    CREATININE <0.20 (L) 0.8 - 1.5 MG/DL    GFR  0 (L) >60 ml/min/1.73m2    GFR Non- 0 (L) >60 ml/min/1.73m2    Calcium 8.6 8.3 - 10.4 MG/DL    Total Bilirubin 0.2 0.2 - 1.1 MG/DL    ALT 10 (L) 12 - 65 U/L    AST 12 (L) 15 - 37 U/L    Alk Phosphatase 291 (H) 50 - 136 U/L    Total Protein 5.6 (L) 6.3 - 8.2 g/dL    Albumin 1.7 (L) 3.2 - 4.6 g/dL    Globulin 3.9 (H) 2.3 - 3.5 g/dL    Albumin/Globulin Ratio 0.4 (L) 1.2 - 3.5     POCT Glucose    Collection Time: 06/19/22  8:09 AM   Result Value Ref Range    POC Glucose 146 (H) 65 - 100 mg/dL    Performed by: Cyrus    POCT Glucose    Collection Time: 06/19/22 12:01 PM   Result Value Ref Range    POC Glucose 124 (H) 65 - 100 mg/dL    Performed by: Chano Navarro    POCT Glucose    Collection Time: 06/19/22  8:46 PM   Result Value Ref Range    POC Glucose 115 (H) 65 - 100 mg/dL    Performed by: Olman    Magnesium    Collection Time: 06/20/22  2:34 AM   Result Value Ref Range    Magnesium 1.8 1.8 - 2.4 mg/dL   Phosphorus    Collection Time: 06/20/22  2:34 AM   Result Value Ref Range    Phosphorus 3.7 2.3 - 3.7 MG/DL   CBC    Collection Time: 06/20/22  2:34 AM   Result Value Ref Range    WBC 21.9 (H) 4.3 - 11.1 K/uL    RBC 3.01 (L) 4.23 - 5.6 M/uL    Hemoglobin 8.2 (L) 13.6 - 17.2 g/dL    Hematocrit 26.5 (L) 41.1 - 50.3 %    MCV 88.0 79.6 - 97.8 FL    MCH 27.2 26.1 - 32.9 PG    MCHC 30.9 (L) 31.4 - 35.0 g/dL    RDW 16.5 (H) 11.9 - 14.6 %    Platelets 258 (H) 031 - 450 K/uL    MPV 9.9 9.4 - 12.3 FL    nRBC 0.00 0.0 - 0.2 K/uL   Comprehensive Metabolic Panel    Collection Time: 06/20/22  2:34 AM   Result Value Ref Range    Sodium 136 136 - 145 mmol/L    Potassium 3.9 3.5 - 5.1 mmol/L    Chloride 93 (L) 98 - 107 mmol/L    CO2 36 (H) 21 - 32 mmol/L    Anion Gap 7 7 - 16 mmol/L    Glucose 101 (H) 65 - 100 mg/dL    BUN 12 8 - 23 MG/DL    CREATININE <0.20 (L) 0.8 - 1.5 MG/DL    GFR  0 (L) >60 ml/min/1.73m2    GFR Non- 0 (L) >60 ml/min/1.73m2    Calcium 8.4 8.3 - 10.4 MG/DL    Total Bilirubin 0.3 0.2 - 1.1 MG/DL    ALT 10 (L) 12 - 65 U/L    AST 12 (L) 15 - 37 U/L    Alk Phosphatase 300 (H) 50 - 136 U/L    Total Protein 5.9 (L) 6.3 - 8.2 g/dL    Albumin 1.6 (L) 3.2 - 4.6 g/dL    Globulin 4.3 (H) 2.3 - 3.5 g/dL    Albumin/Globulin Ratio 0.4 (L) 1.2 - 3.5     POCT Glucose    Collection Time: 06/20/22  9:45 AM   Result Value Ref Range    POC Glucose 106 (H) 65 - 100 mg/dL    Performed by: Cyrus        Other Studies:  XR CHEST PORTABLE   Final Result      1. Findings as described above. XR CHEST PORTABLE   Final Result      1. Findings as described above.             XR CHEST PORTABLE   Final Result      1. Large left pleural effusion with subtotal collapse of the left lung. 2.  Opacity in the right lung base may be caused by a small pleural effusion. XR CHEST PORTABLE   Final Result      Slight improved aeration of the left upper lobe when compared to prior exam.   Persistent large area of increased opacities in the left hemithorax may be due   to combination of large pleural effusion and atelectasis. FL GASTROGRAFFIN SWALLOW   Final Result      1. No evidence of anastomotic leak. CPT code(s)23691      XR CHEST 1 VIEW   Final Result      1. New near-complete opacification of the left hemithorax, consistent with   combination of pleural effusion and atelectasis. Consider possibility of mucous   plugging. XR CHEST 1 VIEW   Final Result   1. Essentially stable appearance of bilateral lung opacities and pleural   effusions, left worse than right. XR CHEST 1 VIEW   Final Result      1. Moderate left and small right pleural effusions, similar to prior exam.      XR CHEST 1 VIEW   Final Result      1. Moderate left pleural effusion, similar to prior exam.      2. No significant change compared to prior exam.      XR CHEST 1 VIEW   Final Result   Bilateral airspace opacities and left pleural effusion with interval   worsening on the left. XR CHEST 1 VIEW   Final Result      1. Persistent bibasilar opacities, likely atelectasis or consolidation. XR CHEST 1 VIEW   Final Result   Unchanged mild bibasilar lung atelectasis or infiltrates. XR CHEST 1 VIEW   Final Result   1. New mild right basilar atelectatic appearing changes. No significant acute   changes otherwise seen. It should be noted that the left lung apex has been   clipped limiting complete assessment for pneumothorax. This report was made using voice transcription.  Despite my best efforts to avoid   any, transcription errors may persist. If there is any question about the accuracy of the report or need for clarification, then please call [phone], or text me through perfectserv for clarification or correction. IR GUIDED IVC FILTER PLACEMENT   Final Result   Successful placement an infrarenal Cook Celect inferior vena cava filter. Plan:   The patient may be evaluated in 4 months by interventional radiology in order to   evaluate for continued need of the IVC filter for versus filter removal.                  Vascular duplex lower extremity venous bilateral   Final Result   Negative for sonographic evidence of deep venous thrombosis right   lower extremity. LEFT LOWER EXTREMITY VENOUS DUPLEX ULTRASOUND. INDICATION: Left lower extremity pain. TECHNIQUE: Direct skin-contact high resolution grayscale images, color-flow   Doppler and duplex waveform analysis. FINDINGS: Abnormal intraluminal echoes within the common femoral and profunda   femoral veins. There is some flow. The superficial superficial femoral and   popliteal veins and upper calf veins are unremarkable. IMPRESSION: Positive for deep venous thrombosis left common femoral profunda   femoral veins, nonocclusive. CTA CHEST ABDOMEN PELVIS W WO CONTRAST   Final Result   1. No extravasation to suggest active GI bleed. 2. Small areas of pulmonary embolism are present in the right lower lobe. Clot   burden is quite small. 3. DVT is also likely present in the left femoral veins. 4. Previously described gastric mass again noted. It is difficult to measure to   evaluate for progression. XR CHEST PORTABLE   Final Result   Unremarkable portable chest radiograph.                      XR CHEST 1 VIEW    (Results Pending)   IR CHEST TUBE INSERTION    (Results Pending)       Current Meds:  Current Facility-Administered Medications   Medication Dose Route Frequency    piperacillin-tazobactam (ZOSYN) 3,375 mg in sodium chloride 0.9 % 50 mL IVPB (mini-bag)  3,375 mg IntraVENous Q8H    sodium chloride (Inhalant) 3 % nebulizer solution 4 mL  4 mL Nebulization BID    sodium chloride (OCEAN, BABY AYR) 0.65 % nasal spray 1 spray  1 spray Each Nostril PRN    naloxone (NARCAN) injection 0.4 mg  0.4 mg IntraVENous PRN    furosemide (LASIX) injection 40 mg  40 mg IntraVENous Daily    melatonin tablet 4.5 mg  4.5 mg Per J Tube Nightly PRN    diatrizoate meglumine-sodium (GASTROGRAFIN) 66-10 % solution 120 mL  120 mL Oral ONCE PRN    bisacodyl (DULCOLAX) suppository 10 mg  10 mg Rectal Daily    levalbuterol (XOPENEX) nebulization 0.63 mg  0.63 mg Nebulization Q6H    metoprolol tartrate (LOPRESSOR) tablet 12.5 mg  12.5 mg Per J Tube BID    sodium chloride flush 0.9 % injection 5-40 mL  5-40 mL IntraVENous 2 times per day    sodium chloride flush 0.9 % injection 5-40 mL  5-40 mL IntraVENous PRN    0.9 % sodium chloride infusion  25 mL IntraVENous PRN    enoxaparin Sodium (LOVENOX) injection 30 mg  30 mg SubCUTAneous Q12H    glucose chewable tablet 16 g  4 tablet Oral PRN    dextrose bolus 10% 125 mL  125 mL IntraVENous PRN    Or    dextrose bolus 10% 250 mL  250 mL IntraVENous PRN    glucagon injection 1 mg  1 mg IntraMUSCular PRN    dextrose 5 % solution  100 mL/hr IntraVENous PRN    medicated lip ointment (BLISTEX)   Topical PRN    HYDROmorphone HCl PF (DILAUDID) injection 0.5 mg  0.5 mg IntraVENous Q2H PRN    oxyCODONE (ROXICODONE) 5 MG/5ML solution 5 mg  5 mg Per J Tube Q6H PRN    guaiFENesin (ROBITUSSIN) 100 MG/5ML oral solution 200 mg  200 mg Per J Tube Q6H    insulin lispro (HUMALOG) injection vial 0-4 Units  0-4 Units SubCUTAneous TID WC    insulin lispro (HUMALOG) injection vial 0-4 Units  0-4 Units SubCUTAneous Nightly    cloNIDine (CATAPRES) tablet 0.2 mg  0.2 mg Per J Tube Q4H PRN    sodium chloride flush 0.9 % injection 5-40 mL  5-40 mL IntraVENous 2 times per day    sodium chloride flush 0.9 % injection 5-40 mL  5-40 mL IntraVENous PRN    0.9 % sodium chloride infusion   IntraVENous PRN    potassium chloride 20 mEq/50 mL IVPB (Central Line)  20 mEq IntraVENous PRN    magnesium sulfate 2000 mg in 50 mL IVPB premix  2,000 mg IntraVENous PRN    ondansetron (ZOFRAN) injection 4 mg  4 mg IntraVENous Q6H PRN    Medela Tender Care Lanolin cream   Topical PRN       Signed:  Janeth Espana DO    Part of this note may have been written by using a voice dictation software. The note has been proof read but may still contain some grammatical/other typographical errors.

## 2022-06-21 ENCOUNTER — APPOINTMENT (OUTPATIENT)
Dept: GENERAL RADIOLOGY | Age: 67
DRG: 853 | End: 2022-06-21
Payer: MEDICARE

## 2022-06-21 PROBLEM — B96.7 CLOSTRIDIUM PERFRINGENS INFECTION: Status: RESOLVED | Noted: 2022-06-15 | Resolved: 2022-06-21

## 2022-06-21 LAB
ALBUMIN SERPL-MCNC: 1.7 G/DL (ref 3.2–4.6)
ALBUMIN/GLOB SERPL: 0.4 {RATIO} (ref 1.2–3.5)
ALP SERPL-CCNC: 275 U/L (ref 50–136)
ALT SERPL-CCNC: 9 U/L (ref 12–65)
ANION GAP SERPL CALC-SCNC: 7 MMOL/L (ref 7–16)
AST SERPL-CCNC: 14 U/L (ref 15–37)
BILIRUB SERPL-MCNC: 0.4 MG/DL (ref 0.2–1.1)
BUN SERPL-MCNC: 14 MG/DL (ref 8–23)
CALCIUM SERPL-MCNC: 8.7 MG/DL (ref 8.3–10.4)
CHLORIDE SERPL-SCNC: 93 MMOL/L (ref 98–107)
CO2 SERPL-SCNC: 36 MMOL/L (ref 21–32)
CREAT SERPL-MCNC: <0.2 MG/DL (ref 0.8–1.5)
ERYTHROCYTE [DISTWIDTH] IN BLOOD BY AUTOMATED COUNT: 16.4 % (ref 11.9–14.6)
GLOBULIN SER CALC-MCNC: 4.5 G/DL (ref 2.3–3.5)
GLUCOSE BLD STRIP.AUTO-MCNC: 103 MG/DL (ref 65–100)
GLUCOSE BLD STRIP.AUTO-MCNC: 70 MG/DL (ref 65–100)
GLUCOSE SERPL-MCNC: 79 MG/DL (ref 65–100)
HCT VFR BLD AUTO: 26.6 % (ref 41.1–50.3)
HGB BLD-MCNC: 8.2 G/DL (ref 13.6–17.2)
MCH RBC QN AUTO: 27.2 PG (ref 26.1–32.9)
MCHC RBC AUTO-ENTMCNC: 30.8 G/DL (ref 31.4–35)
MCV RBC AUTO: 88.4 FL (ref 79.6–97.8)
NRBC # BLD: 0 K/UL (ref 0–0.2)
PLATELET # BLD AUTO: 840 K/UL (ref 150–450)
PMV BLD AUTO: 10.1 FL (ref 9.4–12.3)
POTASSIUM SERPL-SCNC: 3.4 MMOL/L (ref 3.5–5.1)
PROT SERPL-MCNC: 6.2 G/DL (ref 6.3–8.2)
RBC # BLD AUTO: 3.01 M/UL (ref 4.23–5.6)
SERVICE CMNT-IMP: ABNORMAL
SERVICE CMNT-IMP: NORMAL
SODIUM SERPL-SCNC: 136 MMOL/L (ref 136–145)
WBC # BLD AUTO: 20 K/UL (ref 4.3–11.1)

## 2022-06-21 PROCEDURE — 6360000002 HC RX W HCPCS: Performed by: INTERNAL MEDICINE

## 2022-06-21 PROCEDURE — 94640 AIRWAY INHALATION TREATMENT: CPT

## 2022-06-21 PROCEDURE — 2580000003 HC RX 258: Performed by: INTERNAL MEDICINE

## 2022-06-21 PROCEDURE — 99231 SBSQ HOSP IP/OBS SF/LOW 25: CPT | Performed by: INTERNAL MEDICINE

## 2022-06-21 PROCEDURE — 2580000003 HC RX 258: Performed by: FAMILY MEDICINE

## 2022-06-21 PROCEDURE — 2580000003 HC RX 258: Performed by: SURGERY

## 2022-06-21 PROCEDURE — 80053 COMPREHEN METABOLIC PANEL: CPT

## 2022-06-21 PROCEDURE — 85027 COMPLETE CBC AUTOMATED: CPT

## 2022-06-21 PROCEDURE — 6360000002 HC RX W HCPCS: Performed by: SURGERY

## 2022-06-21 PROCEDURE — 6370000000 HC RX 637 (ALT 250 FOR IP): Performed by: FAMILY MEDICINE

## 2022-06-21 PROCEDURE — 99232 SBSQ HOSP IP/OBS MODERATE 35: CPT | Performed by: INTERNAL MEDICINE

## 2022-06-21 PROCEDURE — 71045 X-RAY EXAM CHEST 1 VIEW: CPT

## 2022-06-21 PROCEDURE — 82962 GLUCOSE BLOOD TEST: CPT

## 2022-06-21 PROCEDURE — 6370000000 HC RX 637 (ALT 250 FOR IP): Performed by: PHYSICIAN ASSISTANT

## 2022-06-21 PROCEDURE — 6360000002 HC RX W HCPCS: Performed by: FAMILY MEDICINE

## 2022-06-21 PROCEDURE — 6370000000 HC RX 637 (ALT 250 FOR IP): Performed by: SURGERY

## 2022-06-21 RX ORDER — HYDROMORPHONE HYDROCHLORIDE 1 MG/ML
0.5 INJECTION, SOLUTION INTRAMUSCULAR; INTRAVENOUS; SUBCUTANEOUS
Qty: 1 ML | Refills: 0
Start: 2022-06-21 | End: 2022-06-22

## 2022-06-21 RX ORDER — BISACODYL 10 MG
10 SUPPOSITORY, RECTAL RECTAL DAILY
Qty: 1 SUPPOSITORY | Refills: 0
Start: 2022-06-21 | End: 2022-06-22

## 2022-06-21 RX ORDER — LANOLIN
1 CREAM (ML) TOPICAL PRN
Qty: 1 G | Refills: 0 | Status: ON HOLD
Start: 2022-06-21 | End: 2022-08-15 | Stop reason: HOSPADM

## 2022-06-21 RX ORDER — INSULIN LISPRO 100 [IU]/ML
0-4 INJECTION, SOLUTION INTRAVENOUS; SUBCUTANEOUS
Qty: 1 ML | Refills: 0
Start: 2022-06-21

## 2022-06-21 RX ORDER — LANOLIN ALCOHOL/MO/W.PET/CERES
4.5 CREAM (GRAM) TOPICAL NIGHTLY PRN
Qty: 1 TABLET | Refills: 0
Start: 2022-06-21

## 2022-06-21 RX ORDER — ENOXAPARIN SODIUM 100 MG/ML
1 INJECTION SUBCUTANEOUS EVERY 12 HOURS
Status: DISCONTINUED | OUTPATIENT
Start: 2022-06-21 | End: 2022-06-21 | Stop reason: HOSPADM

## 2022-06-21 RX ORDER — LEVALBUTEROL INHALATION SOLUTION 0.63 MG/3ML
0.63 SOLUTION RESPIRATORY (INHALATION) EVERY 6 HOURS
Qty: 1 EACH | Refills: 0
Start: 2022-06-21

## 2022-06-21 RX ORDER — FUROSEMIDE 10 MG/ML
40 INJECTION INTRAMUSCULAR; INTRAVENOUS DAILY
Qty: 1 ML | Refills: 0 | Status: ON HOLD
Start: 2022-06-21 | End: 2022-08-15 | Stop reason: HOSPADM

## 2022-06-21 RX ORDER — ONDANSETRON 2 MG/ML
4 INJECTION INTRAMUSCULAR; INTRAVENOUS EVERY 6 HOURS PRN
Qty: 84 ML | Refills: 0
Start: 2022-06-21

## 2022-06-21 RX ORDER — INSULIN LISPRO 100 [IU]/ML
0-4 INJECTION, SOLUTION INTRAVENOUS; SUBCUTANEOUS NIGHTLY
Qty: 1 ML | Refills: 0
Start: 2022-06-21

## 2022-06-21 RX ORDER — GUAIFENESIN 100 MG/5ML
200 SOLUTION ORAL EVERY 6 HOURS
Qty: 1200 ML | Refills: 0 | Status: ON HOLD
Start: 2022-06-21 | End: 2022-08-15 | Stop reason: HOSPADM

## 2022-06-21 RX ORDER — OXYCODONE HCL 5 MG/5 ML
5 SOLUTION, ORAL ORAL EVERY 6 HOURS PRN
Qty: 1 ML | Refills: 0
Start: 2022-06-21 | End: 2022-06-22

## 2022-06-21 RX ORDER — SODIUM CHLORIDE FOR INHALATION 3 %
4 VIAL, NEBULIZER (ML) INHALATION 2 TIMES DAILY
Qty: 1 ML | Refills: 0 | Status: ON HOLD
Start: 2022-06-21 | End: 2022-08-15 | Stop reason: HOSPADM

## 2022-06-21 RX ORDER — ENOXAPARIN SODIUM 100 MG/ML
1 INJECTION SUBCUTANEOUS EVERY 12 HOURS
Qty: 1 EACH | Refills: 0 | Status: ON HOLD
Start: 2022-06-21 | End: 2022-08-15 | Stop reason: HOSPADM

## 2022-06-21 RX ADMIN — GUAIFENESIN 200 MG: 200 SOLUTION ORAL at 08:00

## 2022-06-21 RX ADMIN — SODIUM CHLORIDE, PRESERVATIVE FREE 10 ML: 5 INJECTION INTRAVENOUS at 09:55

## 2022-06-21 RX ADMIN — BISACODYL 10 MG: 10 SUPPOSITORY RECTAL at 09:54

## 2022-06-21 RX ADMIN — PIPERACILLIN AND TAZOBACTAM 3375 MG: 3; .375 INJECTION, POWDER, LYOPHILIZED, FOR SOLUTION INTRAVENOUS at 08:02

## 2022-06-21 RX ADMIN — SODIUM CHLORIDE, PRESERVATIVE FREE 10 ML: 5 INJECTION INTRAVENOUS at 09:56

## 2022-06-21 RX ADMIN — LEVALBUTEROL HYDROCHLORIDE 0.63 MG: 0.63 SOLUTION RESPIRATORY (INHALATION) at 02:45

## 2022-06-21 RX ADMIN — LEVALBUTEROL HYDROCHLORIDE 0.63 MG: 0.63 SOLUTION RESPIRATORY (INHALATION) at 08:40

## 2022-06-21 RX ADMIN — POTASSIUM CHLORIDE 20 MEQ: 400 INJECTION, SOLUTION INTRAVENOUS at 05:01

## 2022-06-21 RX ADMIN — GUAIFENESIN 200 MG: 200 SOLUTION ORAL at 00:37

## 2022-06-21 RX ADMIN — PIPERACILLIN AND TAZOBACTAM 3375 MG: 3; .375 INJECTION, POWDER, LYOPHILIZED, FOR SOLUTION INTRAVENOUS at 00:36

## 2022-06-21 RX ADMIN — POTASSIUM CHLORIDE 20 MEQ: 400 INJECTION, SOLUTION INTRAVENOUS at 05:53

## 2022-06-21 RX ADMIN — METOPROLOL TARTRATE 12.5 MG: 25 TABLET, FILM COATED ORAL at 09:54

## 2022-06-21 RX ADMIN — OXYCODONE HYDROCHLORIDE 5 MG: 5 SOLUTION ORAL at 02:01

## 2022-06-21 RX ADMIN — SODIUM CHLORIDE SOLN NEBU 3% 4 ML: 3 NEBU SOLN at 08:40

## 2022-06-21 RX ADMIN — ALTEPLASE 4 MG: 2.2 INJECTION, POWDER, LYOPHILIZED, FOR SOLUTION INTRAVENOUS at 10:18

## 2022-06-21 RX ADMIN — FUROSEMIDE 40 MG: 10 INJECTION, SOLUTION INTRAMUSCULAR; INTRAVENOUS at 09:54

## 2022-06-21 RX ADMIN — ENOXAPARIN SODIUM 50 MG: 100 INJECTION SUBCUTANEOUS at 09:54

## 2022-06-21 RX ADMIN — OXYCODONE HYDROCHLORIDE 5 MG: 5 SOLUTION ORAL at 08:02

## 2022-06-21 ASSESSMENT — PAIN SCALES - GENERAL
PAINLEVEL_OUTOF10: 0
PAINLEVEL_OUTOF10: 0
PAINLEVEL_OUTOF10: 5
PAINLEVEL_OUTOF10: 4
PAINLEVEL_OUTOF10: 0
PAINLEVEL_OUTOF10: 5
PAINLEVEL_OUTOF10: 0

## 2022-06-21 ASSESSMENT — PAIN DESCRIPTION - DESCRIPTORS
DESCRIPTORS: ACHING
DESCRIPTORS: SORE;TENDER

## 2022-06-21 ASSESSMENT — PAIN DESCRIPTION - ORIENTATION
ORIENTATION: ANTERIOR
ORIENTATION: MID

## 2022-06-21 ASSESSMENT — PAIN DESCRIPTION - LOCATION
LOCATION: ABDOMEN
LOCATION: ABDOMEN

## 2022-06-21 NOTE — INTERDISCIPLINARY ROUNDS
Multi-D Rounds/Checklist (leapfrog):  Lines: can any be removed?: None     Closed/Suction Drain Right RUQ Bulb (Active)       Closed/Suction Drain Left LUQ Bulb (Active)       Gastrostomy/Enterostomy/Jejunostomy Tube Gastrostomy LUQ 1 14 fr (Active)     PICC Double Lumen 46/78/27 Right Basilic (Active)     DVT Prophylaxis: Ordered  Vent: N/A  Nutrition Ordered/appropriate: Ordered- resume diet  Can antibiotics or other drugs be stopped?: Yes/End Date set  Consults needed: None  A: Is pain control adequate? Yes  B: Sedation break and SBT? N/A  C: Is sedation choice appropriate? N/A  D: Delirium/CAM-ICU? No  E: Mobility goals/appropriateness? Yes  F: Family update and plan? Spouse is primary contact and is being updated daily by primary attending and nursing staff.     Juliette Amador, APRN - CNP

## 2022-06-21 NOTE — CARE COORDINATION
Pt d/c this day to [facility]. Wife aware and agrees with POC. Goals of care discussed. RN to call report to [phone]. MD completed d/c. IR to follow and wife aware. Updated clinical and MAR sent to [facility]. 06/21/22 [facility]  [facility])   Mode of Transport at Discharge Other (see comment)   Condition of Participation: Discharge Planning   The Plan for Transition of Care is related to the following treatment goals: Discussed with wife goals of care for [facility], [facility] and poss needs at d/c from [facility], [facility]. Wife agrees with POC for d/c. Freedom of Choice list was provided with basic dialogue that supports the patient's individualized plan of care/goals, treatment preferences, and shares the quality data associated with the providers?   Yes

## 2022-06-21 NOTE — PROGRESS NOTES
[facility] CARDIOLOGY PROGRESS NOTE           6/21/2022 10:09 AM    Admit Date: 6/2/2022         Subjective: no further SVT on BID metoprolol 12.5 mg.    ROS:  Cardiovascular:  As noted above    Objective:      Vitals:    06/21/22 0554 06/21/22 0802 06/21/22 0841 06/21/22 0954   BP:    (!) 101/55   Pulse:   81 88   Resp:  18 (!) 118    Temp:       TempSrc:       SpO2:   92%    Weight: 113 lb 1.5 oz (51.3 kg)      Height:               Physical Exam:  General: Well Developed, Well Nourished, No Acute Distress, Alert & Oriented x 3, Appropriate mood  Neck: supple, no JVD  Heart: S1S2 with RRR without murmurs or gallops  Lungs: Clear throughout auscultation bilaterally without adventitious sounds  Abd: soft, nontender, nondistended, with good bowel sounds  Ext: no edema bilaterally  Skin: warm and dry      Data Review:   Recent Labs     06/20/22  0234 06/21/22  0341    136   K 3.9 3.4*   MG 1.8  --    BUN 12 14   WBC 21.9* 20.0*   HGB 8.2* 8.2*   HCT 26.5* 26.6*   * 840*       No results for input(s): TNIPOC in the last 72 hours.     Invalid input(s): TROIQ      Assessment/Plan:     Principal Problem (Resolved):    Septic shock ([address]  Active Problems:    Atrial flutter ([address]    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[address]    Former heavy tobacco smoker    Gastroesophageal reflux disease    Hypoalbuminemia due to protein-calorie malnutrition (HCC)    Syncope due to orthostatic hypotension    Corticosteroid dependence ([address]    Hypoproteinemia (HCC)    Do not resuscitate status    Fatigue    History of gastric cancer    Encounter for palliative care    Debility    Weakness    Hypercoagulable state, secondary ([address]    Adult body mass index less than 19    Acute pulmonary embolism without acute cor pulmonale (HCC)    Severe protein-calorie malnutrition (HCC)    Gastrointestinal hemorrhage    Encounter for weaning from ventilator ([address]    S/P exploratory laparotomy    History of partial pancreatectomy    S/P splenectomy    Jejunostomy status ([facility]    H/O resection of liver    IVC (inferior vena cava obstruction)    SVT (supraventricular tachycardia) (HCC)    Bilateral pleural effusion    Mucus plugging of bronchi    Atelectasis    Syncope and collapse    Acute respiratory failure with hypoxia (HCC)    HAP (hospital-acquired pneumonia)    ABILIO (iron deficiency anemia)    Malignant neoplasm of overlapping sites of stomach (HCC)    Malignant neoplasm of body of stomach (HCC)    Gastric adenocarcinoma (HCC)  Resolved Problems:    Severe sepsis with lactic acidosis (HCC)    ABLA (acute blood loss anemia)    GIB (gastrointestinal bleeding)    Hyponatremia    Hypomagnesemia    Clostridium perfringens infection    Hemorrhagic shock ([facility]    A/p  1) Continue bb for SVT episodes - caridology to sign off  2) PT xfer to [facility] for long term care  3) [facility] CA care per primary team.    Danette French MD  6/21/2022 10:09 AM

## 2022-06-21 NOTE — DISCHARGE SUMMARY
Hospitalist Discharge Summary   Admit Date:  2022  1:05 PM   DC Note date: 2022  Name:  Declan Mendosa   Age:  78 y.o.   Sex:  male  :  1955   MRN:  [id number]   Room:  [address] [place] [address]  PCP:  Jackeline Loomis MD    Presenting Complaint: Loss of Consciousness     Initial Admission Diagnosis: Syncope and collapse [R55]  Hemorrhagic shock ([address] [R57.8]  Shock ([address] [R57.9]  History of gastric cancer [Z85.028]  Septic shock ([address] [A41.9, R65.21]  Gastrointestinal hemorrhage, unspecified gastrointestinal hemorrhage type [K92.2]     Problem List for this Hospitalization (present on admission):    Principal Problem (Resolved):    Septic shock ([address]  Active Problems:    Atrial flutter ([address]    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[address]    Former heavy tobacco smoker    Gastroesophageal reflux disease    Hypoalbuminemia due to protein-calorie malnutrition ([address]    Syncope due to orthostatic hypotension    Corticosteroid dependence ([address]    Hypoproteinemia ([address]    Do not resuscitate status    Fatigue    History of gastric cancer    Encounter for palliative care    Debility    Weakness    Hypercoagulable state, secondary ([address]    Adult body mass index less than 19    Acute pulmonary embolism without acute cor pulmonale (HCC)    Severe protein-calorie malnutrition (HCC)    Gastrointestinal hemorrhage    Encounter for weaning from ventilator ([address]    S/P exploratory laparotomy    History of partial pancreatectomy    S/P splenectomy    Jejunostomy status ([address]    H/O resection of liver    IVC (inferior vena cava obstruction)    SVT (supraventricular tachycardia) (HCC)    Bilateral pleural effusion    Mucus plugging of bronchi    Atelectasis    Syncope and collapse    Acute respiratory failure with hypoxia (HCC)    HAP (hospital-acquired pneumonia)    ABILIO (iron deficiency anemia)    Malignant neoplasm of overlapping sites of stomach (HCC)    Malignant neoplasm of body of stomach (HCC)    Gastric adenocarcinoma ([address]  Resolved Problems: Severe sepsis with lactic acidosis (HCC)    ABLA (acute blood loss anemia)    GIB (gastrointestinal bleeding)    Hyponatremia    Hypomagnesemia    Clostridium perfringens infection    Hemorrhagic shock ([address]    Did Patient have Sepsis (YES OR NO): YES    Hospital Course:  78 y.o. male with medical history significant for gastric adenocarcinoma s/p 4 cycles chemo, last dose 5/19/22, 50 pack year smoking history,  HTN who presented after having a syncopal episode with 2 day history of near syncope. He had been unable to tolerate solid food for months and has been losing weight. In ED, he was hypotensive, BP 74/54, , RR 22. Hb noted to be 4.9. Workup also revealed  gram positive mildred bacteremia (only 1/2, possible contaminate). He was admitted to ICU due to septic shock requiring pressor support. Started on broad spectrum Abx. CT c/a/p with small R sided PE, femoral DVT. Subsequently had large melanotic stools. EGD performed 6/4 but unable to see with food in stomach. Repeat 6/5 similar results. AC held and IR placed IVC filter 6/6.  6/9 was taken for total gastrectomy, left lateral lobectomy of liver, distal pancreatectomy and splenectomy, diaphragmatic resection, J tube placement, and retroperitoneal mass excision and lymphadenectomy, and left abdominal wall mass excision. He again required ICU for vent support and pressor support post-op. He was stable and was transferred to the floor. Noted to have worsening respiratory status on 6/13 requiring 8L of O2 NC. CXR showed moderate pleural effusions. He was started on albumin and diuretics. Underwent left sided thoracentesis with removal of 800cc. Later, went into SVT vs afib which resolved with metoprolol. He had 2 more episodes of SVTs which again resolved with IV metoprolol. EKGs unfortunately showed NSR (as they were done after IV lopressor). On 6/15, he had 3 more episodes of SVT/?afib/flutter after thoracentesis.  They all resolved after IV lopressor. Patient was asymptomatic throughout these episodes. He was transferred to the ICU for Amio gtt. Cardiology saw him and took him off the [address] [place] [address]. He had 3 bronchoscopies on 6/16-6/18. His oxygenation slowly improved. He has persistent pleural effusion, which was thought to be loculated. On 6/20 he went to IR and had a chest tube placed. He remained stable and was discharged to [facility] for further care. Disposition: LTACH  Diet: ADULT DIET; Clear Liquid  Code Status: DNR     Nutrition Recommendations/Plan:   ·  Enteral Nutrition: Managed by surgery  · Trophic Enteral Feeds:  ·  Standard without Fiber (Osmolite 1.2 Carloz) via Jejunostomy increased to 50 ml/hour. · Do not advance. · Water flush 30 BID  · Nutrition needs can be met with Osmolite 1.2 @ 65 ml/hr (formulated for 22 hr infusion) with free water flush 90 ml Q4 hrs. Regimen would provide 1716 kcal (100% estimated energy needs), 79 g pro (100% estimated protein needs), 1713 ml free water (~1 ml/hr). · Meals and Snacks:  · Continue current diet. Clear Liquid Diet    Follow Ups:   Katlyn Martinez MD, MD In 2 weeks. Specialties: Hematology, Hematology and Oncology, Internal Medicine,   Oncology  Why: hospital f/u  Contact information:  [address]  [phone]                       Follow up labs/diagnostics (ultimately defer to outpatient provider):  Per [facility]      Time spent in patient discharge and coordination 42 minutes. Plan was discussed with patient, Pulm, Surgery, nursing, and case management. All questions answered. Patient was stable at time of discharge. Instructions given to call a physician or return if any concerns. Current Discharge Medication List      START taking these medications    Details   HYDROmorphone HCl PF (DILAUDID) 1 MG/ML injection Infuse 0.5 mLs intravenously every 2 hours as needed for Pain for up to 3 doses.   Qty: 1 mL, Refills: 0 Comments: Reduce doses taken as pain becomes manageable  Associated Diagnoses: H/O resection of liver      oxyCODONE (ROXICODONE) 5 MG/5ML solution 5 mLs by Per J Tube route every 6 hours as needed for Pain for up to 3 doses.   Qty: 1 mL, Refills: 0    Comments: Reduce doses taken as pain becomes manageable  Associated Diagnoses: H/O resection of liver      levalbuterol (XOPENEX) 0.63 MG/3ML nebulization Take 3 mLs by nebulization every 6 hours  Qty: 1 each, Refills: 0      enoxaparin (LOVENOX) 60 MG/0.6ML Inject 0.5 mLs into the skin every 12 hours  Qty: 1 each, Refills: 0      !! insulin lispro (HUMALOG) 100 UNIT/ML SOLN injection vial Inject 0-4 Units into the skin 3 times daily (with meals)  Qty: 1 mL, Refills: 0      !! insulin lispro (HUMALOG) 100 UNIT/ML SOLN injection vial Inject 0-4 Units into the skin nightly  Qty: 1 mL, Refills: 0      ondansetron (ZOFRAN) 4 MG/2ML injection Infuse 2 mLs intravenously every 6 hours as needed for Nausea or Vomiting  Qty: 84 mL, Refills: 0      metoprolol tartrate (LOPRESSOR) 25 MG tablet 0.5 tablets by Per J Tube route 2 times daily  Qty: 1 tablet, Refills: 0      guaiFENesin (ROBITUSSIN) 100 MG/5ML SOLN oral solution 10 mLs by Per J Tube route every 6 hours  Qty: 1200 mL, Refills: 0      sodium chloride, Inhalant, 3 % nebulizer solution Take 4 mLs by nebulization 2 times daily  Qty: 1 mL, Refills: 0      sodium chloride (OCEAN, BABY AYR) 0.65 % nasal spray 1 spray by Nasal route as needed for Congestion  Qty: 1 each, Refills: 0      Medela Tender Care Lanolin cream Apply 0.3 oz topically as needed (Itching)  Qty: 1 g, Refills: 0      furosemide (LASIX) 10 MG/ML injection Infuse 4 mLs intravenously daily  Qty: 1 mL, Refills: 0      bisacodyl (DULCOLAX) 10 MG suppository Place 1 suppository rectally daily for 1 day  Qty: 1 suppository, Refills: 0      melatonin 3 MG TABS tablet 1.5 tablets by Per J Tube route nightly as needed (Sleep)  Qty: 1 tablet, Refills: 0       !! - Potential duplicate medications found. Please discuss with provider. CONTINUE these medications which have NOT CHANGED    Details   lidocaine-prilocaine (EMLA) 2.5-2.5 % cream Apply topically as needed      ondansetron (ZOFRAN) 8 MG tablet Take 8 mg by mouth every 8 hours as needed for Nausea         STOP taking these medications       dexamethasone (DECADRON) 1 MG tablet Comments:   Reason for Stopping:         dexamethasone (DECADRON) 4 MG tablet Comments:   Reason for Stopping:         prochlorperazine (COMPAZINE) 10 MG tablet Comments:   Reason for Stopping:               Procedures done this admission:  Procedure(s):  BRONCHOSCOPY    Consults this admission:  [address]  IP CONSULT TO GENERAL SURGERY  IP CONSULT TO PALLIATIVE CARE  IP CONSULT TO DIETITIAN  IP CONSULT TO GI  IP CONSULT TO DIETITIAN  IP CONSULT TO DIETITIAN  IP CONSULT TO PULMONOLOGY  IP CONSULT TO INFECTIOUS DISEASES  IP CONSULT TO PHARMACY  IP CONSULT TO PULMONOLOGY  IP CONSULT TO CARDIOLOGY  IP CONSULT TO PHYSICAL THERAPY  IP CONSULT TO OCCUPATIONAL THERAPY  IP CONSULT TO PHARMACY    Echocardiogram results:  06/02/22    TRANSTHORACIC ECHOCARDIOGRAM (TTE) COMPLETE (CONTRAST/BUBBLE/3D PRN) 06/09/2022  9:31 AM (Final)    Interpretation Summary    Left Ventricle: Reduced left ventricular systolic function. EF by 2D Simpsons Biplane is 42%. Left ventricle size is normal. Severely increased wall thickness. Increased ventricular mass. Infiltrative cardiomyopathy should be considered. Global hypokinesis present. Abnormal diastolic function.   Aortic Valve: Thickened cusp. Mildly calcified cusp. Sclerosis of the aortic valve cusp. Moderate annular calcification vs appearance of aortic valve prosthesis [clinical correlation, clinical history]. Mild regurgitation.   Mitral Valve: Mildly thickened leaflet.   Aorta: Dilated aortic root. Dilated ascending aorta.     Pericardium: Small (<1 cm) localized pericardial effusion present around the right ventricle.   Technical qualifiers: Color flow Doppler was performed and pulse wave and/or continuous wave Doppler was performed. Signed by: Emy Thornton MD on 6/9/2022  9:31 AM      Diagnostic Imaging/Tests:   XR CHEST PORTABLE    Result Date: 6/2/2022  Portable chest: History: Sepsis, near syncope Comparison: 03/25/2022 Findings: Portable AP views were obtained at 1316 at 1319 hours. A left subclavian Mediport catheter is present. The cardiac silhouette is normal in size and configuration. The lungs and pleural spaces are clear. The pulmonary vascularity is within normal limits. Unremarkable portable chest radiograph. 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 2.6 cm distal abdominal aortic aneurysm. Treated gastric cancer with moderate hiatal hernia. Preliminary report was provided by Dr. Rosa Massey, the on-call radiologist, at 21:42 hours Final report to follow from IR. *END PRELIMINARY REPORT*Title:  CTA  of the chest, abdomen and pelvis. Indication:  Sepsis. GI bleed. Syncope. Concern for pulmonary embolism. History of gastric cancer. Technique: Axial images of the chest, abdomen and pelvis were obtained before and after the administration of intravenous iodinated contrast media. Contrast was used to best identify solid structures. Images also viewed on an independent workstation including 3D rendering. All CT scans at this facility are performed using dose reduction/dose modulation techniques, as appropriate the performed exam, including the following: Automated Exposure Control; Adjustment of the mA and/or kV according to patient size (this includes techniques or standardized protocols for targeted exams where dose is matched to indication/reason for exam); and Use of Iterative Reconstruction Technique.  Comparison: February 25, 2022 Findings: Neck base: Normal Lungs: Emphysema. Primarily dependent opacities suggest atelectasis. Trace effusions Mediastinum: Normal. Cardiac: Coronary calcifications. Pulmonary Arteries: Small filling defects of the right lower lobe are present, compatible with pulmonary embolism. The clot burden is quite small. Esophagus: Distention of the distal esophagus again noted. Previously described soft tissue mass at the GE junction is grossly similar. CHEST WALL: Normal Liver:Normal Biliary:Gallbladder is contracted Pancreas:Normal Spleen:Spleen remains mildly enlarged. Compared to the previous study, there are some small peripheral areas of decreased enhancement which may represent interval areas of infarct. The clinical significance is uncertain. Adrenals:Normal Kidneys:Normal Lymph nodes:No pathologically enlarged lymph nodes Abdominal wall:Duyen Bowel:Stomach remains distended. There is some thickening of the walls of the proximal and mid stomach. This is a new finding compared to the previous study. However the previously described soft tissue mass of the posterior wall may be slightly improved. No evidence of extravasation to suggest active GI bleed. This is difficult to completely measure. Pelvic organs:Mild prostate enlargement Bones:Mild degenerative changes in the spine Aorta:  Mild atherosclerotic changes. No evidence of aneurysm or dissection. The great vessels are patent. The is a mild stenosis in the proximal celiac artery. SMA and VAN are patent. Renal arteries are patent Vascular/Other:  Filling defects are present in the left common femoral and deep femoral veins, suggesting DVT. This could be confirmed with vascular ultrasound. Findings were discussed with patient's nurse on 6/20/2022 at 9:00 AM     1. No extravasation to suggest active GI bleed. 2. Small areas of pulmonary embolism are present in the right lower lobe. Clot burden is quite small. 3. DVT is also likely present in the left femoral veins.  4. Previously described gastric mass again noted. It is difficult to measure to evaluate for progression. Vascular duplex lower extremity venous bilateral    Result Date: 6/3/2022  RIGHT LOWER EXTREMITY VENOUS DUPLEX ULTRASOUND. INDICATION: Right lower extremity pain. TECHNIQUE: Direct skin-contact high resolution grayscale images, color-flow Doppler and duplex waveform analysis. FINDINGS: There is compressibility, color-flow assignment and augmentation of the venous waveform with calf compression in the common femoral, superficial femoral and popliteal veins. Upper calf veins are unremarkable. Negative for sonographic evidence of deep venous thrombosis right lower extremity. LEFT LOWER EXTREMITY VENOUS DUPLEX ULTRASOUND. INDICATION: Left lower extremity pain. TECHNIQUE: Direct skin-contact high resolution grayscale images, color-flow Doppler and duplex waveform analysis. FINDINGS: Abnormal intraluminal echoes within the common femoral and profunda femoral veins. There is some flow. The superficial superficial femoral and popliteal veins and upper calf veins are unremarkable. IMPRESSION: Positive for deep venous thrombosis left common femoral profunda femoral veins, nonocclusive.         Labs: Results:       BMP, Mg, Phos Recent Labs     06/19/22 0230 06/20/22 0234 06/21/22  0341    136 136   K 3.5 3.9 3.4*   CL 93* 93* 93*   CO2 38* 36* 36*   BUN 12 12 14   MG  --  1.8  --    PHOS  --  3.7  --       CBC Recent Labs     06/19/22 0230 06/20/22 0234 06/21/22  0341   WBC 20.1* 21.9* 20.0*   RBC 2.83* 3.01* 3.01*   HGB 7.7* 8.2* 8.2*   HCT 25.3* 26.5* 26.6*   * 703* 840*      LFT Recent Labs     06/19/22 0230 06/20/22 0234 06/21/22  0341   ALT 10* 10* 9*   GLOB 3.9* 4.3* 4.5*      Cardiac Testing No results found for: BNP, CPK, RCK1, CKMB   Coagulation Tests Lab Results   Component Value Date    INR 1.2 06/03/2022    INR 1.1 06/02/2022    INR 1.1 03/25/2022    APTT 33.8 06/02/2022    APTT 32.5 03/23/2022      A1c No results found for: HBA1C   Lipid Panel No results found for: CHOL, CHOLPOCT, CHOLX, CHLST, CHOLV, 785260, HDL, HDLC, LDL, LDLC, 582490, VLDLC, VLDL, TGLX, TRIGL   Thyroid Panel No results found for: TSH, T4, FT4     Most Recent UA Lab Results   Component Value Date    MUCUS 0 06/02/2022          All Labs from Last 24 Hrs:  Recent Results (from the past 24 hour(s))   POCT Glucose    Collection Time: 06/20/22  9:45 AM   Result Value Ref Range    POC Glucose 106 (H) 65 - 100 mg/dL    Performed by: Cyrus    POCT Glucose    Collection Time: 06/20/22 11:49 AM   Result Value Ref Range    POC Glucose 94 65 - 100 mg/dL    Performed by: Cyrus    Culture, Body Fluid    Collection Time: 06/20/22 12:44 PM    Specimen: Left;  Body Fluid    PLEURAL FLUID   Result Value Ref Range    Special Requests NO SPECIAL REQUESTS      Gram stain PENDING     Culture NO GROWTH 1 DAY     POCT Glucose    Collection Time: 06/20/22  8:18 PM   Result Value Ref Range    POC Glucose 80 65 - 100 mg/dL    Performed by: Daria    CBC    Collection Time: 06/21/22  3:41 AM   Result Value Ref Range    WBC 20.0 (H) 4.3 - 11.1 K/uL    RBC 3.01 (L) 4.23 - 5.6 M/uL    Hemoglobin 8.2 (L) 13.6 - 17.2 g/dL    Hematocrit 26.6 (L) 41.1 - 50.3 %    MCV 88.4 79.6 - 97.8 FL    MCH 27.2 26.1 - 32.9 PG    MCHC 30.8 (L) 31.4 - 35.0 g/dL    RDW 16.4 (H) 11.9 - 14.6 %    Platelets 046 (H) 062 - 450 K/uL    MPV 10.1 9.4 - 12.3 FL    nRBC 0.00 0.0 - 0.2 K/uL   Comprehensive Metabolic Panel    Collection Time: 06/21/22  3:41 AM   Result Value Ref Range    Sodium 136 136 - 145 mmol/L    Potassium 3.4 (L) 3.5 - 5.1 mmol/L    Chloride 93 (L) 98 - 107 mmol/L    CO2 36 (H) 21 - 32 mmol/L    Anion Gap 7 7 - 16 mmol/L    Glucose 79 65 - 100 mg/dL    BUN 14 8 - 23 MG/DL    CREATININE <0.20 (L) 0.8 - 1.5 MG/DL    GFR  0 (L) >60 ml/min/1.73m2    GFR Non- 0 (L) >60 ml/min/1.73m2    Calcium 8.7 8.3 - 10.4 MG/DL    Total Bilirubin 0.4 0.2 - 1.1 MG/DL    ALT 9 (L) 12 - 65 U/L    AST 14 (L) 15 - 37 U/L    Alk Phosphatase 275 (H) 50 - 136 U/L    Total Protein 6.2 (L) 6.3 - 8.2 g/dL    Albumin 1.7 (L) 3.2 - 4.6 g/dL    Globulin 4.5 (H) 2.3 - 3.5 g/dL    Albumin/Globulin Ratio 0.4 (L) 1.2 - 3.5     POCT Glucose    Collection Time: 06/21/22  8:04 AM   Result Value Ref Range    POC Glucose 70 65 - 100 mg/dL    Performed by: Cyrus        Allergies   Allergen Reactions    Iron     Tetanus Antitoxin      Immunization History   Administered Date(s) Administered    Influenza, MDCK Quadv, with preserv IM (Flucelvax 2 yrs and older) 10/30/2020    PPD Test 06/02/2022       Recent Vital Data:  Patient Vitals for the past 24 hrs:   Temp Pulse Resp BP SpO2   06/21/22 0841 -- 81 (!) 118 -- 92 %   06/21/22 0802 -- -- 18 -- --   06/21/22 0503 -- 78 15 117/69 93 %   06/21/22 0403 -- 78 17 119/71 96 %   06/21/22 0402 -- 79 18 -- 92 %   06/21/22 0333 97.7 °F (36.5 °C) 80 22 115/70 90 %   06/21/22 0303 -- 79 25 116/69 95 %   06/21/22 0240 -- 77 15 129/72 94 %   06/21/22 0231 -- -- 14 -- --   06/21/22 0203 -- 79 21 (!) 107/57 92 %   06/21/22 0133 -- 79 17 117/65 95 %   06/21/22 0103 -- 80 16 109/66 96 %   06/21/22 0047 -- 80 -- -- 93 %   06/21/22 0033 -- 80 16 121/68 93 %   06/21/22 0003 97.9 °F (36.6 °C) 81 15 116/66 (!) 88 %   06/20/22 2333 -- 78 15 119/71 92 %   06/20/22 2303 -- 74 16 139/75 99 %   06/20/22 2233 -- 73 15 124/68 100 %   06/20/22 2203 -- 78 16 117/75 99 %   06/20/22 2133 -- 72 29 124/69 100 %   06/20/22 2103 -- 80 15 116/67 96 %   06/20/22 2033 -- 81 28 116/70 96 %   06/20/22 1946 -- 81 18 -- 96 %   06/20/22 1933 97.7 °F (36.5 °C) 81 20 120/68 95 %   06/20/22 1903 -- 82 19 112/66 96 %   06/20/22 1846 -- 86 20 -- 96 %   06/20/22 1831 -- 82 17 114/68 98 %   06/20/22 1816 -- 82 17 -- 98 %   06/20/22 1801 -- 82 19 112/68 98 %   06/20/22 1746 -- 82 17 -- 95 %   06/20/22 1731 -- 82 17 118/67 92 %   06/20/22 1716 -- 81 18 -- 95 %   06/20/22 1701 -- 82 18 122/71 100 %   06/20/22 1646 -- 81 16 -- 100 %   06/20/22 1631 98.2 °F (36.8 °C) 80 16 133/75 100 %   06/20/22 1616 -- 80 23 -- 98 %   06/20/22 1612 -- -- 18 -- --   06/20/22 1601 -- 80 17 119/69 100 %   06/20/22 1546 -- 80 18 -- 97 %   06/20/22 1542 -- -- 20 -- --   06/20/22 1531 -- 79 (!) 80 114/65 99 %   06/20/22 1516 -- 78 (!) 78 127/65 98 %   06/20/22 1501 -- 81 (!) 84 -- (!) 76 %   06/20/22 1446 -- 80 (!) 80 -- --   06/20/22 1431 -- 83 (!) 84 -- --   06/20/22 1416 -- 82 (!) 84 -- 97 %   06/20/22 1401 -- 81 (!) 65 -- 98 %   06/20/22 1346 -- 81 (!) 61 -- 100 %   06/20/22 1331 -- 81 -- -- 98 %   06/20/22 1301 -- 82 -- 111/65 98 %   06/20/22 1257 -- 81 14 111/65 100 %   06/20/22 1250 -- 82 13 125/74 100 %   06/20/22 1245 -- 80 16 122/78 100 %   06/20/22 1240 -- 81 16 125/74 99 %   06/20/22 1235 -- 83 20 118/72 99 %   06/20/22 1216 -- 82 -- 113/70 98 %   06/20/22 1205 97.4 °F (36.3 °C) 81 16 122/70 96 %   06/20/22 1201 -- 79 -- 122/70 100 %   06/20/22 1146 -- 80 19 -- 91 %   06/20/22 1131 -- 79 17 114/70 94 %   06/20/22 1116 -- 79 18 -- 91 %   06/20/22 1101 -- 74 14 112/68 100 %   06/20/22 1046 -- 78 (!) 36 -- 98 %   06/20/22 1031 -- 75 19 105/68 95 %   06/20/22 1020 -- -- 25 -- --   06/20/22 1016 -- 76 23 -- 98 %   06/20/22 1001 -- 79 (!) 35 127/73 98 %   06/20/22 0950 -- -- 20 -- --   06/20/22 0946 -- 79 19 -- 90 %   06/20/22 0932 -- 78 -- 115/68 --   06/20/22 0931 -- 78 19 115/68 94 %   06/20/22 0916 -- 80 18 -- 94 %   06/20/22 0901 -- 80 16 121/71 96 %       Oxygen Therapy  SpO2: 92 %  Pulse Oximetry Type: Continuous  Pulse via Oximetry: 79 beats per minute  Pulse Oximeter Device Mode: Continuous  Pulse Oximeter Device Location: Forehead  O2 Device: High flow nasal cannula  Oximetry Probe Site Changed: No  Skin Assessment: Clean, dry, & intact  Skin Protection for O2 Device: N/A  Orientation: Bilateral  Location: Cheek  Intervention(s): Mouth Care  FiO2 : 65 %  O2 Flow Rate (L/min): 4 L/min  Blood Gas Performed?: Yes  Loi's Test #1: Collateral flow confirmed  Site #1: Left Radial  Site Prepped #1: Yes  Number of Attempts #1: 1  Pressure Held #1: Yes  Complications #1: None  How Tolerated?: Tolerated well  O2 Delivery Method: Endotracheal tube  Vent Mode: PS/CPAP    Estimated body mass index is 16.7 kg/m² as calculated from the following:    Height as of this encounter: 5' 9\" (1.753 m). Weight as of this encounter: 113 lb 1.5 oz (51.3 kg). Intake/Output Summary (Last 24 hours) at 6/21/2022 0884  Last data filed at 6/21/2022 0503  Gross per 24 hour   Intake 100 ml   Output 358 ml   Net -258 ml         Physical Exam:  General:    Well nourished. No overt distress  Head:  Normocephalic, atraumatic  Eyes:  Sclerae appear normal.  Pupils equally round. HENT:  Nares appear normal, no drainage. Moist mucous membranes  Neck:  No restricted ROM. Trachea midline  CV:   RRR. No m/r/g. No JVD  Lungs:   Scattered rhonchi, no wheezing. Even, unlabored  Abdomen:   Soft, mildly tender, nondistended. Extremities: Warm and dry. No cyanosis or clubbing. No edema. Skin:     No rashes. Normal coloration  Neuro:  CN II-XII grossly intact. Psych:  Normal mood and affect. Signed:  Noris Rodriguez DO    Part of this note may have been written by using a voice dictation software. The note has been proof read but may still contain some grammatical/other typographical errors.

## 2022-06-21 NOTE — PROGRESS NOTES
Department of Interventional Radiology  [phone]                                 Progress Note  Patient: Roger Chambers MRN: [id number]  SSN: [id number]    YOB: 1955  Age: 78 y.o. Sex: male      Subjective:   Resting in bed. Offers no complaints currently. Review of Systems:    Pertinent items are noted in HPI. Objective:   Left pleural chest tube currently to water seal. Cummulative output 65 ml thin orange drainage.   Vitals:    06/21/22 0503 06/21/22 0554 06/21/22 0802 06/21/22 0841   BP: 117/69      Pulse: 78   81   Resp: 15  18 (!) 118   Temp:       TempSrc:       SpO2: 93%   92%   Weight:  113 lb 1.5 oz (51.3 kg)     Height:            Pain Assessment  @bshsiflow(1171)@                   Pain Level: 5   Patient's Stated Pain Goal: 0 - No pain   Pain Location: Abdomen   Pain Orientation: Anterior   Pain Descriptors: Sore,Tender   Functional Pain Assessment: Prevents or interferes with many active not passive activities   Pain Type: Acute pain   Pain Frequency: Continuous   Non-Pharmaceutical Pain Intervention(s): Repositioned     Intake and Output:  Chest Tube Left Fourth intercostal space (Active)   Chest Tube Airleak No 06/21/22 0003   Status Gravity 06/21/22 0003   Y Connector Used No 06/21/22 0003   Drainage Description Serosanguinous 06/21/22 0003   Dressing Status Clean, dry & intact 06/21/22 0003   Site Assessment Clean, dry & intact 06/21/22 0003   Surrounding Skin Clean, dry & intact 06/21/22 0003   Output (ml) 0 ml 06/21/22 0503       Gastrostomy/Enterostomy/Jejunostomy Tube Gastrostomy LUQ 1 14 fr (Active)   Drainage Appearance None 06/20/22 0731   Site Description Clean, dry & intact 06/20/22 1933   J Port Status Clamped 06/20/22 1933   G Port Status Other(comment) 06/20/22 0731   Surrounding Skin Clean, dry & intact 06/20/22 1933   Dressing Status Clean, dry & intact 06/20/22 1933   Dressing Type Split gauze;Dry dressing 06/20/22 1933   G-Tube Care Completed Yes 06/20/22 1933   Tube Feeding Standard without Fiber 06/20/22 0731   Tube feeding/verify rate (mL/hr) 50 mL/hr 06/20/22 0731   Tube Feeding Intake (mL) 1045 ml 06/20/22 0459   Tube Feeding Supplement Amount (mL) 100 mL 06/18/22 1727   Free Water/Flush (mL) 50 mL 06/20/22 0459   Action Taken Placement verified (comment) 06/20/22 0731   Residual Volume (ml) 0 ml 06/19/22 1822             Physical Exam:   HEART: regular rate and rhythm  LUNG: clear to auscultation bilaterally  Lab/Data Review:  CBC:   Lab Results   Component Value Date    WBC 20.0 06/21/2022    RBC 3.01 06/21/2022    HGB 8.2 06/21/2022    HCT 26.6 06/21/2022    MCV 88.4 06/21/2022    RDW 16.4 06/21/2022     06/21/2022     BMP:    Lab Results   Component Value Date     06/21/2022    K 3.4 06/21/2022    CL 93 06/21/2022    CO2 36 06/21/2022    BUN 14 06/21/2022     PT/INR:  No results found for: PTINR  PTT:    Lab Results   Component Value Date    APTT 33.8 06/02/2022    APTT 32.5 03/23/2022     ABG:  No results found for: FIO2A  Assessment:   1 day s/p Left CT insertion for persistent left pleural effusion  Plan:   CT injected with TPA to dwell for 1 hour  CT to 20 cm suction  Repeat Xray in am    Signed By: WALLY Michael NP     June 21, 2022

## 2022-06-21 NOTE — PLAN OF CARE
Problem: Skin/Tissue Integrity  Goal: Absence of new skin breakdown  Description: 1. Monitor for areas of redness and/or skin breakdown  2. Assess vascular access sites hourly  3. Every 4-6 hours minimum:  Change oxygen saturation probe site  4. Every 4-6 hours:  If on nasal continuous positive airway pressure, respiratory therapy assess nares and determine need for appliance change or resting period.   Outcome: Completed     Problem: Safety - Adult  Goal: Free from fall injury  Outcome: Completed     Problem: ABCDS Injury Assessment  Goal: Absence of physical injury  Outcome: Completed     Problem: Pain  Goal: Verbalizes/displays adequate comfort level or baseline comfort level  Outcome: Completed     Problem: Discharge Planning  Goal: Discharge to home or other facility with appropriate resources  Outcome: Completed     Problem: Respiratory - Adult  Goal: Achieves optimal ventilation and oxygenation  Outcome: Completed     Problem: Nutrition Deficit:  Goal: Optimize nutritional status  Outcome: Completed

## 2022-06-21 NOTE — PROGRESS NOTES
Formerly [facility] Critical Care Note[de-identified] 6/21/2022  Aditya Nails  Admission Date: 6/2/2022     Length of Stay: 19 days    Background: 78 y.o. y/o male with history of gastric carcinoma s/p neoadjuvant chemo (last dose May 29, 2022) with plans for subsequent total gastrectomy, HTN, admitted to hospitalist service 6/2 with syncopal episode and anemia (hgb 4.9 on admission) as well as gram positive mildred bacteremia (only 1/2, possible contaminate). Unable to tolerate solid food for months and has been losing weight. Was hypotension on pressors initially. Was covered with vanc and cefepime initially, flagyl added with GPR returned. CT c/a/p with small R sided PE, femoral DVT. Subsequently had large melanotic stools, transfused. EGD performed 6/4 but unable to see with food in stomach. Repeat 6/5 similar results. AC held and IR placed IVC filter 6/6.   6/9 was taken for total gastrectomy, left lateral lobectomy of liver, distal pancreatectomy and splenectomy, diaphragmatic resection, J tube placement, and retroperitoneal mass excision and lymphadenectomy, and left abdominal wall mass excision.  To ICU on vent post op and requiring joe. Extubated 6/10. Was transferred out of the ICU and was stable for a few days. Then on 6/15 had increasing O2 requirements and developed pleural effusions. R thoracentesis 6/15 with 800cc removed. That night transferred to the ICU with a fib with RVR and complete atelectasis of L lung. Started amiodarone. Bronch performed 6/16 with thick mucus plugging seen particularly in the left lung. .   Bronch repeated 6/17 with ongoing but somewhat thinner secretions on the left. Bronch again repeated 6/18 with severe secretions seen in the left lung again. Notable PMH:  has a past medical history of ABLA (acute blood loss anemia), GIB (gastrointestinal bleeding), HTN (hypertension), and Severe sepsis with lactic acidosis ([address]     24 Hour events: IR placed a 10F chest tube in L pleural effusion yesterday. Only 58cc recorded yesterday. Currently satting well on 4L O2. Getting 3% saline nebs to help reduce mucus plugging. Pt without any new complaints today. ROS: negative except as listed elsewhere. Lines: (insertion date)    Closed/Suction Drain Right RUQ Bulb (Active)       Closed/Suction Drain Left LUQ Bulb (Active)       Gastrostomy/Enterostomy/Jejunostomy Tube Gastrostomy LUQ 1 14 fr (Active)     PICC Double Lumen 38/66/98 Right Basilic (Active)     Drips: current dose (range)  Dose (mcg/kg/min) Propofol : 20 mcg/kg/min  Dose (mcg/min) Phenylephrine : 0 mcg/min (MAP > 65)  Dose (mg/min) Amiodarone: 0 mg/min     Pertinent Exam:         Blood pressure 117/69, pulse 78, temperature 97.7 °F (36.5 °C), temperature source Oral, resp. rate 15, height 5' 9\" (1.753 m), weight 113 lb 1.5 oz (51.3 kg), SpO2 93 %. Intake/Output Summary (Last 24 hours) at 6/21/2022 0754  Last data filed at 6/21/2022 0503  Gross per 24 hour   Intake 100 ml   Output 358 ml   Net -258 ml     Constitutional:  Awake, in NAD  EENMT:  Sclera clear, pupils equal, oral mucosa moist  Respiratory: Decreased at left base. Some right sided rhonchi. Chest tube in left chest. Small amount of output in the collection chamber  Cardiovascular:  rrr, no m/r/g  Gastrointestinal:  soft with mild tenderness; positive bowel sounds present  Musculoskeletal:  warm with no cyanosis, no lower extremity edema  Skin:  no jaundice or ecchymosis  Neurologic: alert and oriented to ppt. Moves all 4 extremities.    Psychiatric: calm    CXR:   6/21/22 6/20/22      Recent Labs     06/19/22  0230 06/20/22  0234 06/21/22  0341   WBC 20.1* 21.9* 20.0*   HGB 7.7* 8.2* 8.2*   HCT 25.3* 26.5* 26.6*   * 703* 840*     Recent Labs     06/19/22  0230 06/20/22  0234 06/21/22  0341    136 136   K 3.5 3.9 3.4*   CL 93* 93* 93*   CO2 38* 36* 36*   GLUCOSE 118* 101* 79   BUN 12 12 14   CREATININE <0.20* <0.20* <0.20*   MG  --  1.8 --    PHOS  --  3.7  --    BILITOT 0.2 0.3 0.4   AST 12* 12* 14*   ALT 10* 10* 9*   ALKPHOS 291* 300* 275*     No results for input(s): TROPHS, NTPROBNP, CRP, ESR in the last 72 hours. Recent Labs     06/19/22  0230 06/20/22  0234 06/21/22  0341   GLUCOSE 118* 101* 79      ECHO: 06/02/22    TRANSTHORACIC ECHOCARDIOGRAM (TTE) COMPLETE (CONTRAST/BUBBLE/3D PRN) 06/09/2022  9:31 AM (Final)    Interpretation Summary    Left Ventricle: Reduced left ventricular systolic function. EF by 2D Simpsons Biplane is 42%. Left ventricle size is normal. Severely increased wall thickness. Increased ventricular mass. Infiltrative cardiomyopathy should be considered. Global hypokinesis present. Abnormal diastolic function.   Aortic Valve: Thickened cusp. Mildly calcified cusp. Sclerosis of the aortic valve cusp. Moderate annular calcification vs appearance of aortic valve prosthesis [clinical correlation, clinical history]. Mild regurgitation.   Mitral Valve: Mildly thickened leaflet.   Aorta: Dilated aortic root. Dilated ascending aorta.   Pericardium: Small (<1 cm) localized pericardial effusion present around the right ventricle.   Technical qualifiers: Color flow Doppler was performed and pulse wave and/or continuous wave Doppler was performed. Signed by: Magda Boyce MD on 6/9/2022  9:31 AM    Microbiology:   Recent Labs     06/18/22  1105   CULTURE MODERATE KLEBSIELLA PNEUMONIAE*  HEAVY STAPHYLOCOCCUS AUREUS*  CULTURE IN [address] UPDATES TO FOLLOW     Ventilator Settings Ideal body weight: 70.7 kg (155 lb 13.8 oz)  Mode FIO2 Rate Tidal Volume Pressure   PS/CPAP    65 %  15 bmp     500 mL   8     Peak airway pressure:     Minute ventilation: Minute Ventilation (l/min): 12 l/min  ABG:No results for input(s): PHAPOC, NEA9QWAO, PO1HXTN, EVG9THT, BE in the last 72 hours. Assessment and Plan:  (Medical Decision Making)   Impression: 78 y.o. male with gastric cancer, s/p joe adjuvant chemo.  Admitted with syncope that was likely multifactorial, driven by recent weight loss, poor PO intake, and severe anemia into the 4's. GPR on blood culture but doubt this was sepsis as this was likely contaminate. Course complicated by discovery of PE and DVT, subsequent GI bleed likely due to his cancer, requiring IVC filter placement. Now post op extensive resections and brought to ICU intubated and on pressors. Able to be extubated but had gradual respiratory decline due to combination of pleural effusions and severe mucus plugging requiring multiple bronchoscopies.      Has severe hypoproteinemia and has been given fluids and blood during his hospitalization. R effusion tapped with L looking loculated. S/p IR placed 10F chest tube on left 6/20. NEURO:   Sedation: none  Analgesia: prn meds. CV:   Volume Status: has been getting lasix. Currently appears euvolemic. Lasix is keeping about even I/O. Will continue for now. A flutter: Was on amiodarone. Now on metoprolol. Cardiology following. PULM:   Acute hypoxemic respiratory failure: On 4L. Cont to try to gradually wean. Pleural effusion: S/p R sided drainage. IR placed 10F chest tube on the left 6/20. Minimal output so far. Follow up their plans for tPA today. Mucus plugging: with severe atelectasis of the left lung. Better but has required nearly daily bronchs last week. Cont mucinex and 3% saline nebs. Flutter valve. Has now been able to go several days without a bronch. Pneumonia: mix of mssa and klebsiella on most recent bronch cultures. Associated with leukocytosis. Started zosyn 6/20   RENAL:  Electrolytes: replace as needed  GI:   Gastric Cancer: s/p extensive resection this hospitalization. Nutrition: Has clear liquid diet and tube feeding ordered. HEME:   Anemia: monitoring. Transfuse for < 7. PE: with DVT. Surgery ok with full dose lovenox. Increase dose today. Anticoagulation: full dose lovenox. ID:   HAP: mssa and klebsiella in recent bronch. Zosyn starting 6/20. ENDO:   DM: continue insulin  Skin: no decub, turns, preventive care  Prophy: lovenox    Plan has been for him to go to [place]. Now that he has not required bronch for a few days seems reasonable to transfer. Will need to make sure that IR can continue to follow him there for tpa and chest tube management. DNR    The patient is critically ill with respiratory failure, circulatory failure and requires high complexity decision making for assessment and support including frequent ventilator adjustment , frequent evaluation and titration of therapies , application of advanced monitoring technologies and extensive interpretation of multiple databases    Cumulative time devoted to patient care services by me for day of service is 25 mins.     Bertis Habermann, MD

## 2022-06-21 NOTE — PROGRESS NOTES
Admit Date: 2022    POD 12 Day Post-Op    Procedure:  Procedure(s):  Exploratory laparotomy with total gastrectomy and esophagojejunostomy after extensive lysis of adhesions and dissections which added at least 3-4 hours to this case, Left lateral lobectomy of liver, Distal pancreatectomy and splenectomy, Combined diaphragmatic resection, Falciform ligament flap, Feeding J-tube placement, Retroperitoneal mass excision and lymphadenectomy, Left abdominal wall mass excision on posterior rectus sheath    Subjective: The patient is lying in bed. Had IR placed chest tube yesterday. He has not been having any oral intake. He has no new complaints. Objective:       Vitals:    22 0946 22 0954 22 1001 22 1016   BP:  (!) 101/55 114/63    Pulse: 89 88 89 87   Resp: 18  18 18   Temp:       TempSrc:       SpO2: 95%  92% 94%   Weight:       Height:           Temp (24hrs), Av.8 °F (36.6 °C), Min:97.4 °F (36.3 °C), Max:98.2 °F (36.8 °C)  . I&O reviewed as documented. Physical Exam:   Constitutional: Alert, NAD   /63   Pulse 87   Temp 97.6 °F (36.4 °C) (Oral)   Resp 18   Ht 5' 9\" (1.753 m)   Wt 113 lb 1.5 oz (51.3 kg)   SpO2 94%   BMI 16.70 kg/m²   Eyes: Sclera are clear  ENMT: no external lesions' gross hearing normal; no obvious neck masses, no ear or lip lesions, nares normal  CV: RRR. Normal perfusion  GI: soft and non-distended,  [place] c/d/i. Musculoskeletal: No embolic signs or cyanosis. Neuro:  Alert  Psychiatric: Calm and cooperative    Labs:   Recent Results (from the past 24 hour(s))   POCT Glucose    Collection Time: 22 11:49 AM   Result Value Ref Range    POC Glucose 94 65 - 100 mg/dL    Performed by: Cyrus    Culture, Body Fluid    Collection Time: 22 12:44 PM    Specimen: Left;  Body Fluid    PLEURAL FLUID   Result Value Ref Range    Special Requests NO SPECIAL REQUESTS      Gram stain 0 TO 7 WBC'S SEEN PER OIF     Gram stain NO DEFINITE ORGANISM SEEN      Culture NO GROWTH 1 DAY     POCT Glucose    Collection Time: 06/20/22  8:18 PM   Result Value Ref Range    POC Glucose 80 65 - 100 mg/dL    Performed by: Daria    CBC    Collection Time: 06/21/22  3:41 AM   Result Value Ref Range    WBC 20.0 (H) 4.3 - 11.1 K/uL    RBC 3.01 (L) 4.23 - 5.6 M/uL    Hemoglobin 8.2 (L) 13.6 - 17.2 g/dL    Hematocrit 26.6 (L) 41.1 - 50.3 %    MCV 88.4 79.6 - 97.8 FL    MCH 27.2 26.1 - 32.9 PG    MCHC 30.8 (L) 31.4 - 35.0 g/dL    RDW 16.4 (H) 11.9 - 14.6 %    Platelets 846 (H) 150 - 450 K/uL    MPV 10.1 9.4 - 12.3 FL    nRBC 0.00 0.0 - 0.2 K/uL   Comprehensive Metabolic Panel    Collection Time: 06/21/22  3:41 AM   Result Value Ref Range    Sodium 136 136 - 145 mmol/L    Potassium 3.4 (L) 3.5 - 5.1 mmol/L    Chloride 93 (L) 98 - 107 mmol/L    CO2 36 (H) 21 - 32 mmol/L    Anion Gap 7 7 - 16 mmol/L    Glucose 79 65 - 100 mg/dL    BUN 14 8 - 23 MG/DL    CREATININE <0.20 (L) 0.8 - 1.5 MG/DL    GFR  0 (L) >60 ml/min/1.73m2    GFR Non- 0 (L) >60 ml/min/1.73m2    Calcium 8.7 8.3 - 10.4 MG/DL    Total Bilirubin 0.4 0.2 - 1.1 MG/DL    ALT 9 (L) 12 - 65 U/L    AST 14 (L) 15 - 37 U/L    Alk Phosphatase 275 (H) 50 - 136 U/L    Total Protein 6.2 (L) 6.3 - 8.2 g/dL    Albumin 1.7 (L) 3.2 - 4.6 g/dL    Globulin 4.5 (H) 2.3 - 3.5 g/dL    Albumin/Globulin Ratio 0.4 (L) 1.2 - 3.5     POCT Glucose    Collection Time: 06/21/22  8:04 AM   Result Value Ref Range    POC Glucose 70 65 - 100 mg/dL    Performed by: Cyrus        Data Review     Xray Result (most recent):  XR CHEST LIMITED ONE VIEW 06/21/2022    Narrative  EXAMINATION: One view chest    HISTORY: Left lung atelectasis    TECHNIQUE: Frontal chest.    COMPARISON: 6/20/2022    FINDINGS:  A small pigtail catheter is projecting over the lower left chest. Stable right  PICC line.     A structure felt to represent a catheter previously overlying the left lung base  is not Making:     Care management per Intensivist  Plan to discharge to [facility] today  Both Willye Zambrano drains have been removed  Continue tube feeds at 50 cc/h  Clear liquid diet  On Lovenox 50 mg twice daily    Tasha Palma PA-C

## 2022-06-22 VITALS
BODY MASS INDEX: 16.75 KG/M2 | HEIGHT: 69 IN | TEMPERATURE: 98.1 F | OXYGEN SATURATION: 91 % | HEART RATE: 91 BPM | DIASTOLIC BLOOD PRESSURE: 62 MMHG | WEIGHT: 113.1 LBS | RESPIRATION RATE: 18 BRPM | SYSTOLIC BLOOD PRESSURE: 115 MMHG

## 2022-06-22 LAB
BACTERIA SPEC CULT: ABNORMAL
BACTERIA SPEC CULT: ABNORMAL
BACTERIA SPEC CULT: NORMAL
GRAM STN SPEC: ABNORMAL
GRAM STN SPEC: NORMAL
GRAM STN SPEC: NORMAL
SERVICE CMNT-IMP: ABNORMAL
SERVICE CMNT-IMP: NORMAL

## 2022-06-22 ASSESSMENT — PAIN SCALES - GENERAL: PAINLEVEL_OUTOF10: 0

## 2022-06-22 ASSESSMENT — PAIN DESCRIPTION - ORIENTATION: ORIENTATION: MID;ANTERIOR

## 2022-06-22 ASSESSMENT — PAIN DESCRIPTION - DESCRIPTORS: DESCRIPTORS: ACHING

## 2022-06-22 ASSESSMENT — PAIN - FUNCTIONAL ASSESSMENT: PAIN_FUNCTIONAL_ASSESSMENT: PREVENTS OR INTERFERES SOME ACTIVE ACTIVITIES AND ADLS

## 2022-06-22 ASSESSMENT — PAIN DESCRIPTION - LOCATION: LOCATION: ABDOMEN

## 2022-06-23 LAB
BACTERIA SPEC CULT: ABNORMAL
GRAM STN SPEC: ABNORMAL
SERVICE CMNT-IMP: ABNORMAL

## 2022-06-27 ENCOUNTER — HOSPITAL ENCOUNTER (OUTPATIENT)
Dept: INTERVENTIONAL RADIOLOGY/VASCULAR | Age: 67
Discharge: HOME | End: 2022-06-30

## 2022-06-27 ENCOUNTER — HOME CARE VISIT (OUTPATIENT)
Dept: HOME HEALTH SERVICES | Facility: HOME HEALTH | Age: 67
End: 2022-06-27

## 2022-06-27 VITALS
HEIGHT: 69 IN | SYSTOLIC BLOOD PRESSURE: 137 MMHG | BODY MASS INDEX: 16.74 KG/M2 | HEART RATE: 103 BPM | DIASTOLIC BLOOD PRESSURE: 86 MMHG | TEMPERATURE: 97.6 F | WEIGHT: 113 LBS | OXYGEN SATURATION: 93 % | RESPIRATION RATE: 16 BRPM

## 2022-06-27 DIAGNOSIS — J90 PLEURAL EFFUSION: ICD-10-CM

## 2022-06-27 PROCEDURE — 87070 CULTURE OTHR SPECIMN AEROBIC: CPT

## 2022-06-27 PROCEDURE — C1729 CATH, DRAINAGE: HCPCS

## 2022-06-27 PROCEDURE — 87077 CULTURE AEROBIC IDENTIFY: CPT

## 2022-06-27 PROCEDURE — 87186 SC STD MICRODIL/AGAR DIL: CPT

## 2022-06-27 PROCEDURE — 6360000002 HC RX W HCPCS: Performed by: RADIOLOGY

## 2022-06-27 PROCEDURE — 2500000003 HC RX 250 WO HCPCS: Performed by: RADIOLOGY

## 2022-06-27 RX ORDER — LIDOCAINE HYDROCHLORIDE 20 MG/ML
INJECTION, SOLUTION INFILTRATION; PERINEURAL
Status: COMPLETED | OUTPATIENT
Start: 2022-06-27 | End: 2022-06-27

## 2022-06-27 RX ORDER — FENTANYL CITRATE 50 UG/ML
INJECTION, SOLUTION INTRAMUSCULAR; INTRAVENOUS
Status: COMPLETED | OUTPATIENT
Start: 2022-06-27 | End: 2022-06-27

## 2022-06-27 RX ADMIN — FENTANYL CITRATE 50 MCG: 50 INJECTION INTRAMUSCULAR; INTRAVENOUS at 10:23

## 2022-06-27 RX ADMIN — LIDOCAINE HYDROCHLORIDE 200 MG: 20 INJECTION, SOLUTION INFILTRATION; PERINEURAL at 10:25

## 2022-06-27 ASSESSMENT — PAIN - FUNCTIONAL ASSESSMENT: PAIN_FUNCTIONAL_ASSESSMENT: NONE - DENIES PAIN

## 2022-06-27 NOTE — OR NURSING
TRANSFER - OUT REPORT:           Verbal report given to Roula Gillette on Elva Interiano  being transferred to [facility] room 404 for routine progression of patient care              Report consisted of patients Situation, Background, Assessment and      Recommendations(SBAR). Information from the following report(s) SBAR, Procedure Summary and MAR was reviewed with the receiving nurse. Opportunity for questions and clarification was provided. Pt tolerated procedure well.

## 2022-06-27 NOTE — OR NURSING
TRANSFER - OUT REPORT:           Verbal report given to Britany Whitmore on Mercedes Andrews  being transferred to IR room 3 for routine post-op              Report consisted of patients Situation, Background, Assessment and      Recommendations(SBAR). Information from the following report(s) SBAR and Procedure Summary was reviewed with the receiving nurse. Opportunity for questions and clarification was provided. Pt tolerated procedure well.

## 2022-06-29 LAB
BACTERIA SPEC CULT: ABNORMAL
GRAM STN SPEC: ABNORMAL
GRAM STN SPEC: ABNORMAL
SERVICE CMNT-IMP: ABNORMAL

## 2022-06-30 PROBLEM — C16.9 MALIGNANT NEOPLASM OF STOMACH (HCC): Status: ACTIVE | Noted: 2022-04-18

## 2022-07-05 ENCOUNTER — HOSPITAL ENCOUNTER (OUTPATIENT)
Dept: GENERAL RADIOLOGY | Age: 67
Discharge: HOME | End: 2022-07-08
Payer: COMMERCIAL

## 2022-07-05 ENCOUNTER — HOSPITAL ENCOUNTER (OUTPATIENT)
Dept: INTERVENTIONAL RADIOLOGY/VASCULAR | Age: 67
Discharge: HOME | End: 2022-07-08
Payer: COMMERCIAL

## 2022-07-05 VITALS
RESPIRATION RATE: 18 BRPM | BODY MASS INDEX: 16.74 KG/M2 | OXYGEN SATURATION: 90 % | DIASTOLIC BLOOD PRESSURE: 76 MMHG | WEIGHT: 113 LBS | HEART RATE: 84 BPM | TEMPERATURE: 97 F | SYSTOLIC BLOOD PRESSURE: 124 MMHG | HEIGHT: 69 IN

## 2022-07-05 DIAGNOSIS — J86.9 EMPYEMA (HCC): ICD-10-CM

## 2022-07-05 PROCEDURE — C1729 CATH, DRAINAGE: HCPCS

## 2022-07-05 PROCEDURE — 6360000002 HC RX W HCPCS: Performed by: RADIOLOGY

## 2022-07-05 PROCEDURE — 2500000003 HC RX 250 WO HCPCS: Performed by: RADIOLOGY

## 2022-07-05 PROCEDURE — 71045 X-RAY EXAM CHEST 1 VIEW: CPT

## 2022-07-05 PROCEDURE — 2580000003 HC RX 258: Performed by: RADIOLOGY

## 2022-07-05 RX ORDER — SODIUM CHLORIDE 9 MG/ML
INJECTION, SOLUTION INTRAVENOUS CONTINUOUS PRN
Status: COMPLETED | OUTPATIENT
Start: 2022-07-05 | End: 2022-07-05

## 2022-07-05 RX ORDER — LIDOCAINE HYDROCHLORIDE 20 MG/ML
INJECTION, SOLUTION INFILTRATION; PERINEURAL
Status: COMPLETED | OUTPATIENT
Start: 2022-07-05 | End: 2022-07-05

## 2022-07-05 RX ORDER — FENTANYL CITRATE 50 UG/ML
INJECTION, SOLUTION INTRAMUSCULAR; INTRAVENOUS
Status: COMPLETED | OUTPATIENT
Start: 2022-07-05 | End: 2022-07-05

## 2022-07-05 RX ADMIN — FENTANYL CITRATE 50 MCG: 50 INJECTION INTRAMUSCULAR; INTRAVENOUS at 08:27

## 2022-07-05 RX ADMIN — FENTANYL CITRATE 50 MCG: 50 INJECTION INTRAMUSCULAR; INTRAVENOUS at 09:00

## 2022-07-05 RX ADMIN — LIDOCAINE HYDROCHLORIDE 10 ML: 20 INJECTION, SOLUTION INFILTRATION; PERINEURAL at 08:42

## 2022-07-05 RX ADMIN — SODIUM CHLORIDE 50 ML/HR: 9 INJECTION, SOLUTION INTRAVENOUS at 08:27

## 2022-07-05 ASSESSMENT — PAIN - FUNCTIONAL ASSESSMENT: PAIN_FUNCTIONAL_ASSESSMENT: NONE - DENIES PAIN

## 2022-07-05 NOTE — PROGRESS NOTES
TRANSFER - OUT REPORT:           Verbal report given to brandon(name) on Keena Miu  being transferred to [facility] ltac(unit) for routine progression of patient care              Report consisted of patients Situation, Background, Assessment and      Recommendations(SBAR). Information from the following report(s) SBAR, Procedure Summary and MAR was reviewed with the receiving nurse. Opportunity for questions and clarification was provided. CT to -20 cm H2O wall suction       100 Mcg of Fentanyl administered   0 Mg of Versed administered   0 Mg of Benadryl administered        Pt tolerated procedure well.

## 2022-07-05 NOTE — BRIEF OP NOTE
Brief Postoperative Note      Patient: Melanie Pina  YOB: 1955  MRN: [id number]    Date of Procedure: 7/5/2022    Pre-Op Diagnosis: Left empyema    Post-Op Diagnosis: Same       Old left chest tube was removed. New 14 Fr left chest tube placed lower in the left pleural space in an area of residual empyema. Anesthesia: IV fentanyl for pain    Estimated Blood Loss (mL): Minimal    Complications: None    Specimens:   * No specimens in log *    Implants:  As above      Drains:   Gastrostomy/Enterostomy/Jejunostomy Tube Gastrostomy LUQ 1 14 fr (Active)   Drainage Appearance None 06/21/22 0731   Site Description Clean, dry & intact 06/21/22 0931   J Port Status Infusing 06/21/22 0931   G Port Status Other(comment) 06/20/22 0731   Surrounding Skin Clean, dry & intact 06/21/22 0931   Dressing Status Clean, dry & intact 06/21/22 0931   Dressing Type Split gauze;Dry dressing 06/21/22 0931   G-Tube Care Completed Yes 06/21/22 0931   Tube Feeding Other Tube Feeding (must specify product in comment) 06/21/22 0931   Tube feeding/verify rate (mL/hr) 50 mL/hr 06/21/22 0931   Tube Feeding Intake (mL) 1045 ml 06/20/22 0459   Tube Feeding Supplement Amount (mL) 100 mL 06/18/22 1727   Free Water/Flush (mL) 50 mL 06/20/22 0459   Action Taken Feed set changed; Other (comment) 06/21/22 0931   Residual Volume (ml) 0 ml 06/19/22 1822       [REMOVED] Chest Tube Left Fourth intercostal space (Removed)   Chest Tube Airleak No 06/21/22 0731   Status Gravity 06/21/22 0731   Suction To water seal 06/21/22 0731   Y Connector Used No 06/21/22 0731   Drainage Description Serosanguinous 06/21/22 0731   Dressing Status Clean, dry & intact 06/21/22 0731   Chest Tube Dressing Split Gauze 06/21/22 0731   Site Assessment Clean, dry & intact 06/21/22 0731   Surrounding Skin Clean, dry & intact 06/21/22 0731   Output (ml) 0 ml 06/21/22 0503       [REMOVED] Chest Tube Left 2 (Removed)   Status Gravity 06/27/22 1051   Dressing Status New dressing applied 06/27/22 1051   Site Assessment Clean, dry & intact 06/27/22 1051       [REMOVED] Chest Tube Left 1 (Removed)   Status Gravity 06/27/22 1052   Dressing Status New dressing applied;Clean, dry & intact 06/27/22 1052   Site Assessment Clean, dry & intact 06/27/22 1052       [REMOVED] Chest Tube Left (Removed)       [REMOVED] Closed/Suction Drain Right RUQ Bulb (Removed)   Site Description Clean, dry & intact 06/19/22 1901   Dressing Status Clean, dry & intact 06/19/22 1901   Drainage Appearance Serous 06/19/22 1901   Drain Status Open to gravity drainage 06/19/22 1901   Output (ml) 0 ml 06/19/22 5794       [REMOVED] Closed/Suction Drain Left LUQ Bulb (Removed)   Site Description Clean, dry & intact 06/19/22 1901   Dressing Status Clean, dry & intact 06/19/22 1901   Drainage Appearance Serous 06/19/22 1901   Drain Status Compressed; To bulb suction 06/19/22 1901   Output (ml) 5 ml 06/19/22 0627       [REMOVED] NG/OG/NJ/NE Tube Right nostril (Removed)   Surrounding Skin Clean, dry & intact 06/15/22 0730   Securement device Tape 06/15/22 0730   Status Suction-low intermittent 06/15/22 0730   Placement Verified Gastric Contents 06/15/22 0730   NG/OG/NJ/NE External Measurement (cm) 58 cm 06/15/22 0201   Drainage Appearance Green 06/15/22 0730   Output (mL) 50 ml 06/15/22 0730       [REMOVED] Urinary Catheter 2 Way (Removed)   $ Urethral catheter insertion $ Not inserted for procedure 06/03/22 0820   Catheter Indications Perioperative use for selected surgical procedures 06/14/22 0735   Site Assessment No urethral drainage 06/14/22 0735   Urine Color Yellow 06/14/22 0735   Urine Appearance Clear 06/14/22 0735   Urine Odor Malodorous 06/14/22 0217   Collection Container Standard 06/14/22 0735   Securement Method Securing device (Describe) 06/14/22 0735   Catheter Care Completed Yes 06/13/22 1920   Catheter Best Practices  Drainage tube clipped to bed;Catheter secured to thigh; Tamper seal intact; Bag not on floor; Bag below bladder;Lack of dependent loop in tubing;Drainage bag less than half full 06/14/22 0735   Status Draining 06/14/22 0735   Manual Irrigation Volume Input (mL) 0 mL 06/11/22 0800   Output (mL) 275 mL 06/14/22 0919   Discontinuation Reason Per provider order 06/11/22 0800       Findings: As above    Electronically signed by Indra Jiang MD on 7/5/2022 at 12:14 PM

## 2022-07-05 NOTE — PROGRESS NOTES
TRANSFER - OUT REPORT:           Verbal report given to Mable(name) on Margarette Coates  being transferred to IR recovery(unit) for routine progression of patient care              Report consisted of patients Situation, Background, Assessment and      Recommendations(SBAR). Information from the following report(s) SBAR, Procedure Summary and MAR was reviewed with the receiving nurse. Opportunity for questions and clarification was provided. CT to -20 cm H2O wall suction       100 Mcg of Fentanyl administered   0 Mg of Versed administered   0 Mg of Benadryl administered        Pt tolerated procedure well.

## 2022-07-06 ENCOUNTER — CLINICAL DOCUMENTATION (OUTPATIENT)
Dept: ONCOLOGY | Age: 67
End: 2022-07-06

## 2022-07-07 LAB
Lab: NORMAL
Lab: NORMAL
REFERENCE LAB: NORMAL

## 2022-07-08 ENCOUNTER — HOSPITAL ENCOUNTER (OUTPATIENT)
Dept: CT IMAGING | Age: 67
Discharge: HOME | End: 2022-07-11

## 2022-07-08 ENCOUNTER — HOSPITAL ENCOUNTER (OUTPATIENT)
Dept: INTERVENTIONAL RADIOLOGY/VASCULAR | Age: 67
Discharge: HOME | End: 2022-07-11
Attending: INTERNAL MEDICINE

## 2022-07-08 VITALS
DIASTOLIC BLOOD PRESSURE: 92 MMHG | RESPIRATION RATE: 16 BRPM | OXYGEN SATURATION: 94 % | SYSTOLIC BLOOD PRESSURE: 137 MMHG | TEMPERATURE: 97.7 F | HEART RATE: 92 BPM

## 2022-07-08 PROCEDURE — 6370000000 HC RX 637 (ALT 250 FOR IP): Performed by: RADIOLOGY

## 2022-07-08 PROCEDURE — 2709999900 IR GUIDED REPLACE DUOD OR JEJUN TUBE W CONTRAST

## 2022-07-08 PROCEDURE — 6360000004 HC RX CONTRAST MEDICATION: Performed by: RADIOLOGY

## 2022-07-08 PROCEDURE — 49451 REPLACE DUOD/JEJ TUBE PERC: CPT | Performed by: NURSE PRACTITIONER

## 2022-07-08 RX ORDER — LIDOCAINE HYDROCHLORIDE 20 MG/ML
SOLUTION OROPHARYNGEAL
Status: COMPLETED | OUTPATIENT
Start: 2022-07-08 | End: 2022-07-08

## 2022-07-08 RX ADMIN — Medication 15 ML: at 11:06

## 2022-07-08 RX ADMIN — IOPAMIDOL 15 ML: 612 INJECTION, SOLUTION INTRAVENOUS at 11:25

## 2022-07-08 NOTE — PROGRESS NOTES
Pt moved from IR to [address] [place] [address] room for procedure. Moved on to table with staff assist x 3. Secured to table for safety.

## 2022-07-11 DIAGNOSIS — C16.9 GASTRIC ADENOCARCINOMA (HCC): Primary | ICD-10-CM

## 2022-07-11 DIAGNOSIS — C16.2 MALIGNANT NEOPLASM OF BODY OF STOMACH (HCC): ICD-10-CM

## 2022-07-14 LAB
FUNGAL CULT/SMEAR: NORMAL
FUNGUS (MYCOLOGY) CULTURE: NEGATIVE
FUNGUS SMEAR: NORMAL
REFLEX TO ID: NORMAL
SPECIMEN PROCESSING: NORMAL
SPECIMEN SOURCE: NORMAL
SPECIMEN SOURCE: NORMAL

## 2022-07-29 ENCOUNTER — TELEPHONE (OUTPATIENT)
Dept: ONCOLOGY | Age: 67
End: 2022-07-29

## 2022-07-29 LAB
ACID FAST STN SPEC: NEGATIVE
MYCOBACTERIUM SPEC QL CULT: NEGATIVE
SPECIMEN PREPARATION: NORMAL
SPECIMEN SOURCE: NORMAL

## 2022-07-29 NOTE — TELEPHONE ENCOUNTER
Call from [facility] at [place] [facility] asking if we had received the report for CXR that had been done yesterday. States it shows LLL pulmonary nodule and wanted to make sure we were aware. They didn't know if they needed to do a follow up CT scan. Aasked for report to be re sent to [phone]. Will send message to Dr. Paulina Bess team to be on the lookout.  Can review with MD on Monday

## 2022-08-06 ENCOUNTER — APPOINTMENT (OUTPATIENT)
Dept: CT IMAGING | Age: 67
DRG: 177 | End: 2022-08-06
Payer: MEDICARE

## 2022-08-06 ENCOUNTER — APPOINTMENT (OUTPATIENT)
Dept: GENERAL RADIOLOGY | Age: 67
DRG: 177 | End: 2022-08-06
Payer: MEDICARE

## 2022-08-06 ENCOUNTER — HOSPITAL ENCOUNTER (INPATIENT)
Age: 67
LOS: 9 days | Discharge: HOSPICE | DRG: 177 | End: 2022-08-15
Attending: EMERGENCY MEDICINE | Admitting: INTERNAL MEDICINE
Payer: MEDICARE

## 2022-08-06 DIAGNOSIS — U07.1 ACUTE HYPOXEMIC RESPIRATORY FAILURE DUE TO COVID-19 (HCC): ICD-10-CM

## 2022-08-06 DIAGNOSIS — J96.01 ACUTE HYPOXEMIC RESPIRATORY FAILURE DUE TO COVID-19 (HCC): ICD-10-CM

## 2022-08-06 DIAGNOSIS — Z51.5 ENCOUNTER FOR PALLIATIVE CARE: Primary | ICD-10-CM

## 2022-08-06 PROBLEM — K92.1 HEMATOCHEZIA: Status: ACTIVE | Noted: 2022-08-06

## 2022-08-06 PROBLEM — J96.91 RESPIRATORY FAILURE WITH HYPOXIA (HCC): Status: ACTIVE | Noted: 2022-08-06

## 2022-08-06 LAB
ABO + RH BLD: NORMAL
ALBUMIN SERPL-MCNC: 2.8 G/DL (ref 3.2–4.6)
ALBUMIN/GLOB SERPL: 0.5 {RATIO} (ref 1.2–3.5)
ALP SERPL-CCNC: 205 U/L (ref 50–136)
ALT SERPL-CCNC: 32 U/L (ref 12–65)
ANION GAP SERPL CALC-SCNC: 5 MMOL/L (ref 7–16)
AST SERPL-CCNC: 29 U/L (ref 15–37)
B PERT DNA SPEC QL NAA+PROBE: NOT DETECTED
BASOPHILS # BLD: 0.1 K/UL (ref 0–0.2)
BASOPHILS NFR BLD: 1 % (ref 0–2)
BILIRUB SERPL-MCNC: 0.1 MG/DL (ref 0.2–1.1)
BILIRUB UR QL: NEGATIVE
BLOOD GROUP ANTIBODIES SERPL: NORMAL
BORDETELLA PARAPERTUSSIS BY PCR: NOT DETECTED
BUN SERPL-MCNC: 17 MG/DL (ref 8–23)
C PNEUM DNA SPEC QL NAA+PROBE: NOT DETECTED
CALCIUM SERPL-MCNC: 9.1 MG/DL (ref 8.3–10.4)
CHLORIDE SERPL-SCNC: 99 MMOL/L (ref 98–107)
CO2 SERPL-SCNC: 29 MMOL/L (ref 21–32)
CREAT SERPL-MCNC: 0.3 MG/DL (ref 0.8–1.5)
DIFFERENTIAL METHOD BLD: ABNORMAL
EOSINOPHIL # BLD: 0.1 K/UL (ref 0–0.8)
EOSINOPHIL NFR BLD: 1 % (ref 0.5–7.8)
ERYTHROCYTE [DISTWIDTH] IN BLOOD BY AUTOMATED COUNT: 16.4 % (ref 11.9–14.6)
FLUAV SUBTYP SPEC NAA+PROBE: NOT DETECTED
FLUBV RNA SPEC QL NAA+PROBE: NOT DETECTED
GLOBULIN SER CALC-MCNC: 5.3 G/DL (ref 2.3–3.5)
GLUCOSE SERPL-MCNC: 92 MG/DL (ref 65–100)
GLUCOSE UR QL STRIP.AUTO: NEGATIVE MG/DL
HADV DNA SPEC QL NAA+PROBE: NOT DETECTED
HCOV 229E RNA SPEC QL NAA+PROBE: NOT DETECTED
HCOV HKU1 RNA SPEC QL NAA+PROBE: NOT DETECTED
HCOV NL63 RNA SPEC QL NAA+PROBE: NOT DETECTED
HCOV OC43 RNA SPEC QL NAA+PROBE: NOT DETECTED
HCT VFR BLD AUTO: 37.9 % (ref 41.1–50.3)
HGB BLD-MCNC: 11.4 G/DL (ref 13.6–17.2)
HMPV RNA SPEC QL NAA+PROBE: NOT DETECTED
HPIV1 RNA SPEC QL NAA+PROBE: NOT DETECTED
HPIV2 RNA SPEC QL NAA+PROBE: NOT DETECTED
HPIV3 RNA SPEC QL NAA+PROBE: NOT DETECTED
HPIV4 RNA SPEC QL NAA+PROBE: NOT DETECTED
IMM GRANULOCYTES # BLD AUTO: 0 K/UL (ref 0–0.5)
IMM GRANULOCYTES NFR BLD AUTO: 1 % (ref 0–5)
KETONES UR-MCNC: NEGATIVE MG/DL
LEUKOCYTE ESTERASE UR QL STRIP: NEGATIVE
LIPASE SERPL-CCNC: 91 U/L (ref 73–393)
LYMPHOCYTES # BLD: 2.3 K/UL (ref 0.5–4.6)
LYMPHOCYTES NFR BLD: 30 % (ref 13–44)
M PNEUMO DNA SPEC QL NAA+PROBE: NOT DETECTED
MAGNESIUM SERPL-MCNC: 1.9 MG/DL (ref 1.8–2.4)
MCH RBC QN AUTO: 26.7 PG (ref 26.1–32.9)
MCHC RBC AUTO-ENTMCNC: 30.1 G/DL (ref 31.4–35)
MCV RBC AUTO: 88.8 FL (ref 79.6–97.8)
MONOCYTES # BLD: 0.6 K/UL (ref 0.1–1.3)
MONOCYTES NFR BLD: 8 % (ref 4–12)
NEUTS SEG # BLD: 4.5 K/UL (ref 1.7–8.2)
NEUTS SEG NFR BLD: 59 % (ref 43–78)
NITRITE UR QL: NEGATIVE
NRBC # BLD: 0 K/UL (ref 0–0.2)
NT PRO BNP: 323 PG/ML (ref 5–125)
PH UR: 7 [PH] (ref 5–9)
PLATELET # BLD AUTO: 647 K/UL (ref 150–450)
PMV BLD AUTO: 9 FL (ref 9.4–12.3)
POTASSIUM SERPL-SCNC: 5 MMOL/L (ref 3.5–5.1)
PROT SERPL-MCNC: 8.1 G/DL (ref 6.3–8.2)
PROT UR QL: NEGATIVE MG/DL
RBC # BLD AUTO: 4.27 M/UL (ref 4.23–5.6)
RBC # UR STRIP: NEGATIVE /UL
RSV RNA SPEC QL NAA+PROBE: NOT DETECTED
RV+EV RNA SPEC QL NAA+PROBE: NOT DETECTED
SARS-COV-2 RNA RESP QL NAA+PROBE: DETECTED
SERVICE CMNT-IMP: NORMAL
SODIUM SERPL-SCNC: 133 MMOL/L (ref 136–145)
SP GR UR: 1.01 (ref 1–1.02)
SPECIMEN EXP DATE BLD: NORMAL
TROPONIN I SERPL HS-MCNC: 8.1 PG/ML (ref 0–14)
UROBILINOGEN UR QL: 0.2 EU/DL (ref 0.2–1)
WBC # BLD AUTO: 7.6 K/UL (ref 4.3–11.1)

## 2022-08-06 PROCEDURE — 83735 ASSAY OF MAGNESIUM: CPT

## 2022-08-06 PROCEDURE — 2580000003 HC RX 258: Performed by: EMERGENCY MEDICINE

## 2022-08-06 PROCEDURE — 1100000000 HC RM PRIVATE

## 2022-08-06 PROCEDURE — 83690 ASSAY OF LIPASE: CPT

## 2022-08-06 PROCEDURE — 0202U NFCT DS 22 TRGT SARS-COV-2: CPT

## 2022-08-06 PROCEDURE — 6360000002 HC RX W HCPCS: Performed by: INTERNAL MEDICINE

## 2022-08-06 PROCEDURE — 81003 URINALYSIS AUTO W/O SCOPE: CPT

## 2022-08-06 PROCEDURE — 84484 ASSAY OF TROPONIN QUANT: CPT

## 2022-08-06 PROCEDURE — 71260 CT THORAX DX C+: CPT | Performed by: INTERNAL MEDICINE

## 2022-08-06 PROCEDURE — 83880 ASSAY OF NATRIURETIC PEPTIDE: CPT

## 2022-08-06 PROCEDURE — 6370000000 HC RX 637 (ALT 250 FOR IP): Performed by: INTERNAL MEDICINE

## 2022-08-06 PROCEDURE — 80053 COMPREHEN METABOLIC PANEL: CPT

## 2022-08-06 PROCEDURE — 86901 BLOOD TYPING SEROLOGIC RH(D): CPT

## 2022-08-06 PROCEDURE — 6360000004 HC RX CONTRAST MEDICATION: Performed by: INTERNAL MEDICINE

## 2022-08-06 PROCEDURE — 99285 EMERGENCY DEPT VISIT HI MDM: CPT

## 2022-08-06 PROCEDURE — 85025 COMPLETE CBC W/AUTO DIFF WBC: CPT

## 2022-08-06 PROCEDURE — 71045 X-RAY EXAM CHEST 1 VIEW: CPT

## 2022-08-06 RX ORDER — ACETAMINOPHEN 650 MG/1
650 SUPPOSITORY RECTAL EVERY 6 HOURS PRN
Status: DISCONTINUED | OUTPATIENT
Start: 2022-08-06 | End: 2022-08-15 | Stop reason: HOSPADM

## 2022-08-06 RX ORDER — SODIUM CHLORIDE 0.9 % (FLUSH) 0.9 %
5 SYRINGE (ML) INJECTION EVERY 8 HOURS
Status: DISCONTINUED | OUTPATIENT
Start: 2022-08-06 | End: 2022-08-15 | Stop reason: HOSPADM

## 2022-08-06 RX ORDER — DEXAMETHASONE SODIUM PHOSPHATE 10 MG/ML
6 INJECTION INTRAMUSCULAR; INTRAVENOUS EVERY 24 HOURS
Status: DISCONTINUED | OUTPATIENT
Start: 2022-08-06 | End: 2022-08-07 | Stop reason: SDUPTHER

## 2022-08-06 RX ORDER — POLYETHYLENE GLYCOL 3350 17 G/17G
17 POWDER, FOR SOLUTION ORAL DAILY PRN
Status: DISCONTINUED | OUTPATIENT
Start: 2022-08-06 | End: 2022-08-15 | Stop reason: HOSPADM

## 2022-08-06 RX ORDER — FUROSEMIDE 10 MG/ML
40 INJECTION INTRAMUSCULAR; INTRAVENOUS DAILY
Status: DISCONTINUED | OUTPATIENT
Start: 2022-08-06 | End: 2022-08-09

## 2022-08-06 RX ORDER — ONDANSETRON 4 MG/1
4 TABLET, ORALLY DISINTEGRATING ORAL EVERY 8 HOURS PRN
Status: DISCONTINUED | OUTPATIENT
Start: 2022-08-06 | End: 2022-08-15 | Stop reason: HOSPADM

## 2022-08-06 RX ORDER — ENOXAPARIN SODIUM 100 MG/ML
1 INJECTION SUBCUTANEOUS EVERY 12 HOURS
Status: DISCONTINUED | OUTPATIENT
Start: 2022-08-06 | End: 2022-08-15 | Stop reason: HOSPADM

## 2022-08-06 RX ORDER — SODIUM CHLORIDE 0.9 % (FLUSH) 0.9 %
5 SYRINGE (ML) INJECTION AS NEEDED
Status: DISCONTINUED | OUTPATIENT
Start: 2022-08-06 | End: 2022-08-15 | Stop reason: HOSPADM

## 2022-08-06 RX ORDER — ENOXAPARIN SODIUM 100 MG/ML
40 INJECTION SUBCUTANEOUS DAILY
Status: DISCONTINUED | OUTPATIENT
Start: 2022-08-06 | End: 2022-08-06

## 2022-08-06 RX ORDER — DEXAMETHASONE 4 MG/1
6 TABLET ORAL DAILY
Status: DISCONTINUED | OUTPATIENT
Start: 2022-08-06 | End: 2022-08-06

## 2022-08-06 RX ORDER — SODIUM CHLORIDE 0.9 % (FLUSH) 0.9 %
5-40 SYRINGE (ML) INJECTION EVERY 12 HOURS SCHEDULED
Status: DISCONTINUED | OUTPATIENT
Start: 2022-08-06 | End: 2022-08-15 | Stop reason: HOSPADM

## 2022-08-06 RX ORDER — SODIUM CHLORIDE 0.9 % (FLUSH) 0.9 %
5-40 SYRINGE (ML) INJECTION PRN
Status: DISCONTINUED | OUTPATIENT
Start: 2022-08-06 | End: 2022-08-15 | Stop reason: HOSPADM

## 2022-08-06 RX ORDER — LEVALBUTEROL INHALATION SOLUTION 0.63 MG/3ML
0.63 SOLUTION RESPIRATORY (INHALATION) EVERY 6 HOURS PRN
Status: DISCONTINUED | OUTPATIENT
Start: 2022-08-06 | End: 2022-08-15 | Stop reason: HOSPADM

## 2022-08-06 RX ORDER — SODIUM CHLORIDE 9 MG/ML
INJECTION, SOLUTION INTRAVENOUS PRN
Status: DISCONTINUED | OUTPATIENT
Start: 2022-08-06 | End: 2022-08-15 | Stop reason: HOSPADM

## 2022-08-06 RX ORDER — ACETAMINOPHEN 325 MG/1
650 TABLET ORAL EVERY 6 HOURS PRN
Status: DISCONTINUED | OUTPATIENT
Start: 2022-08-06 | End: 2022-08-15 | Stop reason: HOSPADM

## 2022-08-06 RX ORDER — ONDANSETRON 2 MG/ML
4 INJECTION INTRAMUSCULAR; INTRAVENOUS EVERY 6 HOURS PRN
Status: DISCONTINUED | OUTPATIENT
Start: 2022-08-06 | End: 2022-08-15 | Stop reason: HOSPADM

## 2022-08-06 RX ADMIN — DEXAMETHASONE SODIUM PHOSPHATE 6 MG: 10 INJECTION INTRAMUSCULAR; INTRAVENOUS at 18:55

## 2022-08-06 RX ADMIN — METOPROLOL TARTRATE 12.5 MG: 25 TABLET, FILM COATED ORAL at 20:56

## 2022-08-06 RX ADMIN — SODIUM CHLORIDE, PRESERVATIVE FREE 5 ML: 5 INJECTION INTRAVENOUS at 18:29

## 2022-08-06 RX ADMIN — IOPAMIDOL 100 ML: 755 INJECTION, SOLUTION INTRAVENOUS at 17:55

## 2022-08-06 RX ADMIN — FUROSEMIDE 40 MG: 10 INJECTION, SOLUTION INTRAMUSCULAR; INTRAVENOUS at 18:57

## 2022-08-06 ASSESSMENT — PAIN - FUNCTIONAL ASSESSMENT
PAIN_FUNCTIONAL_ASSESSMENT: 0-10
PAIN_FUNCTIONAL_ASSESSMENT: 0-10

## 2022-08-06 ASSESSMENT — PAIN SCALES - GENERAL
PAINLEVEL_OUTOF10: 0
PAINLEVEL_OUTOF10: 6

## 2022-08-06 ASSESSMENT — PAIN DESCRIPTION - ORIENTATION: ORIENTATION: LOWER

## 2022-08-06 ASSESSMENT — PAIN DESCRIPTION - LOCATION: LOCATION: ABDOMEN

## 2022-08-06 ASSESSMENT — PAIN DESCRIPTION - DESCRIPTORS: DESCRIPTORS: CRAMPING

## 2022-08-06 NOTE — ED NOTES
Patient report to Celina Alcala. Patient care to be assumed at this time.        Sam Hanna RN  08/06/22 [id number]

## 2022-08-06 NOTE — ED TRIAGE NOTES
Patient arrives to ED via EMS from [facility]. Patient was diagnosed with Covid on 8/1/2022. Facility sent patient out because he had nausea, SOB, and rectal bleeding today. Patient denies chest pain. Denies abdominal pain. Patient is on Lovenox. Patient was 88% on RA. Patient placed on 2L via nc with improvement.

## 2022-08-06 NOTE — ED NOTES
Orders released pending prolonged hold in [facility], RN  08/06/22 2931

## 2022-08-06 NOTE — H&P
Admit date: 2022   Name:  Roby See   Age:  78 y.o.   :  1955   MRN:  [id number]   PCP:  Chantal Eddy MD   Provider:  Argentina Edwards MD      ASSESSMENT AND PLAN  59-year-old male with a past medical history of gastric adenocarcinoma status post 4 cycles of chemotherapy, hypertension, pulmonary embolism on Lovenox, recent admission for septic shock, acute respiratory failure in the setting of pleural effusion and pulmonary embolism, locally advanced gastric carcinoma status post total gastrectomy and esophagojejunostomy, lateral lobectomy of the liver, distal pancreatectomy and splenectomy, status post J-tube placement, that presents from nursing facility in the setting of shortness of breath and hematochezia    1. Acute hypoxic respiratory failure likely in the setting of COVID-19 infection  -Oxygen therapy to maintain oxygen saturation more than 92%  -Start IV Decadron  -Monitor CRP  -Will consider baricitinib or tocilizumab if elevated CRP or worsening hypoxia  -Obtain SARS-CoV-2 PCR to confirm diagnosis  -Obtain CT of the chest  -Symptomatic management  -Continue Lasix  -Monitor volume status  -Strict input and output  -Daily weights    2. Hematochezia likely in the setting of lower GI bleed  -Currently hemoglobin stable  -Monitor H&H  -Transfuse if hemoglobin lower than 7  -We will consider gastroenterology consultation if persistent symptomatology    3. History of pulmonary embolism  -Hold Lovenox for now in the setting of hematochezia and resume as soon as possible    4. Locally advanced gastric carcinoma status post total gastrectomy and esophagojejunostomy, lateral lobectomy of the liver, distal pancreatectomy and splenectomy, status post J-tube placement  -Nutritionist consultation for tube feeds  -Outpatient follow-up with oncology    5.   Hypertension  -Continue metoprolol    DVT prophylaxis holding Lovenox for now    Full code    Patient aware of plan        CHIEF COMPLAINT:  Shortness of breath, hematochezia      HISTORY OF PRESENT ILLNESS:  54-year-old male with a past medical history of gastric adenocarcinoma status post 4 cycles of chemotherapy, hypertension, pulmonary embolism on Lovenox, recent admission for septic shock, acute respiratory failure in the setting of pleural effusion and pulmonary embolism, locally advanced gastric carcinoma status post total gastrectomy and esophagojejunostomy, lateral lobectomy of the liver, distal pancreatectomy and splenectomy, status post J-tube placement, that presents from nursing facility in the setting of shortness of breath and hematochezia. The patient has been presenting with some shortness of breath for the past few days associated with some hematochezia. He was diagnosed with COVID-19 on May 1, 2022. He symptoms did not improve so he was sent to the emergency department. Otherwise the patient denies any chest pain, no abdominal pain, no fever or chills. In the emergency department patient was found to be mildly hypoxic with oxygen saturation of 88% on room air. He was placed on oxygen by nasal cannula at 2 L. Improvement on oxygen saturation. He will be admitted to the medical floors for further management.           Past Medical History:   Diagnosis Date    ABLA (acute blood loss anemia) 6/2/2022    GIB (gastrointestinal bleeding) 6/2/2022    HTN (hypertension)     Severe sepsis with lactic acidosis ([address] 6/2/2022       Past Surgical History:   Procedure Laterality Date    BRONCHOSCOPY N/A 6/16/2022    BRONCHOSCOPY performed by Thai Holly MD at [address] N/A 6/17/2022    BRONCHOSCOPY performed by Thai Holly MD at [address] N/A 6/18/2022    BRONCHOSCOPY performed by Eli Davies MD at [facility] ENDOSCOPY    COLONOSCOPY N/A 3/9/2022    COLONOSCOPY/ 22 performed by Diane Barba MD at [facility] N/A 6/9/2022    Exploratory laparotomy with total gastrectomy and esophagojejunostomy after extensive lysis of adhesions and dissections which added at least 3-4 hours to this case, Left lateral lobectomy of liver, Distal pancreatectomy and splenectomy, Combined diaphragmatic resection, Falciform ligament flap, Feeding J-tube placement, Retroperitoneal mass excision and lymphadenectomy, Left abdominal wall mass ex    IR CHEST TUBE INSERTION  6/20/2022    IR CHEST TUBE INSERTION 6/20/2022 [facility]    IR CHEST TUBE INSERTION  7/5/2022    IR CHEST TUBE INSERTION 7/5/2022 [facility]    IR IVC FILTER PLACEMENT W IMAGING  6/6/2022    IR IVC FILTER PLACEMENT W IMAGING 6/6/2022 [facility]    KNEE ARTHROSCOPY      OTHER SURGICAL HISTORY      none    THORACENTESIS Bilateral 6/15/2022    THORACENTESIS ULTRASOUND performed by Chano Patricio MD at [facility] ENDOSCOPY    UPPER GASTROINTESTINAL ENDOSCOPY N/A 6/4/2022    EGD ESOPHAGOGASTRODUODENOSCOPY performed by Negro Harrison MD at [address] [place] [address]:  Prior to Admission medications    Medication Sig Start Date End Date Taking?  Authorizing Provider   levalbuterol [place]) 0.63 MG/3ML nebulization Take 3 mLs by nebulization every 6 hours 6/21/22   Lillian Bass, DO   enoxaparin (LOVENOX) 60 MG/0.6ML Inject 0.5 mLs into the skin every 12 hours 6/21/22   Sadaf Bass, DO   insulin lispro (HUMALOG) 100 UNIT/ML SOLN injection vial Inject 0-4 Units into the skin 3 times daily (with meals) 6/21/22   Cecilia Anderson, DO   insulin lispro (HUMALOG) 100 UNIT/ML SOLN injection vial Inject 0-4 Units into the skin nightly 6/21/22   Sadaf Bass, DO   [facility] 4 MG/2ML injection Infuse 2 mLs intravenously every 6 hours as needed for Nausea or Vomiting 6/21/22   Cecilia Anderson, DO   metoprolol tartrate (LOPRESSOR) 25 MG tablet 0.5 tablets by Per J Tube route 2 times daily 6/21/22   Sadaf Bass, DO   guaiFENesin (ROBITUSSIN) 100 MG/5ML SOLN oral solution 10 mLs by Per J Tube route every 6 hours 6/21/22   Annetta Drop, DO   sodium chloride, Inhalant, 3 % nebulizer solution Take 4 mLs by nebulization 2 times daily 6/21/22   Annetta Drop, DO   sodium chloride (OCEAN, BABY AYR) 0.65 % nasal spray 1 spray by Nasal route as needed for Congestion 6/21/22   Annetta Drop, DO   Medela Tender Care Lanolin cream Apply 0.3 oz topically as needed (Itching) 6/21/22   Annetta Drop, DO   furosemide (LASIX) 10 MG/ML injection Infuse 4 mLs intravenously daily 6/21/22   Sadaf Bass, DO   melatonin 3 MG TABS tablet 1.5 tablets by Per J Tube route nightly as needed (Sleep) 6/21/22   Annetta Drop, DO   lidocaine-prilocaine (EMLA) 2.5-2.5 % cream Apply topically as needed 3/30/22   Ar Automatic Reconciliation   ondansetron (ZOFRAN) 8 MG tablet Take 8 mg by mouth every 8 hours as needed for Nausea 3/30/22   Ar Automatic Reconciliation         REVIEW OF SYSTEMS:  14 ROS negative except from stated on HPI      Social History     Tobacco Use    Smoking status: Former    Smokeless tobacco: Former   Substance Use Topics    Alcohol use: Not Currently    Drug use: Not Currently       Family history  No CAD    Allergies   Allergen Reactions    Iron     Tetanus Antitoxin          Vitals:    08/06/22 1656 08/06/22 1716 08/06/22 1726 08/06/22 1746   BP: 120/84 125/83 122/87 129/82   Pulse: 68 68 69 68   Resp: 15 17 15 16   Temp:       TempSrc:       SpO2:       Weight:       Height:             PHYSICAL EXAM:  General: Alert, NAD  HEENT: NC/AT, EOM are intact  Neck: supple, no JVD  Cardiovascular: RRR, S1, S2, no murmurs  Respiratory: Coarse sounds, no wheezes  Abdomen: Soft, NT, ND  Back: No CVA tenderness, no paraspinal tenderness  Extremities: LE without pedal edema, no erythema  Neuro: CN are intact, no focal deficits  Skin: no rash or ulcers      I have personally reviewed patients laboratory data showing  Lab Results   Component Value Date WBC 7.6 08/06/2022    HGB 11.4 (L) 08/06/2022    HCT 37.9 (L) 08/06/2022    MCV 88.8 08/06/2022     (H) 08/06/2022     No results found for: CKTOTAL, CKMB, CKMBINDEX, TROPONINI  Lab Results   Component Value Date    ANIONGAP 5 (L) 08/06/2022    CALCIUM 9.1 08/06/2022     (L) 08/06/2022    K 5.0 08/06/2022    CO2 29 08/06/2022    CL 99 08/06/2022    BUN 17 08/06/2022    CREATININE 0.30 (L) 08/06/2022         I have personally reviewed patients EKG showing  Sinus rhythm, early repolarization      I have personally reviewed patients imaging showing  XR CHEST PORTABLE   Final Result   1. Stable mild cardiomegaly without evolving acute changes suggested by plain   film. Cardiomegaly can be an early indicator for heart failure in the correct   clinical setting. This report was made using voice transcription. Despite my best efforts to avoid   any, transcription errors may persist. If there is any question about the   accuracy of the report or need for clarification, then please call [phone], or text me through perfectserv for clarification or correction.        CT CHEST PULMONARY EMBOLISM W CONTRAST    (Results Pending)         Likely length of stay more than 2 midnights

## 2022-08-07 LAB
ANION GAP SERPL CALC-SCNC: 7 MMOL/L (ref 7–16)
BASOPHILS # BLD: 0 K/UL (ref 0–0.2)
BASOPHILS NFR BLD: 1 % (ref 0–2)
BUN SERPL-MCNC: 21 MG/DL (ref 8–23)
CALCIUM SERPL-MCNC: 8 MG/DL (ref 8.3–10.4)
CHLORIDE SERPL-SCNC: 102 MMOL/L (ref 98–107)
CO2 SERPL-SCNC: 25 MMOL/L (ref 21–32)
CREAT SERPL-MCNC: 0.4 MG/DL (ref 0.8–1.5)
CRP SERPL-MCNC: 1.2 MG/DL (ref 0–0.9)
DIFFERENTIAL METHOD BLD: ABNORMAL
EOSINOPHIL # BLD: 0 K/UL (ref 0–0.8)
EOSINOPHIL NFR BLD: 1 % (ref 0.5–7.8)
ERYTHROCYTE [DISTWIDTH] IN BLOOD BY AUTOMATED COUNT: 16.3 % (ref 11.9–14.6)
GLUCOSE SERPL-MCNC: 90 MG/DL (ref 65–100)
HCT VFR BLD AUTO: 37.7 % (ref 41.1–50.3)
HGB BLD-MCNC: 11.5 G/DL (ref 13.6–17.2)
IMM GRANULOCYTES # BLD AUTO: 0 K/UL (ref 0–0.5)
IMM GRANULOCYTES NFR BLD AUTO: 0 % (ref 0–5)
LYMPHOCYTES # BLD: 2.6 K/UL (ref 0.5–4.6)
LYMPHOCYTES NFR BLD: 34 % (ref 13–44)
MCH RBC QN AUTO: 26.7 PG (ref 26.1–32.9)
MCHC RBC AUTO-ENTMCNC: 30.5 G/DL (ref 31.4–35)
MCV RBC AUTO: 87.7 FL (ref 79.6–97.8)
MONOCYTES # BLD: 0.9 K/UL (ref 0.1–1.3)
MONOCYTES NFR BLD: 12 % (ref 4–12)
NEUTS SEG # BLD: 4 K/UL (ref 1.7–8.2)
NEUTS SEG NFR BLD: 53 % (ref 43–78)
NRBC # BLD: 0 K/UL (ref 0–0.2)
PLATELET # BLD AUTO: 686 K/UL (ref 150–450)
PMV BLD AUTO: 9.3 FL (ref 9.4–12.3)
POTASSIUM SERPL-SCNC: 4.1 MMOL/L (ref 3.5–5.1)
RBC # BLD AUTO: 4.3 M/UL (ref 4.23–5.6)
SODIUM SERPL-SCNC: 134 MMOL/L (ref 138–145)
WBC # BLD AUTO: 7.6 K/UL (ref 4.3–11.1)

## 2022-08-07 PROCEDURE — 6370000000 HC RX 637 (ALT 250 FOR IP): Performed by: INTERNAL MEDICINE

## 2022-08-07 PROCEDURE — 6360000002 HC RX W HCPCS: Performed by: INTERNAL MEDICINE

## 2022-08-07 PROCEDURE — 1100000000 HC RM PRIVATE

## 2022-08-07 PROCEDURE — 80048 BASIC METABOLIC PNL TOTAL CA: CPT

## 2022-08-07 PROCEDURE — 86140 C-REACTIVE PROTEIN: CPT

## 2022-08-07 PROCEDURE — 2580000003 HC RX 258: Performed by: EMERGENCY MEDICINE

## 2022-08-07 PROCEDURE — 2580000003 HC RX 258: Performed by: INTERNAL MEDICINE

## 2022-08-07 PROCEDURE — 85025 COMPLETE CBC W/AUTO DIFF WBC: CPT

## 2022-08-07 RX ORDER — DEXAMETHASONE SODIUM PHOSPHATE 10 MG/ML
6 INJECTION INTRAMUSCULAR; INTRAVENOUS EVERY 24 HOURS
Status: DISCONTINUED | OUTPATIENT
Start: 2022-08-07 | End: 2022-08-08

## 2022-08-07 RX ORDER — OXYCODONE HYDROCHLORIDE 5 MG/1
5 TABLET ORAL EVERY 4 HOURS PRN
Status: DISCONTINUED | OUTPATIENT
Start: 2022-08-07 | End: 2022-08-12

## 2022-08-07 RX ORDER — POTASSIUM CHLORIDE 7.45 MG/ML
10 INJECTION INTRAVENOUS PRN
Status: DISCONTINUED | OUTPATIENT
Start: 2022-08-07 | End: 2022-08-15 | Stop reason: HOSPADM

## 2022-08-07 RX ORDER — MAGNESIUM SULFATE IN WATER 40 MG/ML
2000 INJECTION, SOLUTION INTRAVENOUS PRN
Status: DISCONTINUED | OUTPATIENT
Start: 2022-08-07 | End: 2022-08-15 | Stop reason: HOSPADM

## 2022-08-07 RX ORDER — POTASSIUM CHLORIDE 20 MEQ/1
40 TABLET, EXTENDED RELEASE ORAL PRN
Status: DISCONTINUED | OUTPATIENT
Start: 2022-08-07 | End: 2022-08-15 | Stop reason: HOSPADM

## 2022-08-07 RX ORDER — OXYCODONE HYDROCHLORIDE 5 MG/1
5 TABLET ORAL EVERY 4 HOURS PRN
Status: DISCONTINUED | OUTPATIENT
Start: 2022-08-07 | End: 2022-08-07

## 2022-08-07 RX ADMIN — ONDANSETRON 4 MG: 2 INJECTION INTRAMUSCULAR; INTRAVENOUS at 12:49

## 2022-08-07 RX ADMIN — SODIUM CHLORIDE, PRESERVATIVE FREE 10 ML: 5 INJECTION INTRAVENOUS at 22:25

## 2022-08-07 RX ADMIN — METOPROLOL TARTRATE 12.5 MG: 25 TABLET, FILM COATED ORAL at 10:22

## 2022-08-07 RX ADMIN — METOPROLOL TARTRATE 12.5 MG: 25 TABLET, FILM COATED ORAL at 22:25

## 2022-08-07 RX ADMIN — ONDANSETRON 4 MG: 4 TABLET, ORALLY DISINTEGRATING ORAL at 06:20

## 2022-08-07 RX ADMIN — OXYCODONE 5 MG: 5 TABLET ORAL at 14:48

## 2022-08-07 RX ADMIN — DEXAMETHASONE SODIUM PHOSPHATE 6 MG: 10 INJECTION INTRAMUSCULAR; INTRAVENOUS at 23:11

## 2022-08-07 RX ADMIN — SODIUM CHLORIDE, PRESERVATIVE FREE 5 ML: 5 INJECTION INTRAVENOUS at 22:25

## 2022-08-07 RX ADMIN — FUROSEMIDE 40 MG: 10 INJECTION, SOLUTION INTRAMUSCULAR; INTRAVENOUS at 10:22

## 2022-08-07 ASSESSMENT — PAIN SCALES - GENERAL
PAINLEVEL_OUTOF10: 7
PAINLEVEL_OUTOF10: 0
PAINLEVEL_OUTOF10: 0

## 2022-08-07 NOTE — ED NOTES
Pt requested water. Pt states even with J tube he still drinks water by mouth. Spoke with pts wife, Letty Dowd, via phone. She confirmed his PCP allows him water, or anything pt would like, by mouth. MD notified via perfect serve. Lucy Paz MD approved water by mouth for patient. Water provided for pt.       Lex Carty RN  08/07/22 [id number]

## 2022-08-07 NOTE — CONSULTS
Comprehensive Nutrition Assessment    Type and Reason for Visit: Consult  Tube Feeding Management (Hospitalists)    Nutrition Recommendations/Plan:   Enteral Nutrition:   Enteral Access: Jejunostomy  Formula: Standard without Fiber (Osmolite 1.2 Carloz)  Delivery: Continuous feeding: Initiate  15 ml/hour, progress by 10 ml/8 hours to goal rate of 65 ml/hour. Water flush Initiate 60 ml every 4 hours  Modulars: None not indicated at this time   Enteral regimen at goal to provide per 24 hours:  1716 calories (100% estimated calorie needs), 79 grams protein (100% estimated protein needs) and 1532 ml free fluid (~0.9 ml/kcal)   Labs:   EN labs: BMP daily, Mg daily x 3 days then MWF and Phos daily x 3 days then MWF. POC Glucoses/SSI Not indicated     Malnutrition Assessment:  Malnutrition Status: Insufficient data (nneeds NFPE, additional wt loss since last admission, + malnutrition last admission)    Nutrition Assessment:  Nutrition History: Patient known to nutrition from previous admission (6/2022). Prior to recent admission patient with poor tolerance of solid foods that progressed to no solid foods for weeks. Main intake at that time was 6-7 Ensure per day with peanut butter powder mixed in. TF initiated per surgery s/p 14 fr Jtube palcement 6/13. Advanced to \"goal\" of 50 ml/hr per surgery. Sips of clears initiated 6/17. RN provided TF regimen from facility \" Omsolite 1.2 at 50 ml/hr with 30 ml water flush BID. Nutrition needs can be met with Osmolite 1.2 @ 65 ml/hr (formulated for 22 hr infusion) with free water flush 90 ml Q4 hrs. Regimen would provide 1716 kcal (100% estimated energy needs), 79 g pro (100% estimated protein needs), 1713 ml free water (~1 ml/hr). \" Appears to be copied from last RD assessment last admission. ? If patient was maintained on Osmolite 1.2 @ 50 ml/hr - would explain additional weight loss. Last measured weight 128# (58.1 kg) 6/17/22 bed scale. NFPE deferred secondary to isolation. Nutrition Background:   Patient with PMH significant for HTN, gastric cancer s/p chemo and total gastrectomy and esophagojejunostomy with extensive lysis of adhesion and dissection, left lateral lobectomy of liver, distal pancreatectomy and splenectomy, combined diaphragmatic resection, falciform ligament flap, j-tube placement (14 fr), retroperitoneal mass excision and lymphadenectomy, and left abdominal wall mass excision. He was discharged to [facility]. He presented back with SOB and hematochezia. He wa diagnosed with COVID in may  Nutrition Interval:  Perfect Serve from ER RN regarding TF consult placed last evening and again this am. She states patient has been in ER nearly 24 hrs and asking about initiating TF. Instructed RN to call for pump. RD provided feed set and formula. RN showed feeding administration instructions listed above from [facility] message from provider. ? Unsure if actual regimen from facility or copied from previous RD note.    Abdominal Status (last documented):    ,     Pertinent Medications: Decadron, Lasix, Zofran  Pertinent Labs:   Lab Results   Component Value Date/Time     08/07/2022 09:47 AM    K 4.1 08/07/2022 09:47 AM     08/07/2022 09:47 AM    CO2 25 08/07/2022 09:47 AM    BUN 21 08/07/2022 09:47 AM    CREATININE 0.40 08/07/2022 09:47 AM    GLUCOSE 90 08/07/2022 09:47 AM    CALCIUM 8.0 08/07/2022 09:47 AM    PHOS 3.0 06/28/2022 07:39 AM    MG 1.9 08/06/2022 02:38 PM   Labs only remarkable for hyponatremia    Current Nutrition Therapies:  No diet orders on file    Current Intake:   Average Meal Intake: NPO        Anthropometric Measures:  Height: 5' 9\" (175.3 cm)  Current Body Wt: 107 lb 12.9 oz (48.9 kg) (8/7), Weight source: Bed Scale  BMI: 15.9, Underweight (BMI less than 18.5)  Admission Body Weight: 113 lb (51.3 kg) (8/6, stated)  Ideal Body Weight (Kg) (Calculated): 73 kg (160 lbs), 67.4 %  Usual Body Wt: 156 lb 1.4 oz (70.8 kg) (office wt 8/11/21), Percent weight change: -30.9       Edema:    BMI Category Underweight (BMI less than 18.5)  Estimated Daily Nutrient Needs:  Energy (kcal/day): 1803-9587 (Kcal/kg (30-35) Weight used: 48.9 kg Current  Protein (g/day): 77-88 (1.5-1.8 g/kg) Weight Used: (Current) 48.9 kg  Fluid (ml/day):   (1 ml/kcal)    Nutrition Diagnosis:   Inadequate oral intake related to altered GI structure as evidenced by  (s/p total gastrectomy, EN for primary needs)  Nutrition Interventions:   Food and/or Nutrient Delivery: Continue NPO, Start Tube Feeding     Coordination of Nutrition Care: Continue to monitor while inpatient       Goals:       Active Goal: Tolerate nutrition support at goal rate, within 2 days       Nutrition Monitoring and Evaluation:      Food/Nutrient Intake Outcomes: Diet Advancement/Tolerance, Enteral Nutrition Intake/Tolerance  Physical Signs/Symptoms Outcomes: Biochemical Data, GI Status, Weight    Discharge Planning:    Enteral Nutrition    Stephani Linares

## 2022-08-07 NOTE — ED NOTES
Assisted pt on bedpan. No bowel movement. Repositioned pt on bed. Provided pt with cool wash cloth for face. Call light within reach. No other needs at this time.      Emilia Paez RN  08/07/22 [id number]

## 2022-08-07 NOTE — PROGRESS NOTES
Hospitalist Progress Note   Admit Date:  2022  2:17 PM   Name:  Ana Ortiz   Age:  78 y.o. Sex:  male  :  1955   MRN:  [id number]   Room:  ER14/14    Presenting Complaint: Shortness of Breath and Rectal Bleeding     Reason(s) for Admission: Respiratory failure with hypoxia [facility]) [facility] Course & Interval History:     Patient with past medical history of    Gastric adenocarcinoma s/p chemotherapy, s/p total gastrectomy and esophagojejunostomy, lateral lobectomy of the liver, distal pancreatectomy and splenectomy, status post J-tube placement. Hypertension   Pulmonary embolism on Lovenox   Recent admission for septic shock, respiratory failure with pleural effusion and pulmonary embolism. Recent COVID infection on May 1, 2022. Patient came from a nursing facility due to shortness of breath and hematochezia. In ER, his oxygenation saturation was 88% on room air. This improved with oxygen via cannula at 2 L/min. From CT chest, he is found to have   1. No pulmonary embolism or right heart strain. 2.  Distal bilateral lower lobe bronchial occlusion with findings of left   greater than right dependent aspiration/consolidation. Slight increased size of   small pleural effusion with decreased size of small right pleural effusion. CXR shows; Stable mild cardiomegaly without evolving acute changes suggested by plain   film. Cardiomegaly can be an early indicator for heart failure in the correct   clinical setting. Patient is admitted for Acute respiratory failure with hypoxia. Subjective/24hr Events (22):     22   Patient reports feeling better. No fever. No shaking. No chills. Less shortness of breath. No chest pain. No abdominal pain   No nausea. No vomiting.        Assessment & Plan:     Principal Problem:    Respiratory failure with hypoxia ([address]  Plan:   PCR testing for COVID-19 is positive   Patient is on Dexamethasone at 6 mg IV daily.   CRP is 1.2  On oxygen supplementation. Monitor oxygenation and wean or adjust as per requirement. Due to reading of possible fluid overload, patient is given diuretic. Monitor symptoms. Check CXR tomorrow. Record intake and output. Symptomatic treatments     Active Problems:    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HCC)    Loss of weight    History of gastric cancer    Jejunostomy status ([facility] [address]    Hematochezia  Plan:   Consult dietary service tomorrow for nutritional supplement. Continue feeding via jejunostomy. Monitor symptoms and CBC. Gastroesophageal reflux disease  Plan:   Monitor   Symptomatic treatments     Hypertension   On Metoprolol     I have discussed the plan of care with patient. Discharge Planning:      to be determined     Diet:  ADULT TUBE FEEDING; Jejunostomy; Standard without Fiber; Continuous; 15; Yes; 10; Q 8 hours; 65; 60; Q 4 hours  DVT PPx: SCD  Code status: Full Code    Hospital Problems:  Principal Problem:    Respiratory failure with hypoxia (HCC)  Active Problems:    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HCC)    Gastroesophageal reflux disease    Loss of weight    History of gastric cancer    Jejunostomy status (HCC)    Hematochezia  Resolved Problems:    * No resolved hospital problems.  *      Objective:   Patient Vitals for the past 24 hrs:   Temp Pulse Resp BP SpO2   08/07/22 1230 -- 69 18 -- 97 %   08/07/22 1215 -- 64 18 (!) 147/97 --   08/07/22 1200 -- 67 16 (!) 147/98 --   08/07/22 1045 -- 77 -- -- 93 %   08/07/22 1030 -- 85 -- (!) 151/100 93 %   08/07/22 1022 -- 87 -- (!) 153/100 --   08/07/22 0900 -- 81 14 (!) 144/94 95 %   08/07/22 0845 -- 81 15 (!) 136/91 96 %   08/07/22 0830 -- 83 20 (!) 140/95 95 %   08/07/22 0715 -- 78 13 (!) 141/91 95 %   08/07/22 0700 -- 77 16 139/88 --   08/07/22 0645 -- 76 18 (!) 131/92 94 %   08/07/22 0530 -- 75 15 (!) 131/91 94 %   08/07/22 0445 -- 77 16 (!) 134/91 93 %   08/07/22 0430 -- 73 14 (!) 139/98 92 %   08/07/22 0300 -- 73 18 122/83 97 %   08/07/22 0231 -- -- -- -- 93 %   08/07/22 0230 -- 73 17 120/82 --   08/07/22 0130 -- 68 20 (!) 120/90 92 %   08/07/22 0031 -- 66 18 -- --   08/07/22 0030 -- 64 20 122/85 --   08/07/22 0000 -- 65 17 122/86 91 %   08/06/22 2330 -- -- -- 114/87 --   08/06/22 2315 -- -- -- 130/77 --   08/06/22 2300 -- 66 -- 116/85 --   08/06/22 2245 -- 58 -- 119/83 --   08/06/22 2101 -- -- -- -- 93 %   08/06/22 2100 -- 79 -- 122/85 --   08/06/22 2056 -- 79 -- 121/85 --   08/06/22 2045 -- 77 -- 120/83 93 %   08/06/22 2030 -- 76 -- 121/82 --   08/06/22 2000 -- 76 -- 130/86 --   08/06/22 1946 -- -- -- -- 93 %   08/06/22 1945 -- 74 -- 116/87 --   08/06/22 1915 -- 68 15 138/89 90 %   08/06/22 1746 -- 68 16 129/82 --   08/06/22 1726 -- 69 15 122/87 --   08/06/22 1716 -- 68 17 125/83 --   08/06/22 1656 -- 68 15 120/84 --   08/06/22 1646 -- 68 16 125/80 --   08/06/22 1626 -- 70 17 122/86 --   08/06/22 1610 -- 70 17 123/85 97 %   08/06/22 1555 -- 70 15 (!) 126/92 --   08/06/22 1540 -- 70 14 127/85 95 %   08/06/22 1525 -- 72 17 (!) 122/94 95 %   08/06/22 1510 -- 70 17 125/86 --   08/06/22 1455 -- 72 12 (!) 122/93 96 %   08/06/22 1440 -- 71 16 (!) 126/91 95 %   08/06/22 1425 -- 69 18 (!) 130/92 94 %   08/06/22 1419 -- -- -- -- 96 %   08/06/22 1417 97.6 °F (36.4 °C) 66 15 (!) 131/90 (!) 87 %       Oxygen Therapy  SpO2: 97 %  Pulse via Oximetry: 69 beats per minute  Pulse Oximeter Device Mode: Continuous  O2 Device: Nasal cannula  O2 Flow Rate (L/min): 2 L/min    Estimated body mass index is 15.9 kg/m² as calculated from the following:    Height as of this encounter: 5' 9\" (1.753 m). Weight as of this encounter: 107 lb 11.2 oz (48.9 kg). Intake/Output Summary (Last 24 hours) at 8/7/2022 1256  Last data filed at 8/7/2022 0211  Gross per 24 hour   Intake --   Output 1370 ml   Net -1370 ml         Physical Exam:     Blood pressure (!) 147/97, pulse 69, temperature 97.6 °F (36.4 °C), temperature source Oral, resp.  rate 18, height 5' 9\" (1.753 m), weight 107 lb 11.2 oz (48.9 kg), SpO2 97 %. General:    Well nourished. Patient is sitting up in bed. He appears comfortable. Thin. Patient is answering questions well. Head:  Normocephalic, atraumatic, mildly pale. No jaundice. Eyes:  Sclerae appear normal.  Pupils equally round. ENT:  Nares appear normal, no drainage. Moist oral mucosa  Neck:  No restricted ROM. Trachea midline   CV:   RRR. No m/r/g. No jugular venous distension. Lungs:   Decreased breath sound at bases bilaterally. No wheezing, rhonchi, or rales. Symmetric expansion. Abdomen: Bowel sounds present. Soft, nontender, nondistended. Extremities: No cyanosis or clubbing. No edema  Skin:     No rashes and normal coloration. Warm and dry. Neuro:  CN II-XII grossly intact. Sensation intact. A&Ox3  Psych:  Normal mood and affect.       I have personally reviewed labs and tests showing:  Recent Labs:  Recent Results (from the past 48 hour(s))   CBC with Auto Differential    Collection Time: 08/06/22  2:38 PM   Result Value Ref Range    WBC 7.6 4.3 - 11.1 K/uL    RBC 4.27 4.23 - 5.6 M/uL    Hemoglobin 11.4 (L) 13.6 - 17.2 g/dL    Hematocrit 37.9 (L) 41.1 - 50.3 %    MCV 88.8 79.6 - 97.8 FL    MCH 26.7 26.1 - 32.9 PG    MCHC 30.1 (L) 31.4 - 35.0 g/dL    RDW 16.4 (H) 11.9 - 14.6 %    Platelets 271 (H) 598 - 450 K/uL    MPV 9.0 (L) 9.4 - 12.3 FL    nRBC 0.00 0.0 - 0.2 K/uL    Differential Type AUTOMATED      Seg Neutrophils 59 43 - 78 %    Lymphocytes 30 13 - 44 %    Monocytes 8 4.0 - 12.0 %    Eosinophils % 1 0.5 - 7.8 %    Basophils 1 0.0 - 2.0 %    Immature Granulocytes 1 0.0 - 5.0 %    Segs Absolute 4.5 1.7 - 8.2 K/UL    Absolute Lymph # 2.3 0.5 - 4.6 K/UL    Absolute Mono # 0.6 0.1 - 1.3 K/UL    Absolute Eos # 0.1 0.0 - 0.8 K/UL    Basophils Absolute 0.1 0.0 - 0.2 K/UL    Absolute Immature Granulocyte 0.0 0.0 - 0.5 K/UL   Comprehensive Metabolic Panel    Collection Time: 08/06/22  2:38 PM   Result Value Ref Range    Sodium 133 (L) 136 - 145 mmol/L    Potassium 5.0 3.5 - 5.1 mmol/L    Chloride 99 98 - 107 mmol/L    CO2 29 21 - 32 mmol/L    Anion Gap 5 (L) 7 - 16 mmol/L    Glucose 92 65 - 100 mg/dL    BUN 17 8 - 23 MG/DL    Creatinine 0.30 (L) 0.8 - 1.5 MG/DL    GFR African American >60 >60 ml/min/1.73m2    GFR Non- >60 >60 ml/min/1.73m2    Calcium 9.1 8.3 - 10.4 MG/DL    Total Bilirubin 0.1 (L) 0.2 - 1.1 MG/DL    ALT 32 12 - 65 U/L    AST 29 15 - 37 U/L    Alk Phosphatase 205 (H) 50 - 136 U/L    Total Protein 8.1 6.3 - 8.2 g/dL    Albumin 2.8 (L) 3.2 - 4.6 g/dL    Globulin 5.3 (H) 2.3 - 3.5 g/dL    Albumin/Globulin Ratio 0.5 (L) 1.2 - 3.5     Magnesium    Collection Time: 08/06/22  2:38 PM   Result Value Ref Range    Magnesium 1.9 1.8 - 2.4 mg/dL   TYPE AND SCREEN    Collection Time: 08/06/22  2:38 PM   Result Value Ref Range    Crossmatch expiration date 08/09/2022,2355     ABO/Rh O POSITIVE     Antibody Screen NEG    Brain Natriuretic Peptide    Collection Time: 08/06/22  2:38 PM   Result Value Ref Range    NT Pro- (H) 5 - 125 PG/ML   Lipase    Collection Time: 08/06/22  2:38 PM   Result Value Ref Range    Lipase 91 73 - 393 U/L   Troponin    Collection Time: 08/06/22  2:38 PM   Result Value Ref Range    Troponin, High Sensitivity 8.1 0 - 14 pg/mL   POCT Urinalysis no Micro    Collection Time: 08/06/22  4:39 PM   Result Value Ref Range    Specific Gravity, Urine, POC 1.015 1.001 - 1.023      pH, Urine, POC 7.0 5.0 - 9.0      Protein, Urine, POC Negative NEG mg/dL    Glucose, UA POC Negative NEG mg/dL    Ketones, Urine, POC Negative NEG mg/dL    Bilirubin, Urine, POC Negative NEG      Blood, UA POC Negative NEG      URINE UROBILINOGEN POC 0.2 0.2 - 1.0 EU/dL    Nitrate, Urine, POC Negative NEG      Leukocyte Est, UA POC Negative NEG      Performed by: [facility]    Respiratory Panel, Molecular, with COVID-19 (Restricted: peds pts or suitable admitted adults)    Collection Time: 08/06/22  6:31 PM    Specimen: Nasopharyngeal Result Value Ref Range    Adenovirus by PCR NOT DETECTED NOTDET      Coronavirus 229E by PCR NOT DETECTED NOTDET      Coronavirus HKU1 by PCR NOT DETECTED NOTDET      Coronavirus NL63 by PCR NOT DETECTED NOTDET      Coronavirus OC43 by PCR NOT DETECTED NOTDET      SARS-CoV-2, PCR Detected (A) NOTDET      Human Metapneumovirus by PCR NOT DETECTED NOTDET      Rhinovirus Enterovirus PCR NOT DETECTED NOTDET      Influenza A by PCR NOT DETECTED NOTDET      INFLUENZA B PCR NOT DETECTED NOTDET      Parainfluenza 1 PCR NOT DETECTED NOTDET      Parainfluenza 2 PCR NOT DETECTED NOTDET      Parainfluenza 3 PCR NOT DETECTED NOTDET      Parainfluenza 4 PCR NOT DETECTED NOTDET      Respiratory Syncytial Virus by PCR NOT DETECTED NOTDET      Bordetella parapertussis by PCR NOT DETECTED NOTDET      Bordetella pertussis by PCR NOT DETECTED NOTDET      Chlamydophila Pneumonia PCR NOT DETECTED NOTDET      Mycoplasma pneumo by PCR NOT DETECTED NOTDET     C-Reactive Protein    Collection Time: 08/07/22  9:47 AM   Result Value Ref Range    CRP 1.2 (H) 0.0 - 0.9 mg/dL   CBC with Auto Differential    Collection Time: 08/07/22  9:47 AM   Result Value Ref Range    WBC 7.6 4.3 - 11.1 K/uL    RBC 4.30 4.23 - 5.6 M/uL    Hemoglobin 11.5 (L) 13.6 - 17.2 g/dL    Hematocrit 37.7 (L) 41.1 - 50.3 %    MCV 87.7 79.6 - 97.8 FL    MCH 26.7 26.1 - 32.9 PG    MCHC 30.5 (L) 31.4 - 35.0 g/dL    RDW 16.3 (H) 11.9 - 14.6 %    Platelets 926 (H) 149 - 450 K/uL    MPV 9.3 (L) 9.4 - 12.3 FL    nRBC 0.00 0.0 - 0.2 K/uL    Differential Type AUTOMATED      Seg Neutrophils 53 43 - 78 %    Lymphocytes 34 13 - 44 %    Monocytes 12 4.0 - 12.0 %    Eosinophils % 1 0.5 - 7.8 %    Basophils 1 0.0 - 2.0 %    Immature Granulocytes 0 0.0 - 5.0 %    Segs Absolute 4.0 1.7 - 8.2 K/UL    Absolute Lymph # 2.6 0.5 - 4.6 K/UL    Absolute Mono # 0.9 0.1 - 1.3 K/UL    Absolute Eos # 0.0 0.0 - 0.8 K/UL    Basophils Absolute 0.0 0.0 - 0.2 K/UL    Absolute Immature Granulocyte 0.0 0.0 - 0.5 K/UL   Basic Metabolic Panel    Collection Time: 08/07/22  9:47 AM   Result Value Ref Range    Sodium 134 (L) 138 - 145 mmol/L    Potassium 4.1 3.5 - 5.1 mmol/L    Chloride 102 98 - 107 mmol/L    CO2 25 21 - 32 mmol/L    Anion Gap 7 7 - 16 mmol/L    Glucose 90 65 - 100 mg/dL    BUN 21 8 - 23 MG/DL    Creatinine 0.40 (L) 0.8 - 1.5 MG/DL    GFR African American >60 >60 ml/min/1.73m2    GFR Non- >60 >60 ml/min/1.73m2    Calcium 8.0 (L) 8.3 - 10.4 MG/DL       I have personally reviewed imaging studies showing: Other Studies:  CT CHEST PULMONARY EMBOLISM W CONTRAST   Final Result   1. No pulmonary embolism or right heart strain. 2.  Distal bilateral lower lobe bronchial occlusion with findings of left   greater than right dependent aspiration/consolidation. Slight increased size of   small pleural effusion with decreased size of small right pleural effusion. XR CHEST PORTABLE   Final Result   1. Stable mild cardiomegaly without evolving acute changes suggested by plain   film. Cardiomegaly can be an early indicator for heart failure in the correct   clinical setting. This report was made using voice transcription. Despite my best efforts to avoid   any, transcription errors may persist. If there is any question about the   accuracy of the report or need for clarification, then please call [phone], or text me through perfectserv for clarification or correction.            Current Meds:  Current Facility-Administered Medications   Medication Dose Route Frequency    potassium chloride (KLOR-CON M) extended release tablet 40 mEq  40 mEq Oral PRN    Or    potassium bicarb-citric acid (EFFER-K) effervescent tablet 40 mEq  40 mEq Oral PRN    Or    potassium chloride 10 mEq/100 mL IVPB (Peripheral Line)  10 mEq IntraVENous PRN    magnesium sulfate 2000 mg in 50 mL IVPB premix  2,000 mg IntraVENous PRN    sodium phosphate 10 mmol in sodium chloride 0.9 % 250 mL IVPB  10 mmol IntraVENous PRN    Or    sodium phosphate 15 mmol in sodium chloride 0.9 % 250 mL IVPB  15 mmol IntraVENous PRN    Or    sodium phosphate 20 mmol in sodium chloride 0.9 % 500 mL IVPB  20 mmol IntraVENous PRN    sodium chloride flush 0.9 % injection 5 mL  5 mL IntraVENous PRN    sodium chloride flush 0.9 % injection 5 mL  5 mL IntraVENous Q8H    sodium chloride flush 0.9 % injection 5-40 mL  5-40 mL IntraVENous 2 times per day    sodium chloride flush 0.9 % injection 5-40 mL  5-40 mL IntraVENous PRN    0.9 % sodium chloride infusion   IntraVENous PRN    ondansetron (ZOFRAN-ODT) disintegrating tablet 4 mg  4 mg Oral Q8H PRN    Or    ondansetron (ZOFRAN) injection 4 mg  4 mg IntraVENous Q6H PRN    polyethylene glycol (GLYCOLAX) packet 17 g  17 g Oral Daily PRN    acetaminophen (TYLENOL) tablet 650 mg  650 mg Oral Q6H PRN    Or    acetaminophen (TYLENOL) suppository 650 mg  650 mg Rectal Q6H PRN    [Held by provider] enoxaparin (LOVENOX) injection 50 mg  1 mg/kg SubCUTAneous Q12H    furosemide (LASIX) injection 40 mg  40 mg IntraVENous Daily    levalbuterol (XOPENEX) nebulization 0.63 mg  0.63 mg Nebulization Q6H PRN    metoprolol tartrate (LOPRESSOR) tablet 12.5 mg  12.5 mg Per J Tube BID    dexamethasone (DECADRON) injection 6 mg  6 mg IntraVENous Q24H     Current Outpatient Medications   Medication Sig    levalbuterol (XOPENEX) 0.63 MG/3ML nebulization Take 3 mLs by nebulization every 6 hours    enoxaparin (LOVENOX) 60 MG/0.6ML Inject 0.5 mLs into the skin every 12 hours    insulin lispro (HUMALOG) 100 UNIT/ML SOLN injection vial Inject 0-4 Units into the skin 3 times daily (with meals)    insulin lispro (HUMALOG) 100 UNIT/ML SOLN injection vial Inject 0-4 Units into the skin nightly    ondansetron (ZOFRAN) 4 MG/2ML injection Infuse 2 mLs intravenously every 6 hours as needed for Nausea or Vomiting    metoprolol tartrate (LOPRESSOR) 25 MG tablet 0.5 tablets by Per J Tube route 2 times daily    guaiFENesin (ROBITUSSIN) 100 MG/5ML SOLN oral solution 10 mLs by Per J Tube route every 6 hours    sodium chloride, Inhalant, 3 % nebulizer solution Take 4 mLs by nebulization 2 times daily    sodium chloride (OCEAN, BABY AYR) 0.65 % nasal spray 1 spray by Nasal route as needed for Congestion    Medela Tender Care Lanolin cream Apply 0.3 oz topically as needed (Itching)    furosemide (LASIX) 10 MG/ML injection Infuse 4 mLs intravenously daily    melatonin 3 MG TABS tablet 1.5 tablets by Per J Tube route nightly as needed (Sleep)    lidocaine-prilocaine (EMLA) 2.5-2.5 % cream Apply topically as needed    ondansetron (ZOFRAN) 8 MG tablet Take 8 mg by mouth every 8 hours as needed for Nausea       Signed:  Gwendolyn Butler MD    Part of this note may have been written by using a voice dictation software. The note has been proof read but may still contain some grammatical/other typographical errors.

## 2022-08-08 ENCOUNTER — APPOINTMENT (OUTPATIENT)
Dept: GENERAL RADIOLOGY | Age: 67
DRG: 177 | End: 2022-08-08
Payer: MEDICARE

## 2022-08-08 LAB
ANION GAP SERPL CALC-SCNC: 7 MMOL/L (ref 7–16)
BASOPHILS # BLD: 0.1 K/UL (ref 0–0.2)
BASOPHILS NFR BLD: 1 % (ref 0–2)
BUN SERPL-MCNC: 26 MG/DL (ref 8–23)
CALCIUM SERPL-MCNC: 9.1 MG/DL (ref 8.3–10.4)
CHLORIDE SERPL-SCNC: 95 MMOL/L (ref 98–107)
CO2 SERPL-SCNC: 28 MMOL/L (ref 21–32)
CREAT SERPL-MCNC: 0.4 MG/DL (ref 0.8–1.5)
DIFFERENTIAL METHOD BLD: ABNORMAL
EOSINOPHIL # BLD: 0.2 K/UL (ref 0–0.8)
EOSINOPHIL NFR BLD: 2 % (ref 0.5–7.8)
ERYTHROCYTE [DISTWIDTH] IN BLOOD BY AUTOMATED COUNT: 16.1 % (ref 11.9–14.6)
GLUCOSE SERPL-MCNC: 116 MG/DL (ref 65–100)
HCT VFR BLD AUTO: 35.7 % (ref 41.1–50.3)
HGB BLD-MCNC: 11 G/DL (ref 13.6–17.2)
IMM GRANULOCYTES # BLD AUTO: 0 K/UL (ref 0–0.5)
IMM GRANULOCYTES NFR BLD AUTO: 0 % (ref 0–5)
LYMPHOCYTES # BLD: 2.2 K/UL (ref 0.5–4.6)
LYMPHOCYTES NFR BLD: 30 % (ref 13–44)
MAGNESIUM SERPL-MCNC: 1.9 MG/DL (ref 1.8–2.4)
MCH RBC QN AUTO: 26.9 PG (ref 26.1–32.9)
MCHC RBC AUTO-ENTMCNC: 30.8 G/DL (ref 31.4–35)
MCV RBC AUTO: 87.3 FL (ref 79.6–97.8)
MONOCYTES # BLD: 0.9 K/UL (ref 0.1–1.3)
MONOCYTES NFR BLD: 12 % (ref 4–12)
NEUTS SEG # BLD: 4.1 K/UL (ref 1.7–8.2)
NEUTS SEG NFR BLD: 55 % (ref 43–78)
NRBC # BLD: 0 K/UL (ref 0–0.2)
PHOSPHATE SERPL-MCNC: 4.2 MG/DL (ref 2.3–3.7)
PLATELET # BLD AUTO: 736 K/UL (ref 150–450)
PMV BLD AUTO: 9.2 FL (ref 9.4–12.3)
POTASSIUM SERPL-SCNC: 4.4 MMOL/L (ref 3.5–5.1)
RBC # BLD AUTO: 4.09 M/UL (ref 4.23–5.6)
SODIUM SERPL-SCNC: 130 MMOL/L (ref 138–145)
WBC # BLD AUTO: 7.4 K/UL (ref 4.3–11.1)

## 2022-08-08 PROCEDURE — 80048 BASIC METABOLIC PNL TOTAL CA: CPT

## 2022-08-08 PROCEDURE — 1100000000 HC RM PRIVATE

## 2022-08-08 PROCEDURE — 6370000000 HC RX 637 (ALT 250 FOR IP): Performed by: INTERNAL MEDICINE

## 2022-08-08 PROCEDURE — 6360000002 HC RX W HCPCS: Performed by: INTERNAL MEDICINE

## 2022-08-08 PROCEDURE — 2580000003 HC RX 258: Performed by: EMERGENCY MEDICINE

## 2022-08-08 PROCEDURE — 71045 X-RAY EXAM CHEST 1 VIEW: CPT

## 2022-08-08 PROCEDURE — C9113 INJ PANTOPRAZOLE SODIUM, VIA: HCPCS | Performed by: INTERNAL MEDICINE

## 2022-08-08 PROCEDURE — 92610 EVALUATE SWALLOWING FUNCTION: CPT

## 2022-08-08 PROCEDURE — 36415 COLL VENOUS BLD VENIPUNCTURE: CPT

## 2022-08-08 PROCEDURE — A4216 STERILE WATER/SALINE, 10 ML: HCPCS | Performed by: INTERNAL MEDICINE

## 2022-08-08 PROCEDURE — 83735 ASSAY OF MAGNESIUM: CPT

## 2022-08-08 PROCEDURE — 2580000003 HC RX 258: Performed by: INTERNAL MEDICINE

## 2022-08-08 PROCEDURE — 85025 COMPLETE CBC W/AUTO DIFF WBC: CPT

## 2022-08-08 PROCEDURE — 84100 ASSAY OF PHOSPHORUS: CPT

## 2022-08-08 RX ADMIN — OXYCODONE 5 MG: 5 TABLET ORAL at 16:29

## 2022-08-08 RX ADMIN — METOPROLOL TARTRATE 12.5 MG: 25 TABLET, FILM COATED ORAL at 08:31

## 2022-08-08 RX ADMIN — SODIUM CHLORIDE, PRESERVATIVE FREE 10 ML: 5 INJECTION INTRAVENOUS at 09:54

## 2022-08-08 RX ADMIN — SODIUM CHLORIDE, PRESERVATIVE FREE 5 ML: 5 INJECTION INTRAVENOUS at 22:13

## 2022-08-08 RX ADMIN — SODIUM CHLORIDE, PRESERVATIVE FREE 5 ML: 5 INJECTION INTRAVENOUS at 13:54

## 2022-08-08 RX ADMIN — OXYCODONE 5 MG: 5 TABLET ORAL at 09:30

## 2022-08-08 RX ADMIN — FUROSEMIDE 40 MG: 10 INJECTION, SOLUTION INTRAMUSCULAR; INTRAVENOUS at 08:31

## 2022-08-08 RX ADMIN — SODIUM CHLORIDE, PRESERVATIVE FREE 40 MG: 5 INJECTION INTRAVENOUS at 22:13

## 2022-08-08 RX ADMIN — SODIUM CHLORIDE, PRESERVATIVE FREE 5 ML: 5 INJECTION INTRAVENOUS at 06:19

## 2022-08-08 RX ADMIN — METOPROLOL TARTRATE 12.5 MG: 25 TABLET, FILM COATED ORAL at 22:13

## 2022-08-08 RX ADMIN — SODIUM CHLORIDE, PRESERVATIVE FREE 10 ML: 5 INJECTION INTRAVENOUS at 22:14

## 2022-08-08 RX ADMIN — SODIUM CHLORIDE, PRESERVATIVE FREE 40 MG: 5 INJECTION INTRAVENOUS at 08:56

## 2022-08-08 ASSESSMENT — PAIN SCALES - GENERAL
PAINLEVEL_OUTOF10: 3
PAINLEVEL_OUTOF10: 0
PAINLEVEL_OUTOF10: 7

## 2022-08-08 ASSESSMENT — PAIN DESCRIPTION - LOCATION
LOCATION: BACK
LOCATION: BACK

## 2022-08-08 ASSESSMENT — PAIN DESCRIPTION - ORIENTATION
ORIENTATION: POSTERIOR
ORIENTATION: RIGHT;LEFT

## 2022-08-08 ASSESSMENT — PAIN DESCRIPTION - DESCRIPTORS
DESCRIPTORS: ACHING
DESCRIPTORS: ACHING

## 2022-08-08 ASSESSMENT — PAIN - FUNCTIONAL ASSESSMENT: PAIN_FUNCTIONAL_ASSESSMENT: ACTIVITIES ARE NOT PREVENTED

## 2022-08-08 ASSESSMENT — PAIN DESCRIPTION - PAIN TYPE: TYPE: CHRONIC PAIN

## 2022-08-08 ASSESSMENT — PAIN DESCRIPTION - ONSET: ONSET: ON-GOING

## 2022-08-08 NOTE — PROGRESS NOTES
Patient resting in bed, alert and oriented, cooperative with care. Patient on RA. Peg  noted on LLQ. Infusing osmolite at 15 mls/hr. Patient denies pain or distress, safety measures in place, call light within reach.

## 2022-08-08 NOTE — CARE COORDINATION
Chart screened by  for potential discharge needs or concerns. Pt was a STR resident at [facility] prior to admission. Per [facility], pt can return to their facility when medically cleared for dc pending bed availability. CM will follow up with spouse tomorrow to confirm she is in agreement with this plan. Please notify/consult  if other discharge needs arise. 08/08/22 0757   Service Assessment   History Provided By Medical Record   Primary Caregiver   (SNF staff)   Marisela Disla [address] is: Legal Next of Jonny 68   PCP Verified by CM Yes   Prior Functional Level Assistance with the following:   Current Functional Level Assistance with the following:   Can patient return to prior living arrangement Unknown at present   Ability to make needs known: Good   Financial Resources Medicare   Social/Functional History   Lives With Spouse  (was at [address] and [facility] prior to admission)   [address] [facility]   ADL Assistance Needs assistance   Ambulation Assistance Needs assistance   Transfer Assistance Needs assistance   Occupation Retired   Discharge Planning   Type of [address] Spouse/Significant Other   Current Services Prior To Admission [address]   DME Ordered? No   Potential Assistance Purchasing Medications No   Type of Home Care Services None   Patient expects to be discharged to: Unknown   Services At/After Discharge   Transition of Care Consult (CM Consult) SNF   Partner SNF No   Reason Why Partner SNF Not Chosen Positive previous encounter; Location   Services At/After Discharge OT;PT;In ambulance;Transport;Skilled Nursing Facility (SNF); Nursing services   [facility] Information Provided?  No   Mode of Transport at Discharge BLS   Confirm Follow Up Transport Family

## 2022-08-08 NOTE — PROGRESS NOTES
LTG: Patient will tolerate least restrictive diet without overt signs or symptoms of airway compromise. STG: Patient will participate in ongoing po trials with speech therapy only in attempts to initiate oral diet for pleasure. STG: Patient will participate in modified barium swallow study as clinically indicated. SPEECH LANGUAGE PATHOLOGY: DYSPHAGIA  Initial Assessment    NAME: Roshni Olea  :   MRN: [id number]    ADMISSION DATE: 2022  PRIMARY DIAGNOSIS: Respiratory failure with hypoxia ([address]  Respiratory failure with hypoxia ([address] [J96.91]    ICD-10: Treatment Diagnosis: R13.12 Dysphagia, Oropharyngeal Phase    RECOMMENDATIONS   Diet:  Diet Solids Recommendation: NPO  Liquid Consistency Recommendation: NPO, Free water after oral care    Medications: Via alternative means of nutrition     Recommendations: NPO   Compensatory Swallowing Strategies:No straws;Small bites/sips;Upright as possible for all oral intake;Remain upright for 30-45 minutes after meals   Therapeutic Intervention:    Patient continues to require skilled intervention: Yes  D/C Recommendations: Ongoing speech therapy is recommended during this hospitalization, To be determined     ASSESSMENT    Dysphagia Diagnosis: Mild to moderate pharyngeal stage dysphagia  Patient with limited acceptance of po trials. Multiple swallows palpated and weak cough post swallow with thin by teaspoon and cup. Patient declined further po trials. Per chart review, patient discharge on clear liquid diet for pleasure after total gastrectomy 2022. Patient reports consuming sips of liquids at home for pleasure. States he has not been eating solid foods. GENERAL    History of Present Injury/Illness: Mr. Melony Kussmaul  has a past medical history of ABLA (acute blood loss anemia), GIB (gastrointestinal bleeding), HTN (hypertension), and Severe sepsis with lactic acidosis ([address] . He also  has a past surgical history that includes other surgical history; Knee arthroscopy; Colonoscopy (N/A, 3/9/2022); Upper gastrointestinal endoscopy (N/A, 6/4/2022); IR GUIDED IVC FILTER PLACEMENT (6/6/2022); gastrectomy (N/A, 6/9/2022); thoracentesis (Bilateral, 6/15/2022); bronchoscopy (N/A, 6/16/2022); bronchoscopy (N/A, 6/17/2022); bronchoscopy (N/A, 6/18/2022); IR CHEST TUBE INSERTION (6/20/2022); and IR CHEST TUBE INSERTION (7/5/2022). Chart Reviewed: Yes  Subjective: Sleeping upon arrival. Woke to name. Agreeable to assessment, but only wants sips of water. States he consumes liquids only at baseline, but unable to provide detailed history. Behavior/Cognition: Alert; Cooperative  Communication Observation: Functional  Follows Directions: Simple  Respiratory Status: Room air              Prior Dysphagia History: Last seen by speech therapy 6/2022- ok for clear liquids/thin - per surgery        Current Liquid Diet : Thin, Clear  Pain:   Patient does not appear in pain                                        OBJECTIVE    Patient Positioning: Upright in bed   Oral Motor   Labial: No impairment  Dentition: Natural (some missing teeth)  Lingual: No impairment  Velum: No Impairment  Dentition: Some missing teeth     Volitional Cough: Weak  Baseline Vocal Quality: Weak  Oropharyngeal Phase:  Patient consumed thin by teaspoon x2 and thin by single cup sip x2. Swallow appears timely, but multiple swallows palpated for each bolus. Immediate weak cough post swallow on initial trial of thin by teaspoon, which patient attributed to water being too cold. Room temperature water provided for remainder of trials. No overt s/sx airway compromise with thin by teaspoon x1. Weak cough on 1 out 2 trials thin by cup. Patient declined further trials.                      PLAN    Duration/Frequency: Continue to follow patient 2x/week for duration of hospitalization and/or until goals met    Dysphagia Outcome and Severity Scale (ALTA)  Dysphagia Outcome Severity Scale: Level 2: Moderate Severe dysphagia- Maximum assistance or maximum use of strategies with partial PO only  Interpretation of Tool: The Dysphagia Outcome and Severity Scale (ALTA) is a simple, easy-to-use, 7-point scale developed to systematically rate the functional severity of dysphagia based on objective assessment and make recommendations for diet level, independence level, and type of nutrition. Normal(7), Functional(6), Mild(5), Mild-Moderate(4), Moderate(3), Moderate-Severe(2), Severe(1)    Speech Therapy Prognosis  Prognosis: Good    Education: Patient, RN  Patient Education: Safe swallowing precautions, aspiration risk, NPO  Patient Education Response: Verbalizes understanding, Needs reinforcement    Current Medications:   No current facility-administered medications on file prior to encounter.      Current Outpatient Medications on File Prior to Encounter   Medication Sig Dispense Refill    levalbuterol (XOPENEX) 0.63 MG/3ML nebulization Take 3 mLs by nebulization every 6 hours 1 each 0    enoxaparin (LOVENOX) 60 MG/0.6ML Inject 0.5 mLs into the skin every 12 hours 1 each 0    insulin lispro (HUMALOG) 100 UNIT/ML SOLN injection vial Inject 0-4 Units into the skin 3 times daily (with meals) 1 mL 0    insulin lispro (HUMALOG) 100 UNIT/ML SOLN injection vial Inject 0-4 Units into the skin nightly 1 mL 0    ondansetron (ZOFRAN) 4 MG/2ML injection Infuse 2 mLs intravenously every 6 hours as needed for Nausea or Vomiting 84 mL 0    metoprolol tartrate (LOPRESSOR) 25 MG tablet 0.5 tablets by Per J Tube route 2 times daily 1 tablet 0    guaiFENesin (ROBITUSSIN) 100 MG/5ML SOLN oral solution 10 mLs by Per J Tube route every 6 hours 1200 mL 0    sodium chloride, Inhalant, 3 % nebulizer solution Take 4 mLs by nebulization 2 times daily 1 mL 0    sodium chloride (OCEAN, BABY AYR) 0.65 % nasal spray 1 spray by Nasal route as needed for Congestion 1 each 0    Medela Tender Care Lanolin cream Apply 0.3 oz topically as needed (Itching) 1 g 0    furosemide (LASIX) 10 MG/ML injection Infuse 4 mLs intravenously daily 1 mL 0    melatonin 3 MG TABS tablet 1.5 tablets by Per J Tube route nightly as needed (Sleep) 1 tablet 0    lidocaine-prilocaine (EMLA) 2.5-2.5 % cream Apply topically as needed      ondansetron (ZOFRAN) 8 MG tablet Take 8 mg by mouth every 8 hours as needed for Nausea         PRECAUTIONS/ALLERGIES: Iron and Tetanus antitoxin   Safety Devices in place: Yes  Type of devices: Left in bed, Nurse notified, Call light within reach  Restraints Initially in Place: No    Therapy Time  SLP Individual Minutes  Time In: 5935  Time Out: 9989 [facility]  Minutes: [address], [place]  8/8/2022 12:54 PM

## 2022-08-08 NOTE — PROGRESS NOTES
Patient has done well all day today. he has had c/o pain x2 today with pain meds given via j tube both times. Currently Osmolite1.2 @ 35ml/hr is infusing into his J Tube with 60ml Q4 H20 flush. Patient is tolerating it well. Patient has been turned and pressure points relieved throughout the day. SLP came and Ok'd him for room temp water with out straw with small sips when he wants. Pills are still to to go down the J tube. Call light within reach. No further needs at this time. Will monitor.

## 2022-08-08 NOTE — PLAN OF CARE
Problem: Pain  Goal: Verbalizes/displays adequate comfort level or baseline comfort level  Outcome: Progressing  Flowsheets (Taken 8/8/2022 0754)  Verbalizes/displays adequate comfort level or baseline comfort level: Encourage patient to monitor pain and request assistance     Problem: Discharge Planning  Goal: Discharge to home or other facility with appropriate resources  Recent Flowsheet Documentation  Taken 8/8/2022 0754 by Maude Thao RN  Discharge to home or other facility with appropriate resources: Identify barriers to discharge with patient and caregiver     Problem: ABCDS Injury Assessment  Goal: Absence of physical injury  Recent Flowsheet Documentation  Taken 8/8/2022 0754 by Maude Thao RN  Absence of Physical Injury: Implement safety measures based on patient assessment

## 2022-08-08 NOTE — CONSULTS
Infectious Disease Consult    Today's Date: 8/8/2022   Admit Date: 8/6/2022    Impression:   Nodular lesions on face, supraclavicular area, abdomen concerning for metastasis  Gastric adenocarcinoma  S/p total gastrectomy, left lobectomy of liver, distal pancreatectomy and splenectomy, diaphragmatic resection, G-tube placement, retroperitoneal mass excision and lymphadenectomy, left abdominal wall mass excision, 6/9/22; Path: poorly differentiated invasive adenocarcinoma  Cachexia  COVID-19 infection (8/1/22)  Hypoxia - resolved    Plan: At this time, I favor the lesions in supraclavicular area and abdomen to represent metastatic cancer given his history and the nature of the lesions. The facial lesion could be basal cell cancer, adenocarcinoma metastasis, or possibly monkeypox. He has been treated for folliculitis without success. He does not have any risk factors for monkeypox, however. He has been in a facility since May and we don't have any reports of nosocomial monkeypox that I am aware of. Would arrange biopsy of the facial lesion and either the supraclavicular or abdominal nodule and would consider monkeypox testing if not cancer. Would continue COVID isolation until 8/17/22 unless we have information that his onset of illness was sooner. Since he is on room air now, I am comfortable stopping decadron. Anti-infectives:   none    Subjective:   Date of Consultation:  August 8, 2022  Referring Physician: Dr. Wynn Early    Patient is a 78 y.o. male with h/o gastric adenocarcinoma admitted with shortness of breath. He has a complicated recent history, with [employer] sepsis and 6/9 surgery with gastrectomy. He spent several weeks at [facility], and then went to SNF. He was brought back for shortness of breath and tested positive for COVID. He is noted to have nodular skin lesions on the face, supraclavicular area, and abdomen.   The patient says that the lesions on his face have been present for months. There is concern for monkeypox. ID is consulted for further recommendations.     Patient Active Problem List   Diagnosis    ABILIO (iron deficiency anemia)    Malignant neoplasm of overlapping sites of stomach ([facility]    Malignant neoplasm of stomach ([facility]    Gastric adenocarcinoma ([facility]    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[facility]    Former heavy tobacco smoker    Gastroesophageal reflux disease    Loss of weight    Open fracture of proximal phalanx of left thumb    Thumb amputation status, left    Tobacco use disorder    Hypoalbuminemia due to protein-calorie malnutrition (HCC)    Syncope due to orthostatic hypotension    Corticosteroid dependence (HCC)    Hypoproteinemia (HCC)    Do not resuscitate status    Fatigue    History of gastric cancer    Encounter for palliative care    Debility    Weakness    Hypercoagulable state, secondary ([facility]    Adult body mass index less than 19    Acute pulmonary embolism without acute cor pulmonale (HCC)    Severe protein-calorie malnutrition (HCC)    Gastrointestinal hemorrhage    Encounter for weaning from ventilator ([facility]    S/P exploratory laparotomy    History of partial pancreatectomy    S/P splenectomy    Jejunostomy status ([facility]    H/O resection of liver    IVC (inferior vena cava obstruction)    SVT (supraventricular tachycardia) (HCC)    Bilateral pleural effusion    Mucus plugging of bronchi    Atelectasis    Atrial flutter (HCC)    Syncope and collapse    Acute respiratory failure with hypoxia (HCC)    HAP (hospital-acquired pneumonia)    Thrombocytosis    Empyema (HCC)    Iron deficiency    Respiratory failure with hypoxia ([facility]    Hematochezia     Past Medical History:   Diagnosis Date    ABLA (acute blood loss anemia) 6/2/2022    GIB (gastrointestinal bleeding) 6/2/2022    HTN (hypertension)     Severe sepsis with lactic acidosis ([address] 6/2/2022      Family History   Problem Relation Age of Onset    Other Other         non-contributory      Social History     Tobacco Use Smoking status: Former    Smokeless tobacco: Former   Substance Use Topics    Alcohol use: Not Currently     Past Surgical History:   Procedure Laterality Date    BRONCHOSCOPY N/A 6/16/2022    BRONCHOSCOPY performed by Praful Shields MD at [facility] ENDOSCOPY    BRONCHOSCOPY N/A 6/17/2022    BRONCHOSCOPY performed by Praful Shields MD at [facility] ENDOSCOPY    BRONCHOSCOPY N/A 6/18/2022    BRONCHOSCOPY performed by Varghese Dozire MD at [facility] ENDOSCOPY    COLONOSCOPY N/A 3/9/2022    COLONOSCOPY/ 25 performed by Vera Kc MD at [address] [place] [address] N/A 6/9/2022    Exploratory laparotomy with total gastrectomy and esophagojejunostomy after extensive lysis of adhesions and dissections which added at least 3-4 hours to this case, Left lateral lobectomy of liver, Distal pancreatectomy and splenectomy, Combined diaphragmatic resection, Falciform ligament flap, Feeding J-tube placement, Retroperitoneal mass excision and lymphadenectomy, Left abdominal wall mass ex    IR CHEST TUBE INSERTION  6/20/2022    IR CHEST TUBE INSERTION 6/20/2022 [facility]    IR CHEST TUBE INSERTION  7/5/2022    IR CHEST TUBE INSERTION 7/5/2022 [facility]    IR IVC FILTER PLACEMENT W IMAGING  6/6/2022    IR IVC FILTER PLACEMENT W IMAGING 6/6/2022 [facility]    KNEE ARTHROSCOPY      OTHER SURGICAL HISTORY      none    THORACENTESIS Bilateral 6/15/2022    THORACENTESIS ULTRASOUND performed by Varghese Dozier MD at [facility] ENDOSCOPY    UPPER GASTROINTESTINAL ENDOSCOPY N/A 6/4/2022    EGD ESOPHAGOGASTRODUODENOSCOPY performed by Sendy Noe MD at [facility] ENDOSCOPY      Prior to Admission medications    Medication Sig Start Date End Date Taking?  Authorizing Provider   levalbuterol [facility]) 0.63 MG/3ML nebulization Take 3 mLs by nebulization every 6 hours 6/21/22   Sadaf Bass, DO   enoxaparin (LOVENOX) 60 MG/0.6ML Inject 0.5 mLs into the skin every 12 hours 6/21/22   Sadaf Bass, DO   insulin lispro (HUMALOG) 100 UNIT/ML SOLN injection vial Inject 0-4 Units into the skin 3 times daily (with meals) 22   [facility], DO   insulin lispro (HUMALOG) 100 UNIT/ML SOLN injection vial Inject 0-4 Units into the skin nightly 22   Sadaf Bass, DO   ondansetron [facility]) 4 MG/2ML injection Infuse 2 mLs intravenously every 6 hours as needed for Nausea or Vomiting 22   [facility], [facility]   metoprolol tartrate (LOPRESSOR) 25 MG tablet 0.5 tablets by Per J Tube route 2 times daily 22   [facility], DO   guaiFENesin (ROBITUSSIN) 100 MG/5ML SOLN oral solution 10 mLs by Per J Tube route every 6 hours 22   [facility], DO   sodium chloride, Inhalant, 3 % nebulizer solution Take 4 mLs by nebulization 2 times daily 22   [facility], DO   sodium chloride (OCEAN, BABY AYR) 0.65 % nasal spray 1 spray by Nasal route as needed for Congestion 22   [facility], DO   Medela Tender Care Lanolin cream Apply 0.3 oz topically as needed (Itching) 22   [facility], DO   furosemide (LASIX) 10 MG/ML injection Infuse 4 mLs intravenously daily 22   Sadaf Bass, DO   melatonin 3 MG TABS tablet 1.5 tablets by Per J Tube route nightly as needed (Sleep) 22   [facility], DO   lidocaine-prilocaine (EMLA) 2.5-2.5 % cream Apply topically as needed 3/30/22   Ar Automatic Reconciliation   ondansetron (ZOFRAN) 8 MG tablet Take 8 mg by mouth every 8 hours as needed for Nausea 3/30/22   Ar Automatic Reconciliation       Allergies   Allergen Reactions    Iron     Tetanus Antitoxin         Review of Systems:  A comprehensive review of systems is negative except as stated in the HPI.     Objective:     Visit Vitals  /84   Pulse 69   Temp 97.7 °F (36.5 °C) (Oral)   Resp 18   Ht 5' 9\" (1.753 m)   Wt 107 lb 11.2 oz (48.9 kg)   SpO2 99%   BMI 15.90 kg/m²     Temp (24hrs), Av.5 °F (36.4 °C), Min:97.3 °F (36.3 °C), Max:97.7 °F (36.5 °C)       Lines: Peripheral IV:       Physical Exam:    General:  Alert, cooperative, cachectic   Eyes:  Sclera anicteric. Pupils equally round and reactive to light. Mouth/Throat: Mucous membranes normal, oral pharynx clear; on room air;   Neck: Supple   Lungs:   Clear to auscultation bilaterally   CV:  Regular rate and rhythm,no murmur, click, rub or gallop   Abdomen:   Midline incision healed   Extremities: No cyanosis or edema   Skin: Facial ulcerative lesions noted on left of unclear etiology; Lymph nodes: Left supraclavicular nodule noted   Musculoskeletal: No swelling or deformity   Lines/Devices:  Intact, no erythema, drainage or tenderness   Psych: Alert and oriented, normal mood affect given the setting       Data Review:     CBC:  Recent Labs     22  1438 22  0947 22  0358   WBC 7.6 7.6 7.4   HGB 11.4* 11.5* 11.0*   HCT 37.9* 37.7* 35.7*   * 686* 736*       BMP:  Recent Labs     22  1438 22  0947 22  0358   BUN 17 21 26*   * 134* 130*   K 5.0 4.1 4.4   CL 99 102 95*   CO2 29 25 28       LFTS:  Recent Labs     22  1438   ALT 32       Microbiology:   Reviewed    Imagin/8/22 CXR:   FINDINGS: Bibasilar lung atelectasis and small pleural effusions have improved,   with minimal residual. The heart size and pulmonary vasculature are normal. No   pneumothorax is seen. 22 CT chest:   Impression   1. No pulmonary embolism or right heart strain. 2.  Distal bilateral lower lobe bronchial occlusion with findings of left   greater than right dependent aspiration/consolidation. Slight increased size of   small pleural effusion with decreased size of small right pleural effusion.        Signed By: Jefferson Mercado MD     2022

## 2022-08-08 NOTE — PROGRESS NOTES
Patient poor historian and unable to recall what type of medication he is taking at home. Charge nurse notified about this patient status.

## 2022-08-09 ENCOUNTER — TELEPHONE (OUTPATIENT)
Dept: ONCOLOGY | Age: 67
End: 2022-08-09

## 2022-08-09 LAB
ANION GAP SERPL CALC-SCNC: 8 MMOL/L (ref 7–16)
BASOPHILS # BLD: 0.1 K/UL (ref 0–0.2)
BASOPHILS NFR BLD: 1 % (ref 0–2)
BUN SERPL-MCNC: 18 MG/DL (ref 8–23)
CALCIUM SERPL-MCNC: 8.8 MG/DL (ref 8.3–10.4)
CHLORIDE SERPL-SCNC: 95 MMOL/L (ref 98–107)
CO2 SERPL-SCNC: 28 MMOL/L (ref 21–32)
CREAT SERPL-MCNC: 0.2 MG/DL (ref 0.8–1.5)
DIFFERENTIAL METHOD BLD: ABNORMAL
EOSINOPHIL # BLD: 0.2 K/UL (ref 0–0.8)
EOSINOPHIL NFR BLD: 2 % (ref 0.5–7.8)
ERYTHROCYTE [DISTWIDTH] IN BLOOD BY AUTOMATED COUNT: 16 % (ref 11.9–14.6)
GLUCOSE SERPL-MCNC: 110 MG/DL (ref 65–100)
HCT VFR BLD AUTO: 35.4 % (ref 41.1–50.3)
HGB BLD-MCNC: 10.8 G/DL (ref 13.6–17.2)
IMM GRANULOCYTES # BLD AUTO: 0.1 K/UL (ref 0–0.5)
IMM GRANULOCYTES NFR BLD AUTO: 1 % (ref 0–5)
LYMPHOCYTES # BLD: 2.3 K/UL (ref 0.5–4.6)
LYMPHOCYTES NFR BLD: 27 % (ref 13–44)
MCH RBC QN AUTO: 27.1 PG (ref 26.1–32.9)
MCHC RBC AUTO-ENTMCNC: 30.5 G/DL (ref 31.4–35)
MCV RBC AUTO: 88.7 FL (ref 79.6–97.8)
MONOCYTES # BLD: 1 K/UL (ref 0.1–1.3)
MONOCYTES NFR BLD: 12 % (ref 4–12)
NEUTS SEG # BLD: 4.8 K/UL (ref 1.7–8.2)
NEUTS SEG NFR BLD: 57 % (ref 43–78)
NRBC # BLD: 0 K/UL (ref 0–0.2)
PLATELET # BLD AUTO: 724 K/UL (ref 150–450)
PMV BLD AUTO: 9.3 FL (ref 9.4–12.3)
POTASSIUM SERPL-SCNC: 4.7 MMOL/L (ref 3.5–5.1)
RBC # BLD AUTO: 3.99 M/UL (ref 4.23–5.6)
SODIUM SERPL-SCNC: 131 MMOL/L (ref 136–145)
WBC # BLD AUTO: 8.4 K/UL (ref 4.3–11.1)

## 2022-08-09 PROCEDURE — A4216 STERILE WATER/SALINE, 10 ML: HCPCS | Performed by: INTERNAL MEDICINE

## 2022-08-09 PROCEDURE — C9113 INJ PANTOPRAZOLE SODIUM, VIA: HCPCS | Performed by: INTERNAL MEDICINE

## 2022-08-09 PROCEDURE — 2500000003 HC RX 250 WO HCPCS: Performed by: INTERNAL MEDICINE

## 2022-08-09 PROCEDURE — 6360000002 HC RX W HCPCS: Performed by: INTERNAL MEDICINE

## 2022-08-09 PROCEDURE — 6370000000 HC RX 637 (ALT 250 FOR IP): Performed by: INTERNAL MEDICINE

## 2022-08-09 PROCEDURE — 2580000003 HC RX 258: Performed by: INTERNAL MEDICINE

## 2022-08-09 PROCEDURE — 85025 COMPLETE CBC W/AUTO DIFF WBC: CPT

## 2022-08-09 PROCEDURE — 80048 BASIC METABOLIC PNL TOTAL CA: CPT

## 2022-08-09 PROCEDURE — 51798 US URINE CAPACITY MEASURE: CPT

## 2022-08-09 PROCEDURE — 2580000003 HC RX 258: Performed by: EMERGENCY MEDICINE

## 2022-08-09 PROCEDURE — 1100000000 HC RM PRIVATE

## 2022-08-09 PROCEDURE — 36415 COLL VENOUS BLD VENIPUNCTURE: CPT

## 2022-08-09 PROCEDURE — 99223 1ST HOSP IP/OBS HIGH 75: CPT | Performed by: SURGERY

## 2022-08-09 RX ORDER — FUROSEMIDE 40 MG/1
40 TABLET ORAL DAILY
Status: DISCONTINUED | OUTPATIENT
Start: 2022-08-10 | End: 2022-08-14

## 2022-08-09 RX ORDER — BISACODYL 10 MG
10 SUPPOSITORY, RECTAL RECTAL DAILY PRN
Status: DISCONTINUED | OUTPATIENT
Start: 2022-08-09 | End: 2022-08-15 | Stop reason: HOSPADM

## 2022-08-09 RX ADMIN — OXYCODONE 5 MG: 5 TABLET ORAL at 00:57

## 2022-08-09 RX ADMIN — TUBERCULIN PURIFIED PROTEIN DERIVATIVE 5 UNITS: 5 INJECTION, SOLUTION INTRADERMAL at 10:51

## 2022-08-09 RX ADMIN — OXYCODONE 5 MG: 5 TABLET ORAL at 16:30

## 2022-08-09 RX ADMIN — SODIUM CHLORIDE, PRESERVATIVE FREE 40 MG: 5 INJECTION INTRAVENOUS at 20:26

## 2022-08-09 RX ADMIN — SODIUM CHLORIDE, PRESERVATIVE FREE 5 ML: 5 INJECTION INTRAVENOUS at 21:00

## 2022-08-09 RX ADMIN — OXYCODONE 5 MG: 5 TABLET ORAL at 20:26

## 2022-08-09 RX ADMIN — OXYCODONE 5 MG: 5 TABLET ORAL at 06:02

## 2022-08-09 RX ADMIN — METOPROLOL TARTRATE 12.5 MG: 25 TABLET, FILM COATED ORAL at 20:26

## 2022-08-09 RX ADMIN — BISACODYL 10 MG: 10 SUPPOSITORY RECTAL at 16:43

## 2022-08-09 RX ADMIN — SODIUM CHLORIDE, PRESERVATIVE FREE 40 MG: 5 INJECTION INTRAVENOUS at 09:47

## 2022-08-09 RX ADMIN — SODIUM CHLORIDE, PRESERVATIVE FREE 5 ML: 5 INJECTION INTRAVENOUS at 13:56

## 2022-08-09 RX ADMIN — METOPROLOL TARTRATE 12.5 MG: 25 TABLET, FILM COATED ORAL at 09:49

## 2022-08-09 RX ADMIN — POLYETHYLENE GLYCOL 3350 17 G: 17 POWDER, FOR SOLUTION ORAL at 06:03

## 2022-08-09 RX ADMIN — SODIUM CHLORIDE, PRESERVATIVE FREE 10 ML: 5 INJECTION INTRAVENOUS at 09:50

## 2022-08-09 RX ADMIN — OXYCODONE 5 MG: 5 TABLET ORAL at 11:28

## 2022-08-09 RX ADMIN — SODIUM CHLORIDE, PRESERVATIVE FREE 10 ML: 5 INJECTION INTRAVENOUS at 21:00

## 2022-08-09 RX ADMIN — ONDANSETRON 4 MG: 2 INJECTION INTRAMUSCULAR; INTRAVENOUS at 19:42

## 2022-08-09 RX ADMIN — SODIUM CHLORIDE, PRESERVATIVE FREE 5 ML: 5 INJECTION INTRAVENOUS at 06:03

## 2022-08-09 ASSESSMENT — PAIN - FUNCTIONAL ASSESSMENT: PAIN_FUNCTIONAL_ASSESSMENT: ACTIVITIES ARE NOT PREVENTED

## 2022-08-09 ASSESSMENT — PAIN SCALES - GENERAL
PAINLEVEL_OUTOF10: 0
PAINLEVEL_OUTOF10: 5
PAINLEVEL_OUTOF10: 1
PAINLEVEL_OUTOF10: 0
PAINLEVEL_OUTOF10: 6
PAINLEVEL_OUTOF10: 7
PAINLEVEL_OUTOF10: 5

## 2022-08-09 ASSESSMENT — PAIN DESCRIPTION - LOCATION
LOCATION: GENERALIZED
LOCATION: BACK;HIP
LOCATION: ABDOMEN;RIB CAGE
LOCATION: ABDOMEN

## 2022-08-09 ASSESSMENT — PAIN DESCRIPTION - DESCRIPTORS
DESCRIPTORS: ACHING
DESCRIPTORS: ACHING;DISCOMFORT

## 2022-08-09 ASSESSMENT — PAIN DESCRIPTION - ORIENTATION: ORIENTATION: RIGHT

## 2022-08-09 NOTE — PROGRESS NOTES
Pt resting in bed awake. Alert and oriented times 3 at this time. On RA. No s/sx of distress noted. Tube feed infusing at 55ml/hr. Denies any pain. Encouraged to call for assistance as needed. Call light within reach. Will continue to monitor.

## 2022-08-09 NOTE — PROGRESS NOTES
Physical Therapy Note:    PT evaluation received and chart reviewed. Pt with complicated medical history, significant for cancer, undergoing chemo, multiple surgeries and possible metastasis noted in chart. Pt from [facility], reports has been ambulating as tolerated with RW and assist; assist with ADLs from staff. Pt states 4/10 pain currently, states \"I am afraid to move because I will hurt more\". Pt declined PT evaluation at this time, reports has lots of decisions to make regarding health and future. PT provided supportive listening for pt. No Evaluation performed this date. Will attempt evaluation at later time as schedule allows.        Han Dean, PT  [phone]     Rehab Caseload Tracker

## 2022-08-09 NOTE — PROGRESS NOTES
Pt in bed resting on RA, tube feed infusing at 55mL/hr, call light in reach, SBAR given to Luis Eduardo Severino RN.

## 2022-08-09 NOTE — PROGRESS NOTES
Hospitalist Progress Note   Admit Date:  2022  2:17 PM   Name:  Rickie Carreon   Age:  78 y.o. Sex:  male  :  1955   MRN:  [id number]   Room:  [facility]    Presenting Complaint: Shortness of Breath and Rectal Bleeding     Reason(s) for Admission: Respiratory failure with hypoxia [facility]) [facility] Course & Interval History:   Patient with past medical history of    Gastric adenocarcinoma s/p chemotherapy, s/p total gastrectomy and esophagojejunostomy, lateral lobectomy of the liver, distal pancreatectomy and splenectomy, status post J-tube placement. Hypertension  Pulmonary embolism on Lovenox  Recent admission for septic shock, respiratory failure with pleural effusion and pulmonary embolism. Recent COVID infection on May 1, 2022. Patient came from a nursing facility due to shortness of breath and hematochezia. In ER, his oxygenation saturation was 88% on room air. This improved with oxygen via cannula at 2 L/min. From CT chest, he is found to have   1. No pulmonary embolism or right heart strain. 2.  Distal bilateral lower lobe bronchial occlusion with findings of left   greater than right dependent aspiration/consolidation. Slight increased size of   small pleural effusion with decreased size of small right pleural effusion. CXR shows; Stable mild cardiomegaly without evolving acute changes suggested by plain   film. Cardiomegaly can be an early indicator for heart failure in the correct   clinical setting. Patient is admitted for Acute respiratory failure with hypoxia. Subjective/24hr Events (22): Patient now tolerating room air. Cough improved. Denies fever,chills. Discussed his lesions of left face/ scalp and also enlarged LN of right supraclavicular.        Assessment & Plan:     # Respiratory failure with hypoxia  #covid-19 infection  - weaned now to room air  - stop IV steroids  - no indication for antivirals    # Gastic 2226  Gross per 24 hour   Intake 1030 ml   Output --   Net 1030 ml         Physical Exam:     Blood pressure (!) 127/90, pulse 89, temperature 98.1 °F (36.7 °C), resp. rate 16, height 5' 9\" (1.753 m), weight 107 lb 11.2 oz (48.9 kg), SpO2 92 %. General:    cachectic  Head:  Normocephalic, atraumatic  Eyes:  Sclerae appear normal.  Pupils equally round. ENT:  Nares appear normal, no drainage. Moist oral mucosa  Neck:  No restricted ROM. Trachea midline   CV:   RRR. No m/r/g. No jugular venous distension. Lungs:   CTAB. No wheezing, rhonchi, or rales. Symmetric expansion. Abdomen: Bowel sounds present. Soft, nontender, nondistended. Extremities: No cyanosis or clubbing. No edema  Skin:     Erythematous raised nodule of left face, nodules of posterior scalp. Enlarged lymph nodes of right supraclavicular. Neuro:  CN II-XII grossly intact. Sensation intact. A&Ox3  Psych:  Normal mood and affect.       I have personally reviewed labs and tests showing:  Recent Labs:  Recent Results (from the past 48 hour(s))   C-Reactive Protein    Collection Time: 08/07/22  9:47 AM   Result Value Ref Range    CRP 1.2 (H) 0.0 - 0.9 mg/dL   CBC with Auto Differential    Collection Time: 08/07/22  9:47 AM   Result Value Ref Range    WBC 7.6 4.3 - 11.1 K/uL    RBC 4.30 4.23 - 5.6 M/uL    Hemoglobin 11.5 (L) 13.6 - 17.2 g/dL    Hematocrit 37.7 (L) 41.1 - 50.3 %    MCV 87.7 79.6 - 97.8 FL    MCH 26.7 26.1 - 32.9 PG    MCHC 30.5 (L) 31.4 - 35.0 g/dL    RDW 16.3 (H) 11.9 - 14.6 %    Platelets 614 (H) 897 - 450 K/uL    MPV 9.3 (L) 9.4 - 12.3 FL    nRBC 0.00 0.0 - 0.2 K/uL    Differential Type AUTOMATED      Seg Neutrophils 53 43 - 78 %    Lymphocytes 34 13 - 44 %    Monocytes 12 4.0 - 12.0 %    Eosinophils % 1 0.5 - 7.8 %    Basophils 1 0.0 - 2.0 %    Immature Granulocytes 0 0.0 - 5.0 %    Segs Absolute 4.0 1.7 - 8.2 K/UL    Absolute Lymph # 2.6 0.5 - 4.6 K/UL    Absolute Mono # 0.9 0.1 - 1.3 K/UL    Absolute Eos # 0.0 0.0 - 0.8 K/UL    Basophils Absolute 0.0 0.0 - 0.2 K/UL    Absolute Immature Granulocyte 0.0 0.0 - 0.5 K/UL   Basic Metabolic Panel    Collection Time: 08/07/22  9:47 AM   Result Value Ref Range    Sodium 134 (L) 138 - 145 mmol/L    Potassium 4.1 3.5 - 5.1 mmol/L    Chloride 102 98 - 107 mmol/L    CO2 25 21 - 32 mmol/L    Anion Gap 7 7 - 16 mmol/L    Glucose 90 65 - 100 mg/dL    BUN 21 8 - 23 MG/DL    Creatinine 0.40 (L) 0.8 - 1.5 MG/DL    GFR African American >60 >60 ml/min/1.73m2    GFR Non- >60 >60 ml/min/1.73m2    Calcium 8.0 (L) 8.3 - 10.4 MG/DL   CBC with Auto Differential    Collection Time: 08/08/22  3:58 AM   Result Value Ref Range    WBC 7.4 4.3 - 11.1 K/uL    RBC 4.09 (L) 4.23 - 5.6 M/uL    Hemoglobin 11.0 (L) 13.6 - 17.2 g/dL    Hematocrit 35.7 (L) 41.1 - 50.3 %    MCV 87.3 79.6 - 97.8 FL    MCH 26.9 26.1 - 32.9 PG    MCHC 30.8 (L) 31.4 - 35.0 g/dL    RDW 16.1 (H) 11.9 - 14.6 %    Platelets 526 (H) 934 - 450 K/uL    MPV 9.2 (L) 9.4 - 12.3 FL    nRBC 0.00 0.0 - 0.2 K/uL    Differential Type AUTOMATED      Seg Neutrophils 55 43 - 78 %    Lymphocytes 30 13 - 44 %    Monocytes 12 4.0 - 12.0 %    Eosinophils % 2 0.5 - 7.8 %    Basophils 1 0.0 - 2.0 %    Immature Granulocytes 0 0.0 - 5.0 %    Segs Absolute 4.1 1.7 - 8.2 K/UL    Absolute Lymph # 2.2 0.5 - 4.6 K/UL    Absolute Mono # 0.9 0.1 - 1.3 K/UL    Absolute Eos # 0.2 0.0 - 0.8 K/UL    Basophils Absolute 0.1 0.0 - 0.2 K/UL    Absolute Immature Granulocyte 0.0 0.0 - 0.5 K/UL   Basic Metabolic Panel    Collection Time: 08/08/22  3:58 AM   Result Value Ref Range    Sodium 130 (L) 138 - 145 mmol/L    Potassium 4.4 3.5 - 5.1 mmol/L    Chloride 95 (L) 98 - 107 mmol/L    CO2 28 21 - 32 mmol/L    Anion Gap 7 7 - 16 mmol/L    Glucose 116 (H) 65 - 100 mg/dL    BUN 26 (H) 8 - 23 MG/DL    Creatinine 0.40 (L) 0.8 - 1.5 MG/DL    GFR African American >60 >60 ml/min/1.73m2    GFR Non- >60 >60 ml/min/1.73m2    Calcium 9.1 8.3 - 10.4 MG/DL   Magnesium Collection Time: 08/08/22  3:58 AM   Result Value Ref Range    Magnesium 1.9 1.8 - 2.4 mg/dL   Phosphorus    Collection Time: 08/08/22  3:58 AM   Result Value Ref Range    Phosphorus 4.2 (H) 2.3 - 3.7 MG/DL       I have personally reviewed imaging studies showing: Other Studies:  XR CHEST 1 VIEW   Final Result   Improved bibasilar lung atelectasis and small pleural effusions. CT CHEST PULMONARY EMBOLISM W CONTRAST   Final Result   1. No pulmonary embolism or right heart strain. 2.  Distal bilateral lower lobe bronchial occlusion with findings of left   greater than right dependent aspiration/consolidation. Slight increased size of   small pleural effusion with decreased size of small right pleural effusion. XR CHEST PORTABLE   Final Result   1. Stable mild cardiomegaly without evolving acute changes suggested by plain   film. Cardiomegaly can be an early indicator for heart failure in the correct   clinical setting. This report was made using voice transcription. Despite my best efforts to avoid   any, transcription errors may persist. If there is any question about the   accuracy of the report or need for clarification, then please call [phone], or text me through [employer] for clarification or correction.            Current Meds:  Current Facility-Administered Medications   Medication Dose Route Frequency    pantoprazole (PROTONIX) 40 mg in sodium chloride (PF) 10 mL injection  40 mg IntraVENous Q12H    potassium chloride (KLOR-CON M) extended release tablet 40 mEq  40 mEq Oral PRN    Or    potassium bicarb-citric acid (EFFER-K) effervescent tablet 40 mEq  40 mEq Oral PRN    Or    potassium chloride 10 mEq/100 mL IVPB (Peripheral Line)  10 mEq IntraVENous PRN    magnesium sulfate 2000 mg in 50 mL IVPB premix  2,000 mg IntraVENous PRN    sodium phosphate 10 mmol in sodium chloride 0.9 % 250 mL IVPB  10 mmol IntraVENous PRN    Or    sodium phosphate 15 mmol in sodium chloride 0.9 % 250 mL IVPB  15 mmol IntraVENous PRN    Or    sodium phosphate 20 mmol in sodium chloride 0.9 % 500 mL IVPB  20 mmol IntraVENous PRN    oxyCODONE (ROXICODONE) immediate release tablet 5 mg  5 mg Per J Tube Q4H PRN    dexamethasone (DECADRON) injection 6 mg  6 mg IntraVENous Q24H    sodium chloride flush 0.9 % injection 5 mL  5 mL IntraVENous PRN    sodium chloride flush 0.9 % injection 5 mL  5 mL IntraVENous Q8H    sodium chloride flush 0.9 % injection 5-40 mL  5-40 mL IntraVENous 2 times per day    sodium chloride flush 0.9 % injection 5-40 mL  5-40 mL IntraVENous PRN    0.9 % sodium chloride infusion   IntraVENous PRN    ondansetron (ZOFRAN-ODT) disintegrating tablet 4 mg  4 mg Oral Q8H PRN    Or    ondansetron (ZOFRAN) injection 4 mg  4 mg IntraVENous Q6H PRN    polyethylene glycol (GLYCOLAX) packet 17 g  17 g Oral Daily PRN    acetaminophen (TYLENOL) tablet 650 mg  650 mg Oral Q6H PRN    Or    acetaminophen (TYLENOL) suppository 650 mg  650 mg Rectal Q6H PRN    [Held by provider] enoxaparin (LOVENOX) injection 50 mg  1 mg/kg SubCUTAneous Q12H    furosemide (LASIX) injection 40 mg  40 mg IntraVENous Daily    levalbuterol (XOPENEX) nebulization 0.63 mg  0.63 mg Nebulization Q6H PRN    metoprolol tartrate (LOPRESSOR) tablet 12.5 mg  12.5 mg Per J Tube BID       Signed:  Montserrat Newton DO    Part of this note may have been written by using a voice dictation software. The note has been proof read but may still contain some grammatical/other typographical errors.

## 2022-08-09 NOTE — PROGRESS NOTES
Comprehensive Nutrition Assessment    Type and Reason for Visit: Reassess  Tube Feeding Management (Hospitalists)    Nutrition Recommendations/Plan:   Enteral Nutrition:   Enteral Access: Jejunostomy  Formula: Standard without Fiber (Osmolite 1.2 Carloz)  Delivery: Continuous feeding: Continue at goal rate of 65 ml/hour. Water flush Continue 60 ml every 4 hours  Modulars: None not indicated at this time   Labs:   EN labs: BMP daily, Mg daily x 3 days then MWF and Phos daily x 3 days then MWF. POC Glucoses/SSI Not indicated     Malnutrition Assessment:  Malnutrition Status: Insufficient data (nneeds NFPE, additional wt loss since last admission, + malnutrition last admission)    Nutrition Assessment:  Nutrition History: Patient known to nutrition from previous admission (6/2022). Prior to recent admission patient with poor tolerance of solid foods that progressed to no solid foods for weeks. Main intake at that time was 6-7 Ensure per day with peanut butter powder mixed in. TF initiated per surgery s/p 14 fr Jtube palcement 6/13. Advanced to \"goal\" of 50 ml/hr per surgery. Sips of clears initiated 6/17. RN provided TF regimen from facility \" Omsolite 1.2 at 50 ml/hr with 30 ml water flush BID. Nutrition needs can be met with Osmolite 1.2 @ 65 ml/hr (formulated for 22 hr infusion) with free water flush 90 ml Q4 hrs. Regimen would provide 1716 kcal (100% estimated energy needs), 79 g pro (100% estimated protein needs), 1713 ml free water (~1 ml/hr). \" Appears to be copied from last RD assessment last admission. ? If patient was maintained on Osmolite 1.2 @ 50 ml/hr - would explain additional weight loss. Last measured weight 128# (58.1 kg) 6/17/22 bed scale. NFPE deferred secondary to isolation.          Nutrition Background:   Patient with PMH significant for HTN, gastric cancer s/p chemo and total gastrectomy and esophagojejunostomy with extensive lysis of adhesion and dissection, left lateral lobectomy of liver, distal pancreatectomy and splenectomy, combined diaphragmatic resection, falciform ligament flap, j-tube placement (14 fr), retroperitoneal mass excision and lymphadenectomy, and left abdominal wall mass excision. He was discharged to [facility]. He presented back with SOB and hematochezia. He was diagnosed with COVID in May. Nutrition Interval:  Called pt's room on room phone without answer. Discussed pt with RAYSA Mariscal who reports pt's TF rate was advanced to goal rate today and pt is tolerating well. Noted nodule on abdomen likely metastatic nodule per surgery. Abdominal Status (last documented):    , GI Symptoms: Loss of appetite   Pertinent Medications: Protonix, Glycolax PRN (1 dose provided today), Zofran PRN, Lasix (held once today, restarting tomorrow)  Pertinent Labs:   Lab Results   Component Value Date/Time     08/08/2022 03:58 AM    K 4.4 08/08/2022 03:58 AM    CL 95 08/08/2022 03:58 AM    CO2 28 08/08/2022 03:58 AM    BUN 26 08/08/2022 03:58 AM    CREATININE 0.40 08/08/2022 03:58 AM    GLUCOSE 116 08/08/2022 03:58 AM    CALCIUM 9.1 08/08/2022 03:58 AM    PHOS 4.2 08/08/2022 03:58 AM    MG 1.9 08/08/2022 03:58 AM     Labs reviewed for 8/8 and remarkable for ongoing hyponatremia, K WNL, mg WNL, and phos elevated. No noted BM since admission. Phos possibly elevated d/t constipation depending on date of his last BM. RN reports no BM today. No adjustment to TF today d/t no current labs. Lab contacted and verified current BMP order. Labs to be collected per discussion with tech. Current Nutrition Therapies:  ADULT TUBE FEEDING; Jejunostomy; Standard without Fiber; Continuous; 15; Yes; 10; Q 8 hours; 65; 60; Q 4 hours  ADULT DIET;  Clear Liquid  Current Tube Feeding (TF) Orders:  Feeding Route: Jejunostomy  Formula: Standard without Fiber  Schedule: Continuous  Feeding Regimen: 65ml/hr  Additives/Modulars: None  Water Flushes: 60ml Q4H  Current TF & Flush Orders Provides: infusing per order  Goal TF & Flush Orders Provides: 1716 calories (100% estimated calorie needs), 79 grams protein (100% estimated protein needs) and 1532 ml free fluid (~0.9 ml/kcal)    Current Intake:   Average Meal Intake: NPO        Anthropometric Measures:  Height: 5' 9\" (175.3 cm)  Current Body Wt: 107 lb 12.9 oz (48.9 kg) (8/7), Weight source: Bed Scale  BMI: 15.9, Underweight (BMI less than 18.5)  Admission Body Weight: 113 lb (51.3 kg) (8/6, stated)  Ideal Body Weight (Kg) (Calculated): 73 kg (160 lbs), 67.4 %  Usual Body Wt: 156 lb 1.4 oz (70.8 kg) (office wt 8/11/21), Percent weight change: -30.9       Edema:Peripheral Vascular  Peripheral Vascular (WDL): Within Defined Limits  Edema: None   BMI Category Underweight (BMI less than 18.5)  Estimated Daily Nutrient Needs:  Energy (kcal/day): 4715-5125 (Kcal/kg (30-35) Weight used: 48.9 kg Current  Protein (g/day): 77-88 (1.5-1.8 g/kg) Weight Used: (Current) 48.9 kg  Fluid (ml/day):   (1 ml/kcal)    Nutrition Diagnosis:   Inadequate oral intake related to altered GI structure as evidenced by  (s/p total gastrectomy, EN for primary needs)  Nutrition Interventions:   Food and/or Nutrient Delivery: Continue NPO, Continue Current Tube Feeding     Coordination of Nutrition Care: Continue to monitor while inpatient       Goals:   Previous Goal Met: Goal(s) Achieved  Active Goal:  (Maintain nutrition needs via EN by RD follow up.)       Nutrition Monitoring and Evaluation:      Food/Nutrient Intake Outcomes: Food and Nutrient Intake, Enteral Nutrition Intake/Tolerance  Physical Signs/Symptoms Outcomes: Biochemical Data, GI Status, Fluid Status or Edema, Meal Time Behavior, Weight    Discharge Planning:    Enteral Nutrition    Harsh John, [address] [place] [address]

## 2022-08-09 NOTE — PROGRESS NOTES
Pt resting in bed awake. On RA. No s/sx of distress noted. Denies any pain. Tube feed running at 65ml/hr. Pt tolerating well. Call light within reach. Will continue to monitor. 162.9

## 2022-08-09 NOTE — PROGRESS NOTES
Pt tolerating tube feed well at 55ml. Increased rate to 65ml which is goal rate. Will continue to monitor.

## 2022-08-09 NOTE — CONSULTS
H&P/Consult Note/Progress Note/Office Note:   Elva Interiano  MRN: [id number]  :1955  Age:66 y.o.    HPI: Elva Interiano is a 78 y.o. male who is known to our services after undergoing a total gastrectomy 2022. At the time of the operation he was found to have locally advanced disease invading directly into the distal esophagus, diaphragm, liver, pancreas, and retroperitoneum. He was also found to have a left abdominal wall mass. These were all biopsied and found to be positive for metastatic adenocarcinoma. He was also found to have a positive paraesophageal lymph node. The patient is in [place] and recently transferred to skilled nursing facility following his operation. The patient was admitted on  2022 with respiratory failure secondary to COVID-19. He was also noted to have multiple nodules with one on his face, and 1 on his abdomen. He was also found to have an enlarged left supraclavicular lymph node. The patient states that the nodules have been present for many weeks. He states that they have been enlarging over the past few weeks.       Past Medical History:   Diagnosis Date    ABLA (acute blood loss anemia) 2022    GIB (gastrointestinal bleeding) 2022    HTN (hypertension)     Severe sepsis with lactic acidosis ([address] 2022     Past Surgical History:   Procedure Laterality Date    BRONCHOSCOPY N/A 2022    BRONCHOSCOPY performed by Jean Rubalcava MD at [address] N/A 2022    BRONCHOSCOPY performed by Jean Rubalcava MD at [facility]    BRONCHOSCOPY N/A 2022    BRONCHOSCOPY performed by Tamir Alcaraz MD at [facility]COPY    COLONOSCOPY N/A 3/9/2022    COLONOSCOPY/ 22 performed by Leigh Love MD at [address] [place] [address] N/A 2022    Exploratory laparotomy with total gastrectomy and esophagojejunostomy after extensive lysis of adhesions and dissections which added at least 3-4 hours to this case, Left lateral lobectomy of liver, Distal pancreatectomy and splenectomy, Combined diaphragmatic resection, Falciform ligament flap, Feeding J-tube placement, Retroperitoneal mass excision and lymphadenectomy, Left abdominal wall mass ex    IR CHEST TUBE INSERTION  6/20/2022    IR CHEST TUBE INSERTION 6/20/2022 [facility]    IR CHEST TUBE INSERTION  7/5/2022    IR CHEST TUBE INSERTION 7/5/2022 [facility]    IR IVC FILTER PLACEMENT W IMAGING  6/6/2022    IR IVC FILTER PLACEMENT W IMAGING 6/6/2022 [facility]    KNEE ARTHROSCOPY      OTHER SURGICAL HISTORY      none    THORACENTESIS Bilateral 6/15/2022    THORACENTESIS ULTRASOUND performed by Lori Duran MD at [facility]    UPPER GASTROINTESTINAL ENDOSCOPY N/A 6/4/2022    EGD ESOPHAGOGASTRODUODENOSCOPY performed by Maude Peter MD at [facility] ENDOSCOPY     Current Facility-Administered Medications   Medication Dose Route Frequency    tuberculin injection 5 Units  5 Units IntraDERmal Once    [START ON 8/10/2022] furosemide (LASIX) tablet 40 mg  40 mg Oral Daily    pantoprazole (PROTONIX) 40 mg in sodium chloride (PF) 10 mL injection  40 mg IntraVENous Q12H    potassium chloride (KLOR-CON M) extended release tablet 40 mEq  40 mEq Oral PRN    Or    potassium bicarb-citric acid (EFFER-K) effervescent tablet 40 mEq  40 mEq Oral PRN    Or    potassium chloride 10 mEq/100 mL IVPB (Peripheral Line)  10 mEq IntraVENous PRN    magnesium sulfate 2000 mg in 50 mL IVPB premix  2,000 mg IntraVENous PRN    sodium phosphate 10 mmol in sodium chloride 0.9 % 250 mL IVPB  10 mmol IntraVENous PRN    Or    sodium phosphate 15 mmol in sodium chloride 0.9 % 250 mL IVPB  15 mmol IntraVENous PRN    Or    sodium phosphate 20 mmol in sodium chloride 0.9 % 500 mL IVPB  20 mmol IntraVENous PRN    oxyCODONE (ROXICODONE) immediate release tablet 5 mg  5 mg Per J Tube Q4H PRN    sodium chloride flush 0.9 % injection 5 mL  5 mL IntraVENous PRN    sodium chloride flush 0.9 % injection 5 mL  5 mL IntraVENous Q8H    sodium chloride flush 0.9 % injection 5-40 mL  5-40 mL IntraVENous 2 times per day    sodium chloride flush 0.9 % injection 5-40 mL  5-40 mL IntraVENous PRN    0.9 % sodium chloride infusion   IntraVENous PRN    ondansetron (ZOFRAN-ODT) disintegrating tablet 4 mg  4 mg Oral Q8H PRN    Or    ondansetron (ZOFRAN) injection 4 mg  4 mg IntraVENous Q6H PRN    polyethylene glycol (GLYCOLAX) packet 17 g  17 g Oral Daily PRN    acetaminophen (TYLENOL) tablet 650 mg  650 mg Oral Q6H PRN    Or    acetaminophen (TYLENOL) suppository 650 mg  650 mg Rectal Q6H PRN    [Held by provider] enoxaparin (LOVENOX) injection 50 mg  1 mg/kg SubCUTAneous Q12H    levalbuterol (XOPENEX) nebulization 0.63 mg  0.63 mg Nebulization Q6H PRN    metoprolol tartrate (LOPRESSOR) tablet 12.5 mg  12.5 mg Per J Tube BID     Iron and Tetanus antitoxin  Social History     Socioeconomic History    Marital status:    Tobacco Use    Smoking status: Former    Smokeless tobacco: Former   Substance and Sexual Activity    Alcohol use: Not Currently    Drug use: Not Currently     Social History     Tobacco Use   Smoking Status Former   Smokeless Tobacco Former     Family History   Problem Relation Age of Onset    Other Other         non-contributory     ROS: The patient has no difficulty with chest pain or shortness of breath. No fever or chills. Comprehensive review of systems was otherwise unremarkable except as noted above. Physical Exam:   /83   Pulse 80   Temp 97.5 °F (36.4 °C) (Oral)   Resp 16   Ht 5' 9\" (1.753 m)   Wt 107 lb 11.2 oz (48.9 kg)   SpO2 95%   BMI 15.90 kg/m²   Vitals:    08/09/22 0255 08/09/22 0602 08/09/22 0632 08/09/22 0734   BP: 123/87   114/83   Pulse: 77   80   Resp: 19 20 18 16   Temp: 97.9 °F (36.6 °C)   97.5 °F (36.4 °C)   TempSrc:    Oral   SpO2: 93%   95%   Weight:       Height:         No intake/output data recorded.   08/07 1901 - 08/09 0700  In: 1090   Out: 150 [Urine:150]    Constitutional: Alert, oriented, cooperative patient in no acute distress  Eyes:Sclera are clear. EOMs intact  ENMT: no external lesions gross hearing normal; no obvious neck masses, no ear or lip lesions, nares normal  CV: RRR. Normal perfusion  Resp: No respiratory distress  GI: soft and non-distended. J-tube remains intact. There is a subcutaneous skin nodule on the abdomen that appears to be likely a malignant nodule. Skin: Palpable erythematous nodule on the left side of face measuring approximately 3 cm x 2-1/2 cm. Enlarged left supraclavicular lymph node    Recent vitals (if inpt):  Patient Vitals for the past 24 hrs:   BP Temp Temp src Pulse Resp SpO2   08/09/22 0734 114/83 97.5 °F (36.4 °C) Oral 80 16 95 %   08/09/22 0632 -- -- -- -- 18 --   08/09/22 0602 -- -- -- -- 20 --   08/09/22 0255 123/87 97.9 °F (36.6 °C) -- 77 19 93 %   08/09/22 0127 -- -- -- -- 20 --   08/09/22 0057 -- -- -- -- 20 --   08/08/22 2200 (!) 127/90 -- -- 89 -- --   08/08/22 1930 (!) 123/93 98.1 °F (36.7 °C) -- 86 -- 92 %   08/08/22 1629 -- -- -- -- 16 --   08/08/22 1611 117/88 97.3 °F (36.3 °C) Oral 75 18 94 %   08/08/22 1211 109/84 97.7 °F (36.5 °C) Oral 69 18 99 %       Amount and/or Complexity of Data Reviewed and Analyzed:  I reviewed and analyzed all of the unique labs and radiologic studies that are shown below as well as any that are in the HPI, and any that are in the expanded problem list below  *Each unique test, order, or document contributes to the combination of 2 or combination of 3 in Category 1 below. For this visit I also reviewed old records and prior notes. Recent Labs     08/06/22  1438 08/07/22  0947 08/08/22  0358   WBC 7.6   < > 7.4   HGB 11.4*   < > 11.0*   *   < > 736*   *   < > 130*   K 5.0   < > 4.4   CL 99   < > 95*   CO2 29   < > 28   BUN 17   < > 26*   ALT 32  --   --     < > = values in this interval not displayed.      Review of most recent CBC  Lab Results Component Value Date    WBC 7.4 08/08/2022    HGB 11.0 (L) 08/08/2022    HCT 35.7 (L) 08/08/2022    MCV 87.3 08/08/2022     (H) 08/08/2022       Review of most recent BMP  Lab Results   Component Value Date/Time     08/08/2022 03:58 AM    K 4.4 08/08/2022 03:58 AM    CL 95 08/08/2022 03:58 AM    CO2 28 08/08/2022 03:58 AM    BUN 26 08/08/2022 03:58 AM    CREATININE 0.40 08/08/2022 03:58 AM    GLUCOSE 116 08/08/2022 03:58 AM    CALCIUM 9.1 08/08/2022 03:58 AM        Review of most recent LFTs (and lipase if done)  Lab Results   Component Value Date    ALT 32 08/06/2022    AST 29 08/06/2022    ALKPHOS 205 (H) 08/06/2022    BILITOT 0.1 (L) 08/06/2022     Lab Results   Component Value Date    LIPASE 91 08/06/2022       Lab Results   Component Value Date/Time    INR 1.2 06/03/2022 06:07 PM    APTT 33.8 06/02/2022 07:53 PM    APTT 32.5 03/23/2022 02:59 PM       Review of most recent HgbA1c  No results found for: LABA1C  No results found for: EAG    Nutritional assessment screen for wound healing issues:  Lab Results   Component Value Date    LABALBU 2.8 (L) 08/06/2022       @lastcovr@  Xray Result (most recent):  XR CHEST LIMITED ONE VIEW 08/08/2022    Narrative  EXAM: Chest x-ray. INDICATION: Dyspnea. COMPARISON: Prior CT chest on August 6, 2022. TECHNIQUE: Frontal view chest x-ray. FINDINGS: Bibasilar lung atelectasis and small pleural effusions have improved,  with minimal residual. The heart size and pulmonary vasculature are normal. No  pneumothorax is seen. Impression  Improved bibasilar lung atelectasis and small pleural effusions. CT Result (most recent):  CT CHEST PULMONARY EMBOLISM W CONTRAST 08/06/2022    Narrative  EXAMINATION: CT CHEST PULMONARY EMBOLISM W CONTRAST 8/6/2022 6:15 PM    ACCESSION NUMBER: [id number]    COMPARISON: Chest radiograph earlier same day.  CT chest July 8, 2022    INDICATION: r/o PE    TECHNIQUE: Multiple contiguous axial CT images of the chest were obtained from  the lung apices to the lung bases after the intravenous administration of 100 mL  Isovue 370 per pulmonary angiography protocol. Coronal reconstructions were  performed. Radiation dose reduction techniques were used for this study. Our CT scanners  use one or all of the following: Automated exposure control, adjustment of the  mA and/or kV according to patient size, iterative reconstruction. FINDINGS:  The pulmonary arteries are well-opacified to at least the level of the large  subsegmental branches without filling defect or evidence of right heart strain. The main pulmonary artery is normal caliber. There is no pericardial effusion. The thoracic aorta is normal in caliber and  appearance. Scattered reactive mediastinal lymph nodes involving multiple stations, not  enlarged considering enlarged by size criteria. Moderate to severe apical predominant emphysema. Interval removal of left  thoracostomy tube with slight increased size of a small pleural effusion. Bilateral lower lobe distal bronchial impaction with worsening right greater  than left dependent consolidation/aspiration . Mild dependent atelectasis and/or  aspiration of the dependent right lower lobe. Hiatal hernia with esophageal fluid extending into the mid thorax. No acute or aggressive osseous abnormalities. Impression  1. No pulmonary embolism or right heart strain. 2.  Distal bilateral lower lobe bronchial occlusion with findings of left  greater than right dependent aspiration/consolidation. Slight increased size of  small pleural effusion with decreased size of small right pleural effusion. DIAGNOSIS        A:  \"PARAESOPHAGEAL LYMPH NODE #9\":             ONE LYMPH NODE POSITIVE FOR METASTATIC ADENOCARCINOMA (1/1). B:  \"DISTAL ESOPHAGUS, TOTAL STOMACH, LIVER, PANCREAS, SPLEEN,   DIAPHRAGM\":             MODERATELY TO POORLY DIFFERENTIATED GASTRIC ADENOCARCINOMA.              TUMOR FOCALLY INVADES THRU SEROSA INTO ABDOMINAL WALL AND                      RETROPERITONEUM. TUMOR IS 19.2 CM IN GREATEST DIMENSION. PROXIMAL AND DISTAL MARGINS ARE NEGATIVE FOR TUMOR. SPLENIC, HEPATIC AND PANCREATIC PARENCHYMA IS NEGATIVE FOR TUMOR               TWENTY-THREE LYMPH NODES NEGATIVE FOR METASTATIC CARCINOMA   (0/23). SEE ATTACHED CANCER CHECKLIST. C:  \"RETROPERITONEAL LYMPH NODE AND MASS\":             ONE LYMPH NODE POSITIVE FOR METASTATIC CARCINOMA WITH                  EXTRACAPSULAR EXTENSION (1/1). INVASIVE ADENOCARCINOMA INVOLVING RETROPERITONEAL SOFT TISSUE. D:  \"LEFT ABDOMINAL WALL MASS\":             ADENOCARCINOMA. ASSESSMENT/PLAN:  @[facility]@  Principal Problem:    Respiratory failure with hypoxia (HCC)  Active Problems:    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HCC)    Gastroesophageal reflux disease    Loss of weight    History of gastric cancer    Jejunostomy status ([facility] [address]    Hematochezia  Resolved Problems:    * No resolved hospital problems. *     It was discussed with the patient that the nodule on his abdomen is likely a metastatic nodule, he has had positive metastatic disease on biopsy at the time of his operation from the abdominal wall. Left supraclavicular lymph node also likely a metastatic lymph node.   The lesion on his face is more uncertain of the etiology, could be infectious etiology  Recommend oncology consult to see if patient would be a candidate for further chemotherapy  If patient is not a candidate for further chemotherapy, then a biopsy would not be necessary and would recommend palliative/hospice evaluation          Number and Complexity of Problems addressed and   Risks of complications and/or morbidity of management            Level of MDM (2/3 elements below)  Number and Complexity of Problems Addressed Amount and/or Complexity of Data to be Reviewed and Analyzed  *Each unique test, order, or document contributes to the combination of 2 or combination of 3 in Category 1 below. Risk of Complications and/or Morbidity or Mortality of pt Management     09084  09908 SF Minimal  ?1self-limited or minor problem Minimal or none Minimal risk of morbidity from additional diagnostic testing or Rx   94417  27739 Low Low  ? 2or more self-limited or minor problems;    or  ? 1stable chronic illness;    or  ?1acute, uncomplicated illness or injury   Limited  (Must meet the requirements of at least 1 of the 2 categories)  Category 1: Tests and documents   ? Any combination of 2 from the following:  ?Review of prior external note(s) from each unique source*;  ?review of the result(s) of each unique test*;   ?ordering of each unique test*    or   Category 2: Assessment requiring an independent historian(s)  (For the categories of independent interpretation of tests and discussion of management or test interpretation, see moderate or high) Low risk of morbidity from additional diagnostic testing or treatment     26156  05609 Mod Moderate  ? 1or more chronic illnesses with exacerbation, progression, or side effects of treatment;    or  ?2or more stable chronic illnesses;    or  ?1undiagnosed new problem with uncertain prognosis;    or  ?1acute illness with systemic symptoms;    or  ?1acute complicated injury   Moderate  (Must meet the requirements of at least 1 out of 3 categories)  Category 1: Tests, documents, or independent historian(s)  ? Any combination of 3 from the following:   ?Review of prior external note(s) from each unique source*;  ?Review of the result(s) of each unique test*;  ?Ordering of each unique test*;  ?Assessment requiring an independent historian(s)    or  Category 2: Independent interpretation of tests   ? Independent interpretation of a test performed by another physician/other qualified health care professional (not separately reported);     or  Category 3: Discussion of management or test interpretation  ? Discussion of management or test interpretation with external physician/other qualified health care professional/appropriate source (not separately reported)   Moderate risk of morbidity from additional diagnostic testing or treatment  Examples only:  ?Prescription drug management   ? Decision regarding minor surgery with identified patient or procedure risk factors  ? Decision regarding elective major surgery without identified patient or procedure risk factors   ? Diagnosis or treatment significantly limited by social determinants of health       30366  70298 High High  ? 1or more chronic illnesses with severe exacerbation, progression, or side effects of treatment;    or  ?1 acute or chronic illness or injury that poses a threat to life or bodily function   Extensive  (Must meet the requirements of at least 2 out of 3 categories)  Category 1: Tests, documents, or independent historian(s)  ? Any combination of 3 from the following:   ?Review of prior external note(s) from each unique source*;  ?Review of the result(s) of each unique test*;   ?Ordering of each unique test*;   ?Assessment requiring an independent historian(s)    or   Category 2: Independent interpretation of tests   ? Independent interpretation of a test performed by another physician/other qualified health care professional (not separately reported);     or  Category 3: Discussion of management or test interpretation  ? Discussion of management or test interpretation with external physician/other qualified health care professional/appropriate source (not separately reported)   High risk of morbidity from additional diagnostic testing or treatment  Examples only:  ?Drug therapy requiring intensive monitoring for toxicity  ? Decision regarding elective major surgery with identified patient or procedure risk factors  ? Decision regarding emergency major surgery  ? Decision regarding hospitalization  ? Decision not to resuscitate or to de-escalate care because of poor prognosis             I have personally performed a face-to-face diagnostic evaluation and management  service on this patient. I have independently seen the patient. I have independently obtained the above history from the patient/family. I have independently examined the patient with above findings. I have independently reviewed data/labs for this patient and developed the above plan of care (MDM).   Signed: Lauro Taylor MD, FACS

## 2022-08-10 PROBLEM — R22.2 ABDOMINAL WALL MASS: Status: ACTIVE | Noted: 2022-08-10

## 2022-08-10 LAB
ANION GAP SERPL CALC-SCNC: 2 MMOL/L (ref 7–16)
BUN SERPL-MCNC: 18 MG/DL (ref 8–23)
CALCIUM SERPL-MCNC: 9.1 MG/DL (ref 8.3–10.4)
CHLORIDE SERPL-SCNC: 95 MMOL/L (ref 98–107)
CO2 SERPL-SCNC: 31 MMOL/L (ref 21–32)
CREAT SERPL-MCNC: 0.3 MG/DL (ref 0.8–1.5)
GLUCOSE SERPL-MCNC: 114 MG/DL (ref 65–100)
MAGNESIUM SERPL-MCNC: 1.9 MG/DL (ref 1.8–2.4)
MM INDURATION, POC: 0 MM (ref 0–5)
PHOSPHATE SERPL-MCNC: 3.7 MG/DL (ref 2.3–3.7)
POTASSIUM SERPL-SCNC: 4.8 MMOL/L (ref 3.5–5.1)
PPD, POC: NEGATIVE
SODIUM SERPL-SCNC: 128 MMOL/L (ref 138–145)

## 2022-08-10 PROCEDURE — C9113 INJ PANTOPRAZOLE SODIUM, VIA: HCPCS | Performed by: INTERNAL MEDICINE

## 2022-08-10 PROCEDURE — 2580000003 HC RX 258: Performed by: INTERNAL MEDICINE

## 2022-08-10 PROCEDURE — 2580000003 HC RX 258: Performed by: EMERGENCY MEDICINE

## 2022-08-10 PROCEDURE — APPSS30 APP SPLIT SHARED TIME 16-30 MINUTES: Performed by: NURSE PRACTITIONER

## 2022-08-10 PROCEDURE — 6360000002 HC RX W HCPCS: Performed by: INTERNAL MEDICINE

## 2022-08-10 PROCEDURE — 97530 THERAPEUTIC ACTIVITIES: CPT

## 2022-08-10 PROCEDURE — 80048 BASIC METABOLIC PNL TOTAL CA: CPT

## 2022-08-10 PROCEDURE — 84100 ASSAY OF PHOSPHORUS: CPT

## 2022-08-10 PROCEDURE — 6370000000 HC RX 637 (ALT 250 FOR IP): Performed by: INTERNAL MEDICINE

## 2022-08-10 PROCEDURE — 99223 1ST HOSP IP/OBS HIGH 75: CPT | Performed by: INTERNAL MEDICINE

## 2022-08-10 PROCEDURE — 97163 PT EVAL HIGH COMPLEX 45 MIN: CPT

## 2022-08-10 PROCEDURE — 1100000000 HC RM PRIVATE

## 2022-08-10 PROCEDURE — 36415 COLL VENOUS BLD VENIPUNCTURE: CPT

## 2022-08-10 PROCEDURE — 83735 ASSAY OF MAGNESIUM: CPT

## 2022-08-10 PROCEDURE — A4216 STERILE WATER/SALINE, 10 ML: HCPCS | Performed by: INTERNAL MEDICINE

## 2022-08-10 PROCEDURE — 92526 ORAL FUNCTION THERAPY: CPT

## 2022-08-10 RX ADMIN — SODIUM CHLORIDE, PRESERVATIVE FREE 5 ML: 5 INJECTION INTRAVENOUS at 05:57

## 2022-08-10 RX ADMIN — SODIUM CHLORIDE, PRESERVATIVE FREE 40 MG: 5 INJECTION INTRAVENOUS at 21:13

## 2022-08-10 RX ADMIN — OXYCODONE 5 MG: 5 TABLET ORAL at 01:13

## 2022-08-10 RX ADMIN — FUROSEMIDE 40 MG: 40 TABLET ORAL at 08:00

## 2022-08-10 RX ADMIN — METOPROLOL TARTRATE 12.5 MG: 25 TABLET, FILM COATED ORAL at 21:13

## 2022-08-10 RX ADMIN — OXYCODONE 5 MG: 5 TABLET ORAL at 17:24

## 2022-08-10 RX ADMIN — SODIUM CHLORIDE, PRESERVATIVE FREE 10 ML: 5 INJECTION INTRAVENOUS at 08:01

## 2022-08-10 RX ADMIN — SODIUM CHLORIDE, PRESERVATIVE FREE 10 ML: 5 INJECTION INTRAVENOUS at 21:14

## 2022-08-10 RX ADMIN — SODIUM CHLORIDE, PRESERVATIVE FREE 5 ML: 5 INJECTION INTRAVENOUS at 14:27

## 2022-08-10 RX ADMIN — OXYCODONE 5 MG: 5 TABLET ORAL at 10:06

## 2022-08-10 RX ADMIN — METOPROLOL TARTRATE 12.5 MG: 25 TABLET, FILM COATED ORAL at 08:00

## 2022-08-10 RX ADMIN — SODIUM CHLORIDE, PRESERVATIVE FREE 5 ML: 5 INJECTION INTRAVENOUS at 21:13

## 2022-08-10 RX ADMIN — SODIUM CHLORIDE, PRESERVATIVE FREE 40 MG: 5 INJECTION INTRAVENOUS at 08:00

## 2022-08-10 RX ADMIN — OXYCODONE 5 MG: 5 TABLET ORAL at 21:13

## 2022-08-10 ASSESSMENT — PAIN DESCRIPTION - LOCATION
LOCATION: ABDOMEN
LOCATION: BACK

## 2022-08-10 ASSESSMENT — PAIN SCALES - GENERAL
PAINLEVEL_OUTOF10: 6
PAINLEVEL_OUTOF10: 0
PAINLEVEL_OUTOF10: 8

## 2022-08-10 ASSESSMENT — PAIN DESCRIPTION - DESCRIPTORS: DESCRIPTORS: ACHING

## 2022-08-10 ASSESSMENT — PAIN DESCRIPTION - ORIENTATION: ORIENTATION: MID

## 2022-08-10 NOTE — PROGRESS NOTES
Pt is resting quietly in bed, alert and oriented. Respirations even and unlabored on RA. No signs of distress noted at this time. Call light within reach. Will continue to round hourly.

## 2022-08-10 NOTE — PROGRESS NOTES
SPEECH PATHOLOGY NOTE    Oncology meeting with patient at time of SLP attempt. Will follow up at later time.      ASHER Cam, CCC-SLP  Speech Language Pathologist  Acute Rehabilitation Services  Contact: [place]

## 2022-08-10 NOTE — PROGRESS NOTES
Hospitalist Progress Note   Admit Date:  2022  2:17 PM   Name:  Susan Mendez   Age:  78 y.o. Sex:  male  :  1955   MRN:  [id number]   Room:  [facility]    Presenting Complaint: Shortness of Breath and Rectal Bleeding     Reason(s) for Admission: Respiratory failure with hypoxia [facility]) [facility] Course & Interval History:   Patient with past medical history of    Gastric adenocarcinoma s/p chemotherapy, s/p total gastrectomy and esophagojejunostomy, lateral lobectomy of the liver, distal pancreatectomy and splenectomy, status post J-tube placement. Hypertension  Pulmonary embolism on Lovenox  Recent admission for septic shock, respiratory failure with pleural effusion and pulmonary embolism. Recent COVID infection on May 1, 2022. Patient came from a nursing facility due to shortness of breath and hematochezia. In ER, his oxygenation saturation was 88% on room air. This improved with oxygen via cannula at 2 L/min. From CT chest, he is found to have   1. No pulmonary embolism or right heart strain. 2.  Distal bilateral lower lobe bronchial occlusion with findings of left   greater than right dependent aspiration/consolidation. Slight increased size of   small pleural effusion with decreased size of small right pleural effusion. CXR shows; Stable mild cardiomegaly without evolving acute changes suggested by plain   film. Cardiomegaly can be an early indicator for heart failure in the correct   clinical setting. Patient is admitted for Acute respiratory failure with hypoxia. Subjective/24hr Events (22): Patient reports to RN that he is considering hospice at home. During our discussion he is wanting to talk with oncology regarding further options. He says he feels weak but no further dyspnea, cough.       Assessment & Plan:     # Respiratory failure with hypoxia  #covid-19 infection  - weaned now to room air  - stable off steorids  - no indication for antivirals    # Gastic adenocarcinoma  - s/p surgical resections as in HPI  - no further report of hematochezia.  - H&H stable  - continue feedings via jejunostomy  - consult to oncology for AM    # HTN  - continue lopressor    # skin lesions  - face and scalp lesions w/ atypical appearing for boils or folliculitis, no improvement w/ antbx. - R supraclavicular, abdominal nodules likely metastatic.   - ID has seen - recommends biopsy of facial lesion and also enlarged LN's. Will d/w surgery, consider monkeypox swabbing if lesions not cancerous. 8/9   - Surgery has seen - suspects all metastatic disease. Recs for oncology eval to discuss if pt is candidate for further chemotherapy. Discharge Planning:      pending    Diet:  ADULT TUBE FEEDING; Jejunostomy; Standard without Fiber; Continuous; 15; Yes; 10; Q 8 hours; 65; 60; Q 4 hours  ADULT DIET; Clear Liquid  DVT PPx:   Code status: Full Code    Hospital Problems:  Principal Problem:    Respiratory failure with hypoxia ([facility] [address]  Active Problems:    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HCC)    Gastroesophageal reflux disease    Loss of weight    History of gastric cancer    Jejunostomy status ([facility] [address]    Hematochezia  Resolved Problems:    * No resolved hospital problems.  *      Objective:   Patient Vitals for the past 24 hrs:   Temp Pulse Resp BP SpO2   08/09/22 1957 -- 75 18 129/89 93 %   08/09/22 1700 -- -- 16 -- --   08/09/22 1455 97.3 °F (36.3 °C) (!) 103 16 112/86 96 %   08/09/22 1158 -- -- 16 -- --   08/09/22 1101 97.3 °F (36.3 °C) 57 18 114/75 97 %   08/09/22 0734 97.5 °F (36.4 °C) 80 16 114/83 95 %   08/09/22 0632 -- -- 18 -- --   08/09/22 0602 -- -- 20 -- --   08/09/22 0255 97.9 °F (36.6 °C) 77 19 123/87 93 %   08/09/22 0127 -- -- 20 -- --   08/09/22 0057 -- -- 20 -- --   08/08/22 2200 -- 89 -- (!) 127/90 --         Oxygen Therapy  SpO2: 93 %  Pulse via Oximetry: 70 beats per minute  Pulse Oximeter Device Mode: Continuous  O2 Device: None (Room air)  O2 Flow Rate (L/min): 2 L/min    Estimated body mass index is 15.9 kg/m² as calculated from the following:    Height as of this encounter: 5' 9\" (1.753 m). Weight as of this encounter: 107 lb 11.2 oz (48.9 kg). Intake/Output Summary (Last 24 hours) at 8/9/2022 2131  Last data filed at 8/9/2022 1821  Gross per 24 hour   Intake 70 ml   Output 800 ml   Net -730 ml           Physical Exam:     Blood pressure 129/89, pulse 75, temperature 97.3 °F (36.3 °C), temperature source Oral, resp. rate 18, height 5' 9\" (1.753 m), weight 107 lb 11.2 oz (48.9 kg), SpO2 93 %. General:    cachectic  Head:  Normocephalic, atraumatic  Eyes:  Sclerae appear normal.  Pupils equally round. ENT:  Nares appear normal, no drainage. Moist oral mucosa  Neck:  No restricted ROM. Trachea midline   CV:   RRR. No m/r/g. No jugular venous distension. Lungs:   CTAB. No wheezing, rhonchi, or rales. Symmetric expansion. Abdomen: Bowel sounds present. Soft, nontender, nondistended. Extremities: No cyanosis or clubbing. No edema  Skin:     Erythematous raised nodule of left face, nodules of posterior scalp. Enlarged lymph nodes of right supraclavicular. Neuro:  CN II-XII grossly intact. Sensation intact. A&Ox3  Psych:  Normal mood and affect.       I have personally reviewed labs and tests showing:  Recent Labs:  Recent Results (from the past 48 hour(s))   CBC with Auto Differential    Collection Time: 08/08/22  3:58 AM   Result Value Ref Range    WBC 7.4 4.3 - 11.1 K/uL    RBC 4.09 (L) 4.23 - 5.6 M/uL    Hemoglobin 11.0 (L) 13.6 - 17.2 g/dL    Hematocrit 35.7 (L) 41.1 - 50.3 %    MCV 87.3 79.6 - 97.8 FL    MCH 26.9 26.1 - 32.9 PG    MCHC 30.8 (L) 31.4 - 35.0 g/dL    RDW 16.1 (H) 11.9 - 14.6 %    Platelets 593 (H) 011 - 450 K/uL    MPV 9.2 (L) 9.4 - 12.3 FL    nRBC 0.00 0.0 - 0.2 K/uL    Differential Type AUTOMATED      Seg Neutrophils 55 43 - 78 %    Lymphocytes 30 13 - 44 %    Monocytes 12 4.0 - 12.0 %    Eosinophils % 2 0.5 - 7.8 % Basophils 1 0.0 - 2.0 %    Immature Granulocytes 0 0.0 - 5.0 %    Segs Absolute 4.1 1.7 - 8.2 K/UL    Absolute Lymph # 2.2 0.5 - 4.6 K/UL    Absolute Mono # 0.9 0.1 - 1.3 K/UL    Absolute Eos # 0.2 0.0 - 0.8 K/UL    Basophils Absolute 0.1 0.0 - 0.2 K/UL    Absolute Immature Granulocyte 0.0 0.0 - 0.5 K/UL   Basic Metabolic Panel    Collection Time: 08/08/22  3:58 AM   Result Value Ref Range    Sodium 130 (L) 138 - 145 mmol/L    Potassium 4.4 3.5 - 5.1 mmol/L    Chloride 95 (L) 98 - 107 mmol/L    CO2 28 21 - 32 mmol/L    Anion Gap 7 7 - 16 mmol/L    Glucose 116 (H) 65 - 100 mg/dL    BUN 26 (H) 8 - 23 MG/DL    Creatinine 0.40 (L) 0.8 - 1.5 MG/DL    GFR African American >60 >60 ml/min/1.73m2    GFR Non- >60 >60 ml/min/1.73m2    Calcium 9.1 8.3 - 10.4 MG/DL   Magnesium    Collection Time: 08/08/22  3:58 AM   Result Value Ref Range    Magnesium 1.9 1.8 - 2.4 mg/dL   Phosphorus    Collection Time: 08/08/22  3:58 AM   Result Value Ref Range    Phosphorus 4.2 (H) 2.3 - 3.7 MG/DL   CBC with Auto Differential    Collection Time: 08/09/22  4:43 PM   Result Value Ref Range    WBC 8.4 4.3 - 11.1 K/uL    RBC 3.99 (L) 4.23 - 5.6 M/uL    Hemoglobin 10.8 (L) 13.6 - 17.2 g/dL    Hematocrit 35.4 (L) 41.1 - 50.3 %    MCV 88.7 79.6 - 97.8 FL    MCH 27.1 26.1 - 32.9 PG    MCHC 30.5 (L) 31.4 - 35.0 g/dL    RDW 16.0 (H) 11.9 - 14.6 %    Platelets 711 (H) 719 - 450 K/uL    MPV 9.3 (L) 9.4 - 12.3 FL    nRBC 0.00 0.0 - 0.2 K/uL    Differential Type AUTOMATED      Seg Neutrophils 57 43 - 78 %    Lymphocytes 27 13 - 44 %    Monocytes 12 4.0 - 12.0 %    Eosinophils % 2 0.5 - 7.8 %    Basophils 1 0.0 - 2.0 %    Immature Granulocytes 1 0.0 - 5.0 %    Segs Absolute 4.8 1.7 - 8.2 K/UL    Absolute Lymph # 2.3 0.5 - 4.6 K/UL    Absolute Mono # 1.0 0.1 - 1.3 K/UL    Absolute Eos # 0.2 0.0 - 0.8 K/UL    Basophils Absolute 0.1 0.0 - 0.2 K/UL    Absolute Immature Granulocyte 0.1 0.0 - 0.5 K/UL   Basic Metabolic Panel    Collection Time: 08/09/22  4:43 PM   Result Value Ref Range    Sodium 131 (L) 136 - 145 mmol/L    Potassium 4.7 3.5 - 5.1 mmol/L    Chloride 95 (L) 98 - 107 mmol/L    CO2 28 21 - 32 mmol/L    Anion Gap 8 7 - 16 mmol/L    Glucose 110 (H) 65 - 100 mg/dL    BUN 18 8 - 23 MG/DL    Creatinine 0.20 (L) 0.8 - 1.5 MG/DL    GFR African American >60 >60 ml/min/1.73m2    GFR Non- >60 >60 ml/min/1.73m2    Calcium 8.8 8.3 - 10.4 MG/DL       I have personally reviewed imaging studies showing: Other Studies:  XR CHEST 1 VIEW   Final Result   Improved bibasilar lung atelectasis and small pleural effusions. CT CHEST PULMONARY EMBOLISM W CONTRAST   Final Result   1. No pulmonary embolism or right heart strain. 2.  Distal bilateral lower lobe bronchial occlusion with findings of left   greater than right dependent aspiration/consolidation. Slight increased size of   small pleural effusion with decreased size of small right pleural effusion. XR CHEST PORTABLE   Final Result   1. Stable mild cardiomegaly without evolving acute changes suggested by plain   film. Cardiomegaly can be an early indicator for heart failure in the correct   clinical setting. This report was made using voice transcription. Despite my best efforts to avoid   any, transcription errors may persist. If there is any question about the   accuracy of the report or need for clarification, then please call [phone], or text me through [email or web address] for clarification or correction.            Current Meds:  Current Facility-Administered Medications   Medication Dose Route Frequency    tuberculin injection 5 Units  5 Units IntraDERmal Once    [START ON 8/10/2022] furosemide (LASIX) tablet 40 mg  40 mg Oral Daily    bisacodyl (DULCOLAX) suppository 10 mg  10 mg Rectal Daily PRN    pantoprazole (PROTONIX) 40 mg in sodium chloride (PF) 10 mL injection  40 mg IntraVENous Q12H    potassium chloride (KLOR-CON M) extended release tablet 40

## 2022-08-10 NOTE — CONSULTS
[facility] Hematology and Oncology: Inpatient Hematology / Oncology Consult Note    Reason for Consult:  metastatic gastric cancer  Referring Physician:  Mini Mckinney DO    History of Present Illness:  Mr. Sherin Rodriguez is a 78 y.o. male admitted on 8/6/2022. There were no encounter diagnoses. He was originally diagnosed with localized gastric cancer and completed 4 cycles of perioperative FLOT in May. He underwent ex lap in June which unfortunately showed extensive locally advanced disease including abdominal wall mass. His postoperative course was complicated by respiratory failure requiring intubation and hypotension requiring vasopressors. He was discharged to [facility] then [facility] but readmitted due to SOB and possible GI bleeding. He also has a new abdominal wall lesion that is very suspicious for metastasis in this context. Oncology is consulted for recommendations. Review of Systems:  ROS:  Constitutional: Positive for weakness, malaise, fatigue. CV: Negative for chest pain, palpitations, edema. Respiratory: Positive for dyspnea. GI: Positive for nausea, abdominal pain, weight loss.      Allergies   Allergen Reactions    Iron     Tetanus Antitoxin      Past Medical History:   Diagnosis Date    ABLA (acute blood loss anemia) 6/2/2022    GIB (gastrointestinal bleeding) 6/2/2022    HTN (hypertension)     Severe sepsis with lactic acidosis ([address] 6/2/2022     Past Surgical History:   Procedure Laterality Date    BRONCHOSCOPY N/A 6/16/2022    BRONCHOSCOPY performed by Jean Rubalcava MD at [address] N/A 6/17/2022    BRONCHOSCOPY performed by Jean Rubalcava MD at [address] N/A 6/18/2022    BRONCHOSCOPY performed by Tamir Alcaraz MD at [facility] ENDOSCOPY    COLONOSCOPY N/A 3/9/2022    COLONOSCOPY/ 22 performed by Leigh Love MD at [place] N/A 6/9/2022    Exploratory laparotomy with total gastrectomy and esophagojejunostomy after extensive lysis of adhesions and dissections which added at least 3-4 hours to this case, Left lateral lobectomy of liver, Distal pancreatectomy and splenectomy, Combined diaphragmatic resection, Falciform ligament flap, Feeding J-tube placement, Retroperitoneal mass excision and lymphadenectomy, Left abdominal wall mass ex    IR CHEST TUBE INSERTION  6/20/2022    IR CHEST TUBE INSERTION 6/20/2022 [facility] SPECI[facility]    IR CHEST TUBE INSERTION  7/5/2022    IR CHEST TUBE INSERTION 7/5/2022 [facility]CI[facility]    IR IVC FILTER PLACEMENT W IMAGING  6/6/2022    IR IVC FILTER PLACEMENT W IMAGING 6/6/2022 [facility] SPECI[facility]    KNEE ARTHROSCOPY      OTHER SURGICAL HISTORY      none    THORACENTESIS Bilateral 6/15/2022    THORACENTESIS ULTRASOUND performed by Lou Truong MD at [facility] ENDOSCOPY    UPPER GASTROINTESTINAL ENDOSCOPY N/A 6/4/2022    EGD ESOPHAGOGASTRODUODENOSCOPY performed by Mary Sherwood MD at [facility] ENDOSCOPY     Family History   Problem Relation Age of Onset    Other Other         non-contributory     Social History     Socioeconomic History    Marital status:      Spouse name: Not on file    Number of children: Not on file    Years of education: Not on file    Highest education level: Not on file   Occupational History    Not on file   Tobacco Use    Smoking status: Former    Smokeless tobacco: Former   Substance and Sexual Activity    Alcohol use: Not Currently    Drug use: Not Currently    Sexual activity: Not on file   Other Topics Concern    Not on file   Social History Narrative    Not on file     Social Determinants of Health     Financial Resource Strain: Not on file   Food Insecurity: Not on file   Transportation Needs: Not on file   Physical Activity: Not on file   Stress: Not on file   Social Connections: Not on file   Intimate Partner Violence: Not on file   Housing Stability: Not on file     Current Facility-Administered Medications   Medication Dose Route Frequency Provider Last Rate Last Admin    furosemide (LASIX) tablet 40 mg  40 mg Oral Daily Cyndia Right, DO   40 mg at 08/10/22 0800    bisacodyl (DULCOLAX) suppository 10 mg  10 mg Rectal Daily PRN Cyndia Right, DO   10 mg at 08/09/22 1643    pantoprazole (PROTONIX) 40 mg in sodium chloride (PF) 10 mL injection  40 mg IntraVENous Q12H Cyndia Right, DO   40 mg at 08/10/22 0800    potassium chloride (KLOR-CON M) extended release tablet 40 mEq  40 mEq Oral PRN Dorothy Mohs, MD        Or    potassium bicarb-citric acid (EFFER-K) effervescent tablet 40 mEq  40 mEq Oral PRN Dorothy Mohs, MD        Or    potassium chloride 10 mEq/100 mL IVPB (Peripheral Line)  10 mEq IntraVENous PRN Dorothy Mohs, MD        magnesium sulfate 2000 mg in 50 mL IVPB premix  2,000 mg IntraVENous PRN Dorothy Mohs, MD        sodium phosphate 10 mmol in sodium chloride 0.9 % 250 mL IVPB  10 mmol IntraVENous PRN Dorothy Mohs, MD        Or    sodium phosphate 15 mmol in sodium chloride 0.9 % 250 mL IVPB  15 mmol IntraVENous PRN Dorothy Mohs, MD        Or    sodium phosphate 20 mmol in sodium chloride 0.9 % 500 mL IVPB  20 mmol IntraVENous PRN Dorothy Mohs, MD        oxyCODONE (ROXICODONE) immediate release tablet 5 mg  5 mg Per J Tube Q4H PRN Dorothy Mohs, MD   5 mg at 08/10/22 1006    sodium chloride flush 0.9 % injection 5 mL  5 mL IntraVENous PRN Sowmya Tafoya MD        sodium chloride flush 0.9 % injection 5 mL  5 mL IntraVENous Q8H Sowmya Tafoya MD   5 mL at 08/10/22 0557    sodium chloride flush 0.9 % injection 5-40 mL  5-40 mL IntraVENous 2 times per day Naseem Hsu MD   10 mL at 08/10/22 0801    sodium chloride flush 0.9 % injection 5-40 mL  5-40 mL IntraVENous PRN Naseem Hsu MD        0[place]9 % sodium chloride infusion   IntraVENous PRN Naseem Hsu MD        ondansetron (ZOFRAN-ODT) disintegrating tablet 4 mg  4 mg Oral Q8H PRN Dorothy Mohs, MD   4 mg at 08/07/22 0620    Or ondansetron (ZOFRAN) injection 4 mg  4 mg IntraVENous Q6H PRN Estella Perez MD   4 mg at 22 194    polyethylene glycol (GLYCOLAX) packet 17 g  17 g Oral Daily PRN Chao Fierro MD   17 g at 22 6263    acetaminophen (TYLENOL) tablet 650 mg  650 mg Oral Q6H PRN Chao Fierro MD        Or    acetaminophen (TYLENOL) suppository 650 mg  650 mg Rectal Q6H PRN Chao Fierro MD        [facility] [place] by provider] enoxaparin (LOVENOX) injection 50 mg  1 mg/kg SubCUTAneous Q12H Chao Fierro MD        [facility]) nebulization 0.63 mg  0.63 mg Nebulization Q6H PRN Chao Fierro MD        metoprolol tartrate (LOPRESSOR) tablet 12.5 mg  12.5 mg Per J Tube BID Chao Fierro MD   12.5 mg at 08/10/22 0800       OBJECTIVE:  Patient Vitals for the past 8 hrs:   BP Temp Temp src Pulse Resp SpO2   08/10/22 0724 117/86 97.5 °F (36.4 °C) Axillary 75 16 99 %   08/10/22 0255 110/85 97.5 °F (36.4 °C) -- 76 18 93 %     Temp (24hrs), Av.5 °F (36.4 °C), Min:97.3 °F (36.3 °C), Max:98.1 °F (36.7 °C)    08/10 07 - 08/10 1900  In: 0   Out: 450 [Urine:450]    Physical Exam:  Constitutional: Thin cachectic male in no acute distress, sitting comfortably in the hospital bed. HEENT: Normocephalic and atraumatic. Sclerae anicteric. Neck supple without JVD. No thyromegaly present. Lymph node   No palpable submandibular, cervical, supraclavicular lymph nodes. Skin Warm and dry. No bruising and no rash noted. No erythema. No pallor. Respiratory Lungs are clear to auscultation bilaterally without wheezes, rales or rhonchi, normal air exchange without accessory muscle use. CVS Normal rate, regular rhythm and normal S1 and S2. No murmurs, gallops, or rubs. Abdomen Soft, scaphoid, nontender and nondistended, normoactive bowel sounds. Neuro Grossly nonfocal with no obvious sensory or motor deficits. MSK Normal range of motion in general.  No edema and no tenderness. Psych Appropriate mood and affect.       Labs:    Recent Results (from the past 24 hour(s))   CBC with Auto Differential    Collection Time: 08/09/22  4:43 PM   Result Value Ref Range    WBC 8.4 4.3 - 11.1 K/uL    RBC 3.99 (L) 4.23 - 5.6 M/uL    Hemoglobin 10.8 (L) 13.6 - 17.2 g/dL    Hematocrit 35.4 (L) 41.1 - 50.3 %    MCV 88.7 79.6 - 97.8 FL    MCH 27.1 26.1 - 32.9 PG    MCHC 30.5 (L) 31.4 - 35.0 g/dL    RDW 16.0 (H) 11.9 - 14.6 %    Platelets 959 (H) 013 - 450 K/uL    MPV 9.3 (L) 9.4 - 12.3 FL    nRBC 0.00 0.0 - 0.2 K/uL    Differential Type AUTOMATED      Seg Neutrophils 57 43 - 78 %    Lymphocytes 27 13 - 44 %    Monocytes 12 4.0 - 12.0 %    Eosinophils % 2 0.5 - 7.8 %    Basophils 1 0.0 - 2.0 %    Immature Granulocytes 1 0.0 - 5.0 %    Segs Absolute 4.8 1.7 - 8.2 K/UL    Absolute Lymph # 2.3 0.5 - 4.6 K/UL    Absolute Mono # 1.0 0.1 - 1.3 K/UL    Absolute Eos # 0.2 0.0 - 0.8 K/UL    Basophils Absolute 0.1 0.0 - 0.2 K/UL    Absolute Immature Granulocyte 0.1 0.0 - 0.5 K/UL   Basic Metabolic Panel    Collection Time: 08/09/22  4:43 PM   Result Value Ref Range    Sodium 131 (L) 136 - 145 mmol/L    Potassium 4.7 3.5 - 5.1 mmol/L    Chloride 95 (L) 98 - 107 mmol/L    CO2 28 21 - 32 mmol/L    Anion Gap 8 7 - 16 mmol/L    Glucose 110 (H) 65 - 100 mg/dL    BUN 18 8 - 23 MG/DL    Creatinine 0.20 (L) 0.8 - 1.5 MG/DL    GFR African American >60 >60 ml/min/1.73m2    GFR Non- >60 >60 ml/min/1.73m2    Calcium 8.8 8.3 - 10.4 MG/DL   Basic Metabolic Panel    Collection Time: 08/10/22  4:22 AM   Result Value Ref Range    Sodium 128 (L) 138 - 145 mmol/L    Potassium 4.8 3.5 - 5.1 mmol/L    Chloride 95 (L) 98 - 107 mmol/L    CO2 31 21 - 32 mmol/L    Anion Gap 2 (L) 7 - 16 mmol/L    Glucose 114 (H) 65 - 100 mg/dL    BUN 18 8 - 23 MG/DL    Creatinine 0.30 (L) 0.8 - 1.5 MG/DL    GFR African American >60 >60 ml/min/1.73m2    GFR Non- >60 >60 ml/min/1.73m2    Calcium 9.1 8.3 - 10.4 MG/DL   Magnesium Collection Time: 08/10/22  4:22 AM   Result Value Ref Range    Magnesium 1.9 1.8 - 2.4 mg/dL   Phosphorus    Collection Time: 08/10/22  4:22 AM   Result Value Ref Range    Phosphorus 3.7 2.3 - 3.7 MG/DL   PLEASE READ & DOCUMENT PPD TEST IN 24 HRS    Collection Time: 08/10/22 10:11 AM   Result Value Ref Range    PPD, (POC) Negative Negative    mm Induration 0 0 - 5 mm       Imaging:  XR ABDOMEN (KUB) (SINGLE AP VIEW)    Result Date: 7/13/2022  KUB CLINICAL INDICATION:  Feeding tube placement, needed for medication and feeding COMPARISON: CT chest 7/8/2022, radiograph chest 7/5/2022, CT angiography of the abdomen 222 TECHNIQUE: AP view of the abdomen portable supine 6:05 PM FINDINGS:  There is a feeding tube on the left with tip projecting over left mid abdomen. Contrast is seen to enter several left mid to upper abdominal small bowel loops likely representing jejunum. On this limited single plane portable image there is no evidence of extravasation. Left upper abdominal sutures, right upper quadrant IVC filter, right upper quadrant calcified granulomata of the liver are noted. Moderate volume stool. Nonobstructive bowel gas pattern. Feeding tube tip within jejunum. XR CHEST PORTABLE    Result Date: 8/6/2022  CHEST X-RAY, single portable view  8/6/2022 History: Shortness of breath. Technique: Single frontal view of the chest. Comparison: Chest x-ray 7/14/2022 Findings: The cardiac silhouette is mildly enlarged although stable. The lungs are expanded without evidence for pneumothorax. Edges overlie the bilateral, lateral hemithoraces which appear to represent skin folds. No evolving consolidative airspace process or pleural effusion is seen. 1.  Stable mild cardiomegaly without evolving acute changes suggested by plain film. Cardiomegaly can be an early indicator for heart failure in the correct clinical setting. This report was made using voice transcription.  Despite my best efforts to avoid any, transcription errors may persist. If there is any question about the accuracy of the report or need for clarification, then please call [phone], or text me through perfectserv for clarification or correction. XR CHEST PORTABLE    Result Date: 7/14/2022  Portable chest: History: evaluate pleural effusions Comparison: 07/05/2022 Findings: Portable AP views were obtained at 9:14 AM. Left-sided chest tube has been removed. There are overlying differences in opacity within the left hemithorax most likely representing skin folds. Lucency at the left apex corresponds to emphysematous changes on CT scanning. The cardiac silhouette is normal in size. Mild left basilar opacity suggest atelectasis/infiltrate and possible small left pleural effusion. The right lung is grossly clear. The pulmonary vascularity is within normal limits. 1. Interval removal of left-sided chest tube. No definite pneumothorax is appreciated. Skin folds overlie the left of the thorax. 2. Mild left basilar opacity suggesting atelectasis/fluid and possible small effusion. XR CHEST 1 VIEW    Result Date: 8/8/2022  EXAM: Chest x-ray. INDICATION: Dyspnea. COMPARISON: Prior CT chest on August 6, 2022. TECHNIQUE: Frontal view chest x-ray. FINDINGS: Bibasilar lung atelectasis and small pleural effusions have improved, with minimal residual. The heart size and pulmonary vasculature are normal. No pneumothorax is seen. Improved bibasilar lung atelectasis and small pleural effusions. CT CHEST PULMONARY EMBOLISM W CONTRAST    Result Date: 8/6/2022  EXAMINATION: CT CHEST PULMONARY EMBOLISM W CONTRAST 8/6/2022 6:15 PM ACCESSION NUMBER: [id number] COMPARISON: Chest radiograph earlier same day.  CT chest July 8, 2022 INDICATION: r/o PE TECHNIQUE: Multiple contiguous axial CT images of the chest were obtained from the lung apices to the lung bases after the intravenous administration of 100 mL Isovue 370 per pulmonary angiography protocol. Coronal reconstructions were performed. Radiation dose reduction techniques were used for this study. Our CT scanners use one or all of the following: Automated exposure control, adjustment of the mA and/or kV according to patient size, iterative reconstruction. FINDINGS: The pulmonary arteries are well-opacified to at least the level of the large subsegmental branches without filling defect or evidence of right heart strain. The main pulmonary artery is normal caliber. There is no pericardial effusion. The thoracic aorta is normal in caliber and appearance. Scattered reactive mediastinal lymph nodes involving multiple stations, not enlarged considering enlarged by size criteria. Moderate to severe apical predominant emphysema. Interval removal of left thoracostomy tube with slight increased size of a small pleural effusion. Bilateral lower lobe distal bronchial impaction with worsening right greater than left dependent consolidation/aspiration . Mild dependent atelectasis and/or aspiration of the dependent right lower lobe. Hiatal hernia with esophageal fluid extending into the mid thorax. No acute or aggressive osseous abnormalities. 1.  No pulmonary embolism or right heart strain. 2.  Distal bilateral lower lobe bronchial occlusion with findings of left greater than right dependent aspiration/consolidation. Slight increased size of small pleural effusion with decreased size of small right pleural effusion. ASSESSMENT:    Principal Problem:    Respiratory failure with hypoxia (HCC)  Active Problems:    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HCC)    Gastroesophageal reflux disease    Loss of weight    History of gastric cancer    Jejunostomy status (HCC)    Hematochezia  Resolved Problems:    * No resolved hospital problems.  *      PLAN / RECOMMENDATIONS:  Gastric carcinoma  S/p FLOT x4 then resection with extensive disease noted at surgery  Now with FTT after long inpatient, LTAC, SNF stay, new abdominal mass worrisome for recurrence  Given his extensive disease at surgery, the likelihood of metastatic disease is very high, either here or elsewhere  Regardless, he cannot tolerate any more disease specific treatment  He has already discussed hospice care with his family and he says \"I don't want to suffer any more\"  I believe hospice care is appropriate given the clinical scenario and agree with consultation    Thrombocytosis  Reactive from acute/chronic illness and splenectomy  Given above, no additional work-up indicated    Lab studies were personally reviewed. Pertinent old records were reviewed. Thank you for allowing me to participate in the care of Mr. Duncan Leo. Please call with other oncologic questions.         Emelyn Hastings MD, MD  [facility]ematology [facility] Oncology  [address] [place] [address]  Office : [phone]  Fax : [phone]

## 2022-08-10 NOTE — PROGRESS NOTES
[facility] Hematology & Oncology        Inpatient Hematology / Oncology Plan of Care    Reason for Consult:  Respiratory failure with hypoxia [facility]) [J96.91]  Referring Physician:  Karla Wright DO    History of Present Illness:  Mr. Elizabeth Piedra is a 78 y.o. male admitted on 8/6/2022. There were no encounter diagnoses. Mr Elizabeth Piedra has a PMH of  gastric cancer. He is a patient of Dr Daysi Iverson. He was initially diagnosed with gastric adenocarcinoma stage IIb earlier this year and completed FLOT x4 in 5/2022. He was seen by Dr Brittany Casas who was planning for surgical resection. However, he was admitted 6/2/22 with syncope and significant anemia. Imaging showed PE and DVT in L femoral vein. He developed GIB and had IVC filter placed 6/6. He underwent ex-lap 6/9/22 with total gastrectomy and esophagojejunostomy and L lateral lobectomy of liver, distal pancreatectomy, and splenectomy, diaphragmatic resection and RP and abdominal wall mass excision all secondary to direct invasion from gastric mass and pathology confirmed locally advanced gastric cancer and pathologically staged as T4bN1. His post-operative course was complicated by ICU admission for ventilator and pressor support, pleural effusions, dysrhythmia, and respiratory failure requiring multiple bronchoscopy. He was discharged to [facility] on 6/21/22 but was admitted through ED after presenting form nursing home with shortness of breath and possible GIB. He was noted to be positive for COVID. Of note, he was noted to have nodular lesions on face, supraclavicular area and abdomen. ID consulted and concern is for metastatic lesions. Surgery also consulted and abdominal lesion highly suspicious for metastatic cancer given his previous presentation. Family may be interested in hospice given above. Of note, he does have thrombocytosis previously evaluated by our team and JAK2 testing negative and ultimately felt to be reactive in nature/secondary to ABILIO.  Oncology consulted for further recommendations. Review of Systems:    Not completed - patient not seen in person. Chart reviewed, data abstracted.       Allergies   Allergen Reactions    Iron     Tetanus Antitoxin      Past Medical History:   Diagnosis Date    ABLA (acute blood loss anemia) 6/2/2022    GIB (gastrointestinal bleeding) 6/2/2022    HTN (hypertension)     Severe sepsis with lactic acidosis ([facility] [address] 6/2/2022     Past Surgical History:   Procedure Laterality Date    BRONCHOSCOPY N/A 6/16/2022    BRONCHOSCOPY performed by Sahra Sahu MD at [facility] ENDOSCOPY    BRONCHOSCOPY N/A 6/17/2022    BRONCHOSCOPY performed by Sahra Sahu MD at [facility] ENDOSCOPY    BRONCHOSCOPY N/A 6/18/2022    BRONCHOSCOPY performed by Gm Buckley MD at [facility] ENDOSCOPY    COLONOSCOPY N/A 3/9/2022    COLONOSCOPY/ 25 performed by Guillermo Cordova MD at [address] [place] [address] N/A 6/9/2022    Exploratory laparotomy with total gastrectomy and esophagojejunostomy after extensive lysis of adhesions and dissections which added at least 3-4 hours to this case, Left lateral lobectomy of liver, Distal pancreatectomy and splenectomy, Combined diaphragmatic resection, Falciform ligament flap, Feeding J-tube placement, Retroperitoneal mass excision and lymphadenectomy, Left abdominal wall mass ex    IR CHEST TUBE INSERTION  6/20/2022    IR CHEST TUBE INSERTION 6/20/2022 [facility]    IR CHEST TUBE INSERTION  7/5/2022    IR CHEST TUBE INSERTION 7/5/2022 [facility]    IR IVC FILTER PLACEMENT W IMAGING  6/6/2022    IR IVC FILTER PLACEMENT W IMAGING 6/6/2022 [facility]    KNEE ARTHROSCOPY      OTHER SURGICAL HISTORY      none    THORACENTESIS Bilateral 6/15/2022    THORACENTESIS ULTRASOUND performed by Gm Buckley MD at [facility] ENDOSCOPY    UPPER GASTROINTESTINAL ENDOSCOPY N/A 6/4/2022    EGD ESOPHAGOGASTRODUODENOSCOPY performed by Chayito Granados MD at [facility] ENDOSCOPY     Family History   Problem Relation Age of Onset    Other Other         non-contributory     Social History     Socioeconomic History    Marital status:      Spouse name: Not on file    Number of children: Not on file    Years of education: Not on file    Highest education level: Not on file   Occupational History    Not on file   Tobacco Use    Smoking status: Former    Smokeless tobacco: Former   Substance and Sexual Activity    Alcohol use: Not Currently    Drug use: Not Currently    Sexual activity: Not on file   Other Topics Concern    Not on file   Social History Narrative    Not on file     Social Determinants of Health     Financial Resource Strain: Not on file   Food Insecurity: Not on file   Transportation Needs: Not on file   Physical Activity: Not on file   Stress: Not on file   Social Connections: Not on file   Intimate Partner Violence: Not on file   Housing Stability: Not on file     Current Facility-Administered Medications   Medication Dose Route Frequency Provider Last Rate Last Admin    tuberculin injection 5 Units  5 Units IntraDERmal Once Yolanda Hippo, DO   5 Units at 08/09/22 1051    furosemide (LASIX) tablet 40 mg  40 mg Oral Daily Yolanda Hippo, DO   40 mg at 08/10/22 0800    bisacodyl (DULCOLAX) suppository 10 mg  10 mg Rectal Daily PRN Yolanda Hippo, DO   10 mg at 08/09/22 1643    pantoprazole (PROTONIX) 40 mg in sodium chloride (PF) 10 mL injection  40 mg IntraVENous Q12H Yolanda Hippo, DO   40 mg at 08/10/22 0800    potassium chloride (KLOR-CON M) extended release tablet 40 mEq  40 mEq Oral PRN Michael Gordon MD        Or    potassium bicarb-citric acid (EFFER-K) effervescent tablet 40 mEq  40 mEq Oral PRN Michael Gordon MD        Or    potassium chloride 10 mEq/100 mL IVPB (Peripheral Line)  10 mEq IntraVENous PRN Michael Gordon MD        magnesium sulfate 2000 mg in 50 mL IVPB premix  2,000 mg IntraVENous PRN Michael Gordon MD        sodium phosphate 10 mmol in sodium chloride 0.9 % 250 mL IVPB  10 mmol IntraVENous PRN Emerson Ruelas MD        Or    sodium phosphate 15 mmol in sodium chloride 0.9 % 250 mL IVPB  15 mmol IntraVENous PRN Emerson Ruelas MD        Or    sodium phosphate 20 mmol in sodium chloride 0.9 % 500 mL IVPB  20 mmol IntraVENous PRN Emerson Ruelas MD        oxyCODONE (ROXICODONE) immediate release tablet 5 mg  5 mg Per J Tube Q4H PRN Emerson Ruelas MD   5 mg at 08/10/22 1006    sodium chloride flush 0.9 % injection 5 mL  5 mL IntraVENous PRN Nery Suárez MD        sodium chloride flush 0.9 % injection 5 mL  5 mL IntraVENous Q8H Nery Suárez MD   5 mL at 08/10/22 0557    sodium chloride flush 0.9 % injection 5-40 mL  5-40 mL IntraVENous 2 times per day Vicente Polanco MD   10 mL at 08/10/22 0801    sodium chloride flush 0.9 % injection 5-40 mL  5-40 mL IntraVENous PRN Vicente Polanco MD        0.9 % sodium chloride infusion   IntraVENous PRN Vicente Polanco MD        ondansetron (ZOFRAN-ODT) disintegrating tablet 4 mg  4 mg Oral Q8H PRN Te Dumont MD   4 mg at 08/07/22 0620    Or    ondansetron (ZOFRAN) injection 4 mg  4 mg IntraVENous Q6H PRN Emerson Ruelas MD   4 mg at 08/09/22 1942    polyethylene glycol (GLYCOLAX) packet 17 g  17 g Oral Daily PRN Vicente Ploanco MD   17 g at 08/09/22 0603    acetaminophen (TYLENOL) tablet 650 mg  650 mg Oral Q6H PRN Vicente Polanco MD        Or    acetaminophen (TYLENOL) suppository 650 mg  650 mg Rectal Q6H PRN Vicente Polanco MD        [Held by provider] enoxaparin (LOVENOX) injection 50 mg  1 mg/kg SubCUTAneous Q12H Vicente Polanco MD        [facility]) nebulization 0.63 mg  0.63 mg Nebulization Q6H PRN Vicente Polanco MD        metoprolol tartrate (LOPRESSOR) tablet 12.5 mg  12.5 mg Per J Tube BID Vicente Polanco MD   12.5 mg at 08/10/22 0800       OBJECTIVE:  Patient Vitals for the past 8 hrs:   BP Temp Temp src Pulse Resp SpO2   08/10/22 0724 117/86 97.5 °F (36.4 °C) Axillary 75 16 99 %   08/10/22 0255 110/85 97.5 °F (36.4 °C) -- 76 18 93 %     Temp (24hrs), Av.5 °F (36.4 °C), Min:97.3 °F (36.3 °C), Max:98.1 °F (36.7 °C)    08/10 07 - 08/10 1900  In: 0   Out: 450 [Urine:450]    Physical Exam:    Not completed - patient not seen in person. Chart reviewed, data abstracted.     Labs:    Recent Results (from the past 24 hour(s))   CBC with Auto Differential    Collection Time: 22  4:43 PM   Result Value Ref Range    WBC 8.4 4.3 - 11.1 K/uL    RBC 3.99 (L) 4.23 - 5.6 M/uL    Hemoglobin 10.8 (L) 13.6 - 17.2 g/dL    Hematocrit 35.4 (L) 41.1 - 50.3 %    MCV 88.7 79.6 - 97.8 FL    MCH 27.1 26.1 - 32.9 PG    MCHC 30.5 (L) 31.4 - 35.0 g/dL    RDW 16.0 (H) 11.9 - 14.6 %    Platelets 109 (H) 717 - 450 K/uL    MPV 9.3 (L) 9.4 - 12.3 FL    nRBC 0.00 0.0 - 0.2 K/uL    Differential Type AUTOMATED      Seg Neutrophils 57 43 - 78 %    Lymphocytes 27 13 - 44 %    Monocytes 12 4.0 - 12.0 %    Eosinophils % 2 0.5 - 7.8 %    Basophils 1 0.0 - 2.0 %    Immature Granulocytes 1 0.0 - 5.0 %    Segs Absolute 4.8 1.7 - 8.2 K/UL    Absolute Lymph # 2.3 0.5 - 4.6 K/UL    Absolute Mono # 1.0 0.1 - 1.3 K/UL    Absolute Eos # 0.2 0.0 - 0.8 K/UL    Basophils Absolute 0.1 0.0 - 0.2 K/UL    Absolute Immature Granulocyte 0.1 0.0 - 0.5 K/UL   Basic Metabolic Panel    Collection Time: 22  4:43 PM   Result Value Ref Range    Sodium 131 (L) 136 - 145 mmol/L    Potassium 4.7 3.5 - 5.1 mmol/L    Chloride 95 (L) 98 - 107 mmol/L    CO2 28 21 - 32 mmol/L    Anion Gap 8 7 - 16 mmol/L    Glucose 110 (H) 65 - 100 mg/dL    BUN 18 8 - 23 MG/DL    Creatinine 0.20 (L) 0.8 - 1.5 MG/DL    GFR African American >60 >60 ml/min/1.73m2    GFR Non- >60 >60 ml/min/1.73m2    Calcium 8.8 8.3 - 10.4 MG/DL   Basic Metabolic Panel    Collection Time: 08/10/22  4:22 AM   Result Value Ref Range    Sodium 128 (L) 138 - 145 mmol/L    Potassium 4.8 3.5 - 5.1 mmol/L    Chloride 95 (L) 98 - 107 mmol/L    CO2 31 21 - 32 mmol/L    Anion Gap 2 (L) 7 - 16 mmol/L    Glucose 114 (H) 65 - 100 mg/dL    BUN 18 8 - 23 MG/DL    Creatinine 0.30 (L) 0.8 - 1.5 MG/DL    GFR African American >60 >60 ml/min/1.73m2    GFR Non- >60 >60 ml/min/1.73m2    Calcium 9.1 8.3 - 10.4 MG/DL   Magnesium    Collection Time: 08/10/22  4:22 AM   Result Value Ref Range    Magnesium 1.9 1.8 - 2.4 mg/dL   Phosphorus    Collection Time: 08/10/22  4:22 AM   Result Value Ref Range    Phosphorus 3.7 2.3 - 3.7 MG/DL   PLEASE READ & DOCUMENT PPD TEST IN 24 HRS    Collection Time: 08/10/22 10:11 AM   Result Value Ref Range    PPD, (POC) Negative Negative    mm Induration 0 0 - 5 mm       Imaging:    CT Result (most recent):  CT CHEST PULMONARY EMBOLISM W CONTRAST 08/06/2022    Narrative  EXAMINATION: CT CHEST PULMONARY EMBOLISM W CONTRAST 8/6/2022 6:15 PM    ACCESSION NUMBER: [id number]    COMPARISON: Chest radiograph earlier same day. CT chest July 8, 2022    INDICATION: r/o PE    TECHNIQUE: Multiple contiguous axial CT images of the chest were obtained from  the lung apices to the lung bases after the intravenous administration of 100 mL  Isovue 370 per pulmonary angiography protocol. Coronal reconstructions were  performed. Radiation dose reduction techniques were used for this study. Our CT scanners  use one or all of the following: Automated exposure control, adjustment of the  mA and/or kV according to patient size, iterative reconstruction. FINDINGS:  The pulmonary arteries are well-opacified to at least the level of the large  subsegmental branches without filling defect or evidence of right heart strain. The main pulmonary artery is normal caliber. There is no pericardial effusion. The thoracic aorta is normal in caliber and  appearance. Scattered reactive mediastinal lymph nodes involving multiple stations, not  enlarged considering enlarged by size criteria.     Moderate to severe apical predominant emphysema. Interval removal of left  thoracostomy tube with slight increased size of a small pleural effusion. Bilateral lower lobe distal bronchial impaction with worsening right greater  than left dependent consolidation/aspiration . Mild dependent atelectasis and/or  aspiration of the dependent right lower lobe. Hiatal hernia with esophageal fluid extending into the mid thorax. No acute or aggressive osseous abnormalities. Impression  1. No pulmonary embolism or right heart strain. 2.  Distal bilateral lower lobe bronchial occlusion with findings of left  greater than right dependent aspiration/consolidation. Slight increased size of  small pleural effusion with decreased size of small right pleural effusion. ASSESSMENT:    No diagnosis found. RECOMMENDATIONS:    Metastatic gastric cancer  - s/p FLOTx4 with total gastrectomy that noted invasion into multiple surrounding structures with very extensive surgical resection. pT4bN2. Multiple post-operative complications requiring LTAC and SNF care. Now with concern for metastatic cutaneous lesions. ID and surgery following pending discussion of goals of care with family/patient (who may be interested in hospice). - Further plan by Dr Mita Gonzalez    Thrombocytosis  - Improved, felt to be reactive  - JAK2 negative    COVID / respiratory failure  - Management per primary    Hx of DVT/PE  - s/p IVC filter due to GIB    Lab studies and imaging studies (CT) were personally reviewed. Pertinent old records were reviewed from [address] [place] [address] Rd. Thank you for allowing us to participate in the care of Mr. Duncan Leo. Formal consult note by Dr. Fan  to follow.          [facility] Hematology & Oncology  [address] [address]  Office : [phone]  Fax : [phone]

## 2022-08-10 NOTE — PROGRESS NOTES
LTG: Patient will tolerate least restrictive diet without overt signs or symptoms of airway compromise. MET   STG: Patient will participate in ongoing po trials with speech therapy only in attempts to initiate oral diet for pleasure. MET   STG: Patient will participate in modified barium swallow study as clinically indicated. NOT INDICATED    SPEECH LANGUAGE PATHOLOGY: DYSPHAGIA  Daily Note #1 and Discharge    NAME: Peter Abdullahi  : [id number]  MRN: [id number]    ADMISSION DATE: 2022  PRIMARY DIAGNOSIS: Respiratory failure with hypoxia ([address]  Respiratory failure with hypoxia ([address] [J96.91]    ICD-10: Treatment Diagnosis: R13.13 Dysphagia, Pharyngeal Phase    RECOMMENDATIONS   Diet:  Diet Solids Recommendation:  (Clear liquids for pleasure)  Liquid Consistency Recommendation: Thin    Medications: Via alternative means of nutrition     Recommendations:  (No additional speech therapy indicated at this time.)     Compensatory Swallowing Strategies:Upright as possible for all oral intake;Small bites/sips     Therapeutic Intervention:Patient/Family education     Patient continues to require skilled intervention: No  D/C Recommendations: No follow up therapy recommended post discharge       ASSESSMENT    Dysphagia Impression : Functional swallow with sips of clear liquids. Additional trials deferred    Patient consumed small sips of thin liquids by cup and straw without s/sx of airway compromise. He stated \"I know I have to take it slow\" when consuming liquids. Additional trials deferred as he only consumes liquids at home. Recommend continue sips of thin liquids for pleasure. No additional speech therapy indicated at this time as patient is on baseline diet. GENERAL    Chart Reviewed: Yes  Subjective: Patient agreeable to po trials. Reports he is tired and has made the decision to \"focus on being comfortable\". Behavior/Cognition: Alert; Cooperative  Communication Observation: Functional  Respiratory Status: Room air              History of Present Injury/Illness: Mr. Kirit Latham  has a past medical history of ABLA (acute blood loss anemia), GIB (gastrointestinal bleeding), HTN (hypertension), and Severe sepsis with lactic acidosis ([facility] [address] . He also  has a past surgical history that includes other surgical history; Knee arthroscopy; Colonoscopy (N/A, 3/9/2022); Upper gastrointestinal endoscopy (N/A, 6/4/2022); IR GUIDED IVC FILTER PLACEMENT (6/6/2022); gastrectomy (N/A, 6/9/2022); thoracentesis (Bilateral, 6/15/2022); bronchoscopy (N/A, 6/16/2022); bronchoscopy (N/A, 6/17/2022); bronchoscopy (N/A, 6/18/2022); IR CHEST TUBE INSERTION (6/20/2022); and IR CHEST TUBE INSERTION (7/5/2022). Pain:   Patient does not c/o pain                   OBJECTIVE    Oropharyngeal Phase:   Patient agreeable to take sips of thin liquids by cup and straw. He consumed only small sips at a slow rate. No overt ss/x of airway compromise observed. He reports only drinking sips of water as he has no interest in additional po intake. Primary nutrition via PEG. PLAN    Duration/Frequency: No treatment indicated at this time    Dysphagia Outcome and Severity Scale (ALTA)  Dysphagia Outcome Severity Scale: Level 2: Moderate Severe dysphagia- Maximum assistance or maximum use of strategies with partial PO only  Interpretation of Tool: The Dysphagia Outcome and Severity Scale (ALTA) is a simple, easy-to-use, 7-point scale developed to systematically rate the functional severity of dysphagia based on objective assessment and make recommendations for diet level, independence level, and type of nutrition. Normal(7), Functional(6), Mild(5), Mild-Moderate(4), Moderate(3), Moderate-Severe(2), Severe(1)    Educations:  Recommendations for sips for pleasure    Current Medications:   No current facility-administered medications on file prior to encounter.      Current Outpatient Medications on File Prior to Encounter   Medication Sig Dispense Refill

## 2022-08-10 NOTE — PROGRESS NOTES
PHYSICAL THERAPY Initial Assessment, Discharge, and AM  (Link to Caseload Tracking: PT Visit Days : 1  Acknowledge Orders  Time In/Out  PT Charge Capture  Rehab Caseload Tracker    Roby See is a 78 y.o. male   PRIMARY DIAGNOSIS: Respiratory failure with hypoxia ([address]  Respiratory failure with hypoxia ([address] [J96.91]       Reason for Referral: Generalized Muscle Weakness (M62.81)  Difficulty in walking, Not elsewhere classified (R26.2)  Inpatient: Payor: MEDICARE / Plan: MEDICARE PART A AND B / Product Type: *No Product type* /     ASSESSMENT:     REHAB RECOMMENDATIONS:   Recommendation to date pending progress:  Setting:  Return to facility    Equipment:    None     ASSESSMENT:  Mr. Ramiro Sawyer was supine at arrival needing encouragement to treat. He got to EOB with Alec, sit balance 10sec then drifted to L. Then pt refused to participate needing more encouragement on what his needs need to be. He stood for 90sec and fatigued. Pt insisted on BTB. When pt got comfortable in bed asked pt if he is interested in the role and job of PT helping him and he honestly said he was not interested due to pt's understanding of his condition. Will discontinue PT service at this time due to pt's request due to his condition. Anticipate pt return to prior facility when cleared medically.       [address]\"" [address] [id number] AM-PAC 6 Clicks Basic Mobility Inpatient Short Form  AM-PAC Mobility Inpatient   How much difficulty turning over in bed?: A Lot  How much difficulty sitting down on / standing up from a chair with arms?: A Little  How much difficulty moving from lying on back to sitting on side of bed?: A Lot  How much help from another person moving to and from a bed to a chair?: A Little  How much help from another person for climbing 3-5 steps with a railing?: A Lot    SUBJECTIVE:   Mr. Ramiro Sawyer states, \"I really dont want to do this\"     Social/Functional Lives With: Spouse (was at [facility] prior to admission)  Shane Baker Help From: Family  ADL Assistance: Needs assistance  Ambulation Assistance: Needs assistance  Transfer Assistance: Needs assistance  Occupation: Retired    OBJECTIVE:     PAIN: VITALS / O2: PRECAUTION / Mayana Martinez / Lyle Hudson:   Pre Treatment:          Post Treatment: 5 Vitals        Oxygen      Nasogastric Tube    RESTRICTIONS/PRECAUTIONS:                    GROSS EVALUATION: Intact Impaired (Comments):   AROM [] AROM RLE (degrees)  RLE AROM: Exceptions   PROM []    Strength [] Strength RLE  Strength RLE: Exception   Balance [] Posture: Fair  Sitting - Static: Fair, +  Sitting - Dynamic: Fair, -  Standing - Static: Fair  Standing - Dynamic: Fair, -   Posture [] N/A   Sensation []     Coordination []      Tone []     Edema []    Activity Tolerance [] Patient limited by fatigue, Treatment limited secondary to medical complications (free text)    []      COGNITION/  PERCEPTION: Intact Impaired (Comments):   Orientation [x]     Vision [x]     Hearing [x]     Cognition  [] Overall Cognitive Status: WFL     MOBILITY: I Mod I S SBA CGA Min Mod Max Total  NT x2 Comments:   Bed Mobility    Rolling [] [] [] [] [] [x] [] [] [] [] []    Supine to Sit [] [] [] [] [] [x] [] [] [] [] []    Scooting [] [] [] [x] [] [] [] [] [] [] []    Sit to Supine [] [] [] [x] [] [] [] [] [] [] []    Transfers    Sit to Stand [] [] [] [] [] [x] [] [] [] [] []    Bed to Chair [] [] [] [] [] [] [] [] [] [x] []    Stand to Sit [] [] [] [] [x] [] [] [] [] [] []     [] [] [] [] [] [] [] [] [] [] []    I=Independent, Mod I=Modified Independent, S=Supervision, SBA=Standby Assistance, CGA=Contact Guard Assistance,   Min=Minimal Assistance, Mod=Moderate Assistance, Max=Maximal Assistance, Total=Total Assistance, NT=Not Tested    GAIT: I Mod I S SBA CGA Min Mod Max Total  NT x2 Comments:   Level of Assistance [] [] [] [] [] [] [] [] [] [x] []    Distance   feet    DME None    Gait Quality N/A    Weightbearing Status      Stairs      I=Independent, Mod I=Modified Independent, S=Supervision, SBA=Standby Assistance, CGA=Contact Guard Assistance,   Min=Minimal Assistance, Mod=Moderate Assistance, Max=Maximal Assistance, Total=Total Assistance, NT=Not Tested    PLAN:   ACUTE PHYSICAL THERAPY GOALS:   (Developed with and agreed upon by patient and/or caregiver.)  dc    FREQUENCY AND DURATION:   for duration of hospital stay or until stated goals are met, whichever comes first.    THERAPY PROGNOSIS: Poor    PROBLEM LIST:   (Skilled intervention is medically necessary to address:)  NA INTERVENTIONS PLANNED:   (Benefits and precautions of physical therapy have been discussed with the patient.)  Education       TREATMENT:   EVALUATION: HIGH COMPLEXITY: (Untimed Charge)    TREATMENT:   Therapeutic Activity (10 Minutes): Therapeutic activity included Rolling to improve functional Activity tolerance and Balance. TREATMENT GRID:  N/A    AFTER TREATMENT PRECAUTIONS: Bed, Bed/Chair Locked, Call light within reach, and Needs within reach    INTERDISCIPLINARY COLLABORATION:  RN/ PCT and PT/ PTA    EDUCATION: Education Given To: Patient  Education Provided: Role of Therapy;Plan of Care  Education Method: Verbal  Barriers to Learning: Cognition; Other (Comment)  Education Outcome: Verbalized understanding; Other (Comment)    TIME IN/OUT:  Time In: 1040  Time Out: [address]  Minutes: [address] PT

## 2022-08-10 NOTE — PROGRESS NOTES
Hospitalist Progress Note   Admit Date:  2022  2:17 PM   Name:  Christi Jacobs   Age:  78 y.o. Sex:  male  :  1955   MRN:  [id number]   Room:  [facility]    Presenting Complaint: Shortness of Breath and Rectal Bleeding     Reason(s) for Admission: Respiratory failure with hypoxia [facility]) [facility] Course & Interval History:   Patient with past medical history of    Gastric adenocarcinoma s/p chemotherapy, s/p total gastrectomy and esophagojejunostomy, lateral lobectomy of the liver, distal pancreatectomy and splenectomy, status post J-tube placement. Hypertension  Pulmonary embolism on Lovenox  Recent admission for septic shock, respiratory failure with pleural effusion and pulmonary embolism. Recent COVID infection on May 1, 2022. Patient came from a nursing facility due to shortness of breath and hematochezia. In ER, his oxygenation saturation was 88% on room air. This improved with oxygen via cannula at 2 L/min. From CT chest, he is found to have   1. No pulmonary embolism or right heart strain. 2.  Distal bilateral lower lobe bronchial occlusion with findings of left   greater than right dependent aspiration/consolidation. Slight increased size of   small pleural effusion with decreased size of small right pleural effusion. CXR shows; Stable mild cardiomegaly without evolving acute changes suggested by plain   film. Cardiomegaly can be an early indicator for heart failure in the correct   clinical setting. Patient is admitted for Acute respiratory failure with hypoxia. Subjective/24hr Events (08/10/22): Patient expressed wishes for possible end-of-life care. Consultation with oncology reviewed. Patient expressed wishes to me prior to oncology consult. Now on room air and off steroids regarding acute hypoxemic respiratory failure secondary to COVID. Will await hospice consult about discharge home with hospice care versus other.   He reports analgesia effective. Denies any shaking chills or fevers. Denies chest pain. Denies change in urinary habits. Assessment & Plan:      # Respiratory failure with hypoxia  #covid-19 infection  8/10--now room air-supportive care. # Gastic adenocarcinoma  - s/p surgical resections as in HPI  - no further report of hematochezia.  - H&H stable  - continue feedings via jejunostomy  - consult to oncology for AM  8/10--patient has expressed wishes for hospice care. Consult placed. # HTN  8/10--stable-observe on beta-blocker. # skin lesions  - face and scalp lesions w/ atypical appearing for boils or folliculitis, no improvement w/ antbx. - R supraclavicular, abdominal nodules likely metastatic.  - ID has seen - recommends biopsy of facial lesion and also enlarged LN's. Will d/w surgery, consider monkeypox swabbing if lesions not cancerous. 8/9  - Surgery has seen - suspects all metastatic disease. Recs for oncology eval to discuss if pt is candidate for further chemotherapy. 8/10--as above-likely end-of-life supportive care-hospice consult placed. Discharge Planning:    Home with hospice versus other     Diet:  ADULT TUBE FEEDING; Jejunostomy; Standard without Fiber; Continuous; 15; Yes; 10; Q 8 hours; 65; 60; Q 4 hours  ADULT DIET; Clear Liquid  DVT PPx: SCDs-Lovenox on hold  Code status: Full Code               Hospital Problems:  Principal Problem:    Respiratory failure with hypoxia (HCC)  Active Problems:    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HCC)    Gastroesophageal reflux disease    Loss of weight    History of gastric cancer    Jejunostomy status (HCC)    Hematochezia    Abdominal wall mass  Resolved Problems:    * No resolved hospital problems.  *      Objective:   Patient Vitals for the past 24 hrs:   Temp Pulse Resp BP SpO2   08/10/22 1139 97.2 °F (36.2 °C) 62 18 112/82 96 %   08/10/22 0724 97.5 °F (36.4 °C) 75 16 117/86 99 %   08/10/22 0255 97.5 °F (36.4 °C) 76 18 110/85 93 %   08/09/22 2311 98.1 °F (36.7 °C) 67 18 121/87 96 %   08/09/22 1957 -- 75 18 129/89 93 %   08/09/22 1700 -- -- 16 -- --   08/09/22 1455 97.3 °F (36.3 °C) (!) 103 16 112/86 96 %       Oxygen Therapy  SpO2: 96 %  Pulse via Oximetry: 70 beats per minute  Pulse Oximeter Device Mode: Continuous  O2 Device: None (Room air)  O2 Flow Rate (L/min): 2 L/min    Estimated body mass index is 15.9 kg/m² as calculated from the following:    Height as of this encounter: 5' 9\" (1.753 m). Weight as of this encounter: 107 lb 11.2 oz (48.9 kg). Intake/Output Summary (Last 24 hours) at 8/10/2022 1430  Last data filed at 8/10/2022 1015  Gross per 24 hour   Intake 120 ml   Output 1075 ml   Net -955 ml         Physical Exam:     Blood pressure 112/82, pulse 62, temperature 97.2 °F (36.2 °C), temperature source Oral, resp. rate 18, height 5' 9\" (1.753 m), weight 107 lb 11.2 oz (48.9 kg), SpO2 96 %. General:    Jjdxtcgqj-lyct-zaxa cachectic  Head:  Normocephalic, atraumatic  Eyes:  Sclerae appear normal.  Pupils equally round. ENT:  Nares appear normal, no drainage. Moist oral mucosa  Neck:  No restricted ROM. Trachea midline   CV:   No gross S3-regular rhythm and rate at time of exam.  Lungs:   No gross wheeze at time of exam or gross consolidation. Normal symmetric excursion of the chest wall bilateral.  Abdomen: Bowel sounds present. Soft, nontender, nondistended. Extremities: No cyanosis or clubbing. Moves all extremities symmetrically. Skin:     No rashes and normal coloration. Very lean body mass  Neuro:  CN II-XII grossly intact. Sensation intact.         I have personally reviewed labs and tests showing:  Recent Labs:  Recent Results (from the past 48 hour(s))   CBC with Auto Differential    Collection Time: 08/09/22  4:43 PM   Result Value Ref Range    WBC 8.4 4.3 - 11.1 K/uL    RBC 3.99 (L) 4.23 - 5.6 M/uL    Hemoglobin 10.8 (L) 13.6 - 17.2 g/dL    Hematocrit 35.4 (L) 41.1 - 50.3 %    MCV 88.7 79.6 - 97.8 FL    MCH 27.1 26.1 - 32.9 PG    MCHC 30.5 (L) 31.4 - 35.0 g/dL    RDW 16.0 (H) 11.9 - 14.6 %    Platelets 543 (H) 345 - 450 K/uL    MPV 9.3 (L) 9.4 - 12.3 FL    nRBC 0.00 0.0 - 0.2 K/uL    Differential Type AUTOMATED      Seg Neutrophils 57 43 - 78 %    Lymphocytes 27 13 - 44 %    Monocytes 12 4.0 - 12.0 %    Eosinophils % 2 0.5 - 7.8 %    Basophils 1 0.0 - 2.0 %    Immature Granulocytes 1 0.0 - 5.0 %    Segs Absolute 4.8 1.7 - 8.2 K/UL    Absolute Lymph # 2.3 0.5 - 4.6 K/UL    Absolute Mono # 1.0 0.1 - 1.3 K/UL    Absolute Eos # 0.2 0.0 - 0.8 K/UL    Basophils Absolute 0.1 0.0 - 0.2 K/UL    Absolute Immature Granulocyte 0.1 0.0 - 0.5 K/UL   Basic Metabolic Panel    Collection Time: 08/09/22  4:43 PM   Result Value Ref Range    Sodium 131 (L) 136 - 145 mmol/L    Potassium 4.7 3.5 - 5.1 mmol/L    Chloride 95 (L) 98 - 107 mmol/L    CO2 28 21 - 32 mmol/L    Anion Gap 8 7 - 16 mmol/L    Glucose 110 (H) 65 - 100 mg/dL    BUN 18 8 - 23 MG/DL    Creatinine 0.20 (L) 0.8 - 1.5 MG/DL    GFR African American >60 >60 ml/min/1.73m2    GFR Non- >60 >60 ml/min/1.73m2    Calcium 8.8 8.3 - 10.4 MG/DL   Basic Metabolic Panel    Collection Time: 08/10/22  4:22 AM   Result Value Ref Range    Sodium 128 (L) 138 - 145 mmol/L    Potassium 4.8 3.5 - 5.1 mmol/L    Chloride 95 (L) 98 - 107 mmol/L    CO2 31 21 - 32 mmol/L    Anion Gap 2 (L) 7 - 16 mmol/L    Glucose 114 (H) 65 - 100 mg/dL    BUN 18 8 - 23 MG/DL    Creatinine 0.30 (L) 0.8 - 1.5 MG/DL    GFR African American >60 >60 ml/min/1.73m2    GFR Non- >60 >60 ml/min/1.73m2    Calcium 9.1 8.3 - 10.4 MG/DL   Magnesium    Collection Time: 08/10/22  4:22 AM   Result Value Ref Range    Magnesium 1.9 1.8 - 2.4 mg/dL   Phosphorus    Collection Time: 08/10/22  4:22 AM   Result Value Ref Range    Phosphorus 3.7 2.3 - 3.7 MG/DL   PLEASE READ & DOCUMENT PPD TEST IN 24 HRS    Collection Time: 08/10/22 10:11 AM   Result Value Ref Range    PPD, (POC) Negative Negative    mm Induration 0 0 - 5 mm       I have personally reviewed imaging studies showing: Other Studies:  XR CHEST 1 VIEW   Final Result   Improved bibasilar lung atelectasis and small pleural effusions. CT CHEST PULMONARY EMBOLISM W CONTRAST   Final Result   1. No pulmonary embolism or right heart strain. 2.  Distal bilateral lower lobe bronchial occlusion with findings of left   greater than right dependent aspiration/consolidation. Slight increased size of   small pleural effusion with decreased size of small right pleural effusion. XR CHEST PORTABLE   Final Result   1. Stable mild cardiomegaly without evolving acute changes suggested by plain   film. Cardiomegaly can be an early indicator for heart failure in the correct   clinical setting. This report was made using voice transcription. Despite my best efforts to avoid   any, transcription errors may persist. If there is any question about the   accuracy of the report or need for clarification, then please call [phone], or text me through [email or web address] for clarification or correction.            Current Meds:  Current Facility-Administered Medications   Medication Dose Route Frequency    furosemide (LASIX) tablet 40 mg  40 mg Oral Daily    bisacodyl (DULCOLAX) suppository 10 mg  10 mg Rectal Daily PRN    pantoprazole (PROTONIX) 40 mg in sodium chloride (PF) 10 mL injection  40 mg IntraVENous Q12H    potassium chloride (KLOR-CON M) extended release tablet 40 mEq  40 mEq Oral PRN    Or    potassium bicarb-citric acid (EFFER-K) effervescent tablet 40 mEq  40 mEq Oral PRN    Or    potassium chloride 10 mEq/100 mL IVPB (Peripheral Line)  10 mEq IntraVENous PRN    magnesium sulfate 2000 mg in 50 mL IVPB premix  2,000 mg IntraVENous PRN    sodium phosphate 10 mmol in sodium chloride 0.9 % 250 mL IVPB  10 mmol IntraVENous PRN    Or    sodium phosphate 15 mmol in sodium chloride 0.9 % 250 mL IVPB  15 mmol IntraVENous PRN    Or    sodium phosphate 20 mmol in sodium chloride 0.9 % 500 mL IVPB  20 mmol IntraVENous PRN    oxyCODONE (ROXICODONE) immediate release tablet 5 mg  5 mg Per J Tube Q4H PRN    sodium chloride flush 0.9 % injection 5 mL  5 mL IntraVENous PRN    sodium chloride flush 0.9 % injection 5 mL  5 mL IntraVENous Q8H    sodium chloride flush 0.9 % injection 5-40 mL  5-40 mL IntraVENous 2 times per day    sodium chloride flush 0.9 % injection 5-40 mL  5-40 mL IntraVENous PRN    0.9 % sodium chloride infusion   IntraVENous PRN    ondansetron (ZOFRAN-ODT) disintegrating tablet 4 mg  4 mg Oral Q8H PRN    Or    ondansetron (ZOFRAN) injection 4 mg  4 mg IntraVENous Q6H PRN    polyethylene glycol (GLYCOLAX) packet 17 g  17 g Oral Daily PRN    acetaminophen (TYLENOL) tablet 650 mg  650 mg Oral Q6H PRN    Or    acetaminophen (TYLENOL) suppository 650 mg  650 mg Rectal Q6H PRN    [Held by provider] enoxaparin (LOVENOX) injection 50 mg  1 mg/kg SubCUTAneous Q12H    levalbuterol (XOPENEX) nebulization 0.63 mg  0.63 mg Nebulization Q6H PRN    metoprolol tartrate (LOPRESSOR) tablet 12.5 mg  12.5 mg Per J Tube BID       Signed:  Sophia Sandhu DO    Part of this note may have been written by using a voice dictation software. The note has been proof read but may still contain some grammatical/other typographical errors.

## 2022-08-10 NOTE — PROGRESS NOTES
Occupational therapy Note:    OT consult received and chart reviewed--attempted to see pt for initial assessment. Pt stated he is not interested in therapy as he is ready to simply be comfortable and pass away. Goals of care discussion are ongoing--pt reported the oncology team told him there were no further treatment options at this time. Will d/c OT order--please re-consult if there are any changes in plan of care of if pt decided he would like to participate with therapy.     Thank you,  Yoel Salas, OTR/L

## 2022-08-10 NOTE — PROGRESS NOTES
Pt resting quietly in bed. Respirations are even and unlabored on RA. No signs of distress noted or needs stated at this time. Call light in reach. Safety measures in place. SBAR to be given to oncoming nurse.

## 2022-08-11 LAB
ANION GAP SERPL CALC-SCNC: 4 MMOL/L (ref 7–16)
BUN SERPL-MCNC: 16 MG/DL (ref 8–23)
CALCIUM SERPL-MCNC: 8.8 MG/DL (ref 8.3–10.4)
CHLORIDE SERPL-SCNC: 96 MMOL/L (ref 98–107)
CO2 SERPL-SCNC: 31 MMOL/L (ref 21–32)
CREAT SERPL-MCNC: 0.2 MG/DL (ref 0.8–1.5)
GLUCOSE SERPL-MCNC: 92 MG/DL (ref 65–100)
MAGNESIUM SERPL-MCNC: 2 MG/DL (ref 1.8–2.4)
MM INDURATION, POC: 0 MM (ref 0–5)
PHOSPHATE SERPL-MCNC: 3.7 MG/DL (ref 2.3–3.7)
POTASSIUM SERPL-SCNC: 4.4 MMOL/L (ref 3.5–5.1)
PPD, POC: NEGATIVE
SODIUM SERPL-SCNC: 131 MMOL/L (ref 136–145)

## 2022-08-11 PROCEDURE — 6370000000 HC RX 637 (ALT 250 FOR IP): Performed by: INTERNAL MEDICINE

## 2022-08-11 PROCEDURE — A4216 STERILE WATER/SALINE, 10 ML: HCPCS | Performed by: INTERNAL MEDICINE

## 2022-08-11 PROCEDURE — 1100000000 HC RM PRIVATE

## 2022-08-11 PROCEDURE — 2580000003 HC RX 258: Performed by: EMERGENCY MEDICINE

## 2022-08-11 PROCEDURE — 83735 ASSAY OF MAGNESIUM: CPT

## 2022-08-11 PROCEDURE — C9113 INJ PANTOPRAZOLE SODIUM, VIA: HCPCS | Performed by: INTERNAL MEDICINE

## 2022-08-11 PROCEDURE — 6360000002 HC RX W HCPCS: Performed by: INTERNAL MEDICINE

## 2022-08-11 PROCEDURE — 84100 ASSAY OF PHOSPHORUS: CPT

## 2022-08-11 PROCEDURE — 2580000003 HC RX 258: Performed by: INTERNAL MEDICINE

## 2022-08-11 PROCEDURE — 80048 BASIC METABOLIC PNL TOTAL CA: CPT

## 2022-08-11 PROCEDURE — 36415 COLL VENOUS BLD VENIPUNCTURE: CPT

## 2022-08-11 RX ADMIN — SODIUM CHLORIDE, PRESERVATIVE FREE 5 ML: 5 INJECTION INTRAVENOUS at 15:23

## 2022-08-11 RX ADMIN — SODIUM CHLORIDE, PRESERVATIVE FREE 5 ML: 5 INJECTION INTRAVENOUS at 05:52

## 2022-08-11 RX ADMIN — SODIUM CHLORIDE, PRESERVATIVE FREE 10 ML: 5 INJECTION INTRAVENOUS at 09:09

## 2022-08-11 RX ADMIN — METOPROLOL TARTRATE 12.5 MG: 25 TABLET, FILM COATED ORAL at 21:21

## 2022-08-11 RX ADMIN — OXYCODONE 5 MG: 5 TABLET ORAL at 09:09

## 2022-08-11 RX ADMIN — OXYCODONE 5 MG: 5 TABLET ORAL at 03:36

## 2022-08-11 RX ADMIN — SODIUM CHLORIDE, PRESERVATIVE FREE 40 MG: 5 INJECTION INTRAVENOUS at 09:09

## 2022-08-11 RX ADMIN — SODIUM CHLORIDE, PRESERVATIVE FREE 40 MG: 5 INJECTION INTRAVENOUS at 21:21

## 2022-08-11 RX ADMIN — METOPROLOL TARTRATE 12.5 MG: 25 TABLET, FILM COATED ORAL at 09:09

## 2022-08-11 RX ADMIN — FUROSEMIDE 40 MG: 40 TABLET ORAL at 09:09

## 2022-08-11 RX ADMIN — OXYCODONE 5 MG: 5 TABLET ORAL at 17:22

## 2022-08-11 RX ADMIN — OXYCODONE 5 MG: 5 TABLET ORAL at 13:40

## 2022-08-11 RX ADMIN — SODIUM CHLORIDE, PRESERVATIVE FREE 5 ML: 5 INJECTION INTRAVENOUS at 21:22

## 2022-08-11 RX ADMIN — OXYCODONE 5 MG: 5 TABLET ORAL at 21:23

## 2022-08-11 ASSESSMENT — ENCOUNTER SYMPTOMS
HEMATOCHEZIA: 1
SHORTNESS OF BREATH: 1

## 2022-08-11 ASSESSMENT — PAIN SCALES - GENERAL
PAINLEVEL_OUTOF10: 0
PAINLEVEL_OUTOF10: 8
PAINLEVEL_OUTOF10: 0
PAINLEVEL_OUTOF10: 8
PAINLEVEL_OUTOF10: 0
PAINLEVEL_OUTOF10: 0
PAINLEVEL_OUTOF10: 6
PAINLEVEL_OUTOF10: 3
PAINLEVEL_OUTOF10: 0

## 2022-08-11 ASSESSMENT — PAIN DESCRIPTION - DESCRIPTORS
DESCRIPTORS: ACHING
DESCRIPTORS: ACHING

## 2022-08-11 ASSESSMENT — PAIN DESCRIPTION - ORIENTATION
ORIENTATION: MID;RIGHT
ORIENTATION: MID

## 2022-08-11 ASSESSMENT — PAIN DESCRIPTION - LOCATION
LOCATION: ABDOMEN;BACK
LOCATION: BACK

## 2022-08-11 NOTE — PROGRESS NOTES
Comprehensive Nutrition Assessment    Type and Reason for Visit: Reassess  Tube Feeding Management (Hospitalists)    Nutrition Recommendations/Plan:   Enteral Nutrition:   Enteral Access: Jejunostomy  Formula: Standard without Fiber (Osmolite 1.2 Carloz)  Delivery: Continuous feeding: Continue at goal rate of 65 ml/hour. Water flush Decrease 40 ml every 4 hours  Modulars: None not indicated at this time   Enteral regimen at goal to provide per 24 hours:  1716 calories (100% estimated calorie needs), 79 grams protein (100% estimated protein needs) and 1413 ml free fluid (~0.8 ml/kcal)  Labs:   EN labs: BMP daily, Mg daily x 3 days then MWF and Phos daily x 3 days then MWF. POC Glucoses/SSI Not indicated  Meals and Snacks:  Diet: Continue current order; CLQ for pleasure per SLP     Malnutrition Assessment:  Malnutrition Status: Insufficient data (nneeds NFPE, additional wt loss since last admission, + malnutrition last admission)    Nutrition Assessment:  Nutrition History: Patient known to nutrition from previous admission (6/2022). Prior to recent admission patient with poor tolerance of solid foods that progressed to no solid foods for weeks. Main intake at that time was 6-7 Ensure per day with peanut butter powder mixed in. TF initiated per surgery s/p 14 fr Jtube palcement 6/13. Advanced to \"goal\" of 50 ml/hr per surgery. Sips of clears initiated 6/17. RN provided TF regimen from facility \" Omsolite 1.2 at 50 ml/hr with 30 ml water flush BID. Nutrition needs can be met with Osmolite 1.2 @ 65 ml/hr (formulated for 22 hr infusion) with free water flush 90 ml Q4 hrs. Regimen would provide 1716 kcal (100% estimated energy needs), 79 g pro (100% estimated protein needs), 1713 ml free water (~1 ml/hr). \" Appears to be copied from last RD assessment last admission. ? If patient was maintained on Osmolite 1.2 @ 50 ml/hr - would explain additional weight loss. Last measured weight 128# (58.1 kg) 6/17/22 bed scale.   NFPE deferred secondary to isolation. Nutrition Background:   Patient with PMH significant for HTN, gastric cancer s/p chemo and total gastrectomy and esophagojejunostomy with extensive lysis of adhesion and dissection, left lateral lobectomy of liver, distal pancreatectomy and splenectomy, combined diaphragmatic resection, falciform ligament flap, j-tube placement (14 fr), retroperitoneal mass excision and lymphadenectomy, and left abdominal wall mass excision. He was discharged to [facility]. He presented back with SOB and hematochezia. He was diagnosed with COVID in May. Nutrition Interval:  Pt discussed with Dillon Castellon who states pt continues to tolerate TF well. Oncology note stating \"he cannot tolerate any more disease specific treatment\". Hospice consulted with plans for d/c with hospice. Abdominal Status (last documented):   Last BM (including prior to admit): 08/10/22, GI Symptoms: Loss of appetite   Pertinent Medications: Dulcolax PRN (1 dose provided 8/9), Lasix, Protonix, Glycolax PRN (1 dose provided 8/9), Zofran PRN  Pertinent Labs:   Lab Results   Component Value Date/Time     08/11/2022 04:22 AM    K 4.4 08/11/2022 04:22 AM    CL 96 08/11/2022 04:22 AM    CO2 31 08/11/2022 04:22 AM    BUN 16 08/11/2022 04:22 AM    CREATININE 0.20 08/11/2022 04:22 AM    GLUCOSE 92 08/11/2022 04:22 AM    CALCIUM 8.8 08/11/2022 04:22 AM    PHOS 3.7 08/11/2022 04:22 AM    MG 2.0 08/11/2022 04:22 AM     Labs reviewed and remarkable for ongoing hyponatremia. K, mg, and phos WNL. Current Nutrition Therapies:  ADULT TUBE FEEDING; Jejunostomy; Standard without Fiber; Continuous; 15; Yes; 10; Q 8 hours; 65; 60; Q 4 hours  ADULT DIET;  Clear Liquid  Current Tube Feeding (TF) Orders:  Feeding Route: Jejunostomy  Formula: Standard without Fiber  Schedule: Continuous  Feeding Regimen: 65ml/hr  Additives/Modulars: None  Water Flushes: 60ml Q4H  Current TF & Flush Orders Provides: infusing per order  Goal TF & Flush Orders Provides: 1716 calories (100% estimated calorie needs), 79 grams protein (100% estimated protein needs) and 1532 ml free fluid (~0.9 ml/kcal)    Current Intake:   Average Meal Intake: NPO        Anthropometric Measures:  Height: 5' 9\" (175.3 cm)  Current Body Wt: 106 lb 4.2 oz (48.2 kg) (8/11), Weight source: Bed Scale  BMI: 15.7, Underweight (BMI less than 18.5)  Admission Body Weight: 113 lb (51.3 kg) (8/6, stated)  Ideal Body Weight (Kg) (Calculated): 73 kg (160 lbs), 67.4 %  Usual Body Wt: 156 lb 1.4 oz (70.8 kg) (office wt 8/11/21), Percent weight change: -30.9       Edema:Peripheral Vascular  Peripheral Vascular (WDL): Within Defined Limits   BMI Category Underweight (BMI less than 18.5)  Estimated Daily Nutrient Needs:  Energy (kcal/day): 8269-9289 (Kcal/kg (30-35) Weight used: 48.9 kg Current  Protein (g/day): 77-88 (1.5-1.8 g/kg) Weight Used: (Current) 48.9 kg  Fluid (ml/day):   (1 ml/kcal)    Nutrition Diagnosis:   Inadequate oral intake related to altered GI structure as evidenced by  (s/p total gastrectomy, EN for primary needs)  Nutrition Interventions:   Food and/or Nutrient Delivery: Continue Current Diet, Continue Current Tube Feeding     Coordination of Nutrition Care: Continue to monitor while inpatient       Goals:   Previous Goal Met: Goal(s) Achieved  Active Goal:  (Maintain nutrition needs via EN by RD follow up.)       Nutrition Monitoring and Evaluation:      Food/Nutrient Intake Outcomes: Food and Nutrient Intake, Enteral Nutrition Intake/Tolerance  Physical Signs/Symptoms Outcomes: Biochemical Data, GI Status, Fluid Status or Edema, Weight    Discharge Planning:    Enteral Nutrition    [facility], [address] [place] [address]

## 2022-08-11 NOTE — PROGRESS NOTES
[facility] Patient awake and in bed. A&O x4. Respirations present. On room air. Tube feed infusing at 65 mL/hr. No signs of distress. No needs expressed. Bed low and locked. Call light within reach. Will continue to monitor.

## 2022-08-11 NOTE — ED PROVIDER NOTES
[employer] Emergency Department Provider Note                   PCP:                Cassie Huertas MD               Age: 78 y.o. Sex: male     No diagnosis found. DISPOSITION Admitted 08/06/2022 04:50:47 PM       MDM  Number of Diagnoses or Management Options  Acute hypoxemic respiratory failure due to COVID-19 [facility])  Diagnosis management comments: Fragile patient with advanced disease requiring oxygen supplementation with recent COVID infection. In light of multiple COVID issues patient will be admitted for oxygen supplementation and ongoing care. Amount and/or Complexity of Data Reviewed  Clinical lab tests: ordered and reviewed  Tests in the radiology section of CPT®: ordered and reviewed  Review and summarize past medical records: yes    Risk of Complications, Morbidity, and/or Mortality  Presenting problems: high  Diagnostic procedures: high  Management options: high         Orders Placed This Encounter   Procedures    Respiratory Panel, Molecular, with COVID-19 (Restricted: peds pts or suitable admitted adults)    XR CHEST PORTABLE    CT CHEST PULMONARY EMBOLISM W CONTRAST    XR CHEST 1 VIEW    CBC with Auto Differential    Comprehensive Metabolic Panel    Magnesium    Brain Natriuretic Peptide    Lipase    C-Reactive Protein    Troponin    CBC with Auto Differential    Basic Metabolic Panel    Basic Metabolic Panel    Magnesium    Magnesium    Phosphorus    Phosphorus    CBC with Auto Differential    Basic Metabolic Panel    ADULT DIET;  Clear Liquid    Continuous Pulse Oximetry    POCT Urine Dipstick    Vital signs per unit routine    Up as tolerated    Nursing communication    Place INT pneumatic compression device    Maintain HOB at the lowest elevation consistent with medical plan of care    Assess skin per unit guidelines    Place INT pneumatic compression device    Full code    Inpatient consult to Dietitian    Inpatient consult to Dietitian    Inpatient consult to Dietitian    Inpatient consult to General Surgery    Inpatient consult to Oncology    Inpatient consult to Hospice    Droplet Plus Isolation    ADULT TUBE FEEDING; Jejunostomy; Standard without Fiber; Continuous; 15; Yes; 10; Q 8 hours; 65; 60; Q 4 hours    OT eval and treat    PT eval and treat    Initiate Oxygen Therapy Protocol    SLP eval and treat    POCT Urinalysis no Micro    PLEASE READ & DOCUMENT PPD TEST IN 24 HRS    PLEASE READ & DOCUMENT PPD TEST IN 48 HRS    PLEASE READ & DOCUMENT PPD TEST IN 72 HRS    EKG 12 Lead    TYPE AND SCREEN    Saline lock IV    ADMIT TO INPATIENT        Cyn Berry is a 78 y.o. male who presents to the Emergency Department with chief complaint of    Chief Complaint   Patient presents with    Shortness of Breath    Rectal Bleeding      Patient is here with some shortness of breath that is beyond his baseline. He is quite cachectic in general.  He has some additional complaint though probably not a primary issue of some red blood when he wipes. Today he had some clotted blood there he noted. He was diagnosed with COVID on August 1. Today he has additional complaints of some nausea and increased beyond baseline shortness of breath. He quires oxygen supplementation. He is on Lovenox. Patient is on Lovenox due to prior pulmonary emboli. He has history of gastric adenocarcinoma with resection and feeding tube placement. Patient has quite advanced disease in general has a BMI of 15-1/2. Patient remains a full code    The history is provided by the patient. Shortness of Breath  Severity:  Moderate  Onset quality:  Gradual  Progression:  Worsening  Relieved by:  Rest and oxygen  Associated symptoms: no fever    Rectal Bleeding  Associated symptoms: no fever      All other systems reviewed and are negative. Review of Systems   Constitutional:  Negative for chills, fatigue and fever. Respiratory:  Positive for shortness of breath. Cardiovascular: Negative.     Gastrointestinal:  Positive for hematochezia. Genitourinary: Negative. Psychiatric/Behavioral:  Negative for decreased concentration and dysphoric mood. All other systems reviewed and are negative.     Past Medical History:   Diagnosis Date    ABLA (acute blood loss anemia) 6/2/2022    GIB (gastrointestinal bleeding) 6/2/2022    HTN (hypertension)     Severe sepsis with lactic acidosis ([address] 6/2/2022        Past Surgical History:   Procedure Laterality Date    BRONCHOSCOPY N/A 6/16/2022    BRONCHOSCOPY performed by Dillon Dominguez MD at [facility] ENDOSCOPY    BRONCHOSCOPY N/A 6/17/2022    BRONCHOSCOPY performed by Dillon Dominguez MD at [facility] ENDOSCOPY    BRONCHOSCOPY N/A 6/18/2022    BRONCHOSCOPY performed by Maria Carmona MD at [facility] ENDOSCOPY    COLONOSCOPY N/A 3/9/2022    COLONOSCOPY/ 25 performed by Jeremy Emerson MD at [address] [place] [address] N/A 6/9/2022    Exploratory laparotomy with total gastrectomy and esophagojejunostomy after extensive lysis of adhesions and dissections which added at least 3-4 hours to this case, Left lateral lobectomy of liver, Distal pancreatectomy and splenectomy, Combined diaphragmatic resection, Falciform ligament flap, Feeding J-tube placement, Retroperitoneal mass excision and lymphadenectomy, Left abdominal wall mass ex    IR CHEST TUBE INSERTION  6/20/2022    IR CHEST TUBE INSERTION 6/20/2022 [facility]    IR CHEST TUBE INSERTION  7/5/2022    IR CHEST TUBE INSERTION 7/5/2022 [facility]    IR IVC FILTER PLACEMENT W IMAGING  6/6/2022    IR IVC FILTER PLACEMENT W IMAGING 6/6/2022 [facility]    KNEE ARTHROSCOPY      OTHER SURGICAL HISTORY      none    THORACENTESIS Bilateral 6/15/2022    THORACENTESIS ULTRASOUND performed by Maria Carmona MD at [facility] ENDOSCOPY    UPPER GASTROINTESTINAL ENDOSCOPY N/A 6/4/2022    EGD ESOPHAGOGASTRODUODENOSCOPY performed by Mannie Glez MD at [facility] ENDOSCOPY        Family History   Problem Relation Age of Onset    Other Other ondansetron (ZOFRAN) 8 MG tablet Take 8 mg by mouth every 8 hours as needed for Nausea       !! - Potential duplicate medications found. Please discuss with provider. Vitals signs and nursing note reviewed. Patient Vitals for the past 4 hrs:   Temp Pulse Resp BP SpO2   08/11/22 1506 97.3 °F (36.3 °C) 72 18 110/77 97 %          Physical Exam  Constitutional:       Appearance: He is ill-appearing. He is not diaphoretic. Comments: Thin but not overtly toxic or septic   HENT:      Head: Atraumatic. Cardiovascular:      Rate and Rhythm: Normal rate. Pulses: Normal pulses. Pulmonary:      Effort: Pulmonary effort is normal.      Comments: Somewhat decreased breath sounds diffusely  Musculoskeletal:      Right lower leg: No tenderness. Left lower leg: No tenderness. Skin:     Coloration: Skin is not cyanotic. Findings: No rash. Neurological:      General: No focal deficit present.    Psychiatric:         Mood and Affect: Mood normal.         Behavior: Behavior normal.        Procedures    ED EKG Interpretation      Labs Reviewed   RESPIRATORY PANEL, MOLECULAR, WITH COVID-19 - Abnormal; Notable for the following components:       Result Value    SARS-CoV-2, PCR Detected (*)     All other components within normal limits   CBC WITH AUTO DIFFERENTIAL - Abnormal; Notable for the following components:    Hemoglobin 11.4 (*)     Hematocrit 37.9 (*)     MCHC 30.1 (*)     RDW 16.4 (*)     Platelets 049 (*)     MPV 9.0 (*)     All other components within normal limits   COMPREHENSIVE METABOLIC PANEL - Abnormal; Notable for the following components:    Sodium 133 (*)     Anion Gap 5 (*)     Creatinine 0.30 (*)     Total Bilirubin 0.1 (*)     Alk Phosphatase 205 (*)     Albumin 2.8 (*)     Globulin 5.3 (*)     Albumin/Globulin Ratio 0.5 (*)     All other components within normal limits   BRAIN NATRIURETIC PEPTIDE - Abnormal; Notable for the following components:    NT Pro- (*)     All other components within normal limits   C-REACTIVE PROTEIN - Abnormal; Notable for the following components:    CRP 1.2 (*)     All other components within normal limits   CBC WITH AUTO DIFFERENTIAL - Abnormal; Notable for the following components:    Hemoglobin 11.5 (*)     Hematocrit 37.7 (*)     MCHC 30.5 (*)     RDW 16.3 (*)     Platelets 127 (*)     MPV 9.3 (*)     All other components within normal limits   BASIC METABOLIC PANEL - Abnormal; Notable for the following components:    Sodium 134 (*)     Creatinine 0.40 (*)     Calcium 8.0 (*)     All other components within normal limits   CBC WITH AUTO DIFFERENTIAL - Abnormal; Notable for the following components:    RBC 4.09 (*)     Hemoglobin 11.0 (*)     Hematocrit 35.7 (*)     MCHC 30.8 (*)     RDW 16.1 (*)     Platelets 453 (*)     MPV 9.2 (*)     All other components within normal limits   BASIC METABOLIC PANEL - Abnormal; Notable for the following components:    Sodium 130 (*)     Chloride 95 (*)     Glucose 116 (*)     BUN 26 (*)     Creatinine 0.40 (*)     All other components within normal limits   PHOSPHORUS - Abnormal; Notable for the following components:    Phosphorus 4.2 (*)     All other components within normal limits   CBC WITH AUTO DIFFERENTIAL - Abnormal; Notable for the following components:    RBC 3.99 (*)     Hemoglobin 10.8 (*)     Hematocrit 35.4 (*)     MCHC 30.5 (*)     RDW 16.0 (*)     Platelets 260 (*)     MPV 9.3 (*)     All other components within normal limits   BASIC METABOLIC PANEL - Abnormal; Notable for the following components:    Sodium 131 (*)     Chloride 95 (*)     Glucose 110 (*)     Creatinine 0.20 (*)     All other components within normal limits   BASIC METABOLIC PANEL - Abnormal; Notable for the following components:    Sodium 128 (*)     Chloride 95 (*)     Anion Gap 2 (*)     Glucose 114 (*)     Creatinine 0.30 (*)     All other components within normal limits   BASIC METABOLIC PANEL - Abnormal; Notable for the following components:    Sodium 131 (*)     Chloride 96 (*)     Anion Gap 4 (*)     Creatinine 0.20 (*)     All other components within normal limits   MAGNESIUM   LIPASE   TROPONIN   MAGNESIUM   MAGNESIUM   PHOSPHORUS   MAGNESIUM   PHOSPHORUS   POCT URINALYSIS DIPSTICK   PLEASE READ & DOCUMENT PPD TEST IN 24 HRS   PLEASE READ & DOCUMENT PPD TEST IN 48 HRS   TYPE AND SCREEN        XR CHEST 1 VIEW   Final Result   Improved bibasilar lung atelectasis and small pleural effusions. CT CHEST PULMONARY EMBOLISM W CONTRAST   Final Result   1. No pulmonary embolism or right heart strain. 2.  Distal bilateral lower lobe bronchial occlusion with findings of left   greater than right dependent aspiration/consolidation. Slight increased size of   small pleural effusion with decreased size of small right pleural effusion. XR CHEST PORTABLE   Final Result   1. Stable mild cardiomegaly without evolving acute changes suggested by plain   film. Cardiomegaly can be an early indicator for heart failure in the correct   clinical setting. This report was made using voice transcription. Despite my best efforts to avoid   any, transcription errors may persist. If there is any question about the   accuracy of the report or need for clarification, then please call [phone], or text me through [employer] for clarification or correction. Voice dictation software was used during the making of this note. This software is not perfect and grammatical and other typographical errors may be present. This note has not been completely proofread for errors.        Wood Paris MD  08/11/22 0099

## 2022-08-11 NOTE — PROGRESS NOTES
Hospitalist Progress Note   Admit Date:  2022  2:17 PM   Name:  Roscoe Bradford   Age:  78 y.o. Sex:  male  :  1955   MRN:  [id number]   Room:  [facility]    Presenting Complaint: Shortness of Breath and Rectal Bleeding     Reason(s) for Admission: Respiratory failure with hypoxia [facility] Course & Interval History:     Copied from prior provider HPI/note summary:  Patient with past medical history of    Gastric adenocarcinoma s/p chemotherapy, s/p total gastrectomy and esophagojejunostomy, lateral lobectomy of the liver, distal pancreatectomy and splenectomy, status post J-tube placement. Hypertension  Pulmonary embolism on Lovenox  Recent admission for septic shock, respiratory failure with pleural effusion and pulmonary embolism. Recent COVID infection on May 1, 2022. Patient came from a nursing facility due to shortness of breath and hematochezia. In ER, his oxygenation saturation was 88% on room air. This improved with oxygen via cannula at 2 L/min. From CT chest, he is found to have   1. No pulmonary embolism or right heart strain. 2.  Distal bilateral lower lobe bronchial occlusion with findings of left   greater than right dependent aspiration/consolidation. Slight increased size of   small pleural effusion with decreased size of small right pleural effusion. CXR shows; Stable mild cardiomegaly without evolving acute changes suggested by plain   film. Cardiomegaly can be an early indicator for heart failure in the correct   clinical setting. Patient is admitted for Acute respiratory failure with hypoxia. Subjective/24hr Events (22): Hospice consultation and arrangements pending-patient expressed wishes to be discharged home with hospice care. Patient and spouse in agreement. Now room air from [facility] several days and appears to be resolved/resolving. Denies change in bowel or bladder habits last 24 hours.   Denies significant chest pain cough or change in sensorium. Assessment & Plan:      # Respiratory failure with hypoxia  #covid-19 infection  8/10--now room air-supportive care. 8/11--changed. # Gastic adenocarcinoma  - s/p surgical resections as in HPI  - no further report of hematochezia.  - H&H stable  - continue feedings via jejunostomy  - consult to oncology for AM  8/10--patient has expressed wishes for hospice care. Consult placed. 8/11--appears stable for discharge home with hospice care when arrangements made/accepted to service and equipment supplies in place. # HTN  8/10--stable-observe on beta-blocker. 8/11--no change. # skin lesions  - face and scalp lesions w/ atypical appearing for boils or folliculitis, no improvement w/ antbx. - R supraclavicular, abdominal nodules likely metastatic.  - ID has seen - recommends biopsy of facial lesion and also enlarged LN's. Will d/w surgery, consider monkeypox swabbing if lesions not cancerous. 8/9  - Surgery has seen - suspects all metastatic disease. Recs for oncology eval to discuss if pt is candidate for further chemotherapy. 8/10--as above-likely end-of-life supportive care-hospice consult placed. Discharge Planning:    Home with hospice versus other     Diet:  ADULT TUBE FEEDING; Jejunostomy; Standard without Fiber; Continuous; 15; Yes; 10; Q 8 hours; 65; 60; Q 4 hours  ADULT DIET; Clear Liquid  DVT PPx: SCDs-Lovenox on hold  Code status: Full Code               Hospital Problems:  Principal Problem:    Respiratory failure with hypoxia (HCC)  Active Problems:    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HCC)    Gastroesophageal reflux disease    Loss of weight    History of gastric cancer    Jejunostomy status (HCC)    Hematochezia    Abdominal wall mass  Resolved Problems:    * No resolved hospital problems.  *      Objective:   Patient Vitals for the past 24 hrs:   Temp Pulse Resp BP SpO2   08/11/22 1506 97.3 °F (36.3 °C) 72 18 110/77 97 %   08/11/22 1116 98.2 °F (36.8 °C) 60 19 106/73 95 %   08/11/22 0715 97.9 °F (36.6 °C) 77 18 109/75 91 %   08/11/22 0330 97.5 °F (36.4 °C) 79 12 103/77 90 %   08/10/22 2256 97.7 °F (36.5 °C) 72 14 100/78 93 %   08/10/22 1935 97.7 °F (36.5 °C) 81 14 110/87 97 %         Oxygen Therapy  SpO2: 97 %  Pulse via Oximetry: 70 beats per minute  Pulse Oximeter Device Mode: Continuous  O2 Device: None (Room air)  O2 Flow Rate (L/min): 2 L/min    Estimated body mass index is 15.68 kg/m² as calculated from the following:    Height as of this encounter: 5' 9\" (1.753 m). Weight as of this encounter: 106 lb 3.2 oz (48.2 kg). Intake/Output Summary (Last 24 hours) at 8/11/2022 1542  Last data filed at 8/11/2022 1335  Gross per 24 hour   Intake 1170 ml   Output 280 ml   Net 890 ml           Physical Exam:     Blood pressure 110/77, pulse 72, temperature 97.3 °F (36.3 °C), resp. rate 18, height 5' 9\" (1.753 m), weight 106 lb 3.2 oz (48.2 kg), SpO2 97 %. General-alert and oriented x3, reports he is at peace with decision  Eyes-conjunctive are clear pupils equal round react to light extraocular muscles intact    Ears-no deformity-no drainage  Nares-no nasal deformity-no discharge-turbinates not significantly enlarged  Throat-oral mucosa moist, no erythema or exudate  Heart-regular rate and rhythm no rubs gallops clicks or murmurs. No JVD grossly  Lungs-clear auscultation palpation and percussion, symmetric excursion of the chest wall. No wheezing    Abdomen-no increased distention. No localized tenderness. Extremities-no significant increase in edema, moves all extremities symmetrically. Neurologic-cranial nerves II through XII grossly intact, no focal motor deficits grossly. No tremor    Psychiatric-no suicidal ideations or homicidal ideations. Denies depressed thoughts.       I have personally reviewed labs and tests showing:  Recent Labs:  Recent Results (from the past 48 hour(s))   CBC with Auto Differential    Collection Time: 08/09/22  4:43 PM   Result Value Ref Range    WBC 8.4 4.3 - 11.1 K/uL    RBC 3.99 (L) 4.23 - 5.6 M/uL    Hemoglobin 10.8 (L) 13.6 - 17.2 g/dL    Hematocrit 35.4 (L) 41.1 - 50.3 %    MCV 88.7 79.6 - 97.8 FL    MCH 27.1 26.1 - 32.9 PG    MCHC 30.5 (L) 31.4 - 35.0 g/dL    RDW 16.0 (H) 11.9 - 14.6 %    Platelets 910 (H) 353 - 450 K/uL    MPV 9.3 (L) 9.4 - 12.3 FL    nRBC 0.00 0.0 - 0.2 K/uL    Differential Type AUTOMATED      Seg Neutrophils 57 43 - 78 %    Lymphocytes 27 13 - 44 %    Monocytes 12 4.0 - 12.0 %    Eosinophils % 2 0.5 - 7.8 %    Basophils 1 0.0 - 2.0 %    Immature Granulocytes 1 0.0 - 5.0 %    Segs Absolute 4.8 1.7 - 8.2 K/UL    Absolute Lymph # 2.3 0.5 - 4.6 K/UL    Absolute Mono # 1.0 0.1 - 1.3 K/UL    Absolute Eos # 0.2 0.0 - 0.8 K/UL    Basophils Absolute 0.1 0.0 - 0.2 K/UL    Absolute Immature Granulocyte 0.1 0.0 - 0.5 K/UL   Basic Metabolic Panel    Collection Time: 08/09/22  4:43 PM   Result Value Ref Range    Sodium 131 (L) 136 - 145 mmol/L    Potassium 4.7 3.5 - 5.1 mmol/L    Chloride 95 (L) 98 - 107 mmol/L    CO2 28 21 - 32 mmol/L    Anion Gap 8 7 - 16 mmol/L    Glucose 110 (H) 65 - 100 mg/dL    BUN 18 8 - 23 MG/DL    Creatinine 0.20 (L) 0.8 - 1.5 MG/DL    GFR African American >60 >60 ml/min/1.73m2    GFR Non- >60 >60 ml/min/1.73m2    Calcium 8.8 8.3 - 10.4 MG/DL   Basic Metabolic Panel    Collection Time: 08/10/22  4:22 AM   Result Value Ref Range    Sodium 128 (L) 138 - 145 mmol/L    Potassium 4.8 3.5 - 5.1 mmol/L    Chloride 95 (L) 98 - 107 mmol/L    CO2 31 21 - 32 mmol/L    Anion Gap 2 (L) 7 - 16 mmol/L    Glucose 114 (H) 65 - 100 mg/dL    BUN 18 8 - 23 MG/DL    Creatinine 0.30 (L) 0.8 - 1.5 MG/DL    GFR African American >60 >60 ml/min/1.73m2    GFR Non- >60 >60 ml/min/1.73m2    Calcium 9.1 8.3 - 10.4 MG/DL   Magnesium    Collection Time: 08/10/22  4:22 AM   Result Value Ref Range    Magnesium 1.9 1.8 - 2.4 mg/dL   Phosphorus    Collection Time: 08/10/22  4:22 AM   Result Value Ref Range    Phosphorus 3.7 2.3 - 3.7 MG/DL   PLEASE READ & DOCUMENT PPD TEST IN 24 HRS    Collection Time: 08/10/22 10:11 AM   Result Value Ref Range    PPD, (POC) Negative Negative    mm Induration 0 0 - 5 mm   PLEASE READ & DOCUMENT PPD TEST IN 48 HRS    Collection Time: 08/11/22 12:00 AM   Result Value Ref Range    PPD, (POC) Negative Negative    mm Induration 0 0 - 5 mm   Basic Metabolic Panel    Collection Time: 08/11/22  4:22 AM   Result Value Ref Range    Sodium 131 (L) 136 - 145 mmol/L    Potassium 4.4 3.5 - 5.1 mmol/L    Chloride 96 (L) 98 - 107 mmol/L    CO2 31 21 - 32 mmol/L    Anion Gap 4 (L) 7 - 16 mmol/L    Glucose 92 65 - 100 mg/dL    BUN 16 8 - 23 MG/DL    Creatinine 0.20 (L) 0.8 - 1.5 MG/DL    GFR African American >60 >60 ml/min/1.73m2    GFR Non- >60 >60 ml/min/1.73m2    Calcium 8.8 8.3 - 10.4 MG/DL   Magnesium    Collection Time: 08/11/22  4:22 AM   Result Value Ref Range    Magnesium 2.0 1.8 - 2.4 mg/dL   Phosphorus    Collection Time: 08/11/22  4:22 AM   Result Value Ref Range    Phosphorus 3.7 2.3 - 3.7 MG/DL       I have personally reviewed imaging studies showing: Other Studies:  XR CHEST 1 VIEW   Final Result   Improved bibasilar lung atelectasis and small pleural effusions. CT CHEST PULMONARY EMBOLISM W CONTRAST   Final Result   1. No pulmonary embolism or right heart strain. 2.  Distal bilateral lower lobe bronchial occlusion with findings of left   greater than right dependent aspiration/consolidation. Slight increased size of   small pleural effusion with decreased size of small right pleural effusion. XR CHEST PORTABLE   Final Result   1. Stable mild cardiomegaly without evolving acute changes suggested by plain   film. Cardiomegaly can be an early indicator for heart failure in the correct   clinical setting. This report was made using voice transcription.  Despite my best efforts to avoid   any, transcription errors may persist. If there is any question about the   accuracy of the report or need for clarification, then please call [phone], or text me through [email or web address] for clarification or correction.            Current Meds:  Current Facility-Administered Medications   Medication Dose Route Frequency    furosemide (LASIX) tablet 40 mg  40 mg Oral Daily    bisacodyl (DULCOLAX) suppository 10 mg  10 mg Rectal Daily PRN    pantoprazole (PROTONIX) 40 mg in sodium chloride (PF) 10 mL injection  40 mg IntraVENous Q12H    potassium chloride (KLOR-CON M) extended release tablet 40 mEq  40 mEq Oral PRN    Or    potassium bicarb-citric acid (EFFER-K) effervescent tablet 40 mEq  40 mEq Oral PRN    Or    potassium chloride 10 mEq/100 mL IVPB (Peripheral Line)  10 mEq IntraVENous PRN    magnesium sulfate 2000 mg in 50 mL IVPB premix  2,000 mg IntraVENous PRN    sodium phosphate 10 mmol in sodium chloride 0.9 % 250 mL IVPB  10 mmol IntraVENous PRN    Or    sodium phosphate 15 mmol in sodium chloride 0.9 % 250 mL IVPB  15 mmol IntraVENous PRN    Or    sodium phosphate 20 mmol in sodium chloride 0.9 % 500 mL IVPB  20 mmol IntraVENous PRN    oxyCODONE (ROXICODONE) immediate release tablet 5 mg  5 mg Per J Tube Q4H PRN    sodium chloride flush 0.9 % injection 5 mL  5 mL IntraVENous PRN    sodium chloride flush 0.9 % injection 5 mL  5 mL IntraVENous Q8H    sodium chloride flush 0.9 % injection 5-40 mL  5-40 mL IntraVENous 2 times per day    sodium chloride flush 0.9 % injection 5-40 mL  5-40 mL IntraVENous PRN    0.9 % sodium chloride infusion   IntraVENous PRN    ondansetron (ZOFRAN-ODT) disintegrating tablet 4 mg  4 mg Oral Q8H PRN    Or    ondansetron (ZOFRAN) injection 4 mg  4 mg IntraVENous Q6H PRN    polyethylene glycol (GLYCOLAX) packet 17 g  17 g Oral Daily PRN    acetaminophen (TYLENOL) tablet 650 mg  650 mg Oral Q6H PRN    Or    acetaminophen (TYLENOL) suppository 650 mg  650 mg Rectal Q6H PRN    [Held by provider] enoxaparin (LOVENOX) injection 50 mg  1 mg/kg SubCUTAneous Q12H    levalbuterol (XOPENEX) nebulization 0.63 mg  0.63 mg Nebulization Q6H PRN    metoprolol tartrate (LOPRESSOR) tablet 12.5 mg  12.5 mg Per J Tube BID       Signed:  Brannon Emanuel DO    Part of this note may have been written by using a voice dictation software. The note has been proof read but may still contain some grammatical/other typographical errors.

## 2022-08-11 NOTE — PROGRESS NOTES
Pt's wife called, requested that case management contact her concerning options and choices for home health, palliative care, etc. Will pass on to dayshift RN as well.

## 2022-08-11 NOTE — CARE COORDINATION
JRAED spoke with the patient's wife about hospice. She wants patient to discharge to [facility]. JARED called [facility] and left a vm with the . JARED will follow up tomorrow.

## 2022-08-12 LAB
ANION GAP SERPL CALC-SCNC: 2 MMOL/L (ref 7–16)
BUN SERPL-MCNC: 15 MG/DL (ref 8–23)
CALCIUM SERPL-MCNC: 8.9 MG/DL (ref 8.3–10.4)
CHLORIDE SERPL-SCNC: 96 MMOL/L (ref 98–107)
CO2 SERPL-SCNC: 32 MMOL/L (ref 21–32)
CREAT SERPL-MCNC: 0.2 MG/DL (ref 0.8–1.5)
GLUCOSE SERPL-MCNC: 91 MG/DL (ref 65–100)
MAGNESIUM SERPL-MCNC: 2.1 MG/DL (ref 1.8–2.4)
PHOSPHATE SERPL-MCNC: 3.8 MG/DL (ref 2.3–3.7)
POTASSIUM SERPL-SCNC: 4.6 MMOL/L (ref 3.5–5.1)
SODIUM SERPL-SCNC: 130 MMOL/L (ref 136–145)

## 2022-08-12 PROCEDURE — 6370000000 HC RX 637 (ALT 250 FOR IP): Performed by: INTERNAL MEDICINE

## 2022-08-12 PROCEDURE — 6360000002 HC RX W HCPCS: Performed by: INTERNAL MEDICINE

## 2022-08-12 PROCEDURE — 6370000000 HC RX 637 (ALT 250 FOR IP): Performed by: FAMILY MEDICINE

## 2022-08-12 PROCEDURE — C9113 INJ PANTOPRAZOLE SODIUM, VIA: HCPCS | Performed by: INTERNAL MEDICINE

## 2022-08-12 PROCEDURE — 80048 BASIC METABOLIC PNL TOTAL CA: CPT

## 2022-08-12 PROCEDURE — 2580000003 HC RX 258: Performed by: EMERGENCY MEDICINE

## 2022-08-12 PROCEDURE — 2580000003 HC RX 258: Performed by: INTERNAL MEDICINE

## 2022-08-12 PROCEDURE — 36415 COLL VENOUS BLD VENIPUNCTURE: CPT

## 2022-08-12 PROCEDURE — A4216 STERILE WATER/SALINE, 10 ML: HCPCS | Performed by: INTERNAL MEDICINE

## 2022-08-12 PROCEDURE — 84100 ASSAY OF PHOSPHORUS: CPT

## 2022-08-12 PROCEDURE — 83735 ASSAY OF MAGNESIUM: CPT

## 2022-08-12 PROCEDURE — 1100000000 HC RM PRIVATE

## 2022-08-12 RX ORDER — OXYCODONE HYDROCHLORIDE 5 MG/1
10 TABLET ORAL EVERY 4 HOURS PRN
Status: DISCONTINUED | OUTPATIENT
Start: 2022-08-12 | End: 2022-08-15 | Stop reason: HOSPADM

## 2022-08-12 RX ADMIN — FUROSEMIDE 40 MG: 40 TABLET ORAL at 08:03

## 2022-08-12 RX ADMIN — OXYCODONE 5 MG: 5 TABLET ORAL at 08:03

## 2022-08-12 RX ADMIN — OXYCODONE 5 MG: 5 TABLET ORAL at 03:57

## 2022-08-12 RX ADMIN — SODIUM CHLORIDE, PRESERVATIVE FREE 5 ML: 5 INJECTION INTRAVENOUS at 22:01

## 2022-08-12 RX ADMIN — SODIUM CHLORIDE, PRESERVATIVE FREE 40 MG: 5 INJECTION INTRAVENOUS at 08:03

## 2022-08-12 RX ADMIN — SODIUM CHLORIDE, PRESERVATIVE FREE 10 ML: 5 INJECTION INTRAVENOUS at 08:05

## 2022-08-12 RX ADMIN — SODIUM CHLORIDE, PRESERVATIVE FREE 5 ML: 5 INJECTION INTRAVENOUS at 21:57

## 2022-08-12 RX ADMIN — SODIUM CHLORIDE, PRESERVATIVE FREE 40 MG: 5 INJECTION INTRAVENOUS at 20:19

## 2022-08-12 RX ADMIN — METOPROLOL TARTRATE 12.5 MG: 25 TABLET, FILM COATED ORAL at 08:03

## 2022-08-12 RX ADMIN — SODIUM CHLORIDE, PRESERVATIVE FREE 5 ML: 5 INJECTION INTRAVENOUS at 13:47

## 2022-08-12 RX ADMIN — OXYCODONE 5 MG: 5 TABLET ORAL at 13:48

## 2022-08-12 RX ADMIN — POLYETHYLENE GLYCOL 3350 17 G: 17 POWDER, FOR SOLUTION ORAL at 09:10

## 2022-08-12 RX ADMIN — METOPROLOL TARTRATE 12.5 MG: 25 TABLET, FILM COATED ORAL at 22:13

## 2022-08-12 RX ADMIN — OXYCODONE 10 MG: 5 TABLET ORAL at 17:39

## 2022-08-12 ASSESSMENT — PAIN SCALES - GENERAL
PAINLEVEL_OUTOF10: 0
PAINLEVEL_OUTOF10: 5
PAINLEVEL_OUTOF10: 10
PAINLEVEL_OUTOF10: 7
PAINLEVEL_OUTOF10: 3
PAINLEVEL_OUTOF10: 0

## 2022-08-12 ASSESSMENT — PAIN DESCRIPTION - ORIENTATION: ORIENTATION: RIGHT;MID

## 2022-08-12 ASSESSMENT — PAIN DESCRIPTION - ONSET: ONSET: ON-GOING

## 2022-08-12 ASSESSMENT — PAIN DESCRIPTION - DESCRIPTORS: DESCRIPTORS: ACHING

## 2022-08-12 ASSESSMENT — PAIN DESCRIPTION - LOCATION: LOCATION: ABDOMEN

## 2022-08-12 NOTE — CARE COORDINATION
JARED spoke with Namrata Goldberg with [facility]. She requested CM fax clinicals. After she reviews then a liaison will be sent to meet with the patient. JARED following.

## 2022-08-12 NOTE — PROGRESS NOTES
Request by staff to visit    Patient was calm    Looks frail    Receptive to       Acknowledged his struggles      Rico Reedings he is going home soon (hospice)    Pt said \" he has peace with the lord\"    Emphasized with him to spend time with those he loves to say goodbyes    prayer

## 2022-08-12 NOTE — PROGRESS NOTES
Hospitalist Progress Note   Admit Date:  2022  2:17 PM   Name:  Keena Guo   Age:  78 y.o. Sex:  male  :  1955   MRN:  [id number]   Room:  [facility]/    Presenting Complaint: Shortness of Breath and Rectal Bleeding     Reason(s) for Admission: Respiratory failure with hypoxia [facility]) [facility] Course & Interval History:     Copied from prior provider HPI/note summary:  Patient with past medical history of    Gastric adenocarcinoma s/p chemotherapy, s/p total gastrectomy and esophagojejunostomy, lateral lobectomy of the liver, distal pancreatectomy and splenectomy, status post J-tube placement. Hypertension  Pulmonary embolism on Lovenox  Recent admission for septic shock, respiratory failure with pleural effusion and pulmonary embolism. Recent COVID infection on May 1, 2022. Patient came from a nursing facility due to shortness of breath and hematochezia. In ER, his oxygenation saturation was 88% on room air. This improved with oxygen via cannula at 2 L/min. From CT chest, he is found to have   1. No pulmonary embolism or right heart strain. 2.  Distal bilateral lower lobe bronchial occlusion with findings of left   greater than right dependent aspiration/consolidation. Slight increased size of   small pleural effusion with decreased size of small right pleural effusion. CXR shows; Stable mild cardiomegaly without evolving acute changes suggested by plain   film. Cardiomegaly can be an early indicator for heart failure in the correct   clinical setting. Patient is admitted for Acute respiratory failure with hypoxia. Subjective/24hr Events (22): Awaiting offer from hospice house. Policy as patient has to be 11 days from diagnosis COVID-19. Diagnosed here  but apparently diagnosed prior at facility and attempting to obtain records. If greater than 11 days patient may be discharged this weekend.   If not may be discharged on PPD TEST IN 48 HRS    Collection Time: 08/11/22 12:00 AM   Result Value Ref Range    PPD, (POC) Negative Negative    mm Induration 0 0 - 5 mm   Basic Metabolic Panel    Collection Time: 08/11/22  4:22 AM   Result Value Ref Range    Sodium 131 (L) 136 - 145 mmol/L    Potassium 4.4 3.5 - 5.1 mmol/L    Chloride 96 (L) 98 - 107 mmol/L    CO2 31 21 - 32 mmol/L    Anion Gap 4 (L) 7 - 16 mmol/L    Glucose 92 65 - 100 mg/dL    BUN 16 8 - 23 MG/DL    Creatinine 0.20 (L) 0.8 - 1.5 MG/DL    GFR African American >60 >60 ml/min/1.73m2    GFR Non- >60 >60 ml/min/1.73m2    Calcium 8.8 8.3 - 10.4 MG/DL   Magnesium    Collection Time: 08/11/22  4:22 AM   Result Value Ref Range    Magnesium 2.0 1.8 - 2.4 mg/dL   Phosphorus    Collection Time: 08/11/22  4:22 AM   Result Value Ref Range    Phosphorus 3.7 2.3 - 3.7 MG/DL   Magnesium    Collection Time: 08/12/22  5:30 AM   Result Value Ref Range    Magnesium 2.1 1.8 - 2.4 mg/dL   Phosphorus    Collection Time: 08/12/22  5:30 AM   Result Value Ref Range    Phosphorus 3.8 (H) 2.3 - 3.7 MG/DL   Basic Metabolic Panel    Collection Time: 08/12/22  5:30 AM   Result Value Ref Range    Sodium 130 (L) 136 - 145 mmol/L    Potassium 4.6 3.5 - 5.1 mmol/L    Chloride 96 (L) 98 - 107 mmol/L    CO2 32 21 - 32 mmol/L    Anion Gap 2 (L) 7 - 16 mmol/L    Glucose 91 65 - 100 mg/dL    BUN 15 8 - 23 MG/DL    Creatinine 0.20 (L) 0.8 - 1.5 MG/DL    GFR African American >60 >60 ml/min/1.73m2    GFR Non- >60 >60 ml/min/1.73m2    Calcium 8.9 8.3 - 10.4 MG/DL       I have personally reviewed imaging studies showing: Other Studies:  XR CHEST 1 VIEW   Final Result   Improved bibasilar lung atelectasis and small pleural effusions. CT CHEST PULMONARY EMBOLISM W CONTRAST   Final Result   1. No pulmonary embolism or right heart strain. 2.  Distal bilateral lower lobe bronchial occlusion with findings of left   greater than right dependent aspiration/consolidation.  Slight PRN    0.9 % sodium chloride infusion   IntraVENous PRN    ondansetron (ZOFRAN-ODT) disintegrating tablet 4 mg  4 mg Oral Q8H PRN    Or    ondansetron (ZOFRAN) injection 4 mg  4 mg IntraVENous Q6H PRN    polyethylene glycol (GLYCOLAX) packet 17 g  17 g Oral Daily PRN    acetaminophen (TYLENOL) tablet 650 mg  650 mg Oral Q6H PRN    Or    acetaminophen (TYLENOL) suppository 650 mg  650 mg Rectal Q6H PRN    [Held by provider] enoxaparin (LOVENOX) injection 50 mg  1 mg/kg SubCUTAneous Q12H    levalbuterol (XOPENEX) nebulization 0.63 mg  0.63 mg Nebulization Q6H PRN    metoprolol tartrate (LOPRESSOR) tablet 12.5 mg  12.5 mg Per J Tube BID       Signed:  Hank Mills DO    Part of this note may have been written by using a voice dictation software. The note has been proof read but may still contain some grammatical/other typographical errors.

## 2022-08-12 NOTE — CARE COORDINATION
Salena Jimenez with [facility]  [phone]) called CM after reviewing clinicals. Patient will need to be 11 days out from testing positive for covid. Patient was tested positive on 8/6 at the hospital, however he tested positive at [facility]. Salena Jimenez is going to attempt to get those results and if she can patient can discharge over the weekend to the [facility]. If she's unable to get those results then patient can discharge on 8/17. Salena Jimenez will be in contact with JARED.

## 2022-08-12 NOTE — PROGRESS NOTES
CM made contact with Spouse and she has requested a referral be made to [address] [place] [address]). Referral completed. Blanche Stein will continue to follow referral status.

## 2022-08-13 LAB
ANION GAP SERPL CALC-SCNC: 4 MMOL/L (ref 7–16)
BUN SERPL-MCNC: 14 MG/DL (ref 8–23)
CALCIUM SERPL-MCNC: 8.8 MG/DL (ref 8.3–10.4)
CHLORIDE SERPL-SCNC: 95 MMOL/L (ref 98–107)
CO2 SERPL-SCNC: 30 MMOL/L (ref 21–32)
CREAT SERPL-MCNC: 0.2 MG/DL (ref 0.8–1.5)
GLUCOSE SERPL-MCNC: 110 MG/DL (ref 65–100)
POTASSIUM SERPL-SCNC: 4.3 MMOL/L (ref 3.5–5.1)
SODIUM SERPL-SCNC: 129 MMOL/L (ref 136–145)

## 2022-08-13 PROCEDURE — 6360000002 HC RX W HCPCS: Performed by: INTERNAL MEDICINE

## 2022-08-13 PROCEDURE — 80048 BASIC METABOLIC PNL TOTAL CA: CPT

## 2022-08-13 PROCEDURE — C9113 INJ PANTOPRAZOLE SODIUM, VIA: HCPCS | Performed by: INTERNAL MEDICINE

## 2022-08-13 PROCEDURE — 1100000000 HC RM PRIVATE

## 2022-08-13 PROCEDURE — 6370000000 HC RX 637 (ALT 250 FOR IP): Performed by: INTERNAL MEDICINE

## 2022-08-13 PROCEDURE — 2580000003 HC RX 258: Performed by: EMERGENCY MEDICINE

## 2022-08-13 PROCEDURE — 36415 COLL VENOUS BLD VENIPUNCTURE: CPT

## 2022-08-13 PROCEDURE — 2580000003 HC RX 258: Performed by: INTERNAL MEDICINE

## 2022-08-13 PROCEDURE — A4216 STERILE WATER/SALINE, 10 ML: HCPCS | Performed by: INTERNAL MEDICINE

## 2022-08-13 PROCEDURE — 6370000000 HC RX 637 (ALT 250 FOR IP): Performed by: FAMILY MEDICINE

## 2022-08-13 RX ADMIN — SODIUM CHLORIDE, PRESERVATIVE FREE 10 ML: 5 INJECTION INTRAVENOUS at 21:57

## 2022-08-13 RX ADMIN — SODIUM CHLORIDE, PRESERVATIVE FREE 5 ML: 5 INJECTION INTRAVENOUS at 05:26

## 2022-08-13 RX ADMIN — SODIUM CHLORIDE, PRESERVATIVE FREE 5 ML: 5 INJECTION INTRAVENOUS at 21:57

## 2022-08-13 RX ADMIN — SODIUM CHLORIDE, PRESERVATIVE FREE 40 MG: 5 INJECTION INTRAVENOUS at 08:01

## 2022-08-13 RX ADMIN — OXYCODONE 10 MG: 5 TABLET ORAL at 17:04

## 2022-08-13 RX ADMIN — METOPROLOL TARTRATE 12.5 MG: 25 TABLET, FILM COATED ORAL at 08:01

## 2022-08-13 RX ADMIN — OXYCODONE 10 MG: 5 TABLET ORAL at 06:47

## 2022-08-13 RX ADMIN — POLYETHYLENE GLYCOL 3350 17 G: 17 POWDER, FOR SOLUTION ORAL at 08:01

## 2022-08-13 RX ADMIN — SODIUM CHLORIDE, PRESERVATIVE FREE 10 ML: 5 INJECTION INTRAVENOUS at 08:02

## 2022-08-13 RX ADMIN — FUROSEMIDE 40 MG: 40 TABLET ORAL at 08:01

## 2022-08-13 RX ADMIN — METOPROLOL TARTRATE 12.5 MG: 25 TABLET, FILM COATED ORAL at 21:57

## 2022-08-13 RX ADMIN — SODIUM CHLORIDE, PRESERVATIVE FREE 40 MG: 5 INJECTION INTRAVENOUS at 21:57

## 2022-08-13 RX ADMIN — OXYCODONE 10 MG: 5 TABLET ORAL at 02:03

## 2022-08-13 RX ADMIN — OXYCODONE 10 MG: 5 TABLET ORAL at 12:30

## 2022-08-13 ASSESSMENT — PAIN SCALES - GENERAL
PAINLEVEL_OUTOF10: 0
PAINLEVEL_OUTOF10: 8
PAINLEVEL_OUTOF10: 0
PAINLEVEL_OUTOF10: 8
PAINLEVEL_OUTOF10: 0

## 2022-08-13 ASSESSMENT — PAIN DESCRIPTION - DESCRIPTORS
DESCRIPTORS: CRUSHING
DESCRIPTORS: BURNING

## 2022-08-13 ASSESSMENT — PAIN DESCRIPTION - LOCATION
LOCATION: BACK
LOCATION: BACK

## 2022-08-13 ASSESSMENT — PAIN DESCRIPTION - PAIN TYPE: TYPE: CHRONIC PAIN

## 2022-08-13 ASSESSMENT — PAIN DESCRIPTION - ORIENTATION
ORIENTATION: RIGHT
ORIENTATION: RIGHT

## 2022-08-13 ASSESSMENT — PAIN DESCRIPTION - FREQUENCY: FREQUENCY: CONTINUOUS

## 2022-08-13 ASSESSMENT — PAIN DESCRIPTION - ONSET: ONSET: PROGRESSIVE

## 2022-08-13 NOTE — PROGRESS NOTES
Hospitalist Progress Note   Admit Date:  2022  2:17 PM   Name:  Nolberto Woods   Age:  78 y.o. Sex:  male  :  1955   MRN:  [id number]   Room:  [facility]/    Presenting Complaint: Shortness of Breath and Rectal Bleeding     Reason(s) for Admission: Respiratory failure with hypoxia [facility]) [facility] Course & Interval History:     Copied from prior provider HPI/note summary:  Patient with past medical history of    Gastric adenocarcinoma s/p chemotherapy, s/p total gastrectomy and esophagojejunostomy, lateral lobectomy of the liver, distal pancreatectomy and splenectomy, status post J-tube placement. Hypertension  Pulmonary embolism on Lovenox  Recent admission for septic shock, respiratory failure with pleural effusion and pulmonary embolism. Recent COVID infection on May 1, 2022. Patient came from a nursing facility due to shortness of breath and hematochezia. In ER, his oxygenation saturation was 88% on room air. This improved with oxygen via cannula at 2 L/min. From CT chest, he is found to have   1. No pulmonary embolism or right heart strain. 2.  Distal bilateral lower lobe bronchial occlusion with findings of left   greater than right dependent aspiration/consolidation. Slight increased size of   small pleural effusion with decreased size of small right pleural effusion. CXR shows; Stable mild cardiomegaly without evolving acute changes suggested by plain   film. Cardiomegaly can be an early indicator for heart failure in the correct   clinical setting. Patient is admitted for Acute respiratory failure with hypoxia. Subjective/24hr Events (22): He has been accepted at [facility] but they require admission at least 11 days after diagnosis of COVID-19. Positive COVID test was . Patient is not familiar with any COVID test being positive prior to that.   Tentative plan is discharge to [facility] August 17-Wednesday. Patient still desires end-of-life care with hospice. Reviewed laboratory data vital signs and discussed no need for continued blood work at present time. 10 system review of systems negative except as above under subjective/24-hour events. Assessment & Plan:      # Respiratory failure with hypoxia  From COVID-19 infection-this is resolved. # Gastic adenocarcinoma  8/12--metastatic. Patient request hospice care and arrangements being made for [address] either over the weekend or August 56-[facility] soon as able-policy as patient needs to be 11 days from initial diagnosis of COVID-19. Was diagnosed here August 6 but apparently diagnosed prior facility prior to that. Attempting to obtain records. Patient made decision for hospice care but apparently DO NOT RESUSCITATE status was not updated and I will update status now. Prior CODE STATUS here was full code. Patient apparently had DNR status prior-and this is his current wish.  8/13--DNR-discharge to hospice house Wednesday, August 17. # HTN  8/12--continue same meds. # skin lesions  8/12-suspected metastatic-supportive/local care. Discharge Planning:    Hospice house as soon as able-regarding needs to be 11 days from COVID diagnosis diagnosed here August 6 but attempting to obtain records regarding diagnosis prior to that     Diet:  ADULT TUBE FEEDING; Jejunostomy; Standard without Fiber; Continuous; 15; Yes; 10; Q 8 hours; 65; 60; Q 4 hours  ADULT DIET; Clear Liquid  DVT PPx: SCDs-Lovenox on hold  Code status: Full Code               Hospital Problems:  Principal Problem:    Respiratory failure with hypoxia (HCC)  Active Problems:    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HCC)    Gastroesophageal reflux disease    Loss of weight    History of gastric cancer    Jejunostomy status (HCC)    Hematochezia    Abdominal wall mass  Resolved Problems:    * No resolved hospital problems.  *      Objective:   Patient Vitals for the past 24 hrs:   Temp Pulse Resp BP SpO2   08/13/22 1123 98.2 °F (36.8 °C) 62 18 96/65 93 %   08/13/22 0817 98.1 °F (36.7 °C) 71 18 108/71 92 %   08/13/22 0647 -- -- 18 -- --   08/13/22 0322 97.5 °F (36.4 °C) 88 18 100/72 92 %   08/13/22 0233 -- -- 14 -- --   08/12/22 2238 97.7 °F (36.5 °C) 80 18 92/69 91 %   08/12/22 2200 -- 80 -- 110/68 --   08/12/22 1933 97.6 °F (36.4 °C) 94 18 106/76 90 %   08/12/22 1519 97.7 °F (36.5 °C) 72 16 92/62 94 %         Oxygen Therapy  SpO2: 93 %  Pulse via Oximetry: 70 beats per minute  Pulse Oximeter Device Mode: Continuous  O2 Device: None (Room air)  O2 Flow Rate (L/min): 2 L/min    Estimated body mass index is 15.68 kg/m² as calculated from the following:    Height as of this encounter: 5' 9\" (1.753 m). Weight as of this encounter: 106 lb 3.2 oz (48.2 kg). Intake/Output Summary (Last 24 hours) at 8/13/2022 1502  Last data filed at 8/13/2022 1001  Gross per 24 hour   Intake 1020 ml   Output 250 ml   Net 770 ml           Physical Exam:     Blood pressure 96/65, pulse 62, temperature 98.2 °F (36.8 °C), temperature source Oral, resp. rate 18, height 5' 9\" (1.753 m), weight 106 lb 3.2 oz (48.2 kg), SpO2 93 %. Physical Exam:   General-alert and oriented x3, cooperative and calm  Eyes-conjunctive are clear pupils equal round react to light extraocular muscles intact  Nares-no nasal deformity-no discharge-turbinates not significantly enlarged  Throat-oral mucosa moist, no erythema or exudate  Heart-regular rate and rhythm no rubs gallops clicks or murmurs. No JVD grossly  Lungs-clear auscultation palpation and percussion, symmetric excursion of the chest wall. No wheezing    Abdomen-soft, nontender, no gross percussible organomegaly. No gross distention. Bowel sounds present all 4 quadrants    Extremities-no significant increased edema, moves all extremities symmetrically. Neurologic-cranial nerves II through XII grossly intact, no focal motor deficits grossly.   No tremor            Lab/Data Review: All lab results for the last 24 hours reviewed. I have personally reviewed labs and tests showing:  Recent Labs:  Recent Results (from the past 48 hour(s))   Magnesium    Collection Time: 08/12/22  5:30 AM   Result Value Ref Range    Magnesium 2.1 1.8 - 2.4 mg/dL   Phosphorus    Collection Time: 08/12/22  5:30 AM   Result Value Ref Range    Phosphorus 3.8 (H) 2.3 - 3.7 MG/DL   Basic Metabolic Panel    Collection Time: 08/12/22  5:30 AM   Result Value Ref Range    Sodium 130 (L) 136 - 145 mmol/L    Potassium 4.6 3.5 - 5.1 mmol/L    Chloride 96 (L) 98 - 107 mmol/L    CO2 32 21 - 32 mmol/L    Anion Gap 2 (L) 7 - 16 mmol/L    Glucose 91 65 - 100 mg/dL    BUN 15 8 - 23 MG/DL    Creatinine 0.20 (L) 0.8 - 1.5 MG/DL    GFR African American >60 >60 ml/min/1.73m2    GFR Non- >60 >60 ml/min/1.73m2    Calcium 8.9 8.3 - 10.4 MG/DL   Basic Metabolic Panel    Collection Time: 08/13/22  3:54 AM   Result Value Ref Range    Sodium 129 (L) 136 - 145 mmol/L    Potassium 4.3 3.5 - 5.1 mmol/L    Chloride 95 (L) 98 - 107 mmol/L    CO2 30 21 - 32 mmol/L    Anion Gap 4 (L) 7 - 16 mmol/L    Glucose 110 (H) 65 - 100 mg/dL    BUN 14 8 - 23 MG/DL    Creatinine 0.20 (L) 0.8 - 1.5 MG/DL    GFR African American >60 >60 ml/min/1.73m2    GFR Non- >60 >60 ml/min/1.73m2    Calcium 8.8 8.3 - 10.4 MG/DL       I have personally reviewed imaging studies showing: Other Studies:  XR CHEST 1 VIEW   Final Result   Improved bibasilar lung atelectasis and small pleural effusions. CT CHEST PULMONARY EMBOLISM W CONTRAST   Final Result   1. No pulmonary embolism or right heart strain. 2.  Distal bilateral lower lobe bronchial occlusion with findings of left   greater than right dependent aspiration/consolidation. Slight increased size of   small pleural effusion with decreased size of small right pleural effusion. XR CHEST PORTABLE   Final Result   1.   Stable mild cardiomegaly without evolving acute changes suggested by plain   film. Cardiomegaly can be an early indicator for heart failure in the correct   clinical setting. This report was made using voice transcription. Despite my best efforts to avoid   any, transcription errors may persist. If there is any question about the   accuracy of the report or need for clarification, then please call [phone], or text me through [email or web address] for clarification or correction.            Current Meds:  Current Facility-Administered Medications   Medication Dose Route Frequency    oxyCODONE (ROXICODONE) immediate release tablet 10 mg  10 mg Per J Tube Q4H PRN    furosemide (LASIX) tablet 40 mg  40 mg Oral Daily    bisacodyl (DULCOLAX) suppository 10 mg  10 mg Rectal Daily PRN    pantoprazole (PROTONIX) 40 mg in sodium chloride (PF) 10 mL injection  40 mg IntraVENous Q12H    potassium chloride (KLOR-CON M) extended release tablet 40 mEq  40 mEq Oral PRN    Or    potassium bicarb-citric acid (EFFER-K) effervescent tablet 40 mEq  40 mEq Oral PRN    Or    potassium chloride 10 mEq/100 mL IVPB (Peripheral Line)  10 mEq IntraVENous PRN    magnesium sulfate 2000 mg in 50 mL IVPB premix  2,000 mg IntraVENous PRN    sodium phosphate 10 mmol in sodium chloride 0.9 % 250 mL IVPB  10 mmol IntraVENous PRN    Or    sodium phosphate 15 mmol in sodium chloride 0.9 % 250 mL IVPB  15 mmol IntraVENous PRN    Or    sodium phosphate 20 mmol in sodium chloride 0.9 % 500 mL IVPB  20 mmol IntraVENous PRN    sodium chloride flush 0.9 % injection 5 mL  5 mL IntraVENous PRN    sodium chloride flush 0.9 % injection 5 mL  5 mL IntraVENous Q8H    sodium chloride flush 0.9 % injection 5-40 mL  5-40 mL IntraVENous 2 times per day    sodium chloride flush 0.9 % injection 5-40 mL  5-40 mL IntraVENous PRN    0.9 % sodium chloride infusion   IntraVENous PRN    ondansetron (ZOFRAN-ODT) disintegrating tablet 4 mg  4 mg Oral Q8H PRN    Or    ondansetron [facility]) injection 4 mg  4 mg IntraVENous Q6H PRN    polyethylene glycol (GLYCOLAX) packet 17 g  17 g Oral Daily PRN    acetaminophen (TYLENOL) tablet 650 mg  650 mg Oral Q6H PRN    Or    acetaminophen (TYLENOL) suppository 650 mg  650 mg Rectal Q6H PRN    [Held by provider] enoxaparin (LOVENOX) injection 50 mg  1 mg/kg SubCUTAneous Q12H    levalbuterol (XOPENEX) nebulization 0.63 mg  0.63 mg Nebulization Q6H PRN    metoprolol tartrate (LOPRESSOR) tablet 12.5 mg  12.5 mg Per J Tube BID       Signed:  Josie Leach DO    Part of this note may have been written by using a voice dictation software. The note has been proof read but may still contain some grammatical/other typographical errors.

## 2022-08-13 NOTE — PROGRESS NOTES
Patient resting in bed, alert and oriented, cooperative with care. Standard without fiber. At 65 ml/hr, and flushed with water 40 ml every 4 hrs. Patient denies pain or distress, safety measures in place, call light within reach.

## 2022-08-14 PROCEDURE — 94760 N-INVAS EAR/PLS OXIMETRY 1: CPT

## 2022-08-14 PROCEDURE — 2580000003 HC RX 258: Performed by: EMERGENCY MEDICINE

## 2022-08-14 PROCEDURE — C9113 INJ PANTOPRAZOLE SODIUM, VIA: HCPCS | Performed by: INTERNAL MEDICINE

## 2022-08-14 PROCEDURE — 6370000000 HC RX 637 (ALT 250 FOR IP): Performed by: INTERNAL MEDICINE

## 2022-08-14 PROCEDURE — 6370000000 HC RX 637 (ALT 250 FOR IP): Performed by: FAMILY MEDICINE

## 2022-08-14 PROCEDURE — A4216 STERILE WATER/SALINE, 10 ML: HCPCS | Performed by: INTERNAL MEDICINE

## 2022-08-14 PROCEDURE — 1100000000 HC RM PRIVATE

## 2022-08-14 PROCEDURE — 2580000003 HC RX 258: Performed by: INTERNAL MEDICINE

## 2022-08-14 PROCEDURE — 6360000002 HC RX W HCPCS: Performed by: INTERNAL MEDICINE

## 2022-08-14 RX ORDER — FUROSEMIDE 20 MG/1
20 TABLET ORAL DAILY
Status: DISCONTINUED | OUTPATIENT
Start: 2022-08-14 | End: 2022-08-15 | Stop reason: HOSPADM

## 2022-08-14 RX ORDER — PANTOPRAZOLE SODIUM 40 MG/1
40 TABLET, DELAYED RELEASE ORAL
Status: DISCONTINUED | OUTPATIENT
Start: 2022-08-14 | End: 2022-08-15

## 2022-08-14 RX ADMIN — METOPROLOL TARTRATE 12.5 MG: 25 TABLET, FILM COATED ORAL at 20:49

## 2022-08-14 RX ADMIN — OXYCODONE 10 MG: 5 TABLET ORAL at 17:39

## 2022-08-14 RX ADMIN — SODIUM CHLORIDE, PRESERVATIVE FREE 10 ML: 5 INJECTION INTRAVENOUS at 20:49

## 2022-08-14 RX ADMIN — SODIUM CHLORIDE, PRESERVATIVE FREE 5 ML: 5 INJECTION INTRAVENOUS at 05:25

## 2022-08-14 RX ADMIN — FUROSEMIDE 40 MG: 40 TABLET ORAL at 09:25

## 2022-08-14 RX ADMIN — SODIUM CHLORIDE, PRESERVATIVE FREE 10 ML: 5 INJECTION INTRAVENOUS at 09:25

## 2022-08-14 RX ADMIN — OXYCODONE 10 MG: 5 TABLET ORAL at 13:37

## 2022-08-14 RX ADMIN — OXYCODONE 10 MG: 5 TABLET ORAL at 09:25

## 2022-08-14 RX ADMIN — OXYCODONE 10 MG: 5 TABLET ORAL at 23:47

## 2022-08-14 RX ADMIN — BISACODYL 10 MG: 10 SUPPOSITORY RECTAL at 05:33

## 2022-08-14 RX ADMIN — PANTOPRAZOLE SODIUM 40 MG: 40 TABLET, DELAYED RELEASE ORAL at 16:23

## 2022-08-14 RX ADMIN — OXYCODONE 10 MG: 5 TABLET ORAL at 01:26

## 2022-08-14 RX ADMIN — POLYETHYLENE GLYCOL 3350 17 G: 17 POWDER, FOR SOLUTION ORAL at 09:25

## 2022-08-14 RX ADMIN — SODIUM CHLORIDE, PRESERVATIVE FREE 5 ML: 5 INJECTION INTRAVENOUS at 20:50

## 2022-08-14 RX ADMIN — METOPROLOL TARTRATE 12.5 MG: 25 TABLET, FILM COATED ORAL at 09:25

## 2022-08-14 RX ADMIN — SODIUM CHLORIDE, PRESERVATIVE FREE 40 MG: 5 INJECTION INTRAVENOUS at 09:25

## 2022-08-14 ASSESSMENT — PAIN SCALES - GENERAL
PAINLEVEL_OUTOF10: 0
PAINLEVEL_OUTOF10: 0
PAINLEVEL_OUTOF10: 5
PAINLEVEL_OUTOF10: 0
PAINLEVEL_OUTOF10: 6

## 2022-08-14 ASSESSMENT — PAIN DESCRIPTION - LOCATION
LOCATION: BACK
LOCATION: GENERALIZED

## 2022-08-14 ASSESSMENT — PAIN DESCRIPTION - DESCRIPTORS: DESCRIPTORS: ACHING

## 2022-08-14 NOTE — FLOWSHEET NOTE
08/14/22 0126   Vital Signs   Resp 18   Pain Assessment   Pain Assessment 0-10   Pain Level 6   Patient's Stated Pain Goal 0 - No pain   Pain Location Back   Opioid-Induced Sedation   POSS Score 1     Roxicodone 10mg given per [employer]

## 2022-08-14 NOTE — PROGRESS NOTES
Noted the patient has been accepted to hospice. Very nice man.     At peace with the Burndonta Rouse

## 2022-08-14 NOTE — PROGRESS NOTES
Hospitalist Progress Note   Admit Date:  2022  2:17 PM   Name:  Geo Sykes   Age:  78 y.o. Sex:  male  :  1955   MRN:  [id number]   Room:  [facility]/    Presenting Complaint: Shortness of Breath and Rectal Bleeding     Reason(s) for Admission: Respiratory failure with hypoxia [place] [facility] Course & Interval History:     Copied from prior provider HPI/note summary:  Patient with past medical history of    Gastric adenocarcinoma s/p chemotherapy, s/p total gastrectomy and esophagojejunostomy, lateral lobectomy of the liver, distal pancreatectomy and splenectomy, status post J-tube placement. Hypertension  Pulmonary embolism on Lovenox  Recent admission for septic shock, respiratory failure with pleural effusion and pulmonary embolism. Recent COVID infection on May 1, 2022. Patient came from a nursing facility due to shortness of breath and hematochezia. In ER, his oxygenation saturation was 88% on room air. This improved with oxygen via cannula at 2 L/min. From CT chest, he is found to have   1. No pulmonary embolism or right heart strain. 2.  Distal bilateral lower lobe bronchial occlusion with findings of left   greater than right dependent aspiration/consolidation. Slight increased size of   small pleural effusion with decreased size of small right pleural effusion. CXR shows; Stable mild cardiomegaly without evolving acute changes suggested by plain   film. Cardiomegaly can be an early indicator for heart failure in the correct   clinical setting. Patient is admitted for Acute respiratory failure with hypoxia. Subjective/24hr Events (22): He has been accepted at [facility] but they require admission at least 11 days after diagnosis of COVID-19. Positive COVID test was . Patient is not familiar with any COVID test being positive prior to that.   Tentative plan is discharge to [facility] August 17-Wednesday. He did have some dyspepsia today and is on IV Protonix therapy. His Lovenox is on hold due to hematochezia on presentation and risk/benefit of recurrent/restart anticoagulation with high risk of GI bleeding with active metastatic gastric cancer awaiting on [facility]-DO NOT RESUSCITATE status. 10 system review of systems negative except as above under subjective/24-hour events. Assessment & Plan:      # Respiratory failure with hypoxia  From COVID-19 infection-this is resolved. # Gastic adenocarcinoma  8/12--metastatic. Patient request hospice care and arrangements being made for [address] either over the weekend or August [id number]-ZS soon as able-policy as patient needs to be 11 days from initial diagnosis of COVID-19. Was diagnosed here August 6 but apparently diagnosed prior facility prior to that. Attempting to obtain records. Patient made decision for hospice care but apparently DO NOT RESUSCITATE status was not updated and I will update status now. Prior CODE STATUS here was full code. Patient apparently had DNR status prior-and this is his current wish.  8/13--DNR-discharge to [facility] Wednesday, August 17.  8/14--continue end-of-life care. Awaiting acceptance at [facility]-needs to be 11 days from time of diagnosis of COVID-19-we will continue to hold anticoagulation-history of pulmonary embolus because of risk/benefit issues-GI bleeding documented at time of admission and high risk for recurrent GI bleeding. End-of-life care with comfort and dignity as goal.    #Pepcid-continue PPI-may convert to oral.     # HTN  8/1400-kpjcpqh-prjjfbpw same. # skin lesions  8/12-suspected metastatic-supportive/local care. Discharge Planning:    [facility] August 17     Diet:  ADULT TUBE FEEDING; Jejunostomy; Standard without Fiber; Continuous; 15; Yes; 10; Q 8 hours; 65; 60; Q 4 hours  ADULT DIET;  Clear Liquid  DVT PPx: SCDs-Lovenox on hold  Code status: Full Code               Hospital Problems:  Principal Problem:    Respiratory failure with hypoxia (HCC)  Active Problems:    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HCC)    Gastroesophageal reflux disease    Loss of weight    History of gastric cancer    Jejunostomy status (HCC)    Hematochezia    Abdominal wall mass  Resolved Problems:    * No resolved hospital problems. *      Objective:   Patient Vitals for the past 24 hrs:   Temp Pulse Resp BP SpO2   08/14/22 1220 97.2 °F (36.2 °C) 57 -- 90/65 92 %   08/14/22 0739 98.4 °F (36.9 °C) 86 18 108/82 90 %   08/14/22 0345 97.4 °F (36.3 °C) 83 18 92/60 96 %   08/14/22 0126 -- -- 18 -- --   08/13/22 2315 97.9 °F (36.6 °C) 76 16 104/70 92 %   08/13/22 1945 97.9 °F (36.6 °C) 78 17 97/71 93 %   08/13/22 1533 97.7 °F (36.5 °C) 71 18 99/65 90 %         Oxygen Therapy  SpO2: 92 %  Pulse via Oximetry: 70 beats per minute  Pulse Oximeter Device Mode: Continuous  O2 Device: None (Room air)  O2 Flow Rate (L/min): 2 L/min    Estimated body mass index is 15.9 kg/m² as calculated from the following:    Height as of this encounter: 5' 9\" (1.753 m). Weight as of this encounter: 107 lb 11.2 oz (48.9 kg). Intake/Output Summary (Last 24 hours) at 8/14/2022 1221  Last data filed at 8/14/2022 0358  Gross per 24 hour   Intake 40 ml   Output 450 ml   Net -410 ml           Physical Exam:     Blood pressure 90/65, pulse 57, temperature 97.2 °F (36.2 °C), resp. rate 18, height 5' 9\" (1.753 m), weight 107 lb 11.2 oz (48.9 kg), SpO2 92 %. Physical Exam:   General-alert and oriented x3, cooperative and calm  Eyes-conjunctive are clear pupils equal round react to light extraocular muscles intact    Ears-no deformity-no drainage  Nares-no nasal deformity-no discharge-turbinates not significantly enlarged  Throat-oral mucosa moist, no erythema or exudate  Heart-regular rate and rhythm no rubs gallops clicks or murmurs.   No JVD grossly  Lungs-clear auscultation palpation and percussion, symmetric excursion of the chest wall. No wheezing    Abdomen-no significant adipose tissue-no distention. No increased mass    Extremities-muscular atrophy/cachexia-moves all extremities symmetrically. Neurologic-cranial nerves II through XII grossly intact, no focal motor deficits grossly. No tremor                Lab/Data Review: All lab results for the last 24 hours reviewed. I have personally reviewed labs and tests showing:  Recent Labs:  Recent Results (from the past 48 hour(s))   Basic Metabolic Panel    Collection Time: 08/13/22  3:54 AM   Result Value Ref Range    Sodium 129 (L) 136 - 145 mmol/L    Potassium 4.3 3.5 - 5.1 mmol/L    Chloride 95 (L) 98 - 107 mmol/L    CO2 30 21 - 32 mmol/L    Anion Gap 4 (L) 7 - 16 mmol/L    Glucose 110 (H) 65 - 100 mg/dL    BUN 14 8 - 23 MG/DL    Creatinine 0.20 (L) 0.8 - 1.5 MG/DL    GFR African American >60 >60 ml/min/1.73m2    GFR Non- >60 >60 ml/min/1.73m2    Calcium 8.8 8.3 - 10.4 MG/DL       I have personally reviewed imaging studies showing: Other Studies:  XR CHEST 1 VIEW   Final Result   Improved bibasilar lung atelectasis and small pleural effusions. CT CHEST PULMONARY EMBOLISM W CONTRAST   Final Result   1. No pulmonary embolism or right heart strain. 2.  Distal bilateral lower lobe bronchial occlusion with findings of left   greater than right dependent aspiration/consolidation. Slight increased size of   small pleural effusion with decreased size of small right pleural effusion. XR CHEST PORTABLE   Final Result   1. Stable mild cardiomegaly without evolving acute changes suggested by plain   film. Cardiomegaly can be an early indicator for heart failure in the correct   clinical setting. This report was made using voice transcription.  Despite my best efforts to avoid   any, transcription errors may persist. If there is any question about the   accuracy of the report or need for clarification, then please call [phone], or text me through [email or web address] for clarification or correction.            Current Meds:  Current Facility-Administered Medications   Medication Dose Route Frequency    oxyCODONE (ROXICODONE) immediate release tablet 10 mg  10 mg Per J Tube Q4H PRN    furosemide (LASIX) tablet 40 mg  40 mg Oral Daily    bisacodyl (DULCOLAX) suppository 10 mg  10 mg Rectal Daily PRN    pantoprazole (PROTONIX) 40 mg in sodium chloride (PF) 10 mL injection  40 mg IntraVENous Q12H    potassium chloride (KLOR-CON M) extended release tablet 40 mEq  40 mEq Oral PRN    Or    potassium bicarb-citric acid (EFFER-K) effervescent tablet 40 mEq  40 mEq Oral PRN    Or    potassium chloride 10 mEq/100 mL IVPB (Peripheral Line)  10 mEq IntraVENous PRN    magnesium sulfate 2000 mg in 50 mL IVPB premix  2,000 mg IntraVENous PRN    sodium phosphate 10 mmol in sodium chloride 0.9 % 250 mL IVPB  10 mmol IntraVENous PRN    Or    sodium phosphate 15 mmol in sodium chloride 0.9 % 250 mL IVPB  15 mmol IntraVENous PRN    Or    sodium phosphate 20 mmol in sodium chloride 0.9 % 500 mL IVPB  20 mmol IntraVENous PRN    sodium chloride flush 0.9 % injection 5 mL  5 mL IntraVENous PRN    sodium chloride flush 0.9 % injection 5 mL  5 mL IntraVENous Q8H    sodium chloride flush 0.9 % injection 5-40 mL  5-40 mL IntraVENous 2 times per day    sodium chloride flush 0.9 % injection 5-40 mL  5-40 mL IntraVENous PRN    0.9 % sodium chloride infusion   IntraVENous PRN    ondansetron (ZOFRAN-ODT) disintegrating tablet 4 mg  4 mg Oral Q8H PRN    Or    ondansetron (ZOFRAN) injection 4 mg  4 mg IntraVENous Q6H PRN    polyethylene glycol (GLYCOLAX) packet 17 g  17 g Oral Daily PRN    acetaminophen (TYLENOL) tablet 650 mg  650 mg Oral Q6H PRN    Or    acetaminophen (TYLENOL) suppository 650 mg  650 mg Rectal Q6H PRN    [Held by provider] enoxaparin (LOVENOX) injection 50 mg  1 mg/kg SubCUTAneous Q12H    levalbuterol (XOPENEX) nebulization 0.63 mg  0.63 mg Nebulization Q6H PRN    metoprolol tartrate (LOPRESSOR) tablet 12.5 mg  12.5 mg Per J Tube BID       Signed:  Bk Solares DO    Part of this note may have been written by using a voice dictation software. The note has been proof read but may still contain some grammatical/other typographical errors.

## 2022-08-15 ENCOUNTER — CLINICAL DOCUMENTATION (OUTPATIENT)
Dept: CASE MANAGEMENT | Age: 67
End: 2022-08-15

## 2022-08-15 VITALS
OXYGEN SATURATION: 96 % | DIASTOLIC BLOOD PRESSURE: 70 MMHG | HEIGHT: 69 IN | RESPIRATION RATE: 17 BRPM | SYSTOLIC BLOOD PRESSURE: 106 MMHG | HEART RATE: 69 BPM | TEMPERATURE: 97.9 F | BODY MASS INDEX: 16.72 KG/M2 | WEIGHT: 112.9 LBS

## 2022-08-15 PROCEDURE — 2580000003 HC RX 258: Performed by: INTERNAL MEDICINE

## 2022-08-15 PROCEDURE — 2580000003 HC RX 258: Performed by: EMERGENCY MEDICINE

## 2022-08-15 PROCEDURE — 6370000000 HC RX 637 (ALT 250 FOR IP): Performed by: INTERNAL MEDICINE

## 2022-08-15 PROCEDURE — 6370000000 HC RX 637 (ALT 250 FOR IP): Performed by: FAMILY MEDICINE

## 2022-08-15 RX ORDER — MAGNESIUM HYDROXIDE/ALUMINUM HYDROXICE/SIMETHICONE 120; 1200; 1200 MG/30ML; MG/30ML; MG/30ML
30 SUSPENSION ORAL EVERY 6 HOURS PRN
Status: DISCONTINUED | OUTPATIENT
Start: 2022-08-15 | End: 2022-08-15 | Stop reason: HOSPADM

## 2022-08-15 RX ORDER — OXYCODONE HYDROCHLORIDE 10 MG/1
10 TABLET ORAL EVERY 4 HOURS PRN
Qty: 18 TABLET | Refills: 0
Start: 2022-08-15 | End: 2022-08-18

## 2022-08-15 RX ORDER — FUROSEMIDE 20 MG/1
20 TABLET ORAL DAILY
Qty: 30 TABLET | Refills: 0
Start: 2022-08-16

## 2022-08-15 RX ORDER — MAGNESIUM HYDROXIDE/ALUMINUM HYDROXICE/SIMETHICONE 120; 1200; 1200 MG/30ML; MG/30ML; MG/30ML
30 SUSPENSION ORAL EVERY 6 HOURS PRN
Qty: 1000 ML | Refills: 0
Start: 2022-08-15

## 2022-08-15 RX ADMIN — OXYCODONE 10 MG: 5 TABLET ORAL at 14:16

## 2022-08-15 RX ADMIN — OXYCODONE 10 MG: 5 TABLET ORAL at 09:56

## 2022-08-15 RX ADMIN — FUROSEMIDE 20 MG: 20 TABLET ORAL at 08:11

## 2022-08-15 RX ADMIN — SODIUM CHLORIDE, PRESERVATIVE FREE 5 ML: 5 INJECTION INTRAVENOUS at 06:10

## 2022-08-15 RX ADMIN — SODIUM CHLORIDE, PRESERVATIVE FREE 10 ML: 5 INJECTION INTRAVENOUS at 08:11

## 2022-08-15 RX ADMIN — PANTOPRAZOLE SODIUM 40 MG: 40 TABLET, DELAYED RELEASE ORAL at 06:02

## 2022-08-15 RX ADMIN — POLYETHYLENE GLYCOL 3350 17 G: 17 POWDER, FOR SOLUTION ORAL at 08:11

## 2022-08-15 RX ADMIN — OXYCODONE 10 MG: 5 TABLET ORAL at 06:15

## 2022-08-15 RX ADMIN — METOPROLOL TARTRATE 12.5 MG: 25 TABLET, FILM COATED ORAL at 08:11

## 2022-08-15 ASSESSMENT — PAIN SCALES - GENERAL
PAINLEVEL_OUTOF10: 0
PAINLEVEL_OUTOF10: 0
PAINLEVEL_OUTOF10: 5

## 2022-08-15 ASSESSMENT — PAIN DESCRIPTION - LOCATION: LOCATION: BACK

## 2022-08-15 ASSESSMENT — PAIN DESCRIPTION - DESCRIPTORS: DESCRIPTORS: DISCOMFORT

## 2022-08-15 NOTE — CARE COORDINATION
08/15/22 1131   Discharge Planning   Patient expects to be discharged to: Hospice (comment)   Services At/After Discharge   Transition of Care Consult (CM Consult) Hospice   Internal Hospice No   Reason Why Partner SNF Not Chosen Location   Services At/After Discharge [facility] Provided? No   Mode of Transport at Discharge [address] Time of Discharge 1400   Condition of Participation: Discharge Planning   The Plan for Transition of Care is related to the following treatment goals: patient is terminal and will discharge to the hospice house   The Patient and/or Patient Representative was provided with a Choice of Provider? Patient   The Patient and/Or Patient Representative agree with the Discharge Plan? Yes   Freedom of Choice list was provided with basic dialogue that supports the patient's individualized plan of care/goals, treatment preferences, and shares the quality data associated with the providers? Yes   CM spoke with Maria Luz from [facility]. Patient will discharge today via [facility]. CM will contact patient's wife to make her aware of transport time.

## 2022-08-15 NOTE — PROGRESS NOTES
8/15/22 pts wife sent message to have someone call her. I called and she wanted to let our office know he was being discharged to in hospice today. Answered questions and listened to her concerns. Encouraged to call with any additional concerns.

## 2022-08-15 NOTE — DISCHARGE SUMMARY
Hospitalist Discharge Summary   Admit Date:  2022  2:17 PM   DC Note date: 8/15/2022  Name:  Lalit Quinones   Age:  75 y.o. Sex:  male  :  1955   MRN:  [id number]   Room:  [facility]  PCP:  Genesis Weldon MD    Presenting Complaint: Shortness of Breath and Rectal Bleeding     Initial Admission Diagnosis: Respiratory failure with hypoxia ([address] [J96.91]     Problem List for this Hospitalization (present on admission):    Principal Problem:    Respiratory failure with hypoxia ([address]  Active Problems:    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[address]    Gastroesophageal reflux disease    Loss of weight    History of gastric cancer    Jejunostomy status ([address]    Hematochezia    Abdominal wall mass  Resolved Problems:    * No resolved hospital problems. [facility] Course:  From history and physical/previous provider HPI Hospital summary:  Patient with past medical history of    Gastric adenocarcinoma s/p chemotherapy, s/p total gastrectomy and esophagojejunostomy, lateral lobectomy of the liver, distal pancreatectomy and splenectomy, status post J-tube placement. Hypertension  Pulmonary embolism on Lovenox  Recent admission for septic shock, respiratory failure with pleural effusion and pulmonary embolism. Recent COVID infection on May 1, 2022. Patient came from a nursing facility due to shortness of breath and hematochezia. In ER, his oxygenation saturation was 88% on room air. This improved with oxygen via cannula at 2 L/min. From CT chest, he is found to have   1. No pulmonary embolism or right heart strain. 2.  Distal bilateral lower lobe bronchial occlusion with findings of left   greater than right dependent aspiration/consolidation. Slight increased size of   small pleural effusion with decreased size of small right pleural effusion. CXR shows; Stable mild cardiomegaly without evolving acute changes suggested by plain   film.   Cardiomegaly can be an early indicator for heart failure in the correct clinical setting. Patient is admitted for Acute respiratory failure with hypoxia. Summary of Hospital Course:   Admitted with acute respiratory failure with hypoxemia this is improved. COVID-positive. This has resolved. Unfortunately patient with metastatic gastric cancer multiple prior interventions and he has made decision to pursue end-of-life care and his goal is dignity and comfort his remaining days. He previously took Lovenox for prior pulmonary thromboembolus but risk-benefit of any continued anticoagulation as patient developed significant GI bleeding with hematochezia. Lovenox discontinued and he was placed on PPI however he has no gastrum by his report and for this reason he was given an order for Mylanta 30 cc every 6 hours as needed for dyspepsia. He has experienced occasional dyspepsia during hospital stay. He has remained stable last 72 hours awaiting bed at hospice house. His COVID diagnosis here was August 6 but a more recent positive COVID test was located and he may be discharged to [address] [place] [address] for end-of-life care. For further details regarding his hospital stay please refer to the entirety of the medical record               Disposition: Stable for discharge to hospice house. Diet: ADULT DIET; Clear Liquid  ADULT TUBE FEEDING; Jejunostomy; Standard without Fiber; Continuous; 65; No; 20; Q 4 hours  Code Status: DNR    Follow Ups:  Per care providers at hospice house    Time spent in patient discharge and coordination 41 minutes. Plan was discussed with patient and staff. All questions answered. Patient was stable at time of discharge. Instructions given to call a physician or return if any concerns. Current Discharge Medication List        START taking these medications    Details   oxyCODONE (OXY-IR) 10 MG immediate release tablet 1 tablet by Per J Tube route every 4 hours as needed for Pain for up to 3 days.   Qty: 18 tablet, Refills: 0 lidocaine-prilocaine (EMLA) 2.5-2.5 % cream Comments:   Reason for Stopping:               Procedures done this admission:  * No surgery found *    Consults this admission:  IP CONSULT TO DIETITIAN  IP CONSULT TO DIETITIAN  IP CONSULT TO DIETITIAN  IP CONSULT TO GENERAL SURGERY  IP CONSULT TO ONCOLOGY  IP CONSULT TO HOSPICE    Echocardiogram results:  06/02/22    TRANSTHORACIC ECHOCARDIOGRAM (TTE) COMPLETE (CONTRAST/BUBBLE/3D PRN) 06/09/2022  9:31 AM (Final)    Interpretation Summary  Formatting of this result is different from the original.      Left Ventricle: Reduced left ventricular systolic function. EF by 2D Simpsons Biplane is 42%. Left ventricle size is normal. Severely increased wall thickness. Increased ventricular mass. Infiltrative cardiomyopathy should be considered. Global hypokinesis present. Abnormal diastolic function. Aortic Valve: Thickened cusp. Mildly calcified cusp. Sclerosis of the aortic valve cusp. Moderate annular calcification vs appearance of aortic valve prosthesis [clinical correlation, clinical history]. Mild regurgitation. Mitral Valve: Mildly thickened leaflet. Aorta: Dilated aortic root. Dilated ascending aorta. Pericardium: Small (<1 cm) localized pericardial effusion present around the right ventricle. Technical qualifiers: Color flow Doppler was performed and pulse wave and/or continuous wave Doppler was performed. Signed by: Valentino Pimenta, MD on 6/9/2022  9:31 AM      Diagnostic Imaging/Tests:   XR CHEST PORTABLE    Result Date: 8/6/2022  1. Stable mild cardiomegaly without evolving acute changes suggested by plain film. Cardiomegaly can be an early indicator for heart failure in the correct clinical setting. This report was made using voice transcription.  Despite my best efforts to avoid any, transcription errors may persist. If there is any question about the accuracy of the report or need for clarification, then please call [phone], or text me through [email or web address] for clarification or correction. XR CHEST 1 VIEW    Result Date: 8/8/2022  Improved bibasilar lung atelectasis and small pleural effusions. CT CHEST PULMONARY EMBOLISM W CONTRAST    Result Date: 8/6/2022  1. No pulmonary embolism or right heart strain. 2.  Distal bilateral lower lobe bronchial occlusion with findings of left greater than right dependent aspiration/consolidation. Slight increased size of small pleural effusion with decreased size of small right pleural effusion. No results for input(s): CULTURE in the last 720 hours. Labs: Results:       BMP, Mg, Phos Recent Labs     08/13/22  0354   *   K 4.3   CL 95*   CO2 30   ANIONGAP 4*   BUN 14   CREATININE 0.20*   LABGLOM >60   GFRAA >60   CALCIUM 8.8   GLUCOSE 110*      CBC No results for input(s): WBC, RBC, HGB, HCT, MCV, MCH, MCHC, RDW, PLT, MPV, NRBC, SEGS, LYMPHOPCT, EOSRELPCT, MONOPCT, BASOPCT, IMMGRAN, SEGSABS, LYMPHSABS, EOSABS, MONOSABS, BASOSABS, ABSIMMGRAN in the last 72 hours. LFT No results for input(s): BILITOT, BILIDIR, ALKPHOS, AST, ALT, PROT, LABALBU, GLOB in the last 72 hours.    Cardiac  Lab Results   Component Value Date/Time    NTPROBNP 323 08/06/2022 02:38 PM    TROPHS 8.1 08/06/2022 02:38 PM    TROPHS 8.8 06/02/2022 01:12 PM      Coags Lab Results   Component Value Date/Time    PROTIME 15.2 06/03/2022 06:07 PM    PROTIME 14.6 06/02/2022 07:53 PM    PROTIME 14.1 03/25/2022 11:14 AM    INR 1.2 06/03/2022 06:07 PM    INR 1.1 06/02/2022 07:53 PM    INR 1.1 03/25/2022 11:14 AM    APTT 33.8 06/02/2022 07:53 PM    APTT 32.5 03/23/2022 02:59 PM      A1c No results found for: LABA1C, EAG   Lipids Lab Results   Component Value Date/Time    TRIG 88 06/15/2022 03:56 AM      Thyroid  Lab Results   Component Value Date/Time    TSHELE 1.41 06/02/2022 07:53 PM        Most Recent UA Lab Results   Component Value Date/Time    COLORU YELLOW 06/02/2022 09:30 PM    APPEARANCE CLEAR 06/02/2022 09:30 PM SPECGRAV 1.015 06/02/2022 09:30 PM    LABPH 7.0 06/02/2022 09:30 PM    PROTEINU Negative 06/02/2022 09:30 PM    GLUCOSEU Negative 08/06/2022 04:39 PM    GLUCOSEU Negative 06/02/2022 09:30 PM    KETUA Negative 06/02/2022 09:30 PM    BILIRUBINUR Negative 06/02/2022 09:30 PM    BILIRUBINUR Negative 03/25/2022 11:16 AM    BLOODU Negative 08/06/2022 04:39 PM    BLOODU Negative 06/02/2022 09:30 PM    UROBILINOGEN 0.2 06/02/2022 09:30 PM    NITRU Negative 06/02/2022 09:30 PM    LEUKOCYTESUR Negative 06/02/2022 09:30 PM    WBCUA 0 06/02/2022 09:30 PM    RBCUA 0 06/02/2022 09:30 PM    EPITHUA 0 06/02/2022 09:30 PM    BACTERIA 0 06/02/2022 09:30 PM    LABCAST 0 06/02/2022 09:30 PM    MUCUS 0 06/02/2022 09:30 PM          All Labs from Last 24 Hrs:  No results found for this or any previous visit (from the past 24 hour(s)). Allergies   Allergen Reactions    Iron     Tetanus Antitoxin      Immunization History   Administered Date(s) Administered    Influenza, MDCK Quadv, with preserv IM (Flucelvax 2 yrs and older) 10/30/2020    PPD Test 06/02/2022, 08/09/2022       Recent Vital Data:  Patient Vitals for the past 24 hrs:   Temp Pulse Resp BP SpO2   08/15/22 1031 97.9 °F (36.6 °C) 69 17 106/70 96 %   08/15/22 0743 97.7 °F (36.5 °C) 69 17 107/71 96 %   08/15/22 0615 -- -- 18 -- --   08/15/22 0403 98.2 °F (36.8 °C) 66 18 101/66 97 %   08/15/22 0014 98.1 °F (36.7 °C) 65 18 97/72 97 %   08/14/22 2347 -- -- 18 -- --   08/14/22 1956 97.7 °F (36.5 °C) 75 14 96/71 94 %   08/14/22 1551 98.1 °F (36.7 °C) 71 18 103/71 94 %   08/14/22 1220 97.2 °F (36.2 °C) 57 18 90/65 92 %       Oxygen Therapy  SpO2: 96 %  Pulse via Oximetry: 70 beats per minute  Pulse Oximeter Device Mode: Continuous  O2 Device: None (Room air)  O2 Flow Rate (L/min): 2 L/min    Estimated body mass index is 16.67 kg/m² as calculated from the following:    Height as of this encounter: 5' 9\" (1.753 m).     Weight as of this encounter: 112 lb 14.4 oz (51.2 kg).    Intake/Output Summary (Last 24 hours) at 8/15/2022 1115  Last data filed at 8/15/2022 1000  Gross per 24 hour   Intake 20 ml   Output 550 ml   Net -530 ml         Physical Exam:    General:    Cachectic but alert oriented x3 and pleasant. Head:  Facial lesion suspected to be metastatic. Eyes:  Sclerae appear normal.  Pupils equally round. HENT:  Nares appear normal, no drainage. Moist mucous membranes  Neck:  No restricted ROM. Trachea midline  CV:   RRR. No m/r/g. No JVD  Lungs:   CTAB. No wheezing, rhonchi, or rales. Respirations even, unlabored-decreased breath sounds. Abdomen:   Soft, nontender, nondistended. Extremities: No gross deformity, moves all extremities  Neuro:  CN II-XII grossly intact. Signed:  Veronica Jewell DO    Part of this note may have been written by using a voice dictation software. The note has been proof read but may still contain some grammatical/other typographical errors.

## 2022-08-16 ENCOUNTER — CARE COORDINATION (OUTPATIENT)
Dept: CARE COORDINATION | Facility: CLINIC | Age: 67
End: 2022-08-16

## (undated) DEVICE — DRAIN SURG 19FR 100% SIL RADPQ RND CHN FULL FLUT

## (undated) DEVICE — PUMP,MEDLINE,DVT,SEQUENTIAL, GEN2: Brand: MEDLINE

## (undated) DEVICE — FORCEPS BX L240CM JAW DIA2.8MM L CAP W/ NDL MIC MESH TOOTH

## (undated) DEVICE — SUTURE SILK BLK BR 3-0 18IN SH (24/BX C0135

## (undated) DEVICE — GLOVE SURG SZ 6.5 L11.2IN THK8.6MIL LT BRN LTX FREE

## (undated) DEVICE — SUT PROL 2-0 30IN SH BLU --

## (undated) DEVICE — RESERVOIR,SUCTION,100CC,SILICONE: Brand: MEDLINE

## (undated) DEVICE — MASTISOL ADHESIVE LIQ 2/3ML

## (undated) DEVICE — ELECTRODE PT RET AD L9FT HI MOIST COND ADH HYDRGEL CORDED

## (undated) DEVICE — KENDALL RADIOLUCENT FOAM MONITORING ELECTRODE RECTANGULAR SHAPE: Brand: KENDALL

## (undated) DEVICE — TROCAR: Brand: KII® SLEEVE

## (undated) DEVICE — SUTURE PERMAHAND SZ 3-0 L18IN NONABSORBABLE BLK SILK BRAID A184H

## (undated) DEVICE — LOGICUT SCISSOR LENGTH 320MM: Brand: LOGI - LAPAROSCOPIC INSTRUMENT SYSTEM

## (undated) DEVICE — TRAY,URINE METER,100% SILICONE,16FR10ML: Brand: MEDLINE

## (undated) DEVICE — STAPLER INT L60MM REG TISS BLU B FRM 8 FIRING 2 ROW AUTO

## (undated) DEVICE — PRESSURE MONITORING SET: Brand: TRUWAVE, VAMP PLUS

## (undated) DEVICE — SUTURE PERMA-HAND SZ 0 L30IN NONABSORBABLE BLK L36MM CT-1 424H

## (undated) DEVICE — GLOVE SURG SZ 65 L12IN FNGR THK79MIL GRN LTX FREE

## (undated) DEVICE — BASIC SINGLE BASIN-LF: Brand: MEDLINE INDUSTRIES, INC.

## (undated) DEVICE — BLOCK BITE AD 60FR W/ VELC STRP ADDRESSES MOST PT AND

## (undated) DEVICE — GLOVE ORANGE PI 7 1/2   MSG9075

## (undated) DEVICE — BLLN KT O RING ENDOSCP US --

## (undated) DEVICE — BRONCHOSCOPY PACK: Brand: MEDLINE INDUSTRIES, INC.

## (undated) DEVICE — GARMENT,MEDLINE,DVT,INT,CALF,MED, GEN2: Brand: MEDLINE

## (undated) DEVICE — SUTURE SZ 0 27IN 5/8 CIR UR-6  TAPER PT VIOLET ABSRB VICRYL J603H

## (undated) DEVICE — C-ARM: Brand: UNBRANDED

## (undated) DEVICE — STERILE SLEEVE: Brand: CONVERTORS

## (undated) DEVICE — STAPLER INT L75MM CUT LN L73MM STPL LN L77MM BLU B FRM 8

## (undated) DEVICE — [HIGH FLOW INSUFFLATOR,  DO NOT USE IF PACKAGE IS DAMAGED,  KEEP DRY,  KEEP AWAY FROM SUNLIGHT,  PROTECT FROM HEAT AND RADIOACTIVE SOURCES.]: Brand: PNEUMOSURE

## (undated) DEVICE — CONNECTOR TBNG OD5-7MM O2 END DISP

## (undated) DEVICE — 3M™ TEGADERM™ TRANSPARENT FILM DRESSING FRAME STYLE, 1626W, 4 IN X 4-3/4 IN (10 CM X 12 CM), 50/CT 4CT/CASE: Brand: 3M™ TEGADERM™

## (undated) DEVICE — SOLUTION IRRIG 1000ML 09% SOD CHL USP PIC PLAS CONTAINER

## (undated) DEVICE — STRIP,CLOSURE,WOUND,MEDI-STRIP,1/2X4: Brand: MEDLINE

## (undated) DEVICE — GAUZE,SPONGE,4"X4",12PLY,WOVEN,NS,LF: Brand: MEDLINE

## (undated) DEVICE — SYRINGE MED 5ML STD CLR PLAS N CTRL SLIP TIP DISP

## (undated) DEVICE — 2, DISPOSABLE SUCTION/IRRIGATOR WITHOUT DISPOSABLE TIP: Brand: STRYKEFLOW

## (undated) DEVICE — AIRLIFE™ OXYGEN TUBING 7 FEET (2.1 M) CRUSH RESISTANT OXYGEN TUBING, VINYL TIPPED: Brand: AIRLIFE™

## (undated) DEVICE — MOUTHPIECE ENDOSCP L CTRL OPN AND SIDE PORTS DISP

## (undated) DEVICE — SUTURE PERMA-HAND SZ 2-0 L30IN NONABSORBABLE BLK L26MM SH K833H

## (undated) DEVICE — CONTAINER PREFIL FRMLN 40ML --

## (undated) DEVICE — NEEDLE SYR 18GA L1.5IN RED PLAS HUB S STL BLNT FILL W/O

## (undated) DEVICE — SINGLE USE SUCTION VALVE MAJ-209: Brand: SINGLE USE SUCTION VALVE (STERILE)

## (undated) DEVICE — GARMENT,MEDLINE,DVT,INT,CALF,LG, GEN2: Brand: MEDLINE

## (undated) DEVICE — CANNULA NSL ORAL AD FOR CAPNOFLEX CO2 O2 AIRLFE

## (undated) DEVICE — DUAL LUMEN STOMACH TUBE: Brand: SALEM SUMP

## (undated) DEVICE — KIT THORCENT 8FR L5IN POLYUR W/ 18/22/25GA NDL 3 W STPCOCK

## (undated) DEVICE — STANDARD HYPODERMIC NEEDLE,POLYPROPYLENE HUB: Brand: MONOJECT

## (undated) DEVICE — SCISSORS ENDOSCP DIA5MM CRV MPLR CAUT W/ RATCH HNDL

## (undated) DEVICE — MOUTHPIECE ENDOSCP 20X27MM --

## (undated) DEVICE — CANISTER, RIGID, 2000CC: Brand: MEDLINE INDUSTRIES, INC.

## (undated) DEVICE — SUTURE PERMAHAND SZ 0 L30IN NONABSORBABLE BLK SILK BRAID A306H

## (undated) DEVICE — TUBING INSUFFLATION SMK EVAC HI FLO SET PNEUMOCLEAR

## (undated) DEVICE — SUTURE PERMAHAND SZ 0 L30IN NONABSORBABLE BLK L30MM PSL 3/8 590H

## (undated) DEVICE — SUTURE PERMAHAND SZ 3-0 L18IN NONABSORBABLE BLK L26MM SH C013D

## (undated) DEVICE — SOLUTION IRRIG 1000ML STRL H2O USP PLAS POUR BTL

## (undated) DEVICE — RELOAD STPL L75MM OPN H3.8MM CLS 1.5MM WIRE DIA0.2MM REG

## (undated) DEVICE — SEALANT TISS 10 CC FOR HUM FIBRIN VISTASEAL

## (undated) DEVICE — GAUZE,SPONGE,4"X4",16PLY,STRL,LF,10/TRAY: Brand: MEDLINE

## (undated) DEVICE — PVC URETHRAL CATHETER: Brand: DOVER

## (undated) DEVICE — RELOAD STPL L60MM H1.5-3.6MM REG TISS BLU GRIPPING SURF B

## (undated) DEVICE — NDL PRT INJ NSAF BLNT 18GX1.5 --

## (undated) DEVICE — DEVICE SEAL L23CM NANO COAT MARYLAND JAW OPN DIV LIGASURE

## (undated) DEVICE — TROCAR: Brand: KII OPTICAL ACCESS SYSTEM

## (undated) DEVICE — RELOAD STPL L60MM H1-2.6MM MESENTERY THN TISS WHT 6 ROW

## (undated) DEVICE — SYRINGE, LUER SLIP, STERILE, 60ML: Brand: MEDLINE

## (undated) DEVICE — NEEDLE INSUF L120MM ULT VERES ENDOPATH

## (undated) DEVICE — GENERAL LAPAROSCOPY: Brand: MEDLINE INDUSTRIES, INC.

## (undated) DEVICE — SINGLE USE BIOPSY VALVE MAJ-210: Brand: SINGLE USE BIOPSY VALVE (STERILE)

## (undated) DEVICE — BAG DRNGE 9OZ L9.5IN BILE W/ ADPT TWO ADJ RUB BELT DISP FOR

## (undated) DEVICE — SUTURE VCRL SZ 3-0 L27IN ABSRB UD L26MM SH 1/2 CIR J416H

## (undated) DEVICE — MAJOR GENERAL: Brand: MEDLINE INDUSTRIES, INC.

## (undated) DEVICE — DRAPE TWL SURG 16X26IN BLU ORB04] ALLCARE INC]

## (undated) DEVICE — SUTURE PDS + SZ 1 L96IN ABSRB VLT L65MM TP-1 1/2 CIR PDP880G

## (undated) DEVICE — NEEDLE HYPO 23GA L1IN TURQ S STL HUB POLYPR SHLD REG BVL

## (undated) DEVICE — GAUZE,SPONGE,2"X2",8PLY,STERILE,LF,2'S: Brand: MEDLINE

## (undated) DEVICE — MINOR SPLIT GENERAL: Brand: MEDLINE INDUSTRIES, INC.

## (undated) DEVICE — STAPLER SKIN LN REINF 60 MM ECHELON ENDOPATH

## (undated) DEVICE — SYR 5ML 1/5 GRAD LL NSAF LF --

## (undated) DEVICE — SUTURE PDS II SZ 3-0 L18IN ABSRB VLT L26MM SH TAPERPOINT Z774D

## (undated) DEVICE — TROCAR: Brand: KII FIOS FIRST ENTRY

## (undated) DEVICE — SPONGE LAPAROTOMY W18XL18IN WHITE STRUNG RADIOPAQUE STERILE

## (undated) DEVICE — STAPLER INT L34CM 60MM LNG ENDOSCP ARTC PWR + ECHELON FLX

## (undated) DEVICE — SOLUTION IRRIG 3000ML 0.9% SOD CHL FLX CONT 0797208] ICU MEDICAL INC]

## (undated) DEVICE — GOWN,REINFORCE,POLY,SIRUS,XLNG/XLG: Brand: MEDLINE

## (undated) DEVICE — 3M™ IOBAN™ 2 ANTIMICROBIAL INCISE DRAPE 6650EZ: Brand: IOBAN™ 2

## (undated) DEVICE — SUTURE COAT VCRL SZ 4-0 L18IN ABSRB UD L19MM PS-2 1/2 CIR J496G

## (undated) DEVICE — SYR 3ML LL TIP 1/10ML GRAD --

## (undated) DEVICE — DRAPE,T,LAPARO,TRANS,STERILE: Brand: MEDLINE